# Patient Record
Sex: MALE | Race: WHITE | NOT HISPANIC OR LATINO | Employment: OTHER | URBAN - METROPOLITAN AREA
[De-identification: names, ages, dates, MRNs, and addresses within clinical notes are randomized per-mention and may not be internally consistent; named-entity substitution may affect disease eponyms.]

---

## 2019-05-28 ENCOUNTER — OFFICE VISIT (OUTPATIENT)
Dept: URGENT CARE | Facility: CLINIC | Age: 64
End: 2019-05-28
Payer: MEDICARE

## 2019-05-28 VITALS
OXYGEN SATURATION: 96 % | RESPIRATION RATE: 16 BRPM | HEART RATE: 89 BPM | SYSTOLIC BLOOD PRESSURE: 142 MMHG | TEMPERATURE: 96.8 F | DIASTOLIC BLOOD PRESSURE: 86 MMHG | HEIGHT: 72 IN | BODY MASS INDEX: 31.83 KG/M2 | WEIGHT: 235 LBS

## 2019-05-28 DIAGNOSIS — R10.9 FLANK PAIN: Primary | ICD-10-CM

## 2019-05-28 LAB
SL AMB  POCT GLUCOSE, UA: ABNORMAL
SL AMB LEUKOCYTE ESTERASE,UA: ABNORMAL
SL AMB POCT BILIRUBIN,UA: ABNORMAL
SL AMB POCT BLOOD,UA: ABNORMAL
SL AMB POCT CLARITY,UA: CLEAR
SL AMB POCT COLOR,UA: ABNORMAL
SL AMB POCT KETONES,UA: ABNORMAL
SL AMB POCT NITRITE,UA: ABNORMAL
SL AMB POCT PH,UA: 7.5
SL AMB POCT SPECIFIC GRAVITY,UA: 1.01
SL AMB POCT URINE PROTEIN: ABNORMAL
SL AMB POCT UROBILINOGEN: NORMAL

## 2019-05-28 PROCEDURE — G0463 HOSPITAL OUTPT CLINIC VISIT: HCPCS | Performed by: PHYSICIAN ASSISTANT

## 2019-05-28 PROCEDURE — 99213 OFFICE O/P EST LOW 20 MIN: CPT | Performed by: PHYSICIAN ASSISTANT

## 2019-05-28 PROCEDURE — 81002 URINALYSIS NONAUTO W/O SCOPE: CPT | Performed by: PHYSICIAN ASSISTANT

## 2019-05-28 RX ORDER — ESCITALOPRAM OXALATE 20 MG/1
20 TABLET ORAL DAILY
Refills: 1 | COMMUNITY
Start: 2019-04-05

## 2019-05-28 RX ORDER — AMLODIPINE BESYLATE 10 MG/1
10 TABLET ORAL DAILY
Refills: 0 | COMMUNITY
Start: 2019-05-20

## 2019-05-28 RX ORDER — HYDROCHLOROTHIAZIDE 25 MG/1
25 TABLET ORAL DAILY
Refills: 1 | COMMUNITY
Start: 2019-05-03

## 2019-05-28 RX ORDER — LEVOTHYROXINE SODIUM 0.07 MG/1
TABLET ORAL
COMMUNITY
Start: 2019-05-17

## 2019-05-28 RX ORDER — BUPROPION HYDROCHLORIDE 300 MG/1
TABLET ORAL
COMMUNITY
Start: 2019-05-25

## 2020-10-08 ENCOUNTER — OFFICE VISIT (OUTPATIENT)
Dept: URGENT CARE | Facility: CLINIC | Age: 65
End: 2020-10-08
Payer: MEDICARE

## 2020-10-08 VITALS
HEART RATE: 64 BPM | BODY MASS INDEX: 30.88 KG/M2 | OXYGEN SATURATION: 96 % | WEIGHT: 228 LBS | TEMPERATURE: 97.1 F | SYSTOLIC BLOOD PRESSURE: 143 MMHG | DIASTOLIC BLOOD PRESSURE: 67 MMHG | RESPIRATION RATE: 16 BRPM | HEIGHT: 72 IN
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
## 2020-10-08 DIAGNOSIS — S30.861A TICK BITE OF ABDOMEN, INITIAL ENCOUNTER: Primary | ICD-10-CM

## 2020-10-08 PROCEDURE — 99213 OFFICE O/P EST LOW 20 MIN: CPT | Performed by: PHYSICIAN ASSISTANT

## 2020-10-08 PROCEDURE — G0463 HOSPITAL OUTPT CLINIC VISIT: HCPCS | Performed by: PHYSICIAN ASSISTANT

## 2020-10-08 RX ORDER — DOXYCYCLINE 100 MG/1
100 TABLET ORAL 2 TIMES DAILY
Qty: 14 TABLET | Refills: 0 | Status: SHIPPED | OUTPATIENT
Start: 2020-10-08 | End: 2020-10-15

## 2022-09-23 ENCOUNTER — APPOINTMENT (EMERGENCY)
Dept: CT IMAGING | Facility: HOSPITAL | Age: 67
End: 2022-09-23
Payer: MEDICARE

## 2022-09-23 ENCOUNTER — HOSPITAL ENCOUNTER (EMERGENCY)
Facility: HOSPITAL | Age: 67
Discharge: HOME | End: 2022-09-23
Attending: EMERGENCY MEDICINE
Payer: MEDICARE

## 2022-09-23 VITALS
WEIGHT: 230 LBS | TEMPERATURE: 97.3 F | DIASTOLIC BLOOD PRESSURE: 83 MMHG | BODY MASS INDEX: 31.15 KG/M2 | OXYGEN SATURATION: 96 % | RESPIRATION RATE: 17 BRPM | HEIGHT: 72 IN | HEART RATE: 77 BPM | SYSTOLIC BLOOD PRESSURE: 143 MMHG

## 2022-09-23 DIAGNOSIS — T14.8XXA CONTUSION: ICD-10-CM

## 2022-09-23 DIAGNOSIS — S22.39XA RIB FRACTURE: Primary | ICD-10-CM

## 2022-09-23 PROCEDURE — 99283 EMERGENCY DEPT VISIT LOW MDM: CPT

## 2022-09-23 PROCEDURE — 99284 EMERGENCY DEPT VISIT MOD MDM: CPT | Performed by: PHYSICIAN ASSISTANT

## 2022-09-23 PROCEDURE — 71250 CT THORAX DX C-: CPT

## 2022-09-23 PROCEDURE — G1004 CDSM NDSC: HCPCS

## 2022-09-23 PROCEDURE — 96372 THER/PROPH/DIAG INJ SC/IM: CPT

## 2022-09-23 RX ORDER — KETOROLAC TROMETHAMINE 30 MG/ML
15 INJECTION, SOLUTION INTRAMUSCULAR; INTRAVENOUS ONCE
Status: COMPLETED | OUTPATIENT
Start: 2022-09-23 | End: 2022-09-23

## 2022-09-23 RX ORDER — BACITRACIN, NEOMYCIN, POLYMYXIN B 400; 3.5; 5 [USP'U]/G; MG/G; [USP'U]/G
1 OINTMENT TOPICAL ONCE
Status: COMPLETED | OUTPATIENT
Start: 2022-09-23 | End: 2022-09-23

## 2022-09-23 RX ORDER — LIDOCAINE 50 MG/G
1 PATCH TOPICAL ONCE
Status: DISCONTINUED | OUTPATIENT
Start: 2022-09-23 | End: 2022-09-23 | Stop reason: HOSPADM

## 2022-09-23 RX ORDER — LIDOCAINE 50 MG/G
1 PATCH TOPICAL DAILY
Qty: 6 PATCH | Refills: 0 | Status: SHIPPED | OUTPATIENT
Start: 2022-09-23

## 2022-09-23 RX ADMIN — BACITRACIN ZINC, NEOMYCIN, POLYMYXIN B 1 LARGE APPLICATION: 400; 3.5; 5 OINTMENT TOPICAL at 13:43

## 2022-09-23 RX ADMIN — KETOROLAC TROMETHAMINE 15 MG: 30 INJECTION, SOLUTION INTRAMUSCULAR at 13:35

## 2022-09-23 RX ADMIN — LIDOCAINE 5% 1 PATCH: 700 PATCH TOPICAL at 13:39

## 2022-09-23 NOTE — DISCHARGE INSTRUCTIONS
Please continue to take deep breaths to avoid developing pneumonia  Please alternate between Tylenol and ibuprofen as needed for pain control  Lidocaine patches of been sent to pharmacy  Please keep your wound clean and dry  Follow-up with your family doctor early next week  Please return with any new or worsening symptoms

## 2022-09-23 NOTE — ED PROVIDER NOTES
History  Chief Complaint   Patient presents with    Fall     Fall c/o right rib pain      74year old male presents to the emergency department for evaluation of right rib pain  Patient reports prior to arrival he was chasing after his puppy when he tripped and fell into the fireplace - which was off  States he hit the fireplace on his right side  States since he has had significant pain  Reports pain is worse with deep inspiration  He reports small contusion to the right side of his back  Denies any other pain  No head strike of LOC  History provided by:  Patient  Fall  Mechanism of injury: fall    Injury location:  Torso  Torso injury location:  Back  Incident location:  Home  Tetanus status:  Up to date  Associated symptoms: back pain    Associated symptoms: no abdominal pain, no blindness, no chest pain, no difficulty breathing, no headaches, no hearing loss, no loss of consciousness, no nausea, no neck pain, no seizures and no vomiting        Prior to Admission Medications   Prescriptions Last Dose Informant Patient Reported? Taking? amLODIPine (NORVASC) 10 mg tablet   Yes No   Sig: Take 10 mg by mouth daily   buPROPion (WELLBUTRIN XL) 300 mg 24 hr tablet   Yes No   escitalopram (LEXAPRO) 20 mg tablet   Yes No   Sig: Take 20 mg by mouth daily   hydrochlorothiazide (HYDRODIURIL) 25 mg tablet   Yes No   Sig: Take 25 mg by mouth daily   levothyroxine 75 mcg tablet   Yes No      Facility-Administered Medications: None       Past Medical History:   Diagnosis Date    Depression     Hypertension     Hypothyroidism        Past Surgical History:   Procedure Laterality Date    BACK SURGERY      TOTAL SHOULDER REPLACEMENT         Family History   Problem Relation Age of Onset    No Known Problems Mother     No Known Problems Father      I have reviewed and agree with the history as documented      E-Cigarette/Vaping     E-Cigarette/Vaping Substances     Social History     Tobacco Use    Smoking status: Light Tobacco Smoker    Smokeless tobacco: Never Used       Review of Systems   Constitutional: Negative  HENT: Negative for hearing loss  Eyes: Negative for blindness  Respiratory: Negative  Cardiovascular: Negative  Negative for chest pain  Gastrointestinal: Negative  Negative for abdominal pain, nausea and vomiting  Musculoskeletal: Positive for back pain  Negative for neck pain  Skin: Positive for wound  Neurological: Negative  Negative for seizures, loss of consciousness and headaches  All other systems reviewed and are negative  Physical Exam  Physical Exam  Vitals and nursing note reviewed  Constitutional:       General: He is not in acute distress  Appearance: Normal appearance  He is not ill-appearing, toxic-appearing or diaphoretic  HENT:      Head: Normocephalic and atraumatic  Nose: Nose normal       Mouth/Throat:      Mouth: Mucous membranes are moist    Eyes:      Conjunctiva/sclera: Conjunctivae normal    Cardiovascular:      Rate and Rhythm: Normal rate and regular rhythm  Pulmonary:      Effort: Pulmonary effort is normal  No respiratory distress  Breath sounds: Normal breath sounds  No stridor  No wheezing, rhonchi or rales  Abdominal:      General: Abdomen is flat  Bowel sounds are normal  There is no distension  Palpations: Abdomen is soft  Tenderness: There is no abdominal tenderness  There is no right CVA tenderness, left CVA tenderness or guarding  Musculoskeletal:         General: Tenderness present  No swelling or deformity  Normal range of motion  Cervical back: No tenderness or bony tenderness  No pain with movement  Normal range of motion  Thoracic back: Tenderness present  Back:       Comments: No midline back tenderness or step off deformities   Normal ROM in upper and lower extremities    Skin:     General: Skin is warm and dry  Capillary Refill: Capillary refill takes less than 2 seconds  Findings: Bruising (right side of back) present  Neurological:      General: No focal deficit present  Mental Status: He is alert and oriented to person, place, and time  Psychiatric:         Mood and Affect: Mood normal          Behavior: Behavior normal          Vital Signs  ED Triage Vitals   Temperature Pulse Respirations Blood Pressure SpO2   09/23/22 1314 09/23/22 1314 09/23/22 1314 09/23/22 1314 09/23/22 1314   (!) 97 3 °F (36 3 °C) 87 18 143/74 97 %      Temp Source Heart Rate Source Patient Position - Orthostatic VS BP Location FiO2 (%)   09/23/22 1314 09/23/22 1345 09/23/22 1345 09/23/22 1345 --   Temporal Monitor Sitting Left arm       Pain Score       09/23/22 1421       7           Vitals:    09/23/22 1314 09/23/22 1345   BP: 143/74 143/83   Pulse: 87 77   Patient Position - Orthostatic VS:  Sitting         Visual Acuity  Visual Acuity    Flowsheet Row Most Recent Value   L Pupil Size (mm) 3   R Pupil Size (mm) 3          ED Medications  Medications   lidocaine (LIDODERM) 5 % patch 1 patch (1 patch Topical Medication Applied 9/23/22 1339)   ketorolac (TORADOL) injection 15 mg (15 mg Intramuscular Given 9/23/22 1335)   neomycin-bacitracin-polymyxin b (NEOSPORIN) ointment 1 large application (1 large application Topical Given 9/23/22 1343)       Diagnostic Studies  Results Reviewed     None                 CT chest without contrast   Final Result by Michelle Jaramillo MD (09/23 1423)      1  Mildly displaced fracture of the right posterolateral 11th rib  No pneumothorax  2   Mild emphysema  3   Subcutaneous fat stranding in the right flank may be due to contusion  The study was marked in [facility] for immediate notification  Workstation performed: [id number]                    Procedures  Procedures         ED Course  ED Course as of 09/23/22 1604   Fri Sep 23, 2022   1425 CT chest: 1  Mildly displaced fracture of the right posterolateral 11th rib  No pneumothorax      2  Mild emphysema        3   Subcutaneous fat stranding in the right flank may be due to contusion  1440 I discussed results and findings with the patient  Incentive spirometer was ordered  We discussed pain control at home and strict return precautions  Patient agrees follow-up with PCP early next week  He was clinically and hemodynamically stable for discharge               [facility]  Number of Diagnoses or Management Options  Contusion: new and requires workup  Rib fracture: new and requires workup  Diagnosis management comments: 74year old male presents emergency department for evaluation of right-sided rib pain status post fall into an off fire place  Vitals and medical records reviewed  Lungs clear to auscultation  Tenderness to the right lateral ribs and right side of the back with some bruising  Abrasion over the right lower back  Abdomen is soft and nontender  Chest x-ray noted for 11th posterior lateral rib fracture, no pneumothorax as well as concerning for right flank contusion  I discussed results and findings with the patient, we discussed symptomatic treatment at home and strict return precautions  Patient was sent home with incentive spirometer  Patient was agreeable with treatment plan  He was clinically and hemodynamically stable for discharge       Amount and/or Complexity of Data Reviewed  Tests in the radiology section of CPT®: ordered and reviewed  Review and summarize past medical records: yes  Independent visualization of images, tracings, or specimens: yes        Disposition  Final diagnoses:   Rib fracture   Contusion     Time reflects when diagnosis was documented in both MDM as applicable and the Disposition within this note     Time User Action Codes Description Comment    9/23/2022  2:29 PM Jovanni Abarca [S22 39XA] Rib fracture     9/23/2022  2:29 PM Jovanni Coleman  8XXA] Contusion       ED Disposition     ED Disposition   Discharge    Condition   Stable Date/Time   Fri Sep 23, 2022  2:37 PM    Comment   Elisasaritaaugustin Bush discharge to home/self care  Follow-up Information     Follow up With Specialties Details Why Contact Info    Clarke Restrepo MD Family Medicine In 3 days for re-check [address]  [phone]            Discharge Medication List as of 9/23/2022  2:41 PM      START taking these medications    Details   lidocaine (Lidoderm) 5 % Apply 1 patch topically daily Remove & Discard patch within 12 hours or as directed by MD, Starting Fri 9/23/2022, Normal         CONTINUE these medications which have NOT CHANGED    Details   amLODIPine (NORVASC) 10 mg tablet Take 10 mg by mouth daily, Starting Mon 5/20/2019, Historical Med      buPROPion (WELLBUTRIN XL) 300 mg 24 hr tablet Starting Sat 5/25/2019, Historical Med      escitalopram (LEXAPRO) 20 mg tablet Take 20 mg by mouth daily, Starting Fri 4/5/2019, Historical Med      hydrochlorothiazide (HYDRODIURIL) 25 mg tablet Take 25 mg by mouth daily, Starting Fri 5/3/2019, Historical Med      levothyroxine 75 mcg tablet Starting Fri 5/17/2019, Historical Med             No discharge procedures on file      PDMP Review     None          ED Provider  Electronically Signed by           Gris Del Toro PA-C  09/23/22 6567

## 2022-10-03 ENCOUNTER — OFFICE VISIT (OUTPATIENT)
Dept: URGENT CARE | Facility: CLINIC | Age: 67
End: 2022-10-03
Payer: MEDICARE

## 2022-10-03 ENCOUNTER — APPOINTMENT (OUTPATIENT)
Dept: RADIOLOGY | Facility: CLINIC | Age: 67
End: 2022-10-03
Payer: MEDICARE

## 2022-10-03 VITALS
DIASTOLIC BLOOD PRESSURE: 61 MMHG | TEMPERATURE: 98.4 F | OXYGEN SATURATION: 98 % | WEIGHT: 230 LBS | RESPIRATION RATE: 16 BRPM | HEART RATE: 70 BPM | SYSTOLIC BLOOD PRESSURE: 102 MMHG | BODY MASS INDEX: 31.15 KG/M2 | HEIGHT: 72 IN

## 2022-10-03 DIAGNOSIS — S22.31XD CLOSED FRACTURE OF ONE RIB OF RIGHT SIDE WITH ROUTINE HEALING, SUBSEQUENT ENCOUNTER: ICD-10-CM

## 2022-10-03 DIAGNOSIS — S20.211D RIB CONTUSION, RIGHT, SUBSEQUENT ENCOUNTER: Primary | ICD-10-CM

## 2022-10-03 DIAGNOSIS — M94.0 COSTOCHONDRITIS: ICD-10-CM

## 2022-10-03 PROCEDURE — 71101 X-RAY EXAM UNILAT RIBS/CHEST: CPT

## 2022-10-03 PROCEDURE — G0463 HOSPITAL OUTPT CLINIC VISIT: HCPCS | Performed by: PHYSICIAN ASSISTANT

## 2022-10-03 PROCEDURE — 99213 OFFICE O/P EST LOW 20 MIN: CPT | Performed by: PHYSICIAN ASSISTANT

## 2022-10-03 RX ORDER — MELOXICAM 15 MG/1
15 TABLET ORAL DAILY
Qty: 30 TABLET | Refills: 0 | Status: SHIPPED | OUTPATIENT
Start: 2022-10-03

## 2022-10-03 RX ORDER — METHOCARBAMOL 500 MG/1
500 TABLET, FILM COATED ORAL 4 TIMES DAILY
Qty: 30 TABLET | Refills: 0 | Status: SHIPPED | OUTPATIENT
Start: 2022-10-03

## 2022-10-03 NOTE — PROGRESS NOTES
[email or web address]        NAME: Fortino Sanchez is a 74 y o  male  : 1955    MRN: [id number]  DATE: 2022  TIME: 8:11 AM    Assessment and Plan   Rib contusion, right, subsequent encounter [U11 823P]  4  Rib contusion, right, subsequent encounter  meloxicam (Mobic) 15 mg tablet   2  Closed fracture of one rib of right side with routine healing, subsequent encounter  XR ribs right w pa chest min 3 views    meloxicam (Mobic) 15 mg tablet   3  Costochondritis  methocarbamol (ROBAXIN) 500 mg tablet         Patient Instructions   Medications as prescribed  Splint with hand  Do not put anything around the chest   Patient to actively take deep breaths  Incentive spirometer 3 times every hour while awake  Tylenol  Ice  Follow up with PCP in 3-5 days  Proceed to  ER if symptoms worsen  Chief Complaint     Chief Complaint   Patient presents with    Rib Pain     Pt fell omn  and was seen in er- dx 2 fx right ribs  Was doing ok until  when he was coughing a lot and pain has increased tremendously since then         History of Present Illness       Patient is a 12-year-old male with significant past medical history of hypertension and hypothyroidism presents the office complaining of right-sided rib pain for 1 week  Patient had trip and fall and was diagnosed in the ER with rib fracture  States his pain was doing well but got significantly worse after he had a harsh cough a few days ago  Pain is rated 7/10 located to the right lateral ribs  States pain is now higher than previous rib fracture pain  Denies any trouble breathing  He used lidocaine patches but no relief  He was given an incentive spirometer but admits he has not been using as often as he should  States he has been able to walk the dog get air into his lungs  Review of Systems   Review of Systems   Constitutional: Negative for fever  Respiratory: Positive for cough  Negative for shortness of breath  Cardiovascular: Positive for chest pain (rib)  Current Medications       Current Outpatient Medications:     amLODIPine (NORVASC) 10 mg tablet, Take 10 mg by mouth daily, Disp: , Rfl: 0    buPROPion (WELLBUTRIN XL) 300 mg 24 hr tablet, , Disp: , Rfl:     escitalopram (LEXAPRO) 20 mg tablet, Take 20 mg by mouth daily, Disp: , Rfl: 1    hydrochlorothiazide (HYDRODIURIL) 25 mg tablet, Take 25 mg by mouth daily, Disp: , Rfl: 1    levothyroxine 75 mcg tablet, , Disp: , Rfl:     lidocaine (Lidoderm) 5 %, Apply 1 patch topically daily Remove & Discard patch within 12 hours or as directed by MD, Disp: 6 patch, Rfl: 0    meloxicam (Mobic) 15 mg tablet, Take 1 tablet (15 mg total) by mouth daily, Disp: 30 tablet, Rfl: 0    methocarbamol (ROBAXIN) 500 mg tablet, Take 1 tablet (500 mg total) by mouth 4 (four) times a day, Disp: 30 tablet, Rfl: 0    Current Allergies     Allergies as of 10/03/2022    (No Known Allergies)            The following portions of the patient's history were reviewed and updated as appropriate: allergies, current medications, past family history, past medical history, past social history, past surgical history and problem list      Past Medical History:   Diagnosis Date    Anxiety     Depression     Hypertension     Hypothyroidism        Past Surgical History:   Procedure Laterality Date    BACK SURGERY      TOTAL SHOULDER REPLACEMENT         Family History   Problem Relation Age of Onset    Dementia Mother     Blindness Mother     Dementia Father          Medications have been verified  Objective   /61   Pulse 70   Temp 98 4 °F (36 9 °C)   Resp 16   Ht 6' (1 829 m)   Wt 104 kg (230 lb)   SpO2 98%   BMI 31 19 kg/m²   No LMP for male patient  Physical Exam     Physical Exam  Vitals and nursing note reviewed  Constitutional:       Appearance: He is well-developed  HENT:      Head: Normocephalic and atraumatic        Right Ear: External ear normal  Left Ear: External ear normal       Nose: Nose normal    Eyes:      General: Lids are normal       Conjunctiva/sclera: Conjunctivae normal    Cardiovascular:      Rate and Rhythm: Normal rate and regular rhythm  Pulmonary:      Effort: Pulmonary effort is normal       Breath sounds: Examination of the right-lower field reveals rales  Examination of the left-lower field reveals rales  Rales present  Skin:     General: Skin is warm and dry  Capillary Refill: Capillary refill takes less than 2 seconds  Findings: No rash  Neurological:      Mental Status: He is alert  Right rib and chest XR: Healing 11 rib fx  No other acute osseous abnormalities  Radiology interpretation pending

## 2022-10-04 ENCOUNTER — TELEPHONE (OUTPATIENT)
Dept: URGENT CARE | Facility: CLINIC | Age: 67
End: 2022-10-04

## 2022-10-04 NOTE — TELEPHONE ENCOUNTER
Called patient to discuss radiology interpretation of recent right rib x-ray  Per Radiology,    1  Old fracture of the right 11th rib  2   2 fractures in the right 10th rib, the more anterior may be relatively acute      Advised patient to continue with treatment as discussed in the office to follow-up with his family doctor

## 2023-01-30 ENCOUNTER — APPOINTMENT (INPATIENT)
Dept: NON INVASIVE DIAGNOSTICS | Facility: HOSPITAL | Age: 68
End: 2023-01-30

## 2023-01-30 ENCOUNTER — HOSPITAL ENCOUNTER (INPATIENT)
Facility: HOSPITAL | Age: 68
LOS: 7 days | Discharge: HOME HEALTH | End: 2023-02-06
Attending: EMERGENCY MEDICINE | Admitting: FAMILY MEDICINE

## 2023-01-30 ENCOUNTER — APPOINTMENT (EMERGENCY)
Dept: RADIOLOGY | Facility: HOSPITAL | Age: 68
End: 2023-01-30

## 2023-01-30 ENCOUNTER — APPOINTMENT (INPATIENT)
Dept: CT IMAGING | Facility: HOSPITAL | Age: 68
End: 2023-01-30

## 2023-01-30 DIAGNOSIS — E83.42 HYPOMAGNESEMIA: ICD-10-CM

## 2023-01-30 DIAGNOSIS — I50.9 CONGESTIVE HEART FAILURE (CHF) (HCC): Primary | ICD-10-CM

## 2023-01-30 DIAGNOSIS — I49.8 BIGEMINY: ICD-10-CM

## 2023-01-30 DIAGNOSIS — G62.9 PERIPHERAL POLYNEUROPATHY: ICD-10-CM

## 2023-01-30 DIAGNOSIS — A40.8 SEPSIS DUE TO OTHER STREPTOCOCCUS SPECIES, UNSPECIFIED WHETHER ACUTE ORGAN DYSFUNCTION PRESENT (HCC): ICD-10-CM

## 2023-01-30 DIAGNOSIS — I49.3 FREQUENT PVCS: ICD-10-CM

## 2023-01-30 DIAGNOSIS — L97.521 ULCER OF LEFT FOOT, LIMITED TO BREAKDOWN OF SKIN (HCC): ICD-10-CM

## 2023-01-30 DIAGNOSIS — A41.9 SEPSIS, DUE TO UNSPECIFIED ORGANISM, UNSPECIFIED WHETHER ACUTE ORGAN DYSFUNCTION PRESENT (HCC): ICD-10-CM

## 2023-01-30 DIAGNOSIS — L97.509 FOOT ULCER (HCC): ICD-10-CM

## 2023-01-30 PROBLEM — E87.1 ACUTE HYPONATREMIA: Status: ACTIVE | Noted: 2023-01-30

## 2023-01-30 PROBLEM — F32.9 MDD (MAJOR DEPRESSIVE DISORDER): Status: ACTIVE | Noted: 2023-01-30

## 2023-01-30 PROBLEM — I10 HTN (HYPERTENSION): Status: ACTIVE | Noted: 2023-01-30

## 2023-01-30 PROBLEM — I35.0 MODERATE AORTIC STENOSIS: Status: ACTIVE | Noted: 2023-01-30

## 2023-01-30 PROBLEM — D72.829 LEUKOCYTOSIS: Status: ACTIVE | Noted: 2023-01-30

## 2023-01-30 PROBLEM — I50.33 ACUTE ON CHRONIC HEART FAILURE WITH PRESERVED EJECTION FRACTION (HCC): Status: ACTIVE | Noted: 2023-01-30

## 2023-01-30 LAB
2HR DELTA HS TROPONIN: -5 NG/L
4HR DELTA HS TROPONIN: 22 NG/L
ALBUMIN SERPL BCP-MCNC: 3.9 G/DL (ref 3.5–5)
ALP SERPL-CCNC: 59 U/L (ref 34–104)
ALT SERPL W P-5'-P-CCNC: 20 U/L (ref 7–52)
ANION GAP SERPL CALCULATED.3IONS-SCNC: 11 MMOL/L (ref 4–13)
ANION GAP SERPL CALCULATED.3IONS-SCNC: 8 MMOL/L (ref 4–13)
AORTIC ROOT: 3.8 CM
AORTIC VALVE MEAN VELOCITY: 25.9 M/S
APICAL FOUR CHAMBER EJECTION FRACTION: 57 %
APTT PPP: 30 SECONDS (ref 23–37)
ASCENDING AORTA: 3.3 CM
AST SERPL W P-5'-P-CCNC: 24 U/L (ref 13–39)
AV AREA BY CONTINUOUS VTI: 1.5 CM2
AV AREA PEAK VELOCITY: 1.3 CM2
AV LVOT MEAN GRADIENT: 2 MMHG
AV LVOT PEAK GRADIENT: 4 MMHG
AV MEAN GRADIENT: 30 MMHG
AV PEAK GRADIENT: 50 MMHG
AV REGURGITATION PRESSURE HALF TIME: 777 MS
AV VALVE AREA: 1.51 CM2
AV VELOCITY RATIO: 0.3
BACTERIA UR QL AUTO: NORMAL /HPF
BASOPHILS # BLD AUTO: 0.04 THOUSANDS/ÂΜL (ref 0–0.1)
BASOPHILS NFR BLD AUTO: 0 % (ref 0–1)
BILIRUB SERPL-MCNC: 0.62 MG/DL (ref 0.2–1)
BILIRUB UR QL STRIP: NEGATIVE
BNP SERPL-MCNC: 157 PG/ML (ref 0–100)
BUN SERPL-MCNC: 18 MG/DL (ref 5–25)
BUN SERPL-MCNC: 20 MG/DL (ref 5–25)
CALCIUM SERPL-MCNC: 8.5 MG/DL (ref 8.4–10.2)
CALCIUM SERPL-MCNC: 8.7 MG/DL (ref 8.4–10.2)
CARDIAC TROPONIN I PNL SERPL HS: 38 NG/L
CARDIAC TROPONIN I PNL SERPL HS: 43 NG/L
CARDIAC TROPONIN I PNL SERPL HS: 65 NG/L
CHLORIDE SERPL-SCNC: 100 MMOL/L (ref 96–108)
CHLORIDE SERPL-SCNC: 99 MMOL/L (ref 96–108)
CLARITY UR: CLEAR
CO2 SERPL-SCNC: 21 MMOL/L (ref 21–32)
CO2 SERPL-SCNC: 26 MMOL/L (ref 21–32)
COLOR UR: YELLOW
CREAT SERPL-MCNC: 1.27 MG/DL (ref 0.6–1.3)
CREAT SERPL-MCNC: 1.3 MG/DL (ref 0.6–1.3)
DOP CALC AO PEAK VEL: 3.52 M/S
DOP CALC AO VTI: 77.58 CM
DOP CALC LVOT AREA: 4.52 CM2
DOP CALC LVOT DIAMETER: 2.4 CM
DOP CALC LVOT PEAK VEL VTI: 25.86 CM
DOP CALC LVOT PEAK VEL: 1.05 M/S
DOP CALC LVOT STROKE INDEX: 51.1 ML/M2
DOP CALC LVOT STROKE VOLUME: 116.93
E WAVE DECELERATION TIME: 217 MS
EOSINOPHIL # BLD AUTO: 0 THOUSAND/ÂΜL (ref 0–0.61)
EOSINOPHIL NFR BLD AUTO: 0 % (ref 0–6)
ERYTHROCYTE [DISTWIDTH] IN BLOOD BY AUTOMATED COUNT: 13.2 % (ref 11.6–15.1)
FLUAV RNA RESP QL NAA+PROBE: NEGATIVE
FLUBV RNA RESP QL NAA+PROBE: NEGATIVE
FRACTIONAL SHORTENING: 36 (ref 28–44)
GFR SERPL CREATININE-BSD FRML MDRD: 55 ML/MIN/1.73SQ M
GFR SERPL CREATININE-BSD FRML MDRD: 57 ML/MIN/1.73SQ M
GLUCOSE SERPL-MCNC: 151 MG/DL (ref 65–140)
GLUCOSE SERPL-MCNC: 188 MG/DL (ref 65–140)
GLUCOSE UR STRIP-MCNC: NEGATIVE MG/DL
HCT VFR BLD AUTO: 48.3 % (ref 36.5–49.3)
HGB BLD-MCNC: 16.4 G/DL (ref 12–17)
HGB UR QL STRIP.AUTO: ABNORMAL
IMM GRANULOCYTES # BLD AUTO: 0.07 THOUSAND/UL (ref 0–0.2)
IMM GRANULOCYTES NFR BLD AUTO: 1 % (ref 0–2)
INR PPP: 1.03 (ref 0.84–1.19)
INTERVENTRICULAR SEPTUM IN DIASTOLE (PARASTERNAL SHORT AXIS VIEW): 1.6 CM
INTERVENTRICULAR SEPTUM: 1.6 CM (ref 0.6–1.1)
KETONES UR STRIP-MCNC: NEGATIVE MG/DL
LAAS-AP2: 28.1 CM2
LAAS-AP4: 22.3 CM2
LACTATE SERPL-SCNC: 1.2 MMOL/L (ref 0.5–2)
LEFT ATRIUM SIZE: 4.1 CM
LEFT INTERNAL DIMENSION IN SYSTOLE: 2.7 CM (ref 2.1–4)
LEFT VENTRICLE DIASTOLIC VOLUME (MOD BIPLANE): 156 ML
LEFT VENTRICLE SYSTOLIC VOLUME (MOD BIPLANE): 76 ML
LEFT VENTRICULAR INTERNAL DIMENSION IN DIASTOLE: 4.2 CM (ref 3.5–6)
LEFT VENTRICULAR POSTERIOR WALL IN END DIASTOLE: 1.7 CM
LEFT VENTRICULAR STROKE VOLUME: 51 ML
LEUKOCYTE ESTERASE UR QL STRIP: NEGATIVE
LV EF: 51 %
LVSV (TEICH): 51 ML
LYMPHOCYTES # BLD AUTO: 0.58 THOUSANDS/ÂΜL (ref 0.6–4.47)
LYMPHOCYTES NFR BLD AUTO: 5 % (ref 14–44)
MAGNESIUM SERPL-MCNC: 1.8 MG/DL (ref 1.9–2.7)
MAGNESIUM SERPL-MCNC: 2.1 MG/DL (ref 1.9–2.7)
MCH RBC QN AUTO: 32 PG (ref 26.8–34.3)
MCHC RBC AUTO-ENTMCNC: 34 G/DL (ref 31.4–37.4)
MCV RBC AUTO: 94 FL (ref 82–98)
MONOCYTES # BLD AUTO: 1.04 THOUSAND/ÂΜL (ref 0.17–1.22)
MONOCYTES NFR BLD AUTO: 9 % (ref 4–12)
MV E'TISSUE VEL-LAT: 6 CM/S
MV E'TISSUE VEL-SEP: 6 CM/S
MV PEAK A VEL: 0.86 M/S
MV PEAK E VEL: 89 CM/S
MV STENOSIS PRESSURE HALF TIME: 63 MS
MV VALVE AREA P 1/2 METHOD: 3.49
NEUTROPHILS # BLD AUTO: 9.48 THOUSANDS/ÂΜL (ref 1.85–7.62)
NEUTS SEG NFR BLD AUTO: 85 % (ref 43–75)
NITRITE UR QL STRIP: NEGATIVE
NON-SQ EPI CELLS URNS QL MICRO: NORMAL /HPF
NRBC BLD AUTO-RTO: 0 /100 WBCS
PH UR STRIP.AUTO: 6.5 [PH]
PLATELET # BLD AUTO: 190 THOUSANDS/UL (ref 149–390)
PMV BLD AUTO: 9.8 FL (ref 8.9–12.7)
POTASSIUM SERPL-SCNC: 3.5 MMOL/L (ref 3.5–5.3)
POTASSIUM SERPL-SCNC: 3.7 MMOL/L (ref 3.5–5.3)
PROT SERPL-MCNC: 7.2 G/DL (ref 6.4–8.4)
PROT UR STRIP-MCNC: NEGATIVE MG/DL
PROTHROMBIN TIME: 13.6 SECONDS (ref 11.6–14.5)
PV PEAK GRADIENT: 16 MMHG
RA PRESSURE ESTIMATED: 15 MMHG
RBC # BLD AUTO: 5.13 MILLION/UL (ref 3.88–5.62)
RBC #/AREA URNS AUTO: NORMAL /HPF
RIGHT ATRIUM AREA SYSTOLE A4C: 24 CM2
RIGHT VENTRICLE ID DIMENSION: 4.5 CM
RSV RNA RESP QL NAA+PROBE: NEGATIVE
RV PSP: 41 MMHG
SARS-COV-2 RNA RESP QL NAA+PROBE: NEGATIVE
SL CV AV DECELERATION TIME RETROGRADE: 2679 MS
SL CV AV PEAK GRADIENT RETROGRADE: 42 MMHG
SL CV LEFT ATRIUM LENGTH A2C: 6.1 CM
SL CV LV EF: 60
SL CV PED ECHO LEFT VENTRICLE DIASTOLIC VOLUME (MOD BIPLANE) 2D: 79 ML
SL CV PED ECHO LEFT VENTRICLE SYSTOLIC VOLUME (MOD BIPLANE) 2D: 28 ML
SODIUM SERPL-SCNC: 131 MMOL/L (ref 135–147)
SODIUM SERPL-SCNC: 134 MMOL/L (ref 135–147)
SP GR UR STRIP.AUTO: <=1.005 (ref 1–1.03)
TR MAX PG: 26 MMHG
TR PEAK VELOCITY: 2.6 M/S
TRICUSPID VALVE PEAK REGURGITATION VELOCITY: 2.56 M/S
UROBILINOGEN UR QL STRIP.AUTO: 0.2 E.U./DL
WBC # BLD AUTO: 11.21 THOUSAND/UL (ref 4.31–10.16)
WBC #/AREA URNS AUTO: NORMAL /HPF

## 2023-01-30 RX ORDER — DEXAMETHASONE SODIUM PHOSPHATE 4 MG/ML
10 INJECTION, SOLUTION INTRA-ARTICULAR; INTRALESIONAL; INTRAMUSCULAR; INTRAVENOUS; SOFT TISSUE ONCE
Status: COMPLETED | OUTPATIENT
Start: 2023-01-30 | End: 2023-01-30

## 2023-01-30 RX ORDER — CEFTRIAXONE 1 G/50ML
1000 INJECTION, SOLUTION INTRAVENOUS EVERY 24 HOURS
Status: DISCONTINUED | OUTPATIENT
Start: 2023-01-30 | End: 2023-02-01

## 2023-01-30 RX ORDER — POTASSIUM CHLORIDE 20 MEQ/1
20 TABLET, EXTENDED RELEASE ORAL ONCE
Status: COMPLETED | OUTPATIENT
Start: 2023-01-30 | End: 2023-01-30

## 2023-01-30 RX ORDER — FUROSEMIDE 10 MG/ML
40 INJECTION INTRAMUSCULAR; INTRAVENOUS 2 TIMES DAILY
Status: DISCONTINUED | OUTPATIENT
Start: 2023-01-30 | End: 2023-01-31

## 2023-01-30 RX ORDER — LEVOTHYROXINE SODIUM 0.07 MG/1
75 TABLET ORAL
Status: DISCONTINUED | OUTPATIENT
Start: 2023-01-30 | End: 2023-02-06 | Stop reason: HOSPADM

## 2023-01-30 RX ORDER — MAGNESIUM SULFATE 1 G/100ML
1 INJECTION INTRAVENOUS ONCE
Status: COMPLETED | OUTPATIENT
Start: 2023-01-30 | End: 2023-01-30

## 2023-01-30 RX ORDER — FUROSEMIDE 10 MG/ML
40 INJECTION INTRAMUSCULAR; INTRAVENOUS ONCE
Status: COMPLETED | OUTPATIENT
Start: 2023-01-30 | End: 2023-01-30

## 2023-01-30 RX ORDER — POTASSIUM CHLORIDE 20 MEQ/1
20 TABLET, EXTENDED RELEASE ORAL 2 TIMES DAILY
Status: DISCONTINUED | OUTPATIENT
Start: 2023-01-30 | End: 2023-02-06 | Stop reason: HOSPADM

## 2023-01-30 RX ORDER — AMLODIPINE BESYLATE 10 MG/1
10 TABLET ORAL DAILY
Status: DISCONTINUED | OUTPATIENT
Start: 2023-01-30 | End: 2023-02-06 | Stop reason: HOSPADM

## 2023-01-30 RX ORDER — ENOXAPARIN SODIUM 100 MG/ML
40 INJECTION SUBCUTANEOUS
Status: DISCONTINUED | OUTPATIENT
Start: 2023-01-30 | End: 2023-02-06 | Stop reason: HOSPADM

## 2023-01-30 RX ORDER — GABAPENTIN 100 MG/1
100 CAPSULE ORAL 2 TIMES DAILY
Status: DISCONTINUED | OUTPATIENT
Start: 2023-01-30 | End: 2023-02-06 | Stop reason: HOSPADM

## 2023-01-30 RX ORDER — ACETAMINOPHEN 325 MG/1
650 TABLET ORAL EVERY 4 HOURS PRN
Status: DISCONTINUED | OUTPATIENT
Start: 2023-01-30 | End: 2023-02-06 | Stop reason: HOSPADM

## 2023-01-30 RX ORDER — ACETAMINOPHEN 325 MG/1
650 TABLET ORAL ONCE
Status: DISCONTINUED | OUTPATIENT
Start: 2023-01-30 | End: 2023-01-30

## 2023-01-30 RX ORDER — BUPROPION HYDROCHLORIDE 150 MG/1
300 TABLET ORAL DAILY
Status: DISCONTINUED | OUTPATIENT
Start: 2023-01-30 | End: 2023-02-06 | Stop reason: HOSPADM

## 2023-01-30 RX ADMIN — ENOXAPARIN SODIUM 40 MG: 40 INJECTION SUBCUTANEOUS at 13:16

## 2023-01-30 RX ADMIN — FUROSEMIDE 40 MG: 10 INJECTION, SOLUTION INTRAMUSCULAR; INTRAVENOUS at 11:19

## 2023-01-30 RX ADMIN — BUPROPION HYDROCHLORIDE 300 MG: 150 TABLET, EXTENDED RELEASE ORAL at 13:16

## 2023-01-30 RX ADMIN — MAGNESIUM SULFATE HEPTAHYDRATE 1 G: 1 INJECTION, SOLUTION INTRAVENOUS at 10:31

## 2023-01-30 RX ADMIN — POTASSIUM CHLORIDE 20 MEQ: 20 TABLET, EXTENDED RELEASE ORAL at 13:17

## 2023-01-30 RX ADMIN — GABAPENTIN 100 MG: 100 CAPSULE ORAL at 13:17

## 2023-01-30 RX ADMIN — FUROSEMIDE 40 MG: 10 INJECTION, SOLUTION INTRAMUSCULAR; INTRAVENOUS at 17:26

## 2023-01-30 RX ADMIN — AMLODIPINE BESYLATE 10 MG: 10 TABLET ORAL at 13:17

## 2023-01-30 RX ADMIN — POTASSIUM CHLORIDE 20 MEQ: 20 TABLET, EXTENDED RELEASE ORAL at 17:25

## 2023-01-30 RX ADMIN — DEXAMETHASONE SODIUM PHOSPHATE 10 MG: 4 INJECTION, SOLUTION INTRAMUSCULAR; INTRAVENOUS at 08:25

## 2023-01-30 RX ADMIN — LEVOTHYROXINE SODIUM 75 MCG: 75 TABLET ORAL at 13:17

## 2023-01-30 RX ADMIN — POTASSIUM CHLORIDE 20 MEQ: 20 TABLET, EXTENDED RELEASE ORAL at 11:19

## 2023-01-30 RX ADMIN — CEFTRIAXONE 1000 MG: 1 INJECTION, SOLUTION INTRAVENOUS at 20:18

## 2023-01-30 RX ADMIN — SODIUM CHLORIDE 1000 ML: 0.9 INJECTION, SOLUTION INTRAVENOUS at 08:26

## 2023-01-30 RX ADMIN — GABAPENTIN 100 MG: 100 CAPSULE ORAL at 20:18

## 2023-01-30 NOTE — Clinical Note
Patient to [address] via litter with CRNA escort  Report given to [facility], bedside nurse

## 2023-01-30 NOTE — ED PROVIDER NOTES
History  Chief Complaint   Patient presents with   • Fever - 9 weeks to 74 years     Patient c/o fever, leg pain and weakness  Patient had recent shingle vaccine  Patient is a 68-year-old male history of hypertension hypothyroidism anxiety and depression presents the emergency department complaining of generalized weakness and fatigue and not feeling well along with a cough and URI symptoms and subjective fevers and neuropathy in the bilateral feet which is chronic but worsening  No chest pain no shortness of breath  Symptoms started about 4 days ago after getting shingles vaccine  History provided by:  Patient  Fever - 75 years or older  Temp source:  Subjective  Severity:  Mild  Onset quality:  Gradual  Duration:  4 days  Timing:  Constant  Progression:  Worsening  Chronicity:  New  Associated symptoms: congestion and cough    Associated symptoms: no chest pain, no chills, no diarrhea, no dysuria, no ear pain, no headaches, no myalgias, no nausea, no rash, no rhinorrhea, no sore throat and no vomiting        Prior to Admission Medications   Prescriptions Last Dose Informant Patient Reported? Taking?    amLODIPine (NORVASC) 10 mg tablet   Yes No   Sig: Take 10 mg by mouth daily   buPROPion (WELLBUTRIN XL) 300 mg 24 hr tablet   Yes No   escitalopram (LEXAPRO) 20 mg tablet   Yes No   Sig: Take 20 mg by mouth daily   hydrochlorothiazide (HYDRODIURIL) 25 mg tablet   Yes No   Sig: Take 25 mg by mouth daily   levothyroxine 75 mcg tablet   Yes No   lidocaine (Lidoderm) 5 %   No No   Sig: Apply 1 patch topically daily Remove & Discard patch within 12 hours or as directed by MD   meloxicam (Mobic) 15 mg tablet   No No   Sig: Take 1 tablet (15 mg total) by mouth daily   methocarbamol (ROBAXIN) 500 mg tablet   No No   Sig: Take 1 tablet (500 mg total) by mouth 4 (four) times a day      Facility-Administered Medications: None       Past Medical History:   Diagnosis Date   • Anxiety    • Depression    • Hypertension • Hypothyroidism        Past Surgical History:   Procedure Laterality Date   • BACK SURGERY     • TOTAL SHOULDER REPLACEMENT         Family History   Problem Relation Age of Onset   • Dementia Mother    • Blindness Mother    • Dementia Father      I have reviewed and agree with the history as documented  E-Cigarette/Vaping   • E-Cigarette Use Never User      E-Cigarette/Vaping Substances     Social History     Tobacco Use   • Smoking status: Light Smoker   • Smokeless tobacco: Never   Vaping Use   • Vaping Use: Never used   Substance Use Topics   • Alcohol use: Not Currently   • Drug use: Not Currently       Review of Systems   Constitutional: Positive for activity change, fatigue and fever  Negative for appetite change and chills  HENT: Positive for congestion  Negative for ear pain, rhinorrhea and sore throat  Eyes: Negative for discharge, redness and visual disturbance  Respiratory: Positive for cough and shortness of breath  Negative for chest tightness and wheezing  Cardiovascular: Negative for chest pain and palpitations  Gastrointestinal: Negative for abdominal pain, constipation, diarrhea, nausea and vomiting  Endocrine: Negative for polydipsia and polyuria  Genitourinary: Negative for difficulty urinating, dysuria, frequency, hematuria and urgency  Musculoskeletal: Negative for arthralgias and myalgias  Neuropathy bilateral feet   Skin: Negative for color change, pallor and rash  Neurological: Negative for dizziness, weakness, light-headedness, numbness and headaches  Hematological: Negative for adenopathy  Does not bruise/bleed easily  All other systems reviewed and are negative  Physical Exam  Physical Exam  Vitals and nursing note reviewed  Constitutional:       Appearance: He is well-developed  HENT:      Head: Normocephalic and atraumatic        Right Ear: External ear normal       Left Ear: External ear normal       Nose: Nose normal    Eyes: Conjunctiva/sclera: Conjunctivae normal       Pupils: Pupils are equal, round, and reactive to light  Cardiovascular:      Rate and Rhythm: Normal rate and regular rhythm  Heart sounds: Normal heart sounds  Pulmonary:      Effort: Pulmonary effort is normal  No respiratory distress  Breath sounds: Normal breath sounds  No wheezing or rales  Chest:      Chest wall: No tenderness  Abdominal:      General: Bowel sounds are normal  There is no distension  Palpations: Abdomen is soft  Tenderness: There is no abdominal tenderness  There is no guarding  Musculoskeletal:         General: Normal range of motion  Cervical back: Normal range of motion and neck supple  Skin:     General: Skin is warm and dry  Neurological:      Mental Status: He is alert and oriented to person, place, and time  Cranial Nerves: No cranial nerve deficit  Sensory: No sensory deficit           Vital Signs  ED Triage Vitals   Temperature Pulse Respirations Blood Pressure SpO2   01/30/23 0812 01/30/23 0812 01/30/23 0812 01/30/23 0812 01/30/23 0812   98 7 °F (37 1 °C) 88 18 136/70 95 %      Temp Source Heart Rate Source Patient Position - Orthostatic VS BP Location FiO2 (%)   01/30/23 0812 01/30/23 0812 01/30/23 0812 01/30/23 0812 --   Temporal Monitor Lying Right arm       Pain Score       01/30/23 0845       4           Vitals:    01/30/23 0812 01/30/23 0845 01/30/23 0900 01/30/23 1031   BP: 136/70 127/67 130/68 112/57   Pulse: 88 57 57 72   Patient Position - Orthostatic VS: Lying Lying  Sitting         Visual Acuity      ED Medications  Medications   acetaminophen (TYLENOL) tablet 650 mg (650 mg Oral Not Given 1/30/23 0193)   magnesium sulfate IVPB (premix) SOLN 1 g (1 g Intravenous New Bag 1/30/23 1031)   potassium chloride (K-DUR,KLOR-CON) CR tablet 20 mEq (has no administration in time range)   furosemide (LASIX) injection 40 mg (has no administration in time range)   sodium chloride 0 9 % bolus 1,000 mL (0 mL Intravenous Stopped 1/30/23 1033)   dexamethasone (DECADRON) injection 10 mg (10 mg Intravenous Given 1/30/23 0825)       Diagnostic Studies  Results Reviewed     Procedure Component Value Units Date/Time    HS Troponin I 2hr [[id number]]  (Normal) Collected: 01/30/23 1031    Lab Status: Final result Specimen: Blood from Arm, Right Updated: 01/30/23 1100     hs TnI 2hr 38 ng/L      Delta 2hr hsTnI -5 ng/L     HS Troponin I 4hr [[id number]]     Lab Status: No result Specimen: Blood     Urine Microscopic [[id number]]  (Normal) Collected: 01/30/23 0959    Lab Status: Final result Specimen: Urine, Clean Catch Updated: 01/30/23 1014     RBC, UA 1-2 /hpf      WBC, UA 0-1 /hpf      Epithelial Cells Occasional /hpf      Bacteria, UA None Seen /hpf     UA w Reflex to Microscopic w Reflex to Culture [[id number]]  (Abnormal) Collected: 01/30/23 0959    Lab Status: Final result Specimen: Urine, Clean Catch Updated: 01/30/23 1006     Color, UA Yellow     Clarity, UA Clear     Specific Gravity, UA <=1 005     pH, UA 6 5     Leukocytes, UA Negative     Nitrite, UA Negative     Protein, UA Negative mg/dl      Glucose, UA Negative mg/dl      Ketones, UA Negative mg/dl      Urobilinogen, UA 0 2 E U /dl      Bilirubin, UA Negative     Occult Blood, UA Small    B-Type Natriuretic Peptide(BNP) [[id number]]  (Abnormal) Collected: 01/30/23 0825    Lab Status: Final result Specimen: Blood from Arm, Right Updated: 01/30/23 0951      pg/mL     Lactic acid [[id number]]  (Normal) Collected: 01/30/23 0825    Lab Status: Final result Specimen: Blood from Arm, Right Updated: 01/30/23 0916     LACTIC ACID 1 2 mmol/L     Narrative:      Result may be elevated if tourniquet was used during collection      HS Troponin 0hr (reflex protocol) [[id number]]  (Normal) Collected: 01/30/23 0825    Lab Status: Final result Specimen: Blood from Arm, Right Updated: 01/30/23 0911     hs TnI 0hr 43 ng/L     FLU/RSV/COVID - if FLU/RSV clinically relevant [[id number]]  (Normal) Collected: 01/30/23 0825    Lab Status: Final result Specimen: Nares from Nose Updated: 01/30/23 0911     SARS-CoV-2 Negative     INFLUENZA A PCR Negative     INFLUENZA B PCR Negative     RSV PCR Negative    Narrative:      FOR PEDIATRIC PATIENTS - copy/paste COVID Guidelines URL to browser: [email or web address]    SARS-CoV-2 assay is a Nucleic Acid Amplification assay intended for the  qualitative detection of nucleic acid from SARS-CoV-2 in nasopharyngeal  swabs  Results are for the presumptive identification of SARS-CoV-2 RNA  Positive results are indicative of infection with SARS-CoV-2, the virus  causing COVID-19, but do not rule out bacterial infection or co-infection  with other viruses  Laboratories within the [place] and its  territories are required to report all positive results to the appropriate  public health authorities  Negative results do not preclude SARS-CoV-2  infection and should not be used as the sole basis for treatment or other  patient management decisions  Negative results must be combined with  clinical observations, patient history, and epidemiological information  This test has not been FDA cleared or approved  This test has been authorized by FDA under an Emergency Use Authorization  (EUA)  This test is only authorized for the duration of time the  declaration that circumstances exist justifying the authorization of the  emergency use of an in vitro diagnostic tests for detection of SARS-CoV-2  virus and/or diagnosis of COVID-19 infection under section 564(b)(1) of  the Act, 21 U  S C  396KBK-4(Z)(1), unless the authorization is terminated  or revoked sooner  The test has been validated but independent review by FDA  and CLIA is pending  Test performed using [employer] Gene[employer] Greenpert: This RT-PCR assay targets N2,  a region unique to SARS-CoV-2   A conserved region in the E-gene was chosen  for pan-Sarbecovirus detection which includes SARS-CoV-2  According to CMS-2020-01-R, this platform meets the definition of high-throughput technology      Comprehensive metabolic panel [[id number]]  (Abnormal) Collected: 01/30/23 0825    Lab Status: Final result Specimen: Blood from Arm, Right Updated: 01/30/23 0909     Sodium 131 mmol/L      Potassium 3 5 mmol/L      Chloride 99 mmol/L      CO2 21 mmol/L      ANION GAP 11 mmol/L      BUN 18 mg/dL      Creatinine 1 27 mg/dL      Glucose 151 mg/dL      Calcium 8 7 mg/dL      AST 24 U/L      ALT 20 U/L      Alkaline Phosphatase 59 U/L      Total Protein 7 2 g/dL      Albumin 3 9 g/dL      Total Bilirubin 0 62 mg/dL      eGFR 57 ml/min/1 73sq m     Narrative:      [place] guidelines for Chronic Kidney Disease (CKD):   •  Stage 1 with normal or high GFR (GFR > 90 mL/min/1 73 square meters)  •  Stage 2 Mild CKD (GFR = 60-89 mL/min/1 73 square meters)  •  Stage 3A Moderate CKD (GFR = 45-59 mL/min/1 73 square meters)  •  Stage 3B Moderate CKD (GFR = 30-44 mL/min/1 73 square meters)  •  Stage 4 Severe CKD (GFR = 15-29 mL/min/1 73 square meters)  •  Stage 5 End Stage CKD (GFR <15 mL/min/1 73 square meters)  Note: GFR calculation is accurate only with a steady state creatinine    Magnesium [[id number]]  (Abnormal) Collected: 01/30/23 0825    Lab Status: Final result Specimen: Blood from Arm, Right Updated: 01/30/23 0909     Magnesium 1 8 mg/dL     Protime-INR [[id number]]  (Normal) Collected: 01/30/23 0825    Lab Status: Final result Specimen: Blood from Arm, Right Updated: 01/30/23 0859     Protime 13 6 seconds      INR 1 03    APTT [[id number]]  (Normal) Collected: 01/30/23 0825    Lab Status: Final result Specimen: Blood from Arm, Right Updated: 01/30/23 0859     PTT 30 seconds     CBC and differential [[id number]]  (Abnormal) Collected: 01/30/23 0825    Lab Status: Final result Specimen: Blood from Arm, Right Updated: 01/30/23 0836     WBC 11 21 Thousand/uL      RBC 5 13 Million/uL      Hemoglobin 16 4 g/dL      Hematocrit 48 3 %      MCV 94 fL      MCH 32 0 pg      MCHC 34 0 g/dL      RDW 13 2 %      MPV 9 8 fL      Platelets 880 Thousands/uL      nRBC 0 /100 WBCs      Neutrophils Relative 85 %      Immat GRANS % 1 %      Lymphocytes Relative 5 %      Monocytes Relative 9 %      Eosinophils Relative 0 %      Basophils Relative 0 %      Neutrophils Absolute 9 48 Thousands/µL      Immature Grans Absolute 0 07 Thousand/uL      Lymphocytes Absolute 0 58 Thousands/µL      Monocytes Absolute 1 04 Thousand/µL      Eosinophils Absolute 0 00 Thousand/µL      Basophils Absolute 0 04 Thousands/µL                  XR chest 1 view portable   Final Result by Katarzyna Fernández MD (01/30 1014)      Development of mild vascular congestion                  Workstation performed: [id number]                    Procedures  ECG 12 Lead Documentation Only    Date/Time: 1/30/2023 8:44 AM  Performed by: Leno Goldstein DO  Authorized by: Leno Goldstein DO     ECG reviewed by me, the ED Provider: yes    Patient location:  ED  Previous ECG:     Comparison to cardiac monitor: Yes    Quality:     Tracing quality:  Limited by artifact  Rate:     ECG rate:  87    ECG rate assessment: normal    Rhythm:     Rhythm: sinus rhythm    Ectopy:     Ectopy: PVCs      PVCs:  Frequent  QRS:     QRS axis:  Normal    QRS intervals:  Normal  Conduction:     Conduction: normal    ST segments:     ST segments:  Non-specific  T waves:     T waves: non-specific               ED Course  ED Course as of 01/30/23 1110   Mon Jan 30, 2023   0922 hs TnI 0hr: 43   noted within normal limits patient has no acute chest pain or shortness of breath symptoms does have generalized fatigue and URI symptoms  Will obtain second troponin in ED to ensure no change in this patient being treated with IV fluids anti-inflammatories and Tylenol for now       1106  spoke with Dr Sukh Granda cardiology on-call reviewed case and findings in the emergency department recommends replete electrolytes and admit to hospital service for congestive heart failure and history of aortic stenosis  18  spoke with Dr Katlyn Richmond hospitalist on-call reviewed case and findings in the emergency department and management thus far and cardiology recommendations she accepts for admission  Medical Decision Making  Bigeminy: acute illness or injury  Congestive heart failure (CHF) ([place] [id number] ): acute illness or injury  Frequent PVCs: acute illness or injury  Hypomagnesemia: acute illness or injury  Amount and/or Complexity of Data Reviewed  External Data Reviewed: labs, radiology, ECG and notes  Labs: ordered  Decision-making details documented in ED Course  Radiology: ordered and independent interpretation performed  Decision-making details documented in ED Course  ECG/medicine tests: ordered and independent interpretation performed  Decision-making details documented in ED Course  Risk  OTC drugs  Prescription drug management  Decision regarding hospitalization            Disposition  Final diagnoses:   Bigeminy   Congestive heart failure (CHF) ([place] [id number] )   Frequent PVCs   Hypomagnesemia     Time reflects when diagnosis was documented in both MDM as applicable and the Disposition within this note     Time User Action Codes Description Comment    1/30/2023 11:04 AM Theotis Oh Add [I49 8] Bigeminy     1/30/2023 11:04 AM Remaley, Firman Remedies Add [I50 9] Congestive heart failure (CHF) ([place] [address] )     1/30/2023 11:04 AM Theotis Oh Add [I49 3] Frequent PVCs     1/30/2023 11:04 AM Theotis Oh Add [E83 42] Hypomagnesemia     1/30/2023 11:05 AM Theotis Oh Modify [I49 8] Bigeminy     1/30/2023 11:05 AM Fredrich Dad, Firman Remedies Modify [I50 9] Congestive heart failure (CHF) [facility])       ED Disposition     ED Disposition   Admit    Condition   Stable    Date/Time   Mon Jan 30, 2023 11:05 AM    Comment   Case was discussed with Dr Sebastian Jones and the patient's admission status was agreed to be Admission Status: inpatient status to the service of Dr Nunu Trujillo  Follow-up Information    None         Patient's Medications   Discharge Prescriptions    No medications on file       No discharge procedures on file      PDMP Review     None          ED Provider  Electronically Signed by           Karina Byrd DO  01/30/23 2339

## 2023-01-30 NOTE — ASSESSMENT & PLAN NOTE
· 2D echo telemetry monitor replete magnesium and potassium repeat labs tonight    Not on a beta-blocker we will have cardiology evaluate

## 2023-01-30 NOTE — ASSESSMENT & PLAN NOTE
· Light of being on hydrochlorothiazide discontinue as well to Lasix we will recheck a BMP tonight to ensure moving the right direction

## 2023-01-30 NOTE — ASSESSMENT & PLAN NOTE
Wt Readings from Last 3 Encounters:   01/30/23 104 kg (228 lb 13 4 oz)   10/03/22 104 kg (230 lb)   09/23/22 104 kg (230 lb)     · Patient is on hydrochlorothiazide last 2D echo had a moderate aortic stenosis and EF of 60 to 65%    · Lungs are with rails no leg edema proBNP is on the low side chest x-ray compliant with vascular congestion with worsening shortness of breath  · Received Lasix 40 mg IV in the ER we will give Lasix 40 mg IV twice daily to replete electrolytes repeat a 2D echo to ensure aortic stenosis not worsening  · Daily weights  ·   · I 's and O's  · Cardiology consultation  · For now tropes are negative

## 2023-01-30 NOTE — PLAN OF CARE
Problem: PAIN - ADULT  Goal: Verbalizes/displays adequate comfort level or baseline comfort level  Description: Interventions:  - Encourage patient to monitor pain and request assistance  - Assess pain using appropriate pain scale  - Administer analgesics based on type and severity of pain and evaluate response  - Implement non-pharmacological measures as appropriate and evaluate response  - Consider cultural and social influences on pain and pain management  - Notify physician/advanced practitioner if interventions unsuccessful or patient reports new pain  Outcome: Progressing     Problem: INFECTION - ADULT  Goal: Absence or prevention of progression during hospitalization  Description: INTERVENTIONS:  - Assess and monitor for signs and symptoms of infection  - Monitor lab/diagnostic results  - Monitor all insertion sites, i e  indwelling lines, tubes, and drains  - Monitor endotracheal if appropriate and nasal secretions for changes in amount and color  - [place] appropriate cooling/warming therapies per order  - Administer medications as ordered  - Instruct and encourage patient and family to use good hand hygiene technique  - Identify and instruct in appropriate isolation precautions for identified infection/condition  Outcome: Progressing  Goal: Absence of fever/infection during neutropenic period  Description: INTERVENTIONS:  - Monitor WBC    Outcome: Progressing     Problem: SAFETY ADULT  Goal: Patient will remain free of falls  Description: INTERVENTIONS:  - Educate patient/family on patient safety including physical limitations  - Instruct patient to call for assistance with activity   - Consult OT/PT to assist with strengthening/mobility   - Keep Call bell within reach  - Keep bed low and locked with side rails adjusted as appropriate  - Keep care items and personal belongings within reach  - Initiate and maintain comfort rounds  - Make Fall Risk Sign visible to staff  - Apply yellow socks and bracelet for high fall risk patients  - Consider moving patient to room near nurses station  Outcome: Progressing  Goal: Maintain or return to baseline ADL function  Description: INTERVENTIONS:  -  Assess patient's ability to carry out ADLs; assess patient's baseline for ADL function and identify physical deficits which impact ability to perform ADLs (bathing, care of mouth/teeth, toileting, grooming, dressing, etc )  - Assess/evaluate cause of self-care deficits   - Assess range of motion  - Assess patient's mobility; develop plan if impaired  - Assess patient's need for assistive devices and provide as appropriate  - Encourage maximum independence but intervene and supervise when necessary  - Involve family in performance of ADLs  - Assess for home care needs following discharge   - Consider OT consult to assist with ADL evaluation and planning for discharge  - Provide patient education as appropriate  Outcome: Progressing  Goal: Maintains/Returns to pre admission functional level  Description: INTERVENTIONS:  - Perform BMAT or MOVE assessment daily    - Set and communicate daily mobility goal to care team and patient/family/caregiver     - Collaborate with rehabilitation services on mobility goals if consulted  - Stand patient 3 times a day  - Ambulate patient 3 times a day  - Out of bed to chair 3 times a day   - Out of bed for meals 3 times a day  - Out of bed for toileting  - Record patient progress and toleration of activity level   Outcome: Progressing     Problem: DISCHARGE PLANNING  Goal: Discharge to home or other facility with appropriate resources  Description: INTERVENTIONS:  - Identify barriers to discharge w/patient and caregiver  - Arrange for needed discharge resources and transportation as appropriate  - Identify discharge learning needs (meds, wound care, etc )  - Arrange for interpretive services to assist at discharge as needed  - Refer to Case Management Department for coordinating discharge planning if the patient needs post-hospital services based on physician/advanced practitioner order or complex needs related to functional status, cognitive ability, or social support system  Outcome: Progressing     Problem: Knowledge Deficit  Goal: Patient/family/caregiver demonstrates understanding of disease process, treatment plan, medications, and discharge instructions  Description: Complete learning assessment and assess knowledge base    Interventions:  - Provide teaching at level of understanding  - Provide teaching via preferred learning methods  Outcome: Progressing

## 2023-01-30 NOTE — ASSESSMENT & PLAN NOTE
· The feet for some time has not been on any treatment we will start gabapentin 100 mg p o  twice daily and see how he tolerates before advancement his hemoglobin A1c recently done was 5 9 in the prediabetic stage

## 2023-01-30 NOTE — ASSESSMENT & PLAN NOTE
· No evidence of infection 11,000 no fever reported fever 103 we will do CT chest to rule out any underlying pneumonia  Monitor fever curve we will hold antibiotics repeat labs tomorrow    COVID influenza and RSV negative

## 2023-01-30 NOTE — H&P
[address]  H&P- Aidee Ronal 1955, 69 y o  male MRN: [id number]  Unit/Bed#: [facility] Encounter: [id number]  Primary Care Provider: Noah Schaumann, MD   Date and time admitted to hospital: 1/30/2023  8:09 AM    * Acute on chronic heart failure with preserved ejection fraction [facility])  Assessment & Plan  Wt Readings from Last 3 Encounters:   01/30/23 104 kg (228 lb 13 4 oz)   10/03/22 104 kg (230 lb)   09/23/22 104 kg (230 lb)     · Patient is on hydrochlorothiazide last 2D echo had a moderate aortic stenosis and EF of 60 to 65%  · Lungs are with rails no leg edema proBNP is on the low side chest x-ray compliant with vascular congestion with worsening shortness of breath  · Received Lasix 40 mg IV in the ER we will give Lasix 40 mg IV twice daily to replete electrolytes repeat a 2D echo to ensure aortic stenosis not worsening  · Daily weights  ·   · I 's and O's  · Cardiology consultation  · For now tropes are negative        Peripheral neuropathy  Assessment & Plan  · The feet for some time has not been on any treatment we will start gabapentin 100 mg p o  twice daily and see how he tolerates before advancement his hemoglobin A1c recently done was 5 9 in the prediabetic stage    MDD (major depressive disorder)  Assessment & Plan  · Start Wellbutrin    Moderate aortic stenosis  Assessment & Plan  · We will do an echocardiogram to evaluate any progression and ask cardiology to evaluate    HTN (hypertension)  Assessment & Plan  · We will continue Norvasc hold hydrochlorothiazide secondary to acute hyponatremia    Acute hyponatremia  Assessment & Plan  · Light of being on hydrochlorothiazide discontinue as well to Lasix we will recheck a BMP tonight to ensure moving the right direction    Leukocytosis  Assessment & Plan  · No evidence of infection 11,000 no fever reported fever 103 we will do CT chest to rule out any underlying pneumonia    Monitor fever curve we will hold antibiotics repeat labs tomorrow  COVID influenza and RSV negative    Bigeminy  Assessment & Plan  · 2D echo telemetry monitor replete magnesium and potassium repeat labs tonight  Not on a beta-blocker we will have cardiology evaluate    VTE Pharmacologic Prophylaxis: VTE Score: 3 Moderate Risk (Score 3-4) - Pharmacological DVT Prophylaxis Ordered: enoxaparin (Lovenox)  Code Status: Level 1 - Full Code   Discussion with family: Patient    Anticipated Length of Stay: Patient will be admitted on an inpatient basis with an anticipated length of stay of greater than 2 midnights secondary to Acute on chronic CHF  Total Time for Visit, including Counseling / Coordination of Care: 60 minutes Greater than 50% of this total time spent on direct patient counseling and coordination of care  Chief Complaint: Shortness of breath    History of Present Illness:  Santo Keller is a 69 y o  male with a PMH of moderate AS and depression who presents with worsening shortness of breath some cough stating a fever 103 that started all after the shingles vaccine on Thursday  No leg edema  He has been eating and drinking fine without any diarrhea  Associated with dizziness     Shortness of breath is also exertional denies any chest pain    Review of Systems:  Review of Systems   Constitutional: Positive for fever  Negative for chills  HENT: Negative for ear pain and sore throat  Eyes: Negative for pain and visual disturbance  Respiratory: Positive for cough and shortness of breath  Cardiovascular: Negative for chest pain and palpitations  Gastrointestinal: Negative for abdominal pain and vomiting  Genitourinary: Negative for dysuria and hematuria  Musculoskeletal: Negative for arthralgias and back pain  Neuropathy   Skin: Negative for color change and rash  Neurological: Negative for seizures and syncope  All other systems reviewed and are negative        Past Medical and Surgical History:   Past Medical History:   Diagnosis Date   • Anxiety    • Depression    • Hypertension    • Hypothyroidism        Past Surgical History:   Procedure Laterality Date   • BACK SURGERY     • TOTAL SHOULDER REPLACEMENT         Meds/Allergies:  Prior to Admission medications    Medication Sig Start Date End Date Taking? Authorizing Provider   amLODIPine (NORVASC) 10 mg tablet Take 10 mg by mouth daily 5/20/19   Historical Provider, MD   buPROPion (WELLBUTRIN XL) 300 mg 24 hr tablet  5/25/19   Historical Provider, MD   escitalopram (LEXAPRO) 20 mg tablet Take 20 mg by mouth daily 4/5/19   Historical Provider, MD   hydrochlorothiazide (HYDRODIURIL) 25 mg tablet Take 25 mg by mouth daily 5/3/19   Historical Provider, MD   levothyroxine 75 mcg tablet  5/17/19   Historical Provider, MD   lidocaine (Lidoderm) 5 % Apply 1 patch topically daily Remove & Discard patch within 12 hours or as directed by MD 9/23/22   Stas Duarte PA-C   meloxicam (Mobic) 15 mg tablet Take 1 tablet (15 mg total) by mouth daily 10/3/22   Mary Hansen PA-C   methocarbamol (ROBAXIN) 500 mg tablet Take 1 tablet (500 mg total) by mouth 4 (four) times a day 10/3/22   Mary Hansen PA-C     I have reviewed home medications with a medical source (PCP, Pharmacy, other)      Allergies: No Known Allergies    Social History:  Marital Status: /Civil Union   Substance Use History:   Social History     Substance and Sexual Activity   Alcohol Use Not Currently     Social History     Tobacco Use   Smoking Status Light Smoker   Smokeless Tobacco Never     Social History     Substance and Sexual Activity   Drug Use Not Currently       Family History:  Family History   Problem Relation Age of Onset   • Dementia Mother    • Blindness Mother    • Dementia Father        Physical Exam:     Vitals:   Blood Pressure: 110/60 (01/30/23 1238)  Pulse: (!) 49 (01/30/23 1238)  Temperature: 98 7 °F (37 1 °C) (01/30/23 0812)  Temp Source: Temporal (01/30/23 5553)  Respirations: 18 (01/30/23 1238)  Weight - Scale: 104 kg (228 lb 13 4 oz) (01/30/23 0812)  SpO2: 96 % (01/30/23 1238)    Physical Exam  Vitals and nursing note reviewed  Constitutional:       General: He is not in acute distress  Appearance: He is well-developed  HENT:      Head: Normocephalic and atraumatic  Eyes:      Conjunctiva/sclera: Conjunctivae normal    Cardiovascular:      Rate and Rhythm: Normal rate and regular rhythm  Heart sounds: No murmur heard  Pulmonary:      Effort: Pulmonary effort is normal  No respiratory distress  Breath sounds: Rales present  Abdominal:      Palpations: Abdomen is soft  Tenderness: There is no abdominal tenderness  Musculoskeletal:         General: No swelling  Cervical back: Neck supple  Skin:     General: Skin is warm and dry  Capillary Refill: Capillary refill takes less than 2 seconds  Neurological:      Mental Status: He is alert and oriented to person, place, and time  Psychiatric:         Mood and Affect: Mood normal          Additional Data:     Lab Results:  Results from last 7 days   Lab Units 01/30/23  0825   WBC Thousand/uL 11 21*   HEMOGLOBIN g/dL 16 4   HEMATOCRIT % 48 3   PLATELETS Thousands/uL 190   NEUTROS PCT % 85*   LYMPHS PCT % 5*   MONOS PCT % 9   EOS PCT % 0     Results from last 7 days   Lab Units 01/30/23  0825   SODIUM mmol/L 131*   POTASSIUM mmol/L 3 5   CHLORIDE mmol/L 99   CO2 mmol/L 21   BUN mg/dL 18   CREATININE mg/dL 1 27   ANION GAP mmol/L 11   CALCIUM mg/dL 8 7   ALBUMIN g/dL 3 9   TOTAL BILIRUBIN mg/dL 0 62   ALK PHOS U/L 59   ALT U/L 20   AST U/L 24   GLUCOSE RANDOM mg/dL 151*     Results from last 7 days   Lab Units 01/30/23  0825   INR  1 03             Results from last 7 days   Lab Units 01/30/23  0825   LACTIC ACID mmol/L 1 2       Lines/Drains:  Invasive Devices     Peripheral Intravenous Line  Duration           Peripheral IV 01/30/23 Ventral (anterior); Right Forearm <1 day Imaging: Reviewed radiology reports from this admission including: chest xray  XR chest 1 view portable   Final Result by Pito Nascimento MD (01/30 1014)      Development of mild vascular congestion                  Workstation performed: [id number]         CT chest wo contrast    (Results Pending)       EKG and Other Studies Reviewed on Admission:   · EKG: NSR  HR 87     ** Please Note: This note has been constructed using a voice recognition system   **

## 2023-01-31 PROBLEM — A41.9 SEPSIS (HCC): Status: ACTIVE | Noted: 2023-01-31

## 2023-01-31 LAB
ANION GAP SERPL CALCULATED.3IONS-SCNC: 8 MMOL/L (ref 4–13)
BASOPHILS # BLD AUTO: 0.01 THOUSANDS/ÂΜL (ref 0–0.1)
BASOPHILS NFR BLD AUTO: 0 % (ref 0–1)
BUN SERPL-MCNC: 24 MG/DL (ref 5–25)
CALCIUM SERPL-MCNC: 8.3 MG/DL (ref 8.4–10.2)
CHLORIDE SERPL-SCNC: 100 MMOL/L (ref 96–108)
CO2 SERPL-SCNC: 22 MMOL/L (ref 21–32)
CREAT SERPL-MCNC: 1.11 MG/DL (ref 0.6–1.3)
EOSINOPHIL # BLD AUTO: 0 THOUSAND/ÂΜL (ref 0–0.61)
EOSINOPHIL NFR BLD AUTO: 0 % (ref 0–6)
ERYTHROCYTE [DISTWIDTH] IN BLOOD BY AUTOMATED COUNT: 12.9 % (ref 11.6–15.1)
GFR SERPL CREATININE-BSD FRML MDRD: 67 ML/MIN/1.73SQ M
GLUCOSE SERPL-MCNC: 134 MG/DL (ref 65–140)
HCT VFR BLD AUTO: 43.8 % (ref 36.5–49.3)
HGB BLD-MCNC: 14.8 G/DL (ref 12–17)
IMM GRANULOCYTES # BLD AUTO: 0.06 THOUSAND/UL (ref 0–0.2)
IMM GRANULOCYTES NFR BLD AUTO: 1 % (ref 0–2)
LYMPHOCYTES # BLD AUTO: 0.69 THOUSANDS/ÂΜL (ref 0.6–4.47)
LYMPHOCYTES NFR BLD AUTO: 6 % (ref 14–44)
MAGNESIUM SERPL-MCNC: 1.8 MG/DL (ref 1.9–2.7)
MCH RBC QN AUTO: 31.3 PG (ref 26.8–34.3)
MCHC RBC AUTO-ENTMCNC: 33.8 G/DL (ref 31.4–37.4)
MCV RBC AUTO: 93 FL (ref 82–98)
MONOCYTES # BLD AUTO: 1.31 THOUSAND/ÂΜL (ref 0.17–1.22)
MONOCYTES NFR BLD AUTO: 11 % (ref 4–12)
NEUTROPHILS # BLD AUTO: 10.21 THOUSANDS/ÂΜL (ref 1.85–7.62)
NEUTS SEG NFR BLD AUTO: 82 % (ref 43–75)
NRBC BLD AUTO-RTO: 0 /100 WBCS
PLATELET # BLD AUTO: 156 THOUSANDS/UL (ref 149–390)
PMV BLD AUTO: 9.9 FL (ref 8.9–12.7)
POTASSIUM SERPL-SCNC: 3.7 MMOL/L (ref 3.5–5.3)
PROCALCITONIN SERPL-MCNC: 0.85 NG/ML
RBC # BLD AUTO: 4.73 MILLION/UL (ref 3.88–5.62)
SODIUM SERPL-SCNC: 130 MMOL/L (ref 135–147)
TSH SERPL DL<=0.05 MIU/L-ACNC: 1.81 UIU/ML (ref 0.45–4.5)
WBC # BLD AUTO: 12.28 THOUSAND/UL (ref 4.31–10.16)

## 2023-01-31 RX ORDER — MAGNESIUM SULFATE HEPTAHYDRATE 40 MG/ML
2 INJECTION, SOLUTION INTRAVENOUS ONCE
Status: COMPLETED | OUTPATIENT
Start: 2023-01-31 | End: 2023-01-31

## 2023-01-31 RX ORDER — LANOLIN ALCOHOL/MO/W.PET/CERES
400 CREAM (GRAM) TOPICAL 2 TIMES DAILY
Status: DISCONTINUED | OUTPATIENT
Start: 2023-02-01 | End: 2023-02-06 | Stop reason: HOSPADM

## 2023-01-31 RX ORDER — IPRATROPIUM BROMIDE AND ALBUTEROL SULFATE 2.5; .5 MG/3ML; MG/3ML
3 SOLUTION RESPIRATORY (INHALATION) EVERY 6 HOURS PRN
Status: DISCONTINUED | OUTPATIENT
Start: 2023-01-31 | End: 2023-02-01

## 2023-01-31 RX ORDER — HYDROCHLOROTHIAZIDE 25 MG/1
25 TABLET ORAL DAILY
Status: DISCONTINUED | OUTPATIENT
Start: 2023-02-01 | End: 2023-02-01

## 2023-01-31 RX ADMIN — GABAPENTIN 100 MG: 100 CAPSULE ORAL at 17:32

## 2023-01-31 RX ADMIN — FUROSEMIDE 40 MG: 10 INJECTION, SOLUTION INTRAMUSCULAR; INTRAVENOUS at 09:09

## 2023-01-31 RX ADMIN — LEVOTHYROXINE SODIUM 75 MCG: 75 TABLET ORAL at 05:28

## 2023-01-31 RX ADMIN — POTASSIUM CHLORIDE 20 MEQ: 20 TABLET, EXTENDED RELEASE ORAL at 17:32

## 2023-01-31 RX ADMIN — AMLODIPINE BESYLATE 10 MG: 10 TABLET ORAL at 09:09

## 2023-01-31 RX ADMIN — ENOXAPARIN SODIUM 40 MG: 40 INJECTION SUBCUTANEOUS at 09:09

## 2023-01-31 RX ADMIN — BUPROPION HYDROCHLORIDE 300 MG: 150 TABLET, EXTENDED RELEASE ORAL at 09:09

## 2023-01-31 RX ADMIN — GABAPENTIN 100 MG: 100 CAPSULE ORAL at 09:09

## 2023-01-31 RX ADMIN — ACETAMINOPHEN 325MG 650 MG: 325 TABLET ORAL at 05:28

## 2023-01-31 RX ADMIN — CEFTRIAXONE 1000 MG: 1 INJECTION, SOLUTION INTRAVENOUS at 20:17

## 2023-01-31 RX ADMIN — POTASSIUM CHLORIDE 20 MEQ: 20 TABLET, EXTENDED RELEASE ORAL at 09:09

## 2023-01-31 RX ADMIN — IPRATROPIUM BROMIDE AND ALBUTEROL SULFATE 3 ML: 2.5; .5 SOLUTION RESPIRATORY (INHALATION) at 15:53

## 2023-01-31 RX ADMIN — MAGNESIUM SULFATE HEPTAHYDRATE 2 G: 40 INJECTION, SOLUTION INTRAVENOUS at 10:09

## 2023-01-31 NOTE — ASSESSMENT & PLAN NOTE
Wt Readings from Last 3 Encounters:   01/31/23 101 kg (223 lb 9 6 oz)   10/03/22 104 kg (230 lb)   09/23/22 104 kg (230 lb)     Echocardiogram 1/30/2023 with EF 60%, grade 1 DD, elevated LA filling pressure with E/E' 15, mod AS   and vascular congestion reported on chest xray  Received IV furosemide x 2 doses, but further doses discontinued as he appeared dehydrated on exam   Continue off diuretics with no evidence of fluid retention on exam   Strict I's/O's, standing daily weights  Optimize electrolytes for K+ >4, Mag > 2

## 2023-01-31 NOTE — ASSESSMENT & PLAN NOTE
Patient has a know history of aortic stenosis reported to be moderate on echo 11/2021 through [facility]  Echocardiogram 1/30/2023 continues to show moderate aortic stenosis with PV 3 52, mean gradient 30 mmHg, DI 0 30  This should be monitored in the next 6-12 months

## 2023-01-31 NOTE — ASSESSMENT & PLAN NOTE
Patient spike high fever 102 4, elevated WBC with possible source of infection from pneumonia as evidenced on imaging  Procalcitonin is trending up  Follow blood culture  COVID/flu panel negative  Urine analysis normal  Continue IV antibiotic

## 2023-01-31 NOTE — CASE MANAGEMENT
Case Management Assessment & Discharge Planning Note    Patient name Julianne Alfaro  Location [address] [id number] MRN [id number]  : 1955 Date 2023       Current Admission Date: 2023  Current Admission Diagnosis:Acute on chronic heart failure with preserved ejection fraction [facility])   Patient Active Problem List    Diagnosis Date Noted   • Acute on chronic heart failure with preserved ejection fraction ([address] ) 2023   • Bigeminy 2023   • Leukocytosis 2023   • Acute hyponatremia 2023   • HTN (hypertension) 2023   • Moderate aortic stenosis 2023   • MDD (major depressive disorder) 2023   • Peripheral neuropathy 2023      LOS (days): 1  Geometric Mean LOS (GMLOS) (days): 3 90  Days to GMLOS:2 9     OBJECTIVE:    Risk of Unplanned Readmission Score: 8 94         Current admission status: Inpatient  Referral Reason:  (dc planning)    Preferred Pharmacy:   [id number], [address] [place] [address]  Phone: [phone] Fax: [phone]    Primary Care Provider: Julien Wilson MD    Primary Insurance: MEDICARE  Secondary Insurance: BLUE CROSS     CM met with patient at the bedside,baseline information  was obtained  CM discussed the role of CM in helping the patient develop a discharge plan and assist the patient in carry out their plan  Patient admit with HF, intermittent fever on IV lasix and IV rocephin  ASSESSMENT:  Active Health Care Proxies    There are no active Health Care Proxies on file         Advance Directives  Does patient have a Health Care POA?: Yes  Does patient have Advance Directives?: Yes  Advance Directives: Living will  Primary Contact: Aleena William, spouse see face sheet         Readmission Root Cause  30 Day Readmission: No    Patient Information  Admitted from[de-identified] Home  Mental Status: Alert  During Assessment patient was accompanied by: Not accompanied during assessment  Assessment information provided by[de-identified] Patient  Support Systems: Spouse/significant other, [address] of Residence: [address] do you live in?: [address] entry access options   Select all that apply : Stairs  Number of steps to enter home : 2  Type of Current Residence: 2 story home  Upon entering residence, is there a bedroom on the main floor (no further steps)?: No  A bedroom is located on the following floor levels of residence (select all that apply):: 2nd Floor  Upon entering residence, is there a bathroom on the main floor (no further steps)?: Yes  Number of steps to 2nd floor from main floor: One Flight  In the last 12 months, was there a time when you were not able to pay the mortgage or rent on time?: No  In the last 12 months, how many places have you lived?: 1  In the last 12 months, was there a time when you did not have a steady place to sleep or slept in a shelter (including now)?: No  Homeless/housing insecurity resource given?: No  Living Arrangements: Lives w/ Spouse/significant other  Is patient a ?: No    Activities of Daily Living Prior to Admission  Functional Status: Independent  Completes ADLs independently?: Yes  Ambulates independently?: Yes  Does patient use assisted devices?: No  Does patient currently own DME?: No  Does patient have a history of Outpatient Therapy (PT/OT)?: No  Does the patient have a history of Short-Term Rehab?: No  Does patient have a history of HHC?: No  Does patient currently have [address]?: No         Patient Information Continued  Income Source: Pension/senior care  Does patient have prescription coverage?: Yes  Within the past 12 months, you worried that your food would run out before you got the money to buy more : Never true  Within the past 12 months, the food you bought just didn't last and you didn't have money to get more : Never true  Food insecurity resource given?: No  Does patient receive dialysis treatments?: No  Does patient have a history of substance abuse?: No (admits to several beers per week)  Does patient have a history of Mental Health Diagnosis?: Yes  Is patient receiving treatment for mental health?: Yes (anxiety and depression, on medications)  Has patient received inpatient treatment related to mental health in the last 2 years?: No         Means of Transportation  Means of Transport to Appts[de-identified] Drives Self  In the past 12 months, has lack of transportation kept you from medical appointments or from getting medications?: No  In the past 12 months, has lack of transportation kept you from meetings, work, or from getting things needed for daily living?: No  Was application for public transport provided?: No        DISCHARGE DETAILS:    Discharge planning discussed with[de-identified] patient  Freedom of Choice: Yes     CM contacted family/caregiver?: No- see comments (declined)                  [facility]         Is the patient interested in [facility] at discharge?: No (not at this time)    DME Referral Provided  Referral made for DME?: No                 Discharge Destination Plan[de-identified] Home  Transport at Discharge : Family         CM will continue to follow and is available for any dc needs

## 2023-01-31 NOTE — PLAN OF CARE
Problem: PAIN - ADULT  Goal: Verbalizes/displays adequate comfort level or baseline comfort level  Description: Interventions:  - Encourage patient to monitor pain and request assistance  - Assess pain using appropriate pain scale  - Administer analgesics based on type and severity of pain and evaluate response  - Implement non-pharmacological measures as appropriate and evaluate response  - Consider cultural and social influences on pain and pain management  - Notify physician/advanced practitioner if interventions unsuccessful or patient reports new pain  Outcome: Progressing     Problem: SAFETY ADULT  Goal: Maintain or return to baseline ADL function  Description: INTERVENTIONS:  -  Assess patient's ability to carry out ADLs; assess patient's baseline for ADL function and identify physical deficits which impact ability to perform ADLs (bathing, care of mouth/teeth, toileting, grooming, dressing, etc )  - Assess/evaluate cause of self-care deficits   - Assess range of motion  - Assess patient's mobility; develop plan if impaired  - Assess patient's need for assistive devices and provide as appropriate  - Encourage maximum independence but intervene and supervise when necessary  - Involve family in performance of ADLs  - Assess for home care needs following discharge   - Consider OT consult to assist with ADL evaluation and planning for discharge  - Provide patient education as appropriate  Outcome: Progressing     Problem: DISCHARGE PLANNING  Goal: Discharge to home or other facility with appropriate resources  Description: INTERVENTIONS:  - Identify barriers to discharge w/patient and caregiver  - Arrange for needed discharge resources and transportation as appropriate  - Identify discharge learning needs (meds, wound care, etc )  - Arrange for interpretive services to assist at discharge as needed  - Refer to Case Management Department for coordinating discharge planning if the patient needs post-hospital services based on physician/advanced practitioner order or complex needs related to functional status, cognitive ability, or social support system  Outcome: Progressing

## 2023-01-31 NOTE — ASSESSMENT & PLAN NOTE
Patient presented [facility]  He reports a history of PVC's  EF 60% on echo 1/30/2023  PVC's improved on telemetry with addition of magnesium supplement  May benefit from updated cardiac monitoring in the outpatient setting

## 2023-01-31 NOTE — ASSESSMENT & PLAN NOTE
· 2D echo telemetry monitor replete magnesium and potassium repeat labs tonight    Not on a beta-blocker we will have cardiology evaluate  · After correcting electrolytes especially magnesium and potassium, improved, continue p o  magnesium  · TSH normal

## 2023-01-31 NOTE — PLAN OF CARE
Problem: PAIN - ADULT  Goal: Verbalizes/displays adequate comfort level or baseline comfort level  Description: Interventions:  - Encourage patient to monitor pain and request assistance  - Assess pain using appropriate pain scale  - Administer analgesics based on type and severity of pain and evaluate response  - Implement non-pharmacological measures as appropriate and evaluate response  - Consider cultural and social influences on pain and pain management  - Notify physician/advanced practitioner if interventions unsuccessful or patient reports new pain  Outcome: Progressing     Problem: INFECTION - ADULT  Goal: Absence or prevention of progression during hospitalization  Description: INTERVENTIONS:  - Assess and monitor for signs and symptoms of infection  - Monitor lab/diagnostic results  - Monitor all insertion sites, i e  indwelling lines, tubes, and drains  - Monitor endotracheal if appropriate and nasal secretions for changes in amount and color  - [place] appropriate cooling/warming therapies per order  - Administer medications as ordered  - Instruct and encourage patient and family to use good hand hygiene technique  - Identify and instruct in appropriate isolation precautions for identified infection/condition  Outcome: Progressing  Goal: Absence of fever/infection during neutropenic period  Description: INTERVENTIONS:  - Monitor WBC    Outcome: Progressing     Problem: SAFETY ADULT  Goal: Patient will remain free of falls  Description: INTERVENTIONS:  - Educate patient/family on patient safety including physical limitations  - Instruct patient to call for assistance with activity   - Consult OT/PT to assist with strengthening/mobility   - Keep Call bell within reach  - Keep bed low and locked with side rails adjusted as appropriate  - Keep care items and personal belongings within reach  - Initiate and maintain comfort rounds  - Make Fall Risk Sign visible to staff  - Offer Toileting every 2 Hours, in advance of need  - Apply yellow socks and bracelet for high fall risk patients  - Consider moving patient to room near nurses station  Outcome: Progressing  Goal: Maintain or return to baseline ADL function  Description: INTERVENTIONS:  -  Assess patient's ability to carry out ADLs; assess patient's baseline for ADL function and identify physical deficits which impact ability to perform ADLs (bathing, care of mouth/teeth, toileting, grooming, dressing, etc )  - Assess/evaluate cause of self-care deficits   - Assess range of motion  - Assess patient's mobility; develop plan if impaired  - Assess patient's need for assistive devices and provide as appropriate  - Encourage maximum independence but intervene and supervise when necessary  - Involve family in performance of ADLs  - Assess for home care needs following discharge   - Consider OT consult to assist with ADL evaluation and planning for discharge  - Provide patient education as appropriate  Outcome: Progressing  Goal: Maintains/Returns to pre admission functional level  Description: INTERVENTIONS:  - Perform BMAT or MOVE assessment daily    - Set and communicate daily mobility goal to care team and patient/family/caregiver  - Collaborate with rehabilitation services on mobility goals if consulted  - Perform Range of Motion 3 times a day    - Stand patient 3 times a day  - Ambulate patient 3 times a day  - Out of bed to chair 3 times a day   - Out of bed for meals 3 times a day  - Out of bed for toileting  - Record patient progress and toleration of activity level   Outcome: Progressing     Problem: DISCHARGE PLANNING  Goal: Discharge to home or other facility with appropriate resources  Description: INTERVENTIONS:  - Identify barriers to discharge w/patient and caregiver  - Arrange for needed discharge resources and transportation as appropriate  - Identify discharge learning needs (meds, wound care, etc )  - Arrange for interpretive services to assist at discharge as needed  - Refer to Case Management Department for coordinating discharge planning if the patient needs post-hospital services based on physician/advanced practitioner order or complex needs related to functional status, cognitive ability, or social support system  Outcome: Progressing     Problem: Knowledge Deficit  Goal: Patient/family/caregiver demonstrates understanding of disease process, treatment plan, medications, and discharge instructions  Description: Complete learning assessment and assess knowledge base    Interventions:  - Provide teaching at level of understanding  - Provide teaching via preferred learning methods  Outcome: Progressing

## 2023-01-31 NOTE — ASSESSMENT & PLAN NOTE
· We will do an echocardiogram to evaluate any progression and ask cardiology to evaluate  · Will need outpatient follow-up

## 2023-01-31 NOTE — ASSESSMENT & PLAN NOTE
Wt Readings from Last 3 Encounters:   01/31/23 101 kg (223 lb 9 6 oz)   10/03/22 104 kg (230 lb)   09/23/22 104 kg (230 lb)     · Patient is on hydrochlorothiazide last 2D echo had a moderate aortic stenosis and EF of 60 to 65%    · Lungs are with rails no leg edema proBNP is on the low side chest x-ray compliant with vascular congestion with worsening shortness of breath  · Continue IV Lasix 40 mg twice daily and monitor intake and output, renal function  · Repeat echocardiogram shows ejection fraction 60% with grade 1 diastolic dysfunction, elevated left atrial filling pressure, moderate aortic stenosis

## 2023-01-31 NOTE — CONSULTS
Consult Cardiology    Blade Freed 1955, 69 y o  male MRN: [id number]    Unit/Bed#: -01 Encounter: [id number]    Attending Provider: Kourtney Pepper MD   Primary Care Provider: Graeme Covarrubias MD   Date admitted to hospital: 1/30/2023       Consults    * Acute on chronic heart failure with preserved ejection fraction [facility])  Assessment & Plan  Wt Readings from Last 3 Encounters:   01/31/23 101 kg (223 lb 9 6 oz)   10/03/22 104 kg (230 lb)   09/23/22 104 kg (230 lb)     Echocardiogram 1/30/2023 with EF 60%, grade 1 DD, elevated LA filling pressure with E/E' 15, mod AS  Evidence of volume overload with  and vascular congestion  Currently on furosemide 40 mg IV BID  Will monitor on current regimen  Strict I's/O's, standing daily weights, sodium/fluid restriction  Optimize electrolytes for K+ >4, Mag > 2  Moderate aortic stenosis  Assessment & Plan  Patient has a know history of aortic stenosis reported to be moderate on echo 11/2021 through [facility]  Echocardiogram 1/30/2023 continues to show moderate aortic stenosis with PV 3 52, mean gradient 30 mmHg, DI 0 30  This should be monitored in the next 6-12 months  HTN (hypertension)  Assessment & Plan  Blood pressures acceptable  Continue amlodipine 10 mg daily  Will monitor with diuresis  Acute hyponatremia  Assessment & Plan  Sodium 131 at admission  HCTZ discontinued  Will monitor  Leukocytosis  Assessment & Plan  Mild leukocytosis on lab work  Temp of 102F  Medicine team aware and following  On IV antibiotics  Bigeminy  Assessment & Plan  Patient presented with ECG showing SR with PVC's in bigeminy  He reports a history of PVC's  EF 60% on echo 1/30/2023  Magnesium supplemented with 2g IV yesterday and today with improvement in PVC burden noted on telemetry  May benefit from updated cardiac monitoring in the outpatient setting              Physician Requesting Consult: Kourtney Pepper MD    Reason for Consult / Principal Problem: Acute diastolic heart failure and frequent PVC's  HPI: Shauna Mccullough is a 69y o  year old male who has a history of hypertension, hypothyroidism, and aortic stenosis  Patient presented to [address] [place] [address] ER 1/30/2023 with shortness of breath, weakness, and fever  He reports symptoms started Friday after receiving a Shingles vaccine on Thursday  Upon presentation ECG showed sinus rhythm with PVC's in a pattern of bigeminy  Labwork showed mild leukocytosis, magnesium 1 8, , and HS troponin 43->38->65  Chest xray was positive for vascular congestion  Chest CT showed mild emphysematous changes and possible atelectasis vs pneumonia  He was started on IV antibiotics and furosemide and magnesium was replaced  At the time of my assessment he is resting in bed  He feels is breathing is somewhat improved  He did spike a temp of 102F overnight  He has been getting up ambulating to the bathroom and feels his weakness is improved since admission  He reports that he has been experiencing belly bloating for the past year, which he thinks is fluid retention  He does feel "skipped beats" for years which he attributes to his PVC's  Reports undergoing a stress test and Holter monitor "years ago"  He has been on HCTZ at home  He denies leg swelling  He states he has been very active with no chest discomfort  He first noticed dyspnea on exertion on Friday of last week  Denies orthopnea but appears visibly dyspneic when lying down  Review of Systems   Constitutional: Negative for chills and fever  HENT: Negative for ear pain and sore throat  Eyes: Negative for pain and visual disturbance  Respiratory: Negative for cough and shortness of breath  Cardiovascular: Negative for chest pain, palpitations and leg swelling  Gastrointestinal: Positive for abdominal distention  Negative for abdominal pain and vomiting  Genitourinary: Negative for dysuria and hematuria     Musculoskeletal: Negative for arthralgias and back pain  Skin: Negative for color change and rash  Neurological: Negative for seizures and syncope  All other systems reviewed and are negative         Historical Information     Past Medical History:   Diagnosis Date   • Anxiety    • Aortic stenosis    • Depression    • Hypertension    • Hypothyroidism    • PVCs (premature ventricular contractions)      Past Surgical History:   Procedure Laterality Date   • APPENDECTOMY     • BACK SURGERY     • TOTAL SHOULDER REPLACEMENT       Social History     Substance and Sexual Activity   Alcohol Use Not Currently     Social History     Substance and Sexual Activity   Drug Use Not Currently    Comment: sporadic in young adulthood     Social History     Tobacco Use   Smoking Status Light Smoker   • Types: Cigarettes   Smokeless Tobacco Never       Family History:   Family History   Problem Relation Age of Onset   • Dementia Mother    • Blindness Mother    • Cancer Mother    • Dementia Father        Meds/Allergies     current meds:   Current Facility-Administered Medications   Medication Dose Route Frequency   • acetaminophen (TYLENOL) tablet 650 mg  650 mg Oral Q4H PRN   • amLODIPine (NORVASC) tablet 10 mg  10 mg Oral Daily   • buPROPion (WELLBUTRIN XL) 24 hr tablet 300 mg  300 mg Oral Daily   • cefTRIAXone (ROCEPHIN) IVPB (premix in dextrose) 1,000 mg 50 mL  1,000 mg Intravenous Q24H   • enoxaparin (LOVENOX) subcutaneous injection 40 mg  40 mg Subcutaneous Q24H ASHLEY   • furosemide (LASIX) injection 40 mg  40 mg Intravenous BID   • gabapentin (NEURONTIN) capsule 100 mg  100 mg Oral BID   • influenza vaccine, high-dose (FLUZONE HIGH-DOSE) IM injection BERYL 0 7 mL  0 7 mL Intramuscular Once   • levothyroxine tablet 75 mcg  75 mcg Oral Early Morning   • magnesium sulfate 2 g/50 mL IVPB (premix) 2 g  2 g Intravenous Once   • potassium chloride (K-DUR,KLOR-CON) CR tablet 20 mEq  20 mEq Oral BID          No Known Allergies    Objective     Vitals: Blood pressure 119/66, pulse 87, temperature 99 9 °F (37 7 °C), temperature source Oral, resp  rate 16, height 6' 0 01" (1 829 m), weight 101 kg (223 lb 9 6 oz), SpO2 99 %  , Body mass index is 30 32 kg/m²  Orthostatic Blood Pressures    Flowsheet Row Most Recent Value   Blood Pressure 119/66 filed at 01/31/2023 0900   Patient Position - Orthostatic VS Sitting filed at 01/31/2023 9063          Systolic (90CWS), SST:128 , Min:110 , MIS:451     Diastolic (15ZLZ), DRJ:29, Min:60, Max:67        Intake/Output Summary (Last 24 hours) at 1/31/2023 1052  Last data filed at 1/31/2023 1009  Gross per 24 hour   Intake 1440 ml   Output 1025 ml   Net 415 ml       Weight (last 2 days)     Date/Time Weight    01/31/23 0943 101 (223 6)    01/30/23 12:38:18 103 (228)    01/30/23 0812 104 (228 84)          Invasive Devices     Peripheral Intravenous Line  Duration           Peripheral IV 01/30/23 Ventral (anterior); Right Forearm 1 day                Physical Exam  Vitals and nursing note reviewed  Constitutional:       General: He is not in acute distress  Appearance: He is well-developed  HENT:      Head: Normocephalic and atraumatic  Eyes:      Conjunctiva/sclera: Conjunctivae normal    Cardiovascular:      Rate and Rhythm: Normal rate and regular rhythm  Occasional extrasystoles are present  Heart sounds: Murmur heard  Systolic murmur is present  Pulmonary:      Effort: Pulmonary effort is normal  No respiratory distress  Breath sounds: Examination of the right-lower field reveals rales  Examination of the left-lower field reveals rales  Rales present  Comments: Currently on room air  Abdominal:      Palpations: Abdomen is soft  Tenderness: There is no abdominal tenderness  Musculoskeletal:         General: No swelling  Cervical back: Neck supple  Right lower leg: No edema  Left lower leg: No edema  Skin:     General: Skin is warm and dry        Capillary Refill: Capillary refill takes less than 2 seconds  Neurological:      Mental Status: He is alert  Psychiatric:         Mood and Affect: Mood and affect normal          Speech: Speech normal          Behavior: Behavior normal  Behavior is cooperative  Cognition and Memory: Cognition and memory normal               Laboratory Results:          CBC with diff:   Results from last 7 days   Lab Units 01/31/23  0524 01/30/23  0825   WBC Thousand/uL 12 28* 11 21*   HEMOGLOBIN g/dL 14 8 16 4   HEMATOCRIT % 43 8 48 3   MCV fL 93 94   PLATELETS Thousands/uL 156 190   MCH pg 31 3 32 0   MCHC g/dL 33 8 34 0   RDW % 12 9 13 2   MPV fL 9 9 9 8   NRBC AUTO /100 WBCs 0 0         CMP:  Results from last 7 days   Lab Units 01/31/23  0524 01/30/23  1753 01/30/23  0825   POTASSIUM mmol/L 3 7 3 7 3 5   CHLORIDE mmol/L 100 100 99   CO2 mmol/L 22 26 21   BUN mg/dL 24 20 18   CREATININE mg/dL 1 11 1 30 1 27   CALCIUM mg/dL 8 3* 8 5 8 7   AST U/L  --   --  24   ALT U/L  --   --  20   ALK PHOS U/L  --   --  59   EGFR ml/min/1 73sq m 67 55 57       BMP:  Results from last 7 days   Lab Units 01/31/23  0524 01/30/23  1753 01/30/23  0825   POTASSIUM mmol/L 3 7 3 7 3 5   CHLORIDE mmol/L 100 100 99   CO2 mmol/L 22 26 21   BUN mg/dL 24 20 18   CREATININE mg/dL 1 11 1 30 1 27   CALCIUM mg/dL 8 3* 8 5 8 7       BNP:    Recent Labs     01/30/23  0825   *     HS troponin 43->38->65    Magnesium:   Results from last 7 days   Lab Units 01/31/23  0524 01/30/23  1753 01/30/23  0825   MAGNESIUM mg/dL 1 8* 2 1 1 8*       Coags:   Results from last 7 days   Lab Units 01/30/23  0825   PTT seconds 30   INR  1 03       TSH:   1 8    Cardiac testing:     Reviewed echocardiogram through [facility] dated 11/11/2021  Imaging: I have personally reviewed pertinent reports  XR chest 1 view portable    Result Date: 1/30/2023  Narrative: CHEST INDICATION:   cough   COMPARISON:  10/3/2022 EXAM PERFORMED/VIEWS:  XR CHEST PORTABLE Images:  1 FINDINGS: Development of mild vascular congestion Cardiomediastinal silhouette appears unremarkable  No pneumothorax or pleural effusion  Osseous structures appear within normal limits for patient age  Partially visualized left shoulder arthroplasty     Impression: Development of mild vascular congestion Workstation performed: [id number]     CT chest wo contrast    Result Date: 1/30/2023  Narrative: CT CHEST WITHOUT IV CONTRAST INDICATION:   r/o pneumonia  As per review of electronic medical record,  patient presenting with generalized weakness, fatigue, cough, upper respiratory infection symptoms, and subjective fever  COMPARISON:  CT of the chest from 9/23/2022 and chest radiograph from earlier the same day  TECHNIQUE: CT examination of the chest was performed without intravenous contrast  Axial, sagittal, and coronal 2D reformatted images were created from the source data and submitted for interpretation  Radiation dose length product (DLP) for this visit:  300 mGy-cm   This examination, like all CT scans performed in the [facility], was performed utilizing techniques to minimize radiation dose exposure, including the use of iterative reconstruction and automated exposure control  FINDINGS: BRONCHOPULMONARY:  Clear central airways  Minimal atelectatic changes are seen at the lung bases  There is a small, tubular opacity in the left upper lobe along the left major fissure, likely representing an area of atelectasis  There is a small groundglass opacity in the right upper lobe medially, abutting the mediastinum (series 3 image 46)  A small groundglass opacity seen at the right lung base  Minimal centrilobular emphysematous changes are seen  There are several, small air-filled cysts in the right lung  There is a calcified granuloma in the right upper lobe  PLEURA:  No pleural effusions  No pneumothorax  HEART/GREAT VESSELS: The heart is mildly enlarged  No pericardial effusion   Normal caliber thoracic aorta  There are calcifications of the aortic annulus and coronary arteries  Main pulmonary artery dilated up to 3 3 cm, suggestive of pulmonary arterial hypertension  MEDIASTINUM/LYMPH NODES:  No axillary or mediastinal lymphadenopathy  Evaluation for hilar lymphadenopathy is limited without IV contrast, however no grossly enlarged lymph nodes are seen  There are calcified mediastinal and right hilar lymph nodes, suggestive of sequela of prior granulomatous disease  CHEST WALL AND LOWER NECK:  Unremarkable  VISUALIZED STRUCTURES IN THE UPPER ABDOMEN:  Small calcifications are seen in the spleen, compatible with sequela of prior granulomatous disease  MUSCULOSKELETAL:  No focal aggressive osseous lesions  Degenerative changes of the spine  The patient is status post left shoulder arthroplasty  There are fractures of the right 10th and 11th ribs near the costovertebral junction with callus formation indicative of healing  There is a chronic, healed fracture of the right 10th rib posterior laterally and another chronic fracture with 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 laterally  A chronic healed fracture deformity of the right 1st rib is seen  Impression: Small groundglass opacity in the right upper lobe and another small groundglass opacity at the right lung base  These may represent areas of atelectasis versus minimal pneumonia  No confluent airspace opacities or consolidations  Minimal centrilobular emphysematous changes and other areas of atelectasis  Subacute, healing fracture of the right 10th and 11th ribs posteriorly  Additional findings as above  Workstation performed: [id number]     Echo complete w/ contrast if indicated    Result Date: 1/30/2023  Narrative: •  Left Ventricle: Left ventricular cavity size is normal  Wall thickness is increased  There is mild concentric hypertrophy  The left ventricular ejection fraction is 60%   Systolic function is normal  Wall motion is normal  Diastolic function is mildly abnormal, consistent with grade I (abnormal) relaxation  Left atrial filling pressure is elevated  E/E' 15  •  IVS: There is sigmoid appearance of the septum  •  Right Ventricle: Right ventricular cavity size is mildly dilated  Systolic function is normal  Normal tricuspid annular plane systolic excursion (TAPSE) > 1 7 cm  •  Left Atrium: The atrium is mildly dilated  •  Aortic Valve: The leaflets are moderately calcified  There is moderately reduced mobility  There is trace to mild regurgitation  There is moderate stenosis  The aortic valve peak velocity is 3 52 m/s  The aortic valve mean gradient is 30 mmHg  The dimensionless velocity index is 0 30  •  Mitral Valve: There is annular calcification  There is trace regurgitation  There is no evidence of stenosis  •  Tricuspid Valve: There is trace regurgitation  There is no evidence of stenosis  The right ventricular systolic pressure is mildly elevated  The estimated right ventricular systolic pressure is 71 82 mmHg  •  Aorta: The aortic root is mildly dilated  The ascending aorta is normal in size  The aortic root is 3 80 cm  The ascending aorta is 3 3 cm  •  IVC/SVC: The right atrial pressure is estimated at 15 0 mmHg  The inferior vena cava is dilated  Respirophasic changes in dimension were absent  •  Prior TTE study available for comparison  Prior study date: 11/11/2021  Changes noted when compared to prior study  Changes include: Moderate AS noted on prior study with peak transaortic velocity of 2 9 m/s with a mean gradient of 20 mmHg  DI 0 38  Peak velocity and mean gradient have increased since prior study  DI is now lower suggesting progression of the degree of aortic stenosis (still moderate aortic stenosis)  Aortic root previously reported as normal in size          EKG reviewed personally: EKG: Sinus rhythm with PVC's in bigeminy       Telemetry: Sinus rhythm with PVC's, rate , PVC triplet 1/30/23 at 1400    Counseling / Coordination of Care  Total floor / unit time spent today 45 minutes  Greater than 50% of total time was spent with the patient and / or family counseling and / or coordination of care    A description of the counseling / coordination of care: Discussed case with Dr Makayla Adler         Code Status: Level 1 - Full Code

## 2023-01-31 NOTE — RESPIRATORY THERAPY NOTE
RT Protocol Note  Luis Tony 69 y o  male MRN: [id number]  Unit/Bed#: -01 Encounter: [id number]    Assessment    Principal Problem:    Sepsis ([address] )  Active Problems:    Acute on chronic heart failure with preserved ejection fraction (HCC)    Bigeminy    Leukocytosis    Acute hyponatremia    HTN (hypertension)    Moderate aortic stenosis    MDD (major depressive disorder)    Peripheral neuropathy      Home Pulmonary Medications:         Past Medical History:   Diagnosis Date    Anxiety     Aortic stenosis     Depression     Hypertension     Hypothyroidism     PVCs (premature ventricular contractions)      Social History     Socioeconomic History    Marital status: /Civil Union     Spouse name: None    Number of children: None    Years of education: None    Highest education level: None   Occupational History    None   Tobacco Use    Smoking status: Light Smoker     Types: Cigarettes    Smokeless tobacco: Never   Vaping Use    Vaping Use: Never used   Substance and Sexual Activity    Alcohol use: Not Currently    Drug use: Not Currently     Comment: sporadic in young adulthood    Sexual activity: None     Comment: defer   Other Topics Concern    None   Social History Narrative    None     Social Determinants of Health     Financial Resource Strain: Not on file   Food Insecurity: No Food Insecurity    Worried About Running Out of Food in the Last Year: Never true    Ran Out of Food in the Last Year: Never true   Transportation Needs: No Transportation Needs    Lack of Transportation (Medical): No    Lack of Transportation (Non-Medical):  No   Physical Activity: Not on file   Stress: Not on file   Social Connections: Not on file   Intimate Partner Violence: Not on file   Housing Stability: Low Risk     Unable to Pay for Housing in the Last Year: No    Number of Places Lived in the Last Year: 1    Unstable Housing in the Last Year: No       Subjective         Objective    Physical Exam:   Assessment Type: Pre-treatment  General Appearance: Alert, Awake  Respiratory Pattern: Dyspnea with exertion  Bilateral Breath Sounds: Diminished, Clear (faint exp wheeze, DAX)  Cough: Non-productive  O2 Device: RA    Vitals:  Blood pressure 133/71, pulse 89, temperature 99 °F (37 2 °C), temperature source Oral, resp  rate 18, height 6' 0 01" (1 829 m), weight 101 kg (223 lb 9 6 oz), SpO2 93 %  Imaging and other studies: I have personally reviewed pertinent reports  O2 Device: RA     Plan    Respiratory Plan: No distress/Pulmonary history, Mild Distress pathway        Resp Comments: (P) Pt seen for increased SOB  pt states he is a 50+year smoker and has cut back to 1/2 pack a week  CT scan shows possible pneumonia and emphysema changes  pt given neb tx and states thats the best he has felt with his breathing   PMH includes aortic stenosis and CHF

## 2023-01-31 NOTE — PROGRESS NOTES
[address]  Progress Note - Daphne Bush 1955, 69 y o  male MRN: [id number]  Unit/Bed#: -[facility] Encounter: [id number]  Primary Care Provider: Severiano Sykes MD   Date and time admitted to hospital: 1/30/2023  8:09 AM    Acute on chronic heart failure with preserved ejection fraction [facility])  Assessment & Plan  Wt Readings from Last 3 Encounters:   01/31/23 101 kg (223 lb 9 6 oz)   10/03/22 104 kg (230 lb)   09/23/22 104 kg (230 lb)     · Patient is on hydrochlorothiazide last 2D echo had a moderate aortic stenosis and EF of 60 to 65%    · Lungs are with rails no leg edema proBNP is on the low side chest x-ray compliant with vascular congestion with worsening shortness of breath  · Continue IV Lasix 40 mg twice daily and monitor intake and output, renal function  · Repeat echocardiogram shows ejection fraction 60% with grade 1 diastolic dysfunction, elevated left atrial filling pressure, moderate aortic stenosis        * Sepsis ([facility] [address] )  Assessment & Plan  Patient spike high fever 102 4, elevated WBC with possible source of infection from pneumonia as evidenced on imaging  Procalcitonin is trending up  Follow blood culture  COVID/flu panel negative  Urine analysis normal  Continue IV antibiotic    Peripheral neuropathy  Assessment & Plan  · The feet for some time has not been on any treatment we will start gabapentin 100 mg p o  twice daily and see how he tolerates before advancement his hemoglobin A1c recently done was 5 9 in the prediabetic stage    MDD (major depressive disorder)  Assessment & Plan  · Start Wellbutrin    Moderate aortic stenosis  Assessment & Plan  · We will do an echocardiogram to evaluate any progression and ask cardiology to evaluate  · Will need outpatient follow-up    HTN (hypertension)  Assessment & Plan  · We will continue Norvasc hold hydrochlorothiazide secondary to acute hyponatremia    Acute hyponatremia  Assessment & Plan  · Light of being on hydrochlorothiazide discontinue as well to Lasix we will recheck a BMP tonight to ensure moving the right direction    Leukocytosis  Assessment & Plan  · No evidence of infection 11,000 no fever reported fever 103 we will do CT chest to rule out any underlying pneumonia  Monitor fever curve we will hold antibiotics repeat labs tomorrow  COVID influenza and RSV negative    Bigeminy  Assessment & Plan  · 2D echo telemetry monitor replete magnesium and potassium repeat labs tonight  Not on a beta-blocker we will have cardiology evaluate  · After correcting electrolytes especially magnesium and potassium, improved, continue p o  magnesium  · TSH normal        VTE Pharmacologic Prophylaxis: VTE Score: 3 Moderate Risk (Score 3-4) - Pharmacological DVT Prophylaxis Ordered: enoxaparin (Lovenox)  Patient Centered Rounds: I performed bedside rounds with nursing staff today  Discussions with Specialists or Other Care Team Provider: None    Education and Discussions with Family / Patient: Patient prefers to update his family member  Current Length of Stay: 1 day(s)  Current Patient Status: Inpatient   Certification Statement: The patient will continue to require additional inpatient hospital stay due to To monitor above condition  Discharge Plan: Anticipate discharge in 48-72 hrs to home  Code Status: Level 1 - Full Code    Subjective:   Seen and evaluated during the event  Resting comfortably  Reports he was having chills and fever this morning  Denies any nausea, vomiting  Having some cough productive in nature  Denies any recent history of antibiotic use  Objective:     Vitals:   Temp (24hrs), Av 4 °F (37 4 °C), Min:97 7 °F (36 5 °C), Max:102 4 °F (39 1 °C)    Temp:  [97 7 °F (36 5 °C)-102 4 °F (39 1 °C)] 99 °F (37 2 °C)  HR:  [66-94] 89  Resp:  [16-18] 18  BP: (111-133)/(64-71) 133/71  SpO2:  [95 %-99 %] 98 %  Body mass index is 30 32 kg/m²       Input and Output Summary (last 24 hours): Intake/Output Summary (Last 24 hours) at 1/31/2023 1340  Last data filed at 1/31/2023 1233  Gross per 24 hour   Intake 1490 ml   Output 925 ml   Net 565 ml       Physical Exam:   Physical Exam  Vitals reviewed  Constitutional:       Appearance: Normal appearance  He is diaphoretic  He is not ill-appearing  HENT:      Head: Normocephalic  Mouth/Throat:      Mouth: Mucous membranes are moist       Pharynx: Oropharynx is clear  No oropharyngeal exudate  Eyes:      General: No scleral icterus  Left eye: No discharge  Extraocular Movements: Extraocular movements intact  Conjunctiva/sclera: Conjunctivae normal       Pupils: Pupils are equal, round, and reactive to light  Neck:      Vascular: No carotid bruit  Cardiovascular:      Rate and Rhythm: Normal rate  Heart sounds: Murmur heard  No friction rub  No gallop  Pulmonary:      Effort: Pulmonary effort is normal  No respiratory distress  Breath sounds: No stridor  No wheezing or rhonchi  Abdominal:      General: Abdomen is flat  Bowel sounds are normal  There is no distension  Palpations: There is no mass  Tenderness: There is no abdominal tenderness  Hernia: No hernia is present  Musculoskeletal:         General: No swelling, tenderness, deformity or signs of injury  Normal range of motion  Cervical back: Normal range of motion  No rigidity or tenderness  Lymphadenopathy:      Cervical: No cervical adenopathy  Skin:     General: Skin is warm  Capillary Refill: Capillary refill takes less than 2 seconds  Coloration: Skin is not jaundiced or pale  Findings: No bruising or erythema  Neurological:      General: No focal deficit present  Mental Status: He is alert and oriented to person, place, and time  Mental status is at baseline  Cranial Nerves: No cranial nerve deficit  Sensory: No sensory deficit  Motor: No weakness        Coordination: Coordination normal          Additional Data:     Labs:  Results from last 7 days   Lab Units 01/31/23  0524   WBC Thousand/uL 12 28*   HEMOGLOBIN g/dL 14 8   HEMATOCRIT % 43 8   PLATELETS Thousands/uL 156   NEUTROS PCT % 82*   LYMPHS PCT % 6*   MONOS PCT % 11   EOS PCT % 0     Results from last 7 days   Lab Units 01/31/23  0524 01/30/23  1753 01/30/23  0825   SODIUM mmol/L 130*   < > 131*   POTASSIUM mmol/L 3 7   < > 3 5   CHLORIDE mmol/L 100   < > 99   CO2 mmol/L 22   < > 21   BUN mg/dL 24   < > 18   CREATININE mg/dL 1 11   < > 1 27   ANION GAP mmol/L 8   < > 11   CALCIUM mg/dL 8 3*   < > 8 7   ALBUMIN g/dL  --   --  3 9   TOTAL BILIRUBIN mg/dL  --   --  0 62   ALK PHOS U/L  --   --  59   ALT U/L  --   --  20   AST U/L  --   --  24   GLUCOSE RANDOM mg/dL 134   < > 151*    < > = values in this interval not displayed  Results from last 7 days   Lab Units 01/30/23  0825   INR  1 03             Results from last 7 days   Lab Units 01/31/23  1255 01/30/23  0825   LACTIC ACID mmol/L  --  1 2   PROCALCITONIN ng/ml 0 85*  --        Lines/Drains:  Invasive Devices     Peripheral Intravenous Line  Duration           Peripheral IV 01/30/23 Ventral (anterior); Right Forearm 1 day                  Telemetry:  Telemetry Orders (From admission, onward)             24 Hour Telemetry Monitoring  (ED Bridging Orders Panel)  Continuous x 24 Hours (Telem)        References:    Telemetry Guidelines   Question:  Reason for 24 Hour Telemetry  Answer:  Metabolic/Electrolyte Disturbance with High Probability of Dysrhythmia (K level <3 or >6, or KCL infusion >=10mEq/hr)                 Telemetry Reviewed: Normal Sinus Rhythm  Indication for Continued Telemetry Use: No indication for continued use  Will discontinue  Imaging: No pertinent imaging reviewed      Recent Cultures (last 7 days):         Last 24 Hours Medication List:   Current Facility-Administered Medications   Medication Dose Route Frequency Provider Last Rate   • acetaminophen  650 mg Oral Q4H PRN Malorie Dillon MD     • amLODIPine  10 mg Oral Daily Malorie Dillon MD     • buPROPion  300 mg Oral Daily Malorie Dillon MD     • cefTRIAXone  1,000 mg Intravenous Q24H Robin Kim MD 1,000 mg (01/30/23 2018)   • enoxaparin  40 mg Subcutaneous Q24H [address] Malorie Dillon MD     • furosemide  40 mg Intravenous BID Malorie Dillon MD     • gabapentin  100 mg Oral BID Malorie Dillon MD     • influenza vaccine  0 7 mL Intramuscular Once Malorie Dillon MD     • levothyroxine  75 mcg Oral Early Morning Malorie Dillon MD     • [START ON 2/1/2023] magnesium Oxide  400 mg Oral BID Brittany Goff MD     • potassium chloride  20 mEq Oral BID Malorie Dillon MD          Today, Patient Was Seen By: Brittany Goff MD    **Please Note: This note may have been constructed using a voice recognition system  **

## 2023-02-01 ENCOUNTER — APPOINTMENT (INPATIENT)
Dept: CT IMAGING | Facility: HOSPITAL | Age: 68
End: 2023-02-01

## 2023-02-01 PROBLEM — D72.829 LEUKOCYTOSIS: Status: RESOLVED | Noted: 2023-01-30 | Resolved: 2023-02-01

## 2023-02-01 PROBLEM — J43.8 OTHER EMPHYSEMA (HCC): Status: ACTIVE | Noted: 2023-02-01

## 2023-02-01 LAB
ALBUMIN SERPL BCP-MCNC: 3.4 G/DL (ref 3.5–5)
ALP SERPL-CCNC: 49 U/L (ref 34–104)
ALT SERPL W P-5'-P-CCNC: 25 U/L (ref 7–52)
ANION GAP SERPL CALCULATED.3IONS-SCNC: 7 MMOL/L (ref 4–13)
AST SERPL W P-5'-P-CCNC: 28 U/L (ref 13–39)
BASOPHILS # BLD AUTO: 0.03 THOUSANDS/ÂΜL (ref 0–0.1)
BASOPHILS NFR BLD AUTO: 0 % (ref 0–1)
BILIRUB SERPL-MCNC: 0.5 MG/DL (ref 0.2–1)
BUN SERPL-MCNC: 24 MG/DL (ref 5–25)
CALCIUM ALBUM COR SERPL-MCNC: 8.8 MG/DL (ref 8.3–10.1)
CALCIUM SERPL-MCNC: 8.3 MG/DL (ref 8.4–10.2)
CHLORIDE SERPL-SCNC: 101 MMOL/L (ref 96–108)
CO2 SERPL-SCNC: 23 MMOL/L (ref 21–32)
CREAT SERPL-MCNC: 1.07 MG/DL (ref 0.6–1.3)
EOSINOPHIL # BLD AUTO: 0.01 THOUSAND/ÂΜL (ref 0–0.61)
EOSINOPHIL NFR BLD AUTO: 0 % (ref 0–6)
ERYTHROCYTE [DISTWIDTH] IN BLOOD BY AUTOMATED COUNT: 13.1 % (ref 11.6–15.1)
GFR SERPL CREATININE-BSD FRML MDRD: 70 ML/MIN/1.73SQ M
GLUCOSE SERPL-MCNC: 120 MG/DL (ref 65–140)
HCT VFR BLD AUTO: 44.2 % (ref 36.5–49.3)
HGB BLD-MCNC: 14.8 G/DL (ref 12–17)
IMM GRANULOCYTES # BLD AUTO: 0.04 THOUSAND/UL (ref 0–0.2)
IMM GRANULOCYTES NFR BLD AUTO: 0 % (ref 0–2)
LYMPHOCYTES # BLD AUTO: 0.97 THOUSANDS/ÂΜL (ref 0.6–4.47)
LYMPHOCYTES NFR BLD AUTO: 11 % (ref 14–44)
MCH RBC QN AUTO: 31.2 PG (ref 26.8–34.3)
MCHC RBC AUTO-ENTMCNC: 33.5 G/DL (ref 31.4–37.4)
MCV RBC AUTO: 93 FL (ref 82–98)
MONOCYTES # BLD AUTO: 1.55 THOUSAND/ÂΜL (ref 0.17–1.22)
MONOCYTES NFR BLD AUTO: 17 % (ref 4–12)
NEUTROPHILS # BLD AUTO: 6.59 THOUSANDS/ÂΜL (ref 1.85–7.62)
NEUTS SEG NFR BLD AUTO: 72 % (ref 43–75)
NRBC BLD AUTO-RTO: 0 /100 WBCS
PLATELET # BLD AUTO: 150 THOUSANDS/UL (ref 149–390)
PMV BLD AUTO: 10.5 FL (ref 8.9–12.7)
POTASSIUM SERPL-SCNC: 3.8 MMOL/L (ref 3.5–5.3)
PROT SERPL-MCNC: 6.3 G/DL (ref 6.4–8.4)
RBC # BLD AUTO: 4.74 MILLION/UL (ref 3.88–5.62)
SODIUM SERPL-SCNC: 131 MMOL/L (ref 135–147)
WBC # BLD AUTO: 9.19 THOUSAND/UL (ref 4.31–10.16)

## 2023-02-01 RX ORDER — IPRATROPIUM BROMIDE AND ALBUTEROL SULFATE 2.5; .5 MG/3ML; MG/3ML
3 SOLUTION RESPIRATORY (INHALATION)
Status: DISCONTINUED | OUTPATIENT
Start: 2023-02-01 | End: 2023-02-01

## 2023-02-01 RX ORDER — IPRATROPIUM BROMIDE AND ALBUTEROL SULFATE 2.5; .5 MG/3ML; MG/3ML
3 SOLUTION RESPIRATORY (INHALATION)
Status: DISCONTINUED | OUTPATIENT
Start: 2023-02-01 | End: 2023-02-04

## 2023-02-01 RX ORDER — CEFAZOLIN SODIUM 2 G/50ML
2000 SOLUTION INTRAVENOUS EVERY 8 HOURS
Status: DISCONTINUED | OUTPATIENT
Start: 2023-02-01 | End: 2023-02-06 | Stop reason: HOSPADM

## 2023-02-01 RX ORDER — ALBUTEROL SULFATE 2.5 MG/3ML
2.5 SOLUTION RESPIRATORY (INHALATION) EVERY 4 HOURS PRN
Status: DISCONTINUED | OUTPATIENT
Start: 2023-02-01 | End: 2023-02-06 | Stop reason: HOSPADM

## 2023-02-01 RX ADMIN — LEVOTHYROXINE SODIUM 75 MCG: 75 TABLET ORAL at 05:19

## 2023-02-01 RX ADMIN — AMLODIPINE BESYLATE 10 MG: 10 TABLET ORAL at 09:11

## 2023-02-01 RX ADMIN — GABAPENTIN 100 MG: 100 CAPSULE ORAL at 18:30

## 2023-02-01 RX ADMIN — IPRATROPIUM BROMIDE AND ALBUTEROL SULFATE 3 ML: 2.5; .5 SOLUTION RESPIRATORY (INHALATION) at 07:52

## 2023-02-01 RX ADMIN — IPRATROPIUM BROMIDE AND ALBUTEROL SULFATE 3 ML: 2.5; .5 SOLUTION RESPIRATORY (INHALATION) at 13:33

## 2023-02-01 RX ADMIN — POTASSIUM CHLORIDE 20 MEQ: 20 TABLET, EXTENDED RELEASE ORAL at 09:11

## 2023-02-01 RX ADMIN — BUPROPION HYDROCHLORIDE 300 MG: 150 TABLET, EXTENDED RELEASE ORAL at 09:10

## 2023-02-01 RX ADMIN — CEFAZOLIN SODIUM 2000 MG: 2 SOLUTION INTRAVENOUS at 22:17

## 2023-02-01 RX ADMIN — MAGNESIUM OXIDE TAB 400 MG (241.3 MG ELEMENTAL MG) 400 MG: 400 (241.3 MG) TAB at 09:11

## 2023-02-01 RX ADMIN — CEFAZOLIN SODIUM 2000 MG: 2 SOLUTION INTRAVENOUS at 15:36

## 2023-02-01 RX ADMIN — POTASSIUM CHLORIDE 20 MEQ: 20 TABLET, EXTENDED RELEASE ORAL at 18:30

## 2023-02-01 RX ADMIN — GABAPENTIN 100 MG: 100 CAPSULE ORAL at 09:11

## 2023-02-01 RX ADMIN — ENOXAPARIN SODIUM 40 MG: 40 INJECTION SUBCUTANEOUS at 09:11

## 2023-02-01 RX ADMIN — ACETAMINOPHEN 325MG 650 MG: 325 TABLET ORAL at 15:44

## 2023-02-01 RX ADMIN — IPRATROPIUM BROMIDE AND ALBUTEROL SULFATE 3 ML: 2.5; .5 SOLUTION RESPIRATORY (INHALATION) at 19:44

## 2023-02-01 RX ADMIN — MAGNESIUM OXIDE TAB 400 MG (241.3 MG ELEMENTAL MG) 400 MG: 400 (241.3 MG) TAB at 18:30

## 2023-02-01 NOTE — ASSESSMENT & PLAN NOTE
Blood cultures + for staph aureus  MAKEDA today for evaluation of possible cardiac source  No contraindications for MAKEDA  Patient has been NPO since midnight  Myopia-Discussed diagnosis with patient. -Explained that people who are myopic are at a higher risk for developing RD/RT and reviewed associated S&S.-Pt to contact our office if symptoms develop. Astigmatism-Discussed diagnosis with patient. Presbyopia-Discussed diagnosis with patient. Updated spec Rx given. Recommend lens that will provide comfort as well as protect safety and health of eyes. CL wear-CLs fit and center well.-Stressed that patient should not sleep in CL. -Updated CL Rx given. -CL care and precautions given.

## 2023-02-01 NOTE — PROGRESS NOTES
[address]  Progress Note - Asmita Oquendo 1955, 69 y o  male MRN: [id number]  Unit/Bed#: -01 Encounter: [id number]  Primary Care Provider: Radha Mckeon MD   Date and time admitted to hospital: 1/30/2023  8:09 AM    Acute on chronic heart failure with preserved ejection fraction [facility])  Assessment & Plan  Wt Readings from Last 3 Encounters:   01/31/23 101 kg (223 lb 9 6 oz)   10/03/22 104 kg (230 lb)   09/23/22 104 kg (230 lb)     · Patient is on hydrochlorothiazide last 2D echo had a moderate aortic stenosis and EF of 60 to 65%    · Lungs are with rails no leg edema proBNP is on the low side chest x-ray compliant with vascular congestion with worsening shortness of breath  · Repeat echocardiogram shows ejection fraction 60% with grade 1 diastolic dysfunction, elevated left atrial filling pressure, moderate aortic stenosis  · Patient was initially receiving IV diuretics, which remain on hold since patient was found clinically dry-and patient also has bacteremia        * Sepsis [facility])  Assessment & Plan  Patient spike high fever 102 4, elevated WBC with possible source of infection from pneumonia as evidenced on imaging  Procalcitonin is trending up  Both sets of blood culture is positive, gram-positive cocci in clusters, most likely MSSA-antibiotic adjusted to cefazolin  COVID/flu panel negative  Follow ID recommendation  Patient denies any prosthesis in the body  Patient had echocardiogram done-requested cardiology to reevaluate the echocardiogram for any possible vegetation    Other emphysema [facility])  Assessment & Plan  Patient is chronic smoker  Suspect undiagnosed COPD  Continue respiratory protocol  Continue treatment for pneumonia    Peripheral neuropathy  Assessment & Plan  · The feet for some time has not been on any treatment we will start gabapentin 100 mg p o  twice daily and see how he tolerates before advancement his hemoglobin A1c recently done was 5 9 in the prediabetic stage    MDD (major depressive disorder)  Assessment & Plan  · Start Wellbutrin    Moderate aortic stenosis  Assessment & Plan  · We will do an echocardiogram to evaluate any progression and ask cardiology to evaluate  · Will need outpatient follow-up    HTN (hypertension)  Assessment & Plan  · We will continue Norvasc hold hydrochlorothiazide secondary to acute hyponatremia    Acute hyponatremia  Assessment & Plan  · Remained stable    Bigeminy  Assessment & Plan  · 2D echo telemetry monitor replete magnesium and potassium repeat labs tonight  Not on a beta-  · After correcting electrolytes especially magnesium and potassium, improved, continue p o  magnesium  · TSH normal    Leukocytosis-resolved as of 2023  Assessment & Plan  · Secondary to sepsis          VTE Pharmacologic Prophylaxis: VTE Score: 3 Moderate Risk (Score 3-4) - Pharmacological DVT Prophylaxis Ordered: enoxaparin (Lovenox)  Patient Centered Rounds: I performed bedside rounds with nursing staff today  Discussions with Specialists or Other Care Team Provider: Cardiology    Education and Discussions with Family / Patient: Updated  (wife) at bedside  Current Length of Stay: 2 day(s)  Current Patient Status: Inpatient   Certification Statement: The patient will continue to require additional inpatient hospital stay due to To monitor above condition  Discharge Plan: Anticipate discharge in 48-72 hrs to home  Code Status: Level 1 - Full Code    Subjective:   Seen and evaluated during the rounds  Resting comfortably  Patient reports he is feeling much better compared to yesterday but is still feeling some    Objective:     Vitals:   Temp (24hrs), Av 1 °F (36 7 °C), Min:97 7 °F (36 5 °C), Max:98 2 °F (36 8 °C)    Temp:  [97 7 °F (36 5 °C)-98 2 °F (36 8 °C)] 98 1 °F (36 7 °C)  HR:  [] 76  Resp:  [16-20] 20  BP: (107-129)/(67-80) 116/68  SpO2:  [94 %-100 %] 96 %  Body mass index is 30 32 kg/m²       Input and Output Summary (last 24 hours): Intake/Output Summary (Last 24 hours) at 2/1/2023 1648  Last data filed at 2/1/2023 0520  Gross per 24 hour   Intake 470 ml   Output 1200 ml   Net -730 ml       Physical Exam:   Physical Exam  Vitals and nursing note reviewed  Constitutional:       Appearance: Normal appearance  He is not ill-appearing or diaphoretic  HENT:      Head: Normocephalic  Mouth/Throat:      Mouth: Mucous membranes are moist       Pharynx: Oropharynx is clear  No oropharyngeal exudate  Eyes:      General: No scleral icterus  Left eye: No discharge  Extraocular Movements: Extraocular movements intact  Conjunctiva/sclera: Conjunctivae normal       Pupils: Pupils are equal, round, and reactive to light  Cardiovascular:      Rate and Rhythm: Normal rate  Heart sounds: No murmur heard  No friction rub  No gallop  Pulmonary:      Effort: Pulmonary effort is normal  No respiratory distress  Breath sounds: No stridor  Wheezing and rales present  No rhonchi  Abdominal:      General: Abdomen is flat  Bowel sounds are normal  There is no distension  Palpations: There is no mass  Tenderness: There is no abdominal tenderness  Hernia: No hernia is present  Musculoskeletal:      Right lower leg: No edema  Left lower leg: No edema  Lymphadenopathy:      Cervical: No cervical adenopathy  Skin:     General: Skin is warm  Capillary Refill: Capillary refill takes less than 2 seconds  Coloration: Skin is not jaundiced or pale  Findings: No bruising or erythema  Neurological:      General: No focal deficit present  Mental Status: He is alert and oriented to person, place, and time  Cranial Nerves: No cranial nerve deficit  Sensory: No sensory deficit  Motor: No weakness        Coordination: Coordination normal          Additional Data:     Labs:  Results from last 7 days   Lab Units 02/01/23  0456   WBC Thousand/uL 9  19   HEMOGLOBIN g/dL 14 8   HEMATOCRIT % 44 2   PLATELETS Thousands/uL 150   NEUTROS PCT % 72   LYMPHS PCT % 11*   MONOS PCT % 17*   EOS PCT % 0     Results from last 7 days   Lab Units 02/01/23  0456   SODIUM mmol/L 131*   POTASSIUM mmol/L 3 8   CHLORIDE mmol/L 101   CO2 mmol/L 23   BUN mg/dL 24   CREATININE mg/dL 1 07   ANION GAP mmol/L 7   CALCIUM mg/dL 8 3*   ALBUMIN g/dL 3 4*   TOTAL BILIRUBIN mg/dL 0 50   ALK PHOS U/L 49   ALT U/L 25   AST U/L 28   GLUCOSE RANDOM mg/dL 120     Results from last 7 days   Lab Units 01/30/23  0825   INR  1 03             Results from last 7 days   Lab Units 01/31/23  1255 01/30/23  0825   LACTIC ACID mmol/L  --  1 2   PROCALCITONIN ng/ml 0 85*  --        Lines/Drains:  Invasive Devices     Peripheral Intravenous Line  Duration           Peripheral IV 01/30/23 Ventral (anterior); Right Forearm 2 days                      Imaging: Reviewed radiology reports from this admission including: CT head    Recent Cultures (last 7 days):   Results from last 7 days   Lab Units 01/31/23  1352   GRAM STAIN RESULT  Gram positive cocci in clusters*  Gram positive cocci in clusters*       Last 24 Hours Medication List:   Current Facility-Administered Medications   Medication Dose Route Frequency Provider Last Rate   • acetaminophen  650 mg Oral Q4H PRN Fred Allen MD     • albuterol  2 5 mg Nebulization Q4H PRN June Alcaraz MD     • amLODIPine  10 mg Oral Daily Fred Allen MD     • buPROPion  300 mg Oral Daily Fred Allen MD     • cefazolin  2,000 mg Intravenous Q8H Deandre Martin MD 2,000 mg (02/01/23 1536)   • enoxaparin  40 mg Subcutaneous Q24H [address] Fred lAlen MD     • gabapentin  100 mg Oral BID Fred Allen MD     • ipratropium-albuterol  3 mL Nebulization TID June Alcaraz MD     • levothyroxine  75 mcg Oral Early Morning Fred Allen MD     • magnesium Oxide  400 mg Oral BID June Alcaraz MD     • potassium chloride  20 mEq Oral BID Markel Goldmann Josiane Benitez MD          Today, Patient Was Seen By: Katlyn Hopkins MD    **Please Note: This note may have been constructed using a voice recognition system  **

## 2023-02-01 NOTE — ASSESSMENT & PLAN NOTE
Patient is complaining of shortness of breath along with wheezing  Emphysema noted on CT along with possible pneumonia  See discussion under sepsis  Now receiving breathing treatments    Discontinued diuretics as likely not vascular congestion as noted on chest xray

## 2023-02-01 NOTE — ASSESSMENT & PLAN NOTE
Wt Readings from Last 3 Encounters:   01/31/23 101 kg (223 lb 9 6 oz)   10/03/22 104 kg (230 lb)   09/23/22 104 kg (230 lb)     · Patient is on hydrochlorothiazide last 2D echo had a moderate aortic stenosis and EF of 60 to 65%    · Lungs are with rails no leg edema proBNP is on the low side chest x-ray compliant with vascular congestion with worsening shortness of breath  · Repeat echocardiogram shows ejection fraction 60% with grade 1 diastolic dysfunction, elevated left atrial filling pressure, moderate aortic stenosis  · Patient was initially receiving IV diuretics, which remain on hold since patient was found clinically dry-and patient also has bacteremia

## 2023-02-01 NOTE — ASSESSMENT & PLAN NOTE
· 2D echo telemetry monitor replete magnesium and potassium repeat labs tonight    Not on a beta-  · After correcting electrolytes especially magnesium and potassium, improved, continue p o  magnesium  · TSH normal

## 2023-02-01 NOTE — PROGRESS NOTES
Progress Note:Cardiology  Kal Hearn 1955, 69 y o  male MRN: [id number]    Unit/Bed#: -01 Encounter: [id number]  Attending Physician: Alan Napoles MD   Primary Care Provider: Kev Bowles MD   Date admitted to hospital: 1/30/2023  Length of stay: 2         Acute on chronic heart failure with preserved ejection fraction [facility])  Assessment & Plan  Wt Readings from Last 3 Encounters:   01/31/23 101 kg (223 lb 9 6 oz)   10/03/22 104 kg (230 lb)   09/23/22 104 kg (230 lb)     Echocardiogram 1/30/2023 with EF 60%, grade 1 DD, elevated LA filling pressure with E/E' 15, mod AS   and vascular congestion reported on chest xray  Received IV furosemide x 2 doses, but further doses discontinued as he appears dehydrated on exam   Will monitor off diuretics  Strict I's/O's, standing daily weights  Optimize electrolytes for K+ >4, Mag > 2  Other emphysema [facility])  Assessment & Plan  Patient is complaining of shortness of breath along with wheezing  Emphysema noted on CT along with possible pneumonia  See discussion under sepsis  Now receiving breathing treatments  Discontinued diuretics as likely not vascular congestion as noted on chest xray    Moderate aortic stenosis  Assessment & Plan  Patient has a know history of aortic stenosis reported to be moderate on echo 11/2021 through [facility]  Echocardiogram 1/30/2023 continues to show moderate aortic stenosis with PV 3 52, mean gradient 30 mmHg, DI 0 30  This should be monitored in the next 6-12 months  HTN (hypertension)  Assessment & Plan  Blood pressures acceptable  Continue amlodipine 10 mg daily  Will monitor with diuresis  Acute hyponatremia  Assessment & Plan  Sodium 131 at admission  HCTZ discontinued  Will monitor  Leukocytosis  Assessment & Plan  See discussion under sepsis  Medicine team following  Bigeminy  Assessment & Plan  Patient presented with ECG showing SR with PVC's in bigeminy    He reports a history of PVC's   EF 60% on echo 1/30/2023  PVC's improved on telemetry with addition of magnesium supplement  May benefit from updated cardiac monitoring in the outpatient setting  * Sepsis ([address] )  Assessment & Plan  Blood cultures pending  No fevers overnight  Medicine team following  Subjective:   Patient seen and examined  He is sitting up at bedside  He received another breathing treatment this morning  Continues with fatigue and feeling "equalibrium is off" but no overt lightheadedness or dizziness  No chest pain  Breathing is improving with the breathing treatments  No fevers overnight  Review of Systems   Constitutional: Positive for malaise/fatigue  HENT: Negative  Cardiovascular: Positive for dyspnea on exertion  Negative for chest pain, irregular heartbeat, leg swelling, near-syncope, orthopnea and palpitations  Respiratory: Positive for shortness of breath and wheezing  Negative for cough and snoring  Endocrine: Negative  Skin: Negative  Musculoskeletal: Negative  Gastrointestinal: Negative  Genitourinary: Negative  Neurological: Negative  Psychiatric/Behavioral: Negative  Objective:     Vitals: Blood pressure 124/68, pulse 77, temperature 97 7 °F (36 5 °C), temperature source Temporal, resp  rate 16, height 6' 0 01" (1 829 m), weight 101 kg (223 lb 9 6 oz), SpO2 96 %  , Body mass index is 30 32 kg/m² ,     Orthostatic Blood Pressures    Flowsheet Row Most Recent Value   Blood Pressure 124/68 filed at 02/01/2023 7227   Patient Position - Orthostatic VS Lying filed at 02/01/2023 9113          Physical Exam  Vitals and nursing note reviewed  Constitutional:       General: He is not in acute distress  Appearance: He is well-developed  HENT:      Head: Normocephalic and atraumatic  Eyes:      Conjunctiva/sclera: Conjunctivae normal    Neck:      Vascular: No JVD  Cardiovascular:      Rate and Rhythm: Normal rate and regular rhythm        Heart sounds: Murmur heard     Systolic murmur is present  Pulmonary:      Effort: No respiratory distress  Breath sounds: Wheezing (throughout) present  Abdominal:      Palpations: Abdomen is soft  Tenderness: There is no abdominal tenderness  Musculoskeletal:         General: No swelling  Cervical back: Neck supple  Right lower leg: No edema  Left lower leg: No edema  Skin:     General: Skin is warm and dry  Capillary Refill: Capillary refill takes less than 2 seconds  Neurological:      Mental Status: He is alert  Psychiatric:         Mood and Affect: Mood normal          Speech: Speech normal          Behavior: Behavior normal  Behavior is cooperative           Cognition and Memory: Cognition normal             Intake/Output Summary (Last 24 hours) at 2/1/2023 1107  Last data filed at 2/1/2023 0520  Gross per 24 hour   Intake 1170 ml   Output 2000 ml   Net -830 ml       Weight (last 2 days)     Date/Time Weight    01/31/23 0943 101 (223 6)    01/30/23 12:38:18 103 (228)    01/30/23 0812 104 (228 84)             Medications:      Current Facility-Administered Medications:   •  acetaminophen (TYLENOL) tablet 650 mg, 650 mg, Oral, Q4H PRN, Isaiah Talbot MD, 650 mg at 01/31/23 2410  •  albuterol inhalation solution 2 5 mg, 2 5 mg, Nebulization, Q4H PRN, Delbert Martin MD  •  amLODIPine (NORVASC) tablet 10 mg, 10 mg, Oral, Daily, Isaiah Talbot MD, 10 mg at 02/01/23 0911  •  buPROPion (WELLBUTRIN XL) 24 hr tablet 300 mg, 300 mg, Oral, Daily, Isaiah Talbot MD, 300 mg at 02/01/23 0910  •  cefTRIAXone (ROCEPHIN) IVPB (premix in dextrose) 1,000 mg 50 mL, 1,000 mg, Intravenous, Q24H, Mary Abdalla MD, Last Rate: 100 mL/hr at 01/31/23 2017, 1,000 mg at 01/31/23 2017  •  enoxaparin (LOVENOX) subcutaneous injection 40 mg, 40 mg, Subcutaneous, Q24H [facility], Isaiah Talbot MD, 40 mg at 02/01/23 9424  •  gabapentin (NEURONTIN) capsule 100 mg, 100 mg, Oral, BID, Isaiah Talbot MD, 100 mg at 02/01/23 0911  •  ipratropium-albuterol (DUO-NEB) 0 5-2 5 mg/3 mL inhalation solution 3 mL, 3 mL, Nebulization, TID, Gunner Hollis MD  •  levothyroxine tablet 75 mcg, 75 mcg, Oral, Early Morning, Julisa Salgado MD, 75 mcg at 02/01/23 4089  •  magnesium Oxide (MAG-OX) tablet 400 mg, 400 mg, Oral, BID, Tomy Martin MD, 400 mg at 02/01/23 0911  •  potassium chloride (K-DUR,KLOR-CON) CR tablet 20 mEq, 20 mEq, Oral, BID, Julisa Salgado MD, 20 mEq at 02/01/23 0911     Labs & Results:        Results from last 7 days   Lab Units 02/01/23 0456 01/31/23  0524 01/30/23  0825   WBC Thousand/uL 9 19 12 28* 11 21*   HEMOGLOBIN g/dL 14 8 14 8 16 4   HEMATOCRIT % 44 2 43 8 48 3   PLATELETS Thousands/uL 150 156 190         Results from last 7 days   Lab Units 02/01/23  0456 01/31/23  0524 01/30/23  1753 01/30/23  0825   POTASSIUM mmol/L 3 8 3 7 3 7 3 5   CHLORIDE mmol/L 101 100 100 99   CO2 mmol/L 23 22 26 21   BUN mg/dL 24 24 20 18   CREATININE mg/dL 1 07 1 11 1 30 1 27   CALCIUM mg/dL 8 3* 8 3* 8 5 8 7   ALK PHOS U/L 49  --   --  59   ALT U/L 25  --   --  20   AST U/L 28  --   --  24     Results from last 7 days   Lab Units 01/30/23  0825   INR  1 03   PTT seconds 30     Results from last 7 days   Lab Units 01/31/23  0524 01/30/23  1753 01/30/23  0825   MAGNESIUM mg/dL 1 8* 2 1 1 8*     Results from last 7 days   Lab Units 01/30/23  0825   BNP pg/mL 157*      Telemetry: sinus rhythm with occasional PVC's  Rate 60-90's    Counseling / Coordination of Care  Total floor / unit time spent today 25 minutes  Greater than 50% of total time was spent with the patient and / or family counseling and / or coordination of care    A description of the counseling / coordination of care: Discussed with Dr Albino Powell

## 2023-02-01 NOTE — ASSESSMENT & PLAN NOTE
Patient spike high fever 102 4, elevated WBC with possible source of infection from pneumonia as evidenced on imaging  Procalcitonin is trending up  Both sets of blood culture is positive, gram-positive cocci in clusters, most likely MSSA-antibiotic adjusted to cefazolin  COVID/flu panel negative  Follow ID recommendation  Patient denies any prosthesis in the body  Patient had echocardiogram done-requested cardiology to reevaluate the echocardiogram for any possible vegetation

## 2023-02-01 NOTE — PLAN OF CARE
Problem: PAIN - ADULT  Goal: Verbalizes/displays adequate comfort level or baseline comfort level  Description: Interventions:  - Encourage patient to monitor pain and request assistance  - Assess pain using appropriate pain scale  - Administer analgesics based on type and severity of pain and evaluate response  - Implement non-pharmacological measures as appropriate and evaluate response  - Consider cultural and social influences on pain and pain management  - Notify physician/advanced practitioner if interventions unsuccessful or patient reports new pain  Outcome: Progressing     Problem: INFECTION - ADULT  Goal: Absence or prevention of progression during hospitalization  Description: INTERVENTIONS:  - Assess and monitor for signs and symptoms of infection  - Monitor lab/diagnostic results  - Monitor all insertion sites, i e  indwelling lines, tubes, and drains  - Monitor endotracheal if appropriate and nasal secretions for changes in amount and color  - [place] appropriate cooling/warming therapies per order  - Administer medications as ordered  - Instruct and encourage patient and family to use good hand hygiene technique  - Identify and instruct in appropriate isolation precautions for identified infection/condition  Outcome: Progressing  Goal: Absence of fever/infection during neutropenic period  Description: INTERVENTIONS:  - Monitor WBC    Outcome: Progressing     Problem: SAFETY ADULT  Goal: Patient will remain free of falls  Description: INTERVENTIONS:  - Educate patient/family on patient safety including physical limitations  - Instruct patient to call for assistance with activity   - Consult OT/PT to assist with strengthening/mobility   - Keep Call bell within reach  - Keep bed low and locked with side rails adjusted as appropriate  - Keep care items and personal belongings within reach  - Initiate and maintain comfort rounds  - Make Fall Risk Sign visible to staff  - Offer Toileting every 2 Hours, in advance of need  - Initiate/Maintain alarm  - Obtain necessary fall risk management equipment  - Apply yellow socks and bracelet for high fall risk patients  - Consider moving patient to room near nurses station  Outcome: Progressing  Goal: Maintain or return to baseline ADL function  Description: INTERVENTIONS:  -  Assess patient's ability to carry out ADLs; assess patient's baseline for ADL function and identify physical deficits which impact ability to perform ADLs (bathing, care of mouth/teeth, toileting, grooming, dressing, etc )  - Assess/evaluate cause of self-care deficits   - Assess range of motion  - Assess patient's mobility; develop plan if impaired  - Assess patient's need for assistive devices and provide as appropriate  - Encourage maximum independence but intervene and supervise when necessary  - Involve family in performance of ADLs  - Assess for home care needs following discharge   - Consider OT consult to assist with ADL evaluation and planning for discharge  - Provide patient education as appropriate  Outcome: Progressing  Goal: Maintains/Returns to pre admission functional level  Description: INTERVENTIONS:  - Perform BMAT or MOVE assessment daily    - Set and communicate daily mobility goal to care team and patient/family/caregiver  - Collaborate with rehabilitation services on mobility goals if consulted  - Perform Range of Motion - times a day  - Reposition patient every - hours    - Dangle patient - times a day  - Stand patient - times a day  - Ambulate patient - times a day  - Out of bed to chair - times a day   - Out of bed for meals - times a day  - Out of bed for toileting  - Record patient progress and toleration of activity level   Outcome: Progressing     Problem: DISCHARGE PLANNING  Goal: Discharge to home or other facility with appropriate resources  Description: INTERVENTIONS:  - Identify barriers to discharge w/patient and caregiver  - Arrange for needed discharge resources and transportation as appropriate  - Identify discharge learning needs (meds, wound care, etc )  - Arrange for interpretive services to assist at discharge as needed  - Refer to Case Management Department for coordinating discharge planning if the patient needs post-hospital services based on physician/advanced practitioner order or complex needs related to functional status, cognitive ability, or social support system  Outcome: Progressing     Problem: Knowledge Deficit  Goal: Patient/family/caregiver demonstrates understanding of disease process, treatment plan, medications, and discharge instructions  Description: Complete learning assessment and assess knowledge base    Interventions:  - Provide teaching at level of understanding  - Provide teaching via preferred learning methods  Outcome: Progressing

## 2023-02-01 NOTE — PLAN OF CARE
Problem: PAIN - ADULT  Goal: Verbalizes/displays adequate comfort level or baseline comfort level  Description: Interventions:  - Encourage patient to monitor pain and request assistance  - Assess pain using appropriate pain scale  - Administer analgesics based on type and severity of pain and evaluate response  - Implement non-pharmacological measures as appropriate and evaluate response  - Consider cultural and social influences on pain and pain management  - Notify physician/advanced practitioner if interventions unsuccessful or patient reports new pain  Outcome: Progressing     Problem: INFECTION - ADULT  Goal: Absence or prevention of progression during hospitalization  Description: INTERVENTIONS:  - Assess and monitor for signs and symptoms of infection  - Monitor lab/diagnostic results  - Monitor all insertion sites, i e  indwelling lines, tubes, and drains  - Monitor endotracheal if appropriate and nasal secretions for changes in amount and color  - [place] appropriate cooling/warming therapies per order  - Administer medications as ordered  - Instruct and encourage patient and family to use good hand hygiene technique  - Identify and instruct in appropriate isolation precautions for identified infection/condition  Outcome: Progressing     Problem: SAFETY ADULT  Goal: Patient will remain free of falls  Description: INTERVENTIONS:  - Educate patient/family on patient safety including physical limitations  - Instruct patient to call for assistance with activity   - Consult OT/PT to assist with strengthening/mobility   - Keep Call bell within reach  - Keep bed low and locked with side rails adjusted as appropriate  - Keep care items and personal belongings within reach  - Initiate and maintain comfort rounds  - Make Fall Risk Sign visible to staff    - Apply yellow socks and bracelet for high fall risk patients  - Consider moving patient to room near nurses station  Outcome: Progressing

## 2023-02-01 NOTE — ASSESSMENT & PLAN NOTE
Patient is chronic smoker  Suspect undiagnosed COPD  Continue respiratory protocol  Continue treatment for pneumonia

## 2023-02-02 LAB
ALBUMIN SERPL BCP-MCNC: 3.4 G/DL (ref 3.5–5)
ALP SERPL-CCNC: 49 U/L (ref 34–104)
ALT SERPL W P-5'-P-CCNC: 41 U/L (ref 7–52)
ANION GAP SERPL CALCULATED.3IONS-SCNC: 6 MMOL/L (ref 4–13)
AST SERPL W P-5'-P-CCNC: 37 U/L (ref 13–39)
BASOPHILS # BLD AUTO: 0.03 THOUSANDS/ÂΜL (ref 0–0.1)
BASOPHILS NFR BLD AUTO: 0 % (ref 0–1)
BILIRUB SERPL-MCNC: 0.58 MG/DL (ref 0.2–1)
BUN SERPL-MCNC: 19 MG/DL (ref 5–25)
CALCIUM ALBUM COR SERPL-MCNC: 9 MG/DL (ref 8.3–10.1)
CALCIUM SERPL-MCNC: 8.5 MG/DL (ref 8.4–10.2)
CHLORIDE SERPL-SCNC: 101 MMOL/L (ref 96–108)
CO2 SERPL-SCNC: 26 MMOL/L (ref 21–32)
CREAT SERPL-MCNC: 1.07 MG/DL (ref 0.6–1.3)
EOSINOPHIL # BLD AUTO: 0.13 THOUSAND/ÂΜL (ref 0–0.61)
EOSINOPHIL NFR BLD AUTO: 2 % (ref 0–6)
ERYTHROCYTE [DISTWIDTH] IN BLOOD BY AUTOMATED COUNT: 13.2 % (ref 11.6–15.1)
GFR SERPL CREATININE-BSD FRML MDRD: 70 ML/MIN/1.73SQ M
GLUCOSE SERPL-MCNC: 119 MG/DL (ref 65–140)
HCT VFR BLD AUTO: 43.4 % (ref 36.5–49.3)
HGB BLD-MCNC: 14.5 G/DL (ref 12–17)
IMM GRANULOCYTES # BLD AUTO: 0.04 THOUSAND/UL (ref 0–0.2)
IMM GRANULOCYTES NFR BLD AUTO: 1 % (ref 0–2)
LYMPHOCYTES # BLD AUTO: 1.38 THOUSANDS/ÂΜL (ref 0.6–4.47)
LYMPHOCYTES NFR BLD AUTO: 16 % (ref 14–44)
MCH RBC QN AUTO: 31.7 PG (ref 26.8–34.3)
MCHC RBC AUTO-ENTMCNC: 33.4 G/DL (ref 31.4–37.4)
MCV RBC AUTO: 95 FL (ref 82–98)
MONOCYTES # BLD AUTO: 1.7 THOUSAND/ÂΜL (ref 0.17–1.22)
MONOCYTES NFR BLD AUTO: 20 % (ref 4–12)
NEUTROPHILS # BLD AUTO: 5.28 THOUSANDS/ÂΜL (ref 1.85–7.62)
NEUTS SEG NFR BLD AUTO: 61 % (ref 43–75)
NRBC BLD AUTO-RTO: 0 /100 WBCS
PLATELET # BLD AUTO: 154 THOUSANDS/UL (ref 149–390)
PMV BLD AUTO: 10.1 FL (ref 8.9–12.7)
POTASSIUM SERPL-SCNC: 4.2 MMOL/L (ref 3.5–5.3)
PROT SERPL-MCNC: 6.5 G/DL (ref 6.4–8.4)
RBC # BLD AUTO: 4.57 MILLION/UL (ref 3.88–5.62)
SODIUM SERPL-SCNC: 133 MMOL/L (ref 135–147)
WBC # BLD AUTO: 8.56 THOUSAND/UL (ref 4.31–10.16)

## 2023-02-02 RX ADMIN — AMLODIPINE BESYLATE 10 MG: 10 TABLET ORAL at 09:03

## 2023-02-02 RX ADMIN — LEVOTHYROXINE SODIUM 75 MCG: 75 TABLET ORAL at 05:40

## 2023-02-02 RX ADMIN — LEVOTHYROXINE SODIUM 75 MCG: 75 TABLET ORAL at 05:43

## 2023-02-02 RX ADMIN — IPRATROPIUM BROMIDE AND ALBUTEROL SULFATE 3 ML: 2.5; .5 SOLUTION RESPIRATORY (INHALATION) at 07:39

## 2023-02-02 RX ADMIN — CEFAZOLIN SODIUM 2000 MG: 2 SOLUTION INTRAVENOUS at 05:40

## 2023-02-02 RX ADMIN — MAGNESIUM OXIDE TAB 400 MG (241.3 MG ELEMENTAL MG) 400 MG: 400 (241.3 MG) TAB at 09:03

## 2023-02-02 RX ADMIN — ENOXAPARIN SODIUM 40 MG: 40 INJECTION SUBCUTANEOUS at 09:03

## 2023-02-02 RX ADMIN — IPRATROPIUM BROMIDE AND ALBUTEROL SULFATE 3 ML: 2.5; .5 SOLUTION RESPIRATORY (INHALATION) at 19:39

## 2023-02-02 RX ADMIN — MAGNESIUM OXIDE TAB 400 MG (241.3 MG ELEMENTAL MG) 400 MG: 400 (241.3 MG) TAB at 18:14

## 2023-02-02 RX ADMIN — BUPROPION HYDROCHLORIDE 300 MG: 150 TABLET, EXTENDED RELEASE ORAL at 09:03

## 2023-02-02 RX ADMIN — GABAPENTIN 100 MG: 100 CAPSULE ORAL at 18:14

## 2023-02-02 RX ADMIN — CEFAZOLIN SODIUM 2000 MG: 2 SOLUTION INTRAVENOUS at 21:31

## 2023-02-02 RX ADMIN — GABAPENTIN 100 MG: 100 CAPSULE ORAL at 09:03

## 2023-02-02 RX ADMIN — IPRATROPIUM BROMIDE AND ALBUTEROL SULFATE 3 ML: 2.5; .5 SOLUTION RESPIRATORY (INHALATION) at 13:33

## 2023-02-02 RX ADMIN — POTASSIUM CHLORIDE 20 MEQ: 20 TABLET, EXTENDED RELEASE ORAL at 09:03

## 2023-02-02 RX ADMIN — ACETAMINOPHEN 325MG 650 MG: 325 TABLET ORAL at 05:43

## 2023-02-02 RX ADMIN — CEFAZOLIN SODIUM 2000 MG: 2 SOLUTION INTRAVENOUS at 14:26

## 2023-02-02 RX ADMIN — POTASSIUM CHLORIDE 20 MEQ: 20 TABLET, EXTENDED RELEASE ORAL at 18:14

## 2023-02-02 NOTE — PROGRESS NOTES
[address]  Progress Note - Maria Dolores Stevens 1955, 69 y o  male MRN: [id number]  Unit/Bed#: MS Amy-01 Encounter: [id number]  Primary Care Provider: Steff Benavides MD   Date and time admitted to hospital: 1/30/2023  8:09 AM    Acute on chronic heart failure with preserved ejection fraction [facility])  Assessment & Plan  Wt Readings from Last 3 Encounters:   01/31/23 101 kg (223 lb 9 6 oz)   10/03/22 104 kg (230 lb)   09/23/22 104 kg (230 lb)     · Patient is on hydrochlorothiazide last 2D echo had a moderate aortic stenosis and EF of 60 to 65%  · Lungs are with rails no leg edema proBNP is on the low side chest x-ray compliant with vascular congestion with worsening shortness of breath  · Repeat echocardiogram shows ejection fraction 60% with grade 1 diastolic dysfunction, elevated left atrial filling pressure, moderate aortic stenosis  · Patient was initially receiving IV diuretics, which remain on hold since patient was found clinically dry-and patient also has bacteremia        * Sepsis [facility])  Assessment & Plan  Patient spike high fever 102 4, elevated WBC with possible source of infection from pneumonia as evidenced on imaging  Procalcitonin is trending up  Both sets of blood culture is positive, gram-positive cocci in clusters, most likely MSSA-antibiotic adjusted to cefazolin  COVID/flu panel negative  ID recommendation appreciated-repeat blood culture ordered, discussed with cardiology, will plan for MAKEDA on 2/3/2023 at 2 PM  Per ID note, expect 4 weeks of IV antibiotic, PICC line can be place if repeat blood culture negative for 48 hours    Patient denies any prosthesis in the body  TTE shows no vegetation    Other emphysema [facility])  Assessment & Plan  Patient is chronic smoker  Suspect undiagnosed COPD  Continue respiratory protocol  Continue treatment for pneumonia    Peripheral neuropathy  Assessment & Plan  · The feet for some time has not been on any treatment we will start gabapentin 100 mg p o  twice daily and see how he tolerates before advancement his hemoglobin A1c recently done was 5 9 in the prediabetic stage    MDD (major depressive disorder)  Assessment & Plan  · Start Wellbutrin    Moderate aortic stenosis  Assessment & Plan  · We will do an echocardiogram to evaluate any progression and ask cardiology to evaluate  · Will need outpatient follow-up    HTN (hypertension)  Assessment & Plan  · We will continue Norvasc hold hydrochlorothiazide secondary to acute hyponatremia    Acute hyponatremia  Assessment & Plan  · Remained stable    Bigeminy  Assessment & Plan  · 2D echo telemetry monitor replete magnesium and potassium repeat labs tonight  Not on a beta-  · After correcting electrolytes especially magnesium and potassium, improved, continue p o  magnesium  · TSH normal          VTE Pharmacologic Prophylaxis: VTE Score: 3 Moderate Risk (Score 3-4) - Pharmacological DVT Prophylaxis Ordered: enoxaparin (Lovenox)  Patient Centered Rounds: I performed bedside rounds with nursing staff today  Discussions with Specialists or Other Care Team Provider: Infectious disease    Education and Discussions with Family / Patient: Updated  (wife) via phone  Current Length of Stay: 3 day(s)  Current Patient Status: Inpatient   Certification Statement: The patient will continue to require additional inpatient hospital stay due to To monitor above conditions  Discharge Plan: Anticipate discharge in 48-72 hrs to home  Code Status: Level 1 - Full Code    Subjective:   Seen and evaluated during the rounds  Resting comfortably  Patient reports this morning he was diaphoretic  But denies any fever, nausea, vomiting      Objective:     Vitals:   Temp (24hrs), Av 8 °F (36 6 °C), Min:97 3 °F (36 3 °C), Max:98 1 °F (36 7 °C)    Temp:  [97 3 °F (36 3 °C)-98 1 °F (36 7 °C)] 97 3 °F (36 3 °C)  HR:  [63-79] 63  Resp:  [16-20] 16  BP: (116-117)/(66-68) 116/66  SpO2:  [95 %-100 %] 97 %  Body mass index is 30 32 kg/m²  Input and Output Summary (last 24 hours): Intake/Output Summary (Last 24 hours) at 2/2/2023 1359  Last data filed at 2/2/2023 6606  Gross per 24 hour   Intake 120 ml   Output 1725 ml   Net -1605 ml       Physical Exam:   Physical Exam  Vitals and nursing note reviewed  Constitutional:       Appearance: Normal appearance  He is not ill-appearing or diaphoretic  HENT:      Head: Normocephalic  Nose: Nose normal  No congestion or rhinorrhea  Mouth/Throat:      Mouth: Mucous membranes are moist       Pharynx: Oropharynx is clear  No oropharyngeal exudate  Eyes:      General: No scleral icterus  Left eye: No discharge  Extraocular Movements: Extraocular movements intact  Conjunctiva/sclera: Conjunctivae normal       Pupils: Pupils are equal, round, and reactive to light  Cardiovascular:      Rate and Rhythm: Normal rate  Heart sounds: No murmur heard  No friction rub  No gallop  Pulmonary:      Effort: Pulmonary effort is normal  No respiratory distress  Breath sounds: No stridor  No wheezing or rhonchi  Abdominal:      General: Abdomen is flat  Bowel sounds are normal  There is no distension  Palpations: There is no mass  Tenderness: There is no abdominal tenderness  Hernia: No hernia is present  Musculoskeletal:      Cervical back: Normal range of motion  No rigidity  Right lower leg: No edema  Left lower leg: No edema  Lymphadenopathy:      Cervical: No cervical adenopathy  Skin:     General: Skin is warm  Capillary Refill: Capillary refill takes less than 2 seconds  Coloration: Skin is not jaundiced or pale  Findings: No bruising or erythema  Neurological:      General: No focal deficit present  Mental Status: He is oriented to person, place, and time  Cranial Nerves: No cranial nerve deficit  Sensory: No sensory deficit  Motor: No weakness  Coordination: Coordination normal          Additional Data:     Labs:  Results from last 7 days   Lab Units 02/02/23  0457   WBC Thousand/uL 8 56   HEMOGLOBIN g/dL 14 5   HEMATOCRIT % 43 4   PLATELETS Thousands/uL 154   NEUTROS PCT % 61   LYMPHS PCT % 16   MONOS PCT % 20*   EOS PCT % 2     Results from last 7 days   Lab Units 02/02/23  0457   SODIUM mmol/L 133*   POTASSIUM mmol/L 4 2   CHLORIDE mmol/L 101   CO2 mmol/L 26   BUN mg/dL 19   CREATININE mg/dL 1 07   ANION GAP mmol/L 6   CALCIUM mg/dL 8 5   ALBUMIN g/dL 3 4*   TOTAL BILIRUBIN mg/dL 0 58   ALK PHOS U/L 49   ALT U/L 41   AST U/L 37   GLUCOSE RANDOM mg/dL 119     Results from last 7 days   Lab Units 01/30/23  0825   INR  1 03             Results from last 7 days   Lab Units 01/31/23  1255 01/30/23  0825   LACTIC ACID mmol/L  --  1 2   PROCALCITONIN ng/ml 0 85*  --        Lines/Drains:  Invasive Devices     Peripheral Intravenous Line  Duration           Peripheral IV 01/30/23 Ventral (anterior); Right Forearm 3 days                      Imaging: No pertinent imaging reviewed      Recent Cultures (last 7 days):   Results from last 7 days   Lab Units 01/31/23  1352   BLOOD CULTURE  Staphylococcus aureus*  Staphylococcus aureus*   GRAM STAIN RESULT  Gram positive cocci in clusters*  Gram positive cocci in clusters*       Last 24 Hours Medication List:   Current Facility-Administered Medications   Medication Dose Route Frequency Provider Last Rate   • acetaminophen  650 mg Oral Q4H PRN Liz Patel MD     • albuterol  2 5 mg Nebulization Q4H PRN Marlena Bustos MD     • amLODIPine  10 mg Oral Daily Liz Patel MD     • buPROPion  300 mg Oral Daily Liz Patel MD     • cefazolin  2,000 mg Intravenous Q8H Marlena Bustos MD 2,000 mg (02/02/23 0540)   • enoxaparin  40 mg Subcutaneous Q24H [facility] Liz Patel MD     • gabapentin  100 mg Oral BID Liz Patel MD     • ipratropium-albuterol  3 mL Nebulization TID Marlena Bustos MD     • levothyroxine  75 mcg Oral Early Morning Omar Church MD     • magnesium Oxide  400 mg Oral BID Juline Runner, MD     • potassium chloride  20 mEq Oral BID Omar Church MD          Today, Patient Was Seen By: Juline Runner, MD    **Please Note: This note may have been constructed using a voice recognition system  **

## 2023-02-02 NOTE — CASE MANAGEMENT
Case Management Discharge Planning Note    Patient name Lizzeth Land  Location [address] [id number] MRN [id number]  : 1955 Date 2023       Current Admission Date: 2023  Current Admission Diagnosis:Sepsis [facility])   Patient Active Problem List    Diagnosis Date Noted   • Other emphysema ([facility] [address] ) 2023   • Sepsis ([facility] [address] ) 2023   • Acute on chronic heart failure with preserved ejection fraction ([facility] [address] ) 2023   • Bigeminy 2023   • Acute hyponatremia 2023   • HTN (hypertension) 2023   • Moderate aortic stenosis 2023   • MDD (major depressive disorder) 2023   • Peripheral neuropathy 2023      LOS (days): 3  Geometric Mean LOS (GMLOS) (days): 3 90  Days to GMLOS:0 7     OBJECTIVE:  Risk of Unplanned Readmission Score: 8 12         Current admission status: Inpatient   Preferred Pharmacy:   [facility] # [address] [address]  Phone: [phone] Fax: [phone]    Primary Care Provider: Judi Bland MD    Primary Insurance: MEDICARE  Secondary Insurance: BLUE CROSS    DISCHARGE DETAILS:    Chart reviewed and aware patient may need 4-6 weeks of IV antibiotics  Staph aureus bacteremia  HAL pending  CM spoke to both patient and wife via phone on the anticipated need for IV antibiotics  Reviewed the process of IV antibiotics and they can be done at home, pending cost   Both Mr Dom Malik and his wife are agreeable to learn IV antibiotics  Discharge planning discussed with[de-identified] patient  Freedom of Choice: Yes  Comments - Freedom of Choice: Discussed Needing HHC for Potential IV antibiotics, 1st choice is [address], however would be agreeable for a blanket referral  Discussed needing a [address], no preference  Reviewed the business relationship with [facility], pt in agreement to use them    CM contacted family/caregiver?: No- see comments  Were Treatment Team discharge recommendations reviewed with patient/caregiver?: Yes  Did patient/caregiver verbalize understanding of patient care needs?: Yes  Were patient/caregiver advised of the risks associated with not following Treatment Team discharge recommendations?: Yes    Contacts  Patient Contacts: Mrs Alberts Gave  Relationship to Patient[de-identified] Family  Contact Method: Phone  Reason/Outcome: Discharge Planning (discussed IV antibiotics  Mrs Espinoza is willing to learn how to do the antibiotics )    [address]         Is the patient interested in [facility] at discharge?: Yes  [address] requested[de-identified] [address][de-identified]  (Pending at this time)  [facility] Kamron Awad Provider[de-identified] PCP  Home Health Services Needed[de-identified] Administration of IV, IM or SC Medications  Homebound Criteria Met[de-identified] Requires Medical Transportation  Supporting Clincal Findings[de-identified] Limited Endurance, Fatigues Easliy in [employer]         Other Referral/Resources/Interventions Provided:  Interventions: [facility], [facility]  Referral Comments: Referral placed to [facility] and other health care agencies  Treatment Team Recommendation: Home with [address]  Discharge Destination Plan[de-identified] Home with [place] at Discharge : Family         At this time referral is pending for [facility],   CM will follow plan of care tomorrow for duration of the IV antibiotics

## 2023-02-02 NOTE — PLAN OF CARE
Problem: PAIN - ADULT  Goal: Verbalizes/displays adequate comfort level or baseline comfort level  Description: Interventions:  - Encourage patient to monitor pain and request assistance  - Assess pain using appropriate pain scale  - Administer analgesics based on type and severity of pain and evaluate response  - Implement non-pharmacological measures as appropriate and evaluate response  - Consider cultural and social influences on pain and pain management  - Notify physician/advanced practitioner if interventions unsuccessful or patient reports new pain  Outcome: Progressing     Problem: INFECTION - ADULT  Goal: Absence or prevention of progression during hospitalization  Description: INTERVENTIONS:  - Assess and monitor for signs and symptoms of infection  - Monitor lab/diagnostic results  - Monitor all insertion sites, i e  indwelling lines, tubes, and drains  - Monitor endotracheal if appropriate and nasal secretions for changes in amount and color  - [place] appropriate cooling/warming therapies per order  - Administer medications as ordered  - Instruct and encourage patient and family to use good hand hygiene technique  - Identify and instruct in appropriate isolation precautions for identified infection/condition  Outcome: Progressing

## 2023-02-02 NOTE — PLAN OF CARE
Problem: PAIN - ADULT  Goal: Verbalizes/displays adequate comfort level or baseline comfort level  Description: Interventions:  - Encourage patient to monitor pain and request assistance  - Assess pain using appropriate pain scale  - Administer analgesics based on type and severity of pain and evaluate response  - Implement non-pharmacological measures as appropriate and evaluate response  - Consider cultural and social influences on pain and pain management  - Notify physician/advanced practitioner if interventions unsuccessful or patient reports new pain  Outcome: Progressing     Problem: INFECTION - ADULT  Goal: Absence or prevention of progression during hospitalization  Description: INTERVENTIONS:  - Assess and monitor for signs and symptoms of infection  - Monitor lab/diagnostic results  - Monitor all insertion sites, i e  indwelling lines, tubes, and drains  - Monitor endotracheal if appropriate and nasal secretions for changes in amount and color  - [place] appropriate cooling/warming therapies per order  - Administer medications as ordered  - Instruct and encourage patient and family to use good hand hygiene technique  - Identify and instruct in appropriate isolation precautions for identified infection/condition  Outcome: Progressing  Goal: Absence of fever/infection during neutropenic period  Description: INTERVENTIONS:  - Monitor WBC    Outcome: Progressing     Problem: SAFETY ADULT  Goal: Patient will remain free of falls  Description: INTERVENTIONS:  - Educate patient/family on patient safety including physical limitations  - Instruct patient to call for assistance with activity   - Consult OT/PT to assist with strengthening/mobility   - Keep Call bell within reach  - Keep bed low and locked with side rails adjusted as appropriate  - Keep care items and personal belongings within reach  - Initiate and maintain comfort rounds  - Make Fall Risk Sign visible to staff  - Offer Toileting every 2 Hours, in advance of need  - Initiate/Maintain alarm  - Obtain necessary fall risk management equipment  - Apply yellow socks and bracelet for high fall risk patients  - Consider moving patient to room near nurses station  Outcome: Progressing  Goal: Maintain or return to baseline ADL function  Description: INTERVENTIONS:  -  Assess patient's ability to carry out ADLs; assess patient's baseline for ADL function and identify physical deficits which impact ability to perform ADLs (bathing, care of mouth/teeth, toileting, grooming, dressing, etc )  - Assess/evaluate cause of self-care deficits   - Assess range of motion  - Assess patient's mobility; develop plan if impaired  - Assess patient's need for assistive devices and provide as appropriate  - Encourage maximum independence but intervene and supervise when necessary  - Involve family in performance of ADLs  - Assess for home care needs following discharge   - Consider OT consult to assist with ADL evaluation and planning for discharge  - Provide patient education as appropriate  Outcome: Progressing  Goal: Maintains/Returns to pre admission functional level  Description: INTERVENTIONS:  - Perform BMAT or MOVE assessment daily    - Set and communicate daily mobility goal to care team and patient/family/caregiver  - Collaborate with rehabilitation services on mobility goals if consulted  - Perform Range of Motion - times a day  - Reposition patient every - hours    - Dangle patient - times a day  - Stand patient - times a day  - Ambulate patient - times a day  - Out of bed to chair - times a day   - Out of bed for meals - times a day  - Out of bed for toileting  - Record patient progress and toleration of activity level   Outcome: Progressing     Problem: DISCHARGE PLANNING  Goal: Discharge to home or other facility with appropriate resources  Description: INTERVENTIONS:  - Identify barriers to discharge w/patient and caregiver  - Arrange for needed discharge resources and transportation as appropriate  - Identify discharge learning needs (meds, wound care, etc )  - Arrange for interpretive services to assist at discharge as needed  - Refer to Case Management Department for coordinating discharge planning if the patient needs post-hospital services based on physician/advanced practitioner order or complex needs related to functional status, cognitive ability, or social support system  Outcome: Progressing     Problem: Knowledge Deficit  Goal: Patient/family/caregiver demonstrates understanding of disease process, treatment plan, medications, and discharge instructions  Description: Complete learning assessment and assess knowledge base    Interventions:  - Provide teaching at level of understanding  - Provide teaching via preferred learning methods  Outcome: Progressing

## 2023-02-02 NOTE — ASSESSMENT & PLAN NOTE
Patient spike high fever 102 4, elevated WBC with possible source of infection from pneumonia as evidenced on imaging  Procalcitonin is trending up  Both sets of blood culture is positive, gram-positive cocci in clusters, most likely MSSA-antibiotic adjusted to cefazolin  COVID/flu panel negative  ID recommendation appreciated-repeat blood culture ordered, discussed with cardiology, will plan for MAKEDA on 2/3/2023 at 2 PM  Per ID note, expect 4 weeks of IV antibiotic, PICC line can be place if repeat blood culture negative for 48 hours    Patient denies any prosthesis in the body  TTE shows no vegetation

## 2023-02-02 NOTE — CONSULTS
Consultation - Infectious Disease   Shannon Beck 69 y o  male MRN: [id number]  Unit/Bed#: -01 Encounter: [id number]      Inpatient consult to Infectious Diseases  Consult performed by: Dario Schwab, MD  Consult ordered by: Caroline Etienne MD            REQUIRED DOCUMENTATION:     1  This service was provided via Telemedicine  2  Provider located at [facility]  3  TeleMed provider: Dario Schwab, MD   4  Identify all parties in room with patient during tele consult:RN  5  After connecting through [employer], patient was identified by name and date of birth and assistant checked wristband  Patient was then informed that this was a Telemedicine visit and that the exam was being conducted confidentially over secure lines  My office door was closed  No one else was in the room  Patient acknowledged consent and understanding of privacy and security of the Telemedicine visit, and gave us permission to have the assistant stay in the room in order to assist with the history and to conduct the exam   I informed the patient that I have reviewed their record in Epic and presented the opportunity for them to ask any questions regarding the visit today  The patient agreed to participate  Assessment/Recommendations     1  Sepsis, evolved after admission  - Fever, leukocytosis  - Source of sepsis is bacteremia  - Clinically stable, afebrile without leukocytosis    · Management as below    2  Staph aureus bacteremia  - Source of bacteremia is uncertain  Has a superficial toe wound without local signs of infection, may have been a portal of entry  No radiographic evidence of pneumonia  Has left shoulder arthroplasty without any local signs of infection  No prosthetic vascular devices    Rule out endovascular infection  - TTE without vegetation  - Clinically improving, afebrile without leukocytosis    · Continue current antibiotic therapy with cefazolin 2 every 8 hours  · Check repeat blood cultures  · Given bacteremia without clear source favor MAKEDA  · Recommend podiatry evaluation  · Duration of therapy to be defined, anticipate at least 4 weeks of iv therapy   · PICC can be placed after fu blood cultures have cleared x 48 hours    3  Chronic toe wound  - Possible source of bacteremia although no local signs of infection    · Recommend XR foot and podiatry evaluation    4  Acute on chronic HFpEF, moderate aortic stenosis    · Management per cardiology, recommend MAKEDA as noted above    5  Possible COPD, chronic tobacco use  -Chest x-ray without obvious pneumonia    Management per primary team    Thank you for involving me in the care of your patient  Please call with questions, change in clinical status or if tests recommended above are abnormal      Discussed in detail with the primary service  History     Reason for Consult: Bacteremia  HPI: Radha Dash is a 69y o  year old male with hypertension, aortic valve stenosis, history of left leg cellulitis, history of left shoulder arthroplasty  He has a chronic left toe wound  He presented to the ER on 1/30 with generalized weakness, fatigue and malaise, cough and nasal congestion and subjective fever for 4 days  He reports he had bumped his shin over a week ago and had some broken skin that has since healed  [place] wound has been without erythema or drainage  In the ER vitals were stable, he had mild leukocytosis, chest x-ray was negative for infiltrate but noted mild vascular congestion  EKG showed nonspecific ST changes  He was admitted for diuresis for suspected acute on chronic diastolic heart failure  He went on to develop fever to 102 4 and he was started on ceftriaxone  Blood cultures from admission are growing staph aureus and he was switched to cefazolin yesterday  TTE is negative for vegetation  He was afebrile overnight without leukocytosis , he overall notes improvement but continues to feel weak and lightheaded     Denies nausea, vomiting, diarrhea or rash and is tolerating antibiotics well  Infectious disease is being consulted for diagnostic work up and antibiotic management  Review of Systems  Pertinent positives and negatives as noted in HPI  Rest complete 12 point system-based review of systems is otherwise negative  PAST MEDICAL HISTORY:  Past Medical History:   Diagnosis Date   • Anxiety    • Aortic stenosis    • Depression    • Hypertension    • Hypothyroidism    • PVCs (premature ventricular contractions)      Past Surgical History:   Procedure Laterality Date   • APPENDECTOMY     • BACK SURGERY     • TOTAL SHOULDER REPLACEMENT         FAMILY HISTORY:  Non-contributory    SOCIAL HISTORY:  Social History   /Civil Union  Social History     Substance and Sexual Activity   Alcohol Use Not Currently     Social History     Substance and Sexual Activity   Drug Use Not Currently    Comment: sporadic in young adulthood     Social History     Tobacco Use   Smoking Status Light Smoker   • Types: Cigarettes   Smokeless Tobacco Never       ALLERGIES:  No Known Allergies    MEDICATIONS:  All current active medications have been reviewed      Physical Exam     Temp:  [97 3 °F (36 3 °C)-98 1 °F (36 7 °C)] 97 3 °F (36 3 °C)  HR:  [63-79] 63  Resp:  [16-20] 16  BP: (116-117)/(66-68) 116/66  SpO2:  [94 %-100 %] 100 %  Temp (24hrs), Av 9 °F (36 6 °C), Min:97 3 °F (36 3 °C), Max:98 1 °F (36 7 °C)  Current: Temperature: (!) 97 3 °F (36 3 °C)    Intake/Output Summary (Last 24 hours) at 2023 7005  Last data filed at 2023 5477  Gross per 24 hour   Intake 120 ml   Output 1525 ml   Net -1405 ml         Physical exam findings reported by bedside and primary medical team staff    General Appearance:  Appearing chronically ill, nontoxic, and in no distress, appears stated age   Head:  Normocephalic, without obvious abnormality, atraumatic   Eyes:  PERRL, conjunctiva pink and sclera anicteric, both eyes   Nose: Nares normal, mucosa normal, no drainage   Throat: Oropharynx moist without lesions; lips, mucosa, and tongue normal; teeth and gums normal   Neck: Supple, symmetrical, trachea midline, no adenopathy, no tenderness/mass/nodules   Back:   Symmetric, no curvature, ROM normal, no CVA tenderness   Lungs:   Clear to auscultation bilaterally, no audible wheezes, rhonchi and rales, respirations unlabored   Chest Wall:  No tenderness or deformity   Heart:  Regular rate and rhythm, S1, S2 normal, no murmur, rub or gallop   Abdomen:   Soft, non-tender, non-distended, positive bowel sounds, no masses, no organomegaly    No CVA tenderness   Extremities: Extremities normal, atraumatic, no cyanosis, clubbing or edema   Skin: Skin color, texture, turgor normal, superficial wound over L great toe   Lymph nodes: Cervical, supraclavicular, and axillary nodes normal   Neurologic: Alert and oriented times 3       Invasive Devices:   Peripheral IV 01/30/23 Ventral (anterior); Right Forearm (Active)   Site Assessment [facility] 02/01/23 2115   Dressing Type Transparent 02/01/23 2115   Line Status Flushed 02/01/23 2115   Dressing Status Clean;Dry; Intact 02/01/23 2115   Dressing Change Due 02/03/23 02/01/23 2115   Reason Not Rotated Not due 01/31/23 0959       Labs, Imaging, & Other Studies     Lab Results:    I have personally reviewed pertinent labs      Results from last 7 days   Lab Units 02/02/23 0457 02/01/23 0456 01/31/23  0524   WBC Thousand/uL 8 56 9 19 12 28*   HEMOGLOBIN g/dL 14 5 14 8 14 8   PLATELETS Thousands/uL 154 150 156     Results from last 7 days   Lab Units 02/02/23  0457 02/01/23  0456 01/30/23  1753 01/30/23  0825   POTASSIUM mmol/L 4 2 3 8   < > 3 5   CHLORIDE mmol/L 101 101   < > 99   CO2 mmol/L 26 23   < > 21   BUN mg/dL 19 24   < > 18   CREATININE mg/dL 1 07 1 07   < > 1 27   EGFR ml/min/1 73sq m 70 70   < > 57   CALCIUM mg/dL 8 5 8 3*   < > 8 7   AST U/L 37 28  --  24   ALT U/L 41 25  --  20   ALK PHOS U/L 49 49  --  59    < > = values in this interval not displayed  Results from last 7 days   Lab Units 01/31/23  1352   GRAM STAIN RESULT  Gram positive cocci in clusters*  Gram positive cocci in clusters*       Imaging Studies:   I have personally reviewed pertinent imaging study reports and images in PACS  EKG, Pathology, and Other Studies:   I have personally reviewed pertinent reports and reviewed external records  Counseling/Coordination of care: Total 70 minutes communication with the patient via telehealth  Labs, medical tests and imaging studies were independently and extensively reviewed by me as noted above in HPI and old records were obtained and summarized as noted above in HPI  My recommendations were discussed with the patient in detail who verbalized understanding

## 2023-02-03 ENCOUNTER — ANESTHESIA (INPATIENT)
Dept: NON INVASIVE DIAGNOSTICS | Facility: HOSPITAL | Age: 68
End: 2023-02-03

## 2023-02-03 ENCOUNTER — APPOINTMENT (INPATIENT)
Dept: MRI IMAGING | Facility: HOSPITAL | Age: 68
End: 2023-02-03

## 2023-02-03 ENCOUNTER — APPOINTMENT (INPATIENT)
Dept: NON INVASIVE DIAGNOSTICS | Facility: HOSPITAL | Age: 68
End: 2023-02-03
Attending: FAMILY MEDICINE

## 2023-02-03 ENCOUNTER — ANESTHESIA EVENT (INPATIENT)
Dept: NON INVASIVE DIAGNOSTICS | Facility: HOSPITAL | Age: 68
End: 2023-02-03

## 2023-02-03 ENCOUNTER — APPOINTMENT (INPATIENT)
Dept: RADIOLOGY | Facility: HOSPITAL | Age: 68
End: 2023-02-03

## 2023-02-03 PROBLEM — L97.529 ULCER OF LEFT FOOT (HCC): Status: ACTIVE | Noted: 2023-02-03

## 2023-02-03 LAB
ALBUMIN SERPL BCP-MCNC: 3.3 G/DL (ref 3.5–5)
ALP SERPL-CCNC: 53 U/L (ref 34–104)
ALT SERPL W P-5'-P-CCNC: 32 U/L (ref 7–52)
ANION GAP SERPL CALCULATED.3IONS-SCNC: 3 MMOL/L (ref 4–13)
AST SERPL W P-5'-P-CCNC: 23 U/L (ref 13–39)
BACTERIA BLD CULT: ABNORMAL
BACTERIA BLD CULT: ABNORMAL
BASOPHILS # BLD MANUAL: 0 THOUSAND/UL (ref 0–0.1)
BASOPHILS NFR MAR MANUAL: 0 % (ref 0–1)
BILIRUB SERPL-MCNC: 0.67 MG/DL (ref 0.2–1)
BUN SERPL-MCNC: 18 MG/DL (ref 5–25)
CALCIUM ALBUM COR SERPL-MCNC: 9 MG/DL (ref 8.3–10.1)
CALCIUM SERPL-MCNC: 8.4 MG/DL (ref 8.4–10.2)
CHLORIDE SERPL-SCNC: 103 MMOL/L (ref 96–108)
CO2 SERPL-SCNC: 26 MMOL/L (ref 21–32)
CREAT SERPL-MCNC: 0.84 MG/DL (ref 0.6–1.3)
EOSINOPHIL # BLD MANUAL: 0.18 THOUSAND/UL (ref 0–0.4)
EOSINOPHIL NFR BLD MANUAL: 2 % (ref 0–6)
ERYTHROCYTE [DISTWIDTH] IN BLOOD BY AUTOMATED COUNT: 13.2 % (ref 11.6–15.1)
GFR SERPL CREATININE-BSD FRML MDRD: 89 ML/MIN/1.73SQ M
GLUCOSE SERPL-MCNC: 109 MG/DL (ref 65–140)
GRAM STN SPEC: ABNORMAL
GRAM STN SPEC: ABNORMAL
HCT VFR BLD AUTO: 41.8 % (ref 36.5–49.3)
HGB BLD-MCNC: 13.9 G/DL (ref 12–17)
LG PLATELETS BLD QL SMEAR: PRESENT
LYMPHOCYTES # BLD AUTO: 1.79 THOUSAND/UL (ref 0.6–4.47)
LYMPHOCYTES # BLD AUTO: 20 % (ref 14–44)
MCH RBC QN AUTO: 31.2 PG (ref 26.8–34.3)
MCHC RBC AUTO-ENTMCNC: 33.3 G/DL (ref 31.4–37.4)
MCV RBC AUTO: 94 FL (ref 82–98)
MONOCYTES # BLD AUTO: 1.25 THOUSAND/UL (ref 0–1.22)
MONOCYTES NFR BLD: 14 % (ref 4–12)
NEUTROPHILS # BLD MANUAL: 5.63 THOUSAND/UL (ref 1.85–7.62)
NEUTS BAND NFR BLD MANUAL: 3 % (ref 0–8)
NEUTS SEG NFR BLD AUTO: 60 % (ref 43–75)
PLATELET # BLD AUTO: 165 THOUSANDS/UL (ref 149–390)
PLATELET BLD QL SMEAR: ADEQUATE
PMV BLD AUTO: 11 FL (ref 8.9–12.7)
POTASSIUM SERPL-SCNC: 4.3 MMOL/L (ref 3.5–5.3)
PROT SERPL-MCNC: 6.3 G/DL (ref 6.4–8.4)
RBC # BLD AUTO: 4.46 MILLION/UL (ref 3.88–5.62)
RBC MORPH BLD: NORMAL
S AUREUS DNA BLD POS QL NAA+NON-PROBE: DETECTED
SL CV LV EF: 60
SODIUM SERPL-SCNC: 132 MMOL/L (ref 135–147)
VARIANT LYMPHS # BLD AUTO: 1 %
WBC # BLD AUTO: 8.93 THOUSAND/UL (ref 4.31–10.16)

## 2023-02-03 RX ORDER — ALBUTEROL SULFATE 2.5 MG/3ML
2.5 SOLUTION RESPIRATORY (INHALATION) ONCE AS NEEDED
Status: DISCONTINUED | OUTPATIENT
Start: 2023-02-03 | End: 2023-02-06 | Stop reason: HOSPADM

## 2023-02-03 RX ORDER — SODIUM CHLORIDE, SODIUM LACTATE, POTASSIUM CHLORIDE, CALCIUM CHLORIDE 600; 310; 30; 20 MG/100ML; MG/100ML; MG/100ML; MG/100ML
50 INJECTION, SOLUTION INTRAVENOUS CONTINUOUS
Status: DISCONTINUED | OUTPATIENT
Start: 2023-02-03 | End: 2023-02-05

## 2023-02-03 RX ORDER — SODIUM CHLORIDE, SODIUM LACTATE, POTASSIUM CHLORIDE, CALCIUM CHLORIDE 600; 310; 30; 20 MG/100ML; MG/100ML; MG/100ML; MG/100ML
INJECTION, SOLUTION INTRAVENOUS CONTINUOUS PRN
Status: DISCONTINUED | OUTPATIENT
Start: 2023-02-03 | End: 2023-02-03

## 2023-02-03 RX ORDER — PROPOFOL 10 MG/ML
INJECTION, EMULSION INTRAVENOUS AS NEEDED
Status: DISCONTINUED | OUTPATIENT
Start: 2023-02-03 | End: 2023-02-03

## 2023-02-03 RX ORDER — LIDOCAINE HYDROCHLORIDE 20 MG/ML
INJECTION, SOLUTION EPIDURAL; INFILTRATION; INTRACAUDAL; PERINEURAL AS NEEDED
Status: DISCONTINUED | OUTPATIENT
Start: 2023-02-03 | End: 2023-02-03

## 2023-02-03 RX ADMIN — ENOXAPARIN SODIUM 40 MG: 40 INJECTION SUBCUTANEOUS at 09:03

## 2023-02-03 RX ADMIN — POTASSIUM CHLORIDE 20 MEQ: 20 TABLET, EXTENDED RELEASE ORAL at 09:03

## 2023-02-03 RX ADMIN — PROPOFOL 50 MG: 10 INJECTION, EMULSION INTRAVENOUS at 14:21

## 2023-02-03 RX ADMIN — SODIUM CHLORIDE, SODIUM LACTATE, POTASSIUM CHLORIDE, AND CALCIUM CHLORIDE: .6; .31; .03; .02 INJECTION, SOLUTION INTRAVENOUS at 14:08

## 2023-02-03 RX ADMIN — PROPOFOL 20 MG: 10 INJECTION, EMULSION INTRAVENOUS at 14:16

## 2023-02-03 RX ADMIN — PROPOFOL 30 MG: 10 INJECTION, EMULSION INTRAVENOUS at 14:14

## 2023-02-03 RX ADMIN — CEFAZOLIN SODIUM 2000 MG: 2 SOLUTION INTRAVENOUS at 15:12

## 2023-02-03 RX ADMIN — PROPOFOL 140 MG: 10 INJECTION, EMULSION INTRAVENOUS at 14:12

## 2023-02-03 RX ADMIN — PROPOFOL 20 MG: 10 INJECTION, EMULSION INTRAVENOUS at 14:19

## 2023-02-03 RX ADMIN — PROPOFOL 20 MG: 10 INJECTION, EMULSION INTRAVENOUS at 14:22

## 2023-02-03 RX ADMIN — CEFAZOLIN SODIUM 2000 MG: 2 SOLUTION INTRAVENOUS at 22:15

## 2023-02-03 RX ADMIN — IPRATROPIUM BROMIDE AND ALBUTEROL SULFATE 3 ML: 2.5; .5 SOLUTION RESPIRATORY (INHALATION) at 07:31

## 2023-02-03 RX ADMIN — CEFAZOLIN SODIUM 2000 MG: 2 SOLUTION INTRAVENOUS at 05:39

## 2023-02-03 RX ADMIN — PROPOFOL 40 MG: 10 INJECTION, EMULSION INTRAVENOUS at 14:24

## 2023-02-03 RX ADMIN — MAGNESIUM OXIDE TAB 400 MG (241.3 MG ELEMENTAL MG) 400 MG: 400 (241.3 MG) TAB at 09:04

## 2023-02-03 RX ADMIN — MAGNESIUM OXIDE TAB 400 MG (241.3 MG ELEMENTAL MG) 400 MG: 400 (241.3 MG) TAB at 17:22

## 2023-02-03 RX ADMIN — PROPOFOL 50 MG: 10 INJECTION, EMULSION INTRAVENOUS at 14:18

## 2023-02-03 RX ADMIN — LIDOCAINE HYDROCHLORIDE 100 MG: 20 INJECTION, SOLUTION EPIDURAL; INFILTRATION; INTRACAUDAL; PERINEURAL at 14:12

## 2023-02-03 RX ADMIN — GABAPENTIN 100 MG: 100 CAPSULE ORAL at 09:04

## 2023-02-03 RX ADMIN — IPRATROPIUM BROMIDE AND ALBUTEROL SULFATE 3 ML: 2.5; .5 SOLUTION RESPIRATORY (INHALATION) at 20:22

## 2023-02-03 RX ADMIN — LEVOTHYROXINE SODIUM 75 MCG: 75 TABLET ORAL at 05:39

## 2023-02-03 RX ADMIN — POTASSIUM CHLORIDE 20 MEQ: 20 TABLET, EXTENDED RELEASE ORAL at 17:22

## 2023-02-03 RX ADMIN — PROPOFOL 50 MG: 10 INJECTION, EMULSION INTRAVENOUS at 14:15

## 2023-02-03 RX ADMIN — BUPROPION HYDROCHLORIDE 300 MG: 150 TABLET, EXTENDED RELEASE ORAL at 09:03

## 2023-02-03 RX ADMIN — PROPOFOL 20 MG: 10 INJECTION, EMULSION INTRAVENOUS at 14:26

## 2023-02-03 RX ADMIN — GABAPENTIN 100 MG: 100 CAPSULE ORAL at 17:22

## 2023-02-03 RX ADMIN — AMLODIPINE BESYLATE 10 MG: 10 TABLET ORAL at 09:04

## 2023-02-03 NOTE — PROGRESS NOTES
Progress Note:Cardiology  Yarelis Baig 1955, 69 y o  male MRN: [id number]    Unit/Bed#: -01 Encounter: [id number]  Attending Physician: Ray Martinez MD   Primary Care Provider: Rika Rondon MD   Date admitted to hospital: 1/30/2023  Length of stay: 4         * Sepsis [facility])  Assessment & Plan  Blood cultures + for staph aureus  MAKEDA today for evaluation of possible cardiac source  No contraindications for MAKEDA  Patient has been NPO since midnight  Other emphysema [facility])  Assessment & Plan  Patient is complaining of shortness of breath along with wheezing  Emphysema noted on CT along with possible pneumonia  See discussion under sepsis  Now receiving breathing treatments  Discontinued diuretics as likely not vascular congestion as noted on chest xray    Acute on chronic heart failure with preserved ejection fraction [facility])  Assessment & Plan  Wt Readings from Last 3 Encounters:   01/31/23 101 kg (223 lb 9 6 oz)   10/03/22 104 kg (230 lb)   09/23/22 104 kg (230 lb)     Echocardiogram 1/30/2023 with EF 60%, grade 1 DD, elevated LA filling pressure with E/E' 15, mod AS   and vascular congestion reported on chest xray  Received IV furosemide x 2 doses, but further doses discontinued as he appeared dehydrated on exam   Continue off diuretics with no evidence of fluid retention on exam   Strict I's/O's, standing daily weights  Optimize electrolytes for K+ >4, Mag > 2  Moderate aortic stenosis  Assessment & Plan  Patient has a know history of aortic stenosis reported to be moderate on echo 11/2021 through [facility]  Echocardiogram 1/30/2023 continues to show moderate aortic stenosis with PV 3 52, mean gradient 30 mmHg, DI 0 30  This should be monitored in the next 6-12 months  HTN (hypertension)  Assessment & Plan  Blood pressures acceptable  Continue amlodipine 10 mg daily  Acute hyponatremia  Assessment & Plan  Sodium 131 at admission  HCTZ discontinued  [facility]  Assessment & Plan  Patient presented with ECG showing SR with PVC's in [facility]  He reports a history of PVC's  EF 60% on echo 1/30/2023  PVC's improved on telemetry with addition of magnesium supplement  May benefit from updated cardiac monitoring in the outpatient setting  Leukocytosis-resolved as of 2/1/2023  Assessment & Plan  See discussion under sepsis  Medicine team following  Subjective:   Patient seen and examined  No significant events overnight  Currently NPO in preparation for MAKEDA today  He is up walking the hallway prior to my examination  He does report feeling much better since admission  Review of Systems   Constitutional: Negative  HENT: Negative  Cardiovascular: Negative for chest pain, dyspnea on exertion, irregular heartbeat, leg swelling, near-syncope, orthopnea and palpitations  Respiratory: Negative for cough, shortness of breath and snoring  Endocrine: Negative  Skin: Negative  Musculoskeletal: Negative  Gastrointestinal: Negative  Genitourinary: Negative  Neurological: Positive for dizziness (greatly improved)  Psychiatric/Behavioral: Negative  Objective:     Vitals: Blood pressure 113/64, pulse 71, temperature 98 1 °F (36 7 °C), temperature source Temporal, resp  rate 18, height 6' 0 01" (1 829 m), weight 101 kg (223 lb 9 6 oz), SpO2 96 %  , Body mass index is 30 32 kg/m² ,     Orthostatic Blood Pressures    Flowsheet Row Most Recent Value   Blood Pressure 113/64 filed at 02/03/2023 0800   Patient Position - Orthostatic VS Lying filed at 02/03/2023 0800          Physical Exam  Vitals and nursing note reviewed  Constitutional:       General: He is not in acute distress  Appearance: He is well-developed  HENT:      Head: Normocephalic and atraumatic  Eyes:      Conjunctiva/sclera: Conjunctivae normal    Cardiovascular:      Rate and Rhythm: Normal rate and regular rhythm  No extrasystoles are present       Heart sounds: Murmur heard  Systolic murmur is present  Pulmonary:      Effort: Pulmonary effort is normal  No respiratory distress  Breath sounds: Normal breath sounds  Comments: Breathing comfortably on room air  Abdominal:      Palpations: Abdomen is soft  Tenderness: There is no abdominal tenderness  Musculoskeletal:         General: No swelling  Cervical back: Neck supple  Right lower leg: No edema  Left lower leg: No edema  Skin:     General: Skin is warm and dry  Capillary Refill: Capillary refill takes less than 2 seconds  Neurological:      Mental Status: He is alert  Psychiatric:         Mood and Affect: Mood and affect normal          Speech: Speech normal          Behavior: Behavior normal  Behavior is cooperative           Cognition and Memory: Cognition normal             Intake/Output Summary (Last 24 hours) at 2/3/2023 1025  Last data filed at 2/3/2023 0447  Gross per 24 hour   Intake --   Output 1650 ml   Net -1650 ml       Weight (last 2 days)     None             Medications:      Current Facility-Administered Medications:   •  acetaminophen (TYLENOL) tablet 650 mg, 650 mg, Oral, Q4H PRN, Brittany Eldridge MD, 650 mg at 02/02/23 0543  •  albuterol inhalation solution 2 5 mg, 2 5 mg, Nebulization, Q4H PRN, Kev Martin MD  •  amLODIPine (NORVASC) tablet 10 mg, 10 mg, Oral, Daily, Brittany Eldridge MD, 10 mg at 02/03/23 3678  •  buPROPion (WELLBUTRIN XL) 24 hr tablet 300 mg, 300 mg, Oral, Daily, Brittany Eldridge MD, 300 mg at 02/03/23 0719  •  ceFAZolin (ANCEF) IVPB (premix in dextrose) 2,000 mg 50 mL, 2,000 mg, Intravenous, Q8H, Ant Enriquez MD, Last Rate: 100 mL/hr at 02/03/23 0539, 2,000 mg at 02/03/23 0539  •  enoxaparin (LOVENOX) subcutaneous injection 40 mg, 40 mg, Subcutaneous, Q24H [address], Brittany Eldridge MD, 40 mg at 02/03/23 4925  •  gabapentin (NEURONTIN) capsule 100 mg, 100 mg, Oral, BID, Brittany Eldridge MD, 100 mg at 02/03/23 8202  • ipratropium-albuterol (DUO-NEB) 0 5-2 5 mg/3 mL inhalation solution 3 mL, 3 mL, Nebulization, TID, Ozzy Solorzano MD, 3 mL at 02/03/23 2004  •  levothyroxine tablet 75 mcg, 75 mcg, Oral, Early Morning, Neto Whipple MD, 75 mcg at 02/03/23 6645  •  magnesium Oxide (MAG-OX) tablet 400 mg, 400 mg, Oral, BID, Gadiel Martin MD, 400 mg at 02/03/23 1848  •  potassium chloride (K-DUR,KLOR-CON) CR tablet 20 mEq, 20 mEq, Oral, BID, Neto Whipple MD, 20 mEq at 02/03/23 8523     Labs & Results:        Results from last 7 days   Lab Units 02/03/23 0445 02/02/23 0457 02/01/23  0456   WBC Thousand/uL 8 93 8 56 9 19   HEMOGLOBIN g/dL 13 9 14 5 14 8   HEMATOCRIT % 41 8 43 4 44 2   PLATELETS Thousands/uL 165 154 150         Results from last 7 days   Lab Units 02/03/23  0445 02/02/23  0457 02/01/23  0456   POTASSIUM mmol/L 4 3 4 2 3 8   CHLORIDE mmol/L 103 101 101   CO2 mmol/L 26 26 23   BUN mg/dL 18 19 24   CREATININE mg/dL 0 84 1 07 1 07   CALCIUM mg/dL 8 4 8 5 8 3*   ALK PHOS U/L 53 49 49   ALT U/L 32 41 25   AST U/L 23 37 28     Results from last 7 days   Lab Units 01/30/23  0825   INR  1 03   PTT seconds 30     Results from last 7 days   Lab Units 01/31/23  0524 01/30/23  1753 01/30/23  0825   MAGNESIUM mg/dL 1 8* 2 1 1 8*     Results from last 7 days   Lab Units 01/30/23  0825   BNP pg/mL 157*        Counseling / Coordination of Care  Total floor / unit time spent today 25 minutes  Greater than 50% of total time was spent with the patient and / or family counseling and / or coordination of care    A description of the counseling / coordination of care: Discussed case with Dr Lucia Tinajero

## 2023-02-03 NOTE — ANESTHESIA POSTPROCEDURE EVALUATION
Post-Op Assessment Note    CV Status:  Stable    Pain management: satisfactory to patient     Mental Status:  Alert and awake   Hydration Status:  Stable   PONV Controlled:  None   Airway Patency:  Patent      Post Op Vitals Reviewed: Yes      Staff: CRNA         No notable events documented      BP 99/54 (02/03/23 1439)    Temp 97 8 °F (36 6 °C) (02/03/23 1439)    Pulse 64 (02/03/23 1439)   Resp 18 (02/03/23 1439)    SpO2 98 % (02/03/23 1439)

## 2023-02-03 NOTE — ANESTHESIA PREPROCEDURE EVALUATION
Procedure:  MAKEDA    Relevant Problems   CARDIO   (+) Acute on chronic heart failure with preserved ejection fraction (HCC)   (+) Bigeminy   (+) HTN (hypertension)   (+) Moderate aortic stenosis      NEURO/PSYCH   (+) MDD (major depressive disorder)      PULMONARY   (+) Other emphysema ([address] )     •  Left Ventricle: Left ventricular cavity size is normal  Wall thickness is increased  There is mild concentric hypertrophy  The left ventricular ejection fraction is 60%  Systolic function is normal  Wall motion is normal  Diastolic function is mildly abnormal, consistent with grade I (abnormal) relaxation  Left atrial filling pressure is elevated  E/E' 15   •  IVS: There is sigmoid appearance of the septum  •  Right Ventricle: Right ventricular cavity size is mildly dilated  Systolic function is normal  Normal tricuspid annular plane systolic excursion (TAPSE) > 1 7 cm  •  Left Atrium: The atrium is mildly dilated  •  Aortic Valve: The leaflets are moderately calcified  There is moderately reduced mobility  There is trace to mild regurgitation  There is moderate stenosis  The aortic valve peak velocity is 3 52 m/s  The aortic valve mean gradient is 30 mmHg  The dimensionless velocity index is 0 30  •  Mitral Valve: There is annular calcification  There is trace regurgitation  There is no evidence of stenosis  •  Tricuspid Valve: There is trace regurgitation  There is no evidence of stenosis  The right ventricular systolic pressure is mildly elevated  The estimated right ventricular systolic pressure is 00 88 mmHg  •  Aorta: The aortic root is mildly dilated  The ascending aorta is normal in size  The aortic root is 3 80 cm  The ascending aorta is 3 3 cm  •  IVC/SVC: The right atrial pressure is estimated at 15 0 mmHg  The inferior vena cava is dilated  Respirophasic changes in dimension were absent  •  Prior TTE study available for comparison  Prior study date: 11/11/2021   Changes noted when compared to prior study  Changes include: Moderate AS noted on prior study with peak transaortic velocity of 2 9 m/s with a mean gradient of 20 mmHg  DI 0 38  Peak velocity and mean gradient have increased since prior study  DI is now lower suggesting progression of the degree of aortic stenosis (still moderate aortic stenosis)  Aortic root previously reported as normal in size               Anesthesia Plan  ASA Score- 3     Anesthesia Type- IV sedation with anesthesia with ASA Monitors  Additional Monitors:   Airway Plan:     Comment: Back up GA  Plan Factors-    Induction-     Postoperative Plan-     Informed Consent-   I personally reviewed this patient with the CRNA  Discussed and agreed on the Anesthesia Plan with the CRNA  Rocky Spear

## 2023-02-03 NOTE — CASE MANAGEMENT
Case Management Discharge Planning Note    Patient name John Lujan  Location [address] 338/-[id number] MRN [id number]  : 1955 Date 2/3/2023       Current Admission Date: 2023  Current Admission Diagnosis:Sepsis [facility])   Patient Active Problem List    Diagnosis Date Noted   • Other emphysema [address] [address] ) 2023   • Sepsis ([facility] [address] ) 2023   • Acute on chronic heart failure with preserved ejection fraction ([facility] [address] ) 2023   • Bigeminy 2023   • Acute hyponatremia 2023   • HTN (hypertension) 2023   • Moderate aortic stenosis 2023   • MDD (major depressive disorder) 2023   • Peripheral neuropathy 2023      LOS (days): 4  Geometric Mean LOS (GMLOS) (days): 3 90  Days to GMLOS:-0 1     OBJECTIVE:  Risk of Unplanned Readmission Score: 8 2         Current admission status: Inpatient   Preferred Pharmacy:   [facility] [id number] [address]  Phone: [phone] Fax: [phone]    Primary Care Provider: Hetal Weber MD    Primary Insurance: MEDICARE  Secondary Insurance: BLUE CROSS    DISCHARGE DETAILS:    Call received from [address] [place] [address] [address] at [facility], they can accept patient for IV antibiotics management  This was the patient's and spouse 1st choice: Accepted on AIDIN  CM made early referral to [facility] in anticipating needing the IV antibiotics  Currently on Ancef 2 Grams every 8 hours_ at  and 22  - requested [facility] to do an early price check  Await ID recommendations at this time  Anticipating a pick line  CM will continue to follow  l

## 2023-02-03 NOTE — PLAN OF CARE
Problem: PAIN - ADULT  Goal: Verbalizes/displays adequate comfort level or baseline comfort level  Description: Interventions:  - Encourage patient to monitor pain and request assistance  - Assess pain using appropriate pain scale  - Administer analgesics based on type and severity of pain and evaluate response  - Implement non-pharmacological measures as appropriate and evaluate response  - Consider cultural and social influences on pain and pain management  - Notify physician/advanced practitioner if interventions unsuccessful or patient reports new pain  Outcome: Progressing     Problem: INFECTION - ADULT  Goal: Absence or prevention of progression during hospitalization  Description: INTERVENTIONS:  - Assess and monitor for signs and symptoms of infection  - Monitor lab/diagnostic results  - Monitor all insertion sites, i e  indwelling lines, tubes, and drains  - Monitor endotracheal if appropriate and nasal secretions for changes in amount and color  - [place] appropriate cooling/warming therapies per order  - Administer medications as ordered  - Instruct and encourage patient and family to use good hand hygiene technique  - Identify and instruct in appropriate isolation precautions for identified infection/condition  Outcome: Progressing     Problem: SAFETY ADULT  Goal: Patient will remain free of falls  Description: INTERVENTIONS:  - Educate patient/family on patient safety including physical limitations  - Instruct patient to call for assistance with activity   - Consult OT/PT to assist with strengthening/mobility   - Keep Call bell within reach  - Keep bed low and locked with side rails adjusted as appropriate  - Keep care items and personal belongings within reach  - Initiate and maintain comfort rounds  - Make Fall Risk Sign visible to staff  - Offer Toileting every 2 Hours, in advance of need  Outcome: Progressing  Goal: Maintain or return to baseline ADL function  Description: INTERVENTIONS:  - Assess patient's ability to carry out ADLs; assess patient's baseline for ADL function and identify physical deficits which impact ability to perform ADLs (bathing, care of mouth/teeth, toileting, grooming, dressing, etc )  - Assess/evaluate cause of self-care deficits   - Assess range of motion  - Assess patient's mobility; develop plan if impaired  - Assess patient's need for assistive devices and provide as appropriate  - Encourage maximum independence but intervene and supervise when necessary  - Involve family in performance of ADLs  - Assess for home care needs following discharge   - Consider OT consult to assist with ADL evaluation and planning for discharge  - Provide patient education as appropriate  Outcome: Progressing  Goal: Maintains/Returns to pre admission functional level  Description: INTERVENTIONS:  - Perform BMAT or MOVE assessment daily    - Set and communicate daily mobility goal to care team and patient/family/caregiver     - Collaborate with rehabilitation services on mobility goals if consulted  - Ambulate patient 5 times a day  - Out of bed for toileting  - Record patient progress and toleration of activity level   Outcome: Progressing     Problem: DISCHARGE PLANNING  Goal: Discharge to home or other facility with appropriate resources  Description: INTERVENTIONS:  - Identify barriers to discharge w/patient and caregiver  - Arrange for needed discharge resources and transportation as appropriate  - Identify discharge learning needs (meds, wound care, etc )  - Arrange for interpretive services to assist at discharge as needed  - Refer to Case Management Department for coordinating discharge planning if the patient needs post-hospital services based on physician/advanced practitioner order or complex needs related to functional status, cognitive ability, or social support system  Outcome: Progressing     Problem: Knowledge Deficit  Goal: Patient/family/caregiver demonstrates understanding of disease process, treatment plan, medications, and discharge instructions  Description: Complete learning assessment and assess knowledge base    Interventions:  - Provide teaching at level of understanding  - Provide teaching via preferred learning methods  Outcome: Progressing     Problem: SKIN/TISSUE INTEGRITY - ADULT  Goal: Incision(s), wounds(s) or drain site(s) healing without S/S of infection  Description: INTERVENTIONS  - Assess and document dressing, wound bed, and surrounding tissue  - Provide patient and family education  - Perform skin care/dressing changes every day and prn  Outcome: Progressing

## 2023-02-03 NOTE — PLAN OF CARE
Problem: PAIN - ADULT  Goal: Verbalizes/displays adequate comfort level or baseline comfort level  Description: Interventions:  - Encourage patient to monitor pain and request assistance  - Assess pain using appropriate pain scale  - Administer analgesics based on type and severity of pain and evaluate response  - Implement non-pharmacological measures as appropriate and evaluate response  - Consider cultural and social influences on pain and pain management  - Notify physician/advanced practitioner if interventions unsuccessful or patient reports new pain  Outcome: Progressing     Problem: INFECTION - ADULT  Goal: Absence or prevention of progression during hospitalization  Description: INTERVENTIONS:  - Assess and monitor for signs and symptoms of infection  - Monitor lab/diagnostic results  - Monitor all insertion sites, i e  indwelling lines, tubes, and drains  - Monitor endotracheal if appropriate and nasal secretions for changes in amount and color  - [place] appropriate cooling/warming therapies per order  - Administer medications as ordered  - Instruct and encourage patient and family to use good hand hygiene technique  - Identify and instruct in appropriate isolation precautions for identified infection/condition  Outcome: Progressing  Goal: Absence of fever/infection during neutropenic period  Description: INTERVENTIONS:  - Monitor WBC    Outcome: Progressing     Problem: SAFETY ADULT  Goal: Patient will remain free of falls  Description: INTERVENTIONS:  - Educate patient/family on patient safety including physical limitations  - Instruct patient to call for assistance with activity   - Consult OT/PT to assist with strengthening/mobility   - Keep Call bell within reach  - Keep bed low and locked with side rails adjusted as appropriate  - Keep care items and personal belongings within reach  - Initiate and maintain comfort rounds  - Make Fall Risk Sign visible to staff  - Offer Toileting every 2 Hours, in advance of need  - Initiate/Maintain alarm  - Obtain necessary fall risk management equipment  - Apply yellow socks and bracelet for high fall risk patients  - Consider moving patient to room near nurses station  Outcome: Progressing  Goal: Maintain or return to baseline ADL function  Description: INTERVENTIONS:  -  Assess patient's ability to carry out ADLs; assess patient's baseline for ADL function and identify physical deficits which impact ability to perform ADLs (bathing, care of mouth/teeth, toileting, grooming, dressing, etc )  - Assess/evaluate cause of self-care deficits   - Assess range of motion  - Assess patient's mobility; develop plan if impaired  - Assess patient's need for assistive devices and provide as appropriate  - Encourage maximum independence but intervene and supervise when necessary  - Involve family in performance of ADLs  - Assess for home care needs following discharge   - Consider OT consult to assist with ADL evaluation and planning for discharge  - Provide patient education as appropriate  Outcome: Progressing  Goal: Maintains/Returns to pre admission functional level  Description: INTERVENTIONS:  - Perform BMAT or MOVE assessment daily    - Set and communicate daily mobility goal to care team and patient/family/caregiver  - Collaborate with rehabilitation services on mobility goals if consulted  - Perform Range of Motion - times a day  - Reposition patient every - hours    - Dangle patient - times a day  - Stand patient - times a day  - Ambulate patient - times a day  - Out of bed to chair - times a day   - Out of bed for meals - times a day  - Out of bed for toileting  - Record patient progress and toleration of activity level   Outcome: Progressing     Problem: DISCHARGE PLANNING  Goal: Discharge to home or other facility with appropriate resources  Description: INTERVENTIONS:  - Identify barriers to discharge w/patient and caregiver  - Arrange for needed discharge resources and transportation as appropriate  - Identify discharge learning needs (meds, wound care, etc )  - Arrange for interpretive services to assist at discharge as needed  - Refer to Case Management Department for coordinating discharge planning if the patient needs post-hospital services based on physician/advanced practitioner order or complex needs related to functional status, cognitive ability, or social support system  Outcome: Progressing     Problem: Knowledge Deficit  Goal: Patient/family/caregiver demonstrates understanding of disease process, treatment plan, medications, and discharge instructions  Description: Complete learning assessment and assess knowledge base    Interventions:  - Provide teaching at level of understanding  - Provide teaching via preferred learning methods  Outcome: Progressing

## 2023-02-03 NOTE — PROGRESS NOTES
-- Patient:  -- MRN: [id number]  -- Aidin Request ID: [id number]  -- Level of care reserved: [address] [facility]  -- Partner Reserved: MONY/ [address], [place], [address] [phone]  -- Clinical needs requested:  -- Geography searched: [id number] [facility]  -- Start of Service:  -- Request sent: 4:16pm EST on 2/2/2023 by Brandon Ferrer  -- Partner reserved: 11:49am EST on 2/3/2023 by Brandon Ferrer  -- Choice list shared: 11:45am EST on 2/3/2023 by Brandon Ferrer

## 2023-02-03 NOTE — CASE MANAGEMENT
Case Management Assessment & Discharge Planning Note    Patient name Lizandro Russell  Location [address] [id number] MRN [id number]  : 1955 Date 2/3/2023       Current Admission Date: 2023  Current Admission Diagnosis:Sepsis [facility])   Patient Active Problem List    Diagnosis Date Noted   • Other emphysema ([facility] [address] ) 2023   • Sepsis ([facility] [address] ) 2023   • Acute on chronic heart failure with preserved ejection fraction ([facility] [address] ) 2023   • Bigeminy 2023   • Acute hyponatremia 2023   • HTN (hypertension) 2023   • Moderate aortic stenosis 2023   • MDD (major depressive disorder) 2023   • Peripheral neuropathy 2023      LOS (days): 4  Geometric Mean LOS (GMLOS) (days): 3 90  Days to GMLOS:-0 3     OBJECTIVE:    Risk of Unplanned Readmission Score: 8 71         Current admission status: Inpatient  Referral Reason:  (dc planning)    Preferred Pharmacy:   [facility], operated [facility] [address] [facility], [address] [address]  Phone: [phone] Fax: [phone]    Primary Care Provider: Kerry Leon MD    Primary Insurance: MEDICARE  Secondary Insurance: BLUE CROSS    ASSESSMENT:  E[address] 26 Proxies    There are no active Health Care Proxies on file  Discussed with provider- Plan is PIC line on Monday as we are waiting for 48 hours from the final cultures  Updated [facility]  Spoke to patient and wife on cost of the antibiotics, $9904 agreeable and Mrs will call [facility] on Monday with credit card information         DISCHARGE DETAILS:    Discharge planning discussed with[de-identified] patient  Freedom of Choice: Yes  Comments - Freedom of Choice: Reviewed cost of IV antibiotics for 4 weeks of Ancef q8 hours will be 1312 00 - agreeable to use [facility] and this is affordable  CM contacted family/caregiver?: Yes  Were Treatment Team discharge recommendations reviewed with patient/caregiver?: Yes  Did patient/caregiver verbalize understanding of patient care needs?: Yes  Were patient/caregiver advised of the risks associated with not following Treatment Team discharge recommendations?: Yes    Contacts  Patient Contacts: Mrs Libertad Murrieta  Relationship to Patient[de-identified] Family  Contact Method: Phone  Phone Number: see face sheet  Reason/Outcome: Discharge Planning (Mrs Libertad Murrieta is in agreement to IV antibiotics at home, aware of Cost, and will call [facility] Monday with Credit Card information )    Requested [address]         Is the patient interested in [place] [address] at discharge?: Yes  [address] requested[de-identified] [address] [employer] Name[de-identified] Other ([facility])  [address] Provider[de-identified] PCP  Home Health Services Needed[de-identified] Administration of IV, IM or SC Medications (IV antibiotics)  Homebound Criteria Met[de-identified] Requires the Assistance of Another Person for Safe Ambulation or to Leave the Home  Supporting Clincal Findings[de-identified] Limited Endurance    DME Referral Provided  Referral made for DME?: No    Other Referral/Resources/Interventions Provided:  Interventions: Home Infusion  Referral Comments: Accepted [facility] and [facility] on [facility]         Treatment Team Recommendation: Home with [address] [employer]  Discharge Destination Plan[de-identified] Home with [place] at Discharge : Family

## 2023-02-03 NOTE — PROGRESS NOTES
Progress Note - Infectious Disease   Asiya Blanc 69 y o  male MRN: [id number]  Unit/Bed#: -01 Encounter: [id number]        REQUIRED DOCUMENTATION:     1  This service was provided via Telemedicine  2  Provider located at [place]  3  TeleMed provider: Abhi Webb MD   4  Identify all parties in room with patient during tele consult:RN  5  After connecting through [employer]Scape, patient was identified by name and date of birth and assistant checked wristband  Patient was then informed that this was a Telemedicine visit and that the exam was being conducted confidentially over secure lines  My office door was closed  No one else was in the room  Patient acknowledged consent and understanding of privacy and security of the Telemedicine visit, and gave us permission to have the assistant stay in the room in order to assist with the history and to conduct the exam   I informed the patient that I have reviewed their record in Epic and presented the opportunity for them to ask any questions regarding the visit today  The patient agreed to participate  Assessment/Recommendations     1  Sepsis, evolved after admission  - Fever, leukocytosis  - Source of sepsis is bacteremia  - Clinically stable, afebrile without leukocytosis     • Management as below     2   Staph aureus bacteremia  - Source of bacteremia is uncertain  Has a superficial toe and right foot wound without local signs of infection, may have been a portal of entry  No radiographic evidence of pneumonia  Has left shoulder arthroplasty without any local signs of infection  No prosthetic vascular devices    Rule out endovascular infection  - TTE without vegetation  - Clinically improving, afebrile without leukocytosis     • Continue current antibiotic therapy with cefazolin 2 every 8 hours  • FU repeat blood cultures  • Await MAKEDA results  • Await foot XR, podiatry evaluation  • Duration of therapy to be defined, anticipate at least 4 weeks of iv therapy   • PICC can be placed after fu blood cultures have cleared x 48 hours     3  Chronic toe and R submet wound  - Possible source of bacteremia although no local signs of infection     • Await XR foot and podiatry evaluation     4  Acute on chronic HFpEF, moderate aortic stenosis     • Management per cardiology, recommend MAKEDA as noted above     5  Possible COPD, chronic tobacco use  -Chest x-ray without obvious pneumonia     • Management per primary team    Discussed in detail with the primary service  History       Subjective: The patient has no complaints and reports feeling better  Denies fevers, chills, or sweats  Denies nausea, vomiting, or diarrhea      Antibiotics:  Cefazolin      Physical Exam     Temp:  [98 1 °F (36 7 °C)-98 2 °F (36 8 °C)] 98 1 °F (36 7 °C)  HR:  [64-71] 71  Resp:  [12-18] 18  BP: (113-116)/(64) 113/64  SpO2:  [94 %-97 %] 96 %  Temp (24hrs), Av 1 °F (36 7 °C), Min:98 1 °F (36 7 °C), Max:98 2 °F (36 8 °C)  Current: Temperature: 98 1 °F (36 7 °C)    Intake/Output Summary (Last 24 hours) at 2/3/2023 0932  Last data filed at 2/3/2023 0447  Gross per 24 hour   Intake --   Output 1650 ml   Net -1650 ml       Physical exam findings reported by bedside and primary medical team staff      General Appearance:  Appearing chronically ill, nontoxic, and in no distress, appears stated age   Throat: Oropharynx moist without lesions; lips, mucosa, and tongue normal; teeth and gums normal   Lungs:   Clear to auscultation bilaterally, no audible wheezes, rhonchi and rales, respirations unlabored   Heart:  Regular rate and rhythm, S1, S2 normal, no murmur, rub or gallop   Abdomen:   Soft, non-tender, non-distended, positive bowel sounds, no masses, no organomegaly    No CVA tenderness   Extremities: Extremities normal, atraumatic, no cyanosis, clubbing or edema   Skin: Skin color, texture, turgor normal, no rashes , superficial wound over L great toe and R 3rd submetatarsal Neurologic: Alert and oriented times 3, extremity strength 5/5 and symmetric         Invasive Devices:   Peripheral IV 02/03/23 Distal;Dorsal (posterior); Right Forearm (Active)   Site Assessment [facility] 02/03/23 0605   Dressing Type Transparent 02/03/23 0605   Line Status Blood return noted; Flushed; Infusing 02/03/23 0605   Dressing Status Clean;Dry; Intact 02/03/23 3707   Dressing Change Due 02/07/23 02/03/23 6752   Reason Not Rotated Not due 02/03/23 3171       Labs, Imaging, & Other Studies     Lab Results:    I have personally reviewed pertinent labs  Results from last 7 days   Lab Units 02/03/23  0445 02/02/23  0457 02/01/23  0456   WBC Thousand/uL 8 93 8 56 9 19   HEMOGLOBIN g/dL 13 9 14 5 14 8   PLATELETS Thousands/uL 165 154 150     Results from last 7 days   Lab Units 02/03/23  0445 02/02/23  0457 02/01/23  0456   POTASSIUM mmol/L 4 3 4 2 3 8   CHLORIDE mmol/L 103 101 101   CO2 mmol/L 26 26 23   BUN mg/dL 18 19 24   CREATININE mg/dL 0 84 1 07 1 07   EGFR ml/min/1 73sq m 89 70 70   CALCIUM mg/dL 8 4 8 5 8 3*   AST U/L 23 37 28   ALT U/L 32 41 25   ALK PHOS U/L 53 49 49     Results from last 7 days   Lab Units 02/02/23  1239 01/31/23  1352   BLOOD CULTURE  Received in Microbiology Lab  Culture in Progress  Received in Microbiology Lab  Culture in Progress  Staphylococcus aureus*  Staphylococcus aureus*   GRAM STAIN RESULT   --  Gram positive cocci in clusters*  Gram positive cocci in clusters*       Imaging Studies:   I have personally reviewed pertinent imaging study reports and images in PACS  EKG, Pathology, and Other Studies:   I have personally reviewed pertinent reports  Counseling/Coordination of care: Total 35 minutes communication with the patient via telehealth  Labs, medical tests and imaging studies were independently reviewed by me as noted above

## 2023-02-03 NOTE — PROGRESS NOTES
-- Patient:  -- MRN: [id number]  -- Aidin Request ID: [id number]  -- Level of care reserved:  Infusion  -- [phone]  -- Clinical needs requested:  -- Geography searched: [phone]  -- Start of Service:  -- Request sent: 12:00pm EST on 2/3/2023 by Olivia Colbert  -- Partner reserved: 4:23pm EST on 2/3/2023 by Olivia Colbert  -- Choice list shared:

## 2023-02-03 NOTE — CONSULTS
Consult - Podiatry   Betsy Enciso 69 y o  male MRN: [id number]  Unit/Bed#: -01 Encounter: [id number]    Assessment/Plan     Assessment:  1  Right submet 3 head HPK  2  Left plantar hallux IPJ ulceration   3  Bacteremia  4  Sepsis - POA  5  Peripheral neuropathy     Plan:  - Left hallux ulceration has bene present for one year in setting of PN  Clinically there are no acute SOI however this could absolutely be portal/source for bacteremia  Given chronicity of ulceration, I ordered an MRI to R/O OM  No open wounds to right foot  - XR right foot 3v 2/3/23: Negative for KULWINDER or osseous erosion  - XR left foot 3c 2/3/23: Neg for KULWINDER or osseous erosion attn hallux  Formal read pending    - A1c 5 9% 1/3/23 (pre-diabetic per SLIM)  - Thank you for consultation, please feel free to reach out for questions/concerns or if acute changes area noted to feet  History of Present Illness     HPI:  Betsy Enciso is a 69 y o  male admitted for SOB, cough and fever who presents as consultation for B/L foot wounds  Patient was found to be septic on admission w/ bacteremia  Podiatry was consulted to assess foot wounds as possible portal of entry for bacteremia  Foot wounds have been present for about one year  Has been going to outside podiatrist for wound care  Notes size has improved  Baseline PN, nondiabetic (pre-diabetic per SLIM)  Inpatient consult to Podiatry  Consult performed by: Karon Rodríguez DPM  Consult ordered by: Abril Weiss MD        Review of Systems   Constitutional: Negative  HENT: Negative  Eyes: Negative  Respiratory: Negative  Cardiovascular: Negative  Gastrointestinal: Negative  Musculoskeletal: Neg   Skin: B/L foot wounds   Neurological: PN   Psych: negative         Historical Information   Past Medical History:   Diagnosis Date   • Anxiety    • Aortic stenosis    • Depression    • Hypertension    • Hypothyroidism    • PVCs (premature ventricular contractions)      Past Surgical History:   Procedure Laterality Date   • APPENDECTOMY     • BACK SURGERY     • TOTAL SHOULDER REPLACEMENT       Social History   Social History     Substance and Sexual Activity   Alcohol Use Not Currently     Social History     Substance and Sexual Activity   Drug Use Not Currently    Comment: sporadic in young adulthood     Social History     Tobacco Use   Smoking Status Light Smoker   • Types: Cigarettes   Smokeless Tobacco Never     Family History:   Family History   Problem Relation Age of Onset   • Dementia Mother    • Blindness Mother    • Cancer Mother    • Dementia Father        Meds/Allergies   Medications Prior to Admission   Medication   • amLODIPine (NORVASC) 10 mg tablet   • buPROPion (WELLBUTRIN XL) 300 mg 24 hr tablet   • escitalopram (LEXAPRO) 20 mg tablet   • hydrochlorothiazide (HYDRODIURIL) 25 mg tablet   • levothyroxine 75 mcg tablet   • meloxicam (Mobic) 15 mg tablet   • methocarbamol (ROBAXIN) 500 mg tablet     No Known Allergies    Objective   First Vitals:   Blood Pressure: 136/70 (01/30/23 0812)  Pulse: 88 (01/30/23 0812)  Temperature: 98 7 °F (37 1 °C) (01/30/23 0812)  Temp Source: Temporal (01/30/23 0812)  Respirations: 18 (01/30/23 0812)  Height: 6' 0 01" (182 9 cm) (01/30/23 1238)  Weight - Scale: 104 kg (228 lb 13 4 oz) (01/30/23 0812)  SpO2: 95 % (01/30/23 0812)    Current Vitals:   Blood Pressure: 114/64 (02/02/23 2210)  Pulse: 71 (02/02/23 2210)  Temperature: 98 2 °F (36 8 °C) (02/02/23 2210)  Temp Source: Temporal (02/02/23 2210)  Respirations: 18 (02/02/23 2210)  Height: 6' 0 01" (182 9 cm) (01/30/23 1238)  Weight - Scale: 101 kg (223 lb 9 6 oz) (01/31/23 0943)  SpO2: 97 % (02/03/23 0715)        /64 (BP Location: Left arm)   Pulse 71   Temp 98 2 °F (36 8 °C) (Temporal)   Resp 18   Ht 6' 0 01" (1 829 m)   Wt 101 kg (223 lb 9 6 oz)   SpO2 97%   BMI 30 32 kg/m²      General Appearance:    Alert, cooperative, no distress   Head:    Normocephalic, without obvious abnormality, atraumatic   Eyes:    PERRL, conjunctiva/corneas clear, EOM's intact        Nose:   Moist mucous membranes   Neck:   Supple, symmetrical, trachea midline   Back:     Symmetric   Lungs:     Respirations unlabored   Heart:    Regular rate and rhythm, S1 and S2 normal, no murmur, rub   or gallop   Abdomen:     Soft, non-tender    B/L LE EXAM   Extremities:   Plantarflexion of left hallux at IPJ  Pain neg  Pulses:   B/L DP & PT pulses palpable  Legs to toes warm to warm  Pedal hair absent  Skin:   Right submet 3 head HPK without open wound  Left plantar hallux ulceration: measures approx 0 5x0 5x0 5cm with deep probe however with hard end point  There is no purulence, crepitus, fluctuance noted  B/L LE skin is thin dry and shiny  Neurologic:   Gross sensation is diminished to feet  Protective sensation is absent to B/L feet  + N/T/B to B/L feet  Lab Results:   No results displayed because visit has over 200 results  Results from last 7 days   Lab Units 01/31/23  1352   GRAM STAIN RESULT  Gram positive cocci in clusters*  Gram positive cocci in clusters*       Results from last 7 days   Lab Units 02/02/23  1239 01/31/23  1352   BLOOD CULTURE  Received in Microbiology Lab  Culture in Progress  Received in Microbiology Lab  Culture in Progress  Staphylococcus aureus*  Staphylococcus aureus*       Invalid input(s): LABAEARO            Imaging: I have personally reviewed pertinent films in PACS  EKG, Pathology, and Other Studies: I have personally reviewed pertinent reports        Code Status: Level 1 - Full Code  Advance Directive and Living Will:      Power of :    POLST:

## 2023-02-04 RX ADMIN — BUPROPION HYDROCHLORIDE 300 MG: 150 TABLET, EXTENDED RELEASE ORAL at 08:30

## 2023-02-04 RX ADMIN — CEFAZOLIN SODIUM 2000 MG: 2 SOLUTION INTRAVENOUS at 21:57

## 2023-02-04 RX ADMIN — MAGNESIUM OXIDE TAB 400 MG (241.3 MG ELEMENTAL MG) 400 MG: 400 (241.3 MG) TAB at 08:31

## 2023-02-04 RX ADMIN — IPRATROPIUM BROMIDE AND ALBUTEROL SULFATE 3 ML: 2.5; .5 SOLUTION RESPIRATORY (INHALATION) at 07:36

## 2023-02-04 RX ADMIN — ENOXAPARIN SODIUM 40 MG: 40 INJECTION SUBCUTANEOUS at 08:30

## 2023-02-04 RX ADMIN — POTASSIUM CHLORIDE 20 MEQ: 20 TABLET, EXTENDED RELEASE ORAL at 17:19

## 2023-02-04 RX ADMIN — LEVOTHYROXINE SODIUM 75 MCG: 75 TABLET ORAL at 06:18

## 2023-02-04 RX ADMIN — CEFAZOLIN SODIUM 2000 MG: 2 SOLUTION INTRAVENOUS at 13:56

## 2023-02-04 RX ADMIN — CEFAZOLIN SODIUM 2000 MG: 2 SOLUTION INTRAVENOUS at 06:19

## 2023-02-04 RX ADMIN — MAGNESIUM OXIDE TAB 400 MG (241.3 MG ELEMENTAL MG) 400 MG: 400 (241.3 MG) TAB at 17:20

## 2023-02-04 RX ADMIN — GABAPENTIN 100 MG: 100 CAPSULE ORAL at 17:19

## 2023-02-04 RX ADMIN — GABAPENTIN 100 MG: 100 CAPSULE ORAL at 08:31

## 2023-02-04 RX ADMIN — POTASSIUM CHLORIDE 20 MEQ: 20 TABLET, EXTENDED RELEASE ORAL at 08:31

## 2023-02-04 NOTE — ASSESSMENT & PLAN NOTE
· Patient is chronic smoker  · Suspect undiagnosed COPD  · Continue respiratory protocol  · Continue treatment for pneumonia

## 2023-02-04 NOTE — ASSESSMENT & PLAN NOTE
· We will do an echocardiogram to evaluate any progression and ask cardiology to evaluate  · Will need outpatient follow-up in 6-12 months

## 2023-02-04 NOTE — RESPIRATORY THERAPY NOTE
RT Protocol Note  Yarelis Baig 69 y o  male MRN: [id number]  Unit/Bed#: -01 Encounter: [id number]    Assessment    Principal Problem:    Sepsis ([address] )  Active Problems:    Acute on chronic heart failure with preserved ejection fraction (HCC)    Bigeminy    Acute hyponatremia    HTN (hypertension)    Moderate aortic stenosis    MDD (major depressive disorder)    Peripheral neuropathy    Other emphysema (HCC)    Ulcer of left foot (HCC)      Home Pulmonary Medications:         Past Medical History:   Diagnosis Date   • Anxiety    • Aortic stenosis    • Depression    • Hypertension    • Hypothyroidism    • PVCs (premature ventricular contractions)      Social History     Socioeconomic History   • Marital status: /Civil Union     Spouse name: None   • Number of children: None   • Years of education: None   • Highest education level: None   Occupational History   • None   Tobacco Use   • Smoking status: Light Smoker     Types: Cigarettes   • Smokeless tobacco: Never   Vaping Use   • Vaping Use: Never used   Substance and Sexual Activity   • Alcohol use: Not Currently   • Drug use: Not Currently     Comment: sporadic in young adulthood   • Sexual activity: None     Comment: defer   Other Topics Concern   • None   Social History Narrative   • None     Social Determinants of Health     Financial Resource Strain: Not on file   Food Insecurity: No Food Insecurity   • Worried About Running Out of Food in the Last Year: Never true   • Ran Out of Food in the Last Year: Never true   Transportation Needs: No Transportation Needs   • Lack of Transportation (Medical): No   • Lack of Transportation (Non-Medical):  No   Physical Activity: Not on file   Stress: Not on file   Social Connections: Not on file   Intimate Partner Violence: Not on file   Housing Stability: Low Risk    • Unable to Pay for Housing in the Last Year: No   • Number of Places Lived in the Last Year: 1   • Unstable Housing in the Last Year: No Subjective    Subjective Data: patient report shis breathing has improved since admission "like night and day, I wasn;t worth much when I came in here "    Objective    Physical Exam:        Vitals:  Blood pressure 108/63, pulse 63, temperature 98 1 °F (36 7 °C), temperature source Temporal, resp  rate 18, height 6' (1 829 m), weight 101 kg (223 lb), SpO2 98 %  Imaging and other studies: I have personally reviewed pertinent reports  O2 Device: room air     Plan    Respiratory Plan: No distress/Pulmonary history        Resp Comments: Pt c/o "I dont need these rxs" They helped me when I got here but now I feel good   Pt would like rxs only PRN

## 2023-02-04 NOTE — ASSESSMENT & PLAN NOTE
· Left plantar hallux IPJ ulceration  · Negative for osteomyelitis  · Podiatry: MRI to R/O OM    · MRI:  Soft tissue ulceration distal great toe  No soft tissue abscess  Small focus of bone marrow edema at the proximal aspect of the great toe distal phalanx without confluent decreased T1 marrow signal, nonspecific as described above  No confluent decreased T1 marrow signal to definitively suggest osteomyelitis  Arthropathy at the lateral tarsometatarsal joint with subcortical cystic changes

## 2023-02-04 NOTE — PLAN OF CARE
Problem: PAIN - ADULT  Goal: Verbalizes/displays adequate comfort level or baseline comfort level  Description: Interventions:  - Encourage patient to monitor pain and request assistance  - Assess pain using appropriate pain scale  - Administer analgesics based on type and severity of pain and evaluate response  - Implement non-pharmacological measures as appropriate and evaluate response  - Consider cultural and social influences on pain and pain management  - Notify physician/advanced practitioner if interventions unsuccessful or patient reports new pain  Outcome: Progressing     Problem: INFECTION - ADULT  Goal: Absence or prevention of progression during hospitalization  Description: INTERVENTIONS:  - Assess and monitor for signs and symptoms of infection  - Monitor lab/diagnostic results  - Monitor all insertion sites, i e  indwelling lines, tubes, and drains  - Monitor endotracheal if appropriate and nasal secretions for changes in amount and color  - Moody appropriate cooling/warming therapies per order  - Administer medications as ordered  - Instruct and encourage patient and family to use good hand hygiene technique  - Identify and instruct in appropriate isolation precautions for identified infection/condition  Outcome: Progressing     Problem: SAFETY ADULT  Goal: Patient will remain free of falls  Description: INTERVENTIONS:  - Educate patient/family on patient safety including physical limitations  - Instruct patient to call for assistance with activity   - Consult OT/PT to assist with strengthening/mobility   - Keep Call bell within reach  - Keep bed low and locked with side rails adjusted as appropriate  - Keep care items and personal belongings within reach  - Initiate and maintain comfort rounds  - Make Fall Risk Sign visible to staff  - Offer Toileting every 2 Hours, in advance of need  Outcome: Progressing  Goal: Maintain or return to baseline ADL function  Description: INTERVENTIONS:  - Assess patient's ability to carry out ADLs; assess patient's baseline for ADL function and identify physical deficits which impact ability to perform ADLs (bathing, care of mouth/teeth, toileting, grooming, dressing, etc )  - Assess/evaluate cause of self-care deficits   - Assess range of motion  - Assess patient's mobility; develop plan if impaired  - Assess patient's need for assistive devices and provide as appropriate  - Encourage maximum independence but intervene and supervise when necessary  - Involve family in performance of ADLs  - Assess for home care needs following discharge   - Consider OT consult to assist with ADL evaluation and planning for discharge  - Provide patient education as appropriate  Outcome: Progressing  Goal: Maintains/Returns to pre admission functional level  Description: INTERVENTIONS:  - Perform BMAT or MOVE assessment daily    - Set and communicate daily mobility goal to care team and patient/family/caregiver     - Collaborate with rehabilitation services on mobility goals if consulted  - Ambulate patient 5 times a day  - Out of bed for toileting  - Record patient progress and toleration of activity level   Outcome: Progressing     Problem: DISCHARGE PLANNING  Goal: Discharge to home or other facility with appropriate resources  Description: INTERVENTIONS:  - Identify barriers to discharge w/patient and caregiver  - Arrange for needed discharge resources and transportation as appropriate  - Identify discharge learning needs (meds, wound care, etc )  - Arrange for interpretive services to assist at discharge as needed  - Refer to Case Management Department for coordinating discharge planning if the patient needs post-hospital services based on physician/advanced practitioner order or complex needs related to functional status, cognitive ability, or social support system  Outcome: Progressing     Problem: Knowledge Deficit  Goal: Patient/family/caregiver demonstrates understanding of disease process, treatment plan, medications, and discharge instructions  Description: Complete learning assessment and assess knowledge base    Interventions:  - Provide teaching at level of understanding  - Provide teaching via preferred learning methods  Outcome: Progressing     Problem: SKIN/TISSUE INTEGRITY - ADULT  Goal: Incision(s), wounds(s) or drain site(s) healing without S/S of infection  Description: INTERVENTIONS  - Assess and document dressing, wound bed, and surrounding tissue  - Provide patient and family education  - Perform skin care/dressing changes every day and prn  Outcome: Progressing

## 2023-02-04 NOTE — PLAN OF CARE
Problem: PAIN - ADULT  Goal: Verbalizes/displays adequate comfort level or baseline comfort level  Description: Interventions:  - Encourage patient to monitor pain and request assistance  - Assess pain using appropriate pain scale  - Administer analgesics based on type and severity of pain and evaluate response  - Implement non-pharmacological measures as appropriate and evaluate response  - Consider cultural and social influences on pain and pain management  - Notify physician/advanced practitioner if interventions unsuccessful or patient reports new pain  Outcome: Progressing     Problem: INFECTION - ADULT  Goal: Absence or prevention of progression during hospitalization  Description: INTERVENTIONS:  - Assess and monitor for signs and symptoms of infection  - Monitor lab/diagnostic results  - Monitor all insertion sites, i e  indwelling lines, tubes, and drains  - Monitor endotracheal if appropriate and nasal secretions for changes in amount and color  - Rush appropriate cooling/warming therapies per order  - Administer medications as ordered  - Instruct and encourage patient and family to use good hand hygiene technique  - Identify and instruct in appropriate isolation precautions for identified infection/condition  Outcome: Progressing     Problem: SAFETY ADULT  Goal: Patient will remain free of falls  Description: INTERVENTIONS:  - Educate patient/family on patient safety including physical limitations  - Instruct patient to call for assistance with activity   - Consult OT/PT to assist with strengthening/mobility   - Keep Call bell within reach  - Keep bed low and locked with side rails adjusted as appropriate  - Keep care items and personal belongings within reach  - Initiate and maintain comfort rounds  - Make Fall Risk Sign visible to staff  - Offer Toileting every 2 Hours, in advance of need  Outcome: Progressing  Goal: Maintain or return to baseline ADL function  Description: INTERVENTIONS:  - Assess patient's ability to carry out ADLs; assess patient's baseline for ADL function and identify physical deficits which impact ability to perform ADLs (bathing, care of mouth/teeth, toileting, grooming, dressing, etc )  - Assess/evaluate cause of self-care deficits   - Assess range of motion  - Assess patient's mobility; develop plan if impaired  - Assess patient's need for assistive devices and provide as appropriate  - Encourage maximum independence but intervene and supervise when necessary  - Involve family in performance of ADLs  - Assess for home care needs following discharge   - Consider OT consult to assist with ADL evaluation and planning for discharge  - Provide patient education as appropriate  Outcome: Progressing  Goal: Maintains/Returns to pre admission functional level  Description: INTERVENTIONS:  - Perform BMAT or MOVE assessment daily    - Set and communicate daily mobility goal to care team and patient/family/caregiver     - Collaborate with rehabilitation services on mobility goals if consulted  - Ambulate patient 5 times a day  - Out of bed for toileting  - Record patient progress and toleration of activity level   Outcome: Progressing     Problem: DISCHARGE PLANNING  Goal: Discharge to home or other facility with appropriate resources  Description: INTERVENTIONS:  - Identify barriers to discharge w/patient and caregiver  - Arrange for needed discharge resources and transportation as appropriate  - Identify discharge learning needs (meds, wound care, etc )  - Arrange for interpretive services to assist at discharge as needed  - Refer to Case Management Department for coordinating discharge planning if the patient needs post-hospital services based on physician/advanced practitioner order or complex needs related to functional status, cognitive ability, or social support system  Outcome: Progressing     Problem: Knowledge Deficit  Goal: Patient/family/caregiver demonstrates understanding of disease process, treatment plan, medications, and discharge instructions  Description: Complete learning assessment and assess knowledge base    Interventions:  - Provide teaching at level of understanding  - Provide teaching via preferred learning methods  Outcome: Progressing     Problem: SKIN/TISSUE INTEGRITY - ADULT  Goal: Incision(s), wounds(s) or drain site(s) healing without S/S of infection  Description: INTERVENTIONS  - Assess and document dressing, wound bed, and surrounding tissue  - Provide patient and family education  - Perform skin care/dressing changes every day and prn  Outcome: Progressing

## 2023-02-04 NOTE — ASSESSMENT & PLAN NOTE
Patient spike high fever 102 4, elevated WBC with possible source of infection from pneumonia as evidenced on imaging  Procalcitonin is trending up  Both sets of blood culture is positive, gram-positive cocci in clusters, most likely MSSA-antibiotic adjusted to cefazolin  COVID/flu panel negative  TTE negative for vegetation  Follow repeat blood culture  Per ID note, expect 4 weeks of IV antibiotic, PICC line can be place if repeat blood culture negative for 48 hours    Patient denies any prosthesis in the body  TTE shows no vegetation

## 2023-02-04 NOTE — PROGRESS NOTES
[address]  Progress Note - Shannon Beck 1955, 69 y o  male MRN: [id number]  Unit/Bed#: -01 Encounter: [id number]  Primary Care Provider: Fernando Eller MD   Date and time admitted to hospital: 1/30/2023  8:09 AM    Acute on chronic heart failure with preserved ejection fraction [facility])  Assessment & Plan  Wt Readings from Last 3 Encounters:   02/03/23 101 kg (223 lb)   10/03/22 104 kg (230 lb)   09/23/22 104 kg (230 lb)     · Patient is on hydrochlorothiazide last 2D echo had a moderate aortic stenosis and EF of 60 to 65%  · Lungs are with rails no leg edema proBNP is on the low side chest x-ray compliant with vascular congestion with worsening shortness of breath  · Repeat echocardiogram shows ejection fraction 60% with grade 1 diastolic dysfunction, elevated left atrial filling pressure, moderate aortic stenosis  · Patient was initially receiving IV diuretics, which remain on hold since patient was found clinically dry-and patient also has bacteremia        * Sepsis [facility])  Assessment & Plan  Patient spike high fever 102 4, elevated WBC with possible source of infection from pneumonia as evidenced on imaging  Procalcitonin is trending up  Both sets of blood culture is positive, gram-positive cocci in clusters, most likely MSSA-antibiotic adjusted to cefazolin  COVID/flu panel negative  TTE negative for vegetation  Follow repeat blood culture  Per ID note, expect 4 weeks of IV antibiotic, PICC line can be place if repeat blood culture negative for 48 hours    Patient denies any prosthesis in the body  TTE shows no vegetation      Ulcer of left foot (HCC)  Assessment & Plan  Left plantar hallux IPJ ulceration  Negative for osteomyelitis  Due to recommendation appreciated    Other emphysema [facility])  Assessment & Plan  Patient is chronic smoker  Suspect undiagnosed COPD  Continue respiratory protocol  Continue treatment for pneumonia    Peripheral neuropathy  Assessment & Plan  · The feet for some time has not been on any treatment we will start gabapentin 100 mg p o  twice daily and see how he tolerates before advancement his hemoglobin A1c recently done was 5 9 in the prediabetic stage    MDD (major depressive disorder)  Assessment & Plan  · Start Wellbutrin    Moderate aortic stenosis  Assessment & Plan  · We will do an echocardiogram to evaluate any progression and ask cardiology to evaluate  · Will need outpatient follow-up    HTN (hypertension)  Assessment & Plan  · We will continue Norvasc hold hydrochlorothiazide secondary to acute hyponatremia    Acute hyponatremia  Assessment & Plan  · Remained stable    Bigeminy  Assessment & Plan  · 2D echo telemetry monitor replete magnesium and potassium repeat labs tonight  Not on a beta-  · After correcting electrolytes especially magnesium and potassium, improved, continue p o  magnesium  · TSH normal        VTE Pharmacologic Prophylaxis: VTE Score: 3 Moderate Risk (Score 3-4) - Pharmacological DVT Prophylaxis Ordered: enoxaparin (Lovenox)  Patient Centered Rounds: I performed bedside rounds with nursing staff today  Discussions with Specialists or Other Care Team Provider: Cardiology, ID    Education and Discussions with Family / Patient: With patient  Current Length of Stay: 4 day(s)  Current Patient Status: Inpatient   Certification Statement: The patient will continue to require additional inpatient hospital stay due to To monitor above condition  Discharge Plan: Anticipate discharge in 48-72 hrs to home  Code Status: Level 1 - Full Code    Subjective:   Seen and evaluated during the event  No overnight issues  Objective:     Vitals:   Temp (24hrs), Av 9 °F (36 6 °C), Min:97 5 °F (36 4 °C), Max:98 2 °F (36 8 °C)    Temp:  [97 5 °F (36 4 °C)-98 2 °F (36 8 °C)] 97 8 °F (36 6 °C)  HR:  [62-80] 64  Resp:  [12-24] 18  BP: ()/(54-67) 104/58  SpO2:  [95 %-100 %] 97 %  Body mass index is 30 24 kg/m²       Input and Output Summary (last 24 hours): Intake/Output Summary (Last 24 hours) at 2/3/2023 1924  Last data filed at 2/3/2023 1701  Gross per 24 hour   Intake 510 ml   Output 2475 ml   Net -1965 ml       Physical Exam:   Physical Exam  Vitals and nursing note reviewed  Constitutional:       Appearance: Normal appearance  He is not ill-appearing or diaphoretic  HENT:      Nose: Nose normal  No congestion  Mouth/Throat:      Mouth: Mucous membranes are moist       Pharynx: Oropharynx is clear  No oropharyngeal exudate  Eyes:      General: No scleral icterus  Left eye: No discharge  Extraocular Movements: Extraocular movements intact  Conjunctiva/sclera: Conjunctivae normal       Pupils: Pupils are equal, round, and reactive to light  Cardiovascular:      Rate and Rhythm: Normal rate  Heart sounds: Murmur heard  No friction rub  No gallop  Pulmonary:      Effort: Pulmonary effort is normal  No respiratory distress  Breath sounds: No stridor  No wheezing or rhonchi  Abdominal:      General: Abdomen is flat  Bowel sounds are normal  There is no distension  Palpations: There is no mass  Tenderness: There is no abdominal tenderness  Hernia: No hernia is present  Musculoskeletal:         General: No swelling, tenderness, deformity or signs of injury  Normal range of motion  Cervical back: Normal range of motion  No rigidity  Lymphadenopathy:      Cervical: No cervical adenopathy  Skin:     General: Skin is warm  Neurological:      General: No focal deficit present  Mental Status: He is alert and oriented to person, place, and time  Cranial Nerves: No cranial nerve deficit  Sensory: No sensory deficit  Motor: No weakness        Coordination: Coordination normal          Additional Data:     Labs:  Results from last 7 days   Lab Units 02/03/23  0445 02/02/23  0457   WBC Thousand/uL 8 93 8 56   HEMOGLOBIN g/dL 13 9 14 5   HEMATOCRIT % 41 8 43 4   PLATELETS Thousands/uL 165 154   BANDS PCT % 3  --    NEUTROS PCT %  --  61   LYMPHS PCT %  --  16   LYMPHO PCT % 20  --    MONOS PCT %  --  20*   MONO PCT % 14*  --    EOS PCT % 2 2     Results from last 7 days   Lab Units 02/03/23  0445   SODIUM mmol/L 132*   POTASSIUM mmol/L 4 3   CHLORIDE mmol/L 103   CO2 mmol/L 26   BUN mg/dL 18   CREATININE mg/dL 0 84   ANION GAP mmol/L 3*   CALCIUM mg/dL 8 4   ALBUMIN g/dL 3 3*   TOTAL BILIRUBIN mg/dL 0 67   ALK PHOS U/L 53   ALT U/L 32   AST U/L 23   GLUCOSE RANDOM mg/dL 109     Results from last 7 days   Lab Units 01/30/23  0825   INR  1 03             Results from last 7 days   Lab Units 01/31/23  1255 01/30/23  0825   LACTIC ACID mmol/L  --  1 2   PROCALCITONIN ng/ml 0 85*  --        Lines/Drains:  Invasive Devices     Peripheral Intravenous Line  Duration           Peripheral IV 02/03/23 Distal;Dorsal (posterior); Right Forearm <1 day                      Imaging: Reviewed radiology reports from this admission including: MRI foot    Recent Cultures (last 7 days):   Results from last 7 days   Lab Units 02/02/23  1239 01/31/23  1352   BLOOD CULTURE  Received in Microbiology Lab  Culture in Progress  Received in Microbiology Lab  Culture in Progress   Staphylococcus aureus*  Staphylococcus aureus*   GRAM STAIN RESULT   --  Gram positive cocci in clusters*  Gram positive cocci in clusters*       Last 24 Hours Medication List:   Current Facility-Administered Medications   Medication Dose Route Frequency Provider Last Rate   • acetaminophen  650 mg Oral Q4H PRN Brittany Eldridge MD     • albuterol  2 5 mg Nebulization Q4H PRN Kev Martin MD     • albuterol  2 5 mg Nebulization Once PRN Altagracia Dias MD     • amLODIPine  10 mg Oral Daily Brittany Eldridge MD     • buPROPion  300 mg Oral Daily Brittany Eldridge MD     • cefazolin  2,000 mg Intravenous Q8H Kev Martin MD 2,000 mg (02/03/23 1512)   • enoxaparin  40 mg Subcutaneous Q24H [address] [facility] MD Rosamaria     • gabapentin  100 mg Oral BID Grisel Shah MD     • ipratropium-albuterol  3 mL Nebulization TID Jose Palomino MD     • lactated ringers  50 mL/hr Intravenous Continuous Yeison OuTALI     • levothyroxine  75 mcg Oral Early Morning Grisel Shah MD     • magnesium Oxide  400 mg Oral BID Jose Palomino MD     • potassium chloride  20 mEq Oral BID Grisel Shah MD          Today, Patient Was Seen By: Jose Palomino MD    **Please Note: This note may have been constructed using a voice recognition system  **

## 2023-02-04 NOTE — ASSESSMENT & PLAN NOTE
Wt Readings from Last 3 Encounters:   02/03/23 101 kg (223 lb)   10/03/22 104 kg (230 lb)   09/23/22 104 kg (230 lb)     · Patient is on hydrochlorothiazide last 2D echo had a moderate aortic stenosis and EF of 60 to 65%    · Lungs are with rails no leg edema proBNP is on the low side chest x-ray compliant with vascular congestion with worsening shortness of breath  · Repeat echocardiogram shows ejection fraction 60% with grade 1 diastolic dysfunction, elevated left atrial filling pressure, moderate aortic stenosis  · Patient was initially receiving IV diuretics, which remain on hold since patient was found clinically dry-and patient also has bacteremia

## 2023-02-04 NOTE — ASSESSMENT & PLAN NOTE
· 2D echo telemetry monitor replete magnesium and potassium repeat labs tonight    Not on a beta-blocker  · After correcting electrolytes especially magnesium and potassium, improved, continue p o  magnesium  · TSH normal  · Cardiology: may benefit from outpatient cardiac monitoring

## 2023-02-04 NOTE — ASSESSMENT & PLAN NOTE
· Patient spike high fever 102 4, elevated WBC with possible source of infection from pneumonia as evidenced on imaging  · Procalcitonin is trending up  · Both sets of blood culture is positive, gram-positive cocci in clusters, most likely MSSA-antibiotic adjusted to cefazolin  · COVID/flu panel negative  · TTE negative for vegetation  · Repeat blood culture negative at 24 hours  · Per ID note, expect 4 weeks of IV antibiotic, PICC line can be place if repeat blood culture negative for 48 hours    · Patient denies any prosthesis in the body  · MAKEDA shows no vegetation

## 2023-02-04 NOTE — RESPIRATORY THERAPY NOTE
RT Protocol Note  Julianne Alfaro 69 y o  male MRN: [id number]  Unit/Bed#: -01 Encounter: [id number]    Assessment    Principal Problem:    Sepsis ([address] )  Active Problems:    Acute on chronic heart failure with preserved ejection fraction (HCC)    Bigeminy    Acute hyponatremia    HTN (hypertension)    Moderate aortic stenosis    MDD (major depressive disorder)    Peripheral neuropathy    Other emphysema (HCC)    Ulcer of left foot (HCC)      Home Pulmonary Medications:  none       Past Medical History:   Diagnosis Date   • Anxiety    • Aortic stenosis    • Depression    • Hypertension    • Hypothyroidism    • PVCs (premature ventricular contractions)      Social History     Socioeconomic History   • Marital status: /Civil Union     Spouse name: None   • Number of children: None   • Years of education: None   • Highest education level: None   Occupational History   • None   Tobacco Use   • Smoking status: Light Smoker     Types: Cigarettes   • Smokeless tobacco: Never   Vaping Use   • Vaping Use: Never used   Substance and Sexual Activity   • Alcohol use: Not Currently   • Drug use: Not Currently     Comment: sporadic in young adulthood   • Sexual activity: None     Comment: defer   Other Topics Concern   • None   Social History Narrative   • None     Social Determinants of Health     Financial Resource Strain: Not on file   Food Insecurity: No Food Insecurity   • Worried About Running Out of Food in the Last Year: Never true   • Ran Out of Food in the Last Year: Never true   Transportation Needs: No Transportation Needs   • Lack of Transportation (Medical): No   • Lack of Transportation (Non-Medical):  No   Physical Activity: Not on file   Stress: Not on file   Social Connections: Not on file   Intimate Partner Violence: Not on file   Housing Stability: Low Risk    • Unable to Pay for Housing in the Last Year: No   • Number of Places Lived in the Last Year: 1   • Unstable Housing in the Last Year: No Subjective    Subjective Data: patient report shis breathing has improved since admission "like night and day, I wasn;t worth much when I came in here "    Objective    Physical Exam:   Assessment Type: During-treatment  General Appearance: Alert, Awake  Respiratory Pattern: Normal  Chest Assessment: Chest expansion symmetrical  Bilateral Breath Sounds: Diminished  Cough: None  O2 Device: room air    Vitals:  Blood pressure 103/64, pulse 60, temperature 97 9 °F (36 6 °C), temperature source Temporal, resp  rate 18, height 6' (1 829 m), weight 101 kg (223 lb), SpO2 96 %            Imaging and other studies: performed greater than three days prior    O2 Device: room air     Plan    Respiratory Plan: No distress/Pulmonary history        Resp Comments: stable on room air

## 2023-02-04 NOTE — PROGRESS NOTES
[address]  Progress Note - Asmita Oquendo 1955, 69 y o  male MRN: [id number]  Unit/Bed#: -01 Encounter: [id number]  Primary Care Provider: Radha Mckeon MD   Date and time admitted to hospital: 1/30/2023  8:09 AM    * Sepsis [facility])  Assessment & Plan  · Patient spike high fever 102 4, elevated WBC with possible source of infection from pneumonia as evidenced on imaging  · Procalcitonin is trending up  · Both sets of blood culture is positive, gram-positive cocci in clusters, most likely MSSA-antibiotic adjusted to cefazolin  · COVID/flu panel negative  · TTE negative for vegetation  · Repeat blood culture negative at 24 hours  · Per ID note, expect 4 weeks of IV antibiotic, PICC line can be place if repeat blood culture negative for 48 hours  · Patient denies any prosthesis in the body  · MAKEDA shows no vegetation      Acute on chronic heart failure with preserved ejection fraction [facility])  Assessment & Plan  Wt Readings from Last 3 Encounters:   02/03/23 101 kg (223 lb)   10/03/22 104 kg (230 lb)   09/23/22 104 kg (230 lb)     · Patient is on hydrochlorothiazide last 2D echo had a moderate aortic stenosis and EF of 60 to 65%    · Lungs are with rails no leg edema proBNP is on the low side chest x-ray compliant with vascular congestion with worsening shortness of breath  · Repeat echocardiogram shows ejection fraction 60% with grade 1 diastolic dysfunction, elevated left atrial filling pressure, moderate aortic stenosis  · Patient was initially receiving IV diuretics, which remain on hold since patient was found clinically dry-and patient also has bacteremia    HTN (hypertension)  Assessment & Plan  · We will continue Norvasc hold hydrochlorothiazide secondary to acute hyponatremia    Acute hyponatremia  Assessment & Plan  · Remained stable    Ulcer of left foot (HCC)  Assessment & Plan  · Left plantar hallux IPJ ulceration  · Negative for osteomyelitis  · Podiatry: MRI to R/O OM    · MRI:  Soft tissue ulceration distal great toe  No soft tissue abscess  Small focus of bone marrow edema at the proximal aspect of the great toe distal phalanx without confluent decreased T1 marrow signal, nonspecific as described above  No confluent decreased T1 marrow signal to definitively suggest osteomyelitis  Arthropathy at the lateral tarsometatarsal joint with subcortical cystic changes  Other emphysema ([address] )  Assessment & Plan  · Patient is chronic smoker  · Suspect undiagnosed COPD  · Continue respiratory protocol  · Continue treatment for pneumonia    Peripheral neuropathy  Assessment & Plan  · The feet for some time has not been on any treatment we will start gabapentin 100 mg p o  twice daily and see how he tolerates before advancement his hemoglobin A1c recently done was 5 9 in the prediabetic stage    MDD (major depressive disorder)  Assessment & Plan  · Start Wellbutrin    Moderate aortic stenosis  Assessment & Plan  · We will do an echocardiogram to evaluate any progression and ask cardiology to evaluate  · Will need outpatient follow-up in 6-12 months    Bigeminy  Assessment & Plan  · 2D echo telemetry monitor replete magnesium and potassium repeat labs tonight  Not on a beta-blocker  · After correcting electrolytes especially magnesium and potassium, improved, continue p o  magnesium  · TSH normal  · Cardiology: may benefit from outpatient cardiac monitoring      VTE Pharmacologic Prophylaxis: VTE Score: 3 Moderate Risk (Score 3-4) - Pharmacological DVT Prophylaxis Ordered: enoxaparin (Lovenox)  Patient Centered Rounds: I performed bedside rounds with nursing staff today  Discussions with Specialists or Other Care Team Provider: Nursing and case management    Education and Discussions with Family / Patient: Updated  (wife) via phone  Time Spent for Care: 30 minutes   More than 50% of total time spent on counseling and coordination of care as described above     Current Length of Stay: 5 day(s)  Current Patient Status: Inpatient   Certification Statement: The patient will continue to require additional inpatient hospital stay due to sepsis  Discharge Plan: Anticipate discharge in 48-72 hrs to home  Code Status: Level 1 - Full Code    Subjective:   Patient seen and examined at bedside this morning  He offers no acute complaints  He states that he is feeling much better and is looking forward to going home  He denies any cough, congestion, chest pain, shortness of breath, fever, chills  Patient's wife states that he was complaining of sweats last week and needed the bed willing to be strep  This morning he offers no complaints of sweats  Objective:     Vitals:   Temp (24hrs), Av 8 °F (36 6 °C), Min:97 5 °F (36 4 °C), Max:98 1 °F (36 7 °C)    Temp:  [97 5 °F (36 4 °C)-98 1 °F (36 7 °C)] 98 1 °F (36 7 °C)  HR:  [59-80] 63  Resp:  [16-24] 18  BP: ()/(54-67) 108/63  SpO2:  [95 %-100 %] 98 %  Body mass index is 30 24 kg/m²  Input and Output Summary (last 24 hours): Intake/Output Summary (Last 24 hours) at 2023 1018  Last data filed at 2023 0067  Gross per 24 hour   Intake 1160 ml   Output 1775 ml   Net -615 ml       Physical Exam:   Physical Exam  Vitals reviewed  Constitutional:       General: He is not in acute distress  Appearance: He is obese  He is not ill-appearing or toxic-appearing  HENT:      Head: Normocephalic and atraumatic  Nose: Nose normal       Mouth/Throat:      Mouth: Mucous membranes are moist    Eyes:      Pupils: Pupils are equal, round, and reactive to light  Cardiovascular:      Rate and Rhythm: Normal rate and regular rhythm  Heart sounds: Murmur heard  Pulmonary:      Effort: Pulmonary effort is normal  No respiratory distress  Breath sounds: Normal breath sounds  No wheezing  Abdominal:      General: Bowel sounds are normal  There is no distension        Palpations: Abdomen is soft  There is no mass  Tenderness: There is no abdominal tenderness  Musculoskeletal:         General: Normal range of motion  Right lower leg: No edema  Left lower leg: No edema  Skin:     General: Skin is warm and dry  Neurological:      General: No focal deficit present  Mental Status: He is alert and oriented to person, place, and time  Psychiatric:         Mood and Affect: Mood normal          Behavior: Behavior normal           Additional Data:     Labs:  Results from last 7 days   Lab Units 02/03/23  0445 02/02/23  0457   WBC Thousand/uL 8 93 8 56   HEMOGLOBIN g/dL 13 9 14 5   HEMATOCRIT % 41 8 43 4   PLATELETS Thousands/uL 165 154   BANDS PCT % 3  --    NEUTROS PCT %  --  61   LYMPHS PCT %  --  16   LYMPHO PCT % 20  --    MONOS PCT %  --  20*   MONO PCT % 14*  --    EOS PCT % 2 2     Results from last 7 days   Lab Units 02/03/23  0445   SODIUM mmol/L 132*   POTASSIUM mmol/L 4 3   CHLORIDE mmol/L 103   CO2 mmol/L 26   BUN mg/dL 18   CREATININE mg/dL 0 84   ANION GAP mmol/L 3*   CALCIUM mg/dL 8 4   ALBUMIN g/dL 3 3*   TOTAL BILIRUBIN mg/dL 0 67   ALK PHOS U/L 53   ALT U/L 32   AST U/L 23   GLUCOSE RANDOM mg/dL 109     Results from last 7 days   Lab Units 01/30/23  0825   INR  1 03             Results from last 7 days   Lab Units 01/31/23  1255 01/30/23  0825   LACTIC ACID mmol/L  --  1 2   PROCALCITONIN ng/ml 0 85*  --        Lines/Drains:  Invasive Devices     Peripheral Intravenous Line  Duration           Peripheral IV 02/03/23 Distal;Dorsal (posterior); Right Forearm 1 day                      Imaging: Reviewed radiology reports from this admission including: MRI left foot, x-ray left foot, x-ray right foot, MAKEDA    Recent Cultures (last 7 days):   Results from last 7 days   Lab Units 02/02/23  1239 01/31/23  1352   BLOOD CULTURE  No Growth at 24 hrs  No Growth at 24 hrs   Staphylococcus aureus*  Staphylococcus aureus*   GRAM STAIN RESULT   --  Gram positive cocci in clusters* Gram positive cocci in clusters*       Last 24 Hours Medication List:   Current Facility-Administered Medications   Medication Dose Route Frequency Provider Last Rate   • acetaminophen  650 mg Oral Q4H PRN Kenzie Murillo MD     • albuterol  2 5 mg Nebulization Q4H PRN Isaura Martin MD     • albuterol  2 5 mg Nebulization Once PRN Yonis Rivera MD     • amLODIPine  10 mg Oral Daily Kenzie Murillo MD     • buPROPion  300 mg Oral Daily Kenzie Murillo MD     • cefazolin  2,000 mg Intravenous Q8H Pretty Crews MD Stopped (02/04/23 2013)   • enoxaparin  40 mg Subcutaneous Q24H [address] Kenzie Murillo MD     • gabapentin  100 mg Oral BID Kenzie Murillo MD     • lactated ringers  50 mL/hr Intravenous Continuous Dora Salas CRNA     • levothyroxine  75 mcg Oral Early Morning Kenzie Murillo MD     • magnesium Oxide  400 mg Oral BID Pretty Crews MD     • potassium chloride  20 mEq Oral BID Kenzie Murillo MD          Today, Patient Was Seen By: Mary German PA-C    **Please Note: This note may have been constructed using a voice recognition system  **

## 2023-02-05 ENCOUNTER — APPOINTMENT (INPATIENT)
Dept: RADIOLOGY | Facility: HOSPITAL | Age: 68
End: 2023-02-05

## 2023-02-05 LAB
ANION GAP SERPL CALCULATED.3IONS-SCNC: 4 MMOL/L (ref 4–13)
ATRIAL RATE: 87 BPM
BUN SERPL-MCNC: 17 MG/DL (ref 5–25)
CALCIUM SERPL-MCNC: 9 MG/DL (ref 8.4–10.2)
CHLORIDE SERPL-SCNC: 102 MMOL/L (ref 96–108)
CO2 SERPL-SCNC: 26 MMOL/L (ref 21–32)
CREAT SERPL-MCNC: 0.9 MG/DL (ref 0.6–1.3)
ERYTHROCYTE [DISTWIDTH] IN BLOOD BY AUTOMATED COUNT: 13.1 % (ref 11.6–15.1)
GFR SERPL CREATININE-BSD FRML MDRD: 86 ML/MIN/1.73SQ M
GLUCOSE SERPL-MCNC: 115 MG/DL (ref 65–140)
HCT VFR BLD AUTO: 44.8 % (ref 36.5–49.3)
HGB BLD-MCNC: 14.8 G/DL (ref 12–17)
MCH RBC QN AUTO: 31.3 PG (ref 26.8–34.3)
MCHC RBC AUTO-ENTMCNC: 33 G/DL (ref 31.4–37.4)
MCV RBC AUTO: 95 FL (ref 82–98)
P AXIS: 54 DEGREES
PLATELET # BLD AUTO: 286 THOUSANDS/UL (ref 149–390)
PMV BLD AUTO: 9.7 FL (ref 8.9–12.7)
POTASSIUM SERPL-SCNC: 4.8 MMOL/L (ref 3.5–5.3)
PR INTERVAL: 158 MS
QRS AXIS: 46 DEGREES
QRSD INTERVAL: 92 MS
QT INTERVAL: 386 MS
QTC INTERVAL: 464 MS
RBC # BLD AUTO: 4.73 MILLION/UL (ref 3.88–5.62)
SODIUM SERPL-SCNC: 132 MMOL/L (ref 135–147)
T WAVE AXIS: 38 DEGREES
VENTRICULAR RATE: 87 BPM
WBC # BLD AUTO: 10.31 THOUSAND/UL (ref 4.31–10.16)

## 2023-02-05 PROCEDURE — 02HV33Z INSERTION OF INFUSION DEVICE INTO SUPERIOR VENA CAVA, PERCUTANEOUS APPROACH: ICD-10-PCS | Performed by: FAMILY MEDICINE

## 2023-02-05 RX ADMIN — AMLODIPINE BESYLATE 10 MG: 10 TABLET ORAL at 08:52

## 2023-02-05 RX ADMIN — GABAPENTIN 100 MG: 100 CAPSULE ORAL at 17:53

## 2023-02-05 RX ADMIN — CEFAZOLIN SODIUM 2000 MG: 2 SOLUTION INTRAVENOUS at 06:18

## 2023-02-05 RX ADMIN — GABAPENTIN 100 MG: 100 CAPSULE ORAL at 08:52

## 2023-02-05 RX ADMIN — MAGNESIUM OXIDE TAB 400 MG (241.3 MG ELEMENTAL MG) 400 MG: 400 (241.3 MG) TAB at 08:53

## 2023-02-05 RX ADMIN — MAGNESIUM OXIDE TAB 400 MG (241.3 MG ELEMENTAL MG) 400 MG: 400 (241.3 MG) TAB at 17:53

## 2023-02-05 RX ADMIN — POTASSIUM CHLORIDE 20 MEQ: 20 TABLET, EXTENDED RELEASE ORAL at 08:52

## 2023-02-05 RX ADMIN — CEFAZOLIN SODIUM 2000 MG: 2 SOLUTION INTRAVENOUS at 21:31

## 2023-02-05 RX ADMIN — ENOXAPARIN SODIUM 40 MG: 40 INJECTION SUBCUTANEOUS at 08:53

## 2023-02-05 RX ADMIN — LEVOTHYROXINE SODIUM 75 MCG: 75 TABLET ORAL at 06:17

## 2023-02-05 RX ADMIN — CEFAZOLIN SODIUM 2000 MG: 2 SOLUTION INTRAVENOUS at 14:22

## 2023-02-05 RX ADMIN — BUPROPION HYDROCHLORIDE 300 MG: 150 TABLET, EXTENDED RELEASE ORAL at 08:52

## 2023-02-05 RX ADMIN — POTASSIUM CHLORIDE 20 MEQ: 20 TABLET, EXTENDED RELEASE ORAL at 17:52

## 2023-02-05 NOTE — PLAN OF CARE
Problem: PAIN - ADULT  Goal: Verbalizes/displays adequate comfort level or baseline comfort level  Description: Interventions:  - Encourage patient to monitor pain and request assistance  - Assess pain using appropriate pain scale  - Administer analgesics based on type and severity of pain and evaluate response  - Implement non-pharmacological measures as appropriate and evaluate response  - Consider cultural and social influences on pain and pain management  - Notify physician/advanced practitioner if interventions unsuccessful or patient reports new pain  Outcome: Progressing     Problem: INFECTION - ADULT  Goal: Absence or prevention of progression during hospitalization  Description: INTERVENTIONS:  - Assess and monitor for signs and symptoms of infection  - Monitor lab/diagnostic results  - Monitor all insertion sites, i e  indwelling lines, tubes, and drains  - Monitor endotracheal if appropriate and nasal secretions for changes in amount and color  - Fields appropriate cooling/warming therapies per order  - Administer medications as ordered  - Instruct and encourage patient and family to use good hand hygiene technique  - Identify and instruct in appropriate isolation precautions for identified infection/condition  Outcome: Progressing     Problem: SAFETY ADULT  Goal: Patient will remain free of falls  Description: INTERVENTIONS:  - Educate patient/family on patient safety including physical limitations  - Instruct patient to call for assistance with activity   - Consult OT/PT to assist with strengthening/mobility   - Keep Call bell within reach  - Keep bed low and locked with side rails adjusted as appropriate  - Keep care items and personal belongings within reach  - Initiate and maintain comfort rounds  - Make Fall Risk Sign visible to staff  - Offer Toileting every 2 Hours, in advance of need  Outcome: Progressing  Goal: Maintain or return to baseline ADL function  Description: INTERVENTIONS:  - Assess patient's ability to carry out ADLs; assess patient's baseline for ADL function and identify physical deficits which impact ability to perform ADLs (bathing, care of mouth/teeth, toileting, grooming, dressing, etc )  - Assess/evaluate cause of self-care deficits   - Assess range of motion  - Assess patient's mobility; develop plan if impaired  - Assess patient's need for assistive devices and provide as appropriate  - Encourage maximum independence but intervene and supervise when necessary  - Involve family in performance of ADLs  - Assess for home care needs following discharge   - Consider OT consult to assist with ADL evaluation and planning for discharge  - Provide patient education as appropriate  Outcome: Progressing  Goal: Maintains/Returns to pre admission functional level  Description: INTERVENTIONS:  - Perform BMAT or MOVE assessment daily    - Set and communicate daily mobility goal to care team and patient/family/caregiver     - Collaborate with rehabilitation services on mobility goals if consulted  - Ambulate patient 5 times a day  - Out of bed for toileting  - Record patient progress and toleration of activity level   Outcome: Progressing     Problem: DISCHARGE PLANNING  Goal: Discharge to home or other facility with appropriate resources  Description: INTERVENTIONS:  - Identify barriers to discharge w/patient and caregiver  - Arrange for needed discharge resources and transportation as appropriate  - Identify discharge learning needs (meds, wound care, etc )  - Arrange for interpretive services to assist at discharge as needed  - Refer to Case Management Department for coordinating discharge planning if the patient needs post-hospital services based on physician/advanced practitioner order or complex needs related to functional status, cognitive ability, or social support system  Outcome: Progressing     Problem: Knowledge Deficit  Goal: Patient/family/caregiver demonstrates understanding of disease process, treatment plan, medications, and discharge instructions  Description: Complete learning assessment and assess knowledge base    Interventions:  - Provide teaching at level of understanding  - Provide teaching via preferred learning methods  Outcome: Progressing     Problem: SKIN/TISSUE INTEGRITY - ADULT  Goal: Incision(s), wounds(s) or drain site(s) healing without S/S of infection  Description: INTERVENTIONS  - Assess and document dressing, wound bed, and surrounding tissue  - Provide patient and family education  - Perform skin care/dressing changes every day and prn  Outcome: Progressing

## 2023-02-05 NOTE — PROCEDURES
PICC Line Insertion    Date/Time: 2/5/2023 1:31 PM  Performed by: Simeon Cates by: JESUS Hays     Patient location:  Bedside  Other Assisting Provider: No    Consent:     Consent obtained:  Verbal and written    Consent given by:  Patient    Risks discussed:  Arterial puncture, bleeding, incorrect placement, infection, nerve damage and pneumothorax    Alternatives discussed:  No treatment  Universal protocol:     Procedure explained and questions answered to patient or proxy's satisfaction: yes      Site/side marked: yes      Immediately prior to procedure, a time out was called: yes      Patient identity confirmed:  Arm band and verbally with patient  Pre-procedure details:     Hand hygiene: Hand hygiene performed prior to insertion      Sterile barrier technique: All elements of maximal sterile technique followed      Skin preparation:  2% chlorhexidine    Skin preparation agent: Skin preparation agent completely dried prior to procedure    Indications:     PICC line indications: long term antibiotics    Anesthesia (see MAR for exact dosages):      Anesthesia method:  Local infiltration    Local anesthetic:  Lidocaine 1% w/o epi  Procedure details:     Location:  [place]    Vessel type: vein      Laterality:  Left    Site selection rationale:  Non dominant arm    Approach: percutaneous technique used      Patient position:  Flat    Procedural supplies:  Double lumen    Catheter size:  5 Fr    Landmarks identified: yes      Ultrasound guidance: yes      Ultrasound image availability:  Not saved    Sterile ultrasound techniques: Sterile gel and sterile probe covers were used      Number of attempts:  1    Successful placement: yes      Vessel of catheter tip end:  Chest Xray needed to confirm placement    Total catheter length (cm):  50    Catheter out on skin (cm):  0    Max flow rate:  999    Arm circumference:  29cm  Post-procedure details:     Post-procedure:  Dressing applied and securement device placed    Assessment:  Blood return through all ports, no pneumothorax on x-ray and free fluid flow    Post-procedure complications: none      Patient tolerance of procedure:   Tolerated well, no immediate complications

## 2023-02-05 NOTE — PROGRESS NOTES
[address]  Progress Note - Radha Dash 1955, 69 y o  male MRN: [id number]  Unit/Bed#: [facility] Encounter: [id number]  Primary Care Provider: Shazia Lisa MD   Date and time admitted to hospital: 1/30/2023  8:09 AM    * Sepsis [facility])  Assessment & Plan  · Patient spike high fever 102 4, elevated WBC with possible source of infection from pneumonia as evidenced on imaging  · Procalcitonin is trending up  · Both sets of blood culture is positive, gram-positive cocci in clusters, most likely MSSA-antibiotic adjusted to cefazolin  · COVID/flu panel negative  · TTE negative for vegetation  · Repeat blood culture negative at 48 hours  · Per ID note, expect 4 weeks of IV antibiotic, PICC line can be place if repeat blood culture negative for 48 hours  · Consulted critical care for PICC placement, duration and final antibiotics per ID recommendations  · Patient denies any prosthesis in the body  · MAKEDA shows no vegetation    Acute on chronic heart failure with preserved ejection fraction (HCC)  Assessment & Plan  Wt Readings from Last 3 Encounters:   02/03/23 101 kg (223 lb)   10/03/22 104 kg (230 lb)   09/23/22 104 kg (230 lb)     · Patient is on hydrochlorothiazide last 2D echo had a moderate aortic stenosis and EF of 60 to 65%    · Lungs are with rails no leg edema proBNP is on the low side chest x-ray compliant with vascular congestion with worsening shortness of breath  · Repeat echocardiogram shows ejection fraction 60% with grade 1 diastolic dysfunction, elevated left atrial filling pressure, moderate aortic stenosis  · Patient was initially receiving IV diuretics, which remain on hold since patient was found clinically dry-and patient also has bacteremia    HTN (hypertension)  Assessment & Plan  · We will continue Norvasc hold hydrochlorothiazide secondary to acute hyponatremia    Acute hyponatremia  Assessment & Plan  · Remained stable    Ulcer of left foot [facility])  Assessment & Plan  · Left plantar hallux IPJ ulceration  · Negative for osteomyelitis  · Podiatry: MRI to R/O OM    · MRI:  Soft tissue ulceration distal great toe  No soft tissue abscess  Small focus of bone marrow edema at the proximal aspect of the great toe distal phalanx without confluent decreased T1 marrow signal, nonspecific as described above  No confluent decreased T1 marrow signal to definitively suggest osteomyelitis  Arthropathy at the lateral tarsometatarsal joint with subcortical cystic changes  Other emphysema ([address] )  Assessment & Plan  · Patient is chronic smoker  · Suspect undiagnosed COPD  · Continue respiratory protocol  · Continue treatment for pneumonia    Peripheral neuropathy  Assessment & Plan  · The feet for some time has not been on any treatment we will start gabapentin 100 mg p o  twice daily and see how he tolerates before advancement his hemoglobin A1c recently done was 5 9 in the prediabetic stage    MDD (major depressive disorder)  Assessment & Plan  · Start Wellbutrin    Moderate aortic stenosis  Assessment & Plan  · We will do an echocardiogram to evaluate any progression and ask cardiology to evaluate  · Will need outpatient follow-up in 6-12 months    Bigeminy  Assessment & Plan  · 2D echo telemetry monitor replete magnesium and potassium repeat labs tonight  Not on a beta-blocker  · After correcting electrolytes especially magnesium and potassium, improved, continue p o  magnesium  · TSH normal  · Cardiology: may benefit from outpatient cardiac monitoring    Leukocytosis-resolved as of 2/1/2023  Assessment & Plan  · Secondary to sepsis      VTE Pharmacologic Prophylaxis: VTE Score: 3 Moderate Risk (Score 3-4) - Pharmacological DVT Prophylaxis Ordered: enoxaparin (Lovenox)  Patient Centered Rounds: I performed bedside rounds with nursing staff today    Discussions with Specialists or Other Care Team Provider: nursing, case management, critical care    Education and Discussions with Family / Patient: Updated  (wife) via phone  Time Spent for Care: 30 minutes  More than 50% of total time spent on counseling and coordination of care as described above  Current Length of Stay: 6 day(s)  Current Patient Status: Inpatient   Certification Statement: The patient will continue to require additional inpatient hospital stay due to sepsis, IV antibiotics, PICC placement  Discharge Plan: Anticipate discharge tomorrow to home  Code Status: Level 1 - Full Code    Subjective:   Patient seen and examined at bedside this morning  He offers no acute complaints  He admits to having an episode of sweating overnight around 1 am, but no fever  States this only happens at night  Denies nausea, vomiting, diarrhea, constipation, abdominal pain, chest pain, SOB, fever, chills  Objective:     Vitals:   Temp (24hrs), Av 6 °F (36 4 °C), Min:97 5 °F (36 4 °C), Max:97 9 °F (36 6 °C)    Temp:  [97 5 °F (36 4 °C)-97 9 °F (36 6 °C)] 97 5 °F (36 4 °C)  HR:  [55-60] 59  Resp:  [16-18] 16  BP: (112-117)/(65-66) 117/66  SpO2:  [94 %-99 %] 97 %  Body mass index is 30 24 kg/m²  Input and Output Summary (last 24 hours): Intake/Output Summary (Last 24 hours) at 2023 1031  Last data filed at 2023 0732  Gross per 24 hour   Intake 690 ml   Output 1700 ml   Net -1010 ml       Physical Exam:   Physical Exam  Vitals reviewed  Constitutional:       General: He is not in acute distress  Appearance: He is obese  He is not ill-appearing or toxic-appearing  HENT:      Head: Normocephalic and atraumatic  Nose: Nose normal       Mouth/Throat:      Mouth: Mucous membranes are moist    Eyes:      Pupils: Pupils are equal, round, and reactive to light  Cardiovascular:      Rate and Rhythm: Normal rate and regular rhythm  Heart sounds: Murmur heard  Pulmonary:      Effort: Pulmonary effort is normal  No respiratory distress  Breath sounds: Normal breath sounds  No wheezing  Abdominal:      General: Bowel sounds are normal  There is no distension  Palpations: Abdomen is soft  There is no mass  Tenderness: There is no abdominal tenderness  Musculoskeletal:         General: Normal range of motion  Right lower leg: No edema  Left lower leg: No edema  Skin:     General: Skin is warm and dry  Neurological:      General: No focal deficit present  Mental Status: He is alert and oriented to person, place, and time  Psychiatric:         Mood and Affect: Mood normal          Behavior: Behavior normal           Additional Data:     Labs:  Results from last 7 days   Lab Units 02/05/23  0553 02/03/23  0445 02/02/23  0457   WBC Thousand/uL 10 31* 8 93 8 56   HEMOGLOBIN g/dL 14 8 13 9 14 5   HEMATOCRIT % 44 8 41 8 43 4   PLATELETS Thousands/uL 286 165 154   BANDS PCT %  --  3  --    NEUTROS PCT %  --   --  61   LYMPHS PCT %  --   --  16   LYMPHO PCT %  --  20  --    MONOS PCT %  --   --  20*   MONO PCT %  --  14*  --    EOS PCT %  --  2 2     Results from last 7 days   Lab Units 02/05/23  0553 02/03/23  0445   SODIUM mmol/L 132* 132*   POTASSIUM mmol/L 4 8 4 3   CHLORIDE mmol/L 102 103   CO2 mmol/L 26 26   BUN mg/dL 17 18   CREATININE mg/dL 0 90 0 84   ANION GAP mmol/L 4 3*   CALCIUM mg/dL 9 0 8 4   ALBUMIN g/dL  --  3 3*   TOTAL BILIRUBIN mg/dL  --  0 67   ALK PHOS U/L  --  53   ALT U/L  --  32   AST U/L  --  23   GLUCOSE RANDOM mg/dL 115 109     Results from last 7 days   Lab Units 01/30/23  0825   INR  1 03             Results from last 7 days   Lab Units 01/31/23  1255 01/30/23  0825   LACTIC ACID mmol/L  --  1 2   PROCALCITONIN ng/ml 0 85*  --        Lines/Drains:  Invasive Devices     Peripheral Intravenous Line  Duration           Peripheral IV 02/03/23 Distal;Dorsal (posterior); Right Forearm 2 days                      Imaging: No pertinent imaging reviewed      Recent Cultures (last 7 days):   Results from last 7 days   Lab Units 02/02/23  1239 01/31/23  1352   BLOOD CULTURE  No Growth at 48 hrs  No Growth at 48 hrs  Staphylococcus aureus*  Staphylococcus aureus*   GRAM STAIN RESULT   --  Gram positive cocci in clusters*  Gram positive cocci in clusters*       Last 24 Hours Medication List:   Current Facility-Administered Medications   Medication Dose Route Frequency Provider Last Rate   • acetaminophen  650 mg Oral Q4H PRN Aristeo Harding MD     • albuterol  2 5 mg Nebulization Q4H PRN Yadira Martin MD     • albuterol  2 5 mg Nebulization Once PRN Cisco Street MD     • amLODIPine  10 mg Oral Daily Aristeo Harding MD     • buPROPion  300 mg Oral Daily Aristeo Harding MD     • cefazolin  2,000 mg Intravenous Q8H Darryl Sands MD 2,000 mg (02/05/23 0618)   • enoxaparin  40 mg Subcutaneous Q24H [address] Aristeo Harding MD     • gabapentin  100 mg Oral BID Aristeo Harding MD     • lactated ringers  50 mL/hr Intravenous Continuous Meera Smith CRNA     • levothyroxine  75 mcg Oral Early Morning Aristeo Harding MD     • magnesium Oxide  400 mg Oral BID Darryl Sands MD     • potassium chloride  20 mEq Oral BID Aristeo Harding MD          Today, Patient Was Seen By: Will Avalos PA-C    **Please Note: This note may have been constructed using a voice recognition system  **

## 2023-02-05 NOTE — ASSESSMENT & PLAN NOTE
· Patient spike high fever 102 4, elevated WBC with possible source of infection from pneumonia as evidenced on imaging  · Procalcitonin is trending up  · Both sets of blood culture is positive, gram-positive cocci in clusters, most likely MSSA-antibiotic adjusted to cefazolin  · COVID/flu panel negative  · TTE negative for vegetation  · Repeat blood culture negative at 48 hours  · Per ID note, expect 4 weeks of IV antibiotic, PICC line can be place if repeat blood culture negative for 48 hours    · Consulted critical care for PICC placement, duration and final antibiotics per ID recommendations  · Patient denies any prosthesis in the body  · MAKEDA shows no vegetation

## 2023-02-05 NOTE — PLAN OF CARE
Problem: PAIN - ADULT  Goal: Verbalizes/displays adequate comfort level or baseline comfort level  Description: Interventions:  - Encourage patient to monitor pain and request assistance  - Assess pain using appropriate pain scale  - Administer analgesics based on type and severity of pain and evaluate response  - Implement non-pharmacological measures as appropriate and evaluate response  - Consider cultural and social influences on pain and pain management  - Notify physician/advanced practitioner if interventions unsuccessful or patient reports new pain  Outcome: Progressing     Problem: INFECTION - ADULT  Goal: Absence or prevention of progression during hospitalization  Description: INTERVENTIONS:  - Assess and monitor for signs and symptoms of infection  - Monitor lab/diagnostic results  - Monitor all insertion sites, i e  indwelling lines, tubes, and drains  - Monitor endotracheal if appropriate and nasal secretions for changes in amount and color  - [place] appropriate cooling/warming therapies per order  - Administer medications as ordered  - Instruct and encourage patient and family to use good hand hygiene technique  - Identify and instruct in appropriate isolation precautions for identified infection/condition  Outcome: Progressing     Problem: SAFETY ADULT  Goal: Patient will remain free of falls  Description: INTERVENTIONS:  - Educate patient/family on patient safety including physical limitations  - Instruct patient to call for assistance with activity   - Consult OT/PT to assist with strengthening/mobility   - Keep Call bell within reach  - Keep bed low and locked with side rails adjusted as appropriate  - Keep care items and personal belongings within reach  - Initiate and maintain comfort rounds  - Make Fall Risk Sign visible to staff  - Offer Toileting every 2 Hours, in advance of need  Outcome: Progressing  Goal: Maintain or return to baseline ADL function  Description: INTERVENTIONS:  - Assess patient's ability to carry out ADLs; assess patient's baseline for ADL function and identify physical deficits which impact ability to perform ADLs (bathing, care of mouth/teeth, toileting, grooming, dressing, etc )  - Assess/evaluate cause of self-care deficits   - Assess range of motion  - Assess patient's mobility; develop plan if impaired  - Assess patient's need for assistive devices and provide as appropriate  - Encourage maximum independence but intervene and supervise when necessary  - Involve family in performance of ADLs  - Assess for home care needs following discharge   - Consider OT consult to assist with ADL evaluation and planning for discharge  - Provide patient education as appropriate  Outcome: Progressing  Goal: Maintains/Returns to pre admission functional level  Description: INTERVENTIONS:  - Perform BMAT or MOVE assessment daily    - Set and communicate daily mobility goal to care team and patient/family/caregiver     - Collaborate with rehabilitation services on mobility goals if consulted  - Ambulate patient 5 times a day  - Out of bed for toileting  - Record patient progress and toleration of activity level   Outcome: Progressing     Problem: DISCHARGE PLANNING  Goal: Discharge to home or other facility with appropriate resources  Description: INTERVENTIONS:  - Identify barriers to discharge w/patient and caregiver  - Arrange for needed discharge resources and transportation as appropriate  - Identify discharge learning needs (meds, wound care, etc )  - Arrange for interpretive services to assist at discharge as needed  - Refer to Case Management Department for coordinating discharge planning if the patient needs post-hospital services based on physician/advanced practitioner order or complex needs related to functional status, cognitive ability, or social support system  Outcome: Progressing     Problem: Knowledge Deficit  Goal: Patient/family/caregiver demonstrates understanding of disease process, treatment plan, medications, and discharge instructions  Description: Complete learning assessment and assess knowledge base    Interventions:  - Provide teaching at level of understanding  - Provide teaching via preferred learning methods  Outcome: Progressing     Problem: SKIN/TISSUE INTEGRITY - ADULT  Goal: Incision(s), wounds(s) or drain site(s) healing without S/S of infection  Description: INTERVENTIONS  - Assess and document dressing, wound bed, and surrounding tissue  - Provide patient and family education  - Perform skin care/dressing changes every day and prn  Outcome: Progressing

## 2023-02-06 VITALS
OXYGEN SATURATION: 97 % | TEMPERATURE: 97.9 F | HEART RATE: 58 BPM | DIASTOLIC BLOOD PRESSURE: 63 MMHG | SYSTOLIC BLOOD PRESSURE: 111 MMHG | BODY MASS INDEX: 30.2 KG/M2 | RESPIRATION RATE: 18 BRPM | WEIGHT: 223 LBS | HEIGHT: 72 IN

## 2023-02-06 PROBLEM — I49.8 BIGEMINY: Status: RESOLVED | Noted: 2023-01-30 | Resolved: 2023-02-06

## 2023-02-06 PROBLEM — A41.9 SEPSIS (HCC): Status: RESOLVED | Noted: 2023-01-31 | Resolved: 2023-02-06

## 2023-02-06 PROBLEM — I50.33 ACUTE ON CHRONIC HEART FAILURE WITH PRESERVED EJECTION FRACTION (HCC): Status: RESOLVED | Noted: 2023-01-30 | Resolved: 2023-02-06

## 2023-02-06 LAB
ANION GAP SERPL CALCULATED.3IONS-SCNC: 4 MMOL/L (ref 4–13)
BUN SERPL-MCNC: 20 MG/DL (ref 5–25)
CALCIUM SERPL-MCNC: 9 MG/DL (ref 8.4–10.2)
CHLORIDE SERPL-SCNC: 103 MMOL/L (ref 96–108)
CO2 SERPL-SCNC: 27 MMOL/L (ref 21–32)
CREAT SERPL-MCNC: 0.94 MG/DL (ref 0.6–1.3)
ERYTHROCYTE [DISTWIDTH] IN BLOOD BY AUTOMATED COUNT: 13 % (ref 11.6–15.1)
GFR SERPL CREATININE-BSD FRML MDRD: 82 ML/MIN/1.73SQ M
GLUCOSE SERPL-MCNC: 110 MG/DL (ref 65–140)
HCT VFR BLD AUTO: 43.1 % (ref 36.5–49.3)
HGB BLD-MCNC: 13.9 G/DL (ref 12–17)
MCH RBC QN AUTO: 30.8 PG (ref 26.8–34.3)
MCHC RBC AUTO-ENTMCNC: 32.3 G/DL (ref 31.4–37.4)
MCV RBC AUTO: 95 FL (ref 82–98)
PLATELET # BLD AUTO: 333 THOUSANDS/UL (ref 149–390)
PMV BLD AUTO: 9.9 FL (ref 8.9–12.7)
POTASSIUM SERPL-SCNC: 4.8 MMOL/L (ref 3.5–5.3)
RBC # BLD AUTO: 4.52 MILLION/UL (ref 3.88–5.62)
SODIUM SERPL-SCNC: 134 MMOL/L (ref 135–147)
WBC # BLD AUTO: 10.4 THOUSAND/UL (ref 4.31–10.16)

## 2023-02-06 RX ORDER — GABAPENTIN 100 MG/1
100 CAPSULE ORAL 2 TIMES DAILY
Qty: 60 CAPSULE | Refills: 0 | Status: SHIPPED | OUTPATIENT
Start: 2023-02-06 | End: 2023-02-16

## 2023-02-06 RX ORDER — CEFAZOLIN SODIUM 2 G/50ML
2000 SOLUTION INTRAVENOUS EVERY 8 HOURS
Qty: 5700 ML | Refills: 0 | Status: ON HOLD | OUTPATIENT
Start: 2023-02-06 | End: 2023-02-14 | Stop reason: SDUPTHER

## 2023-02-06 RX ORDER — LANOLIN ALCOHOL/MO/W.PET/CERES
400 CREAM (GRAM) TOPICAL 2 TIMES DAILY
Qty: 60 TABLET | Refills: 0 | Status: SHIPPED | OUTPATIENT
Start: 2023-02-06 | End: 2023-02-16

## 2023-02-06 RX ORDER — POTASSIUM CHLORIDE 20 MEQ/1
20 TABLET, EXTENDED RELEASE ORAL 2 TIMES DAILY
Qty: 60 TABLET | Refills: 0 | Status: SHIPPED | OUTPATIENT
Start: 2023-02-06 | End: 2023-02-16

## 2023-02-06 RX ADMIN — LEVOTHYROXINE SODIUM 75 MCG: 75 TABLET ORAL at 05:07

## 2023-02-06 RX ADMIN — BUPROPION HYDROCHLORIDE 300 MG: 150 TABLET, EXTENDED RELEASE ORAL at 08:14

## 2023-02-06 RX ADMIN — AMLODIPINE BESYLATE 10 MG: 10 TABLET ORAL at 08:14

## 2023-02-06 RX ADMIN — MAGNESIUM OXIDE TAB 400 MG (241.3 MG ELEMENTAL MG) 400 MG: 400 (241.3 MG) TAB at 08:14

## 2023-02-06 RX ADMIN — POTASSIUM CHLORIDE 20 MEQ: 20 TABLET, EXTENDED RELEASE ORAL at 08:14

## 2023-02-06 RX ADMIN — GABAPENTIN 100 MG: 100 CAPSULE ORAL at 08:14

## 2023-02-06 RX ADMIN — CEFAZOLIN SODIUM 2000 MG: 2 SOLUTION INTRAVENOUS at 05:05

## 2023-02-06 RX ADMIN — CEFAZOLIN SODIUM 2000 MG: 2 SOLUTION INTRAVENOUS at 13:11

## 2023-02-06 RX ADMIN — ENOXAPARIN SODIUM 40 MG: 40 INJECTION SUBCUTANEOUS at 08:14

## 2023-02-06 NOTE — CASE MANAGEMENT
Case Management Discharge Planning Note    Patient name Asmita Oquendo  Location [address] 338/-32 MRN [id number]  : 1955 Date 2023       Current Admission Date: 2023  Current Admission Diagnosis:Sepsis [facility])   Patient Active Problem List    Diagnosis Date Noted   • Ulcer of left foot [address] [address] ) 2023   • Other emphysema ([facility] [address] ) 2023   • Sepsis ([facility] [address] ) 2023   • Acute on chronic heart failure with preserved ejection fraction ([facility] [address] ) 2023   • Bigeminy 2023   • Acute hyponatremia 2023   • HTN (hypertension) 2023   • Moderate aortic stenosis 2023   • MDD (major depressive disorder) 2023   • Peripheral neuropathy 2023      LOS (days): 7  Geometric Mean LOS (GMLOS) (days): 3 90  Days to GMLOS:-3 1     OBJECTIVE:  Risk of Unplanned Readmission Score: 8 89         Current admission status: Inpatient   Preferred Pharmacy:   [facility] [id number] [facility], [address] [place] [address]  Phone: [phone] Fax: [phone]    Primary Care Provider: Radha Mckeon MD    Primary Insurance: MEDICARE  Secondary Insurance: BLUE CROSS    DISCHARGE DETAILS:      CM was unaware of discharge today  CM spoke to patient at the bedside, reviewed DC IMM with patient and informed that patient can stay an additional 4 hours for reconsidering appealing the discharge as the medicare rights were review on the day of discharge  Pt verbalized understanding and feels ready to go  [facility] stated that they would be at the pt's home tonight for IV med administration and teaching                                                                                     IMM Given (Date):: 23  IMM Given to[de-identified] Patient

## 2023-02-06 NOTE — PLAN OF CARE
Problem: PAIN - ADULT  Goal: Verbalizes/displays adequate comfort level or baseline comfort level  Description: Interventions:  - Encourage patient to monitor pain and request assistance  - Assess pain using appropriate pain scale  - Administer analgesics based on type and severity of pain and evaluate response  - Implement non-pharmacological measures as appropriate and evaluate response  - Consider cultural and social influences on pain and pain management  - Notify physician/advanced practitioner if interventions unsuccessful or patient reports new pain  Outcome: Progressing     Problem: INFECTION - ADULT  Goal: Absence or prevention of progression during hospitalization  Description: INTERVENTIONS:  - Assess and monitor for signs and symptoms of infection  - Monitor lab/diagnostic results  - Monitor all insertion sites, i e  indwelling lines, tubes, and drains  - Monitor endotracheal if appropriate and nasal secretions for changes in amount and color  - [place] appropriate cooling/warming therapies per order  - Administer medications as ordered  - Instruct and encourage patient and family to use good hand hygiene technique  - Identify and instruct in appropriate isolation precautions for identified infection/condition  Outcome: Progressing     Problem: SAFETY ADULT  Goal: Patient will remain free of falls  Description: INTERVENTIONS:  - Educate patient/family on patient safety including physical limitations  - Instruct patient to call for assistance with activity   - Consult OT/PT to assist with strengthening/mobility   - Keep Call bell within reach  - Keep bed low and locked with side rails adjusted as appropriate  - Keep care items and personal belongings within reach  - Initiate and maintain comfort rounds  - Make Fall Risk Sign visible to staff  - Offer Toileting every 2 Hours, in advance of need  Outcome: Progressing  Goal: Maintain or return to baseline ADL function  Description: INTERVENTIONS:  - Assess patient's ability to carry out ADLs; assess patient's baseline for ADL function and identify physical deficits which impact ability to perform ADLs (bathing, care of mouth/teeth, toileting, grooming, dressing, etc )  - Assess/evaluate cause of self-care deficits   - Assess range of motion  - Assess patient's mobility; develop plan if impaired  - Assess patient's need for assistive devices and provide as appropriate  - Encourage maximum independence but intervene and supervise when necessary  - Involve family in performance of ADLs  - Assess for home care needs following discharge   - Consider OT consult to assist with ADL evaluation and planning for discharge  - Provide patient education as appropriate  Outcome: Progressing  Goal: Maintains/Returns to pre admission functional level  Description: INTERVENTIONS:  - Perform BMAT or MOVE assessment daily    - Set and communicate daily mobility goal to care team and patient/family/caregiver     - Collaborate with rehabilitation services on mobility goals if consulted  - Ambulate patient 5 times a day  - Out of bed for toileting  - Record patient progress and toleration of activity level   Outcome: Progressing     Problem: DISCHARGE PLANNING  Goal: Discharge to home or other facility with appropriate resources  Description: INTERVENTIONS:  - Identify barriers to discharge w/patient and caregiver  - Arrange for needed discharge resources and transportation as appropriate  - Identify discharge learning needs (meds, wound care, etc )  - Arrange for interpretive services to assist at discharge as needed  - Refer to Case Management Department for coordinating discharge planning if the patient needs post-hospital services based on physician/advanced practitioner order or complex needs related to functional status, cognitive ability, or social support system  Outcome: Progressing     Problem: Knowledge Deficit  Goal: Patient/family/caregiver demonstrates understanding of disease process, treatment plan, medications, and discharge instructions  Description: Complete learning assessment and assess knowledge base    Interventions:  - Provide teaching at level of understanding  - Provide teaching via preferred learning methods  Outcome: Progressing     Problem: SKIN/TISSUE INTEGRITY - ADULT  Goal: Incision(s), wounds(s) or drain site(s) healing without S/S of infection  Description: INTERVENTIONS  - Assess and document dressing, wound bed, and surrounding tissue  - Provide patient and family education  - Perform skin care/dressing changes every day and prn  Outcome: Progressing

## 2023-02-06 NOTE — CASE MANAGEMENT
Case Management Discharge Planning Note    [facility]  Location [address] 338/-96 MRN [id number]  : 1955 Date 2023       Current Admission Date: 2023  Current Admission Diagnosis:Acute hyponatremia   Patient Active Problem List    Diagnosis Date Noted   • Ulcer of left foot ([facility] [address] ) 2023   • Other emphysema ([facility] [address] ) 2023   • Acute hyponatremia 2023   • HTN (hypertension) 2023   • Moderate aortic stenosis 2023   • MDD (major depressive disorder) 2023   • Peripheral neuropathy 2023      LOS (days): 7  Geometric Mean LOS (GMLOS) (days): 3 90  Days to GMLOS:-3 3     OBJECTIVE:  Risk of Unplanned Readmission Score: 9 06         Current admission status: Inpatient   Preferred Pharmacy:   [facility] [id number] [place] [address]  Phone: [phone] Fax: [phone]    Primary Care Provider: Jaqueline Flores MD    Primary Insurance: MEDICARE  Secondary Insurance: BLUE CROSS    DISCHARGE DETAILS:     CM met with pt to follow up on change of IV antibiotic duration  Pt stated that the Physician spoke with him about needing to take IV antibiotics for 6 weeks  [facility] is coordinating with pt to deliver meds to pt   Faxed AVS to [facility]

## 2023-02-06 NOTE — ASSESSMENT & PLAN NOTE
Wt Readings from Last 3 Encounters:   02/03/23 101 kg (223 lb)   10/03/22 104 kg (230 lb)   09/23/22 104 kg (230 lb)     · Patient is on hydrochlorothiazide last 2D echo had a moderate aortic stenosis and EF of 60 to 65%    · Lungs are with rails no leg edema proBNP is on the low side chest x-ray compliant with vascular congestion with worsening shortness of breath  · Repeat echocardiogram shows ejection fraction 60% with grade 1 diastolic dysfunction, elevated left atrial filling pressure, moderate aortic stenosis  · Patient was initially receiving IV diuretics, which remain on hold since patient was found clinically dry-and patient also has bacteremia  · Will restart HCTZ on discharge, follow up with cardiology

## 2023-02-06 NOTE — DISCHARGE INSTR - AVS FIRST PAGE
Complete antibiotics for 6 weeks every 8 hours  Will complete on 3/16  [facility] to the house for around 8 pm   You will need weekly labs - CBC and CMP while on iv antibitics  PICC can be removed after completing IV antibiotics  Follow up with infectious disease within 2 weeks of discharge   Follow up closely with wound care and podiatry outpatient   Follow up with cardiology  Follow up with PCP in 1 week  Continue taking Wellbutrin and Lexapro and follow up with family doctor regarding these medications, it is important to not stop taking these abruptly

## 2023-02-06 NOTE — DISCHARGE SUMMARY
[address]  Discharge- Loretta Bloch 1955, 69 y o  male MRN: [id number]  Unit/Bed#: -01 Encounter: [id number]  Primary Care Provider: Judi Anand MD   Date and time admitted to hospital: 1/30/2023  8:09 AM    * Sepsis [facility])  Assessment & Plan  · Patient spike high fever 102 4, elevated WBC with possible source of infection from pneumonia as evidenced on imaging  · Procalcitonin is trending up  · Both sets of blood culture is positive, gram-positive cocci in clusters, most likely MSSA-antibiotic adjusted to cefazolin  · COVID/flu panel negative  · TTE negative for vegetation  · Repeat blood culture negative at 72 hours  · Per ID note, expect 4 weeks of IV antibiotic, PICC line can be place if repeat blood culture negative for 48 hours  · Consulted critical care for PICC placement, duration and final antibiotics per ID recommendations  · Patient denies any prosthesis in the body  · MAEKDA shows no vegetation  · ID: continue with IV cefazolin 2 g q8h for 6 weeks to complete through 3/16, weekly CBC with diff and CMP, follow up within 2 weeks with ID  · Follow up closely with podiatry    Acute on chronic heart failure with preserved ejection fraction [facility])  Assessment & Plan  Wt Readings from Last 3 Encounters:   02/03/23 101 kg (223 lb)   10/03/22 104 kg (230 lb)   09/23/22 104 kg (230 lb)     · Patient is on hydrochlorothiazide last 2D echo had a moderate aortic stenosis and EF of 60 to 65%    · Lungs are with rails no leg edema proBNP is on the low side chest x-ray compliant with vascular congestion with worsening shortness of breath  · Repeat echocardiogram shows ejection fraction 60% with grade 1 diastolic dysfunction, elevated left atrial filling pressure, moderate aortic stenosis  · Patient was initially receiving IV diuretics, which remain on hold since patient was found clinically dry-and patient also has bacteremia  · Will restart HCTZ on discharge, follow up with cardiology    HTN (hypertension)  Assessment & Plan  · We will continue Norvasc hold hydrochlorothiazide secondary to acute hyponatremia  · Restarting HCTZ on discharge, follow up with cardiology    Acute hyponatremia  Assessment & Plan  · Remained stable    Ulcer of left foot (HCC)  Assessment & Plan  · Left plantar hallux IPJ ulceration  · Negative for osteomyelitis  · Podiatry: MRI to R/O OM    · MRI:  Soft tissue ulceration distal great toe  No soft tissue abscess  Small focus of bone marrow edema at the proximal aspect of the great toe distal phalanx without confluent decreased T1 marrow signal, nonspecific as described above  No confluent decreased T1 marrow signal to definitively suggest osteomyelitis  Arthropathy at the lateral tarsometatarsal joint with subcortical cystic changes  Other emphysema ([address] )  Assessment & Plan  · Patient is chronic smoker  · Suspect undiagnosed COPD  · Continue respiratory protocol  · Continue treatment for pneumonia    Peripheral neuropathy  Assessment & Plan  · The feet for some time has not been on any treatment we will start gabapentin 100 mg p o  twice daily and see how he tolerates before advancement his hemoglobin A1c recently done was 5 9 in the prediabetic stage    MDD (major depressive disorder)  Assessment & Plan  · Start Wellbutrin    Moderate aortic stenosis  Assessment & Plan  · We will do an echocardiogram to evaluate any progression and ask cardiology to evaluate  · Will need outpatient follow-up in 6-12 months    Bigeminy  Assessment & Plan  · 2D echo telemetry monitor replete magnesium and potassium repeat labs tonight    Not on a beta-blocker  · After correcting electrolytes especially magnesium and potassium, improved, continue p o  magnesium  · TSH normal  · Cardiology: may benefit from outpatient cardiac monitoring  · Follow up with cardiology outpatient      Medical Problems     Resolved Problems  Date Reviewed: 2/6/2023          Resolved    Acute on chronic heart failure with preserved ejection fraction ([facility] [address] ) 2/6/2023     Resolved by  Rossy Og PA-C    Bigeminy 2/6/2023     Resolved by  Rossy Og PA-C    Leukocytosis 2/1/2023     Resolved by  Tressa Chua MD    * (Principal) Sepsis ([facility] [address] ) 2/6/2023     Resolved by  Rossy Og PA-C        Discharging Physician / Practitioner: Rossy Og PA-C  PCP: Steff Benavides MD  Admission Date:   Admission Orders (From admission, onward)     Ordered        01/30/23 1109  INPATIENT ADMISSION  Once                      Discharge Date: 02/06/23    Consultations During Hospital Stay:  · Podiatry, infectious disease, cardiology    Procedures Performed:   · PICC line insertion on 2/5/2023    Significant Findings / Test Results:   · XR chest: Left PICC tip overlying the lower SVC  No acute cardiopulmonary disease  · MRI left foot/forefoot toes: Soft tissue ulceration distal great toe  No soft tissue abscess  Small focus of bone marrow edema at the proximal aspect of the great toe distal phalanx without confluent decreased T1 marrow signal, nonspecific as described above  No confluent decreased T1 marrow signal to definitively suggest osteomyelitis  Arthropathy at the lateral tarsometatarsal joint with subcortical cystic changes  · XR foot left: No signs of osteomyelitis  No acute osseous abnormality  · XR foot right: No signs of osteomyelitis  No acute osseous abnormality  · CT head: No acute intracranial abnormality  Chronic microangiopathic changes  · CT chest: Small groundglass opacity in the right upper lobe and another small groundglass opacity at the right lung base  These may represent areas of atelectasis versus minimal pneumonia  No confluent airspace opacities or consolidations  Minimal centrilobular emphysematous changes and other areas of atelectasis  Subacute, healing fracture of the right 10th and 11th ribs posteriorly    · XR chest: Development of mild vascular congestion  · WBC: 12 28  · Na: 130  · Procalcitonin: 0 85  · Blood cultures 2/2: staphylococcus aureus  · TTE:  Left Ventricle: Left ventricular cavity size is normal  Wall thickness is increased  There is mild concentric hypertrophy  The left ventricular ejection fraction is 60%  Systolic function is normal  Wall motion is normal  Diastolic function is mildly abnormal, consistent with grade I (abnormal) relaxation  Left atrial filling pressure is elevated  E/E' 15  IVS: There is sigmoid appearance of the septum  Right Ventricle: Right ventricular cavity size is mildly dilated  Systolic function is normal  Normal tricuspid annular plane systolic excursion (TAPSE) > 1 7 cm  Left Atrium: The atrium is mildly dilated  Aortic Valve: The leaflets are moderately calcified  There is moderately reduced mobility  There is trace to mild regurgitation  There is moderate stenosis  The aortic valve peak velocity is 3 52 m/s  The aortic valve mean gradient is 30 mmHg  The dimensionless velocity index is 0 30  Mitral Valve: There is annular calcification  There is trace regurgitation  There is no evidence of stenosis  Tricuspid Valve: There is trace regurgitation  There is no evidence of stenosis  The right ventricular systolic pressure is mildly elevated  The estimated right ventricular systolic pressure is 78 69 mmHg  Aorta: The aortic root is mildly dilated  The ascending aorta is normal in size  The aortic root is 3 80 cm  The ascending aorta is 3 3 cm  IVC/SVC: The right atrial pressure is estimated at 15 0 mmHg  The inferior vena cava is dilated  Respirophasic changes in dimension were absent  · MAKEDA: Left Ventricle: Left ventricular cavity size is normal  The left ventricular ejection fraction is 60%  Systolic function is normal  Wall motion is normal  Left Atrium: The atrium is mildly dilated  Atrial Septum: No patent foramen ovale detected using color flow Doppler at rest  Aortic Valve:  The leaflets are severely thickened  The leaflets are severely calcified  There is moderately reduced mobility  There is no evidence of regurgitation  There is moderate stenosis  Vegetation could not be excluded on the basis of this study as the valve cusps are thickened and heavily calcified  No definitive vegetation is seen  Mitral Valve: There is trace regurgitation  There is no evidence of stenosis  No vegetation present on the mitral valve  Tricuspid Valve: There is trace regurgitation  There is no evidence of stenosis  No vegetation present on the tricuspid valve  Incidental Findings:   · See above   · I reviewed the above mentioned incidental findings with the patient and/or family and they expressed understanding  Test Results Pending at Discharge (will require follow up): · Blood cultures     Outpatient Tests Requested:  · weekly labs - CBC and CMP while on iv antibitics  · Follow up with infectious disease within 2 weeks of discharge   · Follow up closely with wound care and podiatry outpatient   · Follow up with cardiology for repeat echo in 6 months  · Follow up with PCP in 1 week     Complications:  None    Reason for Admission: acute on chronic CHF    Hospital Course:   Kal Hearn is a 69 y o  male patient with PMH of moderate AS and depression who originally presented to the hospital on 1/30/2023 due to worsening shortness of breath, cough, fever 103 after getting a shingles vaccine  Work-up in ED was significant for findings as above  Cardiology was consulted regarding acute on chronic CHF  Was noted to have some leukocytosis with no evidence of an infection and reported fever of 103  COVID influenza RSV negative  Also noted to have bigeminy  Was placed on telemetry  Patient had echo with findings as above  Cardiology saw patient and due to appearing clinically dry, IV furosemide was discontinued and HCTZ was restarted which was stopped due to hyponatremia   Cardiology recommended that due to bigeminy patient should be considered for outpatient Holter monitor and to follow-up with cardiology on discharge  Blood cultures were obtained and patient was placed on antibiotics for potential pneumonia  Patient was noted to become septic during stay and met criteria with high fever 102 4 F, leukocytosis and possible source of infection with pneumonia  Blood cultures were noted to be positive and growing Staph aureus  Patient was started on IV cefazolin  Infectious disease was consulted and recommended to continue antibiotic therapy of cefazolin 2 g every 8 hours  Recommended a MAKEDA which had resulted as above  Podiatry was consulted due to having a chronic toe wound  An x-ray of the foot was obtained which showed results as above  Left toe ulceration with no acute signs of infection, but this could be a source of bacteremia and due to this an MRI was done to rule out osteomyelitis per podiatry  MRI foot with results as above  Patient had repeat blood cultures done which needed to be negative for 48 hours before placement of PICC  Anticipated 4 weeks of IV antibiotics  After blood cultures were negative for 48 hours, critical care was consulted for placement of PICC line  Final recommendations of antibiotics by infectious disease were cefazolin 2 g every 8 hours for 6 weeks of IV antibiotic therapy through 3/16  Patient needs weekly labs with CBC and differential and CMP while on the biotics  PICC can be removed after completing IV antibiotics  Patient needs to follow-up with infectious disease within 2 weeks of discharge  Discussed with podiatry prior to discharge, no evidence of acute osteomyelitis based on MRI of left foot  Safe for discharge home with close podiatry and wound care follow-up  Recommend patient follow-up with PCP in 1 week  Follow-up with cardiology to discuss possible Holter monitor and echocardiogram in 6-12 months    Due to hyponatremia almost fully correcting, patient was restarted on hydrochlorothiazide with instructions to follow-up with PCP and cardiology for further management of this  Please see above list of diagnoses and related plan for additional information  Condition at Discharge: stable    Discharge Day Visit / Exam:   Subjective: Patient seen and examined at bedside this morning  He offers no acute complaints  States that he has been feeling well and only has occasional sweating at night which is not bothersome to him  He is excited to be going home  Denies fever, chills, cough, congestion, chest pain, shortness of breath, nausea, vomiting, diarrhea, abdominal pain, constipation  Vitals: Blood Pressure: 111/63 (02/06/23 0739)  Pulse: 58 (02/06/23 0739)  Temperature: 97 9 °F (36 6 °C) (02/06/23 0739)  Temp Source: Temporal (02/05/23 1503)  Respirations: 18 (02/06/23 0739)  Height: 6' (182 9 cm) (02/03/23 1359)  Weight - Scale: 101 kg (223 lb) (02/03/23 1359)  SpO2: 97 % (02/06/23 0739)  Exam:   Physical Exam  Vitals reviewed  Constitutional:       General: He is not in acute distress  Appearance: He is obese  He is not ill-appearing or toxic-appearing  HENT:      Head: Normocephalic and atraumatic  Nose: Nose normal       Mouth/Throat:      Mouth: Mucous membranes are moist    Eyes:      Pupils: Pupils are equal, round, and reactive to light  Cardiovascular:      Rate and Rhythm: Normal rate and regular rhythm  Heart sounds: Murmur heard  Pulmonary:      Effort: Pulmonary effort is normal  No respiratory distress  Breath sounds: Normal breath sounds  No wheezing  Abdominal:      General: Bowel sounds are normal  There is no distension  Palpations: Abdomen is soft  There is no mass  Tenderness: There is no abdominal tenderness  Musculoskeletal:         General: Normal range of motion  Right lower leg: No edema  Left lower leg: No edema  Skin:     General: Skin is warm and dry        Comments: PICC line visible in left arm with mild ecchymosis present  Neurological:      General: No focal deficit present  Mental Status: He is alert and oriented to person, place, and time  Psychiatric:         Mood and Affect: Mood normal          Behavior: Behavior normal           Discussion with Family: Updated  (wife) via phone  Discharge instructions/Information to patient and family:   See after visit summary for information provided to patient and family  Provisions for Follow-Up Care:  See after visit summary for information related to follow-up care and any pertinent home health orders  Disposition:   Home with [facility]NA Services (Reminder: Complete face to face encounter)    Planned Readmission: No     Discharge Statement:  I spent 60 minutes discharging the patient  This time was spent on the day of discharge  I had direct contact with the patient on the day of discharge  Greater than 50% of the total time was spent examining patient, answering all patient questions, arranging and discussing plan of care with patient as well as directly providing post-discharge instructions  Additional time then spent on discharge activities  Discharge Medications:  See after visit summary for reconciled discharge medications provided to patient and/or family        **Please Note: This note may have been constructed using a voice recognition system**

## 2023-02-06 NOTE — PROGRESS NOTES
Progress Note - Infectious Disease   Huy Capps 69 y o  male MRN: [id number]  Unit/Bed#: -01 Encounter: [id number]        REQUIRED DOCUMENTATION:     1  This service was provided via Telemedicine  2  Provider located at [facility]  3  TeleMed provider: Gregoria Barr MD   4  Identify all parties in room with patient during tele consult:RN  5  After connecting through [employer], patient was identified by name and date of birth and assistant checked wristband  Patient was then informed that this was a Telemedicine visit and that the exam was being conducted confidentially over secure lines  My office door was closed  No one else was in the room  Patient acknowledged consent and understanding of privacy and security of the Telemedicine visit, and gave us permission to have the assistant stay in the room in order to assist with the history and to conduct the exam   I informed the patient that I have reviewed their record in Epic and presented the opportunity for them to ask any questions regarding the visit today  The patient agreed to participate  Assessment/Recommendations     1  Sepsis, evolved after admission  - Fever, leukocytosis  - Source of sepsis is bacteremia  - Clinically stable, afebrile without leukocytosis     • Management as below     2   Staph aureus bacteremia  - Source of bacteremia is likely chronic left toe wound and possible early osteo, no local signs of infection  Has left shoulder arthroplasty without any local signs of infection  No prosthetic vascular devices   No evidence of endovascular infection with negative MAKEDA and rapid clearance of bacteremia  - Afebrile without leukocytosis     • Continue current antibiotic therapy with cefazolin 2 every 8 hours  Recommend 6 weeks of iv antibiotics via PICC through 3/16  Patient will need weekly labs - CBC/diff, CMP while on iv antibitics  PICC can be removed after completing iv antibiotics  ID fu recommended within 2 weeks of discharge      3  Chronic L toe wound, possible early osteo  -Left toe wound has been present for a year and is likely source of bacteremia, no local signs of infection  - MRI without definite evidence of osteo, non specific focal area of bone marrow edema in distal phalanx of great toe may reflect focal stress reaction or early osteo     • Antibiotics as above  • Recommend wound care and close follow-up with podiatry, if wound fails to heal on fu would favor definitive surgical treatment     4  Acute on chronic HFpEF, moderate aortic stenosis   -MAKEDA negative for vegetation    • Management per cardiology     5  Possible COPD, chronic tobacco use  -Chest x-ray without obvious pneumonia     • Management per primary team    Discussed in detail with the primary service  History       Subjective: The patient has no complaints  Denies fevers, chills, or sweats  Denies nausea, vomiting, or diarrhea      Antibiotics:  Cefazolin      Physical Exam     Temp:  [97 5 °F (36 4 °C)-97 9 °F (36 6 °C)] 97 9 °F (36 6 °C)  HR:  [58] 58  Resp:  [16-18] 18  BP: (111-117)/(62-63) 111/63  SpO2:  [94 %-100 %] 97 %  Temp (24hrs), Av 7 °F (36 5 °C), Min:97 5 °F (36 4 °C), Max:97 9 °F (36 6 °C)  Current: Temperature: 97 9 °F (36 6 °C)    Intake/Output Summary (Last 24 hours) at 2023 4240  Last data filed at 2023 4184  Gross per 24 hour   Intake 460 ml   Output 900 ml   Net -440 ml       Physical exam findings reported by bedside and primary medical team staff      General Appearance:  Appearing chronically ill, nontoxic, and in no distress, appears stated age   Throat: Oropharynx moist without lesions; lips, mucosa, and tongue normal; teeth and gums normal   Lungs:   Clear to auscultation bilaterally, no audible wheezes, rhonchi and rales, respirations unlabored   Heart:  Regular rate and rhythm, S1, S2 normal, no murmur, rub or gallop   Abdomen:   Soft, non-tender, non-distended, positive bowel sounds, no masses, no organomegaly    No CVA tenderness   Extremities: Extremities normal, atraumatic, no cyanosis, clubbing or edema   Skin: Skin color, texture, turgor normal, no rashes , superficial wound over L great toe and R 3rd submetatarsal, stable without erythema   Neurologic: Alert and oriented times 3, extremity strength 5/5 and symmetric         Invasive Devices:   Peripheral IV 02/03/23 Distal;Dorsal (posterior); Right Forearm (Active)   Site Assessment [facility] 02/03/23 0605   Dressing Type Transparent 02/03/23 0605   Line Status Blood return noted; Flushed; Infusing 02/03/23 0605   Dressing Status Clean;Dry; Intact 02/03/23 9231   Dressing Change Due 02/07/23 02/03/23 7447   Reason Not Rotated Not due 02/03/23 2063       Labs, Imaging, & Other Studies     Lab Results:    I have personally reviewed pertinent labs  Results from last 7 days   Lab Units 02/06/23  0500 02/05/23  0553 02/03/23  0445   WBC Thousand/uL 10 40* 10 31* 8 93   HEMOGLOBIN g/dL 13 9 14 8 13 9   PLATELETS Thousands/uL 333 286 165     Results from last 7 days   Lab Units 02/06/23  0500 02/05/23  0553 02/03/23  0445 02/02/23  0457 02/01/23  0456   POTASSIUM mmol/L 4 8   < > 4 3 4 2 3 8   CHLORIDE mmol/L 103   < > 103 101 101   CO2 mmol/L 27   < > 26 26 23   BUN mg/dL 20   < > 18 19 24   CREATININE mg/dL 0 94   < > 0 84 1 07 1 07   EGFR ml/min/1 73sq m 82   < > 89 70 70   CALCIUM mg/dL 9 0   < > 8 4 8 5 8 3*   AST U/L  --   --  23 37 28   ALT U/L  --   --  32 41 25   ALK PHOS U/L  --   --  53 49 49    < > = values in this interval not displayed  Results from last 7 days   Lab Units 02/02/23  1239 01/31/23  1352   BLOOD CULTURE  No Growth at 72 hrs  No Growth at 72 hrs  Staphylococcus aureus*  Staphylococcus aureus*   GRAM STAIN RESULT   --  Gram positive cocci in clusters*  Gram positive cocci in clusters*       Imaging Studies:   I have personally reviewed pertinent imaging study reports and images in PACS        EKG, Pathology, and Other Studies:   I have personally reviewed pertinent reports  Counseling/Coordination of care: Total 35 minutes communication with the patient via telehealth  Labs, medical tests and imaging studies were independently reviewed by me as noted above

## 2023-02-06 NOTE — ASSESSMENT & PLAN NOTE
· We will continue Norvasc hold hydrochlorothiazide secondary to acute hyponatremia  · Restarting HCTZ on discharge, follow up with cardiology

## 2023-02-06 NOTE — ASSESSMENT & PLAN NOTE
· Patient spike high fever 102 4, elevated WBC with possible source of infection from pneumonia as evidenced on imaging  · Procalcitonin is trending up  · Both sets of blood culture is positive, gram-positive cocci in clusters, most likely MSSA-antibiotic adjusted to cefazolin  · COVID/flu panel negative  · TTE negative for vegetation  · Repeat blood culture negative at 72 hours  · Per ID note, expect 4 weeks of IV antibiotic, PICC line can be place if repeat blood culture negative for 48 hours    · Consulted critical care for PICC placement, duration and final antibiotics per ID recommendations  · Patient denies any prosthesis in the body  · MAKEDA shows no vegetation  · ID: continue with IV cefazolin 2 g q8h for 6 weeks to complete through 3/16, weekly CBC with diff and CMP, follow up within 2 weeks with ID  · Follow up closely with podiatry

## 2023-02-06 NOTE — ASSESSMENT & PLAN NOTE
· 2D echo telemetry monitor replete magnesium and potassium repeat labs tonight    Not on a beta-blocker  · After correcting electrolytes especially magnesium and potassium, improved, continue p o  magnesium  · TSH normal  · Cardiology: may benefit from outpatient cardiac monitoring  · Follow up with cardiology outpatient

## 2023-02-07 ENCOUNTER — TELEPHONE (OUTPATIENT)
Dept: INFECTIOUS DISEASES | Facility: CLINIC | Age: 68
End: 2023-02-07

## 2023-02-07 LAB
BACTERIA BLD CULT: NORMAL
BACTERIA BLD CULT: NORMAL

## 2023-02-07 NOTE — TELEPHONE ENCOUNTER
Received a call from [address] at [facility] regarding patients labs  She will be going out Monday 2/13 to draw them, as labs were drawn before he [address]  Patient was arranged for virtual via wife's cell  They will f/u with questions/concerns

## 2023-02-10 ENCOUNTER — APPOINTMENT (INPATIENT)
Dept: ULTRASOUND IMAGING | Facility: HOSPITAL | Age: 68
End: 2023-02-10

## 2023-02-10 ENCOUNTER — HOSPITAL ENCOUNTER (INPATIENT)
Facility: HOSPITAL | Age: 68
LOS: 6 days | Discharge: HOME HEALTH | End: 2023-02-16
Attending: EMERGENCY MEDICINE | Admitting: FAMILY MEDICINE

## 2023-02-10 ENCOUNTER — APPOINTMENT (EMERGENCY)
Dept: RADIOLOGY | Facility: HOSPITAL | Age: 68
End: 2023-02-10

## 2023-02-10 DIAGNOSIS — A41.9 SEPSIS, DUE TO UNSPECIFIED ORGANISM, UNSPECIFIED WHETHER ACUTE ORGAN DYSFUNCTION PRESENT (HCC): ICD-10-CM

## 2023-02-10 DIAGNOSIS — R78.81 BACTEREMIA: ICD-10-CM

## 2023-02-10 DIAGNOSIS — N19 RENAL FAILURE: ICD-10-CM

## 2023-02-10 DIAGNOSIS — E87.5 HYPERKALEMIA: ICD-10-CM

## 2023-02-10 DIAGNOSIS — L97.521 ULCER OF LEFT FOOT, LIMITED TO BREAKDOWN OF SKIN (HCC): ICD-10-CM

## 2023-02-10 DIAGNOSIS — R06.00 DYSPNEA: Primary | ICD-10-CM

## 2023-02-10 DIAGNOSIS — R06.02 SHORTNESS OF BREATH: ICD-10-CM

## 2023-02-10 DIAGNOSIS — N17.9 AKI (ACUTE KIDNEY INJURY) (HCC): ICD-10-CM

## 2023-02-10 PROBLEM — B95.61 MSSA BACTEREMIA: Status: ACTIVE | Noted: 2023-02-10

## 2023-02-10 LAB
2HR DELTA HS TROPONIN: -1 NG/L
4HR DELTA HS TROPONIN: 1 NG/L
ALBUMIN SERPL BCP-MCNC: 3.1 G/DL (ref 3.5–5)
ALP SERPL-CCNC: 47 U/L (ref 34–104)
ALT SERPL W P-5'-P-CCNC: <3 U/L (ref 7–52)
ANION GAP SERPL CALCULATED.3IONS-SCNC: 3 MMOL/L (ref 4–13)
ANION GAP SERPL CALCULATED.3IONS-SCNC: 5 MMOL/L (ref 4–13)
AST SERPL W P-5'-P-CCNC: 11 U/L (ref 13–39)
BASOPHILS # BLD AUTO: 0.06 THOUSANDS/ÂΜL (ref 0–0.1)
BASOPHILS NFR BLD AUTO: 1 % (ref 0–1)
BILIRUB SERPL-MCNC: 0.27 MG/DL (ref 0.2–1)
BNP SERPL-MCNC: 346 PG/ML (ref 0–100)
BUN SERPL-MCNC: 72 MG/DL (ref 5–25)
BUN SERPL-MCNC: 72 MG/DL (ref 5–25)
CALCIUM ALBUM COR SERPL-MCNC: 9.8 MG/DL (ref 8.3–10.1)
CALCIUM SERPL-MCNC: 9.1 MG/DL (ref 8.4–10.2)
CALCIUM SERPL-MCNC: 9.4 MG/DL (ref 8.4–10.2)
CARDIAC TROPONIN I PNL SERPL HS: 14 NG/L
CARDIAC TROPONIN I PNL SERPL HS: 15 NG/L
CARDIAC TROPONIN I PNL SERPL HS: 16 NG/L
CHLORIDE SERPL-SCNC: 108 MMOL/L (ref 96–108)
CHLORIDE SERPL-SCNC: 108 MMOL/L (ref 96–108)
CO2 SERPL-SCNC: 20 MMOL/L (ref 21–32)
CO2 SERPL-SCNC: 24 MMOL/L (ref 21–32)
CREAT SERPL-MCNC: 2.12 MG/DL (ref 0.6–1.3)
CREAT SERPL-MCNC: 2.28 MG/DL (ref 0.6–1.3)
D DIMER PPP FEU-MCNC: 1.08 UG/ML FEU
EOSINOPHIL # BLD AUTO: 0.39 THOUSAND/ÂΜL (ref 0–0.61)
EOSINOPHIL NFR BLD AUTO: 4 % (ref 0–6)
ERYTHROCYTE [DISTWIDTH] IN BLOOD BY AUTOMATED COUNT: 13.1 % (ref 11.6–15.1)
GFR SERPL CREATININE-BSD FRML MDRD: 28 ML/MIN/1.73SQ M
GFR SERPL CREATININE-BSD FRML MDRD: 30 ML/MIN/1.73SQ M
GLUCOSE SERPL-MCNC: 101 MG/DL (ref 65–140)
GLUCOSE SERPL-MCNC: 130 MG/DL (ref 65–140)
GLUCOSE SERPL-MCNC: 138 MG/DL (ref 65–140)
GLUCOSE SERPL-MCNC: 149 MG/DL (ref 65–140)
GLUCOSE SERPL-MCNC: 48 MG/DL (ref 65–140)
GLUCOSE SERPL-MCNC: 91 MG/DL (ref 65–140)
HCT VFR BLD AUTO: 37.9 % (ref 36.5–49.3)
HGB BLD-MCNC: 12.2 G/DL (ref 12–17)
IMM GRANULOCYTES # BLD AUTO: 0.07 THOUSAND/UL (ref 0–0.2)
IMM GRANULOCYTES NFR BLD AUTO: 1 % (ref 0–2)
LYMPHOCYTES # BLD AUTO: 1.53 THOUSANDS/ÂΜL (ref 0.6–4.47)
LYMPHOCYTES NFR BLD AUTO: 15 % (ref 14–44)
MCH RBC QN AUTO: 31.4 PG (ref 26.8–34.3)
MCHC RBC AUTO-ENTMCNC: 32.2 G/DL (ref 31.4–37.4)
MCV RBC AUTO: 98 FL (ref 82–98)
MONOCYTES # BLD AUTO: 1.27 THOUSAND/ÂΜL (ref 0.17–1.22)
MONOCYTES NFR BLD AUTO: 12 % (ref 4–12)
NEUTROPHILS # BLD AUTO: 7.02 THOUSANDS/ÂΜL (ref 1.85–7.62)
NEUTS SEG NFR BLD AUTO: 67 % (ref 43–75)
NRBC BLD AUTO-RTO: 0 /100 WBCS
PLATELET # BLD AUTO: 366 THOUSANDS/UL (ref 149–390)
PMV BLD AUTO: 8.8 FL (ref 8.9–12.7)
POTASSIUM SERPL-SCNC: 6.1 MMOL/L (ref 3.5–5.3)
POTASSIUM SERPL-SCNC: 6.3 MMOL/L (ref 3.5–5.3)
PROT SERPL-MCNC: 6.5 G/DL (ref 6.4–8.4)
RBC # BLD AUTO: 3.88 MILLION/UL (ref 3.88–5.62)
SODIUM SERPL-SCNC: 133 MMOL/L (ref 135–147)
SODIUM SERPL-SCNC: 135 MMOL/L (ref 135–147)
WBC # BLD AUTO: 10.34 THOUSAND/UL (ref 4.31–10.16)

## 2023-02-10 RX ORDER — SODIUM POLYSTYRENE SULFONATE 4.1 MEQ/G
15 POWDER, FOR SUSPENSION ORAL; RECTAL ONCE
Status: COMPLETED | OUTPATIENT
Start: 2023-02-10 | End: 2023-02-10

## 2023-02-10 RX ORDER — SODIUM CHLORIDE 9 MG/ML
125 INJECTION, SOLUTION INTRAVENOUS CONTINUOUS
Status: DISCONTINUED | OUTPATIENT
Start: 2023-02-10 | End: 2023-02-11

## 2023-02-10 RX ORDER — DEXTROSE MONOHYDRATE 25 G/50ML
25 INJECTION, SOLUTION INTRAVENOUS ONCE
Status: COMPLETED | OUTPATIENT
Start: 2023-02-10 | End: 2023-02-10

## 2023-02-10 RX ORDER — LEVOTHYROXINE SODIUM 0.07 MG/1
75 TABLET ORAL
Status: DISCONTINUED | OUTPATIENT
Start: 2023-02-11 | End: 2023-02-16 | Stop reason: HOSPADM

## 2023-02-10 RX ORDER — DEXTROSE MONOHYDRATE 25 G/50ML
50 INJECTION, SOLUTION INTRAVENOUS ONCE
Status: COMPLETED | OUTPATIENT
Start: 2023-02-10 | End: 2023-02-10

## 2023-02-10 RX ORDER — DEXTROSE MONOHYDRATE 25 G/50ML
INJECTION, SOLUTION INTRAVENOUS
Status: COMPLETED
Start: 2023-02-10 | End: 2023-02-10

## 2023-02-10 RX ORDER — BUPROPION HYDROCHLORIDE 150 MG/1
300 TABLET ORAL DAILY
Status: DISCONTINUED | OUTPATIENT
Start: 2023-02-11 | End: 2023-02-16 | Stop reason: HOSPADM

## 2023-02-10 RX ORDER — ACETAMINOPHEN 325 MG/1
650 TABLET ORAL EVERY 6 HOURS PRN
Status: DISCONTINUED | OUTPATIENT
Start: 2023-02-10 | End: 2023-02-16 | Stop reason: HOSPADM

## 2023-02-10 RX ORDER — AMLODIPINE BESYLATE 10 MG/1
10 TABLET ORAL DAILY
Status: DISCONTINUED | OUTPATIENT
Start: 2023-02-10 | End: 2023-02-16 | Stop reason: HOSPADM

## 2023-02-10 RX ORDER — CALCIUM GLUCONATE 20 MG/ML
1 INJECTION, SOLUTION INTRAVENOUS ONCE
Status: COMPLETED | OUTPATIENT
Start: 2023-02-10 | End: 2023-02-11

## 2023-02-10 RX ORDER — ESCITALOPRAM OXALATE 10 MG/1
20 TABLET ORAL DAILY
Status: DISCONTINUED | OUTPATIENT
Start: 2023-02-11 | End: 2023-02-16 | Stop reason: HOSPADM

## 2023-02-10 RX ORDER — CALCIUM CHLORIDE 100 MG/ML
1 INJECTION INTRAVENOUS; INTRAVENTRICULAR ONCE
Status: COMPLETED | OUTPATIENT
Start: 2023-02-10 | End: 2023-02-10

## 2023-02-10 RX ORDER — HEPARIN SODIUM 5000 [USP'U]/ML
5000 INJECTION, SOLUTION INTRAVENOUS; SUBCUTANEOUS EVERY 8 HOURS SCHEDULED
Status: DISCONTINUED | OUTPATIENT
Start: 2023-02-10 | End: 2023-02-12

## 2023-02-10 RX ORDER — ONDANSETRON 2 MG/ML
4 INJECTION INTRAMUSCULAR; INTRAVENOUS EVERY 6 HOURS PRN
Status: DISCONTINUED | OUTPATIENT
Start: 2023-02-10 | End: 2023-02-16 | Stop reason: HOSPADM

## 2023-02-10 RX ADMIN — HEPARIN SODIUM 5000 UNITS: 5000 INJECTION INTRAVENOUS; SUBCUTANEOUS at 20:49

## 2023-02-10 RX ADMIN — AMLODIPINE BESYLATE 10 MG: 10 TABLET ORAL at 15:59

## 2023-02-10 RX ADMIN — INSULIN HUMAN 10 UNITS: 100 INJECTION, SOLUTION PARENTERAL at 20:11

## 2023-02-10 RX ADMIN — DEXTROSE MONOHYDRATE 25 ML: 25 INJECTION, SOLUTION INTRAVENOUS at 14:13

## 2023-02-10 RX ADMIN — CALCIUM GLUCONATE 1 G: 20 INJECTION, SOLUTION INTRAVENOUS at 20:04

## 2023-02-10 RX ADMIN — INSULIN HUMAN 10 UNITS: 100 INJECTION, SOLUTION PARENTERAL at 13:12

## 2023-02-10 RX ADMIN — CALCIUM CHLORIDE 1 G: 100 INJECTION INTRAVENOUS; INTRAVENTRICULAR at 12:53

## 2023-02-10 RX ADMIN — SODIUM CHLORIDE 125 ML/HR: 0.9 INJECTION, SOLUTION INTRAVENOUS at 15:59

## 2023-02-10 RX ADMIN — SODIUM BICARBONATE 50 MEQ: 84 INJECTION, SOLUTION INTRAVENOUS at 13:10

## 2023-02-10 RX ADMIN — SODIUM POLYSTYRENE SULFONATE 15 G: 1 POWDER ORAL; RECTAL at 20:02

## 2023-02-10 RX ADMIN — SODIUM POLYSTYRENE SULFONATE 15 G: 1 POWDER ORAL; RECTAL at 15:59

## 2023-02-10 RX ADMIN — DEXTROSE MONOHYDRATE 50 ML: 25 INJECTION, SOLUTION INTRAVENOUS at 20:07

## 2023-02-10 RX ADMIN — HEPARIN SODIUM 5000 UNITS: 5000 INJECTION INTRAVENOUS; SUBCUTANEOUS at 15:59

## 2023-02-10 RX ADMIN — SODIUM CHLORIDE 125 ML/HR: 0.9 INJECTION, SOLUTION INTRAVENOUS at 23:54

## 2023-02-10 RX ADMIN — DEXTROSE MONOHYDRATE 25 ML: 25 INJECTION, SOLUTION INTRAVENOUS at 13:11

## 2023-02-10 RX ADMIN — ALBUTEROL SULFATE 5 MG: 2.5 SOLUTION RESPIRATORY (INHALATION) at 13:12

## 2023-02-10 NOTE — ASSESSMENT & PLAN NOTE
he has baseline creatinine 0 8-0 9  Currently elevated to 2 28 with hyperkalemia  On IV fluid hydration  Stop HCTZ and Ancef  Urinary retention protocol  May also be allergic reaction to Ancef

## 2023-02-10 NOTE — ED PROVIDER NOTES
History  Chief Complaint   Patient presents with   • Shortness of Breath     Patient c/o sob, wt gain and abdominal distention  Patient was recently discharged with CHF and staph infection in blood stream       18-year-old male with a recent admission secondary to bacteremia and is on IV antibiotics via PICC line who also developed pulmonary edema while in the hospital presents emergency room today reporting that he is developing shortness of breath and abdominal distention  Reports that he is becoming increasingly short of breath over the last day  No chest pressure      History provided by:  Patient  Shortness of Breath  Severity:  Severe  Onset quality:  Gradual  Timing:  Constant  Progression:  Worsening  Relieved by:  Rest  Worsened by: Activity  Ineffective treatments:  None tried  Associated symptoms: no abdominal pain, no chest pain, no cough, no ear pain, no fever, no headaches, no rash, no sore throat, no vomiting and no wheezing        Prior to Admission Medications   Prescriptions Last Dose Informant Patient Reported? Taking?    amLODIPine (NORVASC) 10 mg tablet   Yes No   Sig: Take 10 mg by mouth daily   buPROPion (WELLBUTRIN XL) 300 mg 24 hr tablet   Yes No   ceFAZolin (ANCEF) 2000 mg IVPB   No No   Sig: Inject 2,000 mg into a catheter in a vein over 30 minutes at 100 mL/hr every 8 (eight) hours   escitalopram (LEXAPRO) 20 mg tablet   Yes No   Sig: Take 20 mg by mouth daily   gabapentin (NEURONTIN) 100 mg capsule   No No   Sig: Take 1 capsule (100 mg total) by mouth 2 (two) times a day   hydrochlorothiazide (HYDRODIURIL) 25 mg tablet   Yes No   Sig: Take 25 mg by mouth daily   levothyroxine 75 mcg tablet   Yes No   magnesium Oxide (MAG-OX) 400 mg TABS   No No   Sig: Take 1 tablet (400 mg total) by mouth 2 (two) times a day   meloxicam (Mobic) 15 mg tablet   No No   Sig: Take 1 tablet (15 mg total) by mouth daily   methocarbamol (ROBAXIN) 500 mg tablet   No No   Sig: Take 1 tablet (500 mg total) by mouth 4 (four) times a day   potassium chloride (K-DUR,KLOR-CON) 20 mEq tablet   No No   Sig: Take 1 tablet (20 mEq total) by mouth 2 (two) times a day      Facility-Administered Medications: None       Past Medical History:   Diagnosis Date   • Anxiety    • Aortic stenosis    • Depression    • Hypertension    • Hypothyroidism    • PVCs (premature ventricular contractions)        Past Surgical History:   Procedure Laterality Date   • APPENDECTOMY     • BACK SURGERY     • TOTAL SHOULDER REPLACEMENT         Family History   Problem Relation Age of Onset   • Dementia Mother    • Blindness Mother    • Cancer Mother    • Dementia Father      I have reviewed and agree with the history as documented  E-Cigarette/Vaping   • E-Cigarette Use Never User      E-Cigarette/Vaping Substances     Social History     Tobacco Use   • Smoking status: Light Smoker     Types: Cigarettes   • Smokeless tobacco: Never   Vaping Use   • Vaping Use: Never used   Substance Use Topics   • Alcohol use: Not Currently   • Drug use: Not Currently     Comment: sporadic in young adulthood       Review of Systems   Constitutional: Negative  Negative for activity change, appetite change, fatigue and fever  HENT: Negative  Negative for congestion, ear pain, rhinorrhea, sinus pressure and sore throat  Eyes: Negative  Respiratory: Positive for shortness of breath  Negative for cough, chest tightness and wheezing  Cardiovascular: Negative  Negative for chest pain, palpitations and leg swelling  Gastrointestinal: Positive for abdominal distention  Negative for abdominal pain, diarrhea, nausea and vomiting  Endocrine: Negative  Genitourinary: Negative  Negative for dysuria, flank pain and frequency  Musculoskeletal: Negative  Negative for arthralgias, back pain and myalgias  Skin: Negative  Negative for rash  Allergic/Immunologic: Negative  Neurological: Positive for weakness   Negative for dizziness, facial asymmetry, light-headedness and headaches  Hematological: Negative  Psychiatric/Behavioral: Negative  All other systems reviewed and are negative  Physical Exam  Physical Exam  Vitals and nursing note reviewed  Constitutional:       General: He is in acute distress  Appearance: Normal appearance  He is well-developed  He is not ill-appearing or toxic-appearing  HENT:      Head: Normocephalic and atraumatic  Hair is normal       Jaw: No pain on movement  Right Ear: External ear normal       Left Ear: External ear normal       Nose: Nose normal  No congestion  Mouth/Throat:      Mouth: Mucous membranes are moist    Eyes:      General: Lids are normal  No scleral icterus  Extraocular Movements: Extraocular movements intact  Conjunctiva/sclera: Conjunctivae normal       Pupils: Pupils are equal, round, and reactive to light  Cardiovascular:      Rate and Rhythm: Normal rate and regular rhythm  Heart sounds: Murmur heard  Pulmonary:      Effort: Pulmonary effort is normal  Tachypnea present  No respiratory distress  Breath sounds: Normal breath sounds  No decreased breath sounds, wheezing, rhonchi or rales  Abdominal:      General: Abdomen is flat  There is no distension  Palpations: Abdomen is soft  Abdomen is not rigid  Tenderness: There is no abdominal tenderness  There is no guarding or rebound  Musculoskeletal:         General: No swelling, tenderness, deformity or signs of injury  Normal range of motion  Cervical back: Normal range of motion and neck supple  Right lower leg: No tenderness  No edema  Left lower leg: No tenderness  No edema  Skin:     General: Skin is warm and dry  Coloration: Skin is not pale  Findings: No rash  Neurological:      General: No focal deficit present  Mental Status: He is alert and oriented to person, place, and time  Mental status is at baseline     Psychiatric:         Attention and Perception: Attention normal          Mood and Affect: Mood normal          Speech: Speech normal          Behavior: Behavior normal          Vital Signs  ED Triage Vitals [02/10/23 1127]   Temperature Pulse Respirations Blood Pressure SpO2   (!) 97 °F (36 1 °C) 67 22 164/76 95 %      Temp Source Heart Rate Source Patient Position - Orthostatic VS BP Location FiO2 (%)   Temporal Monitor Lying Right arm --      Pain Score       --           Vitals:    02/10/23 1127 02/10/23 1230   BP: 164/76    Pulse: 67 58   Patient Position - Orthostatic VS: Lying          Visual Acuity      ED Medications  Medications   insulin regular (HumuLIN R,NovoLIN R) injection 10 Units (has no administration in time range)   dextrose 50 % IV solution 25 mL (has no administration in time range)   albuterol inhalation solution 5 mg (has no administration in time range)   sodium bicarbonate 8 4 % injection 50 mEq (has no administration in time range)   calcium chloride 10 % injection 1 g (1 g Intravenous Given 2/10/23 1253)       Diagnostic Studies  Results Reviewed     Procedure Component Value Units Date/Time    Comprehensive metabolic panel [[id number]]  (Abnormal) Collected: 02/10/23 1143    Lab Status: Final result Specimen: Blood from Arm, Left Updated: 02/10/23 1233     Sodium 133 mmol/L      Potassium 6 1 mmol/L      Chloride 108 mmol/L      CO2 20 mmol/L      ANION GAP 5 mmol/L      BUN 72 mg/dL      Creatinine 2 28 mg/dL      Glucose 91 mg/dL      Calcium 9 1 mg/dL      Corrected Calcium 9 8 mg/dL      AST 11 U/L      ALT <3 U/L      Alkaline Phosphatase 47 U/L      Total Protein 6 5 g/dL      Albumin 3 1 g/dL      Total Bilirubin 0 27 mg/dL      eGFR 28 ml/min/1 73sq m     Narrative:      [facility] guidelines for Chronic Kid[facility] Disease (CKD):   •  Stage 1 with normal or high GFR (GFR > 90 mL/min/1 73 square meters)  •  Stage 2 Mild CKD (GFR = 60-89 mL/min/1 73 square meters)  •  Stage 3A Moderate CKD (GFR = 45-59 mL/min/1 73 square meters)  •  Stage 3B Moderate CKD (GFR = 30-44 mL/min/1 73 square meters)  •  Stage 4 Severe CKD (GFR = 15-29 mL/min/1 73 square meters)  •  Stage 5 End Stage CKD (GFR <15 mL/min/1 73 square meters)  Note: GFR calculation is accurate only with a steady state creatinine    B-Type Natriuretic Peptide(BNP) [[id number]]  (Abnormal) Collected: 02/10/23 1143    Lab Status: Final result Specimen: Blood from Arm, Left Updated: 02/10/23 1229      pg/mL     HS Troponin I 2hr [[id number]]     Lab Status: No result Specimen: Blood     HS Troponin I 4hr [[id number]]     Lab Status: No result Specimen: Blood     HS Troponin 0hr (reflex protocol) [[id number]]  (Normal) Collected: 02/10/23 1143    Lab Status: Final result Specimen: Blood from Arm, Left Updated: 02/10/23 1225     hs TnI 0hr 15 ng/L     D-Dimer [[id number]]  (Abnormal) Collected: 02/10/23 1143    Lab Status: Final result Specimen: Blood from Arm, Left Updated: 02/10/23 1219     D-Dimer, Quant 1 08 ug/ml FEU     Narrative: In the evaluation for possible pulmonary embolism, in the appropriate (Well's Score of 4 or less) patient, the age adjusted d-dimer cutoff for this patient can be calculated as:    Age x 0 01 (in ug/mL) for Age-adjusted D-dimer exclusion threshold for a patient over 50 years      CBC and differential [[id number]]  (Abnormal) Collected: 02/10/23 1143    Lab Status: Final result Specimen: Blood from Arm, Left Updated: 02/10/23 1153     WBC 10 34 Thousand/uL      RBC 3 88 Million/uL      Hemoglobin 12 2 g/dL      Hematocrit 37 9 %      MCV 98 fL      MCH 31 4 pg      MCHC 32 2 g/dL      RDW 13 1 %      MPV 8 8 fL      Platelets 142 Thousands/uL      nRBC 0 /100 WBCs      Neutrophils Relative 67 %      Immat GRANS % 1 %      Lymphocytes Relative 15 %      Monocytes Relative 12 %      Eosinophils Relative 4 %      Basophils Relative 1 %      Neutrophils Absolute 7 02 Thousands/µL      Immature Grans Absolute 0 07 Thousand/uL Lymphocytes Absolute 1 53 Thousands/µL      Monocytes Absolute 1 27 Thousand/µL      Eosinophils Absolute 0 39 Thousand/µL      Basophils Absolute 0 06 Thousands/µL                  XR chest 1 view portable    (Results Pending)   CTA ED chest PE Study    (Results Pending)              Procedures  ECG 12 Lead Documentation Only    Date/Time: 2/10/2023 11:46 AM  Performed by: Leidy Buckley DO  Authorized by: Leidy Buckley DO     Indications / Diagnosis:  Chest pain  ECG reviewed by me, the ED Provider: yes    Patient location:  ED  Previous ECG:     Previous ECG:  Compared to current    Comparison ECG info:  Previous EKG has too many PVCs to make an adequate comparison  Interpretation:     Interpretation: non-specific    Quality:     Tracing quality:  Limited by artifact  Rate:     ECG rate assessment: normal    Rhythm:     Rhythm: sinus rhythm    Ectopy:     Ectopy: none    QRS:     QRS axis:  Normal  Conduction:     Conduction: normal    ST segments:     ST segments:  Normal  T waves:     T waves: normal               ED Course  ED Course as of 02/10/23 1304   Fri Feb 10, 2023   1131 Echocardiogram from recent admission showed 60% ejection fraction   1223 D-Dimer, Quant(!): 1 08    Given recent history,  Patient being prone to a pulmonary embolism  Will proceed with CTA imaging   1301 BUN(!): 72   1301 Creatinine(!): 2 28  Patient in acute renal failure  1301 Potassium(!): 6 1  Patient will be given medications to address his hyperkalemia    1301 Case was discussed with medicine and patient was admitted to that service  SBIRT 20yo+    Flowsheet Row Most Recent Value   SBIRT (25 yo +)    In order to provide better care to our patients, we are screening all of our patients for alcohol and drug use  Would it be okay to ask you these screening questions? Yes Filed at: 02/10/2023 1130   Initial Alcohol Screen: US AUDIT-C     1   How often do you have a drink containing alcohol? 0 Filed at: 02/10/2023 1130   2  How many drinks containing alcohol do you have on a typical day you are drinking? 0 Filed at: 02/10/2023 1130   3a  Male UNDER 65: How often do you have five or more drinks on one occasion? 0 Filed at: 02/10/2023 1130   3b  FEMALE Any Age, or MALE 65+: How often do you have 4 or more drinks on one occassion? 0 Filed at: 02/10/2023 1130   Audit-C Score 0 Filed at: 02/10/2023 1130   NIC: How many times in the past year have you    Used an illegal drug or used a prescription medication for non-medical reasons? Never Filed at: 02/10/2023 1130                    Medical Decision Making  Patient presented to the emergency department and a MSE was performed  The patient was evaluated for complaint related to acute shortness of breath  Differential diagnoses included, but are not limited to, acute coronary syndrome, arrhythmia, myocardial infarction, pulmonary embolism, pneumothorax, infectious process of the lungs such as pneumonia, reactive airway disease, asthma, COPD, cardiomyopathy, congestive heart failure or viral syndrome  Several of these diagnoses have been evaluated and ruled out by history and physical   As needed, patient will be further evaluated with laboratory and imaging studies  Higher level diagnostics, such as CT imaging or ultrasound, may also be required  Please see work-up portion of the note for further evaluation of patient's risk  Socioeconomic factors were also considered as part of the decision-making process  Unless otherwise stated in the chart or patient is admitted as elsewhere documented, any deviously prescribed medications will be maintained        Dyspnea: complicated acute illness or injury with systemic symptoms  Hyperkalemia: complicated acute illness or injury with systemic symptoms that poses a threat to life or bodily functions  Renal failure: complicated acute illness or injury with systemic symptoms that poses a threat to life or bodily functions  Amount and/or Complexity of Data Reviewed  Labs: ordered  Decision-making details documented in ED Course  Radiology: ordered  Discussion of management or test interpretation with external provider(s): Discussed case with medicine service  Risk  OTC drugs  Prescription drug management  Decision regarding hospitalization  Risk Details: Patient presented to the emergency department and a MSE was performed  The patient was evaluated and diagnosed with acute renal failure and hyperkalemia  This is a new issue that will require additional planned work-up and treatment in a hospitalized setting  As may have been required as part of this evaluation, clinical laboratory test, radiology imaging and medical testing (I e  EKG) were ordered as necessitated by the patient's presentation  I independently reviewed these studies, imaging and testing  This patient's case is considered to be a considerable risk secondary to the above listed disease process and poses a threat to the patient's well-being and baseline function  Further in-patient diagnostic testing and management, which may include the administration of parenteral medications, is required  Disposition  Final diagnoses:   Dyspnea   Renal failure   Hyperkalemia     Time reflects when diagnosis was documented in both MDM as applicable and the Disposition within this note     Time User Action Codes Description Comment    2/10/2023  1:02 PM Barbara Figures Add [R06 00] Dyspnea     2/10/2023  1:02 PM Barbara Figures Add [N19] Renal failure     2/10/2023  1:02 PM Barbara Figures Add [E87 5] Hyperkalemia       ED Disposition     ED Disposition   Admit    Condition   Stable    Date/Time   Fri Feb 10, 2023  1:02 PM    Comment              Follow-up Information    None         Patient's Medications   Discharge Prescriptions    No medications on file       No discharge procedures on file      PDMP Review     None          ED Provider  Electronically Signed by           Liza Montana, DO  02/10/23 8747 Tom Razo, DO  02/10/23 9528

## 2023-02-10 NOTE — H&P
[address]  H&P- Julianne Alfaro 1955, 69 y o  male MRN: [id number]  Unit/Bed#: ED 05 Encounter: [id number]  Primary Care Provider: Julien Wilson MD   Date and time admitted to hospital: 2/10/2023 11:23 AM    * ANNALEE (acute kidney injury) [facility])  Assessment & Plan  he has baseline creatinine 0 8-0 9  Currently elevated to 2 28 with hyperkalemia  On IV fluid hydration  Stop HCTZ and Ancef  Urinary retention protocol  May also be allergic reaction to Ancef  Hyperkalemia  Assessment & Plan  Potassium 6 1  EKG no changes noted so far  Received calcium gluconate, insulin dextrose and Kayexalate  Repeat BMP this afternoon and again in the morning  Hold oral potassium supplementation  MSSA bacteremia  Assessment & Plan  History of MSSA bacteremia  He was recently discharged from the hospital with MSSA bacteremia and placed on a 6-week course of IV Ancef and it was concern for left toe ulcer with early osteomyelitis as the cause of the infection  Patient now presents with acute kidney injury and hyperkalemia  unClear whether it is secondary to oral potassium supplementation versus reaction to Ancef  Hold Ancef for now  Placed on gentle hydration  Consult placed to infectious disease and podiatry to determine if further antibiotics are required for the foot and duration  Ulcer of left foot [facility])  Assessment & Plan  Continue conservative management  Consult placed to podiatry    MDD (major depressive disorder)  Assessment & Plan  stable for now    Continue home meds    Benign essential HTN  Assessment & Plan  Continue Norvasc but hold HCTZ due to mild hyponatremia and ANNALEE      VTE Prophylaxis: Heparin  / sequential compression device   Code Status: full code  POLST: There is no POLST form on file for this patient (pre-hospital)  Discussion with family: Discussed with wife at bedside    Anticipated Length of Stay:  Patient will be admitted on an Inpatient basis with an anticipated length of stay of more than 2 midnights  Justification for Hospital Stay: Acute kidney injury    Total Time for Visit, including Counseling / Coordination of Care: 60 minutes  Greater than 50% of this total time spent on direct patient counseling and coordination of care  Chief Complaint:   shortness of breath and decreased urine output    History of Present Illness:    Jose R Pardo is a 69 y o  male who presents with feeling well today complaining of some shortness of breath decreased urine output and he feels like he has gained some weight  He was recently discharged from the hospital and sent home with IV antibiotics and did well initially the first day or 2 but started having more fatigue sleeping more and came to the ED to be evaluated  denies any fever but complains of chills    Review of Systems:    Review of Systems   Constitutional: Positive for appetite change and fatigue  Negative for chills and fever  HENT: Negative for hearing loss, sore throat and trouble swallowing  Eyes: Negative for photophobia, discharge and visual disturbance  Respiratory: Positive for shortness of breath  Negative for chest tightness  Cardiovascular: Negative for chest pain and palpitations  Gastrointestinal: Negative for abdominal pain, blood in stool and vomiting  Endocrine: Negative for polydipsia and polyuria  Genitourinary: Positive for decreased urine volume  Negative for difficulty urinating, dysuria, flank pain and hematuria  Musculoskeletal: Negative for back pain and gait problem  Skin: Negative for rash  Allergic/Immunologic: Negative for environmental allergies and food allergies  Neurological: Positive for weakness  Negative for dizziness, seizures, syncope and headaches  Hematological: Does not bruise/bleed easily  Psychiatric/Behavioral: Negative for behavioral problems  All other systems reviewed and are negative        Past Medical and Surgical History: Past Medical History:   Diagnosis Date   • Anxiety    • Aortic stenosis    • Depression    • Hypertension    • Hypothyroidism    • PVCs (premature ventricular contractions)        Past Surgical History:   Procedure Laterality Date   • APPENDECTOMY     • BACK SURGERY     • TOTAL SHOULDER REPLACEMENT         Meds/Allergies:    Prior to Admission medications    Medication Sig Start Date End Date Taking? Authorizing Provider   amLODIPine (NORVASC) 10 mg tablet Take 10 mg by mouth daily 5/20/19   Historical Provider, MD   buPROPion (WELLBUTRIN XL) 300 mg 24 hr tablet  5/25/19   Historical Provider, MD   ceFAZolin (ANCEF) 2000 mg IVPB Inject 2,000 mg into a catheter in a vein over 30 minutes at 100 mL/hr every 8 (eight) hours 2/6/23 3/16/23  Estefani Thakur PA-C   escitalopram (LEXAPRO) 20 mg tablet Take 20 mg by mouth daily 4/5/19   Historical Provider, MD   gabapentin (NEURONTIN) 100 mg capsule Take 1 capsule (100 mg total) by mouth 2 (two) times a day 2/6/23   Cassius Chang PA-C   hydrochlorothiazide (HYDRODIURIL) 25 mg tablet Take 25 mg by mouth daily 5/3/19   Historical Provider, MD   levothyroxine 75 mcg tablet  5/17/19   Historical Provider, MD   magnesium Oxide (MAG-OX) 400 mg TABS Take 1 tablet (400 mg total) by mouth 2 (two) times a day 2/6/23   Estefani Thakur PA-C   meloxicam (Mobic) 15 mg tablet Take 1 tablet (15 mg total) by mouth daily 10/3/22   Jackeline Bullock PA-C   methocarbamol (ROBAXIN) 500 mg tablet Take 1 tablet (500 mg total) by mouth 4 (four) times a day 10/3/22   Jackeline Bullock PA-C   potassium chloride (K-DUR,KLOR-CON) 20 mEq tablet Take 1 tablet (20 mEq total) by mouth 2 (two) times a day 2/6/23   Estefani Thakur PA-C     I have reviewed home medications with patient personally      Allergies: No Known Allergies    Social History:     Marital Status: /Civil Union     Social History     Substance and Sexual Activity   Alcohol Use Not Currently     Social History     Tobacco Use   Smoking Status Light Smoker   • Types: Cigarettes   Smokeless Tobacco Never     Social History     Substance and Sexual Activity   Drug Use Not Currently    Comment: sporadic in young adulthood       Family History:    Family History   Problem Relation Age of Onset   • Dementia Mother    • Blindness Mother    • Cancer Mother    • Dementia Father        Physical Exam:     Vitals:   Blood Pressure: (!) 172/79 (02/10/23 1400)  Pulse: 81 (02/10/23 1400)  Temperature: (!) 97 °F (36 1 °C) (02/10/23 1127)  Temp Source: Temporal (02/10/23 1127)  Respirations: 22 (02/10/23 1400)  Weight - Scale: 104 kg (229 lb 11 5 oz) (02/10/23 1127)  SpO2: 95 % (02/10/23 1400)    Physical Exam  Vitals and nursing note reviewed  Constitutional:       Appearance: He is ill-appearing  HENT:      Head: Normocephalic and atraumatic  Right Ear: External ear normal       Left Ear: External ear normal       Nose: Nose normal       Mouth/Throat:      Pharynx: Oropharynx is clear  Eyes:      Pupils: Pupils are equal, round, and reactive to light  Cardiovascular:      Rate and Rhythm: Normal rate and regular rhythm  Heart sounds: Normal heart sounds  Pulmonary:      Effort: Pulmonary effort is normal       Breath sounds: Normal breath sounds  Abdominal:      General: Bowel sounds are normal       Palpations: Abdomen is soft  Tenderness: There is no abdominal tenderness  Musculoskeletal:         General: Normal range of motion  Cervical back: Normal range of motion and neck supple  Skin:     General: Skin is warm and dry  Capillary Refill: Capillary refill takes less than 2 seconds  Neurological:      General: No focal deficit present  Mental Status: He is alert and oriented to person, place, and time  Psychiatric:         Mood and Affect: Mood normal              Additional Data:     Lab Results: I have personally reviewed pertinent reports        Results from last 7 days Lab Units 02/10/23  1143   WBC Thousand/uL 10 34*   HEMOGLOBIN g/dL 12 2   HEMATOCRIT % 37 9   PLATELETS Thousands/uL 366   NEUTROS PCT % 67   LYMPHS PCT % 15   MONOS PCT % 12   EOS PCT % 4     Results from last 7 days   Lab Units 02/10/23  1143   SODIUM mmol/L 133*   POTASSIUM mmol/L 6 1*   CHLORIDE mmol/L 108   CO2 mmol/L 20*   BUN mg/dL 72*   CREATININE mg/dL 2 28*   ANION GAP mmol/L 5   CALCIUM mg/dL 9 1   ALBUMIN g/dL 3 1*   TOTAL BILIRUBIN mg/dL 0 27   ALK PHOS U/L 47   ALT U/L <3*   AST U/L 11*   GLUCOSE RANDOM mg/dL 91         Results from last 7 days   Lab Units 02/10/23  1440 02/10/23  1426 02/10/23  1402   POC GLUCOSE mg/dl 149* 138 48*               Imaging: I have personally reviewed pertinent reports  XR chest 1 view portable   Final Result by Austen Schumacher MD (02/10 1324)   Mild congestive changes  Workstation performed: [id number]             EKG, Pathology, and Other Studies Reviewed on Admission:   · EKG: Sinus rhythm    Allscripts / Epic Records Reviewed: Yes     ** Please Note: This note has been constructed using a voice recognition system   **

## 2023-02-10 NOTE — ASSESSMENT & PLAN NOTE
Potassium 6 1  EKG no changes noted so far  Received calcium gluconate, insulin dextrose and Kayexalate  Repeat BMP this afternoon and again in the morning  Hold oral potassium supplementation  Simponi Counseling:  I discussed with the patient the risks of golimumab including but not limited to myelosuppression, immunosuppression, autoimmune hepatitis, demyelinating diseases, lymphoma, and serious infections.  The patient understands that monitoring is required including a PPD at baseline and must alert us or the primary physician if symptoms of infection or other concerning signs are noted.

## 2023-02-10 NOTE — ASSESSMENT & PLAN NOTE
History of MSSA bacteremia  He was recently discharged from the hospital with MSSA bacteremia and placed on a 6-week course of IV Ancef and it was concern for left toe ulcer with early osteomyelitis as the cause of the infection  Patient now presents with acute kidney injury and hyperkalemia  unClear whether it is secondary to oral potassium supplementation versus reaction to Ancef  Hold Ancef for now  Placed on gentle hydration  Consult placed to infectious disease and podiatry to determine if further antibiotics are required for the foot and duration

## 2023-02-11 LAB
ANION GAP SERPL CALCULATED.3IONS-SCNC: 1 MMOL/L (ref 4–13)
ANION GAP SERPL CALCULATED.3IONS-SCNC: 3 MMOL/L (ref 4–13)
BUN SERPL-MCNC: 59 MG/DL (ref 5–25)
BUN SERPL-MCNC: 63 MG/DL (ref 5–25)
CALCIUM SERPL-MCNC: 7.8 MG/DL (ref 8.4–10.2)
CALCIUM SERPL-MCNC: 9 MG/DL (ref 8.4–10.2)
CHLORIDE SERPL-SCNC: 110 MMOL/L (ref 96–108)
CHLORIDE SERPL-SCNC: 114 MMOL/L (ref 96–108)
CO2 SERPL-SCNC: 20 MMOL/L (ref 21–32)
CO2 SERPL-SCNC: 24 MMOL/L (ref 21–32)
CREAT SERPL-MCNC: 1.5 MG/DL (ref 0.6–1.3)
CREAT SERPL-MCNC: 1.66 MG/DL (ref 0.6–1.3)
ERYTHROCYTE [DISTWIDTH] IN BLOOD BY AUTOMATED COUNT: 12.9 % (ref 11.6–15.1)
GFR SERPL CREATININE-BSD FRML MDRD: 41 ML/MIN/1.73SQ M
GFR SERPL CREATININE-BSD FRML MDRD: 46 ML/MIN/1.73SQ M
GLUCOSE SERPL-MCNC: 220 MG/DL (ref 65–140)
GLUCOSE SERPL-MCNC: 86 MG/DL (ref 65–140)
GLUCOSE SERPL-MCNC: 93 MG/DL (ref 65–140)
HCT VFR BLD AUTO: 40.5 % (ref 36.5–49.3)
HGB BLD-MCNC: 13.4 G/DL (ref 12–17)
MCH RBC QN AUTO: 31.6 PG (ref 26.8–34.3)
MCHC RBC AUTO-ENTMCNC: 33.1 G/DL (ref 31.4–37.4)
MCV RBC AUTO: 96 FL (ref 82–98)
PLATELET # BLD AUTO: 327 THOUSANDS/UL (ref 149–390)
PMV BLD AUTO: 8.9 FL (ref 8.9–12.7)
POTASSIUM SERPL-SCNC: 5 MMOL/L (ref 3.5–5.3)
POTASSIUM SERPL-SCNC: 5.2 MMOL/L (ref 3.5–5.3)
POTASSIUM SERPL-SCNC: 5.8 MMOL/L (ref 3.5–5.3)
RBC # BLD AUTO: 4.24 MILLION/UL (ref 3.88–5.62)
SODIUM SERPL-SCNC: 135 MMOL/L (ref 135–147)
SODIUM SERPL-SCNC: 137 MMOL/L (ref 135–147)
WBC # BLD AUTO: 9.01 THOUSAND/UL (ref 4.31–10.16)

## 2023-02-11 RX ORDER — DEXTROSE MONOHYDRATE 25 G/50ML
50 INJECTION, SOLUTION INTRAVENOUS ONCE
Status: COMPLETED | OUTPATIENT
Start: 2023-02-11 | End: 2023-02-11

## 2023-02-11 RX ORDER — CALCIUM GLUCONATE 20 MG/ML
1 INJECTION, SOLUTION INTRAVENOUS ONCE
Status: COMPLETED | OUTPATIENT
Start: 2023-02-11 | End: 2023-02-11

## 2023-02-11 RX ORDER — ALBUTEROL SULFATE 2.5 MG/3ML
2.5 SOLUTION RESPIRATORY (INHALATION) EVERY 4 HOURS PRN
Status: DISCONTINUED | OUTPATIENT
Start: 2023-02-11 | End: 2023-02-16 | Stop reason: HOSPADM

## 2023-02-11 RX ORDER — FUROSEMIDE 10 MG/ML
20 INJECTION INTRAMUSCULAR; INTRAVENOUS ONCE
Status: COMPLETED | OUTPATIENT
Start: 2023-02-11 | End: 2023-02-11

## 2023-02-11 RX ORDER — SODIUM CHLORIDE 9 MG/ML
75 INJECTION, SOLUTION INTRAVENOUS CONTINUOUS
Status: DISCONTINUED | OUTPATIENT
Start: 2023-02-11 | End: 2023-02-12

## 2023-02-11 RX ORDER — SODIUM POLYSTYRENE SULFONATE 4.1 MEQ/G
15 POWDER, FOR SUSPENSION ORAL; RECTAL ONCE
Status: COMPLETED | OUTPATIENT
Start: 2023-02-11 | End: 2023-02-11

## 2023-02-11 RX ADMIN — AMLODIPINE BESYLATE 10 MG: 10 TABLET ORAL at 08:35

## 2023-02-11 RX ADMIN — HEPARIN SODIUM 5000 UNITS: 5000 INJECTION INTRAVENOUS; SUBCUTANEOUS at 14:43

## 2023-02-11 RX ADMIN — INSULIN HUMAN 10 UNITS: 100 INJECTION, SOLUTION PARENTERAL at 10:17

## 2023-02-11 RX ADMIN — SODIUM POLYSTYRENE SULFONATE 15 G: 1 POWDER ORAL; RECTAL at 10:17

## 2023-02-11 RX ADMIN — ACETAMINOPHEN 325MG 650 MG: 325 TABLET ORAL at 08:45

## 2023-02-11 RX ADMIN — FUROSEMIDE 20 MG: 10 INJECTION, SOLUTION INTRAVENOUS at 10:16

## 2023-02-11 RX ADMIN — BUPROPION HYDROCHLORIDE 300 MG: 150 TABLET, EXTENDED RELEASE ORAL at 08:35

## 2023-02-11 RX ADMIN — ALBUTEROL SULFATE 2.5 MG: 2.5 SOLUTION RESPIRATORY (INHALATION) at 15:09

## 2023-02-11 RX ADMIN — HEPARIN SODIUM 5000 UNITS: 5000 INJECTION INTRAVENOUS; SUBCUTANEOUS at 22:24

## 2023-02-11 RX ADMIN — SODIUM CHLORIDE 75 ML/HR: 0.9 INJECTION, SOLUTION INTRAVENOUS at 23:22

## 2023-02-11 RX ADMIN — DEXTROSE MONOHYDRATE 50 ML: 25 INJECTION, SOLUTION INTRAVENOUS at 10:17

## 2023-02-11 RX ADMIN — HEPARIN SODIUM 5000 UNITS: 5000 INJECTION INTRAVENOUS; SUBCUTANEOUS at 05:15

## 2023-02-11 RX ADMIN — LEVOTHYROXINE SODIUM 75 MCG: 75 TABLET ORAL at 05:15

## 2023-02-11 RX ADMIN — SODIUM CHLORIDE 75 ML/HR: 0.9 INJECTION, SOLUTION INTRAVENOUS at 10:16

## 2023-02-11 RX ADMIN — ACETAMINOPHEN 325MG 650 MG: 325 TABLET ORAL at 22:26

## 2023-02-11 RX ADMIN — ACETAMINOPHEN 325MG 650 MG: 325 TABLET ORAL at 14:43

## 2023-02-11 RX ADMIN — ESCITALOPRAM OXALATE 20 MG: 10 TABLET ORAL at 08:35

## 2023-02-11 RX ADMIN — CALCIUM GLUCONATE 1 G: 20 INJECTION, SOLUTION INTRAVENOUS at 10:17

## 2023-02-11 NOTE — PLAN OF CARE
Problem: Potential for Falls  Goal: Patient will remain free of falls  Description: INTERVENTIONS:  - Educate patient/family on patient safety including physical limitations  - Instruct patient to call for assistance with activity   - Consult OT/PT to assist with strengthening/mobility   - Keep Call bell within reach  - Keep bed low and locked with side rails adjusted as appropriate  - Keep care items and personal belongings within reach  - Initiate and maintain comfort rounds  - Offer Toileting every 2 Hours, in advance of need  - Consider moving patient to room near nurses station  Outcome: Progressing     Problem: PAIN - ADULT  Goal: Verbalizes/displays adequate comfort level or baseline comfort level  Description: Interventions:  - Encourage patient to monitor pain and request assistance  - Assess pain using appropriate pain scale  - Administer analgesics based on type and severity of pain and evaluate response  - Implement non-pharmacological measures as appropriate and evaluate response  - Consider cultural and social influences on pain and pain management  - Notify physician/advanced practitioner if interventions unsuccessful or patient reports new pain  Outcome: Progressing     Problem: INFECTION - ADULT  Goal: Absence or prevention of progression during hospitalization  Description: INTERVENTIONS:  - Assess and monitor for signs and symptoms of infection  - Monitor lab/diagnostic results  - Monitor all insertion sites, i e  indwelling lines, tubes, and drains  - Monitor endotracheal if appropriate and nasal secretions for changes in amount and color  - Rigby appropriate cooling/warming therapies per order  - Administer medications as ordered  - Instruct and encourage patient and family to use good hand hygiene technique  - Identify and instruct in appropriate isolation precautions for identified infection/condition  Outcome: Progressing  Goal: Absence of fever/infection during neutropenic period  Description: INTERVENTIONS:  - Monitor WBC    Outcome: Progressing     Problem: SAFETY ADULT  Goal: Patient will remain free of falls  Description: INTERVENTIONS:  - Educate patient/family on patient safety including physical limitations  - Instruct patient to call for assistance with activity   - Consult OT/PT to assist with strengthening/mobility   - Keep Call bell within reach  - Keep bed low and locked with side rails adjusted as appropriate  - Keep care items and personal belongings within reach  - Initiate and maintain comfort rounds  - Offer Toileting every 2 Hours, in advance of need  - Consider moving patient to room near nurses station  Outcome: Progressing  Goal: Maintain or return to baseline ADL function  Description: INTERVENTIONS:  -  Assess patient's ability to carry out ADLs; assess patient's baseline for ADL function and identify physical deficits which impact ability to perform ADLs (bathing, care of mouth/teeth, toileting, grooming, dressing, etc )  - Assess/evaluate cause of self-care deficits   - Assess range of motion  - Assess patient's mobility; develop plan if impaired  - Assess patient's need for assistive devices and provide as appropriate  - Encourage maximum independence but intervene and supervise when necessary  - Involve family in performance of ADLs  - Assess for home care needs following discharge   - Consider OT consult to assist with ADL evaluation and planning for discharge  - Provide patient education as appropriate  Outcome: Progressing  Goal: Maintains/Returns to pre admission functional level  Description: INTERVENTIONS:  - Perform BMAT or MOVE assessment daily    - Set and communicate daily mobility goal to care team and patient/family/caregiver  - Collaborate with rehabilitation services on mobility goals if consulted  - Reposition patient every 2 hours    - Record patient progress and toleration of activity level   Outcome: Progressing     Problem: DISCHARGE PLANNING  Goal: Discharge to home or other facility with appropriate resources  Description: INTERVENTIONS:  - Identify barriers to discharge w/patient and caregiver  - Arrange for needed discharge resources and transportation as appropriate  - Identify discharge learning needs (meds, wound care, etc )  - Arrange for interpretive services to assist at discharge as needed  - Refer to Case Management Department for coordinating discharge planning if the patient needs post-hospital services based on physician/advanced practitioner order or complex needs related to functional status, cognitive ability, or social support system  Outcome: Progressing     Problem: Knowledge Deficit  Goal: Patient/family/caregiver demonstrates understanding of disease process, treatment plan, medications, and discharge instructions  Description: Complete learning assessment and assess knowledge base    Interventions:  - Provide teaching at level of understanding  - Provide teaching via preferred learning methods  Outcome: Progressing     Problem: CARDIOVASCULAR - ADULT  Goal: Maintains optimal cardiac output and hemodynamic stability  Description: INTERVENTIONS:  - Monitor I/O, vital signs and rhythm  - Monitor for S/S and trends of decreased cardiac output  - Administer and titrate ordered vasoactive medications to optimize hemodynamic stability  - Assess quality of pulses, skin color and temperature  - Assess for signs of decreased coronary artery perfusion  - Instruct patient to report change in severity of symptoms  Outcome: Progressing  Goal: Absence of cardiac dysrhythmias or at baseline rhythm  Description: INTERVENTIONS:  - Continuous cardiac monitoring, vital signs, obtain 12 lead EKG if ordered  - Administer antiarrhythmic and heart rate control medications as ordered  - Monitor electrolytes and administer replacement therapy as ordered  Outcome: Progressing     Problem: GASTROINTESTINAL - ADULT  Goal: Minimal or absence of nausea and/or vomiting  Description: INTERVENTIONS:  - Administer IV fluids if ordered to ensure adequate hydration  - Maintain NPO status until nausea and vomiting are resolved  - Nasogastric tube if ordered  - Administer ordered antiemetic medications as needed  - Provide nonpharmacologic comfort measures as appropriate  - Advance diet as tolerated, if ordered  - Consider nutrition services referral to assist patient with adequate nutrition and appropriate food choices  Outcome: Progressing  Goal: Maintains or returns to baseline bowel function  Description: INTERVENTIONS:  - Assess bowel function  - Encourage oral fluids to ensure adequate hydration  - Administer IV fluids if ordered to ensure adequate hydration  - Administer ordered medications as needed  - Encourage mobilization and activity  - Consider nutritional services referral to assist patient with adequate nutrition and appropriate food choices  Outcome: Progressing  Goal: Maintains adequate nutritional intake  Description: INTERVENTIONS:  - Monitor percentage of each meal consumed  - Identify factors contributing to decreased intake, treat as appropriate  - Assist with meals as needed  - Monitor I&O, weight, and lab values if indicated  - Obtain nutrition services referral as needed  Outcome: Progressing  Goal: Establish and maintain optimal ostomy function  Description: INTERVENTIONS:  - Assess bowel function  - Encourage oral fluids to ensure adequate hydration  - Administer IV fluids if ordered to ensure adequate hydration   - Administer ordered medications as needed  - Encourage mobilization and activity  - Nutrition services referral to assist patient with appropriate food choices  - Assess stoma site  - Consider wound care consult   Outcome: Progressing  Goal: Oral mucous membranes remain intact  Description: INTERVENTIONS  - Assess oral mucosa and hygiene practices  - Implement preventative oral hygiene regimen  - Implement oral medicated treatments as ordered  - Initiate Nutrition services referral as needed  Outcome: Progressing     Problem: METABOLIC, FLUID AND ELECTROLYTES - ADULT  Goal: Electrolytes maintained within normal limits  Description: INTERVENTIONS:  - Monitor labs and assess patient for signs and symptoms of electrolyte imbalances  - Administer electrolyte replacement as ordered  - Monitor response to electrolyte replacements, including repeat lab results as appropriate  - Instruct patient on fluid and nutrition as appropriate  Outcome: Progressing  Goal: Fluid balance maintained  Description: INTERVENTIONS:  - Monitor labs   - Monitor I/O and WT  - Instruct patient on fluid and nutrition as appropriate  - Assess for signs & symptoms of volume excess or deficit  Outcome: Progressing  Goal: Glucose maintained within target range  Description: INTERVENTIONS:  - Monitor Blood Glucose as ordered  - Assess for signs and symptoms of hyperglycemia and hypoglycemia  - Administer ordered medications to maintain glucose within target range  - Assess nutritional intake and initiate nutrition service referral as needed  Outcome: Progressing     Problem: Nutrition/Hydration-ADULT  Goal: Nutrient/Hydration intake appropriate for improving, restoring or maintaining nutritional needs  Description: Monitor and assess patient's nutrition/hydration status for malnutrition  Collaborate with interdisciplinary team and initiate plan and interventions as ordered  Monitor patient's weight and dietary intake as ordered or per policy  Utilize nutrition screening tool and intervene as necessary  Determine patient's food preferences and provide high-protein, high-caloric foods as appropriate       INTERVENTIONS:  - Monitor oral intake, urinary output, labs, and treatment plans  - Assess nutrition and hydration status and recommend course of action  - Evaluate amount of meals eaten  - Assist patient with eating if necessary   - Allow adequate time for meals  - Recommend/ encourage appropriate diets, oral nutritional supplements, and vitamin/mineral supplements  - Order, calculate, and assess calorie counts as needed  - Recommend, monitor, and adjust tube feedings and TPN/PPN based on assessed needs  - Assess need for intravenous fluids  - Provide specific nutrition/hydration education as appropriate  - Include patient/family/caregiver in decisions related to nutrition  Outcome: Progressing

## 2023-02-11 NOTE — ASSESSMENT & PLAN NOTE
he has baseline creatinine 0 8-0 9  Currently elevated to 2 28 with hyperkalemia  On IV fluid hydration  Stop HCTZ and Ancef  Urinary retention protocol  May also be allergic reaction to Ancef  Improved to 1 6    Continue gentle hydration and also give a dose of Lasix 20 mg IV x1 as patient states he is feeling very puffy and wants to diurese  Kidney Ultrasound normal

## 2023-02-11 NOTE — CASE MANAGEMENT
Case Management Assessment & Discharge Planning Note    Patient name Lizandro Russell  Location [address] [id number] MRN [id number]  : 1955 Date 2023       Current Admission Date: 2/10/2023  Current Admission Diagnosis:ANNALEE (acute kidney injury) [facility])   Patient Active Problem List    Diagnosis Date Noted   • ANNALEE (acute kidney injury) ([facility] [address] ) 02/10/2023   • Hyperkalemia 02/10/2023   • MSSA bacteremia 02/10/2023   • Ulcer of left foot ([facility] [address] ) 2023   • Other emphysema ([facility] [address] ) 2023   • Acute hyponatremia 2023   • Benign essential HTN 2023   • Moderate aortic stenosis 2023   • MDD (major depressive disorder) 2023   • Peripheral neuropathy 2023      LOS (days): 1  Geometric Mean LOS (GMLOS) (days):   Days to GMLOS:     OBJECTIVE:  PATIENT READMITTED TO HOSPITAL  Risk of Unplanned Readmission Score: 12 02         Current admission status: Inpatient       Preferred Pharmacy:   [facility] [address] [address]  Phone: [phone] Fax: [phone]    Primary Care Provider: Kerry Leon MD    Primary Insurance: MEDICARE  Secondary Insurance: BLUE CROSS    ASSESSMENT:  Active Health Care Proxies    There are no active Health Care Proxies on file         Advance Directives  Does patient have a Health Care POA?: Yes  Does patient have Advance Directives?: Yes  Advance Directives: Living will  Primary Contact: Angela Woodruff, spouse         Readmission Root Cause  30 Day Readmission: Yes  Who directed you to return to the hospital?: Family  Did you understand whom to contact if you had questions or problems?: Yes  Did you get your prescriptions before you left the hospital?: Yes  Were you able to get your prescriptions filled when you left the hospital?: Yes  Did you take your medications as prescribed?: Yes  Were you able to get to your follow-up appointments?: Yes  During previous admission, was a post-acute recommendation made?: Yes  What post-acute resources were offered?: [facility]  Patient was readmitted due to: SOB  Action Plan: return home with [facility]    Patient Information  Admitted from[de-identified] Home  Mental Status: Alert  During Assessment patient was accompanied by: Not accompanied during assessment  Assessment information provided by[de-identified] Patient  Primary Caregiver: Self  Support Systems: Spouse/significant other, Mario [address] Dr of Residence: [address] do you live in?: [address] entry access options   Select all that apply : Stairs  Number of steps to enter home : 2  Do the steps have railings?: No  Type of Current Residence: 2 story home  Upon entering residence, is there a bedroom on the main floor (no further steps)?: No  A bedroom is located on the following floor levels of residence (select all that apply):: 2nd Floor  Upon entering residence, is there a bathroom on the main floor (no further steps)?: Yes (full bath)  Number of steps to 2nd floor from main floor: One Flight  In the last 12 months, was there a time when you were not able to pay the mortgage or rent on time?: No  In the last 12 months, how many places have you lived?: 1  In the last 12 months, was there a time when you did not have a steady place to sleep or slept in a shelter (including now)?: No  Homeless/housing insecurity resource given?: Refused  Living Arrangements: Lives w/ Spouse/significant other  Is patient a ?: No    Activities of Daily Living Prior to Admission  Functional Status: Independent  Completes ADLs independently?: Yes  Ambulates independently?: Yes  Does patient use assisted devices?: No  Does patient currently own DME?: No  Does patient have a history of Outpatient Therapy (PT/OT)?: No  Does the patient have a history of Short-Term Rehab?: No  Does patient have a history of HHC?: Yes ([facility])  Does patient currently have [facility]?: Yes ([facility])    Current Home Health Care  Type of Current Home Care Services: Nurse visit  Current Home Health Agency[de-identified] Other (please enter name in comment) ([facility])  [address] [facility] Provider[de-identified] PCP    Patient Information Continued  Income Source: Pension/snf  Does patient have prescription coverage?: Yes  Within the past 12 months, you worried that your food would run out before you got the money to buy more : Never true  Within the past 12 months, the food you bought just didn't last and you didn't have money to get more : Never true  Food insecurity resource given?: N/A  Does patient receive dialysis treatments?: No  Does patient have a history of substance abuse?: No  Does patient have a history of Mental Health Diagnosis?: No         Means of Transportation  Means of Transport to Appts[de-identified] Drives Self  In the past 12 months, has lack of transportation kept you from medical appointments or from getting medications?: No  In the past 12 months, has lack of transportation kept you from meetings, work, or from getting things needed for daily living?: No  Was application for public transport provided?: N/A        DISCHARGE DETAILS:    Discharge planning discussed with[de-identified] patient  Freedom of Choice: Yes  Comments - Freedom of Choice: resumption [facility] for IV antibiotics  CM contacted family/caregiver?: No- see comments (patient declined)                  [address]         Is the patient interested in [facility] at discharge?: Yes  [address] requested[de-identified] [address] Name[de-identified] Other ([facility] [address])  [address] Provider[de-identified] PCP  [address] Needed[de-identified] Administration of IV, IM or SC Medications         Other Referral/Resources/Interventions Provided:  Interventions: HHC  Referral Comments: [facility] and [facility] for IV antibiotics             CM met with patient at the bedside, baseline information was obtained   CM discussed the role of CM in helping the patient develop a discharge plan and assist the patient in carry out their plan  Patient lives at home in 2 story house with spouse, independent at baseline  Patient has [facility] for IV antibiotic (Ancef) due to bacteremia and antibiotics through [address]  CM to follow for discharge referral needs  CM submitted AIDIN referrals to [facility] and [address] for notification of admission and resumption of care upon discharge

## 2023-02-11 NOTE — PLAN OF CARE
Problem: Potential for Falls  Goal: Patient will remain free of falls  Description: INTERVENTIONS:  - Educate patient/family on patient safety including physical limitations  - Instruct patient to call for assistance with activity   - Consult OT/PT to assist with strengthening/mobility   - Keep Call bell within reach  - Keep bed low and locked with side rails adjusted as appropriate  - Keep care items and personal belongings within reach  - Initiate and maintain comfort rounds  - Make Fall Risk Sign visible to staff  - Offer Toileting every  Hours, in advance of need  - Initiate/Maintain alarm  - Obtain necessary fall risk management equipment:   - Apply yellow socks and bracelet for high fall risk patients  - Consider moving patient to room near nurses station  Outcome: Progressing     Problem: PAIN - ADULT  Goal: Verbalizes/displays adequate comfort level or baseline comfort level  Description: Interventions:  - Encourage patient to monitor pain and request assistance  - Assess pain using appropriate pain scale  - Administer analgesics based on type and severity of pain and evaluate response  - Implement non-pharmacological measures as appropriate and evaluate response  - Consider cultural and social influences on pain and pain management  - Notify physician/advanced practitioner if interventions unsuccessful or patient reports new pain  Outcome: Progressing     Problem: INFECTION - ADULT  Goal: Absence or prevention of progression during hospitalization  Description: INTERVENTIONS:  - Assess and monitor for signs and symptoms of infection  - Monitor lab/diagnostic results  - Monitor all insertion sites, i e  indwelling lines, tubes, and drains  - Monitor endotracheal if appropriate and nasal secretions for changes in amount and color  - Etta appropriate cooling/warming therapies per order  - Administer medications as ordered  - Instruct and encourage patient and family to use good hand hygiene technique  - Identify and instruct in appropriate isolation precautions for identified infection/condition  Outcome: Progressing  Goal: Absence of fever/infection during neutropenic period  Description: INTERVENTIONS:  - Monitor WBC    Outcome: Progressing     Problem: SAFETY ADULT  Goal: Patient will remain free of falls  Description: INTERVENTIONS:  - Educate patient/family on patient safety including physical limitations  - Instruct patient to call for assistance with activity   - Consult OT/PT to assist with strengthening/mobility   - Keep Call bell within reach  - Keep bed low and locked with side rails adjusted as appropriate  - Keep care items and personal belongings within reach  - Initiate and maintain comfort rounds  - Make Fall Risk Sign visible to staff  - Offer Toileting every  Hours, in advance of need  - Initiate/Maintain alarm  - Obtain necessary fall risk management equipment:   - Apply yellow socks and bracelet for high fall risk patients  - Consider moving patient to room near nurses station  Outcome: Progressing  Goal: Maintain or return to baseline ADL function  Description: INTERVENTIONS:  -  Assess patient's ability to carry out ADLs; assess patient's baseline for ADL function and identify physical deficits which impact ability to perform ADLs (bathing, care of mouth/teeth, toileting, grooming, dressing, etc )  - Assess/evaluate cause of self-care deficits   - Assess range of motion  - Assess patient's mobility; develop plan if impaired  - Assess patient's need for assistive devices and provide as appropriate  - Encourage maximum independence but intervene and supervise when necessary  - Involve family in performance of ADLs  - Assess for home care needs following discharge   - Consider OT consult to assist with ADL evaluation and planning for discharge  - Provide patient education as appropriate  Outcome: Progressing  Goal: Maintains/Returns to pre admission functional level  Description: INTERVENTIONS:  - Perform BMAT or MOVE assessment daily    - Set and communicate daily mobility goal to care team and patient/family/caregiver  - Collaborate with rehabilitation services on mobility goals if consulted  - Perform Range of Motion  times a day  - Reposition patient every  hours  - Dangle patient  times a day  - Stand patient times a day  - Ambulate patient times a day  - Out of bed to chair  times a day   - Out of bed for meals  times a day  - Out of bed for toileting  - Record patient progress and toleration of activity level   Outcome: Progressing     Problem: DISCHARGE PLANNING  Goal: Discharge to home or other facility with appropriate resources  Description: INTERVENTIONS:  - Identify barriers to discharge w/patient and caregiver  - Arrange for needed discharge resources and transportation as appropriate  - Identify discharge learning needs (meds, wound care, etc )  - Arrange for interpretive services to assist at discharge as needed  - Refer to Case Management Department for coordinating discharge planning if the patient needs post-hospital services based on physician/advanced practitioner order or complex needs related to functional status, cognitive ability, or social support system  Outcome: Progressing     Problem: Knowledge Deficit  Goal: Patient/family/caregiver demonstrates understanding of disease process, treatment plan, medications, and discharge instructions  Description: Complete learning assessment and assess knowledge base    Interventions:  - Provide teaching at level of understanding  - Provide teaching via preferred learning methods  Outcome: Progressing     Problem: CARDIOVASCULAR - ADULT  Goal: Maintains optimal cardiac output and hemodynamic stability  Description: INTERVENTIONS:  - Monitor I/O, vital signs and rhythm  - Monitor for S/S and trends of decreased cardiac output  - Administer and titrate ordered vasoactive medications to optimize hemodynamic stability  - Assess quality of pulses, skin color and temperature  - Assess for signs of decreased coronary artery perfusion  - Instruct patient to report change in severity of symptoms  Outcome: Progressing  Goal: Absence of cardiac dysrhythmias or at baseline rhythm  Description: INTERVENTIONS:  - Continuous cardiac monitoring, vital signs, obtain 12 lead EKG if ordered  - Administer antiarrhythmic and heart rate control medications as ordered  - Monitor electrolytes and administer replacement therapy as ordered  Outcome: Progressing     Problem: GASTROINTESTINAL - ADULT  Goal: Minimal or absence of nausea and/or vomiting  Description: INTERVENTIONS:  - Administer IV fluids if ordered to ensure adequate hydration  - Maintain NPO status until nausea and vomiting are resolved  - Nasogastric tube if ordered  - Administer ordered antiemetic medications as needed  - Provide nonpharmacologic comfort measures as appropriate  - Advance diet as tolerated, if ordered  - Consider nutrition services referral to assist patient with adequate nutrition and appropriate food choices  Outcome: Progressing  Goal: Maintains or returns to baseline bowel function  Description: INTERVENTIONS:  - Assess bowel function  - Encourage oral fluids to ensure adequate hydration  - Administer IV fluids if ordered to ensure adequate hydration  - Administer ordered medications as needed  - Encourage mobilization and activity  - Consider nutritional services referral to assist patient with adequate nutrition and appropriate food choices  Outcome: Progressing  Goal: Maintains adequate nutritional intake  Description: INTERVENTIONS:  - Monitor percentage of each meal consumed  - Identify factors contributing to decreased intake, treat as appropriate  - Assist with meals as needed  - Monitor I&O, weight, and lab values if indicated  - Obtain nutrition services referral as needed  Outcome: Progressing  Goal: Establish and maintain optimal ostomy function  Description: INTERVENTIONS:  - Assess bowel function  - Encourage oral fluids to ensure adequate hydration  - Administer IV fluids if ordered to ensure adequate hydration   - Administer ordered medications as needed  - Encourage mobilization and activity  - Nutrition services referral to assist patient with appropriate food choices  - Assess stoma site  - Consider wound care consult   Outcome: Progressing  Goal: Oral mucous membranes remain intact  Description: INTERVENTIONS  - Assess oral mucosa and hygiene practices  - Implement preventative oral hygiene regimen  - Implement oral medicated treatments as ordered  - Initiate Nutrition services referral as needed  Outcome: Progressing     Problem: METABOLIC, FLUID AND ELECTROLYTES - ADULT  Goal: Electrolytes maintained within normal limits  Description: INTERVENTIONS:  - Monitor labs and assess patient for signs and symptoms of electrolyte imbalances  - Administer electrolyte replacement as ordered  - Monitor response to electrolyte replacements, including repeat lab results as appropriate  - Instruct patient on fluid and nutrition as appropriate  Outcome: Progressing  Goal: Fluid balance maintained  Description: INTERVENTIONS:  - Monitor labs   - Monitor I/O and WT  - Instruct patient on fluid and nutrition as appropriate  - Assess for signs & symptoms of volume excess or deficit  Outcome: Progressing  Goal: Glucose maintained within target range  Description: INTERVENTIONS:  - Monitor Blood Glucose as ordered  - Assess for signs and symptoms of hyperglycemia and hypoglycemia  - Administer ordered medications to maintain glucose within target range  - Assess nutritional intake and initiate nutrition service referral as needed  Outcome: Progressing     Problem: Nutrition/Hydration-ADULT  Goal: Nutrient/Hydration intake appropriate for improving, restoring or maintaining nutritional needs  Description: Monitor and assess patient's nutrition/hydration status for malnutrition  Collaborate with interdisciplinary team and initiate plan and interventions as ordered  Monitor patient's weight and dietary intake as ordered or per policy  Utilize nutrition screening tool and intervene as necessary  Determine patient's food preferences and provide high-protein, high-caloric foods as appropriate       INTERVENTIONS:  - Monitor oral intake, urinary output, labs, and treatment plans  - Assess nutrition and hydration status and recommend course of action  - Evaluate amount of meals eaten  - Assist patient with eating if necessary   - Allow adequate time for meals  - Recommend/ encourage appropriate diets, oral nutritional supplements, and vitamin/mineral supplements  - Order, calculate, and assess calorie counts as needed  - Recommend, monitor, and adjust tube feedings and TPN/PPN based on assessed needs  - Assess need for intravenous fluids  - Provide specific nutrition/hydration education as appropriate  - Include patient/family/caregiver in decisions related to nutrition  Outcome: Progressing

## 2023-02-11 NOTE — PROGRESS NOTES
[address]  Progress Note - Chance Cordova 1955, 69 y o  male MRN: [id number]  Unit/Bed#: -01 Encounter: [id number]  Primary Care Provider: Glenda Reynoso MD   Date and time admitted to hospital: 2/10/2023 11:23 AM    * ANNALEE (acute kidney injury) [facility])  Assessment & Plan  he has baseline creatinine 0 8-0 9  Currently elevated to 2 28 with hyperkalemia  On IV fluid hydration  Stop HCTZ and Ancef  Urinary retention protocol  May also be allergic reaction to Ancef  Improved to 1 6  Continue gentle hydration and also give a dose of Lasix 20 mg IV x1 as patient states he is feeling very puffy and wants to diurese  Kidney Ultrasound normal    Hyperkalemia  Assessment & Plan  Potassium 6 1  EKG no changes noted so far  Received calcium gluconate, insulin dextrose and Kayexalate  Repeat BMP this afternoon and again in the morning  Hold oral potassium supplementation  2/11: Calcium again elevated to 5 8 this morning  We will repeat calcium gluconate insulin, dextrose, Kayexalate and also give a dose of IV Lasix and continue IV fluid hydration  Further as per nephrology  Repeat potassium at 4 PM and continue telemetry monitoring  So far no EKG changes noted due to hyperkalemia      MSSA bacteremia  Assessment & Plan  History of MSSA bacteremia  He was recently discharged from the hospital with MSSA bacteremia and placed on a 6-week course of IV Ancef and it was concern for left toe ulcer with early osteomyelitis as the cause of the infection  Patient now presents with acute kidney injury and hyperkalemia  unClear whether it is secondary to oral potassium supplementation versus reaction to Ancef  Hold Ancef for now  Placed on gentle hydration  Consult placed to infectious disease and podiatry to determine if further antibiotics are required for the foot and duration  Ulcer of left foot [facility])  Assessment & Plan  Continue conservative management    Consult placed to podiatry    MDD (major depressive disorder)  Assessment & Plan  stable for now  Continue home meds    Benign essential HTN  Assessment & Plan  Continue Norvasc but hold HCTZ due to mild hyponatremia and ANNALEE    VTE Pharmacologic Prophylaxis:   Pharmacologic: Heparin  Mechanical VTE Prophylaxis in Place: Yes    Patient Centered Rounds: I have performed bedside rounds with nursing staff today  Discussions with Specialists or Other Care Team Provider: We will discuss with nephrology    Education and Discussions with Family / Patient: Discussed with patient at bedside    Time Spent for Care: 45 minutes  More than 50% of total time spent on counseling and coordination of care as described above  Current Length of Stay: 1 day(s)    Current Patient Status: Inpatient   Certification Statement: The patient will continue to require additional inpatient hospital stay due to Acute hyperkalemia    Discharge Plan: We will be in the hospital at least 48 hours    Code Status: Level 1 - Full Code      Subjective:   Patient denies any chest pain but complaining of swelling in his face and his belly he feels swollen all over and not urinating as much as he wants to    Objective:     Vitals:   Temp (24hrs), Av 8 °F (36 6 °C), Min:97 °F (36 1 °C), Max:98 1 °F (36 7 °C)    Temp:  [97 °F (36 1 °C)-98 1 °F (36 7 °C)] 98 1 °F (36 7 °C)  HR:  [58-81] 68  Resp:  [13-22] 18  BP: (128-172)/(66-79) 152/74  SpO2:  [95 %] 95 %  Body mass index is 30 87 kg/m²  Input and Output Summary (last 24 hours): Intake/Output Summary (Last 24 hours) at 2023 1008  Last data filed at 2023 0900  Gross per 24 hour   Intake 1569 58 ml   Output 450 ml   Net 1119 58 ml       Physical Exam:     Physical Exam  Vitals and nursing note reviewed  Constitutional:       Appearance: Normal appearance  HENT:      Head: Normocephalic and atraumatic        Right Ear: External ear normal       Left Ear: External ear normal       Nose: Nose normal       Mouth/Throat:      Pharynx: Oropharynx is clear  Eyes:      Pupils: Pupils are equal, round, and reactive to light  Cardiovascular:      Rate and Rhythm: Normal rate and regular rhythm  Heart sounds: Normal heart sounds  Pulmonary:      Effort: Pulmonary effort is normal       Comments: Mild decreased breath sounds b/l bases  Abdominal:      General: Bowel sounds are normal       Palpations: Abdomen is soft  Tenderness: There is no abdominal tenderness  Musculoskeletal:         General: Normal range of motion  Cervical back: Normal range of motion and neck supple  Comments: Trace edema b/l le   Skin:     General: Skin is warm and dry  Capillary Refill: Capillary refill takes less than 2 seconds  Neurological:      General: No focal deficit present  Mental Status: He is alert and oriented to person, place, and time  Psychiatric:         Mood and Affect: Mood normal            Additional Data:     Labs:    Results from last 7 days   Lab Units 02/11/23  0530 02/10/23  1143   WBC Thousand/uL 9 01 10 34*   HEMOGLOBIN g/dL 13 4 12 2   HEMATOCRIT % 40 5 37 9   PLATELETS Thousands/uL 327 366   NEUTROS PCT %  --  67   LYMPHS PCT %  --  15   MONOS PCT %  --  12   EOS PCT %  --  4     Results from last 7 days   Lab Units 02/11/23  0530 02/10/23  1822 02/10/23  1143   SODIUM mmol/L 135   < > 133*   POTASSIUM mmol/L 5 8*   < > 6 1*   CHLORIDE mmol/L 110*   < > 108   CO2 mmol/L 24   < > 20*   BUN mg/dL 63*   < > 72*   CREATININE mg/dL 1 66*   < > 2 28*   ANION GAP mmol/L 1*   < > 5   CALCIUM mg/dL 9 0   < > 9 1   ALBUMIN g/dL  --   --  3 1*   TOTAL BILIRUBIN mg/dL  --   --  0 27   ALK PHOS U/L  --   --  47   ALT U/L  --   --  <3*   AST U/L  --   --  11*   GLUCOSE RANDOM mg/dL 93   < > 91    < > = values in this interval not displayed           Results from last 7 days   Lab Units 02/10/23  2002 02/10/23  1440 02/10/23  1426 02/10/23  1402   POC GLUCOSE mg/dl 101 149* 138 48* * I Have Reviewed All Lab Data Listed Above  * Additional Pertinent Lab Tests Reviewed: Allegra 66 Admission Reviewed    Imaging:    Imaging Reports Reviewed Today Include: Kidney ultrasound  Imaging Personally Reviewed by Myself Includes:  none    Recent Cultures (last 7 days):           Last 24 Hours Medication List:   Current Facility-Administered Medications   Medication Dose Route Frequency Provider Last Rate   • acetaminophen  650 mg Oral Q6H PRN Blaine Benitez MD     • albuterol  2 5 mg Nebulization Q4H PRN Karyndavion Martinez, CRNP     • amLODIPine  10 mg Oral Daily Blaine Benitez MD     • buPROPion  300 mg Oral Daily Blaine Benitez MD     • calcium gluconate  1 g Intravenous Once Blaine Benitez MD     • dextrose  50 mL Intravenous Once Blaine Benitez MD     • escitalopram  20 mg Oral Daily Blaine Benitez MD     • furosemide  20 mg Intravenous Once Blaine Benitez MD     • heparin (porcine)  5,000 Units Subcutaneous Q8H [facility] Blaine Benitez MD     • insulin regular  10 Units Intravenous Once Blaine Benitez MD     • levothyroxine  75 mcg Oral Early Morning Blaine Benitez MD     • nicotine  1 patch Transdermal Daily Blaine Benitez MD     • ondansetron  4 mg Intravenous Q6H PRN Blaine Benitez MD     • sodium chloride  75 mL/hr Intravenous Continuous Blaine Benitez MD     • sodium polystyrene  15 g Oral Once Blaine Benitez MD          Today, Patient Was Seen By: Blaine Benitez MD    ** Please Note: Dictation voice to text software may have been used in the creation of this document   **

## 2023-02-11 NOTE — CONSULTS
REQUIRED DOCUMENTATION:     1  This service was provided via Telemedicine  2  Provider located at [facility]  3  TeleMed provider: Josefine Kawasaki, DPM   4  Identify all parties in room with patient during tele consult: None  5  Patient was then informed that this was a Telemedicine visit and that the exam was being conducted confidentially over secure lines  My office door was closed  No one else was in the room  Patient acknowledged consent and understanding of privacy and security of the Telemedicine visit, and gave us permission to have the assistant stay in the room in order to assist with the history and to conduct the exam   I informed the patient that I have reviewed their record in Epic and presented the opportunity for them to ask any questions regarding the visit today  The patient agreed to participate  Consult - Podiatry   Fermin Alberts 69 y o  male MRN: [id number]  Unit/Bed#: -01 Encounter: [id number]    Assessment/Plan     Assessment:  1  Left plantar hallux IPJ ulceration  - MRI left foot 2/3/23: "Small focus of bone marrow edema at the proximal aspect of the great toe distal phalanx without confluent decreased T1 marrow signal, nonspecific as described above  No confluent decreased T1 marrow signal to definitively suggest osteomyelitis"  2  H/o MSSA bacteremia (#1 likely portal of entry)  3  PN    Plan:  - MRI reviewed as above, stress rxn vs possible early OM however not definitive  Per literature, aggressive offloading and LWC recommended  C/w [address] at this time (dermagran dsd)  - I briefly discussed bowen arthroplasty vs amputation if ulceration were not to heal  - IV abx per ID  - Thank you for consultation, please feel free to reach out for questions/concerns or if acute changes area noted to feet  History of Present Illness     HPI:  Fermin Alberts is a 69 y o  male admitted for ANNALEE presenting as consultation for left 1st toe ulceration   Patient was recently admitted due to sepsis/bacteremia with portal likely toe ulcer  MRI left foot without definitive OM  He was placed on 6skx IV abx for bacteremia by ID which is possibly causing ANNALEE  Notes he went to his outpt podiatrist this past Thursday and everything looked good  Inpatient consult to Podiatry  Consult performed by: Arthor Sever, DPM  Consult ordered by: Angel Wei MD        Review of Systems   Constitutional: Negative  HENT: Negative  Eyes: Negative  Respiratory: Negative  Cardiovascular: Negative  Gastrointestinal: Negative  Musculoskeletal: Neg   Skin:L 1st toe wound   Neurological: PN    Psych: negative         Historical Information   Past Medical History:   Diagnosis Date   • Anxiety    • Aortic stenosis    • Depression    • Hypertension    • Hypothyroidism    • PVCs (premature ventricular contractions)      Past Surgical History:   Procedure Laterality Date   • APPENDECTOMY     • BACK SURGERY     • TOTAL SHOULDER REPLACEMENT       Social History   Social History     Substance and Sexual Activity   Alcohol Use Not Currently     Social History     Substance and Sexual Activity   Drug Use Not Currently    Comment: sporadic in young adulthood     Social History     Tobacco Use   Smoking Status Light Smoker   • Types: Cigarettes   Smokeless Tobacco Never     Family History:   Family History   Problem Relation Age of Onset   • Dementia Mother    • Blindness Mother    • Cancer Mother    • Dementia Father        Meds/Allergies   Medications Prior to Admission   Medication   • ceFAZolin (ANCEF) 2000 mg IVPB   • amLODIPine (NORVASC) 10 mg tablet   • buPROPion (WELLBUTRIN XL) 300 mg 24 hr tablet   • escitalopram (LEXAPRO) 20 mg tablet   • gabapentin (NEURONTIN) 100 mg capsule   • hydrochlorothiazide (HYDRODIURIL) 25 mg tablet   • levothyroxine 75 mcg tablet   • magnesium Oxide (MAG-OX) 400 mg TABS   • meloxicam (Mobic) 15 mg tablet   • methocarbamol (ROBAXIN) 500 mg tablet   • potassium chloride (K-DUR,KLOR-CON) 20 mEq tablet     No Known Allergies    Objective   First Vitals:   Blood Pressure: 164/76 (02/10/23 1127)  Pulse: 67 (02/10/23 1127)  Temperature: (!) 97 °F (36 1 °C) (02/10/23 1127)  Temp Source: Temporal (02/10/23 1127)  Respirations: 22 (02/10/23 1127)  Height: 6' (182 9 cm) (02/10/23 1508)  Weight - Scale: 104 kg (229 lb 11 5 oz) (02/10/23 1127)  SpO2: 95 % (02/10/23 1127)    Current Vitals:   Blood Pressure: 152/74 (02/11/23 0835)  Pulse: 68 (02/11/23 0835)  Temperature: 98 1 °F (36 7 °C) (02/11/23 0727)  Temp Source: Temporal (02/10/23 1127)  Respirations: 18 (02/11/23 0727)  Height: 6' (182 9 cm) (02/10/23 1508)  Weight - Scale: 103 kg (227 lb 9 6 oz) (02/11/23 0600)  SpO2: 95 % (02/11/23 0727)        /74   Pulse 68   Temp 98 1 °F (36 7 °C)   Resp 18   Ht 6' (1 829 m)   Wt 103 kg (227 lb 9 6 oz)   SpO2 95%   BMI 30 87 kg/m²      General Appearance:    Alert, cooperative, no distress   Head:    Normocephalic, without obvious abnormality, atraumatic   Eyes:    PERRL, conjunctiva/corneas clear, EOM's intact        Nose:   Moist mucous membranes   Neck:   Supple, symmetrical, trachea midline   Back:     Symmetric   Lungs:     Respirations unlabored   Heart:    Regular rate and rhythm, S1 and S2 normal, no murmur, rub   or gallop   Abdomen:     Soft, non-tender    B/L LE EXAM   Extremities:   Plantarflexion of left hallux at IPJ with dec MTPJ ROM  Pain neg  Pulses:   B/L DP & PT pulses palpable  Legs to toes warm to warm, pedal hair absent  Skin:   Right submet 3 HPK  Left plantar hallux IPJ ulceration appears stable without acute SOI per image  Exam limited due to remote  Neurologic:   Gross sensation is diminished to feet  Protective sensation is absent to B/L feet  + N/T/B to B/L feet                 Lab Results:   Admission on 02/10/2023   Component Date Value   • WBC 02/10/2023 10 34 (H)    • RBC 02/10/2023 3 88    • Hemoglobin 02/10/2023 12 2    • Hematocrit 02/10/2023 37 9    • MCV 02/10/2023 98    • MCH 02/10/2023 31 4    • MCHC 02/10/2023 32 2    • RDW 02/10/2023 13 1    • MPV 02/10/2023 8 8 (L)    • Platelets 90/30/1052 366    • nRBC 02/10/2023 0    • Neutrophils Relative 02/10/2023 67    • Immat GRANS % 02/10/2023 1    • Lymphocytes Relative 02/10/2023 15    • Monocytes Relative 02/10/2023 12    • Eosinophils Relative 02/10/2023 4    • Basophils Relative 02/10/2023 1    • Neutrophils Absolute 02/10/2023 7 02    • Immature Grans Absolute 02/10/2023 0 07    • Lymphocytes Absolute 02/10/2023 1 53    • Monocytes Absolute 02/10/2023 1 27 (H)    • Eosinophils Absolute 02/10/2023 0 39    • Basophils Absolute 02/10/2023 0 06    • Sodium 02/10/2023 133 (L)    • Potassium 02/10/2023 6 1 (H)    • Chloride 02/10/2023 108    • CO2 02/10/2023 20 (L)    • ANION GAP 02/10/2023 5    • BUN 02/10/2023 72 (H)    • Creatinine 02/10/2023 2 28 (H)    • Glucose 02/10/2023 91    • Calcium 02/10/2023 9 1    • Corrected Calcium 02/10/2023 9 8    • AST 02/10/2023 11 (L)    • ALT 02/10/2023 <3 (L)    • Alkaline Phosphatase 02/10/2023 47    • Total Protein 02/10/2023 6 5    • Albumin 02/10/2023 3 1 (L)    • Total Bilirubin 02/10/2023 0 27    • eGFR 02/10/2023 28    • D-Dimer, Quant 02/10/2023 1 08 (H)    • hs TnI 0hr 02/10/2023 15    • BNP 02/10/2023 346 (H)    • hs TnI 2hr 02/10/2023 14    • Delta 2hr hsTnI 02/10/2023 -1    • hs TnI 4hr 02/10/2023 16    • Delta 4hr hsTnI 02/10/2023 1    • POC Glucose 02/10/2023 48 (L)    • POC Glucose 02/10/2023 138    • POC Glucose 02/10/2023 149 (H)    • Sodium 02/10/2023 135    • Potassium 02/10/2023 6 3 (HH)    • Chloride 02/10/2023 108    • CO2 02/10/2023 24    • ANION GAP 02/10/2023 3 (L)    • BUN 02/10/2023 72 (H)    • Creatinine 02/10/2023 2 12 (H)    • Glucose 02/10/2023 130    • Calcium 02/10/2023 9 4    • eGFR 02/10/2023 30    • POC Glucose 02/10/2023 101    • Sodium 02/11/2023 137    • Potassium 02/11/2023 5 0    • Chloride 02/11/2023 114 (H)    • CO2 02/11/2023 20 (L)    • ANION GAP 02/11/2023 3 (L)    • BUN 02/11/2023 59 (H)    • Creatinine 02/11/2023 1 50 (H)    • Glucose 02/11/2023 86    • Calcium 02/11/2023 7 8 (L)    • eGFR 02/11/2023 46    • WBC 02/11/2023 9 01    • RBC 02/11/2023 4 24    • Hemoglobin 02/11/2023 13 4    • Hematocrit 02/11/2023 40 5    • MCV 02/11/2023 96    • MCH 02/11/2023 31 6    • MCHC 02/11/2023 33 1    • RDW 02/11/2023 12 9    • Platelets 63/47/3159 327    • MPV 02/11/2023 8 9    • Sodium 02/11/2023 135    • Potassium 02/11/2023 5 8 (H)    • Chloride 02/11/2023 110 (H)    • CO2 02/11/2023 24    • ANION GAP 02/11/2023 1 (L)    • BUN 02/11/2023 63 (H)    • Creatinine 02/11/2023 1 66 (H)    • Glucose 02/11/2023 93    • Calcium 02/11/2023 9 0    • eGFR 02/11/2023 41                    Invalid input(s): LABAEARO            Imaging: I have personally reviewed pertinent films in PACS  EKG, Pathology, and Other Studies: I have personally reviewed pertinent reports        Code Status: Level 1 - Full Code  Advance Directive and Living Will:      Power of :    POLST:

## 2023-02-11 NOTE — ASSESSMENT & PLAN NOTE
Potassium 6 1  EKG no changes noted so far  Received calcium gluconate, insulin dextrose and Kayexalate  Repeat BMP this afternoon and again in the morning  Hold oral potassium supplementation  2/11: Calcium again elevated to 5 8 this morning  We will repeat calcium gluconate insulin, dextrose, Kayexalate and also give a dose of IV Lasix and continue IV fluid hydration  Further as per nephrology  Repeat potassium at 4 PM and continue telemetry monitoring    So far no EKG changes noted due to hyperkalemia

## 2023-02-12 PROBLEM — R06.02 SHORTNESS OF BREATH: Status: ACTIVE | Noted: 2023-02-12

## 2023-02-12 LAB
ANION GAP SERPL CALCULATED.3IONS-SCNC: 2 MMOL/L (ref 4–13)
APTT PPP: 29 SECONDS (ref 23–37)
APTT PPP: 58 SECONDS (ref 23–37)
ATRIAL RATE: 61 BPM
BUN SERPL-MCNC: 58 MG/DL (ref 5–25)
CALCIUM SERPL-MCNC: 9.1 MG/DL (ref 8.4–10.2)
CHLORIDE SERPL-SCNC: 111 MMOL/L (ref 96–108)
CO2 SERPL-SCNC: 23 MMOL/L (ref 21–32)
CREAT SERPL-MCNC: 1.48 MG/DL (ref 0.6–1.3)
GFR SERPL CREATININE-BSD FRML MDRD: 47 ML/MIN/1.73SQ M
GLUCOSE SERPL-MCNC: 99 MG/DL (ref 65–140)
INR PPP: 0.96 (ref 0.84–1.19)
P AXIS: 57 DEGREES
POTASSIUM SERPL-SCNC: 5.3 MMOL/L (ref 3.5–5.3)
PR INTERVAL: 178 MS
PROTHROMBIN TIME: 12.9 SECONDS (ref 11.6–14.5)
QRS AXIS: 65 DEGREES
QRSD INTERVAL: 88 MS
QT INTERVAL: 408 MS
QTC INTERVAL: 410 MS
SODIUM SERPL-SCNC: 136 MMOL/L (ref 135–147)
T WAVE AXIS: 56 DEGREES
VENTRICULAR RATE: 61 BPM

## 2023-02-12 RX ORDER — HEPARIN SODIUM 1000 [USP'U]/ML
4200 INJECTION, SOLUTION INTRAVENOUS; SUBCUTANEOUS EVERY 6 HOURS PRN
Status: DISCONTINUED | OUTPATIENT
Start: 2023-02-12 | End: 2023-02-13

## 2023-02-12 RX ORDER — HEPARIN SODIUM 1000 [USP'U]/ML
8400 INJECTION, SOLUTION INTRAVENOUS; SUBCUTANEOUS EVERY 6 HOURS PRN
Status: DISCONTINUED | OUTPATIENT
Start: 2023-02-12 | End: 2023-02-13

## 2023-02-12 RX ORDER — HEPARIN SODIUM 10000 [USP'U]/100ML
3-30 INJECTION, SOLUTION INTRAVENOUS
Status: DISCONTINUED | OUTPATIENT
Start: 2023-02-12 | End: 2023-02-13

## 2023-02-12 RX ORDER — FUROSEMIDE 10 MG/ML
40 INJECTION INTRAMUSCULAR; INTRAVENOUS ONCE
Status: COMPLETED | OUTPATIENT
Start: 2023-02-12 | End: 2023-02-12

## 2023-02-12 RX ORDER — SIMETHICONE 80 MG
80 TABLET,CHEWABLE ORAL
Status: DISPENSED | OUTPATIENT
Start: 2023-02-12 | End: 2023-02-14

## 2023-02-12 RX ORDER — LANOLIN ALCOHOL/MO/W.PET/CERES
6 CREAM (GRAM) TOPICAL
Status: DISCONTINUED | OUTPATIENT
Start: 2023-02-12 | End: 2023-02-16 | Stop reason: HOSPADM

## 2023-02-12 RX ADMIN — LEVOTHYROXINE SODIUM 75 MCG: 75 TABLET ORAL at 04:48

## 2023-02-12 RX ADMIN — HEPARIN SODIUM 20 UNITS/KG/HR: 10000 INJECTION, SOLUTION INTRAVENOUS at 20:04

## 2023-02-12 RX ADMIN — ACETAMINOPHEN 325MG 650 MG: 325 TABLET ORAL at 14:22

## 2023-02-12 RX ADMIN — HEPARIN SODIUM 18 UNITS/KG/HR: 10000 INJECTION, SOLUTION INTRAVENOUS at 12:49

## 2023-02-12 RX ADMIN — ACETAMINOPHEN 325MG 650 MG: 325 TABLET ORAL at 05:00

## 2023-02-12 RX ADMIN — ESCITALOPRAM OXALATE 20 MG: 10 TABLET ORAL at 08:20

## 2023-02-12 RX ADMIN — SIMETHICONE 80 MG: 80 TABLET, CHEWABLE ORAL at 11:45

## 2023-02-12 RX ADMIN — AMLODIPINE BESYLATE 10 MG: 10 TABLET ORAL at 08:20

## 2023-02-12 RX ADMIN — HEPARIN SODIUM 5000 UNITS: 5000 INJECTION INTRAVENOUS; SUBCUTANEOUS at 04:48

## 2023-02-12 RX ADMIN — SIMETHICONE 80 MG: 80 TABLET, CHEWABLE ORAL at 16:58

## 2023-02-12 RX ADMIN — MELATONIN 6 MG: 3 TAB ORAL at 21:53

## 2023-02-12 RX ADMIN — FUROSEMIDE 40 MG: 10 INJECTION, SOLUTION INTRAMUSCULAR; INTRAVENOUS at 09:36

## 2023-02-12 RX ADMIN — BUPROPION HYDROCHLORIDE 300 MG: 150 TABLET, EXTENDED RELEASE ORAL at 08:20

## 2023-02-12 RX ADMIN — HEPARIN SODIUM 4200 UNITS: 1000 INJECTION INTRAVENOUS; SUBCUTANEOUS at 19:59

## 2023-02-12 NOTE — ASSESSMENT & PLAN NOTE
Suspect secondary to mild fluid overload but also cannot rule out pulmonary embolism at this time  Will do CTA chest due to renal dysfunction    We will get VQ scan and venous Doppler bilateral lower extremity tomorrow we will tentatively place him on a heparin drip for now

## 2023-02-12 NOTE — PROGRESS NOTES
Progress Note - Nephrology   Kayce Payan 69 y o  male MRN: [id number]  Unit/Bed#: -01 Encounter: [id number]       A/P:  1  Acute kidney injury on top of chronic kidney disease present on admission              Continues to improve, patient's creatinine is down to 1 40 mg/dL, continue to avoid other potential nephrotoxins and continue supportive care  Patient no longer on IV fluids at this time  2   Chronic disease stage II with a baseline creatinine between 0 9-1 mg/dL  3  Hypertension in the setting of chronic kidney disease stage II              Blood pressure okay, continue with amlodipine, reassess blood pressure throughout the day  4   Hyperkalemia              Potassium at 5 3 mmol/L, continue to monitor, avoid high potassium foods, avoid medications known to increase potassium levels such as ACE inhibitor/patient's receptor blocker  5  Shortness of breath   Continue care as indicated according to hospitalist   Reviewed with the team, patient may be placed on a heparin drip for the potential of a pulmonary embolus  We considered the use of a CT scan with contrast, which may be a viable option depending on how he progresses clinically, however patient will most likely have a VQ scan performed tomorrow, February 13, and in the meantime placed on heparin drip for potential pulmonary embolus  6  MSSA bacteremia   Reinitiation of cefazolin as indicated according to hospitalist recommendations  7   Bloating   Patient is having regular bowel movements, including 1 earlier this morning, but Gas-X for his personal satisfaction      Follow up reason for today's visit: Acute kidney injury/chronic kidney disease/hypertension/hyperkalemia    ANNALEE (acute kidney injury) [facility])    Patient Active Problem List   Diagnosis   • Acute hyponatremia   • Benign essential HTN   • Moderate aortic stenosis   • MDD (major depressive disorder)   • Peripheral neuropathy   • Other emphysema ([facility] [address] )   • Ulcer of left foot ([facility] [address] ) • ANNALEE (acute kidney injury) ([facility] [address] )   • Hyperkalemia   • MSSA bacteremia         Subjective:   Patient continues to feel that he has a swollen face and a swollen abdomen, no nausea or vomiting, continues to complain of significant shortness of breath especially with mild exertion  Objective:     Vitals: Blood pressure 147/78, pulse 79, temperature 98 2 °F (36 8 °C), resp  rate 20, height 6' (1 829 m), weight 105 kg (231 lb), SpO2 91 %  ,Body mass index is 31 33 kg/m²  Weight (last 2 days)     Date/Time Weight    02/12/23 0600 105 (231)    02/11/23 0600 103 (227 6)    02/10/23 15:08:29 104 (229 28)    02/10/23 1438 104 (229 28)    02/10/23 1127 104 (229 72)            Intake/Output Summary (Last 24 hours) at 2/12/2023 0923  Last data filed at 2/12/2023 0814  Gross per 24 hour   Intake 2002 5 ml   Output 1600 ml   Net 402 5 ml     I/O last 3 completed shifts: In: 3212 1 [P O :1240;  I V :1972 1]  Out: 1650 [Urine:1650]         Physical Exam: /78   Pulse 79   Temp 98 2 °F (36 8 °C)   Resp 20   Ht 6' (1 829 m)   Wt 105 kg (231 lb)   SpO2 91%   BMI 31 33 kg/m²     General Appearance:    Alert, cooperative, no distress, appears stated age   Head:    Normocephalic, without obvious abnormality, atraumatic   Eyes:    Conjunctiva/corneas clear   Ears:    Normal external ears   Nose:   Nares normal, septum midline, mucosa normal, no drainage    or sinus tenderness   Throat:   Lips, mucosa, and tongue normal; teeth and gums normal   Neck:   Supple, symmetrical, trachea midline, no adenopathy;        thyroid:  No enlargement/tenderness/nodules; no carotid    bruit or JVD   Back:     Symmetric, no curvature, ROM normal, no CVA tenderness   Lungs:     Clear to auscultation bilaterally, respirations unlabored   Chest wall:    No tenderness or deformity   Heart:    Regular rate and rhythm, S1 and S2 normal, no murmur, rub   or gallop   Abdomen:     Soft, non-tender, bowel sounds active   Extremities:   Extremities normal, atraumatic, no cyanosis or edema   Skin:   Skin color, texture, turgor normal, no rashes or lesions   Lymph nodes:   Cervical normal   Neurologic:   CNII-XII intact            Lab, Imaging and other studies: I have personally reviewed pertinent labs  CBC: No results found for: WBC, HGB, HCT, MCV, PLT, ADJUSTEDWBC, MCH, MCHC, RDW, MPV, NRBC  CMP:   Lab Results   Component Value Date    K 5 3 02/12/2023     (H) 02/12/2023    CO2 23 02/12/2023    BUN 58 (H) 02/12/2023    CREATININE 1 48 (H) 02/12/2023    CALCIUM 9 1 02/12/2023    EGFR 47 02/12/2023         Results from last 7 days   Lab Units 02/12/23  0448 02/11/23  1629 02/11/23  0530 02/11/23  0310 02/10/23  1822 02/10/23  1143   POTASSIUM mmol/L 5 3 5 2 5 8* 5 0   < > 6 1*   CHLORIDE mmol/L 111*  --  110* 114*   < > 108   CO2 mmol/L 23  --  24 20*   < > 20*   BUN mg/dL 58*  --  63* 59*   < > 72*   CREATININE mg/dL 1 48*  --  1 66* 1 50*   < > 2 28*   CALCIUM mg/dL 9 1  --  9 0 7 8*   < > 9 1   ALK PHOS U/L  --   --   --   --   --  47   ALT U/L  --   --   --   --   --  <3*   AST U/L  --   --   --   --   --  11*    < > = values in this interval not displayed  Phosphorus: No results found for: PHOS  Magnesium: No results found for: MG  Urinalysis: No results found for: [place] , SPECGRAV, PHUR, LEUKOCYTESUR, NITRITE, PROTEINUA, GLUCOSEU, KETONESU, BILIRUBINUR, BLOODU  Ionized Calcium: No results found for: CAION  Coagulation: No results found for: PT, INR, APTT  Troponin: No results found for: TROPONINI  ABG: No results found for: PHART, EBD8NUF, PO2ART, YUL8NCH, E5EHRYEY, BEART, SOURCE  Radiology review:     IMAGING  Procedure: XR chest 1 view portable    Result Date: 2/10/2023  Narrative: CHEST INDICATION:   Shortness of breath  COMPARISON:  Compared to 02/05/23 EXAM PERFORMED/VIEWS:  XR CHEST PORTABLE FINDINGS:  Left-sided PICC line in SVC  Cardiomediastinal silhouette appears unremarkable  Poor inspiration  Mild prominent markings  Right paratracheal opacity may suggest tortuous vessels  No pneumothorax or pleural effusion  Osseous structures appear within normal limits for patient age  Left humeral head prosthesis  Impression: Mild congestive changes  Workstation performed: [id number]     Procedure: US kidney and bladder    Result Date: 2/11/2023  Narrative: RENAL ULTRASOUND INDICATION:   mark  COMPARISON:  None TECHNIQUE:   Ultrasound of the retroperitoneum was performed with a curvilinear transducer utilizing volumetric sweeps and still imaging techniques  FINDINGS: KIDNEYS: Symmetric and normal size  Right kidney:  12 1 x 5 8 x 5 8 cm  Volume 213 4 mL Left kidney:  12 7 x 6 9 x 6 5 cm  Volume 295 1 mL Right kidney Normal echogenicity and contour  No mass is identified  No hydronephrosis  No shadowing calculi  No perinephric fluid collections  Left kidney Normal echogenicity and contour  No mass is identified  No hydronephrosis  No shadowing calculi  No perinephric fluid collections  URETERS: Nonvisualized  BLADDER: Normally distended  No focal thickening or mass lesions  Bilateral ureteral jets detected  Impression: No hydronephrosis   Workstation performed: [id number]       Current Facility-Administered Medications   Medication Dose Route Frequency   • acetaminophen (TYLENOL) tablet 650 mg  650 mg Oral Q6H PRN   • albuterol inhalation solution 2 5 mg  2 5 mg Nebulization Q4H PRN   • amLODIPine (NORVASC) tablet 10 mg  10 mg Oral Daily   • buPROPion (WELLBUTRIN XL) 24 hr tablet 300 mg  300 mg Oral Daily   • escitalopram (LEXAPRO) tablet 20 mg  20 mg Oral Daily   • furosemide (LASIX) injection 40 mg  40 mg Intravenous Once   • heparin (porcine) subcutaneous injection 5,000 Units  5,000 Units Subcutaneous Q8H [facility]   • levothyroxine tablet 75 mcg  75 mcg Oral Early Morning   • nicotine (NICODERM CQ) 7 mg/24hr TD 24 hr patch 1 patch  1 patch Transdermal Daily   • ondansetron (ZOFRAN) injection 4 mg  4 mg Intravenous Q6H PRN Medications Discontinued During This Encounter   Medication Reason   • sodium chloride 0 9 % infusion    • sodium chloride 0 9 % infusion        Aurora Barrera, DO      This progress note was produced in part using a dictation device which may document imprecise wording from author's original intent

## 2023-02-12 NOTE — ASSESSMENT & PLAN NOTE
he has baseline creatinine 0 8-0 9  Currently elevated to 2 28 with hyperkalemia most likely has atn    Stop HCTZ and Ancef  Less likely allergic reaction to Ancef  Creatinine improved to 1 4 with hydration  Stop IV fluids  Avoid any further nephrotoxic agents or hypotension    Placed on Lasix 20 mg IV x1 on 2/11 and will give a dose of 40 mg IV x1 on 2/12  Kidney Ultrasound normal

## 2023-02-12 NOTE — PLAN OF CARE
Problem: Potential for Falls  Goal: Patient will remain free of falls  Description: INTERVENTIONS:  - Educate patient/family on patient safety including physical limitations  - Instruct patient to call for assistance with activity   - Consult OT/PT to assist with strengthening/mobility   - Keep Call bell within reach  - Keep bed low and locked with side rails adjusted as appropriate  - Keep care items and personal belongings within reach  - Initiate and maintain comfort rounds  - Make Fall Risk Sign visible to staff  - Offer Toileting every  Hours, in advance of need  - Initiate/Maintain alarm  - Obtain necessary fall risk management equipment:   - Apply yellow socks and bracelet for high fall risk patients  - Consider moving patient to room near nurses station  Outcome: Progressing     Problem: PAIN - ADULT  Goal: Verbalizes/displays adequate comfort level or baseline comfort level  Description: Interventions:  - Encourage patient to monitor pain and request assistance  - Assess pain using appropriate pain scale  - Administer analgesics based on type and severity of pain and evaluate response  - Implement non-pharmacological measures as appropriate and evaluate response  - Consider cultural and social influences on pain and pain management  - Notify physician/advanced practitioner if interventions unsuccessful or patient reports new pain  Outcome: Progressing     Problem: INFECTION - ADULT  Goal: Absence or prevention of progression during hospitalization  Description: INTERVENTIONS:  - Assess and monitor for signs and symptoms of infection  - Monitor lab/diagnostic results  - Monitor all insertion sites, i e  indwelling lines, tubes, and drains  - Monitor endotracheal if appropriate and nasal secretions for changes in amount and color  - [place] appropriate cooling/warming therapies per order  - Administer medications as ordered  - Instruct and encourage patient and family to use good hand hygiene technique  - Identify and instruct in appropriate isolation precautions for identified infection/condition  Outcome: Progressing  Goal: Absence of fever/infection during neutropenic period  Description: INTERVENTIONS:  - Monitor WBC    Outcome: Progressing     Problem: SAFETY ADULT  Goal: Patient will remain free of falls  Description: INTERVENTIONS:  - Educate patient/family on patient safety including physical limitations  - Instruct patient to call for assistance with activity   - Consult OT/PT to assist with strengthening/mobility   - Keep Call bell within reach  - Keep bed low and locked with side rails adjusted as appropriate  - Keep care items and personal belongings within reach  - Initiate and maintain comfort rounds  - Make Fall Risk Sign visible to staff  - Offer Toileting every  Hours, in advance of need  - Initiate/Maintain alarm  - Obtain necessary fall risk management equipment:   - Apply yellow socks and bracelet for high fall risk patients  - Consider moving patient to room near nurses station  Outcome: Progressing  Goal: Maintain or return to baseline ADL function  Description: INTERVENTIONS:  -  Assess patient's ability to carry out ADLs; assess patient's baseline for ADL function and identify physical deficits which impact ability to perform ADLs (bathing, care of mouth/teeth, toileting, grooming, dressing, etc )  - Assess/evaluate cause of self-care deficits   - Assess range of motion  - Assess patient's mobility; develop plan if impaired  - Assess patient's need for assistive devices and provide as appropriate  - Encourage maximum independence but intervene and supervise when necessary  - Involve family in performance of ADLs  - Assess for home care needs following discharge   - Consider OT consult to assist with ADL evaluation and planning for discharge  - Provide patient education as appropriate  Outcome: Progressing  Goal: Maintains/Returns to pre admission functional level  Description: INTERVENTIONS:  - Perform BMAT or MOVE assessment daily    - Set and communicate daily mobility goal to care team and patient/family/caregiver  - Collaborate with rehabilitation services on mobility goals if consulted  - Perform Range of Motion  times a day  - Reposition patient every  hours  - Dangle patient  times a day  - Stand patient  times a day  - Ambulate patient  times a day  - Out of bed to chair  times a day   - Out of bed for meals times a day  - Out of bed for toileting  - Record patient progress and toleration of activity level   Outcome: Progressing     Problem: DISCHARGE PLANNING  Goal: Discharge to home or other facility with appropriate resources  Description: INTERVENTIONS:  - Identify barriers to discharge w/patient and caregiver  - Arrange for needed discharge resources and transportation as appropriate  - Identify discharge learning needs (meds, wound care, etc )  - Arrange for interpretive services to assist at discharge as needed  - Refer to Case Management Department for coordinating discharge planning if the patient needs post-hospital services based on physician/advanced practitioner order or complex needs related to functional status, cognitive ability, or social support system  Outcome: Progressing     Problem: Knowledge Deficit  Goal: Patient/family/caregiver demonstrates understanding of disease process, treatment plan, medications, and discharge instructions  Description: Complete learning assessment and assess knowledge base    Interventions:  - Provide teaching at level of understanding  - Provide teaching via preferred learning methods  Outcome: Progressing     Problem: CARDIOVASCULAR - ADULT  Goal: Maintains optimal cardiac output and hemodynamic stability  Description: INTERVENTIONS:  - Monitor I/O, vital signs and rhythm  - Monitor for S/S and trends of decreased cardiac output  - Administer and titrate ordered vasoactive medications to optimize hemodynamic stability  - Assess quality of pulses, skin color and temperature  - Assess for signs of decreased coronary artery perfusion  - Instruct patient to report change in severity of symptoms  Outcome: Progressing  Goal: Absence of cardiac dysrhythmias or at baseline rhythm  Description: INTERVENTIONS:  - Continuous cardiac monitoring, vital signs, obtain 12 lead EKG if ordered  - Administer antiarrhythmic and heart rate control medications as ordered  - Monitor electrolytes and administer replacement therapy as ordered  Outcome: Progressing     Problem: GASTROINTESTINAL - ADULT  Goal: Minimal or absence of nausea and/or vomiting  Description: INTERVENTIONS:  - Administer IV fluids if ordered to ensure adequate hydration  - Maintain NPO status until nausea and vomiting are resolved  - Nasogastric tube if ordered  - Administer ordered antiemetic medications as needed  - Provide nonpharmacologic comfort measures as appropriate  - Advance diet as tolerated, if ordered  - Consider nutrition services referral to assist patient with adequate nutrition and appropriate food choices  Outcome: Progressing  Goal: Maintains or returns to baseline bowel function  Description: INTERVENTIONS:  - Assess bowel function  - Encourage oral fluids to ensure adequate hydration  - Administer IV fluids if ordered to ensure adequate hydration  - Administer ordered medications as needed  - Encourage mobilization and activity  - Consider nutritional services referral to assist patient with adequate nutrition and appropriate food choices  Outcome: Progressing  Goal: Maintains adequate nutritional intake  Description: INTERVENTIONS:  - Monitor percentage of each meal consumed  - Identify factors contributing to decreased intake, treat as appropriate  - Assist with meals as needed  - Monitor I&O, weight, and lab values if indicated  - Obtain nutrition services referral as needed  Outcome: Progressing  Goal: Establish and maintain optimal ostomy function  Description: INTERVENTIONS:  - Assess bowel function  - Encourage oral fluids to ensure adequate hydration  - Administer IV fluids if ordered to ensure adequate hydration   - Administer ordered medications as needed  - Encourage mobilization and activity  - Nutrition services referral to assist patient with appropriate food choices  - Assess stoma site  - Consider wound care consult   Outcome: Progressing  Goal: Oral mucous membranes remain intact  Description: INTERVENTIONS  - Assess oral mucosa and hygiene practices  - Implement preventative oral hygiene regimen  - Implement oral medicated treatments as ordered  - Initiate Nutrition services referral as needed  Outcome: Progressing     Problem: METABOLIC, FLUID AND ELECTROLYTES - ADULT  Goal: Electrolytes maintained within normal limits  Description: INTERVENTIONS:  - Monitor labs and assess patient for signs and symptoms of electrolyte imbalances  - Administer electrolyte replacement as ordered  - Monitor response to electrolyte replacements, including repeat lab results as appropriate  - Instruct patient on fluid and nutrition as appropriate  Outcome: Progressing  Goal: Fluid balance maintained  Description: INTERVENTIONS:  - Monitor labs   - Monitor I/O and WT  - Instruct patient on fluid and nutrition as appropriate  - Assess for signs & symptoms of volume excess or deficit  Outcome: Progressing  Goal: Glucose maintained within target range  Description: INTERVENTIONS:  - Monitor Blood Glucose as ordered  - Assess for signs and symptoms of hyperglycemia and hypoglycemia  - Administer ordered medications to maintain glucose within target range  - Assess nutritional intake and initiate nutrition service referral as needed  Outcome: Progressing     Problem: Nutrition/Hydration-ADULT  Goal: Nutrient/Hydration intake appropriate for improving, restoring or maintaining nutritional needs  Description: Monitor and assess patient's nutrition/hydration status for malnutrition  Collaborate with interdisciplinary team and initiate plan and interventions as ordered  Monitor patient's weight and dietary intake as ordered or per policy  Utilize nutrition screening tool and intervene as necessary  Determine patient's food preferences and provide high-protein, high-caloric foods as appropriate       INTERVENTIONS:  - Monitor oral intake, urinary output, labs, and treatment plans  - Assess nutrition and hydration status and recommend course of action  - Evaluate amount of meals eaten  - Assist patient with eating if necessary   - Allow adequate time for meals  - Recommend/ encourage appropriate diets, oral nutritional supplements, and vitamin/mineral supplements  - Order, calculate, and assess calorie counts as needed  - Recommend, monitor, and adjust tube feedings and TPN/PPN based on assessed needs  - Assess need for intravenous fluids  - Provide specific nutrition/hydration education as appropriate  - Include patient/family/caregiver in decisions related to nutrition  Outcome: Progressing

## 2023-02-12 NOTE — PROGRESS NOTES
[address]  Progress Note - Radha Jarvis 1955, 69 y o  male MRN: [id number]  Unit/Bed#: -01 Encounter: [id number]  Primary Care Provider: Elena Storm MD   Date and time admitted to hospital: 2/10/2023 11:23 AM    * ANNALEE (acute kidney injury) [facility])  Assessment & Plan  he has baseline creatinine 0 8-0 9  Currently elevated to 2 28 with hyperkalemia most likely has atn    Stop HCTZ and Ancef  Less likely allergic reaction to Ancef  Creatinine improved to 1 4 with hydration  Stop IV fluids  Avoid any further nephrotoxic agents or hypotension  Placed on Lasix 20 mg IV x1 on 2/11 and will give a dose of 40 mg IV x1 on 2/12  Kidney Ultrasound normal    Hyperkalemia  Assessment & Plan  Potassium 6 1 on admission  EKG no changes noted so far  Received (calcium gluconate, insulin dextrose and Kayexalate) x3 since admission  Hold oral potassium supplementation  2/12: Potassium down to 5 2 today  We will give Lasix 40 mg IV x1 stop IV fluids and repeat BMP tomorrow      Shortness of breath  Assessment & Plan  Suspect secondary to mild fluid overload but also cannot rule out pulmonary embolism at this time  Will do CTA chest due to renal dysfunction  We will get VQ scan and venous Doppler bilateral lower extremity tomorrow we will tentatively place him on a heparin drip for now    MSSA bacteremia  Assessment & Plan  History of MSSA bacteremia  He was recently discharged from the hospital with MSSA bacteremia and placed on a 6-week course of IV Ancef and it was concern for left toe ulcer with early osteomyelitis as the cause of the infection  Patient now presents with acute kidney injury and hyperkalemia  unClear whether it is secondary to oral potassium supplementation versus reaction to Ancef  Hold Ancef for now  Placed on gentle hydration    Consult placed to infectious disease and podiatry to determine if further antibiotics are required for the foot and duration  Repeat blood cultures ordered    Ulcer of left foot [facility])  Assessment & Plan  Continue conservative management and local wound care  further as per podiatry    MDD (major depressive disorder)  Assessment & Plan  stable for now  Continue home meds    Benign essential HTN  Assessment & Plan  Continue Norvasc but hold HCTZ due to mild hyponatremia and ANNALEE    VTE Pharmacologic Prophylaxis:   Pharmacologic: Heparin  Mechanical VTE Prophylaxis in Place: Yes    Patient Centered Rounds: I have performed bedside rounds with nursing staff today  Discussions with Specialists or Other Care Team Provider: Discussed with nephrology    Education and Discussions with Family / Patient: Discussed with patient at bedside    Time Spent for Care: 30 minutes  More than 50% of total time spent on counseling and coordination of care as described above  Current Length of Stay: 2 day(s)    Current Patient Status: Inpatient   Certification Statement: The patient will continue to require additional inpatient hospital stay due to acute kidney injury    Discharge Plan:  discharge after 48 hours    Code Status: Level 1 - Full Code      Subjective:   Patient complains of some shortness of breath with exertion  Denies any chest pain  He still feels swollen and tight in his chest    Objective:     Vitals:   Temp (24hrs), Av 3 °F (36 8 °C), Min:98 1 °F (36 7 °C), Max:98 8 °F (37 1 °C)    Temp:  [98 1 °F (36 7 °C)-98 8 °F (37 1 °C)] 98 2 °F (36 8 °C)  HR:  [66-82] 79  Resp:  [18-22] 20  BP: (141-155)/(72-80) 147/78  SpO2:  [91 %-98 %] 91 %  Body mass index is 31 33 kg/m²  Input and Output Summary (last 24 hours): Intake/Output Summary (Last 24 hours) at 2023 1125  Last data filed at 2023 1052  Gross per 24 hour   Intake 1762 5 ml   Output 1850 ml   Net -87 5 ml       Physical Exam:     Physical Exam  Vitals and nursing note reviewed  Constitutional:       Appearance: He is ill-appearing     HENT:      Head: Normocephalic and atraumatic  Right Ear: External ear normal       Left Ear: External ear normal       Nose: Nose normal       Mouth/Throat:      Pharynx: Oropharynx is clear  Eyes:      Pupils: Pupils are equal, round, and reactive to light  Cardiovascular:      Rate and Rhythm: Normal rate and regular rhythm  Heart sounds: Normal heart sounds  Pulmonary:      Effort: Pulmonary effort is normal       Breath sounds: Normal breath sounds  Abdominal:      General: Bowel sounds are normal       Palpations: Abdomen is soft  Tenderness: There is no abdominal tenderness  Musculoskeletal:         General: Normal range of motion  Cervical back: Normal range of motion and neck supple  Comments: Trace pedal edema bilateral lower extremity   Skin:     General: Skin is warm and dry  Capillary Refill: Capillary refill takes less than 2 seconds  Neurological:      General: No focal deficit present  Mental Status: He is alert and oriented to person, place, and time  Psychiatric:         Mood and Affect: Mood normal            Additional Data:     Labs:    Results from last 7 days   Lab Units 02/11/23  0530 02/10/23  1143   WBC Thousand/uL 9 01 10 34*   HEMOGLOBIN g/dL 13 4 12 2   HEMATOCRIT % 40 5 37 9   PLATELETS Thousands/uL 327 366   NEUTROS PCT %  --  67   LYMPHS PCT %  --  15   MONOS PCT %  --  12   EOS PCT %  --  4     Results from last 7 days   Lab Units 02/12/23  0448 02/10/23  1822 02/10/23  1143   SODIUM mmol/L 136   < > 133*   POTASSIUM mmol/L 5 3   < > 6 1*   CHLORIDE mmol/L 111*   < > 108   CO2 mmol/L 23   < > 20*   BUN mg/dL 58*   < > 72*   CREATININE mg/dL 1 48*   < > 2 28*   ANION GAP mmol/L 2*   < > 5   CALCIUM mg/dL 9 1   < > 9 1   ALBUMIN g/dL  --   --  3 1*   TOTAL BILIRUBIN mg/dL  --   --  0 27   ALK PHOS U/L  --   --  47   ALT U/L  --   --  <3*   AST U/L  --   --  11*   GLUCOSE RANDOM mg/dL 99   < > 91    < > = values in this interval not displayed  Results from last 7 days   Lab Units 02/11/23  1605 02/10/23  2002 02/10/23  1440 02/10/23  1426 02/10/23  1402   POC GLUCOSE mg/dl 220* 101 149* 138 48*                   * I Have Reviewed All Lab Data Listed Above  * Additional Pertinent Lab Tests Reviewed: [address] Admission Reviewed    Imaging:    Imaging Reports Reviewed Today Include: kidney ultrasound  Imaging Personally Reviewed by Myself Includes:  cxray    Recent Cultures (last 7 days):           Last 24 Hours Medication List:   Current Facility-Administered Medications   Medication Dose Route Frequency Provider Last Rate   • acetaminophen  650 mg Oral Q6H PRN Angel Wei MD     • albuterol  2 5 mg Nebulization Q4H PRN JESUS Herrmann     • amLODIPine  10 mg Oral Daily Angel Wei MD     • buPROPion  300 mg Oral Daily Angel Wei MD     • escitalopram  20 mg Oral Daily Angel Wei MD     • heparin   Intravenous Once Angel Wei MD     • levothyroxine  75 mcg Oral Early Morning Angel Wei MD     • nicotine  1 patch Transdermal Daily Angel Wei MD     • ondansetron  4 mg Intravenous Q6H PRN Angel Wei MD     • simethicone  80 mg Oral 4x Daily (with meals and at bedtime) Jennifer Gastelum DO          Today, Patient Was Seen By: Angel Wei MD    ** Please Note: Dictation voice to text software may have been used in the creation of this document   **

## 2023-02-12 NOTE — CONSULTS
Consultation - Nephrology   Radha Dash 69 y o  male MRN: [id number]  Unit/Bed#: -01 Encounter: [id number]      A/P:  1  Acute kidney injury on top of chronic kidney disease present on admission   Trending slowly improving with volume expansion, continue to provide volume if clinically indicated  It appears the patient is eating and drinking appropriately at this time  Continue avoid all potential nephrotoxins including NSAIDs and Villalta 2 inhibitors  Continue other supportive treatment at this time  2   Chronic disease stage II with a baseline creatinine between 0 9-1 mg/dL  3  Hypertension in the setting of chronic kidney disease stage II   Blood pressures have been controlled at this time, currently stable on amlodipine 10 mg once daily  Continue to encourage low-sodium diet  4   Hyperkalemia   Significant proved, most likely resolved at this time  Continue to avoid potassium supplementation, and continue with low potassium diet for now  Patient is a status post one-time dose of furosemide 20 mg IV as well as other medication and calcium gluconate and insulin with D50 for intracellular potassium shifts in the acute setting  5  MSSA bacteremia   Continue cefazolin hospitalist recommendations  Thank you for allowing us to participate in the care of your patient  Please feel free to contact us regarding the care of this patient, or any other questions/concerns that may be applicable  Patient Active Problem List   Diagnosis   • Acute hyponatremia   • Benign essential HTN   • Moderate aortic stenosis   • MDD (major depressive disorder)   • Peripheral neuropathy   • Other emphysema (HCC)   • Ulcer of left foot (HCC)   • ANNALEE (acute kidney injury) ([facility] [address] )   • Hyperkalemia   • MSSA bacteremia       History of Present Illness   Physician Requesting Consult: Yenny Gaytan MD  Reason for Consult / Principal Problem: Acute kidney injury  Hx and PE limited by:   HPI: Radha Dash is a 69 y o  year old male who presented to the emergency department on February 10 due to increased shortness of breath associated with reduced urine output  Patient has a significant history for recent admission to the hospital for sepsis which turned out to be due to methicillin sensitive Staphylococcus aureus bacteremia and was discharged on February 6 for a full 6-week treatment of cefazolin 2 g every 8 hours, with last dose to be given on March 16  Source of infection at the same time, the patient had an episode of acute congestive heart failure  Most likely due to foot ulceration  At this time, the patient is feeling improved, and in general appears anxious about his recent healthcare developments  From the renal standpoint, the patient appears to have a baseline creatinine between 0 9-1 mg/dL, presenting creatinine 2 12 mg/dL in addition to hyperkalemia of 6 3 mmol/L  Patient is on hydrochlorothiazide, however is also on potassium chloride 20 mill equivalents twice daily in addition to meloxicam daily  Patient denies other NSAIDs at this time  Status post 1 expansion, the patient's serum creatinine has improved down to 1 66 mg/dL, and with discontinuation of potassium supplementation and meloxicam, the patient's potassium level has improved down to 5 8 mmol/L  Imaging including ultrasound kidney and bladder appear appropriate  History obtained from chart review and the patient    Constitutional ROS- Denies fever, chills, night sweats, weight changes  Positive for fatigue  HEENT ROS- Denies history of eye surgeries, glaucoma, headaches or history of trauma, blurred vision     Endocrine ROS- No history diabetes mellitus or thyroid disease  Cardiovascular ROS- Denies chest pain, palpitation, dyspnea exertion, orthopnea, claudication  Pulmonary ROS- Denies history of COPD, asthma  Denies cough, hemoptysis, shortness of breath    GI ROS- Denies abdominal pain, diarrhea, nausea, swallowing problems, vomiting, constipation, blood in stools, fecal incontinence  Hematological ROS- Denies history of easy bruising, blood clots, bleeding or blood transfusions  Genitourinary ROS- Denies recent hematuria, pyuria, flank pain, change in urinary stream, decreased urinary output, increased urinary frequency, nocturia, foamy urine, or urinary incontinence  Lymphatic ROS- Denies lymphadenopathy  Musculoskeletal ROS- Denies history of muscle weakness, joint pain  Dermatological ROS- Denies rash, wounds, ulcers, itching, jaundice  Psychiatric ROS- Denies anxiety, depression, hallucinations, disorientation  Neurological ROS- No stroke or TIA symptoms        Historical Information   Past Medical History:   Diagnosis Date   • Anxiety    • Aortic stenosis    • Depression    • Hypertension    • Hypothyroidism    • PVCs (premature ventricular contractions)      Past Surgical History:   Procedure Laterality Date   • APPENDECTOMY     • BACK SURGERY     • TOTAL SHOULDER REPLACEMENT       Social History   Social History     Substance and Sexual Activity   Alcohol Use Not Currently     Social History     Substance and Sexual Activity   Drug Use Not Currently    Comment: sporadic in young adulthood     Social History     Tobacco Use   Smoking Status Light Smoker   • Types: Cigarettes   Smokeless Tobacco Never     Family History   Problem Relation Age of Onset   • Dementia Mother    • Blindness Mother    • Cancer Mother    • Dementia Father        Meds/Allergies   all current active meds have been reviewed, current meds:   Current Facility-Administered Medications   Medication Dose Route Frequency   • acetaminophen (TYLENOL) tablet 650 mg  650 mg Oral Q6H PRN   • albuterol inhalation solution 2 5 mg  2 5 mg Nebulization Q4H PRN   • amLODIPine (NORVASC) tablet 10 mg  10 mg Oral Daily   • buPROPion (WELLBUTRIN XL) 24 hr tablet 300 mg  300 mg Oral Daily   • escitalopram (LEXAPRO) tablet 20 mg  20 mg Oral Daily   • heparin (porcine) subcutaneous injection 5,000 Units  5,000 Units Subcutaneous Q8H [address]   • levothyroxine tablet 75 mcg  75 mcg Oral Early Morning   • nicotine (NICODERM CQ) 7 mg/24hr TD 24 hr patch 1 patch  1 patch Transdermal Daily   • ondansetron (ZOFRAN) injection 4 mg  4 mg Intravenous Q6H PRN   • sodium chloride 0 9 % infusion  75 mL/hr Intravenous Continuous    and PTA meds:    Medications Prior to Admission   Medication   • ceFAZolin (ANCEF) 2000 mg IVPB   • amLODIPine (NORVASC) 10 mg tablet   • buPROPion (WELLBUTRIN XL) 300 mg 24 hr tablet   • escitalopram (LEXAPRO) 20 mg tablet   • gabapentin (NEURONTIN) 100 mg capsule   • hydrochlorothiazide (HYDRODIURIL) 25 mg tablet   • levothyroxine 75 mcg tablet   • magnesium Oxide (MAG-OX) 400 mg TABS   • meloxicam (Mobic) 15 mg tablet   • methocarbamol (ROBAXIN) 500 mg tablet   • potassium chloride (K-DUR,KLOR-CON) 20 mEq tablet         No Known Allergies    Objective     Intake/Output Summary (Last 24 hours) at 2/11/2023 1924  Last data filed at 2/11/2023 1609  Gross per 24 hour   Intake 2229 58 ml   Output 1650 ml   Net 579 58 ml       Invasive Devices:        Physical Exam      I/O last 3 completed shifts: In: 2229 6 [P O :1240; I V :989 6]  Out: 1900 [Urine:1900]    Vitals:    02/11/23 1604   BP: 152/73   Pulse: 75   Resp:    Temp: 98 1 °F (36 7 °C)   SpO2: 95%       General Appearance:    No acute distress  Cooperative  Appears stated age  Head:    Normocephalic  Atraumatic  Normal jaw occlusion  Eyes:    Lids, conjunctiva normal  No scleral icterus  Ears:    Normal external ears  Nose:   Nares normal  No drainage  Mouth:   Lips, tongue normal  Mucosa normal  Phonation normal    Neck:   Supple  Symmetrical    Back:     Symmetric  No CVA tenderness  Lungs:     Normal respiratory effort  Clear to auscultation bilaterally  Chest wall:    No tenderness or deformity  Heart:    Regular rate and rhythm  Normal S1 and S2  No murmur  No JVD  No edema  Abdomen:     Soft  Non-tender  Bowel sounds active  Genitourinary:   No Germain catheter present  Extremities:   Extremities normal  Atraumatic  No cyanosis  Skin:   Warm and dry  No pallor, jaundice, rash, ecchymoses  Neurologic:   Alert and oriented to person, place, time  No focal deficit  Current Weight: Weight - Scale: 103 kg (227 lb 9 6 oz)  First Weight: Weight - Scale: 104 kg (229 lb 11 5 oz)    Lab Results:  I have personally reviewed pertinent labs      CBC:   Lab Results   Component Value Date    WBC 9 01 02/11/2023    HGB 13 4 02/11/2023    HCT 40 5 02/11/2023    MCV 96 02/11/2023     02/11/2023    MCH 31 6 02/11/2023    MCHC 33 1 02/11/2023    RDW 12 9 02/11/2023    MPV 8 9 02/11/2023     CMP:   Lab Results   Component Value Date    K 5 2 02/11/2023     (H) 02/11/2023    CO2 24 02/11/2023    BUN 63 (H) 02/11/2023    CREATININE 1 66 (H) 02/11/2023    CALCIUM 9 0 02/11/2023    EGFR 41 02/11/2023     Phosphorus: No results found for: PHOS  Magnesium: No results found for: MG  Urinalysis: No results found for: Bernardo Retort, SPECGRAV, PHUR, LEUKOCYTESUR, NITRITE, PROTEINUA, GLUCOSEU, KETONESU, BILIRUBINUR, BLOODU  Ionized Calcium: No results found for: CAION  Coagulation: No results found for: PT, INR, APTT  Troponin: No results found for: TROPONINI  ABG: No results found for: PHART, ROD0KDZ, PO2ART, DWL4ZWM, G4TGQRGB, BEART, SOURCE    Results from last 7 days   Lab Units 02/11/23  1629 02/11/23  0530 02/11/23  0310 02/10/23  1822 02/10/23  1143   POTASSIUM mmol/L 5 2 5 8* 5 0 6 3* 6 1*   CHLORIDE mmol/L  --  110* 114* 108 108   CO2 mmol/L  --  24 20* 24 20*   BUN mg/dL  --  63* 59* 72* 72*   CREATININE mg/dL  --  1 66* 1 50* 2 12* 2 28*   CALCIUM mg/dL  --  9 0 7 8* 9 4 9 1   ALK PHOS U/L  --   --   --   --  47   ALT U/L  --   --   --   --  <3*   AST U/L  --   --   --   --  11*       Radiology review:  Procedure: XR chest 1 view portable    Result Date: 2/10/2023  Narrative: CHEST INDICATION:   Shortness of breath  COMPARISON:  Compared to 02/05/23 EXAM PERFORMED/VIEWS:  XR CHEST PORTABLE FINDINGS:  Left-sided PICC line in SVC  Cardiomediastinal silhouette appears unremarkable  Poor inspiration  Mild prominent markings  Right paratracheal opacity may suggest tortuous vessels  No pneumothorax or pleural effusion  Osseous structures appear within normal limits for patient age  Left humeral head prosthesis  Impression: Mild congestive changes  Workstation performed: [id number]     Procedure: US kidney and bladder    Result Date: 2/11/2023  Narrative: RENAL ULTRASOUND INDICATION:   mark  COMPARISON:  None TECHNIQUE:   Ultrasound of the retroperitoneum was performed with a curvilinear transducer utilizing volumetric sweeps and still imaging techniques  FINDINGS: KIDNEYS: Symmetric and normal size  Right kidney:  12 1 x 5 8 x 5 8 cm  Volume 213 4 mL Left kidney:  12 7 x 6 9 x 6 5 cm  Volume 295 1 mL Right kidney Normal echogenicity and contour  No mass is identified  No hydronephrosis  No shadowing calculi  No perinephric fluid collections  Left kidney Normal echogenicity and contour  No mass is identified  No hydronephrosis  No shadowing calculi  No perinephric fluid collections  URETERS: Nonvisualized  BLADDER: Normally distended  No focal thickening or mass lesions  Bilateral ureteral jets detected  Impression: No hydronephrosis  Workstation performed: [id number]       Jaclyn Lawson DO      This consultation note was produced in part using a dictation device which may document imprecise wording from author's original intent

## 2023-02-12 NOTE — ASSESSMENT & PLAN NOTE
Potassium 6 1 on admission  EKG no changes noted so far  Received (calcium gluconate, insulin dextrose and Kayexalate) x3 since admission  Hold oral potassium supplementation  2/12: Potassium down to 5 2 today    We will give Lasix 40 mg IV x1 stop IV fluids and repeat BMP tomorrow

## 2023-02-12 NOTE — PLAN OF CARE
Problem: Potential for Falls  Goal: Patient will remain free of falls  Description: INTERVENTIONS:  - Educate patient/family on patient safety including physical limitations  - Instruct patient to call for assistance with activity   - Consult OT/PT to assist with strengthening/mobility   - Keep Call bell within reach  - Keep bed low and locked with side rails adjusted as appropriate  - Keep care items and personal belongings within reach  - Initiate and maintain comfort rounds  - Offer Toileting every 2 Hours, in advance of need  - Consider moving patient to room near nurses station  Outcome: Progressing     Problem: PAIN - ADULT  Goal: Verbalizes/displays adequate comfort level or baseline comfort level  Description: Interventions:  - Encourage patient to monitor pain and request assistance  - Assess pain using appropriate pain scale  - Administer analgesics based on type and severity of pain and evaluate response  - Implement non-pharmacological measures as appropriate and evaluate response  - Consider cultural and social influences on pain and pain management  - Notify physician/advanced practitioner if interventions unsuccessful or patient reports new pain  Outcome: Progressing     Problem: INFECTION - ADULT  Goal: Absence or prevention of progression during hospitalization  Description: INTERVENTIONS:  - Assess and monitor for signs and symptoms of infection  - Monitor lab/diagnostic results  - Monitor all insertion sites, i e  indwelling lines, tubes, and drains  - Monitor endotracheal if appropriate and nasal secretions for changes in amount and color  - [place] appropriate cooling/warming therapies per order  - Administer medications as ordered  - Instruct and encourage patient and family to use good hand hygiene technique  - Identify and instruct in appropriate isolation precautions for identified infection/condition  Outcome: Progressing  Goal: Absence of fever/infection during neutropenic period  Description: INTERVENTIONS:  - Monitor WBC    Outcome: Progressing     Problem: SAFETY ADULT  Goal: Patient will remain free of falls  Description: INTERVENTIONS:  - Educate patient/family on patient safety including physical limitations  - Instruct patient to call for assistance with activity   - Consult OT/PT to assist with strengthening/mobility   - Keep Call bell within reach  - Keep bed low and locked with side rails adjusted as appropriate  - Keep care items and personal belongings within reach  - Initiate and maintain comfort rounds  - Offer Toileting every 2 Hours, in advance of need  - Consider moving patient to room near nurses station  Outcome: Progressing  Goal: Maintain or return to baseline ADL function  Description: INTERVENTIONS:  -  Assess patient's ability to carry out ADLs; assess patient's baseline for ADL function and identify physical deficits which impact ability to perform ADLs (bathing, care of mouth/teeth, toileting, grooming, dressing, etc )  - Assess/evaluate cause of self-care deficits   - Assess range of motion  - Assess patient's mobility; develop plan if impaired  - Assess patient's need for assistive devices and provide as appropriate  - Encourage maximum independence but intervene and supervise when necessary  - Involve family in performance of ADLs  - Assess for home care needs following discharge   - Consider OT consult to assist with ADL evaluation and planning for discharge  - Provide patient education as appropriate  Outcome: Progressing  Goal: Maintains/Returns to pre admission functional level  Description: INTERVENTIONS:  - Perform BMAT or MOVE assessment daily    - Set and communicate daily mobility goal to care team and patient/family/caregiver     - Collaborate with rehabilitation services on mobility goals if consulted  - Record patient progress and toleration of activity level   Outcome: Progressing     Problem: DISCHARGE PLANNING  Goal: Discharge to home or other facility with appropriate resources  Description: INTERVENTIONS:  - Identify barriers to discharge w/patient and caregiver  - Arrange for needed discharge resources and transportation as appropriate  - Identify discharge learning needs (meds, wound care, etc )  - Arrange for interpretive services to assist at discharge as needed  - Refer to Case Management Department for coordinating discharge planning if the patient needs post-hospital services based on physician/advanced practitioner order or complex needs related to functional status, cognitive ability, or social support system  Outcome: Progressing     Problem: Knowledge Deficit  Goal: Patient/family/caregiver demonstrates understanding of disease process, treatment plan, medications, and discharge instructions  Description: Complete learning assessment and assess knowledge base    Interventions:  - Provide teaching at level of understanding  - Provide teaching via preferred learning methods  Outcome: Progressing     Problem: CARDIOVASCULAR - ADULT  Goal: Maintains optimal cardiac output and hemodynamic stability  Description: INTERVENTIONS:  - Monitor I/O, vital signs and rhythm  - Monitor for S/S and trends of decreased cardiac output  - Administer and titrate ordered vasoactive medications to optimize hemodynamic stability  - Assess quality of pulses, skin color and temperature  - Assess for signs of decreased coronary artery perfusion  - Instruct patient to report change in severity of symptoms  Outcome: Progressing  Goal: Absence of cardiac dysrhythmias or at baseline rhythm  Description: INTERVENTIONS:  - Continuous cardiac monitoring, vital signs, obtain 12 lead EKG if ordered  - Administer antiarrhythmic and heart rate control medications as ordered  - Monitor electrolytes and administer replacement therapy as ordered  Outcome: Progressing     Problem: GASTROINTESTINAL - ADULT  Goal: Minimal or absence of nausea and/or vomiting  Description: INTERVENTIONS:  - Administer IV fluids if ordered to ensure adequate hydration  - Maintain NPO status until nausea and vomiting are resolved  - Nasogastric tube if ordered  - Administer ordered antiemetic medications as needed  - Provide nonpharmacologic comfort measures as appropriate  - Advance diet as tolerated, if ordered  - Consider nutrition services referral to assist patient with adequate nutrition and appropriate food choices  Outcome: Progressing  Goal: Maintains or returns to baseline bowel function  Description: INTERVENTIONS:  - Assess bowel function  - Encourage oral fluids to ensure adequate hydration  - Administer IV fluids if ordered to ensure adequate hydration  - Administer ordered medications as needed  - Encourage mobilization and activity  - Consider nutritional services referral to assist patient with adequate nutrition and appropriate food choices  Outcome: Progressing  Goal: Maintains adequate nutritional intake  Description: INTERVENTIONS:  - Monitor percentage of each meal consumed  - Identify factors contributing to decreased intake, treat as appropriate  - Assist with meals as needed  - Monitor I&O, weight, and lab values if indicated  - Obtain nutrition services referral as needed  Outcome: Progressing  Goal: Establish and maintain optimal ostomy function  Description: INTERVENTIONS:  - Assess bowel function  - Encourage oral fluids to ensure adequate hydration  - Administer IV fluids if ordered to ensure adequate hydration   - Administer ordered medications as needed  - Encourage mobilization and activity  - Nutrition services referral to assist patient with appropriate food choices  - Assess stoma site  - Consider wound care consult   Outcome: Progressing  Goal: Oral mucous membranes remain intact  Description: INTERVENTIONS  - Assess oral mucosa and hygiene practices  - Implement preventative oral hygiene regimen  - Implement oral medicated treatments as ordered  - Initiate Nutrition services referral as needed  Outcome: Progressing     Problem: METABOLIC, FLUID AND ELECTROLYTES - ADULT  Goal: Electrolytes maintained within normal limits  Description: INTERVENTIONS:  - Monitor labs and assess patient for signs and symptoms of electrolyte imbalances  - Administer electrolyte replacement as ordered  - Monitor response to electrolyte replacements, including repeat lab results as appropriate  - Instruct patient on fluid and nutrition as appropriate  Outcome: Progressing  Goal: Fluid balance maintained  Description: INTERVENTIONS:  - Monitor labs   - Monitor I/O and WT  - Instruct patient on fluid and nutrition as appropriate  - Assess for signs & symptoms of volume excess or deficit  Outcome: Progressing  Goal: Glucose maintained within target range  Description: INTERVENTIONS:  - Monitor Blood Glucose as ordered  - Assess for signs and symptoms of hyperglycemia and hypoglycemia  - Administer ordered medications to maintain glucose within target range  - Assess nutritional intake and initiate nutrition service referral as needed  Outcome: Progressing     Problem: Nutrition/Hydration-ADULT  Goal: Nutrient/Hydration intake appropriate for improving, restoring or maintaining nutritional needs  Description: Monitor and assess patient's nutrition/hydration status for malnutrition  Collaborate with interdisciplinary team and initiate plan and interventions as ordered  Monitor patient's weight and dietary intake as ordered or per policy  Utilize nutrition screening tool and intervene as necessary  Determine patient's food preferences and provide high-protein, high-caloric foods as appropriate       INTERVENTIONS:  - Monitor oral intake, urinary output, labs, and treatment plans  - Assess nutrition and hydration status and recommend course of action  - Evaluate amount of meals eaten  - Assist patient with eating if necessary   - Allow adequate time for meals  - Recommend/ encourage appropriate diets, oral nutritional supplements, and vitamin/mineral supplements  - Order, calculate, and assess calorie counts as needed  - Recommend, monitor, and adjust tube feedings and TPN/PPN based on assessed needs  - Assess need for intravenous fluids  - Provide specific nutrition/hydration education as appropriate  - Include patient/family/caregiver in decisions related to nutrition  Outcome: Progressing

## 2023-02-12 NOTE — ASSESSMENT & PLAN NOTE
History of MSSA bacteremia  He was recently discharged from the hospital with MSSA bacteremia and placed on a 6-week course of IV Ancef and it was concern for left toe ulcer with early osteomyelitis as the cause of the infection  Patient now presents with acute kidney injury and hyperkalemia  unClear whether it is secondary to oral potassium supplementation versus reaction to Ancef  Hold Ancef for now  Placed on gentle hydration  Consult placed to infectious disease and podiatry to determine if further antibiotics are required for the foot and duration    Repeat blood cultures ordered

## 2023-02-13 ENCOUNTER — APPOINTMENT (OUTPATIENT)
Dept: NUCLEAR MEDICINE | Facility: HOSPITAL | Age: 68
End: 2023-02-13

## 2023-02-13 ENCOUNTER — APPOINTMENT (INPATIENT)
Dept: NON INVASIVE DIAGNOSTICS | Facility: HOSPITAL | Age: 68
End: 2023-02-13

## 2023-02-13 ENCOUNTER — APPOINTMENT (INPATIENT)
Dept: RADIOLOGY | Facility: HOSPITAL | Age: 68
End: 2023-02-13

## 2023-02-13 LAB
ANION GAP SERPL CALCULATED.3IONS-SCNC: 2 MMOL/L (ref 4–13)
APTT PPP: 100 SECONDS (ref 23–37)
APTT PPP: 75 SECONDS (ref 23–37)
BUN SERPL-MCNC: 58 MG/DL (ref 5–25)
CALCIUM SERPL-MCNC: 9.1 MG/DL (ref 8.4–10.2)
CHLORIDE SERPL-SCNC: 111 MMOL/L (ref 96–108)
CO2 SERPL-SCNC: 23 MMOL/L (ref 21–32)
CREAT SERPL-MCNC: 1.51 MG/DL (ref 0.6–1.3)
GFR SERPL CREATININE-BSD FRML MDRD: 46 ML/MIN/1.73SQ M
GLUCOSE SERPL-MCNC: 110 MG/DL (ref 65–140)
POTASSIUM SERPL-SCNC: 5.2 MMOL/L (ref 3.5–5.3)
SODIUM SERPL-SCNC: 136 MMOL/L (ref 135–147)

## 2023-02-13 RX ORDER — FUROSEMIDE 10 MG/ML
40 INJECTION INTRAMUSCULAR; INTRAVENOUS
Status: COMPLETED | OUTPATIENT
Start: 2023-02-13 | End: 2023-02-14

## 2023-02-13 RX ORDER — CEFAZOLIN SODIUM 1 G/50ML
1000 SOLUTION INTRAVENOUS EVERY 8 HOURS
Status: DISCONTINUED | OUTPATIENT
Start: 2023-02-13 | End: 2023-02-14

## 2023-02-13 RX ORDER — FUROSEMIDE 10 MG/ML
40 INJECTION INTRAMUSCULAR; INTRAVENOUS
Status: DISCONTINUED | OUTPATIENT
Start: 2023-02-13 | End: 2023-02-13

## 2023-02-13 RX ORDER — HEPARIN SODIUM 5000 [USP'U]/ML
5000 INJECTION, SOLUTION INTRAVENOUS; SUBCUTANEOUS EVERY 8 HOURS SCHEDULED
Status: DISCONTINUED | OUTPATIENT
Start: 2023-02-14 | End: 2023-02-16 | Stop reason: HOSPADM

## 2023-02-13 RX ADMIN — LEVOTHYROXINE SODIUM 75 MCG: 75 TABLET ORAL at 04:47

## 2023-02-13 RX ADMIN — SIMETHICONE 80 MG: 80 TABLET, CHEWABLE ORAL at 21:07

## 2023-02-13 RX ADMIN — FUROSEMIDE 40 MG: 10 INJECTION, SOLUTION INTRAVENOUS at 15:28

## 2023-02-13 RX ADMIN — HEPARIN SODIUM 18 UNITS/KG/HR: 10000 INJECTION, SOLUTION INTRAVENOUS at 03:30

## 2023-02-13 RX ADMIN — CEFAZOLIN SODIUM 1000 MG: 1 SOLUTION INTRAVENOUS at 11:37

## 2023-02-13 RX ADMIN — ESCITALOPRAM OXALATE 20 MG: 10 TABLET ORAL at 08:08

## 2023-02-13 RX ADMIN — CEFAZOLIN SODIUM 1000 MG: 1 SOLUTION INTRAVENOUS at 20:21

## 2023-02-13 RX ADMIN — HEPARIN SODIUM 20 UNITS/KG/HR: 10000 INJECTION, SOLUTION INTRAVENOUS at 02:01

## 2023-02-13 RX ADMIN — BUPROPION HYDROCHLORIDE 300 MG: 150 TABLET, EXTENDED RELEASE ORAL at 08:07

## 2023-02-13 RX ADMIN — AMLODIPINE BESYLATE 10 MG: 10 TABLET ORAL at 08:08

## 2023-02-13 RX ADMIN — HEPARIN SODIUM 18 UNITS/KG/HR: 10000 INJECTION, SOLUTION INTRAVENOUS at 13:24

## 2023-02-13 NOTE — ASSESSMENT & PLAN NOTE
VQ scan and lower extremity Doppler ultrasound negative for DVT  Suspect secondary to mild fluid overload  Received multiple doses of IV Lasix and shortness of breath is improved

## 2023-02-13 NOTE — ASSESSMENT & PLAN NOTE
Suspect secondary to mild fluid overload but also cannot rule out pulmonary embolism at this time  Unable to do CTA chest due to renal dysfunction    We will get VQ scan and venous Doppler bilateral lower extremity tomorrow we will tentatively place him on a heparin drip for now until pulmonary embolism is ruled out

## 2023-02-13 NOTE — CONSULTS
Consultation - Infectious Disease   Lonita Prader 69 y o  male MRN: [id number]  Unit/Bed#: -01 Encounter: [id number]      Inpatient consult to Infectious Diseases  Consult performed by: Leigh Fontaine MD  Consult ordered by: Lawrence Savage MD            REQUIRED DOCUMENTATION:     1  This service was provided via Telemedicine  2  Provider located at [facility]  3  TeleMed provider: Leigh Fontaine MD   4  Identify all parties in room with patient during tele consult:RN  5  After connecting through [employer], patient was identified by name and date of birth and assistant checked wristband  Patient was then informed that this was a Telemedicine visit and that the exam was being conducted confidentially over secure lines  My office door was closed  No one else was in the room  Patient acknowledged consent and understanding of privacy and security of the Telemedicine visit, and gave us permission to have the assistant stay in the room in order to assist with the history and to conduct the exam   I informed the patient that I have reviewed their record in Epic and presented the opportunity for them to ask any questions regarding the visit today  The patient agreed to participate  Assessment/Recommendations     1  Acute kidney injury on CKD  - Likely multifactorial more likely related to underlying CHF and HCTZ use, less likely drug reaction to cefazolin- no rash or peripheral eosinophilia noted to suspect AIN  -Renal ultrasound normal  - creatinine improving    · Antibiotics as below  · Avoid nephrotoxic agents, additional management per nephrology and primary team     2   Recent history of staph aureus bacteremia  - Source of bacteremia is likely chronic left toe wound, no local signs of infection but early osteo could not be ruled out  Has left shoulder arthroplasty without any local signs of infection   No prosthetic vascular devices   No evidence of endovascular infection with negative MAKEDA and rapid clearance of bacteremia  - Afebrile without leukocytosis     · Recommend resuming cefazolin 2 q8h and monitor clinical course and renal function  · Recommend at least 4 weeks of iv therapy for complicated bacteremia, can complete 6 weeks of therapy with po abx for possible early toe osteo  • Monitor clinical course     3   Chronic L toe wound, possible early osteo  -Left toe wound has been present for a year and is likely source of bacteremia, no local signs of infection  - MRI without definite evidence of osteo, non specific focal area of bone marrow edema in distal phalanx of great toe may reflect focal stress reaction or early osteo     • Antibiotics as above  • Recommend wound care and close follow-up with podiatry, if wound fails to heal on fu would favor definitive surgical treatment     4  Dyspnea  - Multifactorial in the setting of ANNALEE, chronic HFpEF, moderate aortic stenosis and possible copd   -MAKEDA negative for vegetation     • Management per primary team, await VQ scan to rule out PE     Will follow  Thank you for involving me in the care of your patient  Please call with questions, change in clinical status or if tests recommended above are abnormal      Discussed in detail with the primary service  History     Reason for Consult: Acute kidney injury, bacteremia  HPI: Fermin Alberts is a 69y o  year old male with hypertension, aortic valve stenosis, history of left leg cellulitis, history of left shoulder arthroplasty and a chronic left toe wound  He was recently admitted on 1/30 with sepsis, Staph aureus bacteremia  MAKEDA was negative and bacteremia rapidly cleared MRI of the foot did not show definite osteo of the left toe, possible early osteo  He was discharged with a plan to complete 6 weeks of IV cefazolin through 3/16  He returned to the ER on 2/10 with worsening shortness of breath and abdominal distention   Labs were notable for new acute kidney injury with a creatinine of 2 28, baseline normal, and hyperkalemia with a potassium of 6 1  EKG showed no acute ST changes  Chest x-ray showed pulmonary vascular congestion, renal ultrasound was unremarkable  Antibiotics were held and he was initially given gentle hydration followed by diuresis  His creatinine has improved  He remains afebrile  A VQ scan is planned today and he remains on a heparin drip for possible PE  Denies nausea, vomiting, diarrhea or rash and is tolerating antibiotics well  Infectious disease is being consulted for diagnostic work up and antibiotic management  Review of Systems  Pertinent positives and negatives as noted in HPI  Rest complete 12 point system-based review of systems is otherwise negative  PAST MEDICAL HISTORY:  Past Medical History:   Diagnosis Date   • Anxiety    • Aortic stenosis    • Depression    • Hypertension    • Hypothyroidism    • PVCs (premature ventricular contractions)      Past Surgical History:   Procedure Laterality Date   • APPENDECTOMY     • BACK SURGERY     • TOTAL SHOULDER REPLACEMENT         FAMILY HISTORY:  Non-contributory    SOCIAL HISTORY:  Social History   /Civil Union  Social History     Substance and Sexual Activity   Alcohol Use Not Currently     Social History     Substance and Sexual Activity   Drug Use Not Currently    Comment: sporadic in young adulthood     Social History     Tobacco Use   Smoking Status Light Smoker   • Types: Cigarettes   Smokeless Tobacco Never       ALLERGIES:  No Known Allergies    MEDICATIONS:  All current active medications have been reviewed      Physical Exam     Temp:  [98 1 °F (36 7 °C)-98 8 °F (37 1 °C)] 98 1 °F (36 7 °C)  HR:  [78-83] 78  Resp:  [20-21] 21  BP: (149-158)/(79-81) 158/81  SpO2:  [93 %-96 %] 96 %  Temp (24hrs), Av 4 °F (36 9 °C), Min:98 1 °F (36 7 °C), Max:98 8 °F (37 1 °C)  Current: Temperature: 98 1 °F (36 7 °C)    Intake/Output Summary (Last 24 hours) at 2023 8205  Last data filed at 2023 0540  Gross per 24 hour   Intake 1440 ml   Output 1750 ml   Net -310 ml         Physical exam findings reported by bedside and primary medical team staff    General Appearance:  Appearing well, nontoxic, and in no distress, appears stated age   Head:  Normocephalic, without obvious abnormality, atraumatic   Eyes:  PERRL, conjunctiva pink and sclera anicteric, both eyes   Nose: Nares normal, mucosa normal, no drainage   Throat: Oropharynx moist without lesions; lips, mucosa, and tongue normal; teeth and gums normal   Neck: Supple, symmetrical, trachea midline, no adenopathy, no tenderness/mass/nodules   Back:   Symmetric, no curvature, ROM normal, no CVA tenderness   Lungs:   Clear to auscultation bilaterally, no audible wheezes, rhonchi and rales, respirations unlabored   Chest Wall:  No tenderness or deformity   Heart:  Regular rate and rhythm, S1, S2 normal, no murmur, rub or gallop   Abdomen:   Soft, non-tender, non-distended, positive bowel sounds, no masses, no organomegaly    No CVA tenderness   Extremities: Extremities normal, atraumatic, no cyanosis, clubbing or edema   Skin: Skin color, texture, turgor normal, no rashes or lesions  No draining wounds noted  Lymph nodes: Cervical, supraclavicular, and axillary nodes normal   Neurologic: Alert and oriented times 3, extremity strength 5/5 and symmetric       Invasive Devices:   PICC Line 02/05/23 (Active)   Reasons to continue PICC Long term antibiotics 02/13/23 0806   Goal for Removal N/A- Discharging with PICC for IV ABX/medications 02/13/23 8411   Line Necessity Reviewed Yes, reviewed with provider 02/13/23 0806   Site Assessment WDL; Clean;Dry; Intact 02/13/23 0806   #1 Lumen Color/Status Red lumen;Blood return noted; Flushed;Normal saline locked 02/13/23 0806   #2 Lumen Color/Status Purple lumen; Infusing 02/13/23 0806   Catheter Out on Skin (cm) 0 cm 02/10/23 1939   Dressing Type Transparent 02/13/23 0806   Dressing Status Clean;Dry; Intact 02/13/23 0806 Dressing Intervention 7 day central line dressing changed 02/13/23 0806   Dressing Change Due 02/19/23 02/13/23 0806       Labs, Imaging, & Other Studies     Lab Results:    I have personally reviewed pertinent labs  Results from last 7 days   Lab Units 02/11/23  0530 02/10/23  1143   WBC Thousand/uL 9 01 10 34*   HEMOGLOBIN g/dL 13 4 12 2   PLATELETS Thousands/uL 327 366     Results from last 7 days   Lab Units 02/13/23  0447 02/10/23  1822 02/10/23  1143   POTASSIUM mmol/L 5 2   < > 6 1*   CHLORIDE mmol/L 111*   < > 108   CO2 mmol/L 23   < > 20*   BUN mg/dL 58*   < > 72*   CREATININE mg/dL 1 51*   < > 2 28*   EGFR ml/min/1 73sq m 46   < > 28   CALCIUM mg/dL 9 1   < > 9 1   AST U/L  --   --  11*   ALT U/L  --   --  <3*   ALK PHOS U/L  --   --  47    < > = values in this interval not displayed  Results from last 7 days   Lab Units 02/12/23  1159 02/12/23  1152   BLOOD CULTURE  Received in Microbiology Lab  Culture in Progress  Received in Microbiology Lab  Culture in Progress  Imaging Studies:   I have personally reviewed pertinent imaging study reports and images in PACS  EKG, Pathology, and Other Studies:   I have personally reviewed pertinent reports and reviewed external records  Counseling/Coordination of care: Total 70 minutes communication with the patient via telehealth  Labs, medical tests and imaging studies were independently and extensively reviewed by me as noted above in HPI and old records were obtained and summarized as noted above in HPI  My recommendations were discussed with the patient in detail who verbalized understanding

## 2023-02-13 NOTE — PROGRESS NOTES
[address]  Progress Note - Radha Galvez 1955, 69 y o  male MRN: [id number]  Unit/Bed#: -01 Encounter: [id number]  Primary Care Provider: Andressa Chand MD   Date and time admitted to hospital: 2/10/2023 11:23 AM    * ANNALEE (acute kidney injury) [facility])  Assessment & Plan  he has baseline creatinine 0 8-0 9  Currently elevated to 2 28 with hyperkalemia most likely has atn    Stop HCTZ and Ancef  Less likely allergic reaction to Ancef  Creatinine improved to 1 4-1 5 with hydration  Stop IV fluids  Avoid any further nephrotoxic agents or hypotension  Placed on Lasix 20 mg IV x1 on 2/11 and 40 mg IV x1 on 2/12  Increase to Lasix 40 mg IV twice daily  Repeat chest x-ray shows congestive changes  Monitor Renal function closely during diuresis  Kidney Ultrasound normal    Hyperkalemia  Assessment & Plan  Potassium 6 1 on admission  EKG no changes noted so far  Received (calcium gluconate, insulin dextrose and Kayexalate)x3 since admission  Hold oral potassium supplementation  Potassium down to 5 2 now  Patient on IV diuretics monitor renal function and potassium levels with that  Low Potassium diet      Shortness of breath  Assessment & Plan  Suspect secondary to mild fluid overload but also cannot rule out pulmonary embolism at this time  Unable to do CTA chest due to renal dysfunction  We will get VQ scan and venous Doppler bilateral lower extremity tomorrow we will tentatively place him on a heparin drip for now until pulmonary embolism is ruled out    MSSA bacteremia  Assessment & Plan  History of MSSA bacteremia  He was recently discharged from the hospital with MSSA bacteremia and placed on a 6-week course of IV Ancef and it was concern for left toe ulcer with early osteomyelitis as the cause of the infection  Patient now presents with acute kidney injury and hyperkalemia  unClear whether annalee is secondary to nsaids versus reaction to Ancef   less Likely reaction to Ancef as no eosinophils or rashes noted  Hold Ancef for now  Consult placed to infectious disease and podiatry to determine if further antibiotics are required for the foot  Repeat blood cultures ordered   Please note that Ancef was held 48 hours and then blood cultures were drawn    Ulcer of left foot [facility])  Assessment & Plan  Continue conservative management and local wound care  further as per podiatry    MDD (major depressive disorder)  Assessment & Plan  stable for now  Continue home meds    Benign essential HTN  Assessment & Plan  Continue Norvasc but hold HCTZ due to mild hyponatremia and ANNALEE  Blood Pressure control is acceptable      VTE Pharmacologic Prophylaxis:   Pharmacologic: Heparin Drip  Mechanical VTE Prophylaxis in Place: Yes    Patient Centered Rounds: I have performed bedside rounds with nursing staff today  Discussions with Specialists or Other Care Team Provider: dw nephro and demetra arango id    Education and Discussions with Family / Patient: discussed with patient at bedside and will update family    Time Spent for Care: 45 minutes  More than 50% of total time spent on counseling and coordination of care as described above  Current Length of Stay: 3 day(s)    Current Patient Status: Inpatient   Certification Statement: The patient will continue to require additional inpatient hospital stay due to Acute kidney injury    Discharge Plan: pending progress  Anticipate another 48 hours in the hospital    Code Status: Level 1 - Full Code      Subjective:   Patient denies any chest pain but does complain of shortness of breath at rest and with exertion  Denies any fevers or chills    Objective:     Vitals:   Temp (24hrs), Av 4 °F (36 9 °C), Min:98 1 °F (36 7 °C), Max:98 8 °F (37 1 °C)    Temp:  [98 1 °F (36 7 °C)-98 8 °F (37 1 °C)] 98 1 °F (36 7 °C)  HR:  [78-83] 78  Resp:  [20-21] 21  BP: (149-158)/(79-81) 158/81  SpO2:  [93 %-96 %] 96 %  Body mass index is 31 17 kg/m²  Input and Output Summary (last 24 hours): Intake/Output Summary (Last 24 hours) at 2/13/2023 1036  Last data filed at 2/13/2023 0540  Gross per 24 hour   Intake 1440 ml   Output 1750 ml   Net -310 ml       Physical Exam:     Physical Exam  Vitals and nursing note reviewed  Constitutional:       Appearance: He is ill-appearing  HENT:      Head: Normocephalic and atraumatic  Right Ear: External ear normal       Left Ear: External ear normal       Nose: Nose normal       Mouth/Throat:      Pharynx: Oropharynx is clear  Eyes:      Pupils: Pupils are equal, round, and reactive to light  Cardiovascular:      Rate and Rhythm: Normal rate and regular rhythm  Heart sounds: Normal heart sounds  Pulmonary:      Effort: Respiratory distress present  Breath sounds: Rales present  Abdominal:      General: Bowel sounds are normal       Palpations: Abdomen is soft  Tenderness: There is no abdominal tenderness  Musculoskeletal:         General: Normal range of motion  Cervical back: Normal range of motion and neck supple  Comments: ulcer noted on the left foot first toe   Skin:     General: Skin is warm and dry  Capillary Refill: Capillary refill takes less than 2 seconds  Neurological:      General: No focal deficit present  Mental Status: He is alert and oriented to person, place, and time     Psychiatric:         Mood and Affect: Mood normal            Additional Data:     Labs:    Results from last 7 days   Lab Units 02/11/23  0530 02/10/23  1143   WBC Thousand/uL 9 01 10 34*   HEMOGLOBIN g/dL 13 4 12 2   HEMATOCRIT % 40 5 37 9   PLATELETS Thousands/uL 327 366   NEUTROS PCT %  --  67   LYMPHS PCT %  --  15   MONOS PCT %  --  12   EOS PCT %  --  4     Results from last 7 days   Lab Units 02/13/23  0447 02/10/23  1822 02/10/23  1143   SODIUM mmol/L 136   < > 133*   POTASSIUM mmol/L 5 2   < > 6 1*   CHLORIDE mmol/L 111*   < > 108   CO2 mmol/L 23   < > 20*   BUN mg/dL 58*   < > 72*   CREATININE mg/dL 1 51*   < > 2 28*   ANION GAP mmol/L 2*   < > 5   CALCIUM mg/dL 9 1   < > 9 1   ALBUMIN g/dL  --   --  3 1*   TOTAL BILIRUBIN mg/dL  --   --  0 27   ALK PHOS U/L  --   --  47   ALT U/L  --   --  <3*   AST U/L  --   --  11*   GLUCOSE RANDOM mg/dL 110   < > 91    < > = values in this interval not displayed  Results from last 7 days   Lab Units 02/12/23  1153   INR  0 96     Results from last 7 days   Lab Units 02/11/23  1605 02/10/23  2002 02/10/23  1440 02/10/23  1426 02/10/23  1402   POC GLUCOSE mg/dl 220* 101 149* 138 48*                   * I Have Reviewed All Lab Data Listed Above  * Additional Pertinent Lab Tests Reviewed: [address] Admission Reviewed    Imaging:    Imaging Reports Reviewed Today Include: cxray  Imaging Personally Reviewed by Myself Includes:  cxray    Recent Cultures (last 7 days):     Results from last 7 days   Lab Units 02/12/23  1159 02/12/23  1152   BLOOD CULTURE  Received in Microbiology Lab  Culture in Progress  Received in Microbiology Lab  Culture in Progress         Last 24 Hours Medication List:   Current Facility-Administered Medications   Medication Dose Route Frequency Provider Last Rate   • acetaminophen  650 mg Oral Q6H PRN Angel Wei MD     • albuterol  2 5 mg Nebulization Q4H PRN Karyn S Juan, CRNP     • amLODIPine  10 mg Oral Daily Angel Wei MD     • buPROPion  300 mg Oral Daily Angel Wei MD     • escitalopram  20 mg Oral Daily Angel Wei MD     • furosemide  40 mg Intravenous BID (diuretic) Angel Wei MD     • heparin (porcine)  3-30 Units/kg/hr (Order-Specific) Intravenous Titrated Angel Wei MD 18 Units/kg/hr (02/13/23 0330)   • heparin (porcine)  4,200 Units Intravenous Q6H PRN Angel Wei MD     • heparin (porcine)  8,400 Units Intravenous Q6H PRN Angel Wei MD     • levothyroxine  75 mcg Oral Early Morning Angel Wei MD     • melatonin  6 mg Oral HS PRN Karyn S Juan, CRNP     • nicotine  1 patch Transdermal Daily Magnus Galicia MD     • ondansetron  4 mg Intravenous Q6H PRN Magnus Galicia MD     • simethicone  80 mg Oral 4x Daily (with meals and at bedtime) Sharol Hatchet, DO          Today, Patient Was Seen By: Magnus Galicia MD    ** Please Note: Dictation voice to text software may have been used in the creation of this document   **

## 2023-02-13 NOTE — PROGRESS NOTES
Progress Note - Nephrology   Karolina Kyle 69 y o  male MRN: [id number]  Unit/Bed#: -01 Encounter: [id number]    A/P:  1   Acute kidney injury on top of chronic kidney disease present on admission              Creatinine stable around 1 5 mg/dL  Patient was reinitiated on diuretics due to potential issues with congestive heart failure as the cause of his shortness of breath  Continue Dialamine's care as best as possible, avoid nephrotoxins as much as possible  2   Chronic disease stage II with a baseline creatinine between 0 9-1 mg/dL  3   Hypertension in the setting of chronic kidney disease stage II             Blood pressure slightly elevated at this time, would simply reassess  4   Hyperkalemia              Potassium level is appropriate at 5 2 mmol/L, continue to monitor for now  5   Shortness of breath              VQ scan result that is low probability, patient is currently on heparin drip  Lower extremity duplex remains pending  Patient believes that his shortness of breath has slightly improved  Patient reinitiated on furosemide 40 mg IV twice daily  6   MSSA bacteremia            Continue cefazolin, monitor kidney function closely  7   Bloating   Continue simethicone 4 times daily, unclear if the patient has received benefit from this medication at this time  Scheduled to have it be given 4 times daily through tomorrow noon time    Follow up reason for today's visit: Acute kidney injury/chronic kidney disease/hypertension/hyperkalemia    ANNALEE (acute kidney injury) [facility])    Patient Active Problem List   Diagnosis   • Acute hyponatremia   • Benign essential HTN   • Moderate aortic stenosis   • MDD (major depressive disorder)   • Peripheral neuropathy   • Other emphysema (HCC)   • Ulcer of left foot (HCC)   • ANNALEE (acute kidney injury) ([facility] [address] )   • Hyperkalemia   • MSSA bacteremia   • Shortness of breath         Subjective:   Patient continues to complain of dyspnea with exertion      Objective: Vitals: Blood pressure 158/81, pulse 78, temperature 98 1 °F (36 7 °C), resp  rate 21, height 6' (1 829 m), weight 104 kg (229 lb 12 8 oz), SpO2 96 %  ,Body mass index is 31 17 kg/m²  Weight (last 2 days)     Date/Time Weight    02/13/23 0540 104 (229 8)    02/12/23 0600 105 (231)    02/11/23 0600 103 (227 6)            Intake/Output Summary (Last 24 hours) at 2/13/2023 1313  Last data filed at 2/13/2023 0900  Gross per 24 hour   Intake 900 ml   Output 800 ml   Net 100 ml     I/O last 3 completed shifts: In: 2782 5 [P O :1800; I V :982 5]  Out: 2500 [Urine:2500]         Physical Exam: /81   Pulse 78   Temp 98 1 °F (36 7 °C)   Resp 21   Ht 6' (1 829 m)   Wt 104 kg (229 lb 12 8 oz)   SpO2 96%   BMI 31 17 kg/m²     General Appearance:    Alert, cooperative, no distress, appears stated age   Head:    Normocephalic, without obvious abnormality, atraumatic   Eyes:    Conjunctiva/corneas clear   Ears:    Normal external ears   Nose:   Nares normal, septum midline, mucosa normal, no drainage    or sinus tenderness   Throat:   Lips, mucosa, and tongue normal; teeth and gums normal   Neck:   Supple   Back:     Symmetric, no curvature, ROM normal, no CVA tenderness   Lungs:     Clear to auscultation bilaterally, respirations unlabored   Chest wall:    No tenderness or deformity   Heart:    Regular rate and rhythm, S1 and S2 normal, no murmur, rub   or gallop   Abdomen:     Soft, non-tender, bowel sounds active   Extremities:   Extremities normal, atraumatic, no cyanosis or edema   Skin:   Skin color, texture, turgor normal, no rashes or lesions   Lymph nodes:   Cervical normal   Neurologic:   CNII-XII intact            Lab, Imaging and other studies: I have personally reviewed pertinent labs    CBC: No results found for: WBC, HGB, HCT, MCV, PLT, ADJUSTEDWBC, MCH, MCHC, RDW, MPV, NRBC  CMP:   Lab Results   Component Value Date    K 5 2 02/13/2023     (H) 02/13/2023    CO2 23 02/13/2023    BUN 58 (H) 02/13/2023    CREATININE 1 51 (H) 02/13/2023    CALCIUM 9 1 02/13/2023    EGFR 46 02/13/2023         Results from last 7 days   Lab Units 02/13/23  0447 02/12/23  0448 02/11/23  1629 02/11/23  0530 02/10/23  1822 02/10/23  1143   POTASSIUM mmol/L 5 2 5 3 5 2 5 8*   < > 6 1*   CHLORIDE mmol/L 111* 111*  --  110*   < > 108   CO2 mmol/L 23 23  --  24   < > 20*   BUN mg/dL 58* 58*  --  63*   < > 72*   CREATININE mg/dL 1 51* 1 48*  --  1 66*   < > 2 28*   CALCIUM mg/dL 9 1 9 1  --  9 0   < > 9 1   ALK PHOS U/L  --   --   --   --   --  47   ALT U/L  --   --   --   --   --  <3*   AST U/L  --   --   --   --   --  11*    < > = values in this interval not displayed  Phosphorus: No results found for: PHOS  Magnesium: No results found for: MG  Urinalysis: No results found for: Yola Alpers, SPECGRAV, PHUR, LEUKOCYTESUR, NITRITE, PROTEINUA, GLUCOSEU, KETONESU, BILIRUBINUR, BLOODU  Ionized Calcium: No results found for: CAION  Coagulation: No results found for: PT, INR, APTT  Troponin: No results found for: TROPONINI  ABG: No results found for: PHART, UVC6ITU, PO2ART, TIR1AWR, K0BQCYRM, BEART, SOURCE  Radiology review:     IMAGING  Procedure: XR chest portable    Result Date: 2/13/2023  Narrative: CHEST INDICATION:   Sob  COMPARISON:  2/10/2023 EXAM PERFORMED/VIEWS:  XR CHEST PORTABLE FINDINGS:  Left PICC line tip projecting over the proximal SVC without pneumothorax  Cardiomegaly noted  Mild central congestion with trace left basilar pleural effusion  No consolidation or pneumothorax  Osseous structures appear within normal limits for patient age  Left humeral prosthesis noted  Impression: Mild pulmonary edema pattern   Workstation performed: [id number]     Procedure: NM lung ventilation / perfusion    Result Date: 2/13/2023  Narrative: VENTILATION AND PERFUSION SCAN INDICATION: Dyspnea on exertion COMPARISON:  Chest x-ray dated 2/13/2023 TECHNIQUE:  Posterior ventilation imaging was performed after the inhalation of 6 4 mCi Xe-133 gas  Multiplanar perfusion imaging was next performed following the intravenous administration of 4 1 mCi Tc-99m labeled MAA  FINDINGS:  Ventilation imaging demonstrates normal distribution of radiopharmaceutical throughout both lungs  Perfusion imaging demonstrates nonuniform perfusion without moderate or large segmental perfusion defect  On bilateral lateral spot images, there is fairly symmetric nonsegmental patchy decreased perfusion involving the posterior aspect of the bilateral  upper to midlung zones with tracer activity extending to the margins of the lungs and therefore most likely artifactual in nature or possibly related to patient habitus/patient positioning  Impression: The probability for pulmonary embolus is low  Workstation performed: [id number]     Procedure: US kidney and bladder    Result Date: 2/11/2023  Narrative: RENAL ULTRASOUND INDICATION:   mark  COMPARISON:  None TECHNIQUE:   Ultrasound of the retroperitoneum was performed with a curvilinear transducer utilizing volumetric sweeps and still imaging techniques  FINDINGS: KIDNEYS: Symmetric and normal size  Right kidney:  12 1 x 5 8 x 5 8 cm  Volume 213 4 mL Left kidney:  12 7 x 6 9 x 6 5 cm  Volume 295 1 mL Right kidney Normal echogenicity and contour  No mass is identified  No hydronephrosis  No shadowing calculi  No perinephric fluid collections  Left kidney Normal echogenicity and contour  No mass is identified  No hydronephrosis  No shadowing calculi  No perinephric fluid collections  URETERS: Nonvisualized  BLADDER: Normally distended  No focal thickening or mass lesions  Bilateral ureteral jets detected  Impression: No hydronephrosis   Workstation performed: [id number]       Current Facility-Administered Medications   Medication Dose Route Frequency   • acetaminophen (TYLENOL) tablet 650 mg  650 mg Oral Q6H PRN   • albuterol inhalation solution 2 5 mg  2 5 mg Nebulization Q4H PRN   • amLODIPine (NORVASC) tablet 10 mg  10 mg Oral Daily   • buPROPion (WELLBUTRIN XL) 24 hr tablet 300 mg  300 mg Oral Daily   • ceFAZolin (ANCEF) IVPB (premix in dextrose) 1,000 mg 50 mL  1,000 mg Intravenous Q8H   • escitalopram (LEXAPRO) tablet 20 mg  20 mg Oral Daily   • furosemide (LASIX) injection 40 mg  40 mg Intravenous BID (diuretic)   • heparin (porcine) 25,000 units in 0 45% NaCl 250 mL infusion (premix)  3-30 Units/kg/hr (Order-Specific) Intravenous Titrated   • heparin (porcine) injection 4,200 Units  4,200 Units Intravenous Q6H PRN   • heparin (porcine) injection 8,400 Units  8,400 Units Intravenous Q6H PRN   • levothyroxine tablet 75 mcg  75 mcg Oral Early Morning   • melatonin tablet 6 mg  6 mg Oral HS PRN   • nicotine (NICODERM CQ) 7 mg/24hr TD 24 hr patch 1 patch  1 patch Transdermal Daily   • ondansetron (ZOFRAN) injection 4 mg  4 mg Intravenous Q6H PRN   • simethicone (MYLICON) chewable tablet 80 mg  80 mg Oral 4x Daily (with meals and at bedtime)     Medications Discontinued During This Encounter   Medication Reason   • sodium chloride 0 9 % infusion    • sodium chloride 0 9 % infusion    • heparin (porcine) subcutaneous injection 5,000 Units    • heparin (VTE/PE) high Meño Weir, DO      This progress note was produced in part using a dictation device which may document imprecise wording from author's original intent

## 2023-02-13 NOTE — PHYSICAL THERAPY NOTE
PHYSICAL THERAPY EVALUATION  NAME:  Ravinder Cuadra  DATE: 02/13/23    AGE:   69 y o   Mrn:   [id number]  ADMIT DX:  Bacteremia [R78 81]  Hyperkalemia [E87 5]  Renal failure [N19]  Dyspnea [R06 00]  SOB (shortness of breath) [R06 02]  ANNALEE (acute kidney injury) ([facility] [address] ) [N17 9]  Ulcer of left foot, limited to breakdown of skin ([facility] [address] ) [place] Prim    Past Medical History:   Diagnosis Date    Anxiety     Aortic stenosis     Depression     Hypertension     Hypothyroidism     PVCs (premature ventricular contractions)      Length Of Stay: 3  Performed at least 2 patient identifiers during session: Name and Birthday  PHYSICAL THERAPY EVALUATION :        02/13/23 1037   Note Type   Note type Evaluation   Pain Assessment   Pain Assessment Tool 0-10   Pain Score No Pain   Restrictions/Precautions   Weight Bearing Precautions Per Order Yes   LLE Weight Bearing Per Order WBAT   Braces or Orthoses   (declined toe off loading shoe, pt reports has one at home but doesn't use it  Educated pt on minimizing weight bearing through forefoot and putting weight through heel for gait)   Other Precautions Chair Alarm; Bed Alarm;WBS;Fall Risk   Home Living   Type of Home House  ([address])   Home Layout Two level;Bed/bath upstairs;Stairs to enter without rails  (full bath, first floor)   Bathroom Shower/Tub Walk-in shower   Bathroom Toilet Standard   Bathroom Equipment   (pt denies DMe)   [address]  (doesn't use PTA)   Additional Comments Pt reports living with spouse in 2 , no AD use PTA   Prior Function   Level of [place] Independent with ADLs; Independent with functional mobility; Independent with IADLS   Lives With Spouse   Receives Help From Family   IADLs Independent with driving; Independent with meal prep; Independent with medication management   Falls in the last 6 months 0   Vocational Retired   Comments Pt reports completing ADLs, IADLs, mobility, and drives at I   Cognition   Overall Cognitive Status [facility]   Orientation Level Oriented X4   Following Commands Follows all commands and directions without difficulty   RLE Assessment   RLE Assessment WFL  (5/5 strength)   LLE Assessment   LLE Assessment X  (5/5 strength, deferred ankle strength due to wound)   Light Touch   RLE Light Touch Impaired   RLE Light Touch Comments   (bottom of feet)   LLE Light Touch Impaired   LLE Light Touch Comments bottom of feet   Bed Mobility   Supine to Sit 6  Modified independent   Additional items Increased time required;HOB elevated   Sit to Supine 6  Modified independent   Additional items Increased time required;HOB elevated   Additional Comments HOB slightly elevated, bedrails and increased time   Transfers   Sit to Stand 5  Supervision   Additional items Increased time required;Verbal cues   Stand to Sit 5  Supervision   Additional items Increased time required;Verbal cues   Stand pivot 5  Supervision   Additional items Increased time required;Verbal cues   Additional Comments supervision for transfers for safety as pt unable to maintain NWB to L forefoot during gait  Increased time required, mild-mod MONTANEZ noted but O2 92-96% throughout session on RA, educated pt on pursed lip breathing however pt reports is unable because of his nose   Ambulation/Elevation   Gait pattern Improper Weight shift;Decreased foot clearance; Excessively slow   Gait Assistance 5  Supervision   Additional items Verbal cues   Assistive Device None   Distance 70' no AD, despite cuing pt unable to maintain NWB to L forefoot, slow gait speed, steady gait   Balance   Static Sitting Normal   Dynamic Sitting Good   Static Standing Fair +   Dynamic Standing Fair +   Ambulatory Fair   Endurance Deficit   Endurance Deficit Yes   Endurance Deficit Description fatigue, MONTANEZ   Activity Tolerance   Activity Tolerance Patient limited by fatigue   Medical Staff Made Aware OT, [facility]   Nurse Made Aware RN, Tiff   Assessment   Prognosis Good   Problem List Decreased strength;Decreased endurance; Impaired balance;Decreased mobility; Impaired judgement;Decreased safety awareness;Decreased skin integrity;Obesity; Impaired sensation;Orthopedic restrictions   Barriers to Discharge None   Barriers to Discharge Comments Pt reports spouse is always home and can assist PRN   Goals   Patient Goals Go home   STG Expiration Date 02/27/23   Plan   Treatment/Interventions Functional transfer training;LE strengthening/ROM; Elevations; Therapeutic exercise; Endurance training;Patient/family training;Equipment eval/education; Bed mobility;Gait training; Compensatory technique education;Spoke to nursing;OT   PT Frequency 1-2x/wk   Recommendation   PT Discharge Recommendation No rehabilitation needs   Equipment Recommended   (none anticipated)   AM-PAC Basic Mobility Inpatient   Turning in Flat Bed Without Bedrails 4   Lying on Back to Sitting on Edge of Flat Bed Without Bedrails 4   Moving Bed to Chair 3   Standing Up From Chair Using Arms 3   Walk in Room 3   Climb 3-5 Stairs With Railing 3   Basic Mobility Inpatient Raw Score 20   Basic Mobility Standardized Score 43 99   Highest Level Of Mobility   JH-HLM Goal 6: Walk 10 steps or more   JH-HLM Achieved 7: Walk 25 feet or more   End of Consult   Patient Position at End of Consult Supine; All needs within reach  (RN, Tiff present)     The patient's AM-PAC Basic Mobility Inpatient Short Form Raw Score is 20    A Raw score of greater than 16 suggests the patient may benefit from discharge to home  Please also refer to the recommendation of the Physical Therapist for safe discharge planning  (Please find full objective findings from PT assessment regarding body systems outlined above)  Assessment: Pt is a 69 y o  male seen for PT evaluation s/p admission to [address] [place] [address] on 2/10/2023 with ANNALEE (acute kidney injury) ([facility] [address] )  Order placed for PT services    Upon evaluation: Pt is presenting with impaired functional mobility due to decreased strength, decreased ROM, decreased endurance, impaired balance, gait deviations, decreased safety awareness, impaired judgment, orthopedic restrictions, fall risk, and impaired skin integrity requiring  mod I assistance for bed mobility and supervision assistance for transfers and ambulation with no AD   Pt's clinical presentation is currently unstable/unpredictable given the functional mobility deficits above coupled with fall risks as indicated by AM-PAC 6-Clicks: 62/24 as well as impulsivity, impaired balance, polypharmacy, impaired judgement, decreased safety awareness, and limited sensation/neuropathy and combined with medical complications of hypertension , abnormal renal lab values, multiple readmissions, and need for input for mobility technique/safety  Pt's PMHx and comorbidities that may affect physical performance and progress include: CHF, HTN, obesity, and neuropathy  Personal factors affecting pt at time of IE include: anxiety, depression, step(s) to enter environment, multi-level environment, past experience, behavioral pattern, inability to navigate community distances, and questionable non-compliance  Pt will benefit from continued skilled PT services to address deficits as defined above and to maximize level of functional mobility to facilitate return toward PLOF and improved QOL  From PT/mobility standpoint, recommendation at time of d/c would be home independently w/ no skilled acute PT needs pending progress in order to reduce fall risk and maximize pt's functional independence and consistency with mobility in order to facilitate return to PLOF  Recommend trial with cane next 1-2 sessions and ther ex next 1-2 sessions  Goals: Pt will: Perform transfers with modified I to increase Indep in home alone environment and prepare for ambulation  Pt will ambulate with LRAD for >/= 150' with  modified I  to improve gait quality and promote proper use of assistive device and to access home environment   Pt will complete >/= 12 steps with with unilateral handrail with modified I to return to home with KULWINDER and return to multilevel home  Increase bilateral LE strength 1/2 grade to facilitate independent mobility and Increase all balance 1/2 grade to decrease risk for falls  Pt will maintain NWB to L forefoot during mobility to facility wound healing without need for VC         [place] Leo, YANETH,DPT

## 2023-02-13 NOTE — OCCUPATIONAL THERAPY NOTE
Occupational Therapy Evaluation     Patient Name: Lonita Prader  PMDHN'G Date: 2/13/2023  Problem List  Principal Problem:    ANNALEE (acute kidney injury) ([address] )  Active Problems:    Benign essential HTN    MDD (major depressive disorder)    Ulcer of left foot (HCC)    Hyperkalemia    MSSA bacteremia    Shortness of breath    Past Medical History  Past Medical History:   Diagnosis Date    Anxiety     Aortic stenosis     Depression     Hypertension     Hypothyroidism     PVCs (premature ventricular contractions)      Past Surgical History  Past Surgical History:   Procedure Laterality Date    APPENDECTOMY      BACK SURGERY      TOTAL SHOULDER REPLACEMENT           02/13/23 1038   Note Type   Note type Evaluation   Pain Assessment   Pain Assessment Tool 0-10   Pain Score No Pain   Restrictions/Precautions   Weight Bearing Precautions Per Order Yes   LLE Weight Bearing Per Order WBAT   Braces or Orthoses   (declined shoe off loading shoe)   Other Precautions Chair Alarm; Bed Alarm; Fall Risk;WBS   Home Living   Type of Home House  ([address])   Home Layout Two level;Performs ADLs on one level; Able to live on main level with bedroom/bathroom  (full bath on 1st)   Bathroom Shower/Tub Walk-in shower   [facility]   Additional Comments Pt reports living in a [facility] with spouse  No AD PTA  Prior Function   Level of [place] Independent with ADLs; Independent with functional mobility; Independent with IADLS   Lives With Spouse   IADLs Independent with driving; Independent with meal prep; Independent with medication management   Falls in the last 6 months 0   ADL   UB Dressing Assistance 5  Supervision/Setup   UB Dressing Deficit Setup   LB Dressing Assistance 5  Supervision/Setup   LB Dressing Deficit Setup   Additional Comments UB/LB ADLs @ S with increased time d/t SOB   Bed Mobility   Supine to Sit 6  Modified independent   Additional items Increased time required;HOB elevated   Sit to Supine 6  Modified independent   Additional items Increased time required;HOB elevated   Additional Comments Pt supine in bed at beginning of session  Supine <> sit @ Mod I  Transfers   Sit to Stand 5  Supervision   Additional items Increased time required;Verbal cues   Stand to Sit 5  Supervision   Additional items Increased time required;Verbal cues   Stand pivot 5  Supervision   Additional items Increased time required;Verbal cues   Additional Comments STS from EOB with no AD @ S  Pt able to complete functional mobility around room with no AD @ S    Balance   Static Sitting Normal   Dynamic Sitting Good   Static Standing Fair +   Dynamic Standing Fair +   Activity Tolerance   Activity Tolerance Patient tolerated treatment well   Medical Staff Made Aware Spoke with PT Emili   Nurse Made Aware Spoke with RN Dahiana   RUE Assessment   RUE Assessment WFL   LUE Assessment   LUE Assessment WFL   Hand Function   Gross Motor Coordination Functional   Fine Motor Coordination Functional   Cognition   Overall Cognitive Status WFL   Arousal/Participation Alert; Responsive; Cooperative   Attention Within functional limits   Orientation Level Oriented X4   Memory Within functional limits   Following Commands Follows all commands and directions without difficulty   Assessment   Limitation Decreased endurance   Prognosis Good   Assessment Pt is a 69 y o  male, admitted to [address] [place] [address] 2/10/2023 d/t experiencing SOB, decreased urine output and weight gain  Dx: ANNALEE  Pt with PMHx impacting their performance during ADL tasks, including: anxiety, aortic stenosis, depression, hypertension, hypothyroidism, PVCs  Prior to admission to the hospital Pt was performing ADLs without physical assistance  IADLs without physical assistance  Functional transfers/ambulation without physical assistance  Cognitive status was PTA was Intact   OT order placed to assess Pt's ADLs, cognitive status, and performance during functional tasks in order to maximize safety and independence while making most appropriate d/c recommendations  PT/OT co-evaluation completed at this time d/t safety concerns  Pt's clinical presentation is currently evolving given new onset deficits that effect Pt's occupational performance and ability to safely return to OF including decrease endurance and decrease activity tolerance combined with medical complications of abnormal renal lab values and abnormal CBC  Personal factors affecting Pt at time of initial evaluation include: step(s) to enter environment, multi-level environment and questionable non-compliance  Pt reporting they are near their baseline function other than MONTANEZ but has no concerns regarding ADLs, IADLs or functional mobility upon return to home, therefore do not require acute OT services at this time  D/C OT effective 2/13/2023  If new concerns arise, please re-consult  Plan   OT Frequency Eval only   Recommendation   OT Discharge Recommendation No rehabilitation needs   AM-PAC Daily Activity Inpatient   Lower Body Dressing 4   Bathing 4   Toileting 4   Upper Body Dressing 4   Grooming 4   Eating 4   Daily Activity Raw Score 24   Daily Activity Standardized Score (Calc for Raw Score >=11) 57 54   AM-PAC Applied Cognition Inpatient   Following a Speech/Presentation 4   Understanding Ordinary Conversation 4   Taking Medications 4   Remembering Where Things Are Placed or Put Away 4   Remembering List of 4-5 Errands 4   Taking Care of Complicated Tasks 4   Applied Cognition Raw Score 24   Applied Cognition Standardized Score 62 21     The patient's raw score on the AM-PAC Daily Activity Inpatient Short Form is 24  A raw score of greater than or equal to 19 suggests the patient may benefit from discharge to home  Pt reporting they are near their baseline function other than MONTANEZ but has no concerns regarding ADLs, IADLs or functional mobility upon return to home, therefore do not require acute OT services at this time   D/C OT effective 2/13/2023  If new concerns arise, please re-consult      PRISCILLA Magaña/RYAN

## 2023-02-13 NOTE — ASSESSMENT & PLAN NOTE
History of MSSA bacteremia  He was recently discharged from the hospital with MSSA bacteremia and placed on a 6-week course of IV Ancef and it was concern for left toe ulcer with early osteomyelitis as the cause of the infection  Patient now presents with acute kidney injury and hyperkalemia  unClear whether mark is secondary to nsaids versus reaction to Ancef  less Likely reaction to Ancef as no eosinophils or rashes noted  Hold Ancef for now  Consult placed to infectious disease and podiatry to determine if further antibiotics are required for the foot  Repeat blood cultures ordered 2/12    Please note that Ancef was held 48 hours and then blood cultures were drawn

## 2023-02-13 NOTE — PLAN OF CARE
Problem: Potential for Falls  Goal: Patient will remain free of falls  Description: INTERVENTIONS:  - Educate patient/family on patient safety including physical limitations  - Instruct patient to call for assistance with activity   - Consult OT/PT to assist with strengthening/mobility   - Keep Call bell within reach  - Keep bed low and locked with side rails adjusted as appropriate  - Keep care items and personal belongings within reach  - Initiate and maintain comfort rounds  - Make Fall Risk Sign visible to staff  - Offer Toileting every  Hours, in advance of need  - Initiate/Maintain alarm  - Obtain necessary fall risk management equipment:   - Apply yellow socks and bracelet for high fall risk patients  - Consider moving patient to room near nurses station  Outcome: Progressing     Problem: PAIN - ADULT  Goal: Verbalizes/displays adequate comfort level or baseline comfort level  Description: Interventions:  - Encourage patient to monitor pain and request assistance  - Assess pain using appropriate pain scale  - Administer analgesics based on type and severity of pain and evaluate response  - Implement non-pharmacological measures as appropriate and evaluate response  - Consider cultural and social influences on pain and pain management  - Notify physician/advanced practitioner if interventions unsuccessful or patient reports new pain  Outcome: Progressing     Problem: INFECTION - ADULT  Goal: Absence or prevention of progression during hospitalization  Description: INTERVENTIONS:  - Assess and monitor for signs and symptoms of infection  - Monitor lab/diagnostic results  - Monitor all insertion sites, i e  indwelling lines, tubes, and drains  - Monitor endotracheal if appropriate and nasal secretions for changes in amount and color  - Daisy appropriate cooling/warming therapies per order  - Administer medications as ordered  - Instruct and encourage patient and family to use good hand hygiene technique  - Identify and instruct in appropriate isolation precautions for identified infection/condition  Outcome: Progressing  Goal: Absence of fever/infection during neutropenic period  Description: INTERVENTIONS:  - Monitor WBC    Outcome: Progressing     Problem: SAFETY ADULT  Goal: Patient will remain free of falls  Description: INTERVENTIONS:  - Educate patient/family on patient safety including physical limitations  - Instruct patient to call for assistance with activity   - Consult OT/PT to assist with strengthening/mobility   - Keep Call bell within reach  - Keep bed low and locked with side rails adjusted as appropriate  - Keep care items and personal belongings within reach  - Initiate and maintain comfort rounds  - Make Fall Risk Sign visible to staff  - Offer Toileting every  Hours, in advance of need  - Initiate/Maintain alarm  - Obtain necessary fall risk management equipment:   - Apply yellow socks and bracelet for high fall risk patients  - Consider moving patient to room near nurses station  Outcome: Progressing  Goal: Maintain or return to baseline ADL function  Description: INTERVENTIONS:  -  Assess patient's ability to carry out ADLs; assess patient's baseline for ADL function and identify physical deficits which impact ability to perform ADLs (bathing, care of mouth/teeth, toileting, grooming, dressing, etc )  - Assess/evaluate cause of self-care deficits   - Assess range of motion  - Assess patient's mobility; develop plan if impaired  - Assess patient's need for assistive devices and provide as appropriate  - Encourage maximum independence but intervene and supervise when necessary  - Involve family in performance of ADLs  - Assess for home care needs following discharge   - Consider OT consult to assist with ADL evaluation and planning for discharge  - Provide patient education as appropriate  Outcome: Progressing  Goal: Maintains/Returns to pre admission functional level  Description: INTERVENTIONS:  - Perform BMAT or MOVE assessment daily    - Set and communicate daily mobility goal to care team and patient/family/caregiver  - Collaborate with rehabilitation services on mobility goals if consulted  - Perform Range of Motion  times a day  - Reposition patient every  hours  - Dangle patient  times a day  - Stand patient  times a day  - Ambulate patient  times a day  - Out of bed to chair  times a day   - Out of bed for meal times a day  - Out of bed for toileting  - Record patient progress and toleration of activity level   Outcome: Progressing     Problem: DISCHARGE PLANNING  Goal: Discharge to home or other facility with appropriate resources  Description: INTERVENTIONS:  - Identify barriers to discharge w/patient and caregiver  - Arrange for needed discharge resources and transportation as appropriate  - Identify discharge learning needs (meds, wound care, etc )  - Arrange for interpretive services to assist at discharge as needed  - Refer to Case Management Department for coordinating discharge planning if the patient needs post-hospital services based on physician/advanced practitioner order or complex needs related to functional status, cognitive ability, or social support system  Outcome: Progressing     Problem: Knowledge Deficit  Goal: Patient/family/caregiver demonstrates understanding of disease process, treatment plan, medications, and discharge instructions  Description: Complete learning assessment and assess knowledge base    Interventions:  - Provide teaching at level of understanding  - Provide teaching via preferred learning methods  Outcome: Progressing     Problem: CARDIOVASCULAR - ADULT  Goal: Maintains optimal cardiac output and hemodynamic stability  Description: INTERVENTIONS:  - Monitor I/O, vital signs and rhythm  - Monitor for S/S and trends of decreased cardiac output  - Administer and titrate ordered vasoactive medications to optimize hemodynamic stability  - Assess quality of pulses, skin color and temperature  - Assess for signs of decreased coronary artery perfusion  - Instruct patient to report change in severity of symptoms  Outcome: Progressing  Goal: Absence of cardiac dysrhythmias or at baseline rhythm  Description: INTERVENTIONS:  - Continuous cardiac monitoring, vital signs, obtain 12 lead EKG if ordered  - Administer antiarrhythmic and heart rate control medications as ordered  - Monitor electrolytes and administer replacement therapy as ordered  Outcome: Progressing     Problem: GASTROINTESTINAL - ADULT  Goal: Minimal or absence of nausea and/or vomiting  Description: INTERVENTIONS:  - Administer IV fluids if ordered to ensure adequate hydration  - Maintain NPO status until nausea and vomiting are resolved  - Nasogastric tube if ordered  - Administer ordered antiemetic medications as needed  - Provide nonpharmacologic comfort measures as appropriate  - Advance diet as tolerated, if ordered  - Consider nutrition services referral to assist patient with adequate nutrition and appropriate food choices  Outcome: Progressing  Goal: Maintains or returns to baseline bowel function  Description: INTERVENTIONS:  - Assess bowel function  - Encourage oral fluids to ensure adequate hydration  - Administer IV fluids if ordered to ensure adequate hydration  - Administer ordered medications as needed  - Encourage mobilization and activity  - Consider nutritional services referral to assist patient with adequate nutrition and appropriate food choices  Outcome: Progressing  Goal: Maintains adequate nutritional intake  Description: INTERVENTIONS:  - Monitor percentage of each meal consumed  - Identify factors contributing to decreased intake, treat as appropriate  - Assist with meals as needed  - Monitor I&O, weight, and lab values if indicated  - Obtain nutrition services referral as needed  Outcome: Progressing  Goal: Establish and maintain optimal ostomy function  Description: INTERVENTIONS:  - Assess bowel function  - Encourage oral fluids to ensure adequate hydration  - Administer IV fluids if ordered to ensure adequate hydration   - Administer ordered medications as needed  - Encourage mobilization and activity  - Nutrition services referral to assist patient with appropriate food choices  - Assess stoma site  - Consider wound care consult   Outcome: Progressing  Goal: Oral mucous membranes remain intact  Description: INTERVENTIONS  - Assess oral mucosa and hygiene practices  - Implement preventative oral hygiene regimen  - Implement oral medicated treatments as ordered  - Initiate Nutrition services referral as needed  Outcome: Progressing     Problem: METABOLIC, FLUID AND ELECTROLYTES - ADULT  Goal: Electrolytes maintained within normal limits  Description: INTERVENTIONS:  - Monitor labs and assess patient for signs and symptoms of electrolyte imbalances  - Administer electrolyte replacement as ordered  - Monitor response to electrolyte replacements, including repeat lab results as appropriate  - Instruct patient on fluid and nutrition as appropriate  Outcome: Progressing  Goal: Fluid balance maintained  Description: INTERVENTIONS:  - Monitor labs   - Monitor I/O and WT  - Instruct patient on fluid and nutrition as appropriate  - Assess for signs & symptoms of volume excess or deficit  Outcome: Progressing  Goal: Glucose maintained within target range  Description: INTERVENTIONS:  - Monitor Blood Glucose as ordered  - Assess for signs and symptoms of hyperglycemia and hypoglycemia  - Administer ordered medications to maintain glucose within target range  - Assess nutritional intake and initiate nutrition service referral as needed  Outcome: Progressing     Problem: Nutrition/Hydration-ADULT  Goal: Nutrient/Hydration intake appropriate for improving, restoring or maintaining nutritional needs  Description: Monitor and assess patient's nutrition/hydration status for malnutrition  Collaborate with interdisciplinary team and initiate plan and interventions as ordered  Monitor patient's weight and dietary intake as ordered or per policy  Utilize nutrition screening tool and intervene as necessary  Determine patient's food preferences and provide high-protein, high-caloric foods as appropriate       INTERVENTIONS:  - Monitor oral intake, urinary output, labs, and treatment plans  - Assess nutrition and hydration status and recommend course of action  - Evaluate amount of meals eaten  - Assist patient with eating if necessary   - Allow adequate time for meals  - Recommend/ encourage appropriate diets, oral nutritional supplements, and vitamin/mineral supplements  - Order, calculate, and assess calorie counts as needed  - Recommend, monitor, and adjust tube feedings and TPN/PPN based on assessed needs  - Assess need for intravenous fluids  - Provide specific nutrition/hydration education as appropriate  - Include patient/family/caregiver in decisions related to nutrition  Outcome: Progressing

## 2023-02-13 NOTE — PROGRESS NOTES
Progress Note - Podiatry  Nam Manzanares 69 y o  male MRN: [id number]  Unit/Bed#: -01 Encounter: [id number]    Assessment:  1  Left plantar hallux IPJ ulceration  - MRI left foot 2/3/23: "Small focus of bone marrow edema at the proximal aspect of the great toe distal phalanx without confluent decreased T1 marrow signal, nonspecific as described above   No confluent decreased T1 marrow signal to definitively suggest osteomyelitis"  2  H/o MSSA bacteremia on 4-6wks IV ancef (#1 likely portal of entry)  3  PN    Plan:  - Left plantar wound superficial without SOI  MRI as above w/o definitive OM  C/w LWC and offloading  I again briefly discussed bowen arthroplasty vs amputation if ulceration were not to heal   - WBAT toe unloading surgical shoe  - Abx per ID  - Rest of care per primary team and consulting teams    Subjective/Objective   Chief Complaint:   Chief Complaint   Patient presents with   • Shortness of Breath     Patient c/o sob, wt gain and abdominal distention  Patient was recently discharged with CHF and staph infection in blood stream         Subjective: 69 y o  y/o male was seen and evaluated at bedside  Blood pressure 158/81, pulse 78, temperature 98 1 °F (36 7 °C), resp  rate 21, height 6' (1 829 m), weight 104 kg (229 lb 12 8 oz), SpO2 96 %  ,Body mass index is 31 17 kg/m²  Invasive Devices     Peripherally Inserted Central Catheter Line  Duration           PICC Line 02/05/23 7 days                Physical Exam:   General: Alert, cooperative and no distress  Lungs: Non labored breathing  Heart: Positive S1, S2  Abdomen: Soft, non-tender  Extremity: gross msk and nvs baseline  Left plantar hallux ulceration appears dry and stable without deep probe  No acute SOI noted                 Lab, Imaging and other studies:   CBC: No results found for: WBC, HGB, HCT, MCV, PLT, ADJUSTEDWBC, MCH, MCHC, RDW, MPV, NRBC    Imaging: I have personally reviewed pertinent films in PACS  EKG, Pathology, and Other Studies: I have personally reviewed pertinent reports

## 2023-02-13 NOTE — ASSESSMENT & PLAN NOTE
he has baseline creatinine 0 8-0 9  Admission creatinine was elevated to 2 28 with hyperkalemia most likely has atn  Hydrochlorothiazide was initially placed on hold  Creatinine improved to 1 4-1 5 with hydration  IV fluids discontinued and HCTZ resumed     Avoid any further nephrotoxic agents or hypotension  Placed on Lasix 20 mg IV x1 on 2/11 and 40 mg IV x1 on 2/12  Increase to Lasix 40 mg IV twice daily for 3 doses    Repeat chest x-ray shows congestive changes  Monitor Renal function closely during diuresis  Kidney Ultrasound normal

## 2023-02-13 NOTE — ASSESSMENT & PLAN NOTE
Potassium 6 1 on admission  EKG no changes noted so far  Received (calcium gluconate, insulin dextrose and Kayexalate)x3 since admission  Hold oral potassium supplementation  Potassium down to 5 2 now  Patient on IV diuretics monitor renal function and potassium levels with that    Low Potassium diet

## 2023-02-13 NOTE — ASSESSMENT & PLAN NOTE
Continue Norvasc but hold HCTZ due to mild hyponatremia and ANNALEE  Blood Pressure control is acceptable

## 2023-02-13 NOTE — ASSESSMENT & PLAN NOTE
History of MSSA bacteremia  He was recently discharged from the hospital with MSSA bacteremia and placed on a 6-week course of IV Ancef and it was concern for left toe ulcer with early osteomyelitis as the cause of the infection  Patient now presents with acute kidney injury and hyperkalemia  unClear whether mark is secondary to nsaids versus reaction to Ancef  less Likely reaction to Ancef as no eosinophils or rashes noted  restart Ancef for now to complete total 4 week course through 3/ 5     Podiatry and ID input appreciated  Repeat blood cultures ordered 2/12  Please note that Ancef was held 48 hours and then blood cultures were drawn  Repeat blood cultures are negative to date

## 2023-02-13 NOTE — PLAN OF CARE
Problem: PHYSICAL THERAPY ADULT  Goal: Performs mobility at highest level of function for planned discharge setting  See evaluation for individualized goals  Description: Treatment/Interventions: Functional transfer training, LE strengthening/ROM, Elevations, Therapeutic exercise, Endurance training, Patient/family training, Equipment eval/education, Bed mobility, Gait training, Compensatory technique education, Spoke to nursing, OT  Equipment Recommended:  (none anticipated)       See flowsheet documentation for full assessment, interventions and recommendations  Note: Prognosis: Good  Problem List: Decreased strength, Decreased endurance, Impaired balance, Decreased mobility, Impaired judgement, Decreased safety awareness, Decreased skin integrity, Obesity, Impaired sensation, Orthopedic restrictions  Assessment: Pt is a 69 y o  male seen for PT evaluation s/p admission to [address] [place] [address] on 2/10/2023 with ANNALEE (acute kidney injury) ([facility] [address] )  Order placed for PT services  Upon evaluation: Pt is presenting with impaired functional mobility due to decreased strength, decreased ROM, decreased endurance, impaired balance, gait deviations, decreased safety awareness, impaired judgment, orthopedic restrictions, fall risk, and impaired skin integrity requiring  mod I assistance for bed mobility and supervision assistance for transfers and ambulation with no AD   Pt's clinical presentation is currently unstable/unpredictable given the functional mobility deficits above coupled with fall risks as indicated by AM-PAC 6-Clicks: 21/76 as well as impulsivity, impaired balance, polypharmacy, impaired judgement, decreased safety awareness, and limited sensation/neuropathy and combined with medical complications of hypertension , abnormal renal lab values, multiple readmissions, and need for input for mobility technique/safety    Pt's PMHx and comorbidities that may affect physical performance and progress include: CHF, HTN, obesity, and neuropathy  Personal factors affecting pt at time of IE include: anxiety, depression, step(s) to enter environment, multi-level environment, past experience, behavioral pattern, inability to navigate community distances, and questionable non-compliance  Pt will benefit from continued skilled PT services to address deficits as defined above and to maximize level of functional mobility to facilitate return toward PLOF and improved QOL  From PT/mobility standpoint, recommendation at time of d/c would be home independently w/ no skilled acute PT needs pending progress in order to reduce fall risk and maximize pt's functional independence and consistency with mobility in order to facilitate return to PLOF  Recommend trial with cane next 1-2 sessions and ther ex next 1-2 sessions  Barriers to Discharge: None  Barriers to Discharge Comments: Pt reports spouse is always home and can assist PRN  PT Discharge Recommendation: No rehabilitation needs    See flowsheet documentation for full assessment

## 2023-02-13 NOTE — ASSESSMENT & PLAN NOTE
he has baseline creatinine 0 8-0 9  Currently elevated to 2 28 with hyperkalemia most likely has atn    Stop HCTZ and Ancef  Less likely allergic reaction to Ancef  Creatinine improved to 1 4-1 5 with hydration  Stop IV fluids  Avoid any further nephrotoxic agents or hypotension  Placed on Lasix 20 mg IV x1 on 2/11 and 40 mg IV x1 on 2/12  Increase to Lasix 40 mg IV twice daily    Repeat chest x-ray shows congestive changes  Monitor Renal function closely during diuresis  Kidney Ultrasound normal

## 2023-02-13 NOTE — PLAN OF CARE
Problem: Potential for Falls  Goal: Patient will remain free of falls  Description: INTERVENTIONS:  - Educate patient/family on patient safety including physical limitations  - Instruct patient to call for assistance with activity   - Consult OT/PT to assist with strengthening/mobility   - Keep Call bell within reach  - Keep bed low and locked with side rails adjusted as appropriate  - Keep care items and personal belongings within reach  - Initiate and maintain comfort rounds  - Make Fall Risk Sign   to staff  - Offer Toileting every   Hours, in advance of need  - Initiate/Maintain   alarm  - Obtain necessary fall risk management equipment:      - Apply yellow socks and bracelet for high fall risk patients  - Consider moving patient to room near nurses station  Outcome: Progressing     Problem: PAIN - ADULT  Goal: Verbalizes/displays adequate comfort level or baseline comfort level  Description: Interventions:  - Encourage patient to monitor pain and request assistance  - Assess pain using appropriate pain scale  - Administer analgesics based on type and severity of pain and evaluate response  - Implement non-pharmacological measures as appropriate and evaluate response  - Consider cultural and social influences on pain and pain management  - Notify physician/advanced practitioner if interventions unsuccessful or patient reports new pain  Outcome: Progressing     Problem: INFECTION - ADULT  Goal: Absence or prevention of progression during hospitalization  Description: INTERVENTIONS:  - Assess and monitor for signs and symptoms of infection  - Monitor lab/diagnostic results  - Monitor all insertion sites, i e  indwelling lines, tubes, and drains  - Monitor endotracheal if appropriate and nasal secretions for changes in amount and color  - Elroy appropriate cooling/warming therapies per order  - Administer medications as ordered  - Instruct and encourage patient and family to use good hand hygiene technique  - Identify and instruct in appropriate isolation precautions for identified infection/condition  Outcome: Progressing  Goal: Absence of fever/infection during neutropenic period  Description: INTERVENTIONS:  - Monitor WBC    Outcome: Progressing     Problem: SAFETY ADULT  Goal: Patient will remain free of falls  Description: INTERVENTIONS:  - Educate patient/family on patient safety including physical limitations  - Instruct patient to call for assistance with activity   - Consult OT/PT to assist with strengthening/mobility   - Keep Call bell within reach  - Keep bed low and locked with side rails adjusted as appropriate  - Keep care items and personal belongings within reach  - Initiate and maintain comfort rounds  - Make Fall Risk Sign   to staff  - Offer Toileting every   Hours, in advance of need  - Initiate/Maintain   alarm  - Obtain necessary fall risk management equipment:      - Apply yellow socks and bracelet for high fall risk patients  - Consider moving patient to room near nurses station  Outcome: Progressing  Goal: Maintain or return to baseline ADL function  Description: INTERVENTIONS:  -  Assess patient's ability to carry out ADLs; assess patient's baseline for ADL function and identify physical deficits which impact ability to perform ADLs (bathing, care of mouth/teeth, toileting, grooming, dressing, etc )  - Assess/evaluate cause of self-care deficits   - Assess range of motion  - Assess patient's mobility; develop plan if impaired  - Assess patient's need for assistive devices and provide as appropriate  - Encourage maximum independence but intervene and supervise when necessary  - Involve family in performance of ADLs  - Assess for home care needs following discharge   - Consider OT consult to assist with ADL evaluation and planning for discharge  - Provide patient education as appropriate  Outcome: Progressing  Goal: Maintains/Returns to pre admission functional level  Description: INTERVENTIONS:  - Perform BMAT or MOVE assessment daily    - Set and communicate daily mobility goal to care team and patient/family/caregiver  - Collaborate with rehabilitation services on mobility goals if consulted  - Perform Range of Motion   times a day  - Reposition patient every   hours  - Dangle patient   times a day  - Stand patient   times a day  - Ambulate patient   times a day  - Out of bed to chair   times a day   - Out of bed for meals     times a day  - Out of bed for toileting  - Record patient progress and toleration of activity level   Outcome: Progressing     Problem: DISCHARGE PLANNING  Goal: Discharge to home or other facility with appropriate resources  Description: INTERVENTIONS:  - Identify barriers to discharge w/patient and caregiver  - Arrange for needed discharge resources and transportation as appropriate  - Identify discharge learning needs (meds, wound care, etc )  - Arrange for interpretive services to assist at discharge as needed  - Refer to Case Management Department for coordinating discharge planning if the patient needs post-hospital services based on physician/advanced practitioner order or complex needs related to functional status, cognitive ability, or social support system  Outcome: Progressing     Problem: Knowledge Deficit  Goal: Patient/family/caregiver demonstrates understanding of disease process, treatment plan, medications, and discharge instructions  Description: Complete learning assessment and assess knowledge base    Interventions:  - Provide teaching at level of understanding  - Provide teaching via preferred learning methods  Outcome: Progressing     Problem: CARDIOVASCULAR - ADULT  Goal: Maintains optimal cardiac output and hemodynamic stability  Description: INTERVENTIONS:  - Monitor I/O, vital signs and rhythm  - Monitor for S/S and trends of decreased cardiac output  - Administer and titrate ordered vasoactive medications to optimize hemodynamic stability  - Assess quality of pulses, skin color and temperature  - Assess for signs of decreased coronary artery perfusion  - Instruct patient to report change in severity of symptoms  Outcome: Progressing  Goal: Absence of cardiac dysrhythmias or at baseline rhythm  Description: INTERVENTIONS:  - Continuous cardiac monitoring, vital signs, obtain 12 lead EKG if ordered  - Administer antiarrhythmic and heart rate control medications as ordered  - Monitor electrolytes and administer replacement therapy as ordered  Outcome: Progressing     Problem: GASTROINTESTINAL - ADULT  Goal: Minimal or absence of nausea and/or vomiting  Description: INTERVENTIONS:  - Administer IV fluids if ordered to ensure adequate hydration  - Maintain NPO status until nausea and vomiting are resolved  - Nasogastric tube if ordered  - Administer ordered antiemetic medications as needed  - Provide nonpharmacologic comfort measures as appropriate  - Advance diet as tolerated, if ordered  - Consider nutrition services referral to assist patient with adequate nutrition and appropriate food choices  Outcome: Progressing  Goal: Maintains or returns to baseline bowel function  Description: INTERVENTIONS:  - Assess bowel function  - Encourage oral fluids to ensure adequate hydration  - Administer IV fluids if ordered to ensure adequate hydration  - Administer ordered medications as needed  - Encourage mobilization and activity  - Consider nutritional services referral to assist patient with adequate nutrition and appropriate food choices  Outcome: Progressing  Goal: Maintains adequate nutritional intake  Description: INTERVENTIONS:  - Monitor percentage of each meal consumed  - Identify factors contributing to decreased intake, treat as appropriate  - Assist with meals as needed  - Monitor I&O, weight, and lab values if indicated  - Obtain nutrition services referral as needed  Outcome: Progressing  Goal: Establish and maintain optimal ostomy function  Description: INTERVENTIONS:  - Assess bowel function  - Encourage oral fluids to ensure adequate hydration  - Administer IV fluids if ordered to ensure adequate hydration   - Administer ordered medications as needed  - Encourage mobilization and activity  - Nutrition services referral to assist patient with appropriate food choices  - Assess stoma site  - Consider wound care consult   Outcome: Progressing  Goal: Oral mucous membranes remain intact  Description: INTERVENTIONS  - Assess oral mucosa and hygiene practices  - Implement preventative oral hygiene regimen  - Implement oral medicated treatments as ordered  - Initiate Nutrition services referral as needed  Outcome: Progressing     Problem: METABOLIC, FLUID AND ELECTROLYTES - ADULT  Goal: Electrolytes maintained within normal limits  Description: INTERVENTIONS:  - Monitor labs and assess patient for signs and symptoms of electrolyte imbalances  - Administer electrolyte replacement as ordered  - Monitor response to electrolyte replacements, including repeat lab results as appropriate  - Instruct patient on fluid and nutrition as appropriate  Outcome: Progressing  Goal: Fluid balance maintained  Description: INTERVENTIONS:  - Monitor labs   - Monitor I/O and WT  - Instruct patient on fluid and nutrition as appropriate  - Assess for signs & symptoms of volume excess or deficit  Outcome: Progressing  Goal: Glucose maintained within target range  Description: INTERVENTIONS:  - Monitor Blood Glucose as ordered  - Assess for signs and symptoms of hyperglycemia and hypoglycemia  - Administer ordered medications to maintain glucose within target range  - Assess nutritional intake and initiate nutrition service referral as needed  Outcome: Progressing     Problem: Nutrition/Hydration-ADULT  Goal: Nutrient/Hydration intake appropriate for improving, restoring or maintaining nutritional needs  Description: Monitor and assess patient's nutrition/hydration status for malnutrition  Collaborate with interdisciplinary team and initiate plan and interventions as ordered  Monitor patient's weight and dietary intake as ordered or per policy  Utilize nutrition screening tool and intervene as necessary  Determine patient's food preferences and provide high-protein, high-caloric foods as appropriate       INTERVENTIONS:  - Monitor oral intake, urinary output, labs, and treatment plans  - Assess nutrition and hydration status and recommend course of action  - Evaluate amount of meals eaten  - Assist patient with eating if necessary   - Allow adequate time for meals  - Recommend/ encourage appropriate diets, oral nutritional supplements, and vitamin/mineral supplements  - Order, calculate, and assess calorie counts as needed  - Recommend, monitor, and adjust tube feedings and TPN/PPN based on assessed needs  - Assess need for intravenous fluids  - Provide specific nutrition/hydration education as appropriate  - Include patient/family/caregiver in decisions related to nutrition  Outcome: Progressing

## 2023-02-14 LAB
ANION GAP SERPL CALCULATED.3IONS-SCNC: 3 MMOL/L (ref 4–13)
BUN SERPL-MCNC: 53 MG/DL (ref 5–25)
CALCIUM SERPL-MCNC: 9.1 MG/DL (ref 8.4–10.2)
CHLORIDE SERPL-SCNC: 111 MMOL/L (ref 96–108)
CO2 SERPL-SCNC: 24 MMOL/L (ref 21–32)
CREAT SERPL-MCNC: 1.36 MG/DL (ref 0.6–1.3)
ERYTHROCYTE [DISTWIDTH] IN BLOOD BY AUTOMATED COUNT: 13.2 % (ref 11.6–15.1)
GFR SERPL CREATININE-BSD FRML MDRD: 52 ML/MIN/1.73SQ M
GLUCOSE SERPL-MCNC: 101 MG/DL (ref 65–140)
HCT VFR BLD AUTO: 34.6 % (ref 36.5–49.3)
HGB BLD-MCNC: 11.2 G/DL (ref 12–17)
MAGNESIUM SERPL-MCNC: 2.2 MG/DL (ref 1.9–2.7)
MCH RBC QN AUTO: 31.2 PG (ref 26.8–34.3)
MCHC RBC AUTO-ENTMCNC: 32.4 G/DL (ref 31.4–37.4)
MCV RBC AUTO: 96 FL (ref 82–98)
PLATELET # BLD AUTO: 317 THOUSANDS/UL (ref 149–390)
PMV BLD AUTO: 8.6 FL (ref 8.9–12.7)
POTASSIUM SERPL-SCNC: 5 MMOL/L (ref 3.5–5.3)
RBC # BLD AUTO: 3.59 MILLION/UL (ref 3.88–5.62)
SODIUM SERPL-SCNC: 138 MMOL/L (ref 135–147)
WBC # BLD AUTO: 9.53 THOUSAND/UL (ref 4.31–10.16)

## 2023-02-14 RX ORDER — HYDROCHLOROTHIAZIDE 25 MG/1
25 TABLET ORAL DAILY
Status: DISCONTINUED | OUTPATIENT
Start: 2023-02-14 | End: 2023-02-16 | Stop reason: HOSPADM

## 2023-02-14 RX ORDER — CEFAZOLIN SODIUM 2 G/50ML
2000 SOLUTION INTRAVENOUS EVERY 8 HOURS
Status: DISCONTINUED | OUTPATIENT
Start: 2023-02-14 | End: 2023-02-16 | Stop reason: HOSPADM

## 2023-02-14 RX ORDER — FUROSEMIDE 10 MG/ML
40 INJECTION INTRAMUSCULAR; INTRAVENOUS
Status: COMPLETED | OUTPATIENT
Start: 2023-02-14 | End: 2023-02-15

## 2023-02-14 RX ORDER — CEFAZOLIN SODIUM 2 G/50ML
2000 SOLUTION INTRAVENOUS EVERY 8 HOURS
Qty: 5700 ML | Refills: 0 | Status: SHIPPED | OUTPATIENT
Start: 2023-02-14 | End: 2023-03-05

## 2023-02-14 RX ADMIN — HYDROCHLOROTHIAZIDE 25 MG: 25 TABLET ORAL at 13:10

## 2023-02-14 RX ADMIN — BUPROPION HYDROCHLORIDE 300 MG: 150 TABLET, EXTENDED RELEASE ORAL at 08:25

## 2023-02-14 RX ADMIN — AMLODIPINE BESYLATE 10 MG: 10 TABLET ORAL at 08:25

## 2023-02-14 RX ADMIN — SIMETHICONE 80 MG: 80 TABLET, CHEWABLE ORAL at 08:25

## 2023-02-14 RX ADMIN — FUROSEMIDE 40 MG: 10 INJECTION, SOLUTION INTRAVENOUS at 15:59

## 2023-02-14 RX ADMIN — MELATONIN 6 MG: 3 TAB ORAL at 20:43

## 2023-02-14 RX ADMIN — CEFAZOLIN SODIUM 1000 MG: 1 SOLUTION INTRAVENOUS at 03:34

## 2023-02-14 RX ADMIN — FUROSEMIDE 40 MG: 10 INJECTION, SOLUTION INTRAVENOUS at 08:25

## 2023-02-14 RX ADMIN — CEFAZOLIN SODIUM 1000 MG: 1 SOLUTION INTRAVENOUS at 11:35

## 2023-02-14 RX ADMIN — HEPARIN SODIUM 5000 UNITS: 5000 INJECTION INTRAVENOUS; SUBCUTANEOUS at 20:35

## 2023-02-14 RX ADMIN — LEVOTHYROXINE SODIUM 75 MCG: 75 TABLET ORAL at 06:00

## 2023-02-14 RX ADMIN — CEFAZOLIN SODIUM 2000 MG: 2 SOLUTION INTRAVENOUS at 20:30

## 2023-02-14 RX ADMIN — ESCITALOPRAM OXALATE 20 MG: 10 TABLET ORAL at 08:25

## 2023-02-14 NOTE — PROGRESS NOTES
[address]  Progress Note - Charlotte Holm 1955, 69 y o  male MRN: [id number]  Unit/Bed#: -[facility] Encounter: [id number]  Primary Care Provider: Arleen Moreira MD   Date and time admitted to hospital: 2/10/2023 11:23 AM    * MARK (acute kidney injury) [facility])  Assessment & Plan  he has baseline creatinine 0 8-0 9  Admission creatinine was elevated to 2 28 with hyperkalemia most likely has atn  Hydrochlorothiazide was initially placed on hold  Creatinine improved to 1 4-1 5 with hydration  IV fluids discontinued and HCTZ resumed     Avoid any further nephrotoxic agents or hypotension  Placed on Lasix 20 mg IV x1 on 2/11 and 40 mg IV x1 on 2/12  Increase to Lasix 40 mg IV twice daily for 3 doses  Repeat chest x-ray shows congestive changes  Monitor Renal function closely during diuresis  Kidney Ultrasound normal    Shortness of breath  Assessment & Plan  VQ scan and lower extremity Doppler ultrasound negative for DVT  Suspect secondary to mild fluid overload  Received multiple doses of IV Lasix and shortness of breath is improved  MSSA bacteremia  Assessment & Plan  History of MSSA bacteremia  He was recently discharged from the hospital with MSSA bacteremia and placed on a 6-week course of IV Ancef and it was concern for left toe ulcer with early osteomyelitis as the cause of the infection  Patient now presents with acute kidney injury and hyperkalemia  unClear whether mark is secondary to nsaids versus reaction to Ancef  less Likely reaction to Ancef as no eosinophils or rashes noted  restart Ancef for now to complete total 4 week course through 3/ 5     Podiatry and ID input appreciated  Repeat blood cultures ordered 2/12  Please note that Ancef was held 48 hours and then blood cultures were drawn  Repeat blood cultures are negative to date  Hyperkalemia  Assessment & Plan  Potassium 6 1 on admission  EKG no changes noted so far    Received (calcium gluconate, insulin dextrose and Kayexalate)x3 since admission  Hold oral potassium supplementation  Potassium down to 5 2 now  Patient on IV diuretics monitor renal function and potassium levels with that  Low Potassium diet      Ulcer of left foot [facility])  Assessment & Plan  Continue conservative management and local wound care  further as per podiatry    MDD (major depressive disorder)  Assessment & Plan  stable for now  Continue home meds    Benign essential HTN  Assessment & Plan  Continue Norvasc  HCTZ initially on hold due to mild hyponatremia and ANNALEE  Resumed by nephrology today  VTE Pharmacologic Prophylaxis:   High Risk (Score >/= 5) - Pharmacological DVT Prophylaxis Ordered: heparin  Sequential Compression Devices Ordered  Patient Centered Rounds: I performed bedside rounds with nursing staff today  Discussions with Specialists or Other Care Team Provider: Discussed with infectious disease    Education and Discussions with Family / Patient: Patient declined call to   Total Time Spent on Date of Encounter in care of patient: 45 minutes This time was spent on one or more of the following: performing physical exam; counseling and coordination of care; obtaining or reviewing history; documenting in the medical record; reviewing/ordering tests, medications or procedures; communicating with other healthcare professionals and discussing with patient's family/caregivers  Current Length of Stay: 4 day(s)  Current Patient Status: Inpatient   Certification Statement: The patient will continue to require additional inpatient hospital stay due to Ongoing monitoring of renal function  Discharge Plan: Anticipate discharge tomorrow to home with home services  Code Status: Level 1 - Full Code    Subjective:   Patient seen and examined  Denies any lower extremity pain  Still complains of intermittent shortness of breath  Denied any cough      Objective:     Vitals:   Temp (24hrs), Av 4 °F (36 9 °C), Min:98 2 °F (36 8 °C), Max:98 6 °F (37 °C)    Temp:  [98 2 °F (36 8 °C)-98 6 °F (37 °C)] 98 2 °F (36 8 °C)  HR:  [74] 74  Resp:  [16-19] 19  BP: (162-177)/(83-95) 177/89  SpO2:  [90 %-94 %] 90 %  Body mass index is 31 06 kg/m²  Input and Output Summary (last 24 hours): Intake/Output Summary (Last 24 hours) at 2/14/2023 1508  Last data filed at 2/14/2023 1301  Gross per 24 hour   Intake 658 ml   Output 2675 ml   Net -2017 ml       Physical Exam:   Physical Exam  HENT:      Head: Normocephalic  Nose: Nose normal       Mouth/Throat:      Mouth: Mucous membranes are moist    Eyes:      Extraocular Movements: Extraocular movements intact  Pupils: Pupils are equal, round, and reactive to light  Cardiovascular:      Rate and Rhythm: Regular rhythm  Pulmonary:      Comments: Scattered expiratory wheezing  No rales appreciated  Abdominal:      General: Abdomen is flat  Bowel sounds are normal       Palpations: Abdomen is soft  Musculoskeletal:      Cervical back: Neck supple  Right lower leg: No edema  Left lower leg: No edema  Skin:     General: Skin is warm  Neurological:      General: No focal deficit present  Mental Status: He is alert and oriented to person, place, and time  Mental status is at baseline  Psychiatric:         Mood and Affect: Mood normal          Additional Data:     Labs:  Results from last 7 days   Lab Units 02/14/23  0602 02/11/23  0530 02/10/23  1143   WBC Thousand/uL 9 53   < > 10 34*   HEMOGLOBIN g/dL 11 2*   < > 12 2   HEMATOCRIT % 34 6*   < > 37 9   PLATELETS Thousands/uL 317   < > 366   NEUTROS PCT %  --   --  67   LYMPHS PCT %  --   --  15   MONOS PCT %  --   --  12   EOS PCT %  --   --  4    < > = values in this interval not displayed       Results from last 7 days   Lab Units 02/14/23  0602 02/10/23  1822 02/10/23  1143   SODIUM mmol/L 138   < > 133*   POTASSIUM mmol/L 5 0   < > 6 1*   CHLORIDE mmol/L 111*   < > 108   CO2 mmol/L 24   < > 20*   BUN mg/dL 53*   < > 72*   CREATININE mg/dL 1 36*   < > 2 28*   ANION GAP mmol/L 3*   < > 5   CALCIUM mg/dL 9 1   < > 9 1   ALBUMIN g/dL  --   --  3 1*   TOTAL BILIRUBIN mg/dL  --   --  0 27   ALK PHOS U/L  --   --  47   ALT U/L  --   --  <3*   AST U/L  --   --  11*   GLUCOSE RANDOM mg/dL 101   < > 91    < > = values in this interval not displayed  Results from last 7 days   Lab Units 02/12/23  1153   INR  0 96     Results from last 7 days   Lab Units 02/11/23  1605 02/10/23  2002 02/10/23  1440 02/10/23  1426 02/10/23  1402   POC GLUCOSE mg/dl 220* 101 149* 138 48*               Lines/Drains:  Invasive Devices     Peripherally Inserted Central Catheter Line  Duration           PICC Line 02/05/23 9 days                Central Line:  Goal for removal: Will discontinue when meds requiring line are completed  Imaging: No pertinent imaging reviewed  Recent Cultures (last 7 days):   Results from last 7 days   Lab Units 02/12/23  1159 02/12/23  1152   BLOOD CULTURE  No Growth at 24 hrs  No Growth at 24 hrs         Last 24 Hours Medication List:   Current Facility-Administered Medications   Medication Dose Route Frequency Provider Last Rate   • acetaminophen  650 mg Oral Q6H PRN Se Barnett MD     • albuterol  2 5 mg Nebulization Q4H PRN Karyn S Juan, CRNP     • amLODIPine  10 mg Oral Daily Se Barnett MD     • buPROPion  300 mg Oral Daily Se Barnett MD     • cefazolin  2,000 mg Intravenous Anastasiya Kilgore MD     • escitalopram  20 mg Oral Daily Se Barnett MD     • furosemide  40 mg Intravenous BID (diuretic) Easton Jarrett MD     • heparin (porcine)  5,000 Units Subcutaneous Q8H [address] Se Barnett MD     • hydrochlorothiazide  25 mg Oral Daily Easton Jarrett MD     • levothyroxine  75 mcg Oral Early Morning Se Barnett MD     • melatonin  6 mg Oral HS PRN Karyn S Juan, CRNP     • nicotine  1 patch Transdermal Daily Se Barnett MD     • ondansetron  4 mg Intravenous Q6H PRN Shanda Saucer Merline Kelch, MD          Today, Patient Was Seen By: Katie Moreno MD    **Please Note: This note may have been constructed using a voice recognition system  **

## 2023-02-14 NOTE — ASSESSMENT & PLAN NOTE
Continue Norvasc  HCTZ initially on hold due to mild hyponatremia and ANNALEE  Resumed by nephrology today

## 2023-02-14 NOTE — PLAN OF CARE
Problem: Potential for Falls  Goal: Patient will remain free of falls  Description: INTERVENTIONS:  - Educate patient/family on patient safety including physical limitations  - Instruct patient to call for assistance with activity   - Consult OT/PT to assist with strengthening/mobility   - Keep Call bell within reach  - Keep bed low and locked with side rails adjusted as appropriate  - Keep care items and personal belongings within reach  - Initiate and maintain comfort rounds  - Make Fall Risk Sign visible to staff  - Offer Toileting every 2 Hours in advance of need  - Apply yellow socks and bracelet for high fall risk patients  Outcome: Progressing     Problem: PAIN - ADULT  Goal: Verbalizes/displays adequate comfort level or baseline comfort level  Description: Interventions:  - Encourage patient to monitor pain and request assistance  - Assess pain using appropriate pain scale  - Administer analgesics based on type and severity of pain and evaluate response  - Implement non-pharmacological measures as appropriate and evaluate response  - Consider cultural and social influences on pain and pain management  - Notify physician/advanced practitioner if interventions unsuccessful or patient reports new pain  Outcome: Progressing     Problem: INFECTION - ADULT  Goal: Absence or prevention of progression during hospitalization  Description: INTERVENTIONS:  - Assess and monitor for signs and symptoms of infection  - Monitor lab/diagnostic results  - Monitor all insertion sites, i e  indwelling lines, tubes, and drains  - Monitor endotracheal if appropriate and nasal secretions for changes in amount and color  - Crane appropriate cooling/warming therapies per order  - Administer medications as ordered  - Instruct and encourage patient and family to use good hand hygiene technique  - Identify and instruct in appropriate isolation precautions for identified infection/condition  Outcome: Progressing     Problem: SAFETY ADULT  Goal: Patient will remain free of falls  Description: INTERVENTIONS:  - Educate patient/family on patient safety including physical limitations  - Instruct patient to call for assistance with activity   - Consult OT/PT to assist with strengthening/mobility   - Keep Call bell within reach  - Keep bed low and locked with side rails adjusted as appropriate  - Keep care items and personal belongings within reach  - Initiate and maintain comfort rounds  - Make Fall Risk Sign visible to staff  - Offer Toileting every 2 Hours in advance of need  - Apply yellow socks and bracelet for high fall risk patients  Outcome: Progressing  Goal: Maintain or return to baseline ADL function  Description: INTERVENTIONS:  - Educate patient/family on patient safety including physical limitations  - Instruct patient to call for assistance with activity   - Consult OT/PT to assist with strengthening/mobility   - Keep Call bell within reach  - Keep bed low and locked with side rails adjusted as appropriate  - Keep care items and personal belongings within reach  - Initiate and maintain comfort rounds  - Make Fall Risk Sign visible to staff  - Offer Toileting every 2 Hours in advance of need  - Apply yellow socks and bracelet for high fall risk patients  Outcome: Progressing  Goal: Maintains/Returns to pre admission functional level  Description: INTERVENTIONS:  - Perform BMAT or MOVE assessment daily    - Set and communicate daily mobility goal to care team and patient/family/caregiver  - Collaborate with rehabilitation services on mobility goals if consulted  - Perform Range of Motion   times a day  - Reposition patient every   hours  - Dangle patient   times a day  - Stand patient   times a day  - Ambulate patient   times a day  - Out of bed to chair   times a day   - Out of bed for meals     times a day  - Out of bed for toileting  - Record patient progress and toleration of activity level   Outcome: Progressing Problem: DISCHARGE PLANNING  Goal: Discharge to home or other facility with appropriate resources  Description: INTERVENTIONS:  - Identify barriers to discharge w/patient and caregiver  - Arrange for needed discharge resources and transportation as appropriate  - Identify discharge learning needs (meds, wound care, etc )  - Arrange for interpretive services to assist at discharge as needed  - Refer to Case Management Department for coordinating discharge planning if the patient needs post-hospital services based on physician/advanced practitioner order or complex needs related to functional status, cognitive ability, or social support system  Outcome: Progressing     Problem: Knowledge Deficit  Goal: Patient/family/caregiver demonstrates understanding of disease process, treatment plan, medications, and discharge instructions  Description: Complete learning assessment and assess knowledge base    Interventions:  - Provide teaching at level of understanding  - Provide teaching via preferred learning methods  Outcome: Progressing     Problem: CARDIOVASCULAR - ADULT  Goal: Maintains optimal cardiac output and hemodynamic stability  Description: INTERVENTIONS:  - Monitor I/O, vital signs and rhythm  - Monitor for S/S and trends of decreased cardiac output  - Administer and titrate ordered vasoactive medications to optimize hemodynamic stability  - Assess quality of pulses, skin color and temperature  - Assess for signs of decreased coronary artery perfusion  - Instruct patient to report change in severity of symptoms  Outcome: Progressing  Goal: Absence of cardiac dysrhythmias or at baseline rhythm  Description: INTERVENTIONS:  - Continuous cardiac monitoring, vital signs, obtain 12 lead EKG if ordered  - Administer antiarrhythmic and heart rate control medications as ordered  - Monitor electrolytes and administer replacement therapy as ordered  Outcome: Progressing     Problem: METABOLIC, FLUID AND ELECTROLYTES - ADULT  Goal: Electrolytes maintained within normal limits  Description: INTERVENTIONS:  - Monitor labs and assess patient for signs and symptoms of electrolyte imbalances  - Administer electrolyte replacement as ordered  - Monitor response to electrolyte replacements, including repeat lab results as appropriate  - Instruct patient on fluid and nutrition as appropriate  Outcome: Progressing  Goal: Fluid balance maintained  Description: INTERVENTIONS:  - Monitor labs   - Monitor I/O and WT  - Instruct patient on fluid and nutrition as appropriate  - Assess for signs & symptoms of volume excess or deficit  Outcome: Progressing  Goal: Glucose maintained within target range  Description: INTERVENTIONS:  - Monitor Blood Glucose as ordered  - Assess for signs and symptoms of hyperglycemia and hypoglycemia  - Administer ordered medications to maintain glucose within target range  - Assess nutritional intake and initiate nutrition service referral as needed  Outcome: Progressing     Problem: SKIN/TISSUE INTEGRITY - ADULT  Goal: Incision(s), wounds(s) or drain site(s) healing without S/S of infection  Description: INTERVENTIONS  - Assess and document dressing, wound bed and surrounding tissue  - Provide patient and family education  - Perform skin care/dressing changes every day and prn  Outcome: Progressing     Problem: Nutrition/Hydration-ADULT  Goal: Nutrient/Hydration intake appropriate for improving, restoring or maintaining nutritional needs  Description: Monitor and assess patient's nutrition/hydration status for malnutrition  Collaborate with interdisciplinary team and initiate plan and interventions as ordered  Monitor patient's weight and dietary intake as ordered or per policy  Utilize nutrition screening tool and intervene as necessary  Determine patient's food preferences and provide high-protein, high-caloric foods as appropriate       INTERVENTIONS:  - Monitor oral intake, urinary output, labs, and treatment plans  - Assess nutrition and hydration status and recommend course of action  - Evaluate amount of meals eaten  - Assist patient with eating if necessary   - Allow adequate time for meals  - Recommend/ encourage appropriate diets, oral nutritional supplements, and vitamin/mineral supplements  - Order, calculate, and assess calorie counts as needed  - Recommend, monitor, and adjust tube feedings and TPN/PPN based on assessed needs  - Assess need for intravenous fluids  - Provide specific nutrition/hydration education as appropriate  - Include patient/family/caregiver in decisions related to nutrition  Outcome: Progressing

## 2023-02-14 NOTE — PROGRESS NOTES
Progress Note - Nephrology   Nam Manzanares 69 y o  male MRN: [id number]  Unit/Bed#: -01 Encounter: [id number]    A/P:  1   Acute kidney injury on top of chronic kidney disease present on admission              Plan continue to slowly improve, now down to 1 36 mg/dL  Continue to maximize overall  Avoid potential nephrotoxins  2   Chronic disease stage II with a baseline creatinine between 0 9-1 mg/dL  3   Hypertension in the setting of chronic kidney disease stage II             Blood pressure continues slightly increased, continue with amlodipine, will reinitiate hydrochlorothiazide 25 mg once daily for now  Continue to closely monitor electrolytes and kidney function  4   Hyperkalemia              Potassium level remains high normal continue to monitor for now  5   Shortness of breath              As mentioned yesterday, VQ scan came back as low probability, ultimately, the patient may be experiencing increased dyspnea due to significant deconditioning  This was reviewed with him and he understands that this is certainly a component of his dyspnea  At the same time, the patient has mild volume overloaded state, he is status post furosemide 40 mg IV yesterday evening as well as this morning  He is feeling a little better status post diuretic use  6   MSSA bacteremia            Patient is back on cefazolin, appears to be tolerating well  7   Bloating   Patient has been on Gas-X for the past 2 days, feels about the same, last dose scheduled for around noontime today      Follow up reason for today's visit: Acute kidney injury/chronic kidney disease/hypertension    ANNALEE (acute kidney injury) [facility])    Patient Active Problem List   Diagnosis   • Acute hyponatremia   • Benign essential HTN   • Moderate aortic stenosis   • MDD (major depressive disorder)   • Peripheral neuropathy   • Other emphysema (HCC)   • Ulcer of left foot (HCC)   • [facility]   • Hyperkalemia   • MSSA bacteremia • Shortness of breath         Subjective:   Patient continues to complain of significant dyspnea with exertion, feels that he may still have some swelling in his face and in his abdomen, offers no other complaints  No chest pain, and he is eating and drinking with no nausea or vomiting  Objective:     Vitals: Blood pressure (!) 177/95, pulse 74, temperature 98 2 °F (36 8 °C), resp  rate 19, height 6' (1 829 m), weight 104 kg (229 lb), SpO2 93 %  ,Body mass index is 31 06 kg/m²  Weight (last 2 days)     Date/Time Weight    02/14/23 0600 104 (229)    02/13/23 0540 104 (229 8)    02/12/23 0600 105 (231)            Intake/Output Summary (Last 24 hours) at 2/14/2023 1157  Last data filed at 2/14/2023 1135  Gross per 24 hour   Intake 720 ml   Output 2230 ml   Net -1510 ml     I/O last 3 completed shifts:   In: 600 [P O :600]  Out: 2730 [Urine:2730]         Physical Exam: BP (!) 177/95   Pulse 74   Temp 98 2 °F (36 8 °C)   Resp 19   Ht 6' (1 829 m)   Wt 104 kg (229 lb)   SpO2 93%   BMI 31 06 kg/m²     General Appearance:    Alert, cooperative, no distress, appears stated age   Head:    Normocephalic, without obvious abnormality, atraumatic   Eyes:    Conjunctiva/corneas clear   Ears:    Normal external ears   Nose:   Nares normal, septum midline, mucosa normal, no drainage    or sinus tenderness   Throat:   Lips, mucosa, and tongue normal; teeth and gums normal   Neck:   Supple   Back:     Symmetric, no curvature, ROM normal, no CVA tenderness   Lungs:     Clear to auscultation bilaterally, respirations unlabored   Chest wall:    No tenderness or deformity   Heart:    Regular rate and rhythm, S1 and S2 normal, no murmur, rub   or gallop   Abdomen:     Soft, non-tender, bowel sounds active   Extremities:   Extremities normal, atraumatic, no cyanosis or edema   Skin:   Skin color, texture, turgor normal, no rashes or lesions   Lymph nodes:   Cervical normal   Neurologic:   CNII-XII intact            Lab, Imaging and other studies: I have personally reviewed pertinent labs  CBC:   Lab Results   Component Value Date    WBC 9 53 02/14/2023    HGB 11 2 (L) 02/14/2023    HCT 34 6 (L) 02/14/2023    MCV 96 02/14/2023     02/14/2023    MCH 31 2 02/14/2023    MCHC 32 4 02/14/2023    RDW 13 2 02/14/2023    MPV 8 6 (L) 02/14/2023     CMP:   Lab Results   Component Value Date    K 5 0 02/14/2023     (H) 02/14/2023    CO2 24 02/14/2023    BUN 53 (H) 02/14/2023    CREATININE 1 36 (H) 02/14/2023    CALCIUM 9 1 02/14/2023    EGFR 52 02/14/2023         Results from last 7 days   Lab Units 02/14/23  0602 02/13/23  0447 02/12/23  0448 02/10/23  1822 02/10/23  1143   POTASSIUM mmol/L 5 0 5 2 5 3   < > 6 1*   CHLORIDE mmol/L 111* 111* 111*   < > 108   CO2 mmol/L 24 23 23   < > 20*   BUN mg/dL 53* 58* 58*   < > 72*   CREATININE mg/dL 1 36* 1 51* 1 48*   < > 2 28*   CALCIUM mg/dL 9 1 9 1 9 1   < > 9 1   ALK PHOS U/L  --   --   --   --  47   ALT U/L  --   --   --   --  <3*   AST U/L  --   --   --   --  11*    < > = values in this interval not displayed  Phosphorus: No results found for: PHOS  Magnesium:   Lab Results   Component Value Date    MG 2 2 02/14/2023     Urinalysis: No results found for: Rocío Michael, SPECGRAV, PHUR, LEUKOCYTESUR, NITRITE, PROTEINUA, GLUCOSEU, KETONESU, BILIRUBINUR, BLOODU  Ionized Calcium: No results found for: CAION  Coagulation: No results found for: PT, INR, APTT  Troponin: No results found for: TROPONINI  ABG: No results found for: PHART, TXS2KVI, PO2ART, SPR2EVU, C9TDSOXP, BEART, SOURCE  Radiology review:     IMAGING  Procedure: XR chest portable    Result Date: 2/13/2023  Narrative: CHEST INDICATION:   Sob  COMPARISON:  2/10/2023 EXAM PERFORMED/VIEWS:  XR CHEST PORTABLE FINDINGS:  Left PICC line tip projecting over the proximal SVC without pneumothorax  Cardiomegaly noted  Mild central congestion with trace left basilar pleural effusion  No consolidation or pneumothorax   Osseous structures appear within normal limits for patient age  Left humeral prosthesis noted  Impression: Mild pulmonary edema pattern  Workstation performed: [id number]     Procedure: NM lung ventilation / perfusion    Result Date: 2/13/2023  Narrative: VENTILATION AND PERFUSION SCAN INDICATION: Dyspnea on exertion COMPARISON:  Chest x-ray dated 2/13/2023 TECHNIQUE:  Posterior ventilation imaging was performed after the inhalation of 6 4 mCi Xe-133 gas  Multiplanar perfusion imaging was next performed following the intravenous administration of 4 1 mCi Tc-99m labeled MAA  FINDINGS:  Ventilation imaging demonstrates normal distribution of radiopharmaceutical throughout both lungs  Perfusion imaging demonstrates nonuniform perfusion without moderate or large segmental perfusion defect  On bilateral lateral spot images, there is fairly symmetric nonsegmental patchy decreased perfusion involving the posterior aspect of the bilateral  upper to midlung zones with tracer activity extending to the margins of the lungs and therefore most likely artifactual in nature or possibly related to patient habitus/patient positioning  Impression: The probability for pulmonary embolus is low  Workstation performed: [id number]     Procedure: VAS lower limb venous duplex study, complete bilateral    Result Date: 2/13/2023  Narrative:  [facility] REPORT CLINICAL: Indications: Patient presents with bilateral lower extremity swelling and shortness of breath  Operative History: No Cardiovascular Surgeries Risk Factors The patient has history of HTN  CONCLUSION: Impression: RIGHT LOWER LIMB: No evidence of acute or chronic deep vein thrombosis  No evidence of superficial thrombophlebitis noted  Doppler evaluation shows a normal response to augmentation maneuvers  Popliteal, posterior tibial and anterior tibial arterial Doppler waveforms are triphasic  LEFT LOWER LIMB: No evidence of acute or chronic deep vein thrombosis  No evidence of superficial thrombophlebitis noted  Doppler evaluation shows a normal response to augmentation maneuvers  Popliteal, posterior tibial and anterior tibial arterial Doppler waveforms are triphasic  Technical findings posted in [facility]: Electronically Signed by: Nayla Diaz MD, RPVI on 2023-02-13 11:34:11 PM      Current Facility-Administered Medications   Medication Dose Route Frequency   • acetaminophen (TYLENOL) tablet 650 mg  650 mg Oral Q6H PRN   • albuterol inhalation solution 2 5 mg  2 5 mg Nebulization Q4H PRN   • amLODIPine (NORVASC) tablet 10 mg  10 mg Oral Daily   • buPROPion (WELLBUTRIN XL) 24 hr tablet 300 mg  300 mg Oral Daily   • ceFAZolin (ANCEF) IVPB (premix in dextrose) 1,000 mg 50 mL  1,000 mg Intravenous Q8H   • escitalopram (LEXAPRO) tablet 20 mg  20 mg Oral Daily   • furosemide (LASIX) injection 40 mg  40 mg Intravenous BID (diuretic)   • heparin (porcine) subcutaneous injection 5,000 Units  5,000 Units Subcutaneous Q8H [facility]   • levothyroxine tablet 75 mcg  75 mcg Oral Early Morning   • melatonin tablet 6 mg  6 mg Oral HS PRN   • nicotine (NICODERM CQ) 7 mg/24hr TD 24 hr patch 1 patch  1 patch Transdermal Daily   • ondansetron (ZOFRAN) injection 4 mg  4 mg Intravenous Q6H PRN   • simethicone (MYLICON) chewable tablet 80 mg  80 mg Oral 4x Daily (with meals and at bedtime)     Medications Discontinued During This Encounter   Medication Reason   • sodium chloride 0 9 % infusion    • sodium chloride 0 9 % infusion    • heparin (porcine) subcutaneous injection 5,000 Units    • heparin (VTE/PE) high    • furosemide (LASIX) injection 40 mg    • heparin (porcine) 25,000 units in 0 45% NaCl 250 mL infusion (premix)    • heparin (porcine) injection 8,400 Units    • heparin (porcine) injection 4,200 Units        Orion Burrows DO      This progress note was produced in part using a dictation device which may document imprecise wording from author's original intent

## 2023-02-14 NOTE — PROGRESS NOTES
Progress Note - Infectious Disease   Alexis Rolle 69 y o  male MRN: [id number]  Unit/Bed#: -01 Encounter: [id number]        REQUIRED DOCUMENTATION:     1  This service was provided via Telemedicine  2  Provider located at [facility]  3  TeleMed provider: Brianna Grayson MD   4  Identify all parties in room with patient during tele consult:RN  5  After connecting through [employer], patient was identified by name and date of birth and assistant checked wristband  Patient was then informed that this was a Telemedicine visit and that the exam was being conducted confidentially over secure lines  My office door was closed  No one else was in the room  Patient acknowledged consent and understanding of privacy and security of the Telemedicine visit, and gave us permission to have the assistant stay in the room in order to assist with the history and to conduct the exam   I informed the patient that I have reviewed their record in Epic and presented the opportunity for them to ask any questions regarding the visit today  The patient agreed to participate  Assessment/Recommendations     1   Acute kidney injury on CKD  - Likely multifactorial more likely related to underlying CHF and HCTZ use, less likely drug reaction to cefazolin- no rash or peripheral eosinophilia noted to suspect AIN  -Renal ultrasound normal  - creatinine continues to improve     • Antibiotics as below  • Avoid nephrotoxic agents, additional management per nephrology and primary team     2   Recent history of complicated staph aureus bacteremia  - Source of bacteremia is likely chronic left toe wound, no local signs of infection but early osteo could not be ruled out  Has left shoulder arthroplasty without any local signs of infection   No prosthetic vascular devices  No evidence of endovascular infection with negative MAKEDA and rapid clearance of bacteremia  - Afebrile without leukocytosis     • Continue cefazolin 2 q8h and monitor clinical course and renal function  • Recommend at least 4 weeks of iv therapy for complicated bacteremia through 3/5  Patient will need weekly labs - CBC/diff, CMP while on iv antibitics  PICC can be removed after completing iv antibiotics  ID fu recommended within 2 weeks of discharge   Recommend wound care, close podiatry follow up  • If toe wound has failed to heal would consider completing 6 weeks of total therapy through 3/19 with transition to po doxy 100 bid after completing iv cefazolin      3   Chronic L toe wound, possible early osteo  -Left toe wound has been present for a year and is likely source of bacteremia, no local signs of infection  - MRI without definite evidence of osteo, non specific focal area of bone marrow edema in distal phalanx of great toe may reflect focal stress reaction or early osteo  - clinically stable     • Antibiotics as above  • Recommend wound care and close follow-up with podiatry, if wound fails to heal on fu would favor definitive surgical treatment and/or consider completing 6 weeks of abx therapy     4  Dyspnea  - Multifactorial in the setting of ANNALEE, chronic HFpEF, moderate aortic stenosis and possible copd   -MAKEDA negative for vegetation, VQ scan with low probability of PE     • Management per primary team       Discussed in detail with the primary service  History       Subjective: The patient has no complaints  Continues to note dyspnea but no leg edema  Denies fevers, chills, or sweats  Denies nausea, vomiting, or diarrhea      Antibiotics:  cefazolin      Physical Exam     Temp:  [97 9 °F (36 6 °C)-98 6 °F (37 °C)] 98 2 °F (36 8 °C)  HR:  [72-74] 74  Resp:  [16-19] 19  BP: (161-177)/(82-95) 177/95  SpO2:  [92 %-94 %] 93 %  Temp (24hrs), Av 2 °F (36 8 °C), Min:97 9 °F (36 6 °C), Max:98 6 °F (37 °C)  Current: Temperature: 98 2 °F (36 8 °C)    Intake/Output Summary (Last 24 hours) at 2023 1042  Last data filed at 2023 1000  Gross per 24 hour   Intake 660 ml   Output 2230 ml   Net -1570 ml       Physical exam findings reported by bedside and primary medical team staff      General Appearance:  Appearing well, nontoxic, and in no distress, appears stated age   Throat: Oropharynx moist without lesions; lips, mucosa, and tongue normal; teeth and gums normal   Lungs:   Clear to auscultation bilaterally, no audible wheezes, rhonchi and rales, respirations unlabored   Heart:  Regular rate and rhythm, S1, S2 normal, no murmur, rub or gallop   Abdomen:   Soft, non-tender, non-distended, positive bowel sounds, no masses, no organomegaly    No CVA tenderness   Extremities: Extremities normal, atraumatic, no cyanosis, clubbing or edema   Skin: Skin color, texture, turgor normal, no rashes, chronic left superficial toe wound without erythema or drainage   Neurologic: Alert and oriented times 3, extremity strength 5/5 and symmetric         Invasive Devices:   PICC Line 02/05/23 (Active)   Reasons to continue PICC Long term antibiotics 02/14/23 1000   Goal for Removal N/A- Discharging with PICC for IV ABX/medications 02/14/23 1000   Line Necessity Reviewed Yes, reviewed with provider 02/14/23 1000   Site Assessment WDL 02/14/23 1000   #1 Lumen Color/Status Red lumen;Blood return noted; Flushed;Normal saline locked 02/14/23 1000   #2 Lumen Color/Status Purple lumen;Blood return noted; Flushed;Normal saline locked 02/14/23 1000   Catheter Out on Skin (cm) 0 cm 02/10/23 1939   Dressing Type Chlorhexidine dressing 02/14/23 1000   Dressing Status Clean;Dry; Intact 02/14/23 1000   Dressing Intervention 7 day central line dressing changed 02/13/23 0806   Dressing Change Due 02/19/23 02/13/23 0806       Labs, Imaging, & Other Studies     Lab Results:    I have personally reviewed pertinent labs      Results from last 7 days   Lab Units 02/14/23  0602 02/11/23  0530 02/10/23  1143   WBC Thousand/uL 9 53 9 01 10 34*   HEMOGLOBIN g/dL 11 2* 13 4 12 2   PLATELETS Thousands/uL 317 327 366 Results from last 7 days   Lab Units 02/14/23  0602 02/10/23  1822 02/10/23  1143   POTASSIUM mmol/L 5 0   < > 6 1*   CHLORIDE mmol/L 111*   < > 108   CO2 mmol/L 24   < > 20*   BUN mg/dL 53*   < > 72*   CREATININE mg/dL 1 36*   < > 2 28*   EGFR ml/min/1 73sq m 52   < > 28   CALCIUM mg/dL 9 1   < > 9 1   AST U/L  --   --  11*   ALT U/L  --   --  <3*   ALK PHOS U/L  --   --  47    < > = values in this interval not displayed  Results from last 7 days   Lab Units 02/12/23  1159 02/12/23  1152   BLOOD CULTURE  No Growth at 24 hrs  No Growth at 24 hrs  Imaging Studies:   I have personally reviewed pertinent imaging study reports and images in PACS  EKG, Pathology, and Other Studies:   I have personally reviewed pertinent reports  Counseling/Coordination of care: Total 35 minutes communication with the patient via telehealth  Labs, medical tests and imaging studies were independently reviewed by me as noted above

## 2023-02-14 NOTE — PLAN OF CARE
Problem: Potential for Falls  Goal: Patient will remain free of falls  Description: INTERVENTIONS:  - Educate patient/family on patient safety including physical limitations  - Instruct patient to call for assistance with activity   - Consult OT/PT to assist with strengthening/mobility   - Keep Call bell within reach  - Keep bed low and locked with side rails adjusted as appropriate  - Keep care items and personal belongings within reach  - Initiate and maintain comfort rounds  - Make Fall Risk Sign   to staff  - Offer Toileting every   Hours, in advance of need  - Initiate/Maintain   alarm  - Obtain necessary fall risk management equipment:      - Apply yellow socks and bracelet for high fall risk patients  - Consider moving patient to room near nurses station  Outcome: Progressing     Problem: PAIN - ADULT  Goal: Verbalizes/displays adequate comfort level or baseline comfort level  Description: Interventions:  - Encourage patient to monitor pain and request assistance  - Assess pain using appropriate pain scale  - Administer analgesics based on type and severity of pain and evaluate response  - Implement non-pharmacological measures as appropriate and evaluate response  - Consider cultural and social influences on pain and pain management  - Notify physician/advanced practitioner if interventions unsuccessful or patient reports new pain  Outcome: Progressing     Problem: INFECTION - ADULT  Goal: Absence or prevention of progression during hospitalization  Description: INTERVENTIONS:  - Assess and monitor for signs and symptoms of infection  - Monitor lab/diagnostic results  - Monitor all insertion sites, i e  indwelling lines, tubes, and drains  - Monitor endotracheal if appropriate and nasal secretions for changes in amount and color  - [place] appropriate cooling/warming therapies per order  - Administer medications as ordered  - Instruct and encourage patient and family to use good hand hygiene technique  - Identify and instruct in appropriate isolation precautions for identified infection/condition  Outcome: Progressing     Problem: SAFETY ADULT  Goal: Patient will remain free of falls  Description: INTERVENTIONS:  - Educate patient/family on patient safety including physical limitations  - Instruct patient to call for assistance with activity   - Consult OT/PT to assist with strengthening/mobility   - Keep Call bell within reach  - Keep bed low and locked with side rails adjusted as appropriate  - Keep care items and personal belongings within reach  - Initiate and maintain comfort rounds  - Make Fall Risk Sign   to staff  - Offer Toileting every   Hours, in advance of need  - Initiate/Maintain   alarm  - Obtain necessary fall risk management equipment:      - Apply yellow socks and bracelet for high fall risk patients  - Consider moving patient to room near nurses station  Outcome: Progressing  Goal: Maintain or return to baseline ADL function  Description: INTERVENTIONS:  -  Assess patient's ability to carry out ADLs; assess patient's baseline for ADL function and identify physical deficits which impact ability to perform ADLs (bathing, care of mouth/teeth, toileting, grooming, dressing, etc )  - Assess/evaluate cause of self-care deficits   - Assess range of motion  - Assess patient's mobility; develop plan if impaired  - Assess patient's need for assistive devices and provide as appropriate  - Encourage maximum independence but intervene and supervise when necessary  - Involve family in performance of ADLs  - Assess for home care needs following discharge   - Consider OT consult to assist with ADL evaluation and planning for discharge  - Provide patient education as appropriate  Outcome: Progressing  Goal: Maintains/Returns to pre admission functional level  Description: INTERVENTIONS:  - Perform BMAT or MOVE assessment daily    - Set and communicate daily mobility goal to care team and patient/family/caregiver  - Collaborate with rehabilitation services on mobility goals if consulted  - Perform Range of Motion   times a day  - Reposition patient every   hours  - Dangle patient   times a day  - Stand patient   times a day  - Ambulate patient   times a day  - Out of bed to chair   times a day   - Out of bed for meals     times a day  - Out of bed for toileting  - Record patient progress and toleration of activity level   Outcome: Progressing     Problem: DISCHARGE PLANNING  Goal: Discharge to home or other facility with appropriate resources  Description: INTERVENTIONS:  - Identify barriers to discharge w/patient and caregiver  - Arrange for needed discharge resources and transportation as appropriate  - Identify discharge learning needs (meds, wound care, etc )  - Arrange for interpretive services to assist at discharge as needed  - Refer to Case Management Department for coordinating discharge planning if the patient needs post-hospital services based on physician/advanced practitioner order or complex needs related to functional status, cognitive ability, or social support system  Outcome: Progressing     Problem: Knowledge Deficit  Goal: Patient/family/caregiver demonstrates understanding of disease process, treatment plan, medications, and discharge instructions  Description: Complete learning assessment and assess knowledge base    Interventions:  - Provide teaching at level of understanding  - Provide teaching via preferred learning methods  Outcome: Progressing     Problem: CARDIOVASCULAR - ADULT  Goal: Maintains optimal cardiac output and hemodynamic stability  Description: INTERVENTIONS:  - Monitor I/O, vital signs and rhythm  - Monitor for S/S and trends of decreased cardiac output  - Administer and titrate ordered vasoactive medications to optimize hemodynamic stability  - Assess quality of pulses, skin color and temperature  - Assess for signs of decreased coronary artery perfusion  - Instruct patient to report change in severity of symptoms  Outcome: Progressing  Goal: Absence of cardiac dysrhythmias or at baseline rhythm  Description: INTERVENTIONS:  - Continuous cardiac monitoring, vital signs, obtain 12 lead EKG if ordered  - Administer antiarrhythmic and heart rate control medications as ordered  - Monitor electrolytes and administer replacement therapy as ordered  Outcome: Progressing     Problem: GASTROINTESTINAL - ADULT  Goal: Minimal or absence of nausea and/or vomiting  Description: INTERVENTIONS:  - Administer IV fluids if ordered to ensure adequate hydration  - Maintain NPO status until nausea and vomiting are resolved  - Nasogastric tube if ordered  - Administer ordered antiemetic medications as needed  - Provide nonpharmacologic comfort measures as appropriate  - Advance diet as tolerated, if ordered  - Consider nutrition services referral to assist patient with adequate nutrition and appropriate food choices  Outcome: Progressing  Goal: Maintains or returns to baseline bowel function  Description: INTERVENTIONS:  - Assess bowel function  - Encourage oral fluids to ensure adequate hydration  - Administer IV fluids if ordered to ensure adequate hydration  - Administer ordered medications as needed  - Encourage mobilization and activity  - Consider nutritional services referral to assist patient with adequate nutrition and appropriate food choices  Outcome: Progressing  Goal: Maintains adequate nutritional intake  Description: INTERVENTIONS:  - Monitor percentage of each meal consumed  - Identify factors contributing to decreased intake, treat as appropriate  - Assist with meals as needed  - Monitor I&O, weight, and lab values if indicated  - Obtain nutrition services referral as needed  Outcome: Progressing  Goal: Establish and maintain optimal ostomy function  Description: INTERVENTIONS:  - Assess bowel function  - Encourage oral fluids to ensure adequate hydration  - Administer IV fluids if ordered to ensure adequate hydration   - Administer ordered medications as needed  - Encourage mobilization and activity  - Nutrition services referral to assist patient with appropriate food choices  - Assess stoma site  - Consider wound care consult   Outcome: Progressing  Goal: Oral mucous membranes remain intact  Description: INTERVENTIONS  - Assess oral mucosa and hygiene practices  - Implement preventative oral hygiene regimen  - Implement oral medicated treatments as ordered  - Initiate Nutrition services referral as needed  Outcome: Progressing     Problem: METABOLIC, FLUID AND ELECTROLYTES - ADULT  Goal: Electrolytes maintained within normal limits  Description: INTERVENTIONS:  - Monitor labs and assess patient for signs and symptoms of electrolyte imbalances  - Administer electrolyte replacement as ordered  - Monitor response to electrolyte replacements, including repeat lab results as appropriate  - Instruct patient on fluid and nutrition as appropriate  Outcome: Progressing  Goal: Fluid balance maintained  Description: INTERVENTIONS:  - Monitor labs   - Monitor I/O and WT  - Instruct patient on fluid and nutrition as appropriate  - Assess for signs & symptoms of volume excess or deficit  Outcome: Progressing  Goal: Glucose maintained within target range  Description: INTERVENTIONS:  - Monitor Blood Glucose as ordered  - Assess for signs and symptoms of hyperglycemia and hypoglycemia  - Administer ordered medications to maintain glucose within target range  - Assess nutritional intake and initiate nutrition service referral as needed  Outcome: Progressing     Problem: Nutrition/Hydration-ADULT  Goal: Nutrient/Hydration intake appropriate for improving, restoring or maintaining nutritional needs  Description: Monitor and assess patient's nutrition/hydration status for malnutrition  Collaborate with interdisciplinary team and initiate plan and interventions as ordered    Monitor patient's weight and dietary intake as ordered or per policy  Utilize nutrition screening tool and intervene as necessary  Determine patient's food preferences and provide high-protein, high-caloric foods as appropriate       INTERVENTIONS:  - Monitor oral intake, urinary output, labs, and treatment plans  - Assess nutrition and hydration status and recommend course of action  - Evaluate amount of meals eaten  - Assist patient with eating if necessary   - Allow adequate time for meals  - Recommend/ encourage appropriate diets, oral nutritional supplements, and vitamin/mineral supplements  - Order, calculate, and assess calorie counts as needed  - Recommend, monitor, and adjust tube feedings and TPN/PPN based on assessed needs  - Assess need for intravenous fluids  - Provide specific nutrition/hydration education as appropriate  - Include patient/family/caregiver in decisions related to nutrition  Outcome: Progressing

## 2023-02-15 LAB
ANION GAP SERPL CALCULATED.3IONS-SCNC: 5 MMOL/L (ref 4–13)
BUN SERPL-MCNC: 50 MG/DL (ref 5–25)
CALCIUM SERPL-MCNC: 9.2 MG/DL (ref 8.4–10.2)
CHLORIDE SERPL-SCNC: 109 MMOL/L (ref 96–108)
CO2 SERPL-SCNC: 25 MMOL/L (ref 21–32)
CREAT SERPL-MCNC: 1.26 MG/DL (ref 0.6–1.3)
GFR SERPL CREATININE-BSD FRML MDRD: 57 ML/MIN/1.73SQ M
GLUCOSE SERPL-MCNC: 101 MG/DL (ref 65–140)
POTASSIUM SERPL-SCNC: 4.5 MMOL/L (ref 3.5–5.3)
SODIUM SERPL-SCNC: 139 MMOL/L (ref 135–147)

## 2023-02-15 RX ORDER — FUROSEMIDE 10 MG/ML
40 INJECTION INTRAMUSCULAR; INTRAVENOUS
Status: COMPLETED | OUTPATIENT
Start: 2023-02-15 | End: 2023-02-16

## 2023-02-15 RX ADMIN — ESCITALOPRAM OXALATE 20 MG: 10 TABLET ORAL at 08:07

## 2023-02-15 RX ADMIN — HEPARIN SODIUM 5000 UNITS: 5000 INJECTION INTRAVENOUS; SUBCUTANEOUS at 21:30

## 2023-02-15 RX ADMIN — AMLODIPINE BESYLATE 10 MG: 10 TABLET ORAL at 08:07

## 2023-02-15 RX ADMIN — LEVOTHYROXINE SODIUM 75 MCG: 75 TABLET ORAL at 05:27

## 2023-02-15 RX ADMIN — CEFAZOLIN SODIUM 2000 MG: 2 SOLUTION INTRAVENOUS at 19:21

## 2023-02-15 RX ADMIN — FUROSEMIDE 40 MG: 10 INJECTION, SOLUTION INTRAVENOUS at 08:07

## 2023-02-15 RX ADMIN — CEFAZOLIN SODIUM 2000 MG: 2 SOLUTION INTRAVENOUS at 04:21

## 2023-02-15 RX ADMIN — HYDROCHLOROTHIAZIDE 25 MG: 25 TABLET ORAL at 08:07

## 2023-02-15 RX ADMIN — FUROSEMIDE 40 MG: 10 INJECTION, SOLUTION INTRAVENOUS at 16:08

## 2023-02-15 RX ADMIN — CEFAZOLIN SODIUM 2000 MG: 2 SOLUTION INTRAVENOUS at 11:26

## 2023-02-15 RX ADMIN — BUPROPION HYDROCHLORIDE 300 MG: 150 TABLET, EXTENDED RELEASE ORAL at 08:07

## 2023-02-15 RX ADMIN — HEPARIN SODIUM 5000 UNITS: 5000 INJECTION INTRAVENOUS; SUBCUTANEOUS at 14:06

## 2023-02-15 RX ADMIN — HEPARIN SODIUM 5000 UNITS: 5000 INJECTION INTRAVENOUS; SUBCUTANEOUS at 05:27

## 2023-02-15 NOTE — PROGRESS NOTES
[address]  Progress Note - Huy Capps 1955, 69 y o  male MRN: [id number]  Unit/Bed#: -01 Encounter: [id number]  Primary Care Provider: Jaqueline Flores MD   Date and time admitted to hospital: 2/10/2023 11:23 AM    * ANNALEE (acute kidney injury) [facility])  Assessment & Plan  he has baseline creatinine 0 8-0 9  Admission creatinine was elevated to 2 28 with hyperkalemia most likely has atn  Hydrochlorothiazide was initially placed on hold  Creatinine improved to 1 4-1 5 with hydration  IV fluids discontinued and HCTZ resumed     Avoid any further nephrotoxic agents or hypotension  Placed on Lasix 20 mg IV x1 on 2/11 and 40 mg IV x1 on 2/12  Increase to Lasix 40 mg IV twice daily for 3 doses  Repeat chest x-ray shows congestive changes  Monitor Renal function closely during diuresis  Kidney Ultrasound normal    Shortness of breath  Assessment & Plan  VQ scan and lower extremity Doppler ultrasound negative for DVT  Suspect secondary to mild fluid overload  Received multiple doses of IV Lasix and shortness of breath is improved  Continue with IV Lasix for today will likely transition to oral diuretics in the next 24 hours  Continues to have subjective shortness of breath however has no lower extremity edema and rales on exam   Echocardiogram with normal ejection fraction and grade 1 diastolic dysfunction  MSSA bacteremia  Assessment & Plan  History of MSSA bacteremia  He was recently discharged from the hospital with MSSA bacteremia and placed on a 6-week course of IV Ancef and it was concern for left toe ulcer with early osteomyelitis as the cause of the infection  Patient now presents with acute kidney injury and hyperkalemia  unClear whether annalee is secondary to nsaids versus reaction to Ancef  less Likely reaction to Ancef as no eosinophils or rashes noted  restart Ancef for now to complete total 4 week course through 3/ 5     Podiatry and ID input appreciated  Repeat blood cultures ordered 2/12  Please note that Ancef was held 48 hours and then blood cultures were drawn  Repeat blood cultures are negative to date  Hyperkalemia  Assessment & Plan  Potassium 6 1 on admission  EKG no changes noted so far  Received (calcium gluconate, insulin dextrose and Kayexalate)x3 since admission  Hold oral potassium supplementation  Potassium down to 5 2 now  Patient on IV diuretics monitor renal function and potassium levels with that  Low Potassium diet      Ulcer of left foot [facility])  Assessment & Plan  Continue conservative management and local wound care  further as per podiatry    MDD (major depressive disorder)  Assessment & Plan  stable for now  Continue home meds    Benign essential HTN  Assessment & Plan  Continue Norvasc  HCTZ initially on hold due to mild hyponatremia and ANNALEE  Resumed by nephrology 2/14          VTE Pharmacologic Prophylaxis:   High Risk (Score >/= 5) - Pharmacological DVT Prophylaxis Ordered: heparin  Sequential Compression Devices Ordered  Patient Centered Rounds: I performed bedside rounds with nursing staff today  Discussions with Specialists or Other Care Team Provider: Discussed with nursing and case management  Education and Discussions with Family / Patient: Updated  (wife) via phone  Total Time Spent on Date of Encounter in care of patient: 45 minutes This time was spent on one or more of the following: performing physical exam; counseling and coordination of care; obtaining or reviewing history; documenting in the medical record; reviewing/ordering tests, medications or procedures; communicating with other healthcare professionals and discussing with patient's family/caregivers      Current Length of Stay: 5 day(s)  Current Patient Status: Inpatient   Certification Statement: The patient will continue to require additional inpatient hospital stay due to iv diuresis  Discharge Plan: Anticipate discharge tomorrow to home with home services  Code Status: Level 1 - Full Code    Subjective:   Seen and examined  Still continues to complain of shortness of breath but denies any cough  Does not have any increased oxygen requirement, cough, fever or chills  Objective:     Vitals:   Temp (24hrs), Av 5 °F (36 9 °C), Min:98 4 °F (36 9 °C), Max:98 8 °F (37 1 °C)    Temp:  [98 4 °F (36 9 °C)-98 8 °F (37 1 °C)] 98 4 °F (36 9 °C)  HR:  [69-72] 72  Resp:  [16-19] 19  BP: (160-171)/() 160/86  SpO2:  [91 %-95 %] 95 %  Body mass index is 30 98 kg/m²  Input and Output Summary (last 24 hours): Intake/Output Summary (Last 24 hours) at 2/15/2023 1454  Last data filed at 2/15/2023 1240  Gross per 24 hour   Intake 170 ml   Output 3595 ml   Net -3425 ml       Physical Exam:   Physical Exam  HENT:      Head: Normocephalic  Nose: Nose normal       Mouth/Throat:      Mouth: Mucous membranes are moist    Eyes:      Extraocular Movements: Extraocular movements intact  Pupils: Pupils are equal, round, and reactive to light  Cardiovascular:      Rate and Rhythm: Normal rate and regular rhythm  Pulmonary:      Effort: Pulmonary effort is normal  No respiratory distress  Breath sounds: No wheezing  Comments: Poor respiratory effort with diminished breath sounds at bases  No rales appreciated  Abdominal:      General: Abdomen is flat  Bowel sounds are normal    Musculoskeletal:      Cervical back: Neck supple  Right lower leg: No edema  Left lower leg: No edema  Neurological:      General: No focal deficit present  Mental Status: He is alert and oriented to person, place, and time  Mental status is at baseline     Psychiatric:         Mood and Affect: Mood normal          Additional Data:     Labs:  Results from last 7 days   Lab Units 23  0602 23  0530 02/10/23  1143   WBC Thousand/uL 9 53   < > 10 34*   HEMOGLOBIN g/dL 11 2*   < > 12 2   HEMATOCRIT % 34 6*   < > 37 9   PLATELETS Thousands/uL 317   < > 366   NEUTROS PCT %  --   --  67   LYMPHS PCT %  --   --  15   MONOS PCT %  --   --  12   EOS PCT %  --   --  4    < > = values in this interval not displayed  Results from last 7 days   Lab Units 02/15/23  0536 02/10/23  1822 02/10/23  1143   SODIUM mmol/L 139   < > 133*   POTASSIUM mmol/L 4 5   < > 6 1*   CHLORIDE mmol/L 109*   < > 108   CO2 mmol/L 25   < > 20*   BUN mg/dL 50*   < > 72*   CREATININE mg/dL 1 26   < > 2 28*   ANION GAP mmol/L 5   < > 5   CALCIUM mg/dL 9 2   < > 9 1   ALBUMIN g/dL  --   --  3 1*   TOTAL BILIRUBIN mg/dL  --   --  0 27   ALK PHOS U/L  --   --  47   ALT U/L  --   --  <3*   AST U/L  --   --  11*   GLUCOSE RANDOM mg/dL 101   < > 91    < > = values in this interval not displayed  Results from last 7 days   Lab Units 02/12/23  1153   INR  0 96     Results from last 7 days   Lab Units 02/11/23  1605 02/10/23  2002 02/10/23  1440 02/10/23  1426 02/10/23  1402   POC GLUCOSE mg/dl 220* 101 149* 138 48*               Lines/Drains:  Invasive Devices     Peripherally Inserted Central Catheter Line  Duration           PICC Line 02/05/23 10 days                Central Line:  Goal for removal: Will discontinue when meds requiring line are completed  Imaging: No pertinent imaging reviewed  Recent Cultures (last 7 days):   Results from last 7 days   Lab Units 02/12/23  1159 02/12/23  1152   BLOOD CULTURE  No Growth at 48 hrs  No Growth at 48 hrs         Last 24 Hours Medication List:   Current Facility-Administered Medications   Medication Dose Route Frequency Provider Last Rate   • acetaminophen  650 mg Oral Q6H PRN Medhat Sherman MD     • albuterol  2 5 mg Nebulization Q4H PRN JESUS Herrmann     • amLODIPine  10 mg Oral Daily Medhat Sherman MD     • buPROPion  300 mg Oral Daily Medhat Sherman MD     • cefazolin  2,000 mg Intravenous Rosemary Fang MD 2,000 mg (02/15/23 1126)   • escitalopram  20 mg Oral Daily Medhat Sherman MD     • furosemide  40 mg Intravenous BID (diuretic) Belle Meza MD     • heparin (porcine)  5,000 Units Subcutaneous [facility] Brie Benites MD     • hydrochlorothiazide  25 mg Oral Daily Belle Meza MD     • levothyroxine  75 mcg Oral Early Morning Brie Benites MD     • melatonin  6 mg Oral HS PRN JESUS Herrmann     • nicotine  1 patch Transdermal Daily Brie Benites MD     • ondansetron  4 mg Intravenous Q6H PRN Brie Benites MD          Today, Patient Was Seen By: Belle Meza MD    **Please Note: This note may have been constructed using a voice recognition system  **

## 2023-02-15 NOTE — PROGRESS NOTES
Progress Note - Nephrology   Catina Lopez 69 y o  male MRN: [id number]  Unit/Bed#: -01 Encounter: [id number]    Assessment and Plan    1  Acute kidney injury, superimposed on chronic kidney disease, present on admission  Renal function improved to creatinine 1 26 mg/dL  Trend renal function, optimize hemodynamics, hold ACE/ARB, avoid nephrotoxins, renally dose medications, monitor volume status, monitor for urinary retention  2  Shortness of breath  Patient reports this is persistent  We will continue furosemide 40 mg IV twice today  Look to transition to oral diuretics versus discontinuation of loop diuretics in 24 to 48 hours  3  Chronic kidney disease, stage II with baseline creatinine between 0 9 and 1 0 mg/dL  4  Hypertension the setting of chronic kidney disease, stage II  Remains elevated  We will follow with hydrochlorothiazide 25 mg daily initiated 2/14/2023  Also on amlodipine 10 mg daily and furosemide 40 mg IV  5  MSSA bacteremia  On cefazolin  Management per hospitalist colleagues  Follow up reason for today's visit:     ANNALEE (acute kidney injury) [facility])    Patient Active Problem List   Diagnosis   • Acute hyponatremia   • Benign essential HTN   • Moderate aortic stenosis   • MDD (major depressive disorder)   • Peripheral neuropathy   • Other emphysema (HCC)   • Ulcer of left foot (HCC)   • ANNALEE (acute kidney injury) ([facility] [address] )   • Hyperkalemia   • MSSA bacteremia   • Shortness of breath         Subjective:   Ports continued shortness of breath, continued swelling in face and abdomen, denies lower extremity swelling  Feels he needs more Lasix  A complete 10 point review of systems was performed and is otherwise negative  Objective:     Vitals: Blood pressure 160/86, pulse 72, temperature 98 4 °F (36 9 °C), resp  rate 19, height 6' (1 829 m), weight 104 kg (228 lb 6 4 oz), SpO2 95 %  ,Body mass index is 30 98 kg/m²      Weight (last 2 days)     Date/Time Weight    02/15/23 0597 104 (228 4)    02/14/23 0600 104 (229)    02/13/23 0540 104 (229 8)            Intake/Output Summary (Last 24 hours) at 2/15/2023 1326  Last data filed at 2/15/2023 1240  Gross per 24 hour   Intake 170 ml   Output 3595 ml   Net -3425 ml     I/O last 3 completed shifts: In: 65 [P O :358; I V :10; IV Piggyback:100]  Out: 4795 [Urine:4795]         Physical Exam: /86 (BP Location: Right arm)   Pulse 72   Temp 98 4 °F (36 9 °C)   Resp 19   Ht 6' (1 829 m)   Wt 104 kg (228 lb 6 4 oz)   SpO2 95%   BMI 30 98 kg/m²     General Appearance:    No acute distress  Cooperative  Appears stated age  Head:    Normocephalic  Atraumatic  Normal jaw occlusion  Eyes:    Lids, conjunctiva normal  No scleral icterus  Ears:    Normal external ears  Nose:   Nares normal  No drainage  Mouth:   Lips, tongue normal  Mucosa normal  Phonation normal    Neck:   Supple  Symmetrical    Back:     Symmetric  No CVA tenderness  Lungs:     Normal respiratory effort  Clear to auscultation bilaterally  Chest wall:    No tenderness or deformity  Heart:    Regular rate and rhythm  Normal S1 and S2  No murmur  No JVD  1+ edema  Abdomen:     Soft  Non-tender  Bowel sounds active  Genitourinary:   No Germain catheter present  Extremities:   Extremities normal  Atraumatic  No cyanosis  Skin:   Warm and dry  No pallor, jaundice, rash, ecchymoses  Neurologic:   Alert and oriented to person, place, time  No focal deficit  Lab, Imaging and other studies: I have personally reviewed pertinent labs  CBC: No results found for: WBC, HGB, HCT, MCV, PLT, ADJUSTEDWBC, MCH, MCHC, RDW, MPV, NRBC  CMP:   Lab Results   Component Value Date    K 4 5 02/15/2023     (H) 02/15/2023    CO2 25 02/15/2023    BUN 50 (H) 02/15/2023    CREATININE 1 26 02/15/2023    CALCIUM 9 2 02/15/2023    EGFR 57 02/15/2023           Results from last 7 days   Lab Units 02/15/23  0536 02/14/23  0602 02/13/23  0447 02/10/23  1822 02/10/23  1143 POTASSIUM mmol/L 4 5 5 0 5 2   < > 6 1*   CHLORIDE mmol/L 109* 111* 111*   < > 108   CO2 mmol/L 25 24 23   < > 20*   BUN mg/dL 50* 53* 58*   < > 72*   CREATININE mg/dL 1 26 1 36* 1 51*   < > 2 28*   CALCIUM mg/dL 9 2 9 1 9 1   < > 9 1   ALK PHOS U/L  --   --   --   --  47   ALT U/L  --   --   --   --  <3*   AST U/L  --   --   --   --  11*    < > = values in this interval not displayed  Phosphorus: No results found for: PHOS  Magnesium: No results found for: MG  Urinalysis: No results found for: Alicia William, SPECGRAV, PHUR, LEUKOCYTESUR, NITRITE, PROTEINUA, GLUCOSEU, KETONESU, BILIRUBINUR, BLOODU  Ionized Calcium: No results found for: CAION  Coagulation: No results found for: PT, INR, APTT  Troponin: No results found for: TROPONINI  ABG: No results found for: PHART, STL0TTB, PO2ART, VSY8HFG, Y8MIPIDD, BEART, SOURCE  Radiology review:     IMAGING  Procedure: XR chest portable    Result Date: 2/13/2023  Narrative: CHEST INDICATION:   Sob  COMPARISON:  2/10/2023 EXAM PERFORMED/VIEWS:  XR CHEST PORTABLE FINDINGS:  Left PICC line tip projecting over the proximal SVC without pneumothorax  Cardiomegaly noted  Mild central congestion with trace left basilar pleural effusion  No consolidation or pneumothorax  Osseous structures appear within normal limits for patient age  Left humeral prosthesis noted  Impression: Mild pulmonary edema pattern  Workstation performed: [id number]     Procedure: NM lung ventilation / perfusion    Result Date: 2/13/2023  Narrative: VENTILATION AND PERFUSION SCAN INDICATION: Dyspnea on exertion COMPARISON:  Chest x-ray dated 2/13/2023 TECHNIQUE:  Posterior ventilation imaging was performed after the inhalation of 6 4 mCi Xe-133 gas  Multiplanar perfusion imaging was next performed following the intravenous administration of 4 1 mCi Tc-99m labeled MAA  FINDINGS:  Ventilation imaging demonstrates normal distribution of radiopharmaceutical throughout both lungs   Perfusion imaging demonstrates nonuniform perfusion without moderate or large segmental perfusion defect  On bilateral lateral spot images, there is fairly symmetric nonsegmental patchy decreased perfusion involving the posterior aspect of the bilateral  upper to midlung zones with tracer activity extending to the margins of the lungs and therefore most likely artifactual in nature or possibly related to patient habitus/patient positioning  Impression: The probability for pulmonary embolus is low  Workstation performed: [id number]     Procedure: VAS lower limb venous duplex study, complete bilateral    Result Date: 2/13/2023  Narrative:  [facility] REPORT CLINICAL: Indications: Patient presents with bilateral lower extremity swelling and shortness of breath  Operative History: No Cardiovascular Surgeries Risk Factors The patient has history of HTN  CONCLUSION: Impression: RIGHT LOWER LIMB: No evidence of acute or chronic deep vein thrombosis  No evidence of superficial thrombophlebitis noted  Doppler evaluation shows a normal response to augmentation maneuvers  Popliteal, posterior tibial and anterior tibial arterial Doppler waveforms are triphasic  LEFT LOWER LIMB: No evidence of acute or chronic deep vein thrombosis  No evidence of superficial thrombophlebitis noted  Doppler evaluation shows a normal response to augmentation maneuvers  Popliteal, posterior tibial and anterior tibial arterial Doppler waveforms are triphasic    Technical findings posted in [employer]: Electronically Signed by: Essence Torres MD, RPVI on 2023-02-13 11:34:11 PM      Current Facility-Administered Medications   Medication Dose Route Frequency   • acetaminophen (TYLENOL) tablet 650 mg  650 mg Oral Q6H PRN   • albuterol inhalation solution 2 5 mg  2 5 mg Nebulization Q4H PRN   • amLODIPine (NORVASC) tablet 10 mg  10 mg Oral Daily   • buPROPion (WELLBUTRIN XL) 24 hr tablet 300 mg  300 mg Oral Daily   • ceFAZolin (ANCEF) IVPB (premix in dextrose) 2,000 mg 50 mL  2,000 mg Intravenous Q8H   • escitalopram (LEXAPRO) tablet 20 mg  20 mg Oral Daily   • heparin (porcine) subcutaneous injection 5,000 Units  5,000 Units Subcutaneous Q8H [address]   • hydrochlorothiazide (HYDRODIURIL) tablet 25 mg  25 mg Oral Daily   • levothyroxine tablet 75 mcg  75 mcg Oral Early Morning   • melatonin tablet 6 mg  6 mg Oral HS PRN   • nicotine (NICODERM CQ) 7 mg/24hr TD 24 hr patch 1 patch  1 patch Transdermal Daily   • ondansetron (ZOFRAN) injection 4 mg  4 mg Intravenous Q6H PRN     Medications Discontinued During This Encounter   Medication Reason   • sodium chloride 0 9 % infusion    • sodium chloride 0 9 % infusion    • heparin (porcine) subcutaneous injection 5,000 Units    • heparin (VTE/PE) high    • furosemide (LASIX) injection 40 mg    • heparin (porcine) 25,000 units in 0 45% NaCl 250 mL infusion (premix)    • heparin (porcine) injection 8,400 Units    • heparin (porcine) injection 4,200 Units    • ceFAZolin (ANCEF) IVPB (premix in dextrose) 1,000 mg 50 mL    • ceFAZolin (ANCEF) 2000 mg IVPB Reorder       Kayce Rivera PA-C    Portions of the record may have been created with voice recognition software  Occasional wrong word or "sound a like" substitutions may have occurred due to the inherent limitations of voice recognition software  Read the chart carefully and recognize, using context, where substitutions have occurred

## 2023-02-15 NOTE — PLAN OF CARE
Problem: Potential for Falls  Goal: Patient will remain free of falls  Description: INTERVENTIONS:  - Educate patient/family on patient safety including physical limitations  - Instruct patient to call for assistance with activity   - Consult OT/PT to assist with strengthening/mobility   - Keep Call bell within reach  - Keep bed low and locked with side rails adjusted as appropriate  - Keep care items and personal belongings within reach  - Initiate and maintain comfort rounds  - Make Fall Risk Sign visible to staff  - Offer Toileting every 2 Hours in advance of need  - Apply yellow socks and bracelet for high fall risk patients  Outcome: Progressing     Problem: PAIN - ADULT  Goal: Verbalizes/displays adequate comfort level or baseline comfort level  Description: Interventions:  - Encourage patient to monitor pain and request assistance  - Assess pain using appropriate pain scale  - Administer analgesics based on type and severity of pain and evaluate response  - Implement non-pharmacological measures as appropriate and evaluate response  - Consider cultural and social influences on pain and pain management  - Notify physician/advanced practitioner if interventions unsuccessful or patient reports new pain  Outcome: Progressing     Problem: INFECTION - ADULT  Goal: Absence or prevention of progression during hospitalization  Description: INTERVENTIONS:  - Assess and monitor for signs and symptoms of infection  - Monitor lab/diagnostic results  - Monitor all insertion sites, i e  indwelling lines, tubes, and drains  - Monitor endotracheal if appropriate and nasal secretions for changes in amount and color  - [place] appropriate cooling/warming therapies per order  - Administer medications as ordered  - Instruct and encourage patient and family to use good hand hygiene technique  - Identify and instruct in appropriate isolation precautions for identified infection/condition  Outcome: Progressing     Problem: SAFETY ADULT  Goal: Patient will remain free of falls  Description: INTERVENTIONS:  - Educate patient/family on patient safety including physical limitations  - Instruct patient to call for assistance with activity   - Consult OT/PT to assist with strengthening/mobility   - Keep Call bell within reach  - Keep bed low and locked with side rails adjusted as appropriate  - Keep care items and personal belongings within reach  - Initiate and maintain comfort rounds  - Make Fall Risk Sign visible to staff  - Offer Toileting every 2 Hours in advance of need  - Apply yellow socks and bracelet for high fall risk patients  Outcome: Progressing  Goal: Maintain or return to baseline ADL function  Description: INTERVENTIONS:  - Educate patient/family on patient safety including physical limitations  - Instruct patient to call for assistance with activity   - Consult OT/PT to assist with strengthening/mobility   - Keep Call bell within reach  - Keep bed low and locked with side rails adjusted as appropriate  - Keep care items and personal belongings within reach  - Initiate and maintain comfort rounds  - Make Fall Risk Sign visible to staff  - Offer Toileting every 2 Hours in advance of need  - Apply yellow socks and bracelet for high fall risk patients  Outcome: Progressing  Goal: Maintains/Returns to pre admission functional level  Description: INTERVENTIONS:  - Perform BMAT or MOVE assessment daily    - Set and communicate daily mobility goal to care team and patient/family/caregiver     - Ambulate patient 6 times a day  - Out of bed for toileting  - Record patient progress and toleration of activity level   Outcome: Progressing    Problem: DISCHARGE PLANNING  Goal: Discharge to home or other facility with appropriate resources  Description: INTERVENTIONS:  - Identify barriers to discharge w/patient and caregiver  - Arrange for needed discharge resources and transportation as appropriate  - Identify discharge learning needs (meds, wound care, etc )  - Arrange for interpretive services to assist at discharge as needed  - Refer to Case Management Department for coordinating discharge planning if the patient needs post-hospital services based on physician/advanced practitioner order or complex needs related to functional status, cognitive ability, or social support system  Outcome: Progressing     Problem: Knowledge Deficit  Goal: Patient/family/caregiver demonstrates understanding of disease process, treatment plan, medications, and discharge instructions  Description: Complete learning assessment and assess knowledge base    Interventions:  - Provide teaching at level of understanding  - Provide teaching via preferred learning methods  Outcome: Progressing     Problem: METABOLIC, FLUID AND ELECTROLYTES - ADULT  Goal: Electrolytes maintained within normal limits  Description: INTERVENTIONS:  - Monitor labs and assess patient for signs and symptoms of electrolyte imbalances  - Administer electrolyte replacement as ordered  - Monitor response to electrolyte replacements, including repeat lab results as appropriate  - Instruct patient on fluid and nutrition as appropriate  Outcome: Progressing  Goal: Fluid balance maintained  Description: INTERVENTIONS:  - Monitor labs   - Monitor I/O and WT  - Instruct patient on fluid and nutrition as appropriate  - Assess for signs & symptoms of volume excess or deficit  Outcome: Progressing  Goal: Glucose maintained within target range  Description: INTERVENTIONS:  - Monitor Blood Glucose as ordered  - Assess for signs and symptoms of hyperglycemia and hypoglycemia  - Administer ordered medications to maintain glucose within target range  - Assess nutritional intake and initiate nutrition service referral as needed  Outcome: Progressing     Problem: SKIN/TISSUE INTEGRITY - ADULT  Goal: Incision(s), wounds(s) or drain site(s) healing without S/S of infection  Description: INTERVENTIONS  - Assess and document dressing, wound bed and surrounding tissue  - Provide patient and family education  - Perform skin care/dressing changes every day and prn  Outcome: Progressing     Problem: RESPIRATORY - ADULT  Goal: Achieves optimal ventilation and oxygenation  Description: INTERVENTIONS:  - Assess for changes in respiratory status  - Assess for changes in mentation and behavior  - Position to facilitate oxygenation and minimize respiratory effort encourage rest periods in between activities to allow for adequate gas exchange  - Oxygen administered by appropriate delivery if ordered  - I  - Encourage broncho-pulmonary hygiene including cough, deep breathe, Incentive Spirometry  - Assess the need for suctioning and aspirate as needed  - Assess and instruct to report SOB or any respiratory difficulty  - Respiratory Therapy support as indicated  Outcome: Progressing

## 2023-02-15 NOTE — CASE MANAGEMENT
Cm sub   Case Management Discharge Planning Note    Patient name Karolina Kyle  Location [address] 313/-95 MRN [id number]  : 1955 Date 2023       Current Admission Date: 2/10/2023  Current Admission Diagnosis:ANNALEE (acute kidney injury) [facility])   Patient Active Problem List    Diagnosis Date Noted   • Shortness of breath 2023   • ANNALEE (acute kidney injury) ([facility] [address] ) 02/10/2023   • Hyperkalemia 02/10/2023   • MSSA bacteremia 02/10/2023   • Ulcer of left foot ([facility] [address] ) 2023   • Other emphysema ([facility] [address] ) 2023   • Acute hyponatremia 2023   • Benign essential HTN 2023   • Moderate aortic stenosis 2023   • MDD (major depressive disorder) 2023   • Peripheral neuropathy 2023      LOS (days): 4  Geometric Mean LOS (GMLOS) (days): 3 40  Days to GMLOS:-0 9     OBJECTIVE:  Risk of Unplanned Readmission Score: 14 32         Current admission status: Inpatient   Preferred Pharmacy:   [facility] # [address] [address]  Phone: [phone] Fax: [phone]    Primary Care Provider: Rakesh Anton MD    Primary Insurance: MEDICARE  Secondary Insurance: BLUE CROSS    DISCHARGE DETAILS:        CM forwarded ID Progress Note and Order for Cefazolin until 3/5/23 to [address] [place] [address]'S [facility], in [place], in anticipation of patients discharge  CM updated [facility], in [place],  with patients potential discharge date for tomorrow

## 2023-02-15 NOTE — ASSESSMENT & PLAN NOTE
Continue Norvasc    HCTZ initially on hold due to mild hyponatremia and ANNALEE  Resumed by nephrology 2/14

## 2023-02-15 NOTE — CASE MANAGEMENT
Case Management Discharge Planning Note    Patient name Radha Galvez  Location [address] 313/-03 MRN [id number]  : 1955 Date 2/15/2023       Current Admission Date: 2/10/2023  Current Admission Diagnosis:ANNALEE (acute kidney injury) [facility])   Patient Active Problem List    Diagnosis Date Noted   • Shortness of breath 2023   • ANNALEE (acute kidney injury) ([facility] [address] ) 02/10/2023   • Hyperkalemia 02/10/2023   • MSSA bacteremia 02/10/2023   • Ulcer of left foot ([facility] [address] ) 2023   • Other emphysema ([facility] [address] ) 2023   • Acute hyponatremia 2023   • Benign essential HTN 2023   • Moderate aortic stenosis 2023   • MDD (major depressive disorder) 2023   • Peripheral neuropathy 2023      LOS (days): 5  Geometric Mean LOS (GMLOS) (days): 3 40  Days to GMLOS:-1 5     OBJECTIVE:  Risk of Unplanned Readmission Score: 13 53         Current admission status: Inpatient   Preferred Pharmacy:   [facility] [address] [facility], [address] [address]  Phone: [phone] Fax: [phone]    Primary Care Provider: Andressa Chand MD    Primary Insurance: MEDICARE  Secondary Insurance: BLUE CROSS    DISCHARGE DETAILS:    Discharge planning discussed with[de-identified] patient  Freedom of Choice: Yes  Comments - Freedom of Choice: [facility] resumption  CM contacted family/caregiver?: No- see comments (patient declined)  Were Treatment Team discharge recommendations reviewed with patient/caregiver?: Yes  Did patient/caregiver verbalize understanding of patient care needs?: Yes  Were patient/caregiver advised of the risks associated with not following Treatment Team discharge recommendations?: Yes         Requested  [address]         Is the patient interested in [facility] at discharge?: Yes  [address] requested[de-identified] Physical Therapy, [address] Name[de-identified] Other ([facility])  [address] [place] [address] Provider[de-identified] PCP  [address] Needed[de-identified] Evaluate Functional Status and Safety, Strengthening/Theraputic Exercises to Improve Function, Administration of IV, IM or SC Medications  Homebound Criteria Met[de-identified] Immunosuppressed  Supporting Clincal Findings[de-identified] Limited Endurance    DME Referral Provided  Referral made for DME?: No    Other Referral/Resources/Interventions Provided:  Interventions: HHC, Home Infusion  Referral Comments: [facility] and [facility] for IV antibiotics    Would you like to participate in our [address] service program?  : Yes    Treatment Team Recommendation: Home with [facility]  Discharge Destination Plan[de-identified] Home with Rina at Discharge : Family               IMM Given (Date):: 02/15/23  IMM Given to[de-identified] Patient  Family notified[de-identified] appeal rights providecd to patient           CM met with patient to discuss discharge today with [facility] for IV antibiotic delivery, patient indicated he also have antibiotics at home from prior delivery  CM made patient aware [facility] will resume in home also  CM discussed with patient he has been receiving his IV antibiotics at [address] [place] [address] 3:30 AM, 11:30 AM and 7:30 PM   Patient indicated he receives doses at home 7:00 AM, 3:00 PM,  and 11:00 PM   CM encouraged patient to stay at [address] [place] [address] until after 11:30 AM dose and take PM dose late tonight so he can start switching to home times progressively  CM spoke to patient at the bedside, reviewed DC IMM with patient and informed that patient can stay an additional 4 hours for reconsidering appealing the discharge as the medicare rights were review on the day of discharge  Patient indicated he has been short of breath when ambulating and has gained fluids/weight while at [address] [place] [address]  Patient indicated he does not feel comfortable discharging today  Patient indicated he will call [address] number for appeal following CM providing him with IMM

## 2023-02-15 NOTE — PLAN OF CARE
Problem: RESPIRATORY - ADULT  Goal: Achieves optimal ventilation and oxygenation  Description: INTERVENTIONS:  - Assess for changes in respiratory status  - Assess for changes in mentation and behavior  - Position to facilitate oxygenation and minimize respiratory effort encourage rest periods in between activities to allow for adequate gas exchange  - Oxygen administered by appropriate delivery if ordered  - I  - Encourage broncho-pulmonary hygiene including cough, deep breathe, Incentive Spirometry  - Assess the need for suctioning and aspirate as needed  - Assess and instruct to report SOB or any respiratory difficulty  - Respiratory Therapy support as indicated  Outcome: Progressing

## 2023-02-15 NOTE — ASSESSMENT & PLAN NOTE
VQ scan and lower extremity Doppler ultrasound negative for DVT  Suspect secondary to mild fluid overload  Received multiple doses of IV Lasix and shortness of breath is improved  Continue with IV Lasix for today will likely transition to oral diuretics in the next 24 hours  Continues to have subjective shortness of breath however has no lower extremity edema and rales on exam   Echocardiogram with normal ejection fraction and grade 1 diastolic dysfunction

## 2023-02-16 VITALS
HEART RATE: 59 BPM | SYSTOLIC BLOOD PRESSURE: 164 MMHG | TEMPERATURE: 98.1 F | RESPIRATION RATE: 17 BRPM | BODY MASS INDEX: 30.1 KG/M2 | WEIGHT: 222.2 LBS | DIASTOLIC BLOOD PRESSURE: 89 MMHG | OXYGEN SATURATION: 96 % | HEIGHT: 72 IN

## 2023-02-16 LAB
ANION GAP SERPL CALCULATED.3IONS-SCNC: 1 MMOL/L (ref 4–13)
BUN SERPL-MCNC: 47 MG/DL (ref 5–25)
CALCIUM SERPL-MCNC: 9 MG/DL (ref 8.4–10.2)
CHLORIDE SERPL-SCNC: 110 MMOL/L (ref 96–108)
CO2 SERPL-SCNC: 28 MMOL/L (ref 21–32)
CREAT SERPL-MCNC: 1.23 MG/DL (ref 0.6–1.3)
GFR SERPL CREATININE-BSD FRML MDRD: 59 ML/MIN/1.73SQ M
GLUCOSE SERPL-MCNC: 90 MG/DL (ref 65–140)
POTASSIUM SERPL-SCNC: 4.2 MMOL/L (ref 3.5–5.3)
SODIUM SERPL-SCNC: 139 MMOL/L (ref 135–147)

## 2023-02-16 RX ORDER — FUROSEMIDE 10 MG/ML
40 INJECTION INTRAMUSCULAR; INTRAVENOUS ONCE
Status: COMPLETED | OUTPATIENT
Start: 2023-02-16 | End: 2023-02-16

## 2023-02-16 RX ORDER — POTASSIUM CHLORIDE 750 MG/1
10 CAPSULE, EXTENDED RELEASE ORAL DAILY
Qty: 30 CAPSULE | Refills: 0 | Status: SHIPPED | OUTPATIENT
Start: 2023-02-16

## 2023-02-16 RX ORDER — FUROSEMIDE 40 MG/1
40 TABLET ORAL DAILY
Qty: 30 TABLET | Refills: 0 | Status: SHIPPED | OUTPATIENT
Start: 2023-02-16

## 2023-02-16 RX ADMIN — CEFAZOLIN SODIUM 2000 MG: 2 SOLUTION INTRAVENOUS at 11:06

## 2023-02-16 RX ADMIN — FUROSEMIDE 40 MG: 10 INJECTION, SOLUTION INTRAVENOUS at 09:54

## 2023-02-16 RX ADMIN — CEFAZOLIN SODIUM 2000 MG: 2 SOLUTION INTRAVENOUS at 02:39

## 2023-02-16 RX ADMIN — HEPARIN SODIUM 5000 UNITS: 5000 INJECTION INTRAVENOUS; SUBCUTANEOUS at 05:05

## 2023-02-16 RX ADMIN — AMLODIPINE BESYLATE 10 MG: 10 TABLET ORAL at 09:54

## 2023-02-16 RX ADMIN — HYDROCHLOROTHIAZIDE 25 MG: 25 TABLET ORAL at 09:54

## 2023-02-16 RX ADMIN — LEVOTHYROXINE SODIUM 75 MCG: 75 TABLET ORAL at 05:05

## 2023-02-16 RX ADMIN — ESCITALOPRAM OXALATE 20 MG: 10 TABLET ORAL at 09:54

## 2023-02-16 RX ADMIN — BUPROPION HYDROCHLORIDE 300 MG: 150 TABLET, EXTENDED RELEASE ORAL at 09:54

## 2023-02-16 NOTE — ASSESSMENT & PLAN NOTE
History of MSSA bacteremia  He was recently discharged from the hospital with MSSA bacteremia and placed on a 6-week course of IV Ancef and it was concern for left toe ulcer with early osteomyelitis as the cause of the infection  Patient now presents with acute kidney injury and hyperkalemia  Ancef dose initially decreased because of acute kidney injury  Subsequently upon resolution of renal failure placed on 2 g every 8 hourly  Patient to complete total 4 week course through 3/ 5     Podiatry and ID input appreciated  Repeat blood cultures ordered 2/12  Please note that Ancef was held 48 hours and then blood cultures were drawn  Repeat blood cultures are negative to date

## 2023-02-16 NOTE — ASSESSMENT & PLAN NOTE
Patient has a baseline creatinine 0 8-0 9  Admission creatinine was elevated to 2 28 with hyperkalemia most likely has atn  Hydrochlorothiazide was initially placed on hold  Creatinine improved to 1 4-1 5 with hydration  IV fluids discontinued    Avoid any further nephrotoxic agents or hypotension  Placed on Lasix 20 mg IV x1 on 2/11 and 40 mg IV x1 on 2/12  Increase to Lasix 40 mg IV twice daily for 3 doses  Repeat chest x-ray shows congestive changes  Renal functions have remained stable with diuresis  Will transition to Lasix twice daily upon discharge  Fluids outpatient follow-up of renal functions on current diuretic regimen

## 2023-02-16 NOTE — ASSESSMENT & PLAN NOTE
VQ scan and lower extremity Doppler ultrasound negative for DVT  Suspect secondary to mild fluid overload  Received multiple doses of IV Lasix and shortness of breath is improved  Continue with IV Lasix for today will likely transition to oral diuretics on discharge  Continues to have subjective shortness of breath however has no lower extremity edema and rales on exam   Echocardiogram with normal ejection fraction and grade 1 diastolic dysfunction  Recommended to monitor weight daily and maintain on low-salt diet  We will continue with oral diuretics on discharge

## 2023-02-16 NOTE — CASE MANAGEMENT
Case Management Discharge Planning Note    Patient name Kal Eisenbergable  Location [address] 313/-23 MRN [id number]  : 1955 Date 2023       Current Admission Date: 2/10/2023  Current Admission Diagnosis:ANNALEE (acute kidney injury) [facility])   Patient Active Problem List    Diagnosis Date Noted   • Heart failure with preserved ejection fraction 2023   • ANNALEE (acute kidney injury) ([facility] [address] ) 02/10/2023   • Hyperkalemia 02/10/2023   • MSSA bacteremia 02/10/2023   • Ulcer of left foot ([facility] [address] ) 2023   • Other emphysema ([facility] [address] ) 2023   • Acute hyponatremia 2023   • Benign essential HTN 2023   • Moderate aortic stenosis 2023   • MDD (major depressive disorder) 2023   • Peripheral neuropathy 2023      LOS (days): 6  Geometric Mean LOS (GMLOS) (days): 3 40  Days to GMLOS:-2 7     OBJECTIVE:  Risk of Unplanned Readmission Score: 13 62         Current admission status: Inpatient   Preferred Pharmacy:   [facility] # [address] [address]  Phone: [phone] Fax: [phone]    Primary Care Provider: Kev Bowles MD    Primary Insurance: MEDICARE  Secondary Insurance: BLUE CROSS    DISCHARGE DETAILS:           AVS to [facility]

## 2023-02-16 NOTE — PROGRESS NOTES
Progress Note - Infectious Disease   Loretta Bloch 69 y o  male MRN: [id number]  Unit/Bed#: -01 Encounter: [id number]        REQUIRED DOCUMENTATION:     1  This service was provided via Telemedicine  2  Provider located at [facility]  3  TeleMed provider: Corby Arrington MD   4  Identify all parties in room with patient during tele consult:RN  5  After connecting through [facility], patient was identified by name and date of birth and assistant checked wristband  Patient was then informed that this was a Telemedicine visit and that the exam was being conducted confidentially over secure lines  My office door was closed  No one else was in the room  Patient acknowledged consent and understanding of privacy and security of the Telemedicine visit, and gave us permission to have the assistant stay in the room in order to assist with the history and to conduct the exam   I informed the patient that I have reviewed their record in Epic and presented the opportunity for them to ask any questions regarding the visit today  The patient agreed to participate  Assessment/Recommendations     1   Acute kidney injury on CKD  - Likely multifactorial more likely related to underlying CHF and HCTZ use, unlikely drug reaction to cefazolin, tolerating abx at present  -Renal ultrasound normal  - creatinine continues to improve     • Antibiotics as below  • Avoid nephrotoxic agents, additional management per nephrology and primary team     2   Recent history of complicated staph aureus bacteremia  - Source of bacteremia is likely chronic left toe wound, no local signs of infection but early osteo could not be ruled out  Has left shoulder arthroplasty without any local signs of infection   No prosthetic vascular devices  No evidence of endovascular infection with negative MAKEDA and rapid clearance of bacteremia  - Afebrile without leukocytosis     • Continue cefazolin 2 q8h and monitor clinical course and renal function  • Recommend at least 4 weeks of iv therapy for complicated bacteremia through 3/5  Patient will need weekly labs - CBC/diff, CMP while on iv antibitics  PICC can be removed after completing iv antibiotics  ID fu recommended within 2 weeks of discharge   Recommend wound care, close podiatry follow up  • If toe wound has failed to heal would consider completing 6 weeks of total therapy through 3/19 with transition to po doxy 100 bid after completing iv cefazolin      3   Chronic L toe wound, possible early osteo  -Left toe wound has been present for a year and is likely source of bacteremia, no local signs of infection  - MRI without definite evidence of osteo, non specific focal area of bone marrow edema in distal phalanx of great toe may reflect focal stress reaction or early osteo  - clinically stable     • Antibiotics as above  • Recommend wound care and close follow-up with podiatry, if wound fails to heal on fu would favor definitive surgical treatment and/or consider completing 6 weeks of abx therapy     4  Dyspnea  - Multifactorial in the setting of ANNALEE, chronic HFpEF, moderate aortic stenosis and possible copd   -MAKEDA negative for vegetation, VQ scan with low probability of PE     • Recommend close outpt fu with pcp and cardiology and outpatient fu with pulm , recommend tobacco cessation counseling     Reviewed case with primary team  Will sign off  Please call with concerns or any changes in clinical status or new test results  History       Subjective: The patient has no complaints  Intermittent dyspnea in the morning but improves with lasix  Denies fevers, chills, or sweats  Denies nausea, vomiting, or diarrhea      Antibiotics:  cefazolin      Physical Exam     Temp:  [98 1 °F (36 7 °C)-98 2 °F (36 8 °C)] 98 1 °F (36 7 °C)  HR:  [68-77] 77  Resp:  [17-18] 17  BP: (159-160)/(82-86) 159/86  SpO2:  [92 %-94 %] 92 %  Temp (24hrs), Av 1 °F (36 7 °C), Min:98 1 °F (36 7 °C), Max:98 2 °F (36 8 °C)  Current: Temperature: 98 1 °F (36 7 °C)    Intake/Output Summary (Last 24 hours) at 2/16/2023 0934  Last data filed at 2/16/2023 6836  Gross per 24 hour   Intake 440 ml   Output 2735 ml   Net -2295 ml       Physical exam findings reported by bedside and primary medical team staff      General Appearance:  Appearing well, nontoxic, and in no distress, appears stated age   Throat: Oropharynx moist without lesions; lips, mucosa, and tongue normal; teeth and gums normal   Lungs:   Clear to auscultation bilaterally, no audible wheezes, rhonchi and rales, respirations unlabored   Heart:  Regular rate and rhythm, S1, S2 normal, no murmur, rub or gallop   Abdomen:   Soft, non-tender, non-distended, positive bowel sounds, no masses, no organomegaly    No CVA tenderness   Extremities: Extremities normal, atraumatic, no cyanosis, clubbing or edema   Skin: Skin color, texture, turgor normal, no rashes, chronic left superficial toe wound without erythema or drainage   Neurologic: Alert and oriented times 3, extremity strength 5/5 and symmetric         Invasive Devices:   PICC Line 02/05/23 (Active)   Reasons to continue PICC Long term antibiotics 02/14/23 1000   Goal for Removal N/A- Discharging with PICC for IV ABX/medications 02/14/23 1000   Line Necessity Reviewed Yes, reviewed with provider 02/14/23 1000   Site Assessment WDL 02/14/23 1000   #1 Lumen Color/Status Red lumen;Blood return noted; Flushed;Normal saline locked 02/14/23 1000   #2 Lumen Color/Status Purple lumen;Blood return noted; Flushed;Normal saline locked 02/14/23 1000   Catheter Out on Skin (cm) 0 cm 02/10/23 1939   Dressing Type Chlorhexidine dressing 02/14/23 1000   Dressing Status Clean;Dry; Intact 02/14/23 1000   Dressing Intervention 7 day central line dressing changed 02/13/23 0806   Dressing Change Due 02/19/23 02/13/23 0806       Labs, Imaging, & Other Studies     Lab Results:    I have personally reviewed pertinent labs      Results from last 7 days   Lab Units 02/14/23  0602 02/11/23  0530 02/10/23  1143   WBC Thousand/uL 9 53 9 01 10 34*   HEMOGLOBIN g/dL 11 2* 13 4 12 2   PLATELETS Thousands/uL 317 327 366     Results from last 7 days   Lab Units 02/16/23  0509 02/10/23  1822 02/10/23  1143   POTASSIUM mmol/L 4 2   < > 6 1*   CHLORIDE mmol/L 110*   < > 108   CO2 mmol/L 28   < > 20*   BUN mg/dL 47*   < > 72*   CREATININE mg/dL 1 23   < > 2 28*   EGFR ml/min/1 73sq m 59   < > 28   CALCIUM mg/dL 9 0   < > 9 1   AST U/L  --   --  11*   ALT U/L  --   --  <3*   ALK PHOS U/L  --   --  47    < > = values in this interval not displayed  Results from last 7 days   Lab Units 02/12/23  1159 02/12/23  1152   BLOOD CULTURE  No Growth at 72 hrs  No Growth at 72 hrs  Imaging Studies:   I have personally reviewed pertinent imaging study reports and images in PACS  EKG, Pathology, and Other Studies:   I have personally reviewed pertinent reports  Counseling/Coordination of care: Total 35 minutes communication with the patient via telehealth  Labs, medical tests and imaging studies were independently reviewed by me as noted above

## 2023-02-16 NOTE — PLAN OF CARE
Problem: Potential for Falls  Goal: Patient will remain free of falls  Description: INTERVENTIONS:  - Educate patient/family on patient safety including physical limitations  - Instruct patient to call for assistance with activity   - Consult OT/PT to assist with strengthening/mobility   - Keep Call bell within reach  - Keep bed low and locked with side rails adjusted as appropriate  - Keep care items and personal belongings within reach  - Initiate and maintain comfort rounds  - Make Fall Risk Sign visible to staff  - Offer Toileting every 2 Hours in advance of need  - Apply yellow socks and bracelet for high fall risk patients  Outcome: Progressing     Problem: PAIN - ADULT  Goal: Verbalizes/displays adequate comfort level or baseline comfort level  Description: Interventions:  - Encourage patient to monitor pain and request assistance  - Assess pain using appropriate pain scale  - Administer analgesics based on type and severity of pain and evaluate response  - Implement non-pharmacological measures as appropriate and evaluate response  - Consider cultural and social influences on pain and pain management  - Notify physician/advanced practitioner if interventions unsuccessful or patient reports new pain  Outcome: Progressing

## 2023-02-16 NOTE — ASSESSMENT & PLAN NOTE
Potassium 6 1 on admission  EKG no changes noted so far  Received (calcium gluconate, insulin dextrose and Kayexalate)x3 since admission  Hold oral potassium supplementation  Potassium down to 4 2 on the day of discharge

## 2023-02-16 NOTE — CASE MANAGEMENT
Case Management Progress Note    Patient name Fermin Alberts  Location [address] 313/-71 MRN [id number]  : 1955 Date 2023       LOS (days): 6  Geometric Mean LOS (GMLOS) (days): 3 40  Days to GMLOS:-2 7        OBJECTIVE:        Current admission status: Inpatient  Preferred Pharmacy:   [facility] # [address] [address]  Phone: [phone] Fax: [phone]    Primary Care Provider: Minoo Witt MD    Primary Insurance: MEDICARE  Secondary Insurance: BLUE CROSS      PROGRESS NOTE:    Received a call from [facility], [facility] they were requesting clinical on Mikael  An appeal was initiated 2/15 by the patient  [place] [id number] EMR KEY SYQVIH    Patient was already DC when message was retrieved  CM called [facility] and explained to representative that a notification was not received, and patient was already dc  Explained patient was Not discharged yesterday and he was already dc  [facility] requested a letter fax to them ([phone]) which included, that he was not discharged yesterday, as he was not clinically ready for dc as he needed diuresing  And that patient was dc today  This was completed

## 2023-02-16 NOTE — DISCHARGE SUMMARY
[address]  Discharge- Lizzeth Land 1955, 69 y o  male MRN: [id number]  Unit/Bed#: -01 Encounter: [id number]  Primary Care Provider: Judi Bland MD   Date and time admitted to hospital: 2/10/2023 11:23 AM    * ANNALEE (acute kidney injury) [facility])  Assessment & Plan  Patient has a baseline creatinine 0 8-0 9  Admission creatinine was elevated to 2 28 with hyperkalemia most likely has atn  Hydrochlorothiazide was initially placed on hold  Creatinine improved to 1 4-1 5 with hydration  IV fluids discontinued    Avoid any further nephrotoxic agents or hypotension  Placed on Lasix 20 mg IV x1 on 2/11 and 40 mg IV x1 on 2/12  Increase to Lasix 40 mg IV twice daily for 3 doses  Repeat chest x-ray shows congestive changes  Renal functions have remained stable with diuresis  Will transition to Lasix twice daily upon discharge  Fluids outpatient follow-up of renal functions on current diuretic regimen  Heart failure with preserved ejection fraction  Assessment & Plan  VQ scan and lower extremity Doppler ultrasound negative for DVT  Suspect secondary to mild fluid overload  Received multiple doses of IV Lasix and shortness of breath is improved  Continue with IV Lasix for today will likely transition to oral diuretics on discharge  Continues to have subjective shortness of breath however has no lower extremity edema and rales on exam   Echocardiogram with normal ejection fraction and grade 1 diastolic dysfunction  Recommended to monitor weight daily and maintain on low-salt diet  We will continue with oral diuretics on discharge  MSSA bacteremia  Assessment & Plan  History of MSSA bacteremia  He was recently discharged from the hospital with MSSA bacteremia and placed on a 6-week course of IV Ancef and it was concern for left toe ulcer with early osteomyelitis as the cause of the infection    Patient now presents with acute kidney injury and hyperkalemia  Ancef dose initially decreased because of acute kidney injury  Subsequently upon resolution of renal failure placed on 2 g every 8 hourly  Patient to complete total 4 week course through 3/ 5     Podiatry and ID input appreciated  Repeat blood cultures ordered 2/12  Please note that Ancef was held 48 hours and then blood cultures were drawn  Repeat blood cultures are negative to date  Hyperkalemia  Assessment & Plan  Potassium 6 1 on admission  EKG no changes noted so far  Received (calcium gluconate, insulin dextrose and Kayexalate)x3 since admission  Hold oral potassium supplementation  Potassium down to 4 2 on the day of discharge  Ulcer of left foot [facility])  Assessment & Plan  Continue conservative management and local wound care  further as per podiatry  Continue with intravenous antibiotics to complete course of treatment through 3/5  MDD (major depressive disorder)  Assessment & Plan  stable for now  Continue home meds    Benign essential HTN  Assessment & Plan  Continue Norvasc  HCTZ initially on hold due to mild hyponatremia and ANNALEE  Resumed by nephrology 2/14  Will be transitioned to furosemide on discharge        Medical Problems     Resolved Problems  Date Reviewed: 2/13/2023   None       Discharging Physician / Practitioner: Dulce Huertas MD  PCP: Severiano Sykes MD  Admission Date:   Admission Orders (From admission, onward)     Ordered        02/10/23 [address]  Once                      Discharge Date: 02/16/23    Consultations During Hospital Stay:  · Infectious disease  · Podiatry  · Nephrology    Procedures Performed:   · Echocardiogram with normal ejection fraction and grade 1 diastolic dysfunction    · VQ scan low probability of PE  · Venous Doppler ultrasound negative for DVT  · Renal ultrasound no hydronephrosis    Significant Findings / Test Results:   NA  Incidental Findings:   · NA    Test Results Pending at Discharge (will require follow up):   NA  Outpatient Tests Requested:  · Cbc/BMP in 1 week  Complications:  None   Reason for Admission: Abnormal renal function  Hospital Course:   Catina Lopez is a 69 y o  male patient who originally presented to the hospital on 2/10/2023 due to acute kidney injury  Patient was on home IV antibiotics for MSSA bacteremia  His diuretics were initially placed on hold and he received gentle IV hydration  Subsequent renal functions improved  He did have volume overload with normal ejection fraction on echocardiogram and grade 1 diastolic dysfunction  Started on IV diuresis with reported improvement in symptoms  Sequently transition to oral diuretics on discharge  Maintain on low salt and fluid restricted diet  His Ancef dose was increased to 2 g every 8 hourly upon resolution of his renal failure  Stable for discharge to complete 4-week course of antibiotic 335  He will get weekly CBC and BMP while on antibiotics  He will continue to follow-up with infectious disease and podiatry on discharge  Please see above list of diagnoses and related plan for additional information  Condition at Discharge: stable    Discharge Day Visit / Exam:   Subjective: Patient reports improvement in shortness of breath on diuretics  Denies any cough  No acute events overnight  Does not require any supplemental oxygen  Vitals: Blood Pressure: 164/89 (02/16/23 1042)  Pulse: 59 (02/16/23 1042)  Temperature: 98 1 °F (36 7 °C) (02/16/23 0746)  Temp Source: Temporal (02/11/23 1130)  Respirations: 17 (02/16/23 0746)  Height: 6' (182 9 cm) (02/10/23 1508)  Weight - Scale: 101 kg (222 lb 3 2 oz) (02/16/23 0551)  SpO2: 96 % (02/16/23 1042)  Exam:   Physical Exam  Constitutional:       General: He is not in acute distress  HENT:      Head: Normocephalic  Mouth/Throat:      Mouth: Mucous membranes are moist    Eyes:      Extraocular Movements: Extraocular movements intact        Pupils: Pupils are equal, round, and reactive to light  Cardiovascular:      Rate and Rhythm: Normal rate  Pulses: Normal pulses  Pulmonary:      Effort: Pulmonary effort is normal       Breath sounds: Normal breath sounds  Abdominal:      General: Abdomen is flat  Musculoskeletal:      Comments: Trace bilateral lower extremity edema   left upper extremity PICC line in place   Neurological:      General: No focal deficit present  Mental Status: He is alert and oriented to person, place, and time  Mental status is at baseline  Psychiatric:         Mood and Affect: Mood normal          Discussion with Family: Patient declined call to   Discharge instructions/Information to patient and family:   See after visit summary for information provided to patient and family  Provisions for Follow-Up Care:  See after visit summary for information related to follow-up care and any pertinent home health orders  Disposition:   Home with VNA Services (Reminder: Complete face to face encounter)    Planned Readmission: No     Discharge Statement:  I spent 45 minutes discharging the patient  This time was spent on the day of discharge  I had direct contact with the patient on the day of discharge  Greater than 50% of the total time was spent examining patient, answering all patient questions, arranging and discussing plan of care with patient as well as directly providing post-discharge instructions  Additional time then spent on discharge activities  Discharge Medications:  See after visit summary for reconciled discharge medications provided to patient and/or family        **Please Note: This note may have been constructed using a voice recognition system**

## 2023-02-16 NOTE — NURSING NOTE
Reviewed discharge instructions, med rec, , follow up appointments, handout given on fluid restriction, and picc care verbally understood advised to keep antibiotics refrigerated that were delivered to room

## 2023-02-16 NOTE — CASE MANAGEMENT
Case Management Discharge Planning Note    Patient name Jany Knightu  Location [address] 313/-75 MRN [id number]  : 1955 Date 2023       Current Admission Date: 2/10/2023  Current Admission Diagnosis:ANNALEE (acute kidney injury) [facility])   Patient Active Problem List    Diagnosis Date Noted   • Shortness of breath 2023   • ANNAELE (acute kidney injury) ([facility] [address] ) 02/10/2023   • Hyperkalemia 02/10/2023   • MSSA bacteremia 02/10/2023   • Ulcer of left foot ([facility] [address] ) 2023   • Other emphysema ([facility] [address] ) 2023   • Acute hyponatremia 2023   • Benign essential HTN 2023   • Moderate aortic stenosis 2023   • MDD (major depressive disorder) 2023   • Peripheral neuropathy 2023      LOS (days): 6  Geometric Mean LOS (GMLOS) (days): 3 40  Days to GMLOS:-2 6     OBJECTIVE:  Risk of Unplanned Readmission Score: 13 62         Current admission status: Inpatient   Preferred Pharmacy:   [facility] # [address] [place] [address]  Phone: [phone] Fax: [phone]    Primary Care Provider: Yohana Pickens MD    Primary Insurance: MEDICARE  Secondary Insurance: BLUE CROSS    DISCHARGE DETAILS:     CM met with patient to review discharge today with [facility] and [facility] for IV antibiotics  [facility] plans to deliver more IV antibiotics to [facility] today for discharge purposes

## 2023-02-16 NOTE — ASSESSMENT & PLAN NOTE
Continue conservative management and local wound care  further as per podiatry  Continue with intravenous antibiotics to complete course of treatment through 3/5

## 2023-02-16 NOTE — ASSESSMENT & PLAN NOTE
Continue Norvasc  HCTZ initially on hold due to mild hyponatremia and ANNALEE  Resumed by nephrology 2/14    Will be transitioned to furosemide on discharge

## 2023-02-17 DIAGNOSIS — B95.61 STAPHYLOCOCCUS AUREUS BACTEREMIA: Primary | ICD-10-CM

## 2023-02-17 DIAGNOSIS — R78.81 STAPHYLOCOCCUS AUREUS BACTEREMIA: Primary | ICD-10-CM

## 2023-02-17 LAB
BACTERIA BLD CULT: NORMAL
BACTERIA BLD CULT: NORMAL

## 2023-02-17 NOTE — CASE MANAGEMENT
Case Management Discharge Planning Note    Patient name Lizzeth Land  Location [address] 313/-25 MRN [id number]  : 1955 Date 2023       Current Admission Date: 2/10/2023  Current Admission Diagnosis:ANNALEE (acute kidney injury) [facility])   Patient Active Problem List    Diagnosis Date Noted   • Heart failure with preserved ejection fraction 2023   • ANNALEE (acute kidney injury) ([facility] [address] ) 02/10/2023   • Hyperkalemia 02/10/2023   • MSSA bacteremia 02/10/2023   • Ulcer of left foot ([facility] [address] ) 2023   • Other emphysema ([facility] [address] ) 2023   • Acute hyponatremia 2023   • Benign essential HTN 2023   • Moderate aortic stenosis 2023   • MDD (major depressive disorder) 2023   • Peripheral neuropathy 2023      LOS (days): 6  Geometric Mean LOS (GMLOS) (days): 3 40  Days to GMLOS:-2 7     OBJECTIVE:  Risk of Unplanned Readmission Score: 14 11         Current admission status: Inpatient   Preferred Pharmacy:   [facility] # [address] [address]  Phone: [phone] Fax: [phone]    Primary Care Provider: Judi Bland MD    Primary Insurance: MEDICARE  Secondary Insurance: BLUE CROSS    DISCHARGE DETAILS:    Call receive from [place] at [facility], she received my letter to explain patient was not dc on the  and was dc on the   She requested all clinical to be sent over per review  CM explained the person yesterday told me the letter was sufficient yesterday and no clinical was needed  CM will submit clinical  EMR SYQVIH  His ID -- is [place] [id number]  CM task CM support

## 2023-02-17 NOTE — CASE MANAGEMENT
[address] 48 received a [facility] appeal    Care Manager notified of the appeal: Irma Lauren  Case Control ID: [id number]  EMR Key:[id number]  Clinicals attached via [employer] online portal  Appeal is pending       Submission Tracking#[id number]

## 2023-02-17 NOTE — PROGRESS NOTES
OPAT NOTE    Supervising/Discharge physician: Umm    Diagnosis: Staph Bacteremia    Drug: Cefazolin    Dose/Frequency: 2g IV Q8    Infusion/VNA contact: [facility]    Next appointment: 3/1

## 2023-02-21 NOTE — CASE MANAGEMENT
Case Management Progress Note    Patient name Hank Garcia  Location [address] 313/-98 MRN [id number]  : 1955 Date 2023       LOS (days): 6  Geometric Mean LOS (GMLOS) (days): 3 40  Days to GMLOS:-2 7        OBJECTIVE:        Current admission status: Inpatient  Preferred Pharmacy:   [facility] # [address] [address]  Phone: [phone] Fax: [phone]    Primary Care Provider: Gladys Burger MD    Primary Insurance: MEDICARE  Secondary Insurance: BLUE CROSS    PROGRESS NOTE:    Chaz Streeter website      2/15/2023 10:50:54 AM  Medical Records Received:  2023 11:52:08 AM  Outcome:  [place] Agrees with termination of Hospital services  Liability:  Beneficiary Liability Starts on 2023

## 2023-02-24 ENCOUNTER — OFFICE VISIT (OUTPATIENT)
Dept: PODIATRY | Age: 68
End: 2023-02-24

## 2023-02-24 ENCOUNTER — HOSPITAL ENCOUNTER (OUTPATIENT)
Dept: RADIOLOGY | Facility: CLINIC | Age: 68
End: 2023-02-24

## 2023-02-24 VITALS — WEIGHT: 222 LBS | BODY MASS INDEX: 30.07 KG/M2 | HEIGHT: 72 IN

## 2023-02-24 DIAGNOSIS — I73.9 PERIPHERAL VASCULAR DISEASE (HCC): ICD-10-CM

## 2023-02-24 DIAGNOSIS — G62.9 PERIPHERAL POLYNEUROPATHY: ICD-10-CM

## 2023-02-24 DIAGNOSIS — L97.521 ULCER OF LEFT FOOT, LIMITED TO BREAKDOWN OF SKIN (HCC): ICD-10-CM

## 2023-02-24 DIAGNOSIS — A40.8 SEPSIS DUE TO OTHER STREPTOCOCCUS SPECIES, UNSPECIFIED WHETHER ACUTE ORGAN DYSFUNCTION PRESENT (HCC): ICD-10-CM

## 2023-02-24 DIAGNOSIS — A41.9 SEPSIS, DUE TO UNSPECIFIED ORGANISM, UNSPECIFIED WHETHER ACUTE ORGAN DYSFUNCTION PRESENT (HCC): ICD-10-CM

## 2023-02-24 RX ORDER — LISINOPRIL 10 MG/1
TABLET ORAL
COMMUNITY
Start: 2023-02-22

## 2023-02-24 NOTE — PROGRESS NOTES
Assessment/Plan:     Diagnoses and all orders for this visit:    Sepsis, due to unspecified organism, unspecified whether acute organ dysfunction present [facility])  -     Ambulatory Referral to Podiatry    Sepsis due to other Streptococcus species, unspecified whether acute organ dysfunction present [facility])  -     Ambulatory Referral to Podiatry    Ulcer of left foot, limited to breakdown of skin [facility])  -     Ambulatory Referral to Podiatry  -     X-ray foot left 3+ views; Future    Other orders  -     lisinopril (ZESTRIL) 10 mg tablet           IMPRESSION:  1  Left plantar hallux IPJ ulceration - healed w/ fragile epithelium on 2/24/23 exam  - MRI left foot 2/3/23: "Small focus of bone marrow edema at the proximal aspect of the great toe distal phalanx without confluent decreased T1 marrow signal, nonspecific as described above   No confluent decreased T1 marrow signal to definitively suggest osteomyelitis"  2  H/o MSSA bacteremia on 4-6wks IV ancef (#1 likely portal of entry)  3  PN  4  PVD (Q8 findings)           PLAN:  · I reviewed clinical exam with patient in detail today  I have discussed with the patient the pathophysiology of this diagnosis and reviewed how the examination correlates with this diagnosis  · Left plantar hallux ulcer has healed after I pared callus to somewhat fragile epithelium with a #10 blade  MRI as above w/o definitive OM  · C/w Abx per ID   · I reviewed and independently interpreted XR left foot WB 3v 2/24/23: there is elevation of 1st ray with PF of hallux IPJ  · I briefly discussed bowen arthroplasty vs amputation if ulceration were to reopen  · Rec aquaphor to keep skin soft   · Rec cushion to prevent friction  · Rec dannanberg arch supports (1st ray cutout) to reduce pressure to plantar hallux  · F/u 2 weeks for recheck       Subjective:      Patient ID: Mary Harrell is a 69 y o  male  Peace Win presents to clinic today concerning left 1st toe ulceration   This has been present for months and seeing another outpt podiatrist  Recently admitted February due to bacteremia with portal likely ulcer on toe  Has PN  On 4 wks IV abx for MSSA bacteremia by ID  The following portions of the patient's history were reviewed and updated as appropriate: allergies, current medications, past family history, past medical history, past social history, past surgical history and problem list     Review of Systems   Constitutional: Negative for activity change, chills and fever  HENT: Negative  Respiratory: Negative for cough, chest tightness and shortness of breath  Cardiovascular: Negative for chest pain and leg swelling  Endocrine: Negative  Genitourinary: Negative  Skin:        Left plantar hallux lesion   Neurological:        PN   Psychiatric/Behavioral: Negative  Negative for agitation and behavioral problems  Objective:      Ht 6' (1 829 m)   Wt 101 kg (222 lb)   BMI 30 11 kg/m²          Physical Exam  Constitutional:       General: He is not in acute distress  Appearance: Normal appearance  He is not ill-appearing  Cardiovascular:      Comments: Bilateral DP and PT pulses are palpable but slightly diminished 1/4  Pedal hair is diminished  Legs to toes warm to cool  Varicosities with mild LE edema noted  Pulmonary:      Effort: No respiratory distress  Musculoskeletal:         General: No tenderness or deformity  Normal range of motion  Skin:     Capillary Refill: Capillary refill takes less than 2 seconds  Comments: B/L LE skin is atrophic - thin, dry and shiny in appearance  Left plantar hallux hyperkeratotic tissue without current open ulcer - previous ulcer healed however epithelium is fragile  Toenails x10 are elongated, dystrophic, discolored with nail thickening and subungual debris  Neurological:      General: No focal deficit present  Mental Status: He is alert and oriented to person, place, and time        Comments: Gross sensation to feet intact  Patient endorses or denies numbness, tingling and burning to B/L feet      Psychiatric:         Mood and Affect: Mood normal          Behavior: Behavior normal

## 2023-02-24 NOTE — PATIENT INSTRUCTIONS
[address] [facility] [address]  com : [employer] Insoles - Size: Large, Mens Shoe Size (9 1/2 - 11), Womens Shoe Size (10 1/2 - 12) :  [employer] : Health & Household     Aquaphor to callus on toe    Allevyn border foam  Silicone toe sleeve

## 2023-02-24 NOTE — PROGRESS NOTES
Virtual Outpatient Progress Note - [facility] Infectious Disease   Alexis Rolle 69 y o  male MRN: [id number]  Encounter: [id number]      Virtual Regular Visit  Verification of patient location: [address] [place] [address]  Patient is located in the following state in which I hold an active license [place]    Problem List Items Addressed This Visit        Genitourinary    ANNALEE (acute kidney injury) ([facility] )       Other    Ulcer of left foot ([facility] )    Relevant Orders    Discontinue PICC line    MSSA bacteremia - Primary    Relevant Orders    Discontinue PICC line     Encounter provider Laurent Esquivel PA-C    Provider located at [address] [place] [address] [facility] [address] [place] [id number] [phone]    The patient was identified by name and date of birth  Alexis Rolle was informed that this is a telemedicine visit and that the visit is being conducted through the [employer]  He agrees to proceed     My office door was closed  No one else was in the room  He acknowledged consent and understanding of privacy and security of the video platform  The patient has agreed to participate and understands they can discontinue the visit at any time  Patient is aware this is a billable service  Assessment/Plan:  1  Complicated MSSA bacteremia  Source of bacteremia likely chronic left toe wound with possible early osteomyelitis  Patient has a left shoulder arthroplasty but does not have any local signs of infection  No other known prosthetic or intravascular devices  There is no evidence for endovascular infection with negative MAKEDA and rapid clearance of bacteremia  Plan was to complete a 4-6-week course of abx for complicated bacteremia with possible early OM pending improvement in toe wound  He will complete 4 weeks of IV ancef on 3/5/23  Wound during recent podiatry visit appeared to have healed   Discussed with podiatry, given healed wound, equivocal MRI and clinical improvement we will stop IV abx after 4 week course  -Continue 4 week course of IV cefazolin through 3/5/23   -D/c PICC after last dose of IV abx   -Continue close outpatient podiatry follow up      2  Left plantar hallux ulcer  Wound being followed by outpatient podiatry  Appears to have healed  MRI equivocal for possible early osteomyelitis  Open wound was likely the source for bacteremia as above  -Abx as above  -Continue close outpatient podiatry follow-up     3  Acute kidney injury  This has resolved  Creatinine on 2/20 was 1 0   -Monitor Cr while on abx      4  PICC Line  Will remove after last dose of IV abx  Above assessment and plan discussed in detail with patient during examination  Antibiotics:  IV cefazolin     Subjective:  Maria Dolores Stevens is a 69 y o  male patient who presents to outpatient ID office for regimen of MSSA bacteremia  He was recently admitted 1/30 to 2/6 with MSSA bacteremia and placed on a 6-week course of IV Ancef  Patient presented back to the hospital and readmitted 2/10-2/16 with shortness of breath and acute kidney injury  He was also found to be hyperkalemic  Due to acute renal failure Ancef dose was adjusted  Infectious disease was following for management of known MSSA bacteremia  Source is likely patient's chronic left great toe wound with possible early osteomyelitis  He was seen by podiatry recently and this appeared improved  He is tolerating the IV abx without side effects  No missed doses  No fevers or chills  No n/v/d  Just got delivery today and will finish abx on Sunday  No issues with picc  VNA coming out to remove picc on Monday  He is happy foot wound has healed since he has been dealing with it for over a year  Agrees with plan to stop abx at 4 weeks        Past Medical History:   Diagnosis Date   • Anxiety    • Aortic stenosis    • Depression    • Hypertension    • Hypothyroidism    • PVCs (premature ventricular contractions) Past Surgical History:   Procedure Laterality Date   • APPENDECTOMY     • BACK SURGERY     • TOTAL SHOULDER REPLACEMENT         Current Outpatient Medications   Medication Sig Dispense Refill   • amLODIPine (NORVASC) 10 mg tablet Take 10 mg by mouth daily  0   • buPROPion (WELLBUTRIN XL) 300 mg 24 hr tablet      • ceFAZolin (ANCEF) 2000 mg IVPB Inject 2,000 mg into a catheter in a vein over 30 minutes at 100 mL/hr every 8 (eight) hours for 19 days 5700 mL 0   • escitalopram (LEXAPRO) 20 mg tablet Take 20 mg by mouth daily  1   • furosemide (LASIX) 40 mg tablet Take 1 tablet (40 mg total) by mouth daily 30 tablet 0   • levothyroxine 75 mcg tablet      • lisinopril (ZESTRIL) 10 mg tablet      • nicotine (NICODERM CQ) 7 mg/24hr TD 24 hr patch Place 1 patch on the skin over 24 hours daily 28 patch 0   • potassium chloride (MICRO-K) 10 MEQ CR capsule Take 1 capsule (10 mEq total) by mouth daily 30 capsule 0     No current facility-administered medications for this visit  No Known Allergies    Video Exam  Exam limited due to virtual visit     There were no vitals filed for this visit  Physical Exam  Constitutional:       Appearance: Normal appearance  HENT:      Head: Normocephalic and atraumatic  Neurological:      General: No focal deficit present  Mental Status: He is alert  Psychiatric:         Mood and Affect: Mood normal          Behavior: Behavior normal          Labs, Imaging, & Other studies:   All pertinent labs and imaging studies were personally reviewed by me from 2/20/23        I spent 30 minutes with patient today in which greater than 50% of the time was spent in counseling/coordination of care regarding ongoing management of mssa bacteremia and possible osteo with IV abx

## 2023-03-01 ENCOUNTER — TELEMEDICINE (OUTPATIENT)
Age: 68
End: 2023-03-01

## 2023-03-01 DIAGNOSIS — B95.61 MSSA BACTEREMIA: Primary | ICD-10-CM

## 2023-03-01 DIAGNOSIS — L97.521 ULCER OF LEFT FOOT, LIMITED TO BREAKDOWN OF SKIN (HCC): ICD-10-CM

## 2023-03-01 DIAGNOSIS — N17.9 AKI (ACUTE KIDNEY INJURY) (HCC): ICD-10-CM

## 2023-03-01 DIAGNOSIS — R78.81 MSSA BACTEREMIA: Primary | ICD-10-CM

## 2023-03-07 RX ORDER — CLONAZEPAM 1 MG/1
1 TABLET ORAL
COMMUNITY
End: 2023-03-08 | Stop reason: ALTCHOICE

## 2023-03-07 RX ORDER — CRANBERRY FRUIT EXTRACT 200 MG
600 CAPSULE ORAL DAILY
COMMUNITY
End: 2023-03-08 | Stop reason: ALTCHOICE

## 2023-03-07 RX ORDER — LIOTHYRONINE SODIUM 50 UG/1
TABLET ORAL
COMMUNITY
End: 2023-03-08 | Stop reason: ALTCHOICE

## 2023-03-07 RX ORDER — CHLORAL HYDRATE 500 MG
2 CAPSULE ORAL DAILY
COMMUNITY

## 2023-03-07 RX ORDER — LISINOPRIL 10 MG/1
10 TABLET ORAL DAILY
COMMUNITY
Start: 2023-02-22 | End: 2023-03-08 | Stop reason: SDUPTHER

## 2023-03-07 RX ORDER — MELOXICAM 15 MG/1
15 TABLET ORAL DAILY PRN
COMMUNITY

## 2023-03-08 ENCOUNTER — OFFICE VISIT (OUTPATIENT)
Dept: CARDIOLOGY CLINIC | Facility: CLINIC | Age: 68
End: 2023-03-08

## 2023-03-08 VITALS
HEIGHT: 72 IN | TEMPERATURE: 97.8 F | DIASTOLIC BLOOD PRESSURE: 78 MMHG | HEART RATE: 74 BPM | BODY MASS INDEX: 29.07 KG/M2 | OXYGEN SATURATION: 97 % | SYSTOLIC BLOOD PRESSURE: 120 MMHG | WEIGHT: 214.6 LBS

## 2023-03-08 DIAGNOSIS — I49.3 PVCS (PREMATURE VENTRICULAR CONTRACTIONS): ICD-10-CM

## 2023-03-08 DIAGNOSIS — I10 BENIGN ESSENTIAL HTN: ICD-10-CM

## 2023-03-08 DIAGNOSIS — I35.0 MODERATE AORTIC STENOSIS: Primary | ICD-10-CM

## 2023-03-08 DIAGNOSIS — R06.02 SHORTNESS OF BREATH: ICD-10-CM

## 2023-03-08 RX ORDER — LISINOPRIL 10 MG/1
10 TABLET ORAL DAILY
Qty: 90 TABLET | Refills: 3 | Status: SHIPPED | OUTPATIENT
Start: 2023-03-08

## 2023-03-08 RX ORDER — FUROSEMIDE 20 MG/1
20 TABLET ORAL DAILY
Qty: 90 TABLET | Refills: 3 | Status: SHIPPED | OUTPATIENT
Start: 2023-03-08 | End: 2023-03-15 | Stop reason: SDUPTHER

## 2023-03-08 RX ORDER — POTASSIUM CHLORIDE 750 MG/1
10 CAPSULE, EXTENDED RELEASE ORAL DAILY
Qty: 90 CAPSULE | Refills: 3 | Status: SHIPPED | OUTPATIENT
Start: 2023-03-08

## 2023-03-08 NOTE — PATIENT INSTRUCTIONS
Reduce furosemide to 20 mg daily  OK to cut your 40 mg pills in half to use up, but the new script is 20 mg tablets  Continue to monitor your daily weights and report a gain of 3 pounds in 1 day or 5 pounds in 1 week  BMP lab test in 2 weeks, no fasting needed  Continue daily magnesium - I recommend 250 -500 mg daily  24 hour Holter monitor to assess PVC burden  Based on results we will let you know if adjustments to your medication are needed  Echocardiogram in 6 months to monitor your aortic valve stenosis  Continue to monitor blood pressure once daily and report readings > 140/90   Contact us with any concerns    I do recommend a neurology eval

## 2023-03-08 NOTE — PROGRESS NOTES
Cardiology Follow Up    Loretta Bloch  1955  [id number]  [facility] [place] [address] [facility] [address]  [phone]    Flaco Gomez presents for follow up of AS, PVC's, CHF  1  Moderate aortic stenosis  Assessment & Plan:  Patient has a know history of aortic stenosis reported to be moderate on echo 11/2021 through [facility]  Echocardiogram 1/30/2023 continues to show moderate aortic stenosis with PV 3 52, mean gradient 30 mmHg, DI 0 30  Will obtain updated echo in 6-12 months  Orders:  -     Echo follow up/limited w/ contrast if indicated; Future; Expected date: 09/08/2023    2  PVCs (premature ventricular contractions)  Assessment & Plan:  ECG and telemetry monitoring during hospitalization revealed PVC's in the pattern of bigeminy  History of PVC's per patient report  Echocardiogram 1/30/2023 with EF 60%  Magnesium supplementation added with improvement in PVC's  Will obtain 24 hour Holter monitoring to assess PVC burden  Continue magnesium  BMP to monitor K+ with goal > 4  Orders:  -     Holter monitor; Future; Expected date: 03/08/2023    3  Heart failure with preserved ejection fraction  Assessment & Plan:  Echocardiogram 1/30/2023 with EF 57% and mild diastolic dysfunction  Currently appears euvolemic on furosemide 20 mg daily  Reviewed 2 g sodium diet, daily weights, and s/s to report  Will monitor  Orders:  -     Ambulatory Referral to Cardiology  -     Echo follow up/limited w/ contrast if indicated; Future; Expected date: 09/08/2023  -     Basic metabolic panel; Future; Expected date: 03/22/2023  -     furosemide (LASIX) 20 mg tablet; Take 1 tablet (20 mg total) by mouth daily  -     potassium chloride (MICRO-K) 10 MEQ CR capsule; Take 1 capsule (10 mEq total) by mouth daily    4   Benign essential HTN  Assessment & Plan:  Blood pressure remains at goal   Continue amlodipine 10 mg daily, lisinopril 10 mg daily  Also on furosemide 20 mg daily  Updated BMP ordered  Reviewed 2 g sodium diet, regular exercise, weight control  Orders:  -     lisinopril (ZESTRIL) 10 mg tablet; Take 1 tablet (10 mg total) by mouth daily     LEXI Haywood has a past medical history of aortic stenosis, chronic diastolic heart failure, HTN, hypothyroidism, and PVC's  He was admitted to [address] [place] [address] 1/30-2/6/2023 for sepsis  Cardiology was consulted for evaluation of CHF an MAKEDA for work up of bacteremia  HCTZ was discontinued due to hyponatremia  Furosemide was held due to dehydration  Echo 1/30/2023 showed EF 60% with moderate AS  MAKEDA showed no source of bacteremia  Ultimately source of bacteremia was from a toe wound  He was re-admitted to [address] [place] [address] 2/10-2/16/2023 for hyperkalemia and ANNALEE  His potassium had been continued despite discontinuation of HCTZ and furosemide  He was also on IV antibiotics via a PICC line  He underwent adjustment of antibiotic dosing  He was initially treated with IV fluids, but subsequently developed congestive changes on chest imaging and requiring IV furosemide  Nephrology was managing treatment during admission  3/8/2023Gzeejen Hammer presents for close follow up accompanied by his wife  He reports significant improvement since hospital discharge  Brings along a list of home BP readings  His BP was elevated in 160-170's and his PCP started lisinopril 10 mg daily on 2/22/2023  He has been taking this faithfully and tolerating well  BP since starting are 627-151 systolic primarily  He denies chest pain, shortness of breath, lightheadedness, palpitations, or edema/swelling  He is monitoring his weights daily  He completed lab work through [place] [address] yesterday showing stable renal function and electrolytes  His PCP stopped his furosemide this week  He states he did not take today but otherwise has been taking 40 mg daily  He does continue with intermittent balance issues, feeling like his equilibrium is off   His wife is concerned for Parkinson's disease as he has a tremor and muscle weakness/neuropathy  He drops items frequently  No falls  He does not follow with a neurologist      Medical Problems     Problem List     Other emphysema ([facility] )    Acute hyponatremia    Benign essential HTN    Moderate aortic stenosis    MDD (major depressive disorder)    Peripheral neuropathy    Ulcer of left foot (HCC)    ANNALEE (acute kidney injury) ([facility] )    Hyperkalemia    MSSA bacteremia    Heart failure with preserved ejection fraction        Past Medical History:   Diagnosis Date   • Anxiety    • Aortic stenosis    • Depression    • Hypertension    • Hypothyroidism    • PVCs (premature ventricular contractions)      Social History     Socioeconomic History   • Marital status: /Civil Union     Spouse name: Not on file   • Number of children: Not on file   • Years of education: Not on file   • Highest education level: Not on file   Occupational History   • Not on file   Tobacco Use   • Smoking status: Former     Packs/day: 1 00     Years: 50 00     Pack years: 50 00     Types: Cigarettes     Quit date:      Years since quittin 1   • Smokeless tobacco: Never   Vaping Use   • Vaping Use: Never used   Substance and Sexual Activity   • Alcohol use: Not Currently   • Drug use: Not Currently     Comment: sporadic in young adulthood   • Sexual activity: Not on file     Comment: defer   Other Topics Concern   • Not on file   Social History Narrative   • Not on file     Social Determinants of Health     Financial Resource Strain: Not on file   Food Insecurity: No Food Insecurity   • Worried About Running Out of Food in the Last Year: Never true   • Ran Out of Food in the Last Year: Never true   Transportation Needs: No Transportation Needs   • Lack of Transportation (Medical): No   • Lack of Transportation (Non-Medical):  No   Physical Activity: Not on file   Stress: Not on file   Social Connections: Not on file   Intimate Partner Violence: Not on file Housing Stability: Low Risk    • Unable to Pay for Housing in the Last Year: No   • Number of Places Lived in the Last Year: 1   • Unstable Housing in the Last Year: No      Family History   Problem Relation Age of Onset   • Dementia Mother    • Blindness Mother    • Cancer Mother    • Dementia Father      Past Surgical History:   Procedure Laterality Date   • APPENDECTOMY     • BACK SURGERY     • TOTAL SHOULDER REPLACEMENT         Current Outpatient Medications:   •  amLODIPine (NORVASC) 10 mg tablet, Take 10 mg by mouth daily, Disp: , Rfl: 0  •  buPROPion (WELLBUTRIN XL) 300 mg 24 hr tablet, , Disp: , Rfl:   •  escitalopram (LEXAPRO) 20 mg tablet, Take 20 mg by mouth daily, Disp: , Rfl: 1  •  furosemide (LASIX) 20 mg tablet, Take 1 tablet (20 mg total) by mouth daily, Disp: 90 tablet, Rfl: 3  •  levothyroxine 75 mcg tablet, , Disp: , Rfl:   •  lisinopril (ZESTRIL) 10 mg tablet, Take 1 tablet (10 mg total) by mouth daily, Disp: 90 tablet, Rfl: 3  •  Multiple Vitamins-Minerals (OCUVITE ADULT FORMULA PO), Take 1 tablet by mouth, Disp: , Rfl:   •  Omega-3 1000 MG CAPS, Take 2 capsules by mouth daily, Disp: , Rfl:   •  potassium chloride (MICRO-K) 10 MEQ CR capsule, Take 1 capsule (10 mEq total) by mouth daily, Disp: 90 capsule, Rfl: 3  •  ammonium lactate (LAC-HYDRIN) 12 % cream, Apply topically as needed for dry skin, Disp: 385 g, Rfl: 0  •  meloxicam (MOBIC) 15 mg tablet, Take 15 mg by mouth Once daily as needed (Patient not taking: Reported on 3/8/2023), Disp: , Rfl:   Allergies   Allergen Reactions   • Statins Myalgia     Other reaction(s): muscle aches         Labs:     Chemistry        Component Value Date/Time    K 4 2 02/16/2023 0509     (H) 02/16/2023 0509    CO2 28 02/16/2023 0509    BUN 47 (H) 02/16/2023 0509    CREATININE 1 23 02/16/2023 0509        Component Value Date/Time    CALCIUM 9 0 02/16/2023 0509    ALKPHOS 47 02/10/2023 1143    AST 11 (L) 02/10/2023 1143    ALT <3 (L) 02/10/2023 1146 Cardiac testing:  ECG 2/10/2023: Normal sinus rhythm  MAKEDA 2/3/2023:  EF 60%  LA mildly dilated  Mod AS  Trace MR  Trace TR  Echocardiogram 1/30/2023:  EF 60%, mild LVH  Grade 1 DD  Mod AS, PV 3 52 m/s, MG 30 mmHg, DI 0 30  Mild AI  MAC with trace MR  Trace TR  PASP 41 mmHg  Aortic root 3 8 cm  Ascending aorta 3 3  cm  ECG 1/30/2023: Sinus rhythm with frequent PVC's  Nonspecific ST abnormality  Review of Systems   Constitutional: Negative  HENT: Negative  Cardiovascular: Negative for chest pain, dyspnea on exertion, irregular heartbeat, leg swelling, near-syncope, orthopnea and palpitations  Respiratory: Negative for cough, shortness of breath, snoring and wheezing  Endocrine: Negative  Skin: Negative  Musculoskeletal: Negative  Gastrointestinal: Negative  Genitourinary: Negative  Neurological: Positive for loss of balance and numbness  Psychiatric/Behavioral: Negative  Vitals:    03/08/23 1103   BP: 120/78   Pulse: 74   Temp: 97 8 °F (36 6 °C)   SpO2: 97%     Vitals:    03/08/23 1103   Weight: 97 3 kg (214 lb 9 6 oz)     Height: 6' (182 9 cm)   Body mass index is 29 1 kg/m²  Physical Exam  Vitals and nursing note reviewed  Constitutional:       General: He is not in acute distress  Appearance: He is well-developed  He is not diaphoretic  HENT:      Head: Normocephalic and atraumatic  Neck:      Vascular: No carotid bruit or JVD  Cardiovascular:      Rate and Rhythm: Normal rate and regular rhythm  No extrasystoles are present  Pulses: Intact distal pulses  Heart sounds: S1 normal and S2 normal  Murmur heard  Harsh midsystolic murmur is present at the upper right sternal border radiating to the neck  No friction rub  No gallop  Comments: No edema  Pulmonary:      Effort: Pulmonary effort is normal  No respiratory distress  Breath sounds: Normal breath sounds  Abdominal:      General: There is no distension  Palpations: Abdomen is soft  Tenderness: There is no abdominal tenderness  Skin:     General: Skin is warm and dry  Findings: No rash  Neurological:      Mental Status: He is alert and oriented to person, place, and time  Psychiatric:         Mood and Affect: Mood and affect normal          Speech: Speech normal          Behavior: Behavior normal  Behavior is cooperative           Cognition and Memory: Cognition and memory normal

## 2023-03-09 ENCOUNTER — TELEPHONE (OUTPATIENT)
Dept: CARDIOLOGY CLINIC | Facility: CLINIC | Age: 68
End: 2023-03-09

## 2023-03-09 NOTE — TELEPHONE ENCOUNTER
Heidi Newby from [facility] called asking for OV note from yesterday   Fax is [phone]

## 2023-03-10 ENCOUNTER — OFFICE VISIT (OUTPATIENT)
Dept: PODIATRY | Age: 68
End: 2023-03-10

## 2023-03-10 VITALS — BODY MASS INDEX: 28.99 KG/M2 | WEIGHT: 214 LBS | HEIGHT: 72 IN

## 2023-03-10 DIAGNOSIS — L84 CALLUS: ICD-10-CM

## 2023-03-10 DIAGNOSIS — I73.9 PERIPHERAL VASCULAR DISEASE (HCC): Primary | ICD-10-CM

## 2023-03-10 DIAGNOSIS — G62.9 PERIPHERAL POLYNEUROPATHY: ICD-10-CM

## 2023-03-10 DIAGNOSIS — L97.521 ULCER OF LEFT FOOT, LIMITED TO BREAKDOWN OF SKIN (HCC): ICD-10-CM

## 2023-03-10 RX ORDER — AMMONIUM LACTATE 12 G/100G
CREAM TOPICAL AS NEEDED
Qty: 385 G | Refills: 0 | Status: SHIPPED | OUTPATIENT
Start: 2023-03-10

## 2023-03-10 NOTE — PROGRESS NOTES
Assessment/Plan:     Diagnoses and all orders for this visit:    Peripheral vascular disease ([facility] [address] )    Peripheral polyneuropathy    Callus  -     ammonium lactate (LAC-HYDRIN) 12 % cream; Apply topically as needed for dry skin    Ulcer of left foot, limited to breakdown of skin ([facility] [address] )           IMPRESSION:  1  Left plantar hallux IPJ ulceration - healed w/ fragile epithelium on 2/24/23 and 3/10/23 exam  - MRI left foot 2/3/23: "Small focus of bone marrow edema at the proximal aspect of the great toe distal phalanx without confluent decreased T1 marrow signal, nonspecific as described above   No confluent decreased T1 marrow signal to definitively suggest osteomyelitis"  2  H/o MSSA bacteremia s/p 4-6wks IV ancef (#1 likely portal of entry) (finished 3/5/23)  3  PN  4  PVD (Q8 findings) w/ right foot HPK         PLAN:  · Left plantar hallux ulcer continues to be healed after I pared callus to somewhat fragile epithelium with a #15 blade  MRI as above w/o definitive OM  Finished Abx per ID   · I reviewed and independently interpreted XR left foot WB 3v 2/24/23: there is elevation of 1st ray with PF of hallux IPJ  · I again briefly discussed bowen arthroplasty vs amputation if ulceration were to reopen  · C/w aquaphor to keep skin soft, cushion to prevent friction, dannanberg arch supports (1st ray cutout) to reduce pressure to plantar hallux  · I rx'd amlactin   · I pared right foot callus to normal epithelium with a #15 blade  · F/u 3 weeks for recheck       Subjective:      Patient ID: Asiya Blanc is a 69 y o  male  Guzman Anita presents to clinic today concerning left 1st toe ulceration - thinks it is still healed  Had final ID appt yesterday and was dc'd will labs normal  PICC was pullsed 3/5/23 and he still continues to feel well   Notes right foot callus he would like looked at as well      The following portions of the patient's history were reviewed and updated as appropriate: allergies, current medications, past family history, past medical history, past social history, past surgical history and problem list     Review of Systems   Constitutional: Negative for activity change, chills and fever  HENT: Negative  Respiratory: Negative for cough, chest tightness and shortness of breath  Cardiovascular: Negative for chest pain and leg swelling  Endocrine: Negative  Genitourinary: Negative  Skin:        Left plantar hallux lesion   Neurological:        PN   Psychiatric/Behavioral: Negative  Negative for agitation and behavioral problems  Objective:      Ht 6' (1 829 m)   Wt 97 1 kg (214 lb)   BMI 29 02 kg/m²          Physical Exam  Constitutional:       General: He is not in acute distress  Appearance: Normal appearance  He is not ill-appearing  Cardiovascular:      Comments: Bilateral DP and PT pulses are palpable but slightly diminished 1/4  Pedal hair is diminished  Legs to toes warm to cool  Varicosities with mild LE edema noted  Pulmonary:      Effort: No respiratory distress  Musculoskeletal:         General: No tenderness or deformity  Normal range of motion  Skin:     Capillary Refill: Capillary refill takes less than 2 seconds  Comments: B/L LE skin is atrophic - thin, dry and shiny in appearance  Left plantar hallux hyperkeratotic tissue without current open ulcer - previous ulcer healed however epithelium is fragile  Toenails x10 are elongated, dystrophic, discolored with nail thickening and subungual debris  Right submet 3 head HPK  Neurological:      General: No focal deficit present  Mental Status: He is alert and oriented to person, place, and time  Comments: Gross sensation to feet intact  Patient endorses or denies numbness, tingling and burning to B/L feet      Psychiatric:         Mood and Affect: Mood normal          Behavior: Behavior normal

## 2023-03-12 PROBLEM — I49.3 PVCS (PREMATURE VENTRICULAR CONTRACTIONS): Status: ACTIVE | Noted: 2023-03-12

## 2023-03-12 NOTE — ASSESSMENT & PLAN NOTE
ECG and telemetry monitoring during hospitalization revealed PVC's in the pattern of bigeminy  History of PVC's per patient report  Echocardiogram 1/30/2023 with EF 60%  Magnesium supplementation added with improvement in PVC's  Will obtain 24 hour Holter monitoring to assess PVC burden  Continue magnesium  BMP to monitor K+ with goal > 4

## 2023-03-12 NOTE — ASSESSMENT & PLAN NOTE
Blood pressure remains at goal   Continue amlodipine 10 mg daily, lisinopril 10 mg daily  Also on furosemide 20 mg daily  Updated BMP ordered  Reviewed 2 g sodium diet, regular exercise, weight control

## 2023-03-12 NOTE — ASSESSMENT & PLAN NOTE
Echocardiogram 1/30/2023 with EF 70% and mild diastolic dysfunction  Currently appears euvolemic on furosemide 20 mg daily  Reviewed 2 g sodium diet, daily weights, and s/s to report  Will monitor

## 2023-03-12 NOTE — ASSESSMENT & PLAN NOTE
Patient has a know history of aortic stenosis reported to be moderate on echo 11/2021 through [facility]  Echocardiogram 1/30/2023 continues to show moderate aortic stenosis with PV 3 52, mean gradient 30 mmHg, DI 0 30  Will obtain updated echo in 6-12 months

## 2023-03-15 ENCOUNTER — HOSPITAL ENCOUNTER (OUTPATIENT)
Dept: NON INVASIVE DIAGNOSTICS | Facility: HOSPITAL | Age: 68
Discharge: HOME | End: 2023-03-15

## 2023-03-15 ENCOUNTER — TELEPHONE (OUTPATIENT)
Dept: CARDIOLOGY CLINIC | Facility: CLINIC | Age: 68
End: 2023-03-15

## 2023-03-15 DIAGNOSIS — I49.3 PVCS (PREMATURE VENTRICULAR CONTRACTIONS): ICD-10-CM

## 2023-03-15 DIAGNOSIS — R06.02 SHORTNESS OF BREATH: ICD-10-CM

## 2023-03-15 RX ORDER — FUROSEMIDE 40 MG/1
40 TABLET ORAL DAILY
Qty: 30 TABLET | Refills: 11 | Status: SHIPPED | OUTPATIENT
Start: 2023-03-15

## 2023-03-15 NOTE — TELEPHONE ENCOUNTER
Pt called stating that he gained 3 lb since yesterday and 8 lbs since last seen here  TT Lola and she said that he is to go back up to 40 mg lasix and call us at the end of the week       He is aware

## 2023-03-21 ENCOUNTER — TELEPHONE (OUTPATIENT)
Dept: CARDIOLOGY CLINIC | Facility: CLINIC | Age: 68
End: 2023-03-21

## 2023-03-21 NOTE — TELEPHONE ENCOUNTER
----- Message from Roman Scherer sent at 3/21/2023 12:44 PM EDT -----  Please notify patient that his Holter showed 3 7% PVC burden  This is not high enough for us to adjust medication at this time  Please continue with magnesium supplement and we will discuss further at next visit  Thank you!

## 2023-03-25 ENCOUNTER — NURSE TRIAGE (OUTPATIENT)
Dept: OTHER | Facility: OTHER | Age: 68
End: 2023-03-25

## 2023-03-25 NOTE — TELEPHONE ENCOUNTER
Reason for Disposition  • [1] Caller has URGENT question AND [2] triager unable to answer question    Answer Assessment - Initial Assessment Questions  1  MAIN CONCERN OR SYMPTOM:  "What is your main concern right now?" "What questions do you have?" "What's the main symptom you're worried about?" (e g , breathing difficulty, ankle swelling, weight gain )      Weight gain of 5lbs in past week    2  ONSET: "When did the this start?"      Over past week    3  BREATHING DIFFICULTY: "Are you having any difficulty breathing?" If Yes, ask: "How bad is it?"  (e g , none, mild, moderate, severe)    - MILD: No SOB at rest, mild SOB with walking, speaks normally in sentences, able to lie down, no retractions, pulse < 100     - MODERATE: SOB at rest, SOB with minimal exertion and prefers to sit, cannot lie down flat, speaks in phrases, mild retractions, audible wheezing, pulse 100-120     - SEVERE: Very SOB at rest, speaks in single words, struggling to breathe, sitting hunched forward, retractions, pulse > 120       Denies     4  BETTER-SAME-WORSE: "Are you getting better, staying the same, or getting worse compared to the day you were discharged?"      Worse     5  HOSPITALIZATION: "How long were you hospitalized?" (e g , days)        6  DISCHARGE DATE: "What date were you discharged from the hospital?"      Patient was admitted from 1/30 - 2/6, and then again from 2/10 - 2/16 for CHF    7  DISCHARGE DOCTOR: "Who is the main doctor taking care of you now?"      [facility]  cardiology    8  DISCHARGE APPOINTMENT: "Have you scheduled a follow-up discharge appointment with your doctor?"      LOV 3/8/2023    9  DISCHARGE MEDICATIONS: "Did the physician who discharged you order any new medications for you to use?" If Yes, ask: "Have you filled the prescription and started taking the medication?"       Patient is taking Lasix 40 mg daily    10   WEIGHT - DISCHARGE:  "Do you know your weight when you were discharged from the hospital?"        206     11  WEIGHT - TARGET:  "Do you have a target weight?"         Unsure     12  WEIGHT - CURRENT:  "What is your current weight?"         214 8    13  OTHER SYMPTOMS: "Do you have any other symptoms?" (e g , depression, weakness)        Fatigue    Patient denies shortness of breath, lower extremity edema, CP  Discussed protocol disposition with wife who reports that patient will not go back to the emergency room and would like to know if he can increase his Lasix  Contacted on-call provider who recommends patient increase his Lasix from 40 mg daily to 40 mg twice daily over the weekend and to call the office on Monday  Contacted patient's wife and informed her of above information  ER/callback precautions reviewed  She verbalized understanding and was appreciative      Protocols used: HEART FAILURE POST-HOSPITALIZATION FOLLOW-UP CALL-ADULT-

## 2023-03-25 NOTE — TELEPHONE ENCOUNTER
Regarding: weight gain - Lasix  ----- Message from Tim Acosta sent at 3/25/2023  3:10 PM EDT -----  " My  has gained a total of over 5Lbs in a week   Does he need to up his lasix?"

## 2023-03-27 ENCOUNTER — TELEPHONE (OUTPATIENT)
Dept: CARDIOLOGY CLINIC | Facility: CLINIC | Age: 68
End: 2023-03-27

## 2023-03-27 NOTE — TELEPHONE ENCOUNTER
Pt states that he has been taking it twice daily  States that since yesterday he still went up a lb  limitations in strength

## 2023-03-27 NOTE — TELEPHONE ENCOUNTER
"I called and talked with Meagan Hubbard  He states he has been eating ring bologna  He reports weight gain and swelling  No shortness of breath  He also states his BP is \"all over the place\" on his home cuff  I would like to bring him in for BP check to confirm accuracy of his home cuff as well as a weight check on our scale  He will stay on furosemide 40 mg BID for now  Can you call him to schedule a nurse visit? Thank you!   "

## 2023-03-27 NOTE — TELEPHONE ENCOUNTER
Patient was instructed to increase furosemide to twice daily over the weekend  Can you call him and his wife for an update this morning to check on his weight and symptoms? Thank you!

## 2023-03-29 ENCOUNTER — TELEPHONE (OUTPATIENT)
Dept: CARDIOLOGY CLINIC | Facility: CLINIC | Age: 68
End: 2023-03-29

## 2023-03-29 ENCOUNTER — OFFICE VISIT (OUTPATIENT)
Dept: CARDIOLOGY CLINIC | Facility: CLINIC | Age: 68
End: 2023-03-29

## 2023-03-29 ENCOUNTER — APPOINTMENT (OUTPATIENT)
Dept: LAB | Facility: HOSPITAL | Age: 68
End: 2023-03-29

## 2023-03-29 VITALS
TEMPERATURE: 98.2 F | BODY MASS INDEX: 29.53 KG/M2 | SYSTOLIC BLOOD PRESSURE: 124 MMHG | HEART RATE: 91 BPM | DIASTOLIC BLOOD PRESSURE: 76 MMHG | HEIGHT: 72 IN | WEIGHT: 218 LBS | OXYGEN SATURATION: 96 %

## 2023-03-29 DIAGNOSIS — R06.02 SHORTNESS OF BREATH: Primary | ICD-10-CM

## 2023-03-29 DIAGNOSIS — R06.02 SHORTNESS OF BREATH: ICD-10-CM

## 2023-03-29 LAB
ANION GAP SERPL CALCULATED.3IONS-SCNC: 8 MMOL/L (ref 4–13)
BUN SERPL-MCNC: 24 MG/DL (ref 5–25)
CALCIUM SERPL-MCNC: 10.1 MG/DL (ref 8.4–10.2)
CHLORIDE SERPL-SCNC: 104 MMOL/L (ref 96–108)
CO2 SERPL-SCNC: 26 MMOL/L (ref 21–32)
CREAT SERPL-MCNC: 1.14 MG/DL (ref 0.6–1.3)
GFR SERPL CREATININE-BSD FRML MDRD: 65 ML/MIN/1.73SQ M
GLUCOSE SERPL-MCNC: 85 MG/DL (ref 65–140)
NT-PROBNP SERPL-MCNC: 324 PG/ML
POTASSIUM SERPL-SCNC: 4.6 MMOL/L (ref 3.5–5.3)
SODIUM SERPL-SCNC: 138 MMOL/L (ref 135–147)

## 2023-03-29 RX ORDER — FUROSEMIDE 40 MG/1
60 TABLET ORAL DAILY
Qty: 45 TABLET | Refills: 11 | Status: SHIPPED | OUTPATIENT
Start: 2023-03-29

## 2023-03-29 NOTE — TELEPHONE ENCOUNTER
I left a message for Dana Betancourt to call back  My message:  His kidney function and electrolytes are stable  His weight was 214 pounds on 3/8 and 218 pounds today  I would like him to take the furosemide 40 mg twice daily for 2 more days for a total of 7 days, then reduce to 60 mg daily  I sent this dose to his pharmacy  Recheck BMP to monitor kidney function in 2 weeks  He should continue to limit the sodium in his diet and call with gain of 3 pounds in 1day or 5 pounds in 1 week  Thank you!

## 2023-03-29 NOTE — PROGRESS NOTES
Patient present today for BP check  Patient did bring his BP cuff from home and took BP in same are reading was 140/76 pulse 87  Patient only just got machine on 2/10/23

## 2023-03-30 NOTE — TELEPHONE ENCOUNTER
Please have him call Monday with an update on weights   As long as he is not fluid overloaded, I am ok with proceeding

## 2023-03-30 NOTE — TELEPHONE ENCOUNTER
Next week Friday  They are concerned that all of the prep and being cleared out will be an issue and cause him to be dehydrated

## 2023-03-31 ENCOUNTER — OFFICE VISIT (OUTPATIENT)
Dept: PODIATRY | Age: 68
End: 2023-03-31

## 2023-03-31 ENCOUNTER — HOSPITAL ENCOUNTER (OUTPATIENT)
Dept: RADIOLOGY | Facility: CLINIC | Age: 68
End: 2023-03-31

## 2023-03-31 VITALS
HEIGHT: 72 IN | SYSTOLIC BLOOD PRESSURE: 118 MMHG | WEIGHT: 218 LBS | DIASTOLIC BLOOD PRESSURE: 60 MMHG | BODY MASS INDEX: 29.53 KG/M2

## 2023-03-31 DIAGNOSIS — L97.521 ULCER OF LEFT FOOT, LIMITED TO BREAKDOWN OF SKIN (HCC): ICD-10-CM

## 2023-03-31 DIAGNOSIS — I73.9 PERIPHERAL VASCULAR DISEASE (HCC): Primary | ICD-10-CM

## 2023-03-31 DIAGNOSIS — I73.9 PERIPHERAL VASCULAR DISEASE (HCC): ICD-10-CM

## 2023-03-31 DIAGNOSIS — G62.9 PERIPHERAL POLYNEUROPATHY: ICD-10-CM

## 2023-03-31 NOTE — PROGRESS NOTES
"Assessment/Plan:     Diagnoses and all orders for this visit:    Peripheral vascular disease ([address] )  -     XR toe left great min 2 views; Future    Peripheral polyneuropathy    Ulcer of left foot, limited to breakdown of skin (HCC)           IMPRESSION:  1  Left plantar hallux IPJ ulceration - healed w/ fragile epithelium on 2/24/23 and 3/10/23 exam however reopened 3/31/23 exam  - MRI left foot 2/3/23: \"Small focus of bone marrow edema at the proximal aspect of the great toe distal phalanx without confluent decreased T1 marrow signal, nonspecific as described above   No confluent decreased T1 marrow signal to definitively suggest osteomyelitis\"  2  H/o MSSA bacteremia s/p 4-6wks IV ancef (#1 likely portal of entry) (finished 3/5/23)  3  PN  4  PVD (Q8 findings)          PLAN:  · Left plantar hallux ulcer has unfortunately reopened  MRI as above w/o definitive OM  Finished Abx per ID  No acute SOI today fortunately  · I reviewed and independently interpreted XR left 1st toe 3/31/23: I do not see KUWLINDER nor gross osseous erosion  · I again discussed bowen arthroplasty vs amputation  Patient will consider bowen arthroplasty as I fear the ulcer with keep reopening without this  · Applied dermagran dsd, chg Q2d  Do not shower  · I fear he is at risk for recurrent infection from toe ulcer  · F/u 1 week for recheck     Debridement   Wound 01/30/23 Toe (Comment  which one) Left; Outer;Posterior    Universal Protocol:  Consent: Verbal consent obtained    Risks and benefits: risks, benefits and alternatives were discussed  Consent given by: patient  Patient understanding: patient states understanding of the procedure being performed  Patient consent: the patient's understanding of the procedure matches consent given  Patient identity confirmed: verbally with patient      Performed by: physician  Debridement type: selective    Post-debridement measurements  Length (cm): 0 2  Width (cm): 0 2  Depth (cm): 0 1  Percent debrided: " 100%  Surface Area (cm^2): 0 04  Area debrided (cm^2): 0 04  Volume (cm^3): 0  Devitalized tissue debrided: biofilm and callus  Instrument(s) utilized: blade  Bleeding: none  Hemostasis obtained with: not applicable  Procedural pain (0-10): insensate  Post-procedural pain: insensate   Response to treatment: procedure was tolerated well          Subjective:      Patient ID: Khushboo Carreon is a 69 y o  male  Jamir Dance presents to clinic today concerning left 1st toe ulceration which was previously healed  Notes not drainage however continued callus  Has not thought of surgery yet  The following portions of the patient's history were reviewed and updated as appropriate: allergies, current medications, past family history, past medical history, past social history, past surgical history and problem list     Review of Systems   Constitutional: Negative for activity change, chills and fever  HENT: Negative  Respiratory: Negative for cough, chest tightness and shortness of breath  Cardiovascular: Negative for chest pain and leg swelling  Endocrine: Negative  Genitourinary: Negative  Skin:        Left plantar hallux lesion   Neurological:        PN   Psychiatric/Behavioral: Negative  Negative for agitation and behavioral problems  Objective:      /60   Ht 6' (1 829 m)   Wt 98 9 kg (218 lb)   BMI 29 57 kg/m²          Physical Exam  Constitutional:       General: He is not in acute distress  Appearance: Normal appearance  He is not ill-appearing  Cardiovascular:      Comments: Bilateral DP and PT pulses are palpable but slightly diminished 1/4  Pedal hair is diminished  Legs to toes warm to cool  Varicosities with mild LE edema noted  Pulmonary:      Effort: No respiratory distress  Musculoskeletal:         General: No tenderness or deformity  Normal range of motion  Skin:     Capillary Refill: Capillary refill takes less than 2 seconds        Comments: B/L LE skin is atrophic - thin, dry and shiny in appearance  Left plantar hallux hyperkeratotic tissue with recurrent ulceration after callus was pared  There is no deep probe to bone however skin in this area is fragile and thin  No erythema, edema, purulence  Toenails x10 are elongated, dystrophic, discolored with nail thickening and subungual debris  Right submet 3 head HPK  Neurological:      General: No focal deficit present  Mental Status: He is alert and oriented to person, place, and time  Comments: Gross sensation to feet intact  Patient endorses or denies numbness, tingling and burning to B/L feet      Psychiatric:         Mood and Affect: Mood normal          Behavior: Behavior normal

## 2023-04-03 ENCOUNTER — TELEPHONE (OUTPATIENT)
Dept: CARDIOLOGY CLINIC | Facility: CLINIC | Age: 68
End: 2023-04-03

## 2023-04-03 NOTE — TELEPHONE ENCOUNTER
Ok to send  Also, please find out from patient how his weights and swelling are with the increased furosemide  Thank you!

## 2023-04-03 NOTE — TELEPHONE ENCOUNTER
All information sent  Also, pt states that he is doing well  States that yesterday he did have palm Sunday food and that included ham so his weight was up this AM but he has been going down generally  States that the swelling in his legs is gone, but there is some in his stomach  Please schedule pt for f/u post JENNIFER with Radha in 2-3 weeks

## 2023-04-03 NOTE — TELEPHONE ENCOUNTER
Lb [place] from [facility]  stating that pt is getting a colonscopy on Friday and told the that he has CHF  Office is requesting last office note and echo results    P- [phone]  F  [phone]

## 2023-04-12 NOTE — PRE-PROCEDURE INSTRUCTIONS
Pre-Surgery Instructions:   Medication Instructions   • amLODIPine (NORVASC) 10 mg tablet Take day of surgery  • ammonium lactate (LAC-HYDRIN) 12 % cream Uses PRN- DO NOT take day of surgery   • buPROPion (WELLBUTRIN XL) 300 mg 24 hr tablet Take day of surgery  • escitalopram (LEXAPRO) 20 mg tablet Take day of surgery  • furosemide (LASIX) 40 mg tablet Hold day of surgery  • levothyroxine 75 mcg tablet Take day of surgery  • lisinopril (ZESTRIL) 10 mg tablet Hold day of surgery  • Magnesium 400 MG CAPS Stop taking 7 days prior to surgery  • Multiple Vitamins-Minerals (OCUVITE ADULT FORMULA PO) Stop taking 7 days prior to surgery  • potassium chloride (MICRO-K) 10 MEQ CR capsule Hold day of surgery  Medication instructions for day surgery reviewed  Please use only a sip of water to take your instructed medications  Avoid all over the counter vitamins, supplements and NSAIDS for one week prior to surgery per anesthesia guidelines  Tylenol is ok to take as needed  You will receive a call one business day prior to surgery with an arrival time and hospital directions  If your surgery is scheduled on a Monday, the hospital will be calling you on the Friday prior to your surgery  If you have not heard from anyone by 8pm, please call the hospital supervisor through the hospital  at [phone]  Joe Dheeraj [phone])  Do not eat or drink anything after midnight the night before your surgery, including candy, mints, lifesavers, or chewing gum  Do not drink alcohol 24hrs before your surgery  Try not to smoke at least 24hrs before your surgery  Follow the pre surgery showering instructions as listed in the [facility] Surgical Experience Booklet” or otherwise provided by your surgeon's office  Do not shave the surgical area 24 hours before surgery  Do not apply any lotions, creams, including makeup, cologne, deodorant, or perfumes after showering on the day of your surgery       No contact lenses, eye make-up, or artificial eyelashes  Remove nail polish, including gel polish, and any artificial, gel, or acrylic nails if possible  Remove all jewelry including rings and body piercing jewelry  Wear causal clothing that is easy to take on and off  Consider your type of surgery  Keep any valuables, jewelry, piercings at home  Please bring any specially ordered equipment (sling, braces) if indicated  Arrange for a responsible person to drive you to and from the hospital on the day of your surgery  Visitor Guidelines discussed  Call the surgeon's office with any new illnesses, exposures, or additional questions prior to surgery  Please reference your [facility] Surgical Experience Booklet” for additional information to prepare for your upcoming surgery

## 2023-04-13 ENCOUNTER — APPOINTMENT (OUTPATIENT)
Dept: LAB | Facility: HOSPITAL | Age: 68
End: 2023-04-13

## 2023-04-13 DIAGNOSIS — N17.9 AKI (ACUTE KIDNEY INJURY) (HCC): Primary | ICD-10-CM

## 2023-04-13 DIAGNOSIS — R06.02 SHORTNESS OF BREATH: ICD-10-CM

## 2023-04-13 DIAGNOSIS — M20.5X2 HALLUX LIMITUS OF LEFT FOOT: ICD-10-CM

## 2023-04-13 DIAGNOSIS — Z01.818 PREOP EXAMINATION: ICD-10-CM

## 2023-04-13 LAB
ALBUMIN SERPL BCP-MCNC: 4.3 G/DL (ref 3.5–5)
ALP SERPL-CCNC: 82 U/L (ref 34–104)
ALT SERPL W P-5'-P-CCNC: 8 U/L (ref 7–52)
ANION GAP SERPL CALCULATED.3IONS-SCNC: 7 MMOL/L (ref 4–13)
AST SERPL W P-5'-P-CCNC: 14 U/L (ref 13–39)
BILIRUB SERPL-MCNC: 0.42 MG/DL (ref 0.2–1)
BUN SERPL-MCNC: 30 MG/DL (ref 5–25)
CALCIUM SERPL-MCNC: 9.6 MG/DL (ref 8.4–10.2)
CHLORIDE SERPL-SCNC: 103 MMOL/L (ref 96–108)
CO2 SERPL-SCNC: 29 MMOL/L (ref 21–32)
CREAT SERPL-MCNC: 1.72 MG/DL (ref 0.6–1.3)
CRP SERPL QL: 4.5 MG/L
ERYTHROCYTE [DISTWIDTH] IN BLOOD BY AUTOMATED COUNT: 15.2 % (ref 11.6–15.1)
ERYTHROCYTE [SEDIMENTATION RATE] IN BLOOD: 23 MM/HOUR (ref 0–19)
GFR SERPL CREATININE-BSD FRML MDRD: 39 ML/MIN/1.73SQ M
GLUCOSE SERPL-MCNC: 108 MG/DL (ref 65–140)
HCT VFR BLD AUTO: 48.1 % (ref 36.5–49.3)
HGB BLD-MCNC: 15.6 G/DL (ref 12–17)
MCH RBC QN AUTO: 31.2 PG (ref 26.8–34.3)
MCHC RBC AUTO-ENTMCNC: 32.4 G/DL (ref 31.4–37.4)
MCV RBC AUTO: 96 FL (ref 82–98)
PLATELET # BLD AUTO: 294 THOUSANDS/UL (ref 149–390)
PMV BLD AUTO: 9.2 FL (ref 8.9–12.7)
POTASSIUM SERPL-SCNC: 4.4 MMOL/L (ref 3.5–5.3)
PROT SERPL-MCNC: 7.7 G/DL (ref 6.4–8.4)
RBC # BLD AUTO: 5 MILLION/UL (ref 3.88–5.62)
SODIUM SERPL-SCNC: 139 MMOL/L (ref 135–147)
WBC # BLD AUTO: 8.27 THOUSAND/UL (ref 4.31–10.16)

## 2023-04-18 ENCOUNTER — TELEPHONE (OUTPATIENT)
Dept: CARDIOLOGY CLINIC | Facility: CLINIC | Age: 68
End: 2023-04-18

## 2023-04-18 NOTE — TELEPHONE ENCOUNTER
Angela from patients PCP office called stated that she spoke to Cardiology office on 4/14/23 asking if PCP office can use last Cardiology OV notes from 3/08/23 with Teri Wood to clear patient for surgery that is 4/19/23 surgery is to be done at [address] with Louisa Habermann  PCP office did not hear back from Cardiology  PCP office can be reached at [phone] option 2 for nursing

## 2023-04-18 NOTE — TELEPHONE ENCOUNTER
He is acceptable risk based on my last office visit  I printed a letter that you can give  They need to monitor him for fluid overload and avoid hypotension given his moderate aortic stenosis    Thank you

## 2023-04-18 NOTE — LETTER
April 18, 2023    [address]  [address] [place] [id number]      [facility] [birth year] has moderate aortic stenosis and diastolic heart failure  He is mildly elevated but acceptable cardiac risk for surgery  Please monitor volume status closely and avoid hypotension  If you have any questions or concerns, please don't hesitate to call      Sincerely,             Jose Alberto Webb

## 2023-04-19 ENCOUNTER — HOSPITAL ENCOUNTER (OUTPATIENT)
Facility: HOSPITAL | Age: 68
Setting detail: OUTPATIENT SURGERY
Discharge: HOME | End: 2023-04-19
Attending: STUDENT IN AN ORGANIZED HEALTH CARE EDUCATION/TRAINING PROGRAM | Admitting: STUDENT IN AN ORGANIZED HEALTH CARE EDUCATION/TRAINING PROGRAM

## 2023-04-19 ENCOUNTER — APPOINTMENT (OUTPATIENT)
Dept: RADIOLOGY | Facility: HOSPITAL | Age: 68
End: 2023-04-19

## 2023-04-19 VITALS
RESPIRATION RATE: 20 BRPM | BODY MASS INDEX: 29.12 KG/M2 | HEIGHT: 72 IN | WEIGHT: 215 LBS | SYSTOLIC BLOOD PRESSURE: 131 MMHG | TEMPERATURE: 97.6 F | DIASTOLIC BLOOD PRESSURE: 65 MMHG | OXYGEN SATURATION: 95 % | HEART RATE: 54 BPM

## 2023-04-19 DIAGNOSIS — Z98.890 POSTOPERATIVE STATE: Primary | ICD-10-CM

## 2023-04-19 LAB
ANION GAP SERPL CALCULATED.3IONS-SCNC: 8 MMOL/L (ref 4–13)
BUN SERPL-MCNC: 24 MG/DL (ref 5–25)
CALCIUM SERPL-MCNC: 10 MG/DL (ref 8.4–10.2)
CHLORIDE SERPL-SCNC: 105 MMOL/L (ref 96–108)
CO2 SERPL-SCNC: 23 MMOL/L (ref 21–32)
CREAT SERPL-MCNC: 1.01 MG/DL (ref 0.6–1.3)
GFR SERPL CREATININE-BSD FRML MDRD: 75 ML/MIN/1.73SQ M
GLUCOSE P FAST SERPL-MCNC: 97 MG/DL (ref 65–99)
GLUCOSE SERPL-MCNC: 97 MG/DL (ref 65–140)
POTASSIUM SERPL-SCNC: 4.2 MMOL/L (ref 3.5–5.3)
SODIUM SERPL-SCNC: 136 MMOL/L (ref 135–147)

## 2023-04-19 RX ORDER — DIPHENHYDRAMINE HYDROCHLORIDE 50 MG/ML
12.5 INJECTION INTRAMUSCULAR; INTRAVENOUS ONCE AS NEEDED
Status: DISCONTINUED | OUTPATIENT
Start: 2023-04-19 | End: 2023-04-19 | Stop reason: HOSPADM

## 2023-04-19 RX ORDER — OXYCODONE HYDROCHLORIDE AND ACETAMINOPHEN 5; 325 MG/1; MG/1
1 TABLET ORAL EVERY 4 HOURS PRN
Qty: 5 TABLET | Refills: 0 | Status: SHIPPED | OUTPATIENT
Start: 2023-04-19 | End: 2023-04-29

## 2023-04-19 RX ORDER — CEFAZOLIN SODIUM 2 G/50ML
2000 SOLUTION INTRAVENOUS ONCE
Status: COMPLETED | OUTPATIENT
Start: 2023-04-19 | End: 2023-04-19

## 2023-04-19 RX ORDER — FENTANYL CITRATE/PF 50 MCG/ML
25 SYRINGE (ML) INJECTION
Status: DISCONTINUED | OUTPATIENT
Start: 2023-04-19 | End: 2023-04-19 | Stop reason: HOSPADM

## 2023-04-19 RX ORDER — CHLORHEXIDINE GLUCONATE 4 G/100ML
SOLUTION TOPICAL DAILY PRN
Status: DISCONTINUED | OUTPATIENT
Start: 2023-04-19 | End: 2023-04-23 | Stop reason: HOSPADM

## 2023-04-19 RX ORDER — LIDOCAINE HYDROCHLORIDE 10 MG/ML
INJECTION, SOLUTION EPIDURAL; INFILTRATION; INTRACAUDAL; PERINEURAL AS NEEDED
Status: DISCONTINUED | OUTPATIENT
Start: 2023-04-19 | End: 2023-04-19 | Stop reason: HOSPADM

## 2023-04-19 RX ORDER — CHLORHEXIDINE GLUCONATE 0.12 MG/ML
15 RINSE ORAL ONCE
Status: COMPLETED | OUTPATIENT
Start: 2023-04-19 | End: 2023-04-19

## 2023-04-19 RX ORDER — ONDANSETRON 2 MG/ML
4 INJECTION INTRAMUSCULAR; INTRAVENOUS ONCE AS NEEDED
Status: DISCONTINUED | OUTPATIENT
Start: 2023-04-19 | End: 2023-04-19 | Stop reason: HOSPADM

## 2023-04-19 RX ORDER — BUPIVACAINE HYDROCHLORIDE 2.5 MG/ML
INJECTION, SOLUTION EPIDURAL; INFILTRATION; INTRACAUDAL AS NEEDED
Status: DISCONTINUED | OUTPATIENT
Start: 2023-04-19 | End: 2023-04-19 | Stop reason: HOSPADM

## 2023-04-19 RX ORDER — MAGNESIUM HYDROXIDE 1200 MG/15ML
LIQUID ORAL AS NEEDED
Status: DISCONTINUED | OUTPATIENT
Start: 2023-04-19 | End: 2023-04-19 | Stop reason: HOSPADM

## 2023-04-19 RX ADMIN — CHLORHEXIDINE GLUCONATE 0.12% ORAL RINSE 15 ML: 1.2 LIQUID ORAL at 12:36

## 2023-04-19 NOTE — OP NOTE
OPERATIVE REPORT  PATIENT NAME: Jami Valdivia    :  1955  MRN: [id number]  Pt Location: [facility] OR ROOM 02    SURGERY DATE: 2023    Surgeon(s) and Role:     Kellee Price DPM - Primary    Preop Diagnosis:  Ulcer of left foot, limited to breakdown of skin ([address] ) [L97 521]  Hallux limitus of left foot [M20 5X2]    Post-Op Diagnosis Codes:     * Ulcer of left foot, limited to breakdown of skin ([address] ) [L97 521]     * Hallux limitus of left foot [M20 5X2]    Procedure(s):  Left - BUNIONECTOMY PERDOMO and wound debridement    Specimen(s):  * No specimens in log *    Estimated Blood Loss:   Minimal    Drains:  * No LDAs found *    Anesthesia Type:   Choice    Operative Indications:  Ulcer of left foot, limited to breakdown of skin (HCC) [L97 521]  Hallux limitus of left foot [M20 5X2]    Operative Findings:  Consistent with diagnosis  Hallux limitus s/p perdomo arthroplasty which allowed for more dorsiflexory ROM of great toe thus placing less weight to plantar IPJ at site of ulcer  Complications:   None    Procedure and Technique:    Under mild sedation, the patient was brought into the operating room and remained on stertcher in a supine position  A pneumatic ankle tourniquet was then placed around the patient's left ankle with ample webril padding  A time out was performed to confirm the correct patient, procedure and site with all parties in agreement  Following IV sedation, an ankle block was performed consisting of 10 ml of 1% Lidocaine and 0 25% Bupivacaine in a 1:1 mixture  The foot was then scrubbed, prepped and draped in the usual aseptic manner  An esmarch bandage was utilized to exsangunate the patients foot and the pneumatic ankle tourniquet was then inflated  The esmarch bandage was removed and the foot was placed on the operating room table      Attention was now directed to the dorso medial aspect of the Irer2cx MTPJ where an approximately 5cm incision was made through skin with a #15 blade just medial to and parallel to the EHL tendon  It was now deepened by means of sharp and blunt dissection  All vital neurovascular structures were identified and retracted  Bleeders were cauterized as necessary  The incision was deepened to the capsule of the 1st MTPJoint and incised with a straight linear incision  The capsule was dissected off the 1st met head and base of the proximal phalanx  A through and through osteotomy through the base of the proximal phalnax was performed just distal to the anatomical flare  The base of the proximal phalnax was freed of its remaining soft tissue attachment and excised in toto from the surgical wound  The wound was then irrigated with copious amounts of sterile saline  The FHL tendon was inspected and found to be intact  Capsular with purse-stringing and subcutaneous closure was completed with 3-0 Vicryl suture  Skin closure was obtained with 3-0 Nylon  There was noted to be significantly improved ROM of the 1st MTPJ after surgery (from around 5 degrees to more than 50 degrees)  Selective debride of the ulceration to the distal hallux IPJ was then performed as follows  This was debrided with #15 blade nonviable, devitalized, hyperkeratotic tissue  Pre and Post debridement wound measurements 0 5x0 5x0 1cm, with appearance of wound fresh bleeding tissue, viable with viable 100% vs nonviable 0%, <20sq cm debrided  Adaptic applied to dorsal incision site and dermagran applied to ulceration  This was covered with 4x4's, christine and ACE  The tourniquet was released at this time  Total tourniquet time being around 30 minutes  A prompt hyperemic response was noted to all digits of the Left foot  The patient was returned to PACU in stable condition with N/V status at perioperative baseline  I was present for the entire procedure      Patient Disposition:  PACU         SIGNATURE: Omar Galdamez DPM  DATE: April 19, 2023  TIME: 2:13 PM

## 2023-04-19 NOTE — DISCHARGE SUMMARY
Discharge Summary Outpatient Procedure Podiatry -   Liana Ly 69 y o  male MRN: [id number]  Unit/Bed#: OR POOL Encounter: [id number]    Admission Date: 4/19/2023     Admitting Diagnosis: Ulcer of left foot, limited to breakdown of skin ([facility] [address] ) [L97 521]  Hallux limitus of left foot [M20 5X2]    Discharge Diagnosis: same    Procedures Performed: Melvia Carlos and wound debridement: 65591 (CPT®) LEFT FOOT    Complications: none    Condition at Discharge: stable    Discharge instructions/Information to patient and family:   See after visit summary for information provided to patient and family  Provisions for Follow-Up Care/Important appointments:  See after visit summary for information related to follow-up care and any pertinent home health orders  Discharge Medications:  See after visit summary for reconciled discharge medications provided to patient and family

## 2023-04-19 NOTE — DISCHARGE INSTR - AVS FIRST PAGE
Dr Cristy Taveras    1  Take your prescribed medication as directed  2  Upon arrival at home, lie down and elevate your surgical foot on 2 pillows  3  Remain quiet, off your feet as much as possible, for the first 24-48 hours  This is when your feet first swell and may become painful  After 48 hours you may begin limited walking following these restrictions:   Weightbear as tolerated to surgical foot in surgical shoe  4  Drink large quantities of water  Consume no alcohol  Continue a well-balanced diet  5  Report any unusual discomfort or fever to this office  6  A limited amount of discomfort and swelling is to be expected  In some cases the skin may take on a bruised appearance  The surgical solution that was applied to your foot prior to the operation is dark in color and the operation site may appear to be oozing when it actually is not  7  A slight amount of blood is to be expected, and is no cause for alarm  Do not remove the dressings  If there is active bleeding and if the bleeding persists, add additional gauze to the bandage, apply direct pressure, elevate your feet and call this office  8  Do not get the dressings wet  As regular bathing may be inconvenient, sponge baths are recommended  9  When anesthesia wears off and if any discomfort should be present, apply an ice pack directly over the operated area for 15 minute intervals for several hours or until the pain leaves  (USE IN EXCESS OF 15 MINUTES COULD CAUSE FROSTBITE)  Do not use hot water bags or electric pads  A convenient icepack can be made by placing ice cubes in a plastic bag and covering this with a towel  10  If necessary, take a mild laxative before retiring  11  Wear your special open shoes anytime you put weight on your foot, even if it is just to walk to the bathroom and back  It will probably be 2 or 3 weeks before you will be permitted to try regular shoes    12  Having performed the operation, we are interested in a prompt recovery  Please cooperate by following the above instructions  13  Please call to confirm your post-op appointment or call with any other questions

## 2023-04-19 NOTE — QUICK NOTE
Patient seen and examined  Medical history reviewed  No acute changes in medical history  See anesthesia preprocedure note for full details

## 2023-04-24 ENCOUNTER — OFFICE VISIT (OUTPATIENT)
Dept: PODIATRY | Age: 68
End: 2023-04-24

## 2023-04-24 VITALS
DIASTOLIC BLOOD PRESSURE: 78 MMHG | BODY MASS INDEX: 29.16 KG/M2 | HEIGHT: 73 IN | WEIGHT: 220 LBS | SYSTOLIC BLOOD PRESSURE: 140 MMHG | TEMPERATURE: 98 F | HEART RATE: 78 BPM

## 2023-04-24 DIAGNOSIS — L97.521 ULCER OF LEFT FOOT, LIMITED TO BREAKDOWN OF SKIN (HCC): Primary | ICD-10-CM

## 2023-04-24 DIAGNOSIS — M20.5X2 HALLUX LIMITUS OF LEFT FOOT: ICD-10-CM

## 2023-04-24 DIAGNOSIS — Z98.890 STATUS POST FOOT SURGERY: ICD-10-CM

## 2023-04-24 RX ORDER — CEPHALEXIN 500 MG/1
500 CAPSULE ORAL EVERY 6 HOURS SCHEDULED
Qty: 20 CAPSULE | Refills: 0 | Status: SHIPPED | OUTPATIENT
Start: 2023-04-24 | End: 2023-04-29

## 2023-04-24 NOTE — PROGRESS NOTES
Kal Bell presents to clinic POD#5 s/p left bowen arthroplasty (DOS 4/19/23) due to distal-plantar IPJ ulceration in attempt for limb salvage  Condition: Dressing C/D/I  WBAT surgical shoe  Doing well, pain controlled  He has been walking on it and nor resting (against instructions)  W[place] [employer] [place] over the [place]  Dorsal left 1st MPTJ incision appears well coapted without dehiscence, all sutures intact  No purulence, crepitus, fluctuance  There is edema and slight erythema present (however locally to toe and not proximal)  Toe warm and well perfused  Plantar hallux IPJ ulceration appears with healing, now partial thickness  Instructions given to patient (betadine, adaptic, dsd QOD)  I again instructed patient to rest the foot as much as possible and elevate/ice - I stressed the importance of this  I rx'd cephalexin 5d  Images reviewed today: XR left foot 2v 4/19/23 with 1st toe proximal phalanx base excision     RTC: 7 days for suture removal and recheck  Report to ED if further SOI arise

## 2023-04-28 ENCOUNTER — TELEPHONE (OUTPATIENT)
Dept: PODIATRY | Facility: CLINIC | Age: 68
End: 2023-04-28

## 2023-04-28 NOTE — TELEPHONE ENCOUNTER
Caller: Raghav Sotelo (spouse)    Doctor/Office: Dr Merna Palacios    #: [phone]    Escalation: Care Calling on behalf of patient Shawn Lover [phone]  Would like a call back to discuss bandage care  Soaking through and needs to be done daily eventhough paperwork says every 2 days  Please call back and advise  Would like a call before EOD  Thank you

## 2023-04-28 NOTE — TELEPHONE ENCOUNTER
Called pt today per Dr Marie Miranda and told him that it is ok to change his dressing every day and that the clear blood tinged drainage is normal  Also the white skin is normal

## 2023-05-01 ENCOUNTER — HOSPITAL ENCOUNTER (OUTPATIENT)
Dept: RADIOLOGY | Facility: CLINIC | Age: 68
Discharge: HOME | End: 2023-05-01

## 2023-05-01 ENCOUNTER — OFFICE VISIT (OUTPATIENT)
Dept: PODIATRY | Age: 68
End: 2023-05-01

## 2023-05-01 VITALS
WEIGHT: 220 LBS | HEIGHT: 73 IN | SYSTOLIC BLOOD PRESSURE: 116 MMHG | BODY MASS INDEX: 29.16 KG/M2 | DIASTOLIC BLOOD PRESSURE: 72 MMHG

## 2023-05-01 DIAGNOSIS — G62.9 PERIPHERAL POLYNEUROPATHY: ICD-10-CM

## 2023-05-01 DIAGNOSIS — T81.31XA DEHISCENCE OF OPERATIVE WOUND, INITIAL ENCOUNTER: ICD-10-CM

## 2023-05-01 DIAGNOSIS — Z98.890 STATUS POST FOOT SURGERY: Primary | ICD-10-CM

## 2023-05-01 DIAGNOSIS — Z98.890 STATUS POST FOOT SURGERY: ICD-10-CM

## 2023-05-01 NOTE — PROGRESS NOTES
Yue Ramirez presents to clinic POD#12 s/p left bowen arthroplasty (DOS 4/19/23) due to distal-plantar IPJ ulceration in attempt for limb salvage  Patient notes more drainage from area  Condition: Dressing C/D/I  WBAT surgical shoe  No pain  He has still not laxmi resting as instructed and notes drainage on dressing more than usual     Dorsal left 1st MPTJ incision appears with new proximal dehiscence (about 2cm), full thickness with subcutaneous layer exposed, no deep probe  No purulence, crepitus, fluctuance  There is edema but resolved erythema  Toe warm and well perfused  Plantar hallux IPJ ulceration appears with healing, now healed with thin layer of hyperkeratotis  There is improved DF of hallux s/p surgery  Wound debridement note/procedure: After verbal consent was obtained, sutures removed, wound located at left dorsal 1st MTPJ was excisionally debrided with 15 blade of nonviable, devitalized, slough/fibrin/fibrous tissue w/ excision of  subcutaneous tissue to depth of subcutaneous tissue  Post debridement wound measurements 2x0 5x0 2cm, with appearance of wound fresh bleeding tissue with viable 100% vs nonviable 0%, <20sq cm debrided  Instructions given to patient (maxorb Ag, dsd, ACE QD to QOD)  Ice, elevation, ACE for compression  Remain off of foot and keep it elevated at all times  Images reviewed today: XR left foot 3v 5/1/23 with 1st toe proximal phalanx base excision  Stable post-op changes  RTC: 5d for recheck  Report to ED if further SOI arise

## 2023-05-01 NOTE — PATIENT INSTRUCTIONS
Change dressing every 1-2 days with maxorb Ag, 4x4, christine, ACE  Keep foot elevated above heart  Limit ambulation

## 2023-05-05 ENCOUNTER — OFFICE VISIT (OUTPATIENT)
Dept: PODIATRY | Age: 68
End: 2023-05-05

## 2023-05-05 ENCOUNTER — TELEPHONE (OUTPATIENT)
Dept: CARDIOLOGY CLINIC | Facility: CLINIC | Age: 68
End: 2023-05-05

## 2023-05-05 VITALS
DIASTOLIC BLOOD PRESSURE: 76 MMHG | BODY MASS INDEX: 29.16 KG/M2 | HEIGHT: 73 IN | WEIGHT: 220 LBS | SYSTOLIC BLOOD PRESSURE: 120 MMHG

## 2023-05-05 DIAGNOSIS — Z98.890 STATUS POST FOOT SURGERY: Primary | ICD-10-CM

## 2023-05-05 DIAGNOSIS — T81.31XD DEHISCENCE OF OPERATIVE WOUND, SUBSEQUENT ENCOUNTER: ICD-10-CM

## 2023-05-05 DIAGNOSIS — I49.3 PVCS (PREMATURE VENTRICULAR CONTRACTIONS): Primary | ICD-10-CM

## 2023-05-05 NOTE — PROGRESS NOTES
Verner Rower presents to clinic s/p left bowen arthroplasty (DOS 4/19/23) due to distal-plantar IPJ ulceration in attempt for limb salvage  Doing well, doing wound care as instructed  Denies N/V/C/F/CP/SOB  Condition: Dressing C/D/I  WBAT surgical shoe  No pain  He has been resting more  Dorsal left 1st MPTJ incision appears with proximal dehiscence (about 1 5x0 5x0 2cm), full thickness with subcutaneous layer exposed, no deep probe  No purulence, crepitus, fluctuance  There is edema but resolved erythema  Toe warm and well perfused  Plantar hallux IPJ ulceration appears healed with thin layer of hyperkeratotis  There is improved DF of hallux s/p surgery  Wound debridement note/procedure: After verbal consent was obtained, wound located at left dorsal 1st MTPJ was excisionally debrided with 15 blade of nonviable, devitalized, slough/fibrin/fibrous tissue w/ excision of  subcutaneous tissue to depth of subcutaneous tissue  Post debridement wound measurements 1 5x0 5x0 25cm, with appearance of wound fresh bleeding tissue with viable 100% vs nonviable 0%, <20sq cm debrided  [place] wound matrix applied, acticoat 7, steristrip  DSD, ACE  Toe unloading shoe dispensed     Instructions given to patient (Do not change what is under steri-strips  Change only dsd, ACE QD to QOD)  Remain off of foot and keep it elevated at all times  RTC: 5d for recheck and l;ikely repeat Phoenix application  Report to ED if further SOI arise

## 2023-05-05 NOTE — TELEPHONE ENCOUNTER
Patient stopped in office to see if he should get another refill on his or stop taking Magnesium 400 MG CAPS     Pharmacy: [facility] in [facility] [address] provider: JESUS Hill  Last OV: 3/29/23  Next OV: 6/13/23  Supply: 60 days

## 2023-05-10 ENCOUNTER — OFFICE VISIT (OUTPATIENT)
Dept: PODIATRY | Age: 68
End: 2023-05-10

## 2023-05-10 VITALS
DIASTOLIC BLOOD PRESSURE: 78 MMHG | WEIGHT: 220 LBS | BODY MASS INDEX: 29.16 KG/M2 | HEIGHT: 73 IN | SYSTOLIC BLOOD PRESSURE: 120 MMHG

## 2023-05-10 DIAGNOSIS — T81.31XD DEHISCENCE OF OPERATIVE WOUND, SUBSEQUENT ENCOUNTER: Primary | ICD-10-CM

## 2023-05-10 DIAGNOSIS — M20.5X2 HALLUX LIMITUS OF LEFT FOOT: ICD-10-CM

## 2023-05-10 DIAGNOSIS — Z98.890 STATUS POST FOOT SURGERY: ICD-10-CM

## 2023-05-10 NOTE — PROGRESS NOTES
Clifton Gaston presents to clinic s/p left bowen arthroplasty (DOS 4/19/23) due to distal-plantar IPJ ulceration in attempt for limb salvage  Doing well, doing wound care as instructed at site of dorsal dehiscence  Denies N/V/C/F/CP/SOB  Condition: Dressing C/D/I  WBAT surgical shoe  No pain  He has been resting more  Dorsal left 1st MPTJ incision appears with proximal dehiscence (about 1 5x0 5x0 2cm), full thickness with subcutaneous layer exposed, no deep probe  No purulence, crepitus, fluctuance  There is edema but resolved erythema  Toe warm and well perfused  Plantar hallux IPJ ulceration appears healed with thin layer of hyperkeratotis  There is improved DF of hallux s/p surgery  Wound debridement note/procedure: After verbal consent was obtained, wound located at left dorsal 1st MTPJ was excisionally debrided with 15 blade of nonviable, devitalized, slough/fibrin/fibrous tissue w/ excision of  subcutaneous tissue to depth of subcutaneous tissue  Post debridement wound measurements 1 5x0 5x0 25cm, with appearance of wound fresh bleeding tissue with viable 100% vs nonviable 0%, <20sq cm debrided  [place] wound matrix applied, acticoat 7, steristrip  DSD, ACE  Toe unloading shoe     Instructions given to patient (Do not change what is under steri-strips  Change only dsd, ACE QD to QOD)  Remain off of foot and keep it elevated at all times  RTC: 7d for recheck, new XR and likely repeat [place] application  Report to ED if further SOI arise

## 2023-05-17 ENCOUNTER — OFFICE VISIT (OUTPATIENT)
Dept: PODIATRY | Age: 68
End: 2023-05-17

## 2023-05-17 ENCOUNTER — HOSPITAL ENCOUNTER (OUTPATIENT)
Dept: RADIOLOGY | Facility: CLINIC | Age: 68
Discharge: HOME | End: 2023-05-17

## 2023-05-17 VITALS — HEIGHT: 73 IN | BODY MASS INDEX: 29.16 KG/M2 | WEIGHT: 220 LBS

## 2023-05-17 DIAGNOSIS — T81.31XD DEHISCENCE OF OPERATIVE WOUND, SUBSEQUENT ENCOUNTER: ICD-10-CM

## 2023-05-17 DIAGNOSIS — M79.672 LEFT FOOT PAIN: Primary | ICD-10-CM

## 2023-05-17 DIAGNOSIS — Z98.890 STATUS POST FOOT SURGERY: ICD-10-CM

## 2023-05-17 DIAGNOSIS — M79.672 LEFT FOOT PAIN: ICD-10-CM

## 2023-05-17 RX ORDER — CEPHALEXIN 500 MG/1
500 CAPSULE ORAL EVERY 6 HOURS SCHEDULED
Qty: 28 CAPSULE | Refills: 0 | Status: SHIPPED | OUTPATIENT
Start: 2023-05-17 | End: 2023-05-24

## 2023-05-17 NOTE — PROGRESS NOTES
Britta presents to clinic s/p left bowen arthroplasty (DOS 4/19/23) due to distal-plantar IPJ ulceration in attempt for limb salvage  Doing well, doing wound care as instructed at site of dorsal dehiscence  Denies N/V/C/F/CP/SOB  Going on his trip to [facility]) this week  Condition: Dressing C/D/I  WBAT surgical shoe  No pain  He has been resting more  Dorsal left 1st MPTJ incision appears with proximal dehiscence (about 1 0x0 5x0 1cm), full thickness with subcutaneous layer exposed, no deep probe  No purulence, crepitus, fluctuance  There is edema but resolved erythema  Toe warm and well perfused  Plantar hallux IPJ ulceration appears healed with thin layer of hyperkeratotis  There is improved DF of hallux s/p surgery  Wound debridement note/procedure: After verbal consent was obtained, wound located at left dorsal 1st MTPJ was excisionally debrided with 15 blade of nonviable, devitalized, slough/fibrin/fibrous tissue w/ excision of  subcutaneous tissue to depth of subcutaneous tissue  Post debridement wound measurements 1 0x0 5x0 15cm, with appearance of wound fresh bleeding tissue with viable 100% vs nonviable 0%, <20sq cm debrided  Silver alginate, DSD, ACE  Chg Q2-3d and PRN if soiled  Toe unloading shoe     XR left foot 3v reviewed from 5/17/23: stable resection of 1st toe proximal phalanx base    I rx'd cephalexin for PRN use if he notices SOI while in trip to [facility])  I recommended that he be vigilant about not getting foot wet and resting  RTC: 1-2wks at wound care (patient going on fishing trip to [facility]))   Report to ED if SOI arise

## 2023-06-06 ENCOUNTER — APPOINTMENT (OUTPATIENT)
Dept: RADIOLOGY | Facility: MEDICAL CENTER | Age: 68
End: 2023-06-06
Payer: MEDICARE

## 2023-06-06 ENCOUNTER — HOSPITAL ENCOUNTER (OUTPATIENT)
Dept: MRI IMAGING | Facility: HOSPITAL | Age: 68
Discharge: HOME | End: 2023-06-06
Attending: STUDENT IN AN ORGANIZED HEALTH CARE EDUCATION/TRAINING PROGRAM
Payer: MEDICARE

## 2023-06-06 ENCOUNTER — HOSPITAL ENCOUNTER (INPATIENT)
Facility: HOSPITAL | Age: 68
LOS: 6 days | Discharge: HOME | DRG: 474 | End: 2023-06-12
Attending: EMERGENCY MEDICINE | Admitting: FAMILY MEDICINE
Payer: MEDICARE

## 2023-06-06 ENCOUNTER — OFFICE VISIT (OUTPATIENT)
Dept: PODIATRY | Facility: CLINIC | Age: 68
End: 2023-06-06
Payer: MEDICARE

## 2023-06-06 VITALS — WEIGHT: 220 LBS | HEIGHT: 73 IN | BODY MASS INDEX: 29.16 KG/M2

## 2023-06-06 DIAGNOSIS — L03.116 CELLULITIS OF LEFT FOOT: ICD-10-CM

## 2023-06-06 DIAGNOSIS — Z98.890 STATUS POST FOOT SURGERY: ICD-10-CM

## 2023-06-06 DIAGNOSIS — Z01.818 PRE-OPERATIVE CLEARANCE: ICD-10-CM

## 2023-06-06 DIAGNOSIS — G62.9 PERIPHERAL POLYNEUROPATHY: ICD-10-CM

## 2023-06-06 DIAGNOSIS — M86.172 OTHER ACUTE OSTEOMYELITIS OF LEFT FOOT (HCC): ICD-10-CM

## 2023-06-06 DIAGNOSIS — T81.31XD DEHISCENCE OF OPERATIVE WOUND, SUBSEQUENT ENCOUNTER: ICD-10-CM

## 2023-06-06 DIAGNOSIS — I35.0 MODERATE AORTIC STENOSIS: ICD-10-CM

## 2023-06-06 DIAGNOSIS — M79.672 LEFT FOOT PAIN: ICD-10-CM

## 2023-06-06 DIAGNOSIS — M86.072 ACUTE HEMATOGENOUS OSTEOMYELITIS OF LEFT FOOT (HCC): Primary | ICD-10-CM

## 2023-06-06 DIAGNOSIS — R06.02 SHORTNESS OF BREATH: ICD-10-CM

## 2023-06-06 DIAGNOSIS — N17.9 AKI (ACUTE KIDNEY INJURY) (HCC): ICD-10-CM

## 2023-06-06 DIAGNOSIS — M79.672 LEFT FOOT PAIN: Primary | ICD-10-CM

## 2023-06-06 PROBLEM — E87.1 ACUTE HYPONATREMIA: Status: RESOLVED | Noted: 2023-01-30 | Resolved: 2023-06-06

## 2023-06-06 LAB
ALBUMIN SERPL BCP-MCNC: 3.9 G/DL (ref 3.5–5)
ALP SERPL-CCNC: 71 U/L (ref 34–104)
ALT SERPL W P-5'-P-CCNC: 11 U/L (ref 7–52)
ANION GAP SERPL CALCULATED.3IONS-SCNC: 9 MMOL/L (ref 4–13)
APTT PPP: 29 SECONDS (ref 23–37)
AST SERPL W P-5'-P-CCNC: 14 U/L (ref 13–39)
BASOPHILS # BLD AUTO: 0.04 THOUSANDS/ÂΜL (ref 0–0.1)
BASOPHILS NFR BLD AUTO: 0 % (ref 0–1)
BILIRUB SERPL-MCNC: 0.23 MG/DL (ref 0.2–1)
BUN SERPL-MCNC: 26 MG/DL (ref 5–25)
CALCIUM SERPL-MCNC: 9.5 MG/DL (ref 8.4–10.2)
CHLORIDE SERPL-SCNC: 103 MMOL/L (ref 96–108)
CO2 SERPL-SCNC: 25 MMOL/L (ref 21–32)
CREAT SERPL-MCNC: 1.36 MG/DL (ref 0.6–1.3)
CRP SERPL QL: 21.3 MG/L
EOSINOPHIL # BLD AUTO: 0.52 THOUSAND/ÂΜL (ref 0–0.61)
EOSINOPHIL NFR BLD AUTO: 5 % (ref 0–6)
ERYTHROCYTE [DISTWIDTH] IN BLOOD BY AUTOMATED COUNT: 13.9 % (ref 11.6–15.1)
ERYTHROCYTE [SEDIMENTATION RATE] IN BLOOD: 43 MM/HOUR (ref 0–19)
GFR SERPL CREATININE-BSD FRML MDRD: 52 ML/MIN/1.73SQ M
GLUCOSE SERPL-MCNC: 97 MG/DL (ref 65–140)
HCT VFR BLD AUTO: 45.3 % (ref 36.5–49.3)
HGB BLD-MCNC: 15.1 G/DL (ref 12–17)
IMM GRANULOCYTES # BLD AUTO: 0.04 THOUSAND/UL (ref 0–0.2)
IMM GRANULOCYTES NFR BLD AUTO: 0 % (ref 0–2)
INR PPP: 0.89 (ref 0.84–1.19)
LACTATE SERPL-SCNC: 1.3 MMOL/L (ref 0.5–2)
LIPASE SERPL-CCNC: 51 U/L (ref 11–82)
LYMPHOCYTES # BLD AUTO: 2.18 THOUSANDS/ÂΜL (ref 0.6–4.47)
LYMPHOCYTES NFR BLD AUTO: 21 % (ref 14–44)
MCH RBC QN AUTO: 31.2 PG (ref 26.8–34.3)
MCHC RBC AUTO-ENTMCNC: 33.3 G/DL (ref 31.4–37.4)
MCV RBC AUTO: 94 FL (ref 82–98)
MONOCYTES # BLD AUTO: 1.25 THOUSAND/ÂΜL (ref 0.17–1.22)
MONOCYTES NFR BLD AUTO: 12 % (ref 4–12)
NEUTROPHILS # BLD AUTO: 6.38 THOUSANDS/ÂΜL (ref 1.85–7.62)
NEUTS SEG NFR BLD AUTO: 62 % (ref 43–75)
NRBC BLD AUTO-RTO: 0 /100 WBCS
PLATELET # BLD AUTO: 264 THOUSANDS/UL (ref 149–390)
PMV BLD AUTO: 9.6 FL (ref 8.9–12.7)
POTASSIUM SERPL-SCNC: 3.8 MMOL/L (ref 3.5–5.3)
PROT SERPL-MCNC: 7.1 G/DL (ref 6.4–8.4)
PROTHROMBIN TIME: 12.1 SECONDS (ref 11.6–14.5)
RBC # BLD AUTO: 4.84 MILLION/UL (ref 3.88–5.62)
SODIUM SERPL-SCNC: 137 MMOL/L (ref 135–147)
TSH SERPL DL<=0.05 MIU/L-ACNC: 3.15 UIU/ML (ref 0.45–4.5)
WBC # BLD AUTO: 10.41 THOUSAND/UL (ref 4.31–10.16)

## 2023-06-06 PROCEDURE — 85610 PROTHROMBIN TIME: CPT | Performed by: EMERGENCY MEDICINE

## 2023-06-06 PROCEDURE — 99214 OFFICE O/P EST MOD 30 MIN: CPT | Performed by: STUDENT IN AN ORGANIZED HEALTH CARE EDUCATION/TRAINING PROGRAM

## 2023-06-06 PROCEDURE — 99223 1ST HOSP IP/OBS HIGH 75: CPT | Performed by: FAMILY MEDICINE

## 2023-06-06 PROCEDURE — 83735 ASSAY OF MAGNESIUM: CPT | Performed by: FAMILY MEDICINE

## 2023-06-06 PROCEDURE — 73718 MRI LOWER EXTREMITY W/O DYE: CPT

## 2023-06-06 PROCEDURE — 87040 BLOOD CULTURE FOR BACTERIA: CPT | Performed by: EMERGENCY MEDICINE

## 2023-06-06 PROCEDURE — 85652 RBC SED RATE AUTOMATED: CPT | Performed by: EMERGENCY MEDICINE

## 2023-06-06 PROCEDURE — 86140 C-REACTIVE PROTEIN: CPT | Performed by: EMERGENCY MEDICINE

## 2023-06-06 PROCEDURE — G1004 CDSM NDSC: HCPCS

## 2023-06-06 PROCEDURE — 85730 THROMBOPLASTIN TIME PARTIAL: CPT | Performed by: EMERGENCY MEDICINE

## 2023-06-06 PROCEDURE — 87070 CULTURE OTHR SPECIMN AEROBIC: CPT | Performed by: FAMILY MEDICINE

## 2023-06-06 PROCEDURE — 99284 EMERGENCY DEPT VISIT MOD MDM: CPT

## 2023-06-06 PROCEDURE — 85025 COMPLETE CBC W/AUTO DIFF WBC: CPT | Performed by: EMERGENCY MEDICINE

## 2023-06-06 PROCEDURE — 80053 COMPREHEN METABOLIC PANEL: CPT | Performed by: EMERGENCY MEDICINE

## 2023-06-06 PROCEDURE — 73630 X-RAY EXAM OF FOOT: CPT

## 2023-06-06 PROCEDURE — 83605 ASSAY OF LACTIC ACID: CPT | Performed by: EMERGENCY MEDICINE

## 2023-06-06 PROCEDURE — 84443 ASSAY THYROID STIM HORMONE: CPT | Performed by: FAMILY MEDICINE

## 2023-06-06 PROCEDURE — 83690 ASSAY OF LIPASE: CPT | Performed by: EMERGENCY MEDICINE

## 2023-06-06 PROCEDURE — 87205 SMEAR GRAM STAIN: CPT | Performed by: FAMILY MEDICINE

## 2023-06-06 PROCEDURE — 36415 COLL VENOUS BLD VENIPUNCTURE: CPT | Performed by: EMERGENCY MEDICINE

## 2023-06-06 PROCEDURE — 87186 SC STD MICRODIL/AGAR DIL: CPT | Performed by: FAMILY MEDICINE

## 2023-06-06 RX ORDER — FUROSEMIDE 10 MG/ML
40 INJECTION INTRAMUSCULAR; INTRAVENOUS
Status: DISCONTINUED | OUTPATIENT
Start: 2023-06-07 | End: 2023-06-07

## 2023-06-06 RX ORDER — LEVOTHYROXINE SODIUM 0.07 MG/1
75 TABLET ORAL
Status: DISCONTINUED | OUTPATIENT
Start: 2023-06-07 | End: 2023-06-12 | Stop reason: HOSPADM

## 2023-06-06 RX ORDER — POLYETHYLENE GLYCOL 3350 17 G/17G
17 POWDER, FOR SOLUTION ORAL DAILY
Status: DISCONTINUED | OUTPATIENT
Start: 2023-06-07 | End: 2023-06-12 | Stop reason: HOSPADM

## 2023-06-06 RX ORDER — DOCUSATE SODIUM 100 MG/1
100 CAPSULE, LIQUID FILLED ORAL 2 TIMES DAILY
Status: DISCONTINUED | OUTPATIENT
Start: 2023-06-06 | End: 2023-06-12 | Stop reason: HOSPADM

## 2023-06-06 RX ORDER — HEPARIN SODIUM 5000 [USP'U]/ML
5000 INJECTION, SOLUTION INTRAVENOUS; SUBCUTANEOUS EVERY 8 HOURS SCHEDULED
Status: DISCONTINUED | OUTPATIENT
Start: 2023-06-06 | End: 2023-06-12 | Stop reason: HOSPADM

## 2023-06-06 RX ORDER — LANOLIN ALCOHOL/MO/W.PET/CERES
400 CREAM (GRAM) TOPICAL DAILY
Status: DISCONTINUED | OUTPATIENT
Start: 2023-06-07 | End: 2023-06-12 | Stop reason: HOSPADM

## 2023-06-06 RX ORDER — GABAPENTIN 100 MG/1
100 CAPSULE ORAL 2 TIMES DAILY
COMMUNITY

## 2023-06-06 RX ORDER — CEFTRIAXONE 1 G/50ML
1000 INJECTION, SOLUTION INTRAVENOUS EVERY 24 HOURS
Status: DISCONTINUED | OUTPATIENT
Start: 2023-06-06 | End: 2023-06-10

## 2023-06-06 RX ORDER — ESCITALOPRAM OXALATE 10 MG/1
20 TABLET ORAL DAILY
Status: DISCONTINUED | OUTPATIENT
Start: 2023-06-07 | End: 2023-06-12 | Stop reason: HOSPADM

## 2023-06-06 RX ORDER — SULFAMETHOXAZOLE AND TRIMETHOPRIM 800; 160 MG/1; MG/1
1 TABLET ORAL EVERY 12 HOURS SCHEDULED
Qty: 14 TABLET | Refills: 0 | Status: SHIPPED | OUTPATIENT
Start: 2023-06-06 | End: 2023-06-12

## 2023-06-06 RX ORDER — BUPROPION HYDROCHLORIDE 150 MG/1
300 TABLET ORAL DAILY
Status: DISCONTINUED | OUTPATIENT
Start: 2023-06-07 | End: 2023-06-12 | Stop reason: HOSPADM

## 2023-06-06 RX ORDER — ONDANSETRON 2 MG/ML
4 INJECTION INTRAMUSCULAR; INTRAVENOUS EVERY 6 HOURS PRN
Status: DISCONTINUED | OUTPATIENT
Start: 2023-06-06 | End: 2023-06-12 | Stop reason: HOSPADM

## 2023-06-06 RX ORDER — ACETAMINOPHEN 325 MG/1
650 TABLET ORAL EVERY 6 HOURS PRN
Status: DISCONTINUED | OUTPATIENT
Start: 2023-06-06 | End: 2023-06-12 | Stop reason: HOSPADM

## 2023-06-06 RX ORDER — OXYCODONE HYDROCHLORIDE AND ACETAMINOPHEN 5; 325 MG/1; MG/1
1 TABLET ORAL EVERY 4 HOURS PRN
Status: DISCONTINUED | OUTPATIENT
Start: 2023-06-06 | End: 2023-06-07

## 2023-06-06 RX ORDER — AMLODIPINE BESYLATE 10 MG/1
10 TABLET ORAL DAILY
Status: DISCONTINUED | OUTPATIENT
Start: 2023-06-07 | End: 2023-06-12 | Stop reason: HOSPADM

## 2023-06-06 RX ADMIN — SODIUM CHLORIDE 3 G: 9 INJECTION, SOLUTION INTRAVENOUS at 18:39

## 2023-06-06 RX ADMIN — DOCUSATE SODIUM 100 MG: 100 CAPSULE, LIQUID FILLED ORAL at 21:15

## 2023-06-06 RX ADMIN — VANCOMYCIN HYDROCHLORIDE 1500 MG: 5 INJECTION, POWDER, LYOPHILIZED, FOR SOLUTION INTRAVENOUS at 16:20

## 2023-06-06 RX ADMIN — HEPARIN SODIUM 5000 UNITS: 5000 INJECTION INTRAVENOUS; SUBCUTANEOUS at 21:24

## 2023-06-06 RX ADMIN — CEFTRIAXONE 1000 MG: 1 INJECTION, SOLUTION INTRAVENOUS at 21:21

## 2023-06-06 NOTE — PROGRESS NOTES
Camila Mustafa presents to clinic s/p left bowen arthroplasty (DOS 4/19/23) due to distal-plantar IPJ ulceration in attempt for limb salvage  Notes increased redness and swelling over the last week  Denies N/V/C/F/CP/SOB  Returned from his trip to [facility])  Condition: Dressing C/D/I  WBAT in regular shoe  Dorsal left 1st MPTJ incision appears with proximal dehiscence (about 0 5x0 5x0 1cm), full thickness with subcutaneous layer exposed, no deep probe  No purulence, crepitus, fluctuance  There is edema and erythema  Toe warm and well perfused  Plantar hallux IPJ ulceration appears healed with thin layer of hyperkeratotis  There is improved DF of hallux s/p surgery  Silver alginate, DSD, ACE  Chg Q2-3d and PRN if soiled  Toe unloading shoe     XR left foot 3v reviewed from 6/6/23: resection of 1st toe proximal phalanx base with new erosion to osteotomy site concerning for OM  There is increased soft tissue density julián-1st MTPJ concerning for joint effusion  I rx'd 7d bactrim DS BID  I ordered MRI left foot STAT to assess for OM  I instructed patient to report to ED if this is + for OM/abscess as he will likely need amputation/washout    RTC: 1wk if MRI negative however if + then patient should report to ED for admission

## 2023-06-06 NOTE — H&P
[address]  H&P  Name: Garth Mishra 69 y o  male I MRN: [id number]  Unit/Bed#: ED 6 I Date of Admission: 6/6/2023   Date of Service: 6/6/2023 I Hospital Day: 0      Assessment/Plan   ANNALEE (acute kidney injury) ([facility] )  Assessment & Plan  Creatinine on 4/19/2023 was 1 01  Today creatinine is 1 36  Patient also reports he gained 4 pounds over last 1 week-could be related to CHF  Hold lisinopril  We will use IV Lasix  Monitor intake and output    * Acute hematogenous osteomyelitis of left foot ([facility] )  Assessment & Plan  MRI Foot:Findings consistent with definite osteomyelitis of the great toe proximal phalanx  Nonspecific marrow edema involving the distal 3 1 cm of the first metatarsal, possibly reactive, although early osteomyelitis cannot be excluded  Large first MTP joint effusion, nonspecific; septic arthritis cannot be excluded on the basis of imaging  No evidence of a soft tissue abscess  Continue IV ceftriaxone vancomycin with pharmacy consult  Follow wound culture, blood culture  Check arterial duplex to rule out PAD  Follow podiatry consult  Follow cardiology consult for preop clearance since patient has cardiac history    Heart failure with preserved ejection fraction  Assessment & Plan  Diastolic in nature  Last echocardiogram shows ejection fraction of 60% with grade 1 diastolic dysfunction  And also reports he gained 4 pounds over last 1 week even though he was taking Lasix  Aspiration IV Lasix 40 mg twice daily and monitor  Cardiology recommendation    Moderate aortic stenosis  Assessment & Plan  Continue cardiac medication  Cardiology recommendation         VTE Pharmacologic Prophylaxis: VTE Score: 5 High Risk (Score >/= 5) - Pharmacological DVT Prophylaxis Ordered: heparin  Sequential Compression Devices Ordered  Code Status: Level 1 - Full Code as per patient  Discussion with family: Updated  (wife) via phone      Anticipated Length of Stay: Patient will be admitted on an inpatient basis with an anticipated length of stay of greater than 2 midnights secondary to To monitor above condition  Total Time Spent on Date of Encounter in care of patient: 15 minutes This time was spent on one or more of the following: performing physical exam; counseling and coordination of care; obtaining or reviewing history; documenting in the medical record; reviewing/ordering tests, medications or procedures; communicating with other healthcare professionals and discussing with patient's family/caregivers  Chief Complaint: Left foot swelling redness and discharge    History of Present Illness:  Titus Cabral is a 69 y o  male with a PMH of CHF, aortic stenosis, peripheral neuropathy who presents with left foot swelling and redness  As per patient, he recently had surgery-bunionectomy on the left foot and it was not healing properly, and following up with podiatry  Patient's podiatry ordered MRI which shows osteomyelitis and recommending to admission  Patient denies any nausea, vomiting, diarrhea  Does have peripheral neuropathy, complaining of redness is getting worse, and there is some yellow discharge noted       Review of Systems:  Review of Systems   Constitutional: Positive for activity change and unexpected weight change  Negative for appetite change, chills, diaphoresis, fatigue and fever  Respiratory: Negative for cough, shortness of breath and stridor  Cardiovascular: Negative for chest pain, palpitations and leg swelling  Gastrointestinal: Negative for abdominal distention, abdominal pain, anal bleeding, blood in stool, constipation, rectal pain and vomiting  Musculoskeletal: Positive for arthralgias and gait problem  Skin: Positive for color change, rash and wound  Neurological: Negative for dizziness, seizures, facial asymmetry, speech difficulty and headaches  Hematological: Negative for adenopathy     Psychiatric/Behavioral: Negative for agitation, "behavioral problems, confusion and decreased concentration  All other systems reviewed and are negative  Past Medical and Surgical History:   Past Medical History:   Diagnosis Date   • Anxiety    • Aortic stenosis    • Borderline high cholesterol    • CHF (congestive heart failure) ([facility]   • Colon polyp    • Depression    • Heart murmur    • Hypertension    • Hypothyroidism    • Prediabetes    • PVCs (premature ventricular contractions)     \"skip\"   • Wears glasses     readers       Past Surgical History:   Procedure Laterality Date   • APPENDECTOMY     • BACK SURGERY     • CATARACT EXTRACTION Bilateral    • COLONOSCOPY     • AL CORRJ HALLUX VALGUS W/SESMDC W/RESCJ PROX PHAL Left 4/19/2023    Procedure: [place] and wound debridement;  Surgeon: Isaias Macario DPM;  Location:  MAIN OR;  Service: Podiatry   • TONSILLECTOMY     • TOTAL SHOULDER REPLACEMENT Left    • WISDOM TOOTH EXTRACTION         Meds/Allergies:  Prior to Admission medications    Medication Sig Start Date End Date Taking?  Authorizing Provider   amLODIPine (NORVASC) 10 mg tablet Take 10 mg by mouth daily 5/20/19   Historical Provider, MD   ammonium lactate (LAC-HYDRIN) 12 % cream Apply topically as needed for dry skin 3/10/23   Isaias Macario DPM   buPROPion (WELLBUTRIN XL) 300 mg 24 hr tablet  5/25/19   Historical Provider, MD   escitalopram (LEXAPRO) 20 mg tablet Take 20 mg by mouth daily 4/5/19   Historical Provider, MD   furosemide (LASIX) 40 mg tablet Take 1 5 tablets (60 mg total) by mouth daily 3/29/23   JESUS Rivera   levothyroxine 75 mcg tablet  5/17/19   Historical Provider, MD   lisinopril (ZESTRIL) 10 mg tablet Take 1 tablet (10 mg total) by mouth daily 3/8/23   JESUS Rivera   Magnesium 400 MG CAPS Take 1 capsule (400 mg total) by mouth in the morning 5/5/23   JESUS Rivera   Multiple Vitamins-Minerals (OCUVITE ADULT FORMULA PO) Take 1 tablet by mouth Eye care vitamin " "daily  Patient not taking: Reported on 2023    Historical Provider, MD   potassium chloride (MICRO-K) 10 MEQ CR capsule Take 1 capsule (10 mEq total) by mouth daily 3/8/23   JESUS Mcclure   sulfamethoxazole-trimethoprim (BACTRIM DS) 800-160 mg per tablet Take 1 tablet by mouth every 12 (twelve) hours for 7 days 23  Nat Brown DPM     I have reviewed home medications with patient personally  Allergies: Allergies   Allergen Reactions   • Statins Myalgia     Other reaction(s): muscle aches and joint aches         Social History:  Marital Status: /Civil Union   Occupation: Unknown  Patient Pre-hospital Living Situation: Home  Patient Pre-hospital Level of Mobility: walks  Patient Pre-hospital Diet Restrictions: Cardiac diet  Substance Use History:   Social History     Substance and Sexual Activity   Alcohol Use Yes    Comment: socially-2 drinks per week     Social History     Tobacco Use   Smoking Status Former   • Packs/day: 1 00   • Years: 50 00   • Total pack years: 50 00   • Types: Cigarettes   • Quit date: 2023   • Years since quittin 4   Smokeless Tobacco Never     Social History     Substance and Sexual Activity   Drug Use Not Currently    Comment: sporadic in young adulthood       Family History:  Family History   Problem Relation Age of Onset   • Dementia Mother    • Blindness Mother    • Cancer Mother    • Dementia Father        Physical Exam:     Vitals:   Blood Pressure: 145/77 (23 1700)  Pulse: 68 (23 1700)  Temperature: 98 °F (36 7 °C) (23 1526)  Temp Source: Temporal (23 1526)  Respirations: 22 (23 1700)  Height: 6' 1\" (185 4 cm) (23 1526)  Weight - Scale: 99 8 kg (220 lb) (23 1526)  SpO2: 95 % (23 1700)    Physical Exam  Vitals and nursing note reviewed  Constitutional:       Appearance: He is not ill-appearing or diaphoretic     HENT:      Mouth/Throat:      Mouth: Mucous membranes are moist       Pharynx: No " oropharyngeal exudate or posterior oropharyngeal erythema  Eyes:      Extraocular Movements: Extraocular movements intact  Conjunctiva/sclera: Conjunctivae normal       Pupils: Pupils are equal, round, and reactive to light  Cardiovascular:      Rate and Rhythm: Normal rate  Heart sounds: Murmur heard  No friction rub  No gallop  Pulmonary:      Effort: Pulmonary effort is normal  No respiratory distress  Breath sounds: No stridor  Rales present  No wheezing or rhonchi  Abdominal:      General: Abdomen is flat  Bowel sounds are normal  There is no distension  Palpations: There is no mass  Tenderness: There is no abdominal tenderness  Hernia: No hernia is present  Musculoskeletal:         General: Swelling (left foot) and tenderness (left foot) present  Skin:     Findings: Erythema (left foot) present  Neurological:      Mental Status: He is alert and oriented to person, place, and time  Cranial Nerves: No cranial nerve deficit  Sensory: No sensory deficit  Motor: No weakness        Coordination: Coordination normal    Psychiatric:         Mood and Affect: Mood normal          Additional Data:     Lab Results:  Results from last 7 days   Lab Units 06/06/23  1610   EOS PCT % 5   HEMATOCRIT % 45 3   HEMOGLOBIN g/dL 15 1   LYMPHS PCT % 21   MONOS PCT % 12   NEUTROS PCT % 62   PLATELETS Thousands/uL 264   WBC Thousand/uL 10 41*     Results from last 7 days   Lab Units 06/06/23  1610   ANION GAP mmol/L 9   ALBUMIN g/dL 3 9   ALK PHOS U/L 71   ALT U/L 11   AST U/L 14   BUN mg/dL 26*   CALCIUM mg/dL 9 5   CHLORIDE mmol/L 103   CO2 mmol/L 25   CREATININE mg/dL 1 36*   GLUCOSE RANDOM mg/dL 97   POTASSIUM mmol/L 3 8   SODIUM mmol/L 137   TOTAL BILIRUBIN mg/dL 0 23     Results from last 7 days   Lab Units 06/06/23  1610   INR  0 89             Results from last 7 days   Lab Units 06/06/23  1610   LACTIC ACID mmol/L 1 3       Lines/Drains:  Invasive Devices Peripheral Intravenous Line  Duration           Peripheral IV 06/06/23 Left;Ventral (anterior) Forearm <1 day    Peripheral IV 06/06/23 Right;Ventral (anterior) Forearm <1 day                    Imaging: Reviewed radiology reports from this admission including: MRI foot  VAS lower limb arterial duplex, complete bilateral    (Results Pending)   US kidney and bladder    (Results Pending)       EKG and Other Studies Reviewed on Admission:   · EKG: No EKG obtained  ** Please Note: This note has been constructed using a voice recognition system   **

## 2023-06-06 NOTE — PROGRESS NOTES
Cristal Frye is a 69 y o  male who is currently ordered Vancomycin IV with management by the Pharmacy Consult service  Relevant clinical data and objective / subjective history reviewed  Vancomycin Assessment:  Indication and Goal AUC/Trough: Bone/joint infection (goal -600, trough >10)  Clinical Status:  new start  Micro:     Renal Function:  SCr: 1 36 mg/dL  CrCl: 63 7 mL/min  Renal replacement: Not on dialysis  Days of Therapy: 1  Current Dose: 1500 mg IV once (loading dose)  Vancomycin Plan:  New Dosin mg IV q 12 hours  Estimated AUC: 489 mcg*hr/mL  Estimated Trough: 17 1 mcg/mL  Next Level: 23 at 1330  Renal Function Monitoring: Daily BMP and [place] will continue to follow closely for s/sx of nephrotoxicity, infusion reactions and appropriateness of therapy  BMP and CBC will be ordered per protocol  We will continue to follow the patient’s culture results and clinical progress daily      Gustavo Chapman, Pharmacist

## 2023-06-06 NOTE — ED PROVIDER NOTES
History  Chief Complaint   Patient presents with   • Wound Check     Pt just had MRI and was told to come to ER for bone infection in left great toe, ongoing toe injury/infection over a month ago      Patient complains of increasing redness and swelling to the left great toe  Had surgery on the toe a few months ago  Seen by podiatry today who sent him for an emergent MRI which was positive for osteomyelitis  Sent here for admission and possible surgery by podiatry  Patient denies any fevers or chills  No nausea vomiting or diarrhea  History provided by:  Patient   used: No    Medical Problem  Location:  Left foot swelling  Quality:  Achy  Severity:  Mild  Onset quality:  Gradual  Duration:  1 week  Timing:  Constant  Progression:  Worsening  Context:  Complains of redness and swelling to the left great toe at the incision site from April  Relieved by:  Nothing  Worsened by:  Nothing  Ineffective treatments:  Was on Bactrim for 7 days  Associated symptoms: no abdominal pain, no chest pain, no cough, no diarrhea, no ear pain, no fever, no headaches, no loss of consciousness, no nausea, no rash, no shortness of breath, no sore throat, no vomiting and no wheezing        Prior to Admission Medications   Prescriptions Last Dose Informant Patient Reported? Taking?    Magnesium 400 MG CAPS   No No   Sig: Take 1 capsule (400 mg total) by mouth in the morning   Multiple Vitamins-Minerals (OCUVITE ADULT FORMULA PO)   Yes No   Sig: Take 1 tablet by mouth Eye care vitamin daily   Patient not taking: Reported on 4/24/2023   amLODIPine (NORVASC) 10 mg tablet   Yes No   Sig: Take 10 mg by mouth daily   ammonium lactate (LAC-HYDRIN) 12 % cream   No No   Sig: Apply topically as needed for dry skin   buPROPion (WELLBUTRIN XL) 300 mg 24 hr tablet   Yes No   escitalopram (LEXAPRO) 20 mg tablet   Yes No   Sig: Take 20 mg by mouth daily   furosemide (LASIX) 40 mg tablet   No No   Sig: Take 1 5 tablets (60 mg "total) by mouth daily   levothyroxine 75 mcg tablet   Yes No   lisinopril (ZESTRIL) 10 mg tablet   No No   Sig: Take 1 tablet (10 mg total) by mouth daily   potassium chloride (MICRO-K) 10 MEQ CR capsule   No No   Sig: Take 1 capsule (10 mEq total) by mouth daily   sulfamethoxazole-trimethoprim (BACTRIM DS) 800-160 mg per tablet   No No   Sig: Take 1 tablet by mouth every 12 (twelve) hours for 7 days      Facility-Administered Medications: None       Past Medical History:   Diagnosis Date   • Anxiety    • Aortic stenosis    • Borderline high cholesterol    • CHF (congestive heart failure) ([facility])     Asaf Jackson-cardio-   • Colon polyp    • Depression    • Heart murmur    • Hypertension    • Hypothyroidism    • Prediabetes    • PVCs (premature ventricular contractions)     \"skip\"   • Wears glasses     readers       Past Surgical History:   Procedure Laterality Date   • APPENDECTOMY     • BACK SURGERY     • CATARACT EXTRACTION Bilateral    • COLONOSCOPY     • MO CORRJ HALLUX VALGUS W/SESMDC W/RESCJ PROX PHAL Left 2023    Procedure: Lyric Foots and wound debridement;  Surgeon: Huong Abel DPM;  Location:  MAIN OR;  Service: Podiatry   • TONSILLECTOMY     • TOTAL SHOULDER REPLACEMENT Left    • WISDOM TOOTH EXTRACTION         Family History   Problem Relation Age of Onset   • Dementia Mother    • Blindness Mother    • Cancer Mother    • Dementia Father      I have reviewed and agree with the history as documented      E-Cigarette/Vaping   • E-Cigarette Use Never User      E-Cigarette/Vaping Substances     Social History     Tobacco Use   • Smoking status: Former     Packs/day: 1 00     Years: 50 00     Total pack years: 50 00     Types: Cigarettes     Quit date: 2023     Years since quittin 4   • Smokeless tobacco: Never   Vaping Use   • Vaping Use: Never used   Substance Use Topics   • Alcohol use: Yes     Comment: socially-2 drinks per week   • Drug use: Not Currently     Comment: " sporadic in young adulthood       Review of Systems   Constitutional: Negative for chills and fever  HENT: Negative for ear pain, hearing loss, sore throat, trouble swallowing and voice change  Eyes: Negative for pain and discharge  Respiratory: Negative for cough, shortness of breath and wheezing  Cardiovascular: Negative for chest pain and palpitations  Gastrointestinal: Negative for abdominal pain, blood in stool, constipation, diarrhea, nausea and vomiting  Genitourinary: Negative for dysuria, flank pain, frequency and hematuria  Musculoskeletal: Positive for joint swelling  Negative for neck pain and neck stiffness  Skin: Negative for rash and wound  Neurological: Negative for dizziness, seizures, loss of consciousness, syncope, facial asymmetry and headaches  Psychiatric/Behavioral: Negative for hallucinations, self-injury and suicidal ideas  All other systems reviewed and are negative  Physical Exam  Physical Exam  Musculoskeletal:         General: Swelling present  Comments: Swelling to the left foot especially at the first metatarsal and MTP  See attached pictures           Vital Signs  ED Triage Vitals [06/06/23 1526]   Temperature Pulse Respirations Blood Pressure SpO2   98 °F (36 7 °C) 74 18 120/76 98 %      Temp Source Heart Rate Source Patient Position - Orthostatic VS BP Location FiO2 (%)   Temporal Monitor Sitting Left arm --      Pain Score       --           Vitals:    06/06/23 1526   BP: 120/76   Pulse: 74   Patient Position - Orthostatic VS: Sitting         Visual Acuity      ED Medications  Medications   ampicillin-sulbactam (UNASYN) 3 g in sodium chloride 0 9 % 100 mL IVPB (has no administration in time range)   vancomycin (VANCOCIN) 1500 mg in sodium chloride 0 9% 250 mL IVPB (1,500 mg Intravenous New Bag 6/6/23 1620)       Diagnostic Studies  Results Reviewed     Procedure Component Value Units Date/Time    Lactic acid, plasma (w/reflex if result > 2 0) [[id number]]  (Normal) Collected: 06/06/23 1610    Lab Status: Final result Specimen: Blood from Arm, Right Updated: 06/06/23 1639     LACTIC ACID 1 3 mmol/L     Narrative:      Result may be elevated if tourniquet was used during collection      Comprehensive metabolic panel [[id number]]  (Abnormal) Collected: 06/06/23 1610    Lab Status: Final result Specimen: Blood from Arm, Right Updated: 06/06/23 1639     Sodium 137 mmol/L      Potassium 3 8 mmol/L      Chloride 103 mmol/L      CO2 25 mmol/L      ANION GAP 9 mmol/L      BUN 26 mg/dL      Creatinine 1 36 mg/dL      Glucose 97 mg/dL      Calcium 9 5 mg/dL      AST 14 U/L      ALT 11 U/L      Alkaline Phosphatase 71 U/L      Total Protein 7 1 g/dL      Albumin 3 9 g/dL      Total Bilirubin 0 23 mg/dL      eGFR 52 ml/min/1 73sq m     Narrative:      [place] guidelines for Chronic Kidney Disease (CKD):   •  Stage 1 with normal or high GFR (GFR > 90 mL/min/1 73 square meters)  •  Stage 2 Mild CKD (GFR = 60-89 mL/min/1 73 square meters)  •  Stage 3A Moderate CKD (GFR = 45-59 mL/min/1 73 square meters)  •  Stage 3B Moderate CKD (GFR = 30-44 mL/min/1 73 square meters)  •  Stage 4 Severe CKD (GFR = 15-29 mL/min/1 73 square meters)  •  Stage 5 End Stage CKD (GFR <15 mL/min/1 73 square meters)  Note: GFR calculation is accurate only with a steady state creatinine    Lipase [[id number]]  (Normal) Collected: 06/06/23 1610    Lab Status: Final result Specimen: Blood from Arm, Right Updated: 06/06/23 1639     Lipase 51 u/L     C-reactive protein [[id number]]  (Abnormal) Collected: 06/06/23 1610    Lab Status: Final result Specimen: Blood from Arm, Right Updated: 06/06/23 1639     CRP 21 3 mg/L     Sedimentation rate, automated [[id number]]  (Abnormal) Collected: 06/06/23 1610    Lab Status: Final result Specimen: Blood from Arm, Right Updated: 06/06/23 1623     Sed Rate 43 mm/hour     CBC and differential [[id number]]  (Abnormal) Collected: 06/06/23 5994 Lab Status: Final result Specimen: Blood from Arm, Right Updated: 06/06/23 1619     WBC 10 41 Thousand/uL      RBC 4 84 Million/uL      Hemoglobin 15 1 g/dL      Hematocrit 45 3 %      MCV 94 fL      MCH 31 2 pg      MCHC 33 3 g/dL      RDW 13 9 %      MPV 9 6 fL      Platelets 465 Thousands/uL      nRBC 0 /100 WBCs      Neutrophils Relative 62 %      Immat GRANS % 0 %      Lymphocytes Relative 21 %      Monocytes Relative 12 %      Eosinophils Relative 5 %      Basophils Relative 0 %      Neutrophils Absolute 6 38 Thousands/µL      Immature Grans Absolute 0 04 Thousand/uL      Lymphocytes Absolute 2 18 Thousands/µL      Monocytes Absolute 1 25 Thousand/µL      Eosinophils Absolute 0 52 Thousand/µL      Basophils Absolute 0 04 Thousands/µL     Blood culture #1 [[id number]] Collected: 06/06/23 1611    Lab Status: In process Specimen: Blood from Arm, Left Updated: 06/06/23 1616    Blood culture #2 [[id number]] Collected: 06/06/23 1610    Lab Status: In process Specimen: Blood from Arm, Right Updated: 06/06/23 [address] Rapp 10 [[id number]] Collected: 06/06/23 1610    Lab Status: In process Specimen: Blood from Arm, Right Updated: 06/06/23 1615    APTT [[id number]] Collected: 06/06/23 1610    Lab Status:  In process Specimen: Blood from Arm, Right Updated: 06/06/23 1615                 VAS lower limb arterial duplex, complete bilateral    (Results Pending)              Procedures  Procedures         ED Course  ED Course as of 06/06/23 1641   Tue Jun 06, 2023   1554 MRI from today:  IMPRESSION:     Findings consistent with definite osteomyelitis of the great toe proximal phalanx      Nonspecific marrow edema involving the distal 3 1 cm of the first metatarsal, possibly reactive, although early osteomyelitis cannot be excluded      Large first MTP joint effusion, nonspecific; septic arthritis cannot be excluded on the basis of imaging      No evidence of a soft tissue abscess      The study was marked in EPIC for immediate notification  Charo note from today:  I rx'd 7d bactrim DS BID       I ordered MRI left foot STAT to assess for OM  I instructed patient to report to ED if this is + for OM/abscess as he will likely need amputation/washout                               SBIRT 22yo+    Flowsheet Row Most Recent Value   Initial Alcohol Screen: US AUDIT-C     1  How often do you have a drink containing alcohol? 1 Filed at: 06/06/2023 1527   2  How many drinks containing alcohol do you have on a typical day you are drinking? 0 Filed at: 06/06/2023 1527   3a  Male UNDER 65: How often do you have five or more drinks on one occasion? 0 Filed at: 06/06/2023 1527   3b  FEMALE Any Age, or MALE 65+: How often do you have 4 or more drinks on one occassion? 0 Filed at: 06/06/2023 1527   Audit-C Score 1 Filed at: 06/06/2023 1527   NIC: How many times in the past year have you    Used an illegal drug or used a prescription medication for non-medical reasons? Never Filed at: 06/06/2023 1527                    Medical Decision Making              Amount and/or Complexity of Data Reviewed  Labs: ordered  Decision-making details documented in ED Course  Radiology: ordered  Decision-making details documented in ED Course  Discussion of management or test interpretation with external provider(s): Osteomyelitis on outpatient MRI  Will need admission for IV antibiotics and possible OR for washout and/or amputation          Disposition  Final diagnoses:   Acute hematogenous osteomyelitis of left foot ([address] )     Time reflects when diagnosis was documented in both MDM as applicable and the Disposition within this note     Time User Action Codes Description Comment    6/6/2023  3:49 PM Annika Smith Add [[id number]] Acute hematogenous osteomyelitis of left foot [facility])       ED Disposition     ED Disposition   Admit    Condition   Stable    Date/Time   Tue Jun 6, 2023  4:11 PM    Comment   Case was discussed with Dr Julien Jauregui and the patient's admission status was agreed to be to the service of Dr Karlos Esqueda            Follow-up Information    None         Patient's Medications   Discharge Prescriptions    No medications on file       No discharge procedures on file      PDMP Review     None          ED Provider  Electronically Signed by           Barbaraann Schilder, MD  06/06/23 6810

## 2023-06-06 NOTE — ASSESSMENT & PLAN NOTE
MRI Foot:Findings consistent with definite osteomyelitis of the great toe proximal phalanx  Nonspecific marrow edema involving the distal 3 1 cm of the first metatarsal, possibly reactive, although early osteomyelitis cannot be excluded  Large first MTP joint effusion, nonspecific; septic arthritis cannot be excluded on the basis of imaging  No evidence of a soft tissue abscess       Continue IV ceftriaxone vancomycin with pharmacy consult  Follow wound culture, blood culture  Check arterial duplex to rule out PAD  Follow podiatry consult  Follow cardiology consult for preop clearance since patient has cardiac history

## 2023-06-06 NOTE — ASSESSMENT & PLAN NOTE
Diastolic in nature  Last echocardiogram shows ejection fraction of 60% with grade 1 diastolic dysfunction  And also reports he gained 4 pounds over last 1 week even though he was taking Lasix  Aspiration IV Lasix 40 mg twice daily and monitor  Cardiology recommendation

## 2023-06-06 NOTE — ASSESSMENT & PLAN NOTE
Creatinine on 4/19/2023 was 1 01  Today creatinine is 1 36  Patient also reports he gained 4 pounds over last 1 week-could be related to CHF  Hold lisinopril  We will use IV Lasix  Monitor intake and output

## 2023-06-07 ENCOUNTER — ANESTHESIA EVENT (INPATIENT)
Dept: PERIOP | Facility: HOSPITAL | Age: 68
DRG: 474 | End: 2023-06-07
Payer: MEDICARE

## 2023-06-07 ENCOUNTER — APPOINTMENT (INPATIENT)
Dept: NON INVASIVE DIAGNOSTICS | Facility: HOSPITAL | Age: 68
DRG: 474 | End: 2023-06-07
Payer: MEDICARE

## 2023-06-07 ENCOUNTER — APPOINTMENT (INPATIENT)
Dept: ULTRASOUND IMAGING | Facility: HOSPITAL | Age: 68
DRG: 474 | End: 2023-06-07
Payer: MEDICARE

## 2023-06-07 ENCOUNTER — APPOINTMENT (INPATIENT)
Dept: RADIOLOGY | Facility: HOSPITAL | Age: 68
DRG: 474 | End: 2023-06-07
Payer: MEDICARE

## 2023-06-07 ENCOUNTER — ANESTHESIA (INPATIENT)
Dept: PERIOP | Facility: HOSPITAL | Age: 68
DRG: 474 | End: 2023-06-07
Payer: MEDICARE

## 2023-06-07 PROBLEM — Z01.810 PREOPERATIVE CARDIOVASCULAR EXAMINATION: Status: ACTIVE | Noted: 2023-06-07

## 2023-06-07 LAB
ALBUMIN SERPL BCP-MCNC: 3.6 G/DL (ref 3.5–5)
ALP SERPL-CCNC: 62 U/L (ref 34–104)
ALT SERPL W P-5'-P-CCNC: 7 U/L (ref 7–52)
ANION GAP SERPL CALCULATED.3IONS-SCNC: 6 MMOL/L (ref 4–13)
AST SERPL W P-5'-P-CCNC: 13 U/L (ref 13–39)
BASOPHILS # BLD AUTO: 0.04 THOUSANDS/ÂΜL (ref 0–0.1)
BASOPHILS NFR BLD AUTO: 0 % (ref 0–1)
BILIRUB SERPL-MCNC: 0.4 MG/DL (ref 0.2–1)
BNP SERPL-MCNC: 26 PG/ML (ref 0–100)
BUN SERPL-MCNC: 17 MG/DL (ref 5–25)
CALCIUM SERPL-MCNC: 9 MG/DL (ref 8.4–10.2)
CHLORIDE SERPL-SCNC: 106 MMOL/L (ref 96–108)
CO2 SERPL-SCNC: 25 MMOL/L (ref 21–32)
CREAT SERPL-MCNC: 1 MG/DL (ref 0.6–1.3)
EOSINOPHIL # BLD AUTO: 0.51 THOUSAND/ÂΜL (ref 0–0.61)
EOSINOPHIL NFR BLD AUTO: 6 % (ref 0–6)
ERYTHROCYTE [DISTWIDTH] IN BLOOD BY AUTOMATED COUNT: 13.9 % (ref 11.6–15.1)
GFR SERPL CREATININE-BSD FRML MDRD: 76 ML/MIN/1.73SQ M
GLUCOSE SERPL-MCNC: 111 MG/DL (ref 65–140)
HCT VFR BLD AUTO: 44.5 % (ref 36.5–49.3)
HGB BLD-MCNC: 14.7 G/DL (ref 12–17)
IMM GRANULOCYTES # BLD AUTO: 0.02 THOUSAND/UL (ref 0–0.2)
IMM GRANULOCYTES NFR BLD AUTO: 0 % (ref 0–2)
LYMPHOCYTES # BLD AUTO: 1.98 THOUSANDS/ÂΜL (ref 0.6–4.47)
LYMPHOCYTES NFR BLD AUTO: 21 % (ref 14–44)
MAGNESIUM SERPL-MCNC: 1.9 MG/DL (ref 1.9–2.7)
MCH RBC QN AUTO: 31.2 PG (ref 26.8–34.3)
MCHC RBC AUTO-ENTMCNC: 33 G/DL (ref 31.4–37.4)
MCV RBC AUTO: 95 FL (ref 82–98)
MONOCYTES # BLD AUTO: 1.22 THOUSAND/ÂΜL (ref 0.17–1.22)
MONOCYTES NFR BLD AUTO: 13 % (ref 4–12)
NEUTROPHILS # BLD AUTO: 5.49 THOUSANDS/ÂΜL (ref 1.85–7.62)
NEUTS SEG NFR BLD AUTO: 60 % (ref 43–75)
NRBC BLD AUTO-RTO: 0 /100 WBCS
PLATELET # BLD AUTO: 214 THOUSANDS/UL (ref 149–390)
PMV BLD AUTO: 9.5 FL (ref 8.9–12.7)
POTASSIUM SERPL-SCNC: 3.8 MMOL/L (ref 3.5–5.3)
PROT SERPL-MCNC: 6.5 G/DL (ref 6.4–8.4)
RBC # BLD AUTO: 4.71 MILLION/UL (ref 3.88–5.62)
SODIUM SERPL-SCNC: 137 MMOL/L (ref 135–147)
WBC # BLD AUTO: 9.26 THOUSAND/UL (ref 4.31–10.16)

## 2023-06-07 PROCEDURE — 88305 TISSUE EXAM BY PATHOLOGIST: CPT | Performed by: PATHOLOGY

## 2023-06-07 PROCEDURE — 87075 CULTR BACTERIA EXCEPT BLOOD: CPT | Performed by: PODIATRIST

## 2023-06-07 PROCEDURE — 93923 UPR/LXTR ART STDY 3+ LVLS: CPT | Performed by: SURGERY

## 2023-06-07 PROCEDURE — 83880 ASSAY OF NATRIURETIC PEPTIDE: CPT | Performed by: NURSE PRACTITIONER

## 2023-06-07 PROCEDURE — 87205 SMEAR GRAM STAIN: CPT | Performed by: PODIATRIST

## 2023-06-07 PROCEDURE — 88311 DECALCIFY TISSUE: CPT | Performed by: PATHOLOGY

## 2023-06-07 PROCEDURE — 73630 X-RAY EXAM OF FOOT: CPT

## 2023-06-07 PROCEDURE — 93005 ELECTROCARDIOGRAM TRACING: CPT

## 2023-06-07 PROCEDURE — 80053 COMPREHEN METABOLIC PANEL: CPT | Performed by: FAMILY MEDICINE

## 2023-06-07 PROCEDURE — 85025 COMPLETE CBC W/AUTO DIFF WBC: CPT | Performed by: FAMILY MEDICINE

## 2023-06-07 PROCEDURE — 93923 UPR/LXTR ART STDY 3+ LVLS: CPT

## 2023-06-07 PROCEDURE — 87070 CULTURE OTHR SPECIMN AEROBIC: CPT | Performed by: PODIATRIST

## 2023-06-07 PROCEDURE — 99024 POSTOP FOLLOW-UP VISIT: CPT | Performed by: PODIATRIST

## 2023-06-07 PROCEDURE — 28810 AMPUTATION TOE & METATARSAL: CPT | Performed by: PODIATRIST

## 2023-06-07 PROCEDURE — 99223 1ST HOSP IP/OBS HIGH 75: CPT | Performed by: INTERNAL MEDICINE

## 2023-06-07 PROCEDURE — 76775 US EXAM ABDO BACK WALL LIM: CPT

## 2023-06-07 PROCEDURE — 0Y6N0Z9 DETACHMENT AT LEFT FOOT, PARTIAL 1ST RAY, OPEN APPROACH: ICD-10-PCS | Performed by: PODIATRIST

## 2023-06-07 PROCEDURE — 87176 TISSUE HOMOGENIZATION CULTR: CPT | Performed by: PODIATRIST

## 2023-06-07 PROCEDURE — 99232 SBSQ HOSP IP/OBS MODERATE 35: CPT | Performed by: STUDENT IN AN ORGANIZED HEALTH CARE EDUCATION/TRAINING PROGRAM

## 2023-06-07 RX ORDER — MIDAZOLAM HYDROCHLORIDE 2 MG/2ML
INJECTION, SOLUTION INTRAMUSCULAR; INTRAVENOUS AS NEEDED
Status: DISCONTINUED | OUTPATIENT
Start: 2023-06-07 | End: 2023-06-07

## 2023-06-07 RX ORDER — VANCOMYCIN HYDROCHLORIDE 1 G/200ML
1000 INJECTION, SOLUTION INTRAVENOUS EVERY 12 HOURS
Status: DISCONTINUED | OUTPATIENT
Start: 2023-06-07 | End: 2023-06-07

## 2023-06-07 RX ORDER — SODIUM CHLORIDE, SODIUM LACTATE, POTASSIUM CHLORIDE, CALCIUM CHLORIDE 600; 310; 30; 20 MG/100ML; MG/100ML; MG/100ML; MG/100ML
INJECTION, SOLUTION INTRAVENOUS CONTINUOUS PRN
Status: DISCONTINUED | OUTPATIENT
Start: 2023-06-07 | End: 2023-06-07

## 2023-06-07 RX ORDER — LIDOCAINE HYDROCHLORIDE 10 MG/ML
INJECTION, SOLUTION EPIDURAL; INFILTRATION; INTRACAUDAL; PERINEURAL AS NEEDED
Status: DISCONTINUED | OUTPATIENT
Start: 2023-06-07 | End: 2023-06-07 | Stop reason: HOSPADM

## 2023-06-07 RX ORDER — LIDOCAINE HYDROCHLORIDE 10 MG/ML
INJECTION, SOLUTION EPIDURAL; INFILTRATION; INTRACAUDAL; PERINEURAL AS NEEDED
Status: DISCONTINUED | OUTPATIENT
Start: 2023-06-07 | End: 2023-06-07

## 2023-06-07 RX ORDER — OXYCODONE HYDROCHLORIDE AND ACETAMINOPHEN 5; 325 MG/1; MG/1
1 TABLET ORAL EVERY 6 HOURS PRN
Status: DISCONTINUED | OUTPATIENT
Start: 2023-06-07 | End: 2023-06-07

## 2023-06-07 RX ORDER — MAGNESIUM HYDROXIDE 1200 MG/15ML
LIQUID ORAL AS NEEDED
Status: DISCONTINUED | OUTPATIENT
Start: 2023-06-07 | End: 2023-06-07 | Stop reason: HOSPADM

## 2023-06-07 RX ORDER — OXYCODONE HYDROCHLORIDE AND ACETAMINOPHEN 5; 325 MG/1; MG/1
1 TABLET ORAL EVERY 6 HOURS PRN
Status: DISCONTINUED | OUTPATIENT
Start: 2023-06-07 | End: 2023-06-12 | Stop reason: HOSPADM

## 2023-06-07 RX ORDER — PROPOFOL 10 MG/ML
INJECTION, EMULSION INTRAVENOUS AS NEEDED
Status: DISCONTINUED | OUTPATIENT
Start: 2023-06-07 | End: 2023-06-07

## 2023-06-07 RX ORDER — VANCOMYCIN HYDROCHLORIDE 1 G/200ML
1000 INJECTION, SOLUTION INTRAVENOUS EVERY 12 HOURS
Status: DISCONTINUED | OUTPATIENT
Start: 2023-06-07 | End: 2023-06-10

## 2023-06-07 RX ORDER — OXYCODONE HYDROCHLORIDE AND ACETAMINOPHEN 5; 325 MG/1; MG/1
2 TABLET ORAL EVERY 6 HOURS PRN
Status: DISCONTINUED | OUTPATIENT
Start: 2023-06-07 | End: 2023-06-07

## 2023-06-07 RX ORDER — OXYCODONE HYDROCHLORIDE AND ACETAMINOPHEN 5; 325 MG/1; MG/1
2 TABLET ORAL EVERY 6 HOURS PRN
Status: DISCONTINUED | OUTPATIENT
Start: 2023-06-07 | End: 2023-06-12 | Stop reason: HOSPADM

## 2023-06-07 RX ORDER — FUROSEMIDE 10 MG/ML
40 INJECTION INTRAMUSCULAR; INTRAVENOUS DAILY
Status: DISCONTINUED | OUTPATIENT
Start: 2023-06-08 | End: 2023-06-07

## 2023-06-07 RX ORDER — METOCLOPRAMIDE HYDROCHLORIDE 5 MG/ML
10 INJECTION INTRAMUSCULAR; INTRAVENOUS ONCE AS NEEDED
Status: DISCONTINUED | OUTPATIENT
Start: 2023-06-07 | End: 2023-06-07

## 2023-06-07 RX ORDER — FENTANYL CITRATE 50 UG/ML
INJECTION, SOLUTION INTRAMUSCULAR; INTRAVENOUS AS NEEDED
Status: DISCONTINUED | OUTPATIENT
Start: 2023-06-07 | End: 2023-06-07

## 2023-06-07 RX ORDER — FENTANYL CITRATE/PF 50 MCG/ML
25 SYRINGE (ML) INJECTION
Status: DISCONTINUED | OUTPATIENT
Start: 2023-06-07 | End: 2023-06-07

## 2023-06-07 RX ORDER — CEFAZOLIN SODIUM 1 G/3ML
INJECTION, POWDER, FOR SOLUTION INTRAMUSCULAR; INTRAVENOUS AS NEEDED
Status: DISCONTINUED | OUTPATIENT
Start: 2023-06-07 | End: 2023-06-07

## 2023-06-07 RX ORDER — PROPOFOL 10 MG/ML
INJECTION, EMULSION INTRAVENOUS CONTINUOUS PRN
Status: DISCONTINUED | OUTPATIENT
Start: 2023-06-07 | End: 2023-06-07

## 2023-06-07 RX ADMIN — LEVOTHYROXINE SODIUM 75 MCG: 75 TABLET ORAL at 05:08

## 2023-06-07 RX ADMIN — PROPOFOL 100 MCG/KG/MIN: 10 INJECTION, EMULSION INTRAVENOUS at 14:29

## 2023-06-07 RX ADMIN — PROPOFOL 60 MG: 10 INJECTION, EMULSION INTRAVENOUS at 14:28

## 2023-06-07 RX ADMIN — POLYETHYLENE GLYCOL 3350 17 G: 17 POWDER, FOR SOLUTION ORAL at 08:26

## 2023-06-07 RX ADMIN — VANCOMYCIN HYDROCHLORIDE 1000 MG: 1 INJECTION, SOLUTION INTRAVENOUS at 09:42

## 2023-06-07 RX ADMIN — MIDAZOLAM 2 MG: 1 INJECTION INTRAMUSCULAR; INTRAVENOUS at 14:24

## 2023-06-07 RX ADMIN — HEPARIN SODIUM 5000 UNITS: 5000 INJECTION INTRAVENOUS; SUBCUTANEOUS at 21:11

## 2023-06-07 RX ADMIN — HEPARIN SODIUM 5000 UNITS: 5000 INJECTION INTRAVENOUS; SUBCUTANEOUS at 05:08

## 2023-06-07 RX ADMIN — OXYCODONE HYDROCHLORIDE AND ACETAMINOPHEN 1 TABLET: 5; 325 TABLET ORAL at 18:46

## 2023-06-07 RX ADMIN — CEFAZOLIN 2000 MG: 1 INJECTION, POWDER, FOR SOLUTION INTRAMUSCULAR; INTRAVENOUS at 14:44

## 2023-06-07 RX ADMIN — VANCOMYCIN HYDROCHLORIDE 1000 MG: 1 INJECTION, SOLUTION INTRAVENOUS at 20:36

## 2023-06-07 RX ADMIN — ESCITALOPRAM OXALATE 20 MG: 10 TABLET ORAL at 08:24

## 2023-06-07 RX ADMIN — FENTANYL CITRATE 25 MCG: 50 INJECTION INTRAMUSCULAR; INTRAVENOUS at 14:28

## 2023-06-07 RX ADMIN — BUPROPION HYDROCHLORIDE 300 MG: 150 TABLET, EXTENDED RELEASE ORAL at 08:25

## 2023-06-07 RX ADMIN — VANCOMYCIN HYDROCHLORIDE 750 MG: 750 INJECTION, SOLUTION INTRAVENOUS at 01:08

## 2023-06-07 RX ADMIN — DOCUSATE SODIUM 100 MG: 100 CAPSULE, LIQUID FILLED ORAL at 08:24

## 2023-06-07 RX ADMIN — AMLODIPINE BESYLATE 10 MG: 10 TABLET ORAL at 08:25

## 2023-06-07 RX ADMIN — SODIUM CHLORIDE, SODIUM LACTATE, POTASSIUM CHLORIDE, AND CALCIUM CHLORIDE: .6; .31; .03; .02 INJECTION, SOLUTION INTRAVENOUS at 14:21

## 2023-06-07 RX ADMIN — Medication 400 MG: at 08:24

## 2023-06-07 RX ADMIN — LIDOCAINE HYDROCHLORIDE 50 MG: 10 INJECTION, SOLUTION EPIDURAL; INFILTRATION; INTRACAUDAL at 14:28

## 2023-06-07 RX ADMIN — CEFTRIAXONE 1000 MG: 1 INJECTION, SOLUTION INTRAVENOUS at 18:13

## 2023-06-07 RX ADMIN — FUROSEMIDE 40 MG: 10 INJECTION, SOLUTION INTRAVENOUS at 08:26

## 2023-06-07 RX ADMIN — DOCUSATE SODIUM 100 MG: 100 CAPSULE, LIQUID FILLED ORAL at 17:30

## 2023-06-07 NOTE — ASSESSMENT & PLAN NOTE
Echocardiogram 1/30/2023 with EF 56% and mild diastolic dysfunction  Currently appears euvolemic  BNP 26   Stop IV furosemide  Will consider resuming PO furosemide after surgery

## 2023-06-07 NOTE — CONSULTS
Consult Cardiology    Lady Le 1955, 69 y o  male MRN: [id number]    Unit/Bed#: -01 Encounter: [id number]    Attending Provider: Olaf Michelle MD   Primary Care Provider: Misty Monreal MD   Date admitted to hospital: 6/6/2023       Inpatient consult to Cardiology  Consult performed by: JESUS Trejo  Consult ordered by: Jerzy Tavares MD          Preoperative cardiovascular examination  Assessment & Plan  Pre-operative cardiac risk stratification requested prior to left great toe amputation today  I did make physician aware that patient ate a small amount of food this morning  ECG today shows sinus rhythm with one PVC  BNP normal at 26  He currently appears euvolemic and is without anginal complaints  Aortic stenosis noted to be moderate by echocardiogram and MAKEDA in 2/2023  He is acceptable cardiac risk to undergo surgery  Please avoid hypotension in the setting of aortic stenosis  Optimize electrolytes for K+ > 4, Mag >2  Monitor volume status due to history of chronic diastolic heart failure  Moderate aortic stenosis  Assessment & Plan  Patient has a know history of aortic stenosis reported to be moderate on echo 11/2021 through [facility]  Echocardiogram 1/30/2023 continues to show moderate aortic stenosis with PV 3 52, mean gradient 30 mmHg, DI 0 30  Heart failure with preserved ejection fraction  Assessment & Plan  Echocardiogram 1/30/2023 with EF 22% and mild diastolic dysfunction  Currently appears euvolemic  BNP 26   Stop IV furosemide  Will consider resuming PO furosemide after surgery  ANNALEE (acute kidney injury) [facility])  Assessment & Plan  Renal function is at baseline today  Avoid nephrotoxic agents  Physician Requesting Consult: Olaf Michelle MD    Reason for Consult / Principal Problem: Pre-operative cardiac evaluation in preparation for toe amputation        HPI: Lady Le is a 69y o  year old male who has a history of aortic stenosis, chronic diastolic heart failure, PVC's, hypertension, and hypothyrodism who follows with our office in the outpatient setting  Echocardiogram completed 1/30/2023 showed EF 60% with moderate aortic stenosis  Moderate aortic stenosis was also noted on transesophageal echocardiogram done 2/3/2023  Patient presented to [address] [place] [address] 6/6/2023 at the direction of his podiatrist due to osteomyelitis of of the great toe on the left foot with plan for amputation  Cardiology was consulted for pre-operative cardiac risk stratification in preparation for surgery  At the time of my assessment he is sitting up, just started eating breakfast  I asked him to not eat breakfast as he is going to be going for surgery  He denies any anginal complaints  No chest pain  No current shortness of breath  He states that he did note intermittent shortness of breath over 1 week ago  He has been taking his furosemide 40 mg daily at home  He denies palpitations, lightheadedness, near syncope  No lower leg edema  He notes intermittent lower abdominal swelling and also constipation  Review of Systems   Constitutional: Negative for chills and fever  HENT: Negative for ear pain and sore throat  Eyes: Negative for pain and visual disturbance  Respiratory: Negative for cough and shortness of breath (reports over 1 week ago, but since resolved)  Cardiovascular: Negative for chest pain, palpitations and leg swelling  Gastrointestinal: Positive for abdominal distention (intermittent, lower abdomen) and constipation  Negative for abdominal pain and vomiting  Genitourinary: Negative for dysuria and hematuria  Musculoskeletal: Negative for arthralgias and back pain  Skin: Negative for color change and rash  Neurological: Negative for seizures and syncope  All other systems reviewed and are negative         Historical Information     Past Medical History:   Diagnosis Date   • Anxiety    • Aortic stenosis    • Borderline high "cholesterol    • CHF (congestive heart failure) ([facility] [address] )     Asaf Jackson-cardio-   • Colon polyp    • Depression    • Heart murmur    • Hypertension    • Hypothyroidism    • Prediabetes    • PVCs (premature ventricular contractions)     \"skip\"   • Wears glasses     readers     Past Surgical History:   Procedure Laterality Date   • APPENDECTOMY     • BACK SURGERY     • CATARACT EXTRACTION Bilateral    • COLONOSCOPY     • TN CORRJ HALLUX VALGUS W/SESMDC W/RESCJ PROX PHAL Left 2023    Procedure: Moss Litter and wound debridement;  Surgeon: Jn Garcia DPM;  Location:  MAIN OR;  Service: Podiatry   • TONSILLECTOMY     • TOTAL SHOULDER REPLACEMENT Left    • WISDOM TOOTH EXTRACTION       Social History     Substance and Sexual Activity   Alcohol Use Yes    Comment: socially-2 drinks per week     Social History     Substance and Sexual Activity   Drug Use Not Currently    Comment: sporadic in young adulthood     Social History     Tobacco Use   Smoking Status Former   • Packs/day: 1 00   • Years: 50 00   • Total pack years: 50 00   • Types: Cigarettes   • Quit date: 2023   • Years since quittin 4   Smokeless Tobacco Never       Family History:   Family History   Problem Relation Age of Onset   • Dementia Mother    • Blindness Mother    • Cancer Mother    • Dementia Father        Meds/Allergies     current meds:   Current Facility-Administered Medications   Medication Dose Route Frequency   • acetaminophen (TYLENOL) tablet 650 mg  650 mg Oral Q6H PRN   • amLODIPine (NORVASC) tablet 10 mg  10 mg Oral Daily   • buPROPion (WELLBUTRIN XL) 24 hr tablet 300 mg  300 mg Oral Daily   • cefTRIAXone (ROCEPHIN) IVPB (premix in dextrose) 1,000 mg 50 mL  1,000 mg Intravenous Q24H   • docusate sodium (COLACE) capsule 100 mg  100 mg Oral BID   • escitalopram (LEXAPRO) tablet 20 mg  20 mg Oral Daily   • heparin (porcine) subcutaneous injection 5,000 Units  5,000 Units Subcutaneous Q8H [address]   • levothyroxine " "tablet 75 mcg  75 mcg Oral Early Morning   • magnesium Oxide (MAG-OX) tablet 400 mg  400 mg Oral Daily   • morphine injection 2 mg  2 mg Intravenous Q6H PRN   • ondansetron (ZOFRAN) injection 4 mg  4 mg Intravenous Q6H PRN   • oxyCODONE-acetaminophen (PERCOCET) 5-325 mg per tablet 1 tablet  1 tablet Oral Q6H PRN   • oxyCODONE-acetaminophen (PERCOCET) 5-325 mg per tablet 2 tablet  2 tablet Oral Q6H PRN   • polyethylene glycol (MIRALAX) packet 17 g  17 g Oral Daily   • vancomycin (VANCOCIN) IVPB (premix in dextrose) 1,000 mg 200 mL  1,000 mg Intravenous Q12H          Allergies   Allergen Reactions   • Statins Myalgia     Other reaction(s): muscle aches and joint aches         Objective     Vitals: Blood pressure 135/80, pulse 71, temperature 98 1 °F (36 7 °C), resp  rate 18, height 6' 1\" (1 854 m), weight 99 1 kg (218 lb 6 4 oz), SpO2 94 %  , Body mass index is 28 81 kg/m²  Orthostatic Blood Pressures    Flowsheet Row Most Recent Value   Blood Pressure 135/80 filed at 06/07/2023 0607   Patient Position - Orthostatic VS Lying filed at 06/06/2023 2813          Systolic (58KSA), ZZI:136 , Min:120 , FSI:583     Diastolic (02NTK), CNW:46, Min:76, Max:83        Intake/Output Summary (Last 24 hours) at 6/7/2023 1236  Last data filed at 6/7/2023 0947  Gross per 24 hour   Intake 350 ml   Output 750 ml   Net -400 ml       Weight (last 2 days)     Date/Time Weight    06/07/23 0600 99 1 (218 4)    06/06/23 1526 99 8 (220)          Invasive Devices     Peripheral Intravenous Line  Duration           Peripheral IV 06/06/23 Left;Ventral (anterior) Forearm <1 day    Peripheral IV 06/06/23 Right;Ventral (anterior) Forearm <1 day                Physical Exam  Vitals and nursing note reviewed  Constitutional:       General: He is not in acute distress  Appearance: He is well-developed  HENT:      Head: Normocephalic and atraumatic     Eyes:      Conjunctiva/sclera: Conjunctivae normal    Cardiovascular:      Rate and Rhythm: " Normal rate and regular rhythm  No extrasystoles are present  Heart sounds: Murmur heard  Systolic murmur is present  Pulmonary:      Effort: Pulmonary effort is normal  No respiratory distress  Breath sounds: Normal breath sounds  Comments: Breathing comfortably on room air  Abdominal:      Palpations: Abdomen is soft  Tenderness: There is no abdominal tenderness  Musculoskeletal:         General: No swelling  Cervical back: Neck supple  Right lower leg: No edema  Left lower leg: No edema  Skin:     General: Skin is warm and dry  Capillary Refill: Capillary refill takes less than 2 seconds  Neurological:      Mental Status: He is alert  Psychiatric:         Mood and Affect: Mood normal          Speech: Speech normal          Behavior: Behavior normal  Behavior is cooperative           Cognition and Memory: Cognition and memory normal               Laboratory Results:          CBC with diff:   Results from last 7 days   Lab Units 06/07/23  0607 06/06/23  1610   HEMATOCRIT % 44 5 45 3   HEMOGLOBIN g/dL 14 7 15 1   MCH pg 31 2 31 2   MCHC g/dL 33 0 33 3   MCV fL 95 94   MPV fL 9 5 9 6   NRBC AUTO /100 WBCs 0 0   PLATELETS Thousands/uL 214 264   RBC Million/uL 4 71 4 84   RDW % 13 9 13 9   WBC Thousand/uL 9 26 10 41*         CMP:  Results from last 7 days   Lab Units 06/07/23  0504 06/06/23  1610   ALK PHOS U/L 62 71   ALT U/L 7 11   AST U/L 13 14   BUN mg/dL 17 26*   CALCIUM mg/dL 9 0 9 5   CHLORIDE mmol/L 106 103   CO2 mmol/L 25 25   CREATININE mg/dL 1 00 1 36*   EGFR ml/min/1 73sq m 76 52   POTASSIUM mmol/L 3 8 3 8       BMP:  Results from last 7 days   Lab Units 06/07/23  0504 06/06/23  1610   BUN mg/dL 17 26*   CALCIUM mg/dL 9 0 9 5   CHLORIDE mmol/L 106 103   CO2 mmol/L 25 25   CREATININE mg/dL 1 00 1 36*   POTASSIUM mmol/L 3 8 3 8       BNP:  26    Magnesium:   Results from last 7 days   Lab Units 06/06/23  1610   MAGNESIUM mg/dL 1 9       Coags:   Results from last 7 days   Lab Units 06/06/23  1610   INR  0 89   PTT seconds 29       Cardiac testing:     Reviewed echocardiogram, MAKEDA, and Holter monitor results from our [place]  Imaging: I have personally reviewed pertinent reports  MRI foot/forefoot toes left wo contrast    Result Date: 6/6/2023  Narrative: MRI LEFT FOOT INDICATION:   T81 31XD: Disruption of external operation (surgical) wound, not elsewhere classified, subsequent encounter  COMPARISON: Left foot radiographs 6/6/2023 and 5/17/2023 TECHNIQUE:  Multiplanar/multisequence MR of the left foot was performed  FINDINGS: SUBCUTANEOUS TISSUES: Dorsal edema in the midfoot  No evidence of a focal fluid collection  BONES: Erosion and T1 marrow replacement at the base of the first proximal phalanx with marrow edema extending throughout the entire phalanx (images #4/20 in #3/20)  Marrow edema involving the distal 3 1 cm of the first metatarsal, without T1 marrow replacement (image #3/20)  FIRST MTP JOINT: Large joint effusion  Status post first proximal phalangeal base osteotomy  SESAMOID BONES:  Intact  OTHER ARTICULAR SURFACE:  Normal  PLANTAR FASCIA:  Intact  LISFRANC LIGAMENT:  Intact  FOREFOOT TENDONS: Intact  INTERMETATARSAL REGIONS: No bursitis or Watson's neuroma  MUSCULATURE: Diffuse fatty atrophy of the visualized intrinsic foot musculature consistent with chronic denervation changes  Impression: Findings consistent with definite osteomyelitis of the great toe proximal phalanx  Nonspecific marrow edema involving the distal 3 1 cm of the first metatarsal, possibly reactive, although early osteomyelitis cannot be excluded  Large first MTP joint effusion, nonspecific; septic arthritis cannot be excluded on the basis of imaging  No evidence of a soft tissue abscess  The study was marked in [facility] for immediate notification   Workstation performed: [id number]     X-ray foot left 3+ views    Result Date: 5/22/2023  Narrative: LEFT FOOT INDICATION: Z44 832: Pain in left foot  COMPARISON: 5/1/2023 VIEWS:  XR FOOT 3+ VW LEFT FINDINGS: There are postoperative changes of the first proximal phalanx related to resection arthroplasty  No evidence of osseous complication  Stable alignment  No significant degenerative changes  Pes planus  No lytic or blastic osseous lesion  Diffuse soft tissue swelling of the first ray, correlate clinically for cellulitis  Impression: Stable postoperative changes  No evidence of osseous complication  Diffuse soft tissue swelling of the first ray, correlate clinically for cellulitis  Pes planus  Workstation performed: [id number]       EKG reviewed personally: EKG: Sinus rhythm with one PVC  Counseling / Coordination of Care  Total floor / unit time spent today 45 minutes  Greater than 50% of total time was spent with the patient and / or family counseling and / or coordination of care  A description of the counseling / coordination of care: Discussed case with Dr Jonathan Edouard          Code Status: Level 1 - Full Code

## 2023-06-07 NOTE — PHYSICAL THERAPY NOTE
PHYSICAL THERAPY NOTE          Patient Name: Titus Cabral  XKGHE'T Date: 6/7/2023 06/07/23 1410   Note Type   Note type Evaluation; Cancelled Session   Cancel Reasons Medical status     Received order for PT consult  Chart reviewed  Pt admitted with Acute hematogenous osteomyelitis of left foot requiring amputation of left toe this date  Will cancel PT services and see patient as medically appropriate at a later time post op      Dixon Pierre, PT,DPT Adult

## 2023-06-07 NOTE — PROGRESS NOTES
[address]  Progress Note  Name: Reinaldo Blanco I  MRN: [id number]  Unit/Bed#: -01 I Date of Admission: 6/6/2023   Date of Service: 6/7/2023 I Hospital Day: 1    Assessment/Plan   Heart failure with preserved ejection fraction  Assessment & Plan  Diastolic in nature  Last echocardiogram shows ejection fraction of 60% with grade 1 diastolic dysfunction  Appreciate cardiology input  Hold further diuretics, can resume likely postsurgery  Currently euvolemic    ANNALEE (acute kidney injury) ([facility] )  Assessment & Plan  Creatinine on 4/19/2023 was 1 01, creatinine on admission 1 36  Now improved after IV Lasix due to volume overload  Renal function improved, currently at baseline  Continue to monitor labs    Moderate aortic stenosis  Assessment & Plan  Known history of moderate aortic stenosis  Appreciate cardiology input, avoid aggressive diuresis    * Acute hematogenous osteomyelitis of left foot ([facility] )  Assessment & Plan  MRI Foot: Findings consistent with definite osteomyelitis of the great toe proximal phalanx  Nonspecific marrow edema involving the distal 3 1 cm of the first metatarsal, possibly reactive, although early osteomyelitis cannot be excluded  Large first MTP joint effusion, nonspecific; septic arthritis cannot be excluded on the basis of imaging  No evidence of a soft tissue abscess  Continue IV ceftriaxone vancomycin  Follow wound culture, blood culture  N p o , podiatry consulted with plans for surgical resection later this evening  Pain control           Acute on chronic diastolic (congestive) heart failure (POA) in the setting of ANNALEE as evidenced by increase in weight even though compliant with Lasix, rales noted being treated with IV Lasix, I&O monitoring       VTE Pharmacologic Prophylaxis:   Pharmacologic: Heparin  Mechanical VTE Prophylaxis in Place: Yes    Patient Centered Rounds: I have performed bedside rounds with nursing staff today      Discussions with Specialists or Other Care Team Provider: Cardiology    Current Length of Stay: 1 day(s)    Current Patient Status: Inpatient   Certification Statement: The patient will continue to require additional inpatient hospital stay due to IV abx pending surgery    Discharge Plan: pending    Code Status: Level 1 - Full Code      Subjective:   No events overnight  Pain controlled  Denies fevers, chills, CP or sob  Objective:     Vitals:   Temp (24hrs), Av 2 °F (36 8 °C), Min:98 °F (36 7 °C), Max:98 4 °F (36 9 °C)    Temp:  [98 °F (36 7 °C)-98 4 °F (36 9 °C)] 98 1 °F (36 7 °C)  HR:  [59-74] 71  Resp:  [16-22] 18  BP: (120-151)/(76-83) 135/80  SpO2:  [93 %-99 %] 94 %  Body mass index is 28 81 kg/m²  Input and Output Summary (last 24 hours): Intake/Output Summary (Last 24 hours) at 2023 1155  Last data filed at 2023 0947  Gross per 24 hour   Intake 350 ml   Output 750 ml   Net -400 ml       Physical Exam:     Physical Exam  Vitals and nursing note reviewed  Constitutional:       General: He is not in acute distress  Appearance: He is not ill-appearing  Eyes:      General:         Right eye: No discharge  Conjunctiva/sclera: Conjunctivae normal       Pupils: Pupils are equal, round, and reactive to light  Cardiovascular:      Rate and Rhythm: Normal rate  Heart sounds: Murmur heard  Pulmonary:      Effort: Pulmonary effort is normal    Abdominal:      General: Abdomen is flat  Bowel sounds are normal  There is no distension  Palpations: There is no mass  Musculoskeletal:         General: Swelling (left foot) and tenderness (left foot) present  Skin:     Findings: Erythema (left foot) present  Neurological:      Mental Status: He is alert and oriented to person, place, and time  Mental status is at baseline  Cranial Nerves: No cranial nerve deficit  Sensory: No sensory deficit     Psychiatric:         Mood and Affect: Mood normal          Additional Data: Labs:    Results from last 7 days   Lab Units 06/07/23  0607   EOS PCT % 6   HEMATOCRIT % 44 5   HEMOGLOBIN g/dL 14 7   LYMPHS PCT % 21   MONOS PCT % 13*   NEUTROS PCT % 60   PLATELETS Thousands/uL 214   WBC Thousand/uL 9 26     Results from last 7 days   Lab Units 06/07/23  0504   ANION GAP mmol/L 6   ALBUMIN g/dL 3 6   ALK PHOS U/L 62   ALT U/L 7   AST U/L 13   BUN mg/dL 17   CALCIUM mg/dL 9 0   CHLORIDE mmol/L 106   CO2 mmol/L 25   CREATININE mg/dL 1 00   GLUCOSE RANDOM mg/dL 111   POTASSIUM mmol/L 3 8   SODIUM mmol/L 137   TOTAL BILIRUBIN mg/dL 0 40     Results from last 7 days   Lab Units 06/06/23  1610   INR  0 89             Results from last 7 days   Lab Units 06/06/23  1610   LACTIC ACID mmol/L 1 3           * I Have Reviewed All Lab Data Listed Above  * Additional Pertinent Lab Tests Reviewed: [address] Admission Reviewed    Imaging:    X-ray foot left 3+ views    Result Date: 6/7/2023  Impression: Osteomyelitis of the base of the 1st proximal phalanx  Workstation performed: [id number]     MRI foot/forefoot toes left wo contrast    Result Date: 6/6/2023  Impression: Findings consistent with definite osteomyelitis of the great toe proximal phalanx  Nonspecific marrow edema involving the distal 3 1 cm of the first metatarsal, possibly reactive, although early osteomyelitis cannot be excluded  Large first MTP joint effusion, nonspecific; septic arthritis cannot be excluded on the basis of imaging  No evidence of a soft tissue abscess  The study was marked in [facility] for immediate notification  Workstation performed: [id number]       Recent Cultures (last 7 days):     Results from last 7 days   Lab Units 06/06/23  2109 06/06/23  1611 06/06/23  1610   BLOOD CULTURE   --  Received in Microbiology Lab  Culture in Progress  Received in Microbiology Lab  Culture in Progress     GRAM STAIN RESULT  1+ Polys  No bacteria seen  --   --        Last 24 Hours Medication List:   Current Facility-Administered Medications   Medication Dose Route Frequency Provider Last Rate   • acetaminophen  650 mg Oral Q6H PRN Jerzy Tavares MD     • amLODIPine  10 mg Oral Daily Jerzy Tavares MD     • buPROPion  300 mg Oral Daily Jerzy Tavares MD     • cefTRIAXone  1,000 mg Intravenous Q24H Jerzy Tavares MD 1,000 mg (06/06/23 2121)   • docusate sodium  100 mg Oral BID Jerzy Tavares MD     • escitalopram  20 mg Oral Daily Jerzy Tavares MD     • [START ON 6/8/2023] furosemide  40 mg Intravenous Daily JESUS Lozano     • heparin (porcine)  5,000 Units Subcutaneous Q8H Eri Martin MD     • levothyroxine  75 mcg Oral Early Morning Jerzy Tavares MD     • magnesium Oxide  400 mg Oral Daily Haresh Z MD Veronica     • morphine injection  2 mg Intravenous Q6H PRN Olaf Michelle MD     • ondansetron  4 mg Intravenous Q6H PRN Jerzy Tavares MD     • oxyCODONE-acetaminophen  1 tablet Oral Q6H PRN Olaf Michelle MD     • oxyCODONE-acetaminophen  2 tablet Oral Q6H PRN Olaf Michelle MD     • polyethylene glycol  17 g Oral Daily Jerzy Tavares MD     • vancomycin  1,000 mg Intravenous Q12H Priscila Bello DPM 1,000 mg (06/07/23 5878)        Today, Patient Was Seen By: Olaf Michelle MD    ** Please Note: Dictation voice to text software may have been used in the creation of this document   **

## 2023-06-07 NOTE — QUICK NOTE
- Significant infection of the left first MTPJ  - Cultures were taken aerobic, anaerobic from both the soft tissue and also the proximal bone margin of the first metatarsal cut  - He was packed open, dressing may stay intact until further return to the OR on 6/9   - Hopefully cultures will be resulted by then and we will plan for delayed primary closure, he is strict bedrest for the next 24 hours and following that he may be strict nonweightbearing to the left foot  - High probability for PICC line placement and continued IV antibiotics for 6 weeks, bone quality the first metatarsal is somewhat suboptimal

## 2023-06-07 NOTE — ASSESSMENT & PLAN NOTE
Patient has a know history of aortic stenosis reported to be moderate on echo 11/2021 through [facility]  Echocardiogram 1/30/2023 continues to show moderate aortic stenosis with PV 3 52, mean gradient 30 mmHg, DI 0 30

## 2023-06-07 NOTE — PROGRESS NOTES
Erick Evans is a 69 y o  male who is currently ordered Vancomycin IV with management by the Pharmacy Consult service  Relevant clinical data and objective / subjective history reviewed  Vancomycin Assessment:  Indication and Goal AUC/Trough: Bone/joint infection (goal -600, trough >10)  Clinical Status: improving  Micro:     Renal Function:  SCr: 1 0 mg/dL  CrCl: 86 4 mL/min  Renal replacement: Not on dialysis  Days of Therapy: 2  Current Dose: 750mg IV Q12H  Vancomycin Plan:  New Dosing: increase to 1000mg Q12H  Estimated AUC: 518 mcg*hr/mL  Estimated Trough: 17 2 mcg/mL  Next Level: 6/8/23 @0600  Renal Function Monitoring: Daily BMP and UOP  Pharmacy will continue to follow closely for s/sx of nephrotoxicity, infusion reactions and appropriateness of therapy  BMP and CBC will be ordered per protocol  We will continue to follow the patient’s culture results and clinical progress daily      Elayne Maker, Pharmacist

## 2023-06-07 NOTE — ASSESSMENT & PLAN NOTE
Pre-operative cardiac risk stratification requested prior to left great toe amputation today  I did make physician aware that patient ate a small amount of food this morning  ECG today shows sinus rhythm with one PVC  BNP normal at 26  He currently appears euvolemic and is without anginal complaints  Aortic stenosis noted to be moderate by echocardiogram and MAKEDA in 2/2023  He is acceptable cardiac risk to undergo surgery  Please avoid hypotension in the setting of aortic stenosis  Optimize electrolytes for K+ > 4, Mag >2  Monitor volume status due to history of chronic diastolic heart failure

## 2023-06-07 NOTE — ASSESSMENT & PLAN NOTE
Diastolic in nature  Last echocardiogram shows ejection fraction of 60% with grade 1 diastolic dysfunction  Appreciate cardiology input  Hold further diuretics, can resume likely postsurgery  Currently euvolemic

## 2023-06-07 NOTE — ANESTHESIA PREPROCEDURE EVALUATION
Procedure:  AMPUTATION TOE (Left: Toe)    Relevant Problems   ANESTHESIA (within normal limits)      CARDIO  Echo: 60% EF, mod AS   (+) Benign essential HTN   (+) Moderate aortic stenosis   (+) PVCs (premature ventricular contractions)      /RENAL   (+) ANNALEE (acute kidney injury) (HCC)      NEURO/PSYCH   (+) MDD (major depressive disorder)      PULMONARY   (+) Heart failure with preserved ejection fraction   (+) Other emphysema (HCC)      Nervous and Auditory   (+) Peripheral neuropathy      Other   (+) Acute hematogenous osteomyelitis of left foot (HCC)   (+) Status post foot surgery   (+) Ulcer of left foot (HCC)        Physical Exam    Airway    Mallampati score: II  TM Distance: >3 FB  Neck ROM: full     Dental   No notable dental hx     Cardiovascular      Pulmonary      Other Findings        Anesthesia Plan  ASA Score- 3     Anesthesia Type- IV sedation with anesthesia with ASA Monitors  Additional Monitors:   Airway Plan:     Comment: Mod AS on echo, but no C/P or angina or MONTANEZ  Pt ate small amount of food at 8:25a  He has no pain or nausea or hx of gastroparesis so will proceed with sedation after just under 6 hours  Plan Factors-Exercise tolerance (METS): >4 METS  Chart reviewed  EKG reviewed  Existing labs reviewed  Patient summary reviewed  Patient is not a current smoker  Induction-     Postoperative Plan-     Informed Consent- Anesthetic plan and risks discussed with patient  I personally reviewed this patient with the CRNA  Discussed and agreed on the Anesthesia Plan with the CRNA  Odin Montes

## 2023-06-07 NOTE — CONSULTS
[facility] - Consultation    Patient Information:   Belen Sanders 69 y o  male MRN: [id number]  Unit/Bed#: -01 Encounter: [id number]  PCP: Ladan Mckeon MD  Date of Admission:  6/6/2023  Date of Consultation: 06/07/23  Requesting Physician: Evgeny Carranza MD      ASSESSMENT:    Belen Sanders is a 69 y o  male with:    1  Osteomyelitis of left 1st proximal phalanx base, osteomyelitis of left first metatarsal  2  Septic arthritis of Left 1st MTPJ      PLAN:    · Patient is currently n p o , case was discussed detail with internal medicine  · MRI was reviewed and does show significant T1 and T2 abnormalities of the base the proximal phalanx which is consistent with osteomyelitis of base the proximal phalanx, base of the fluid collection and also the increased aching of the first metatarsal this is likely correlated for septic arthritis and also osteomyelitis of the left first metatarsal  · We will plan for left partial first ray based off of his exuberant soft tissue swelling he will likely need to further delayed primary closure later this week  · We will plan for proximal cultures possible bone biopsy first met and soft tissue cultures today, if these do return positive especially of the remnant portion of first metatarsal he will likely need 6 weeks IV antibiotics with PICC line placement  · Rest of care per primary team   · Dr Merna Sarkar previous notes were also reviewed, arterial studies reviewed as well and do show no need for vascular intervention, normal healing potential        SUBJECTIVE    History of Present Illness:    Belen Sanders is a 69 y o  male with past medical history of previous Shoemaker arthroplasty who presents with worsening swelling, drainage, redness, erythema of the left 1st MTPJ  Review of Systems:    Constitutional: Negative  HENT: Negative  Eyes: Negative  Respiratory: Negative  Cardiovascular: Negative  Gastrointestinal: Negative  "  Musculoskeletal: neg   Skin:as above   Neurological: neg   Psych: Negative       Past Medical and Surgical History:     Past Medical History:   Diagnosis Date   • Anxiety    • Aortic stenosis    • Borderline high cholesterol    • CHF (congestive heart failure) ([facility])     Asaf Jackson-cardio-   • Colon polyp    • Depression    • Heart murmur    • Hypertension    • Hypothyroidism    • Prediabetes    • PVCs (premature ventricular contractions)     \"skip\"   • Wears glasses     readers       Past Surgical History:   Procedure Laterality Date   • APPENDECTOMY     • BACK SURGERY     • CATARACT EXTRACTION Bilateral    • COLONOSCOPY     • MT CORRJ HALLUX VALGUS W/SESMDC W/RESCJ PROX PHAL Left 4/19/2023    Procedure: BUNIONECTOMY PERDOMO and wound debridement;  Surgeon: Rommel Pickard DPM;  Location:  MAIN OR;  Service: Podiatry   • TONSILLECTOMY     • TOTAL SHOULDER REPLACEMENT Left    • WISDOM TOOTH EXTRACTION         Meds/Allergies:    Medications Prior to Admission   Medication   • amLODIPine (NORVASC) 10 mg tablet   • buPROPion (WELLBUTRIN XL) 300 mg 24 hr tablet   • escitalopram (LEXAPRO) 20 mg tablet   • furosemide (LASIX) 40 mg tablet   • gabapentin (NEURONTIN) 100 mg capsule   • levothyroxine 75 mcg tablet   • lisinopril (ZESTRIL) 10 mg tablet   • Magnesium 400 MG CAPS   • Multiple Vitamins-Minerals (OCUVITE ADULT FORMULA PO)   • potassium chloride (MICRO-K) 10 MEQ CR capsule   • ammonium lactate (LAC-HYDRIN) 12 % cream   • sulfamethoxazole-trimethoprim (BACTRIM DS) 800-160 mg per tablet       Allergies   Allergen Reactions   • Statins Myalgia     Other reaction(s): muscle aches and joint aches         Social History:     Marital Status: /Civil Union    Substance Use History:   Social History     Substance and Sexual Activity   Alcohol Use Yes    Comment: socially-2 drinks per week     Social History     Tobacco Use   Smoking Status Former   • Packs/day: 1 00   • Years: 50 00   • Total pack years: " "50 00   • Types: Cigarettes   • Quit date: 2023   • Years since quittin 4   Smokeless Tobacco Never     Social History     Substance and Sexual Activity   Drug Use Not Currently    Comment: sporadic in young adulthood       Family History:    Family History   Problem Relation Age of Onset   • Dementia Mother    • Blindness Mother    • Cancer Mother    • Dementia Father          OBJECTIVE:    Vitals:   Blood Pressure: 135/80 (23)  Pulse: 71 (23)  Temperature: 98 1 °F (36 7 °C) (23)  Temp Source: Temporal (23)  Respirations: 18 (23)  Height: 6' 1\" (185 4 cm) (23 1526)  Weight - Scale: 99 1 kg (218 lb 6 4 oz) (23)  SpO2: 94 % (23)    Physical Exam:     General Appearance: Alert, cooperative, no distress  HEENT: Head normocephalic, atraumatic, without obvious abnormality  Heart: Normal rate and rhythm  Lungs: Non-labored breathing  No respiratory distress  Abdomen: Without distension  Psychiatric: AAOx3  Lower Extremity:  Vascular: There is exuberant soft tissue swelling to the left foot encompassing the left hallux and also first MTPJ, extensive swelling  No pain with palpation of the affected area, there is pinpoint ulceration overlying the incision with significant purulent and serous drainage emanating from this area    No ascending cellulitis        Additional Data:     Lab Results: I have personally reviewed pertinent labs including:    Results from last 7 days   Lab Units 23  0607   EOS PCT % 6   HEMATOCRIT % 44 5   HEMOGLOBIN g/dL 14 7   LYMPHS PCT % 21   MONOS PCT % 13*   NEUTROS PCT % 60   PLATELETS Thousands/uL 214   WBC Thousand/uL 9 26     Results from last 7 days   Lab Units 23  0504   ALK PHOS U/L 62   ALT U/L 7   AST U/L 13   BUN mg/dL 17   CALCIUM mg/dL 9 0   CHLORIDE mmol/L 106   CO2 mmol/L 25   CREATININE mg/dL 1 00   POTASSIUM mmol/L 3 8     Results from last 7 days   Lab Units 23  1610 " "  INR  0 89       Cultures: I have personally reviewed pertinent cultures including:    Results from last 7 days   Lab Units 06/06/23  2109 06/06/23  1611 06/06/23  1610   BLOOD CULTURE   --  Received in Microbiology Lab  Culture in Progress  Received in Microbiology Lab  Culture in Progress  GRAM STAIN RESULT  1+ Polys  No bacteria seen  --   --            Imaging: I have personally reviewed pertinent films in PACS  EKG, Pathology, and Other Studies: I have personally reviewed pertinent reports  ** Please Note: Portions of the record may have been created with voice recognition software  Occasional wrong word or \"sound a like\" substitutions may have occurred due to the inherent limitations of voice recognition software  Read the chart carefully and recognize, using context, where substitutions have occurred   **    "

## 2023-06-07 NOTE — OP NOTE
OPERATIVE REPORT  PATIENT NAME: Frank Gary    :  1955  MRN: [id number]  Pt Location: OW OR ROOM 01    SURGERY DATE: 2023    Surgeon(s) and Role:     * Jordan Chau DPM - Primary    Preop Diagnosis:  Other acute osteomyelitis of left foot ([facility] ) [M86 172]    Post-Op Diagnosis Codes:     * Other acute osteomyelitis of left foot ([facility] ) [M86 172]    Procedure(s):  Left - PARTIAL 1ST RAY AMPUTATION    Specimen(s):  ID Type Source Tests Collected by Time Destination   1 : left great toe  Tissue Toe, Left TISSUE EXAM Jordan Chau DPM 2023 1451    A : culture of left great toe  Tissue Toe, Left ANAEROBIC CULTURE AND GRAM STAIN, CULTURE, TISSUE AND GRAM STAIN Jordan Chau DPM 2023 1449    B : bone left great toe for aerobic and anaerobic culture Tissue Toe, Left ANAEROBIC CULTURE AND GRAM STAIN, CULTURE, TISSUE AND GRAM STAIN Jordan Chau DPM 2023 1455        Estimated Blood Loss:   Minimal    Drains:  * No LDAs found *    Anesthesia Type:   IV Sedation with Anesthesia    Operative Indications: Other acute osteomyelitis of left foot ([facility] ) [M86 172]      Operative Findings:  C/w dx    Complications:   None    Procedure and Technique:  Patient is brought the operating room and repair for the procedure on his litter  Attention was then directed left foot where a preoperative injection consisting of 10 cc of lidocaine plain was injected in the foot  The foot was then scrubbed prepped and draped in usual aseptic manner a full timeout was then performed identify the correct patient location and procedure    Attention was then directed to the left foot where a modified racquet type incision was made overlying the first MTPJ, this incision was carried down directly to bone  The left hallux was then disarticulated and placed on the back table    An exuberant mount of purulent drainage emanated from the left first MTPJ consistent with septic arthritis    Sagittal saw was then utilized to make a dorsal plantar cut and the first metatarsal which was then severed proximal to the metatarsal neck and placed in the back table  The intramedullary canal was viable, but very soft in nature, cultures were taken from this area and also from soft tissue post lavage  This was then packed open using half-inch iodoform packing  Patient tolerated procedure anesthesia well and will need to be returned to the OR hopefully on Friday for delayed primary closure pending culture results     I was present for the entire procedure      Patient Disposition:  PACU  and extubated and stable        SIGNATURE: Aurelio Mckeon  DATE: June 7, 2023  TIME: 3:14 PM

## 2023-06-07 NOTE — ANESTHESIA POSTPROCEDURE EVALUATION
Post-Op Assessment Note    CV Status:  Stable  Pain Score: 0    Pain management: adequate     Mental Status:  Alert and awake   Hydration Status:  Stable   PONV Controlled:  Controlled   Airway Patency:  Patent      Post Op Vitals Reviewed: Yes      Staff: CRNA         No notable events documented      BP   111/59   Temp      Pulse  71   Resp   16   SpO2   94

## 2023-06-07 NOTE — ASSESSMENT & PLAN NOTE
Creatinine on 4/19/2023 was 1 01, creatinine on admission 1 36  Now improved after IV Lasix due to volume overload  Renal function improved, currently at baseline  Continue to monitor labs

## 2023-06-07 NOTE — ASSESSMENT & PLAN NOTE
MRI Foot: Findings consistent with definite osteomyelitis of the great toe proximal phalanx  Nonspecific marrow edema involving the distal 3 1 cm of the first metatarsal, possibly reactive, although early osteomyelitis cannot be excluded  Large first MTP joint effusion, nonspecific; septic arthritis cannot be excluded on the basis of imaging  No evidence of a soft tissue abscess       Continue IV ceftriaxone vancomycin  Follow wound culture, blood culture  N p o , podiatry consulted with plans for surgical resection later this evening  Pain control

## 2023-06-07 NOTE — OCCUPATIONAL THERAPY NOTE
Problem: Physiological Stability  Goal: Vital signs and physical assessments stable and within expected parameters  Outcome: Outcome Not Met, Continue to Monitor  Goal: Remains free of signs and symptoms of infection  Outcome: Outcome Not Met, Continue to Monitor  Goal: Parent / caregiver verbalizes understanding of NICU care related to patient-specific condition and treatment  Description: Document on Patient Education Activity  Outcome: Outcome Not Met, Continue to Monitor  Goal: Parent / caregiver verbalizes / demonstrates comfort with their ability to care for infant and familiarity with community resources prior to discharge  Description: Document on Patient Education Activity  Outcome: Outcome Not Met, Continue to Monitor      Occupational Therapy Cancellation Note       Patient Name: Ambrocio THACKERPHomeN Date: 6/7/2023  Problem List  Principal Problem:    Acute hematogenous osteomyelitis of left foot ([facility] )  Active Problems: Moderate aortic stenosis    ANNALEE (acute kidney injury) ([facility] )    Heart failure with preserved ejection fraction          06/07/23 0731   Note Type   Note type Evaluation; Cancelled Session   Cancel Reasons Medical status       OT orders received  Chart review completed  Pt admitted to [address] [place] [address] 6/6/2023 with Dx: acute hematogenous osteomyelitis of L foot  Pt with LEADs pending and plan for possible amputation/washout  Will hold OT services at this time and re-address s/p podiatry intervention         PRISCILLA Diana/RYAN

## 2023-06-07 NOTE — PLAN OF CARE
Problem: Potential for Falls  Goal: Patient will remain free of falls  Description: INTERVENTIONS:  - Educate patient/family on patient safety including physical limitations  - Instruct patient to call for assistance with activity   - Consult OT/PT to assist with strengthening/mobility   - Keep Call bell within reach  - Keep bed low and locked with side rails adjusted as appropriate  - Keep care items and personal belongings within reach  - Initiate and maintain comfort rounds  - Make Fall Risk Sign visible to staff  - Offer Toileting every 2 Hours, in advance of need  - Apply yellow socks and bracelet for high fall risk patients  Outcome: Progressing     Problem: Nutrition/Hydration-ADULT  Goal: Nutrient/Hydration intake appropriate for improving, restoring or maintaining nutritional needs  Description: Monitor and assess patient's nutrition/hydration status for malnutrition  Collaborate with interdisciplinary team and initiate plan and interventions as ordered  Monitor patient's weight and dietary intake as ordered or per policy  Utilize nutrition screening tool and intervene as necessary  Determine patient's food preferences and provide high-protein, high-caloric foods as appropriate       INTERVENTIONS:  - Monitor oral intake, urinary output, labs, and treatment plans  - Assess nutrition and hydration status and recommend course of action  - Evaluate amount of meals eaten  - Assist patient with eating if necessary   - Allow adequate time for meals  - Recommend/ encourage appropriate diets, oral nutritional supplements, and vitamin/mineral supplements  - Order, calculate, and assess calorie counts as needed  - Recommend, monitor, and adjust tube feedings and TPN/PPN based on assessed needs  - Assess need for intravenous fluids  - Provide specific nutrition/hydration education as appropriate  - Include patient/family/caregiver in decisions related to nutrition  Outcome: Progressing     Problem: PAIN - ADULT  Goal: Verbalizes/displays adequate comfort level or baseline comfort level  Description: Interventions:  - Encourage patient to monitor pain and request assistance  - Assess pain using appropriate pain scale  - Administer analgesics based on type and severity of pain and evaluate response  - Implement non-pharmacological measures as appropriate and evaluate response  - Consider cultural and social influences on pain and pain management  - Notify physician/advanced practitioner if interventions unsuccessful or patient reports new pain  Outcome: Progressing     Problem: INFECTION - ADULT  Goal: Absence or prevention of progression during hospitalization  Description: INTERVENTIONS:  - Assess and monitor for signs and symptoms of infection  - Monitor lab/diagnostic results  - Monitor all insertion sites, i e  indwelling lines, tubes, and drains  - Monitor endotracheal if appropriate and nasal secretions for changes in amount and color  - [place] appropriate cooling/warming therapies per order  - Administer medications as ordered  - Instruct and encourage patient and family to use good hand hygiene technique  - Identify and instruct in appropriate isolation precautions for identified infection/condition  Outcome: Progressing     Problem: SAFETY ADULT  Goal: Patient will remain free of falls  Description: INTERVENTIONS:  - Educate patient/family on patient safety including physical limitations  - Instruct patient to call for assistance with activity   - Consult OT/PT to assist with strengthening/mobility   - Keep Call bell within reach  - Keep bed low and locked with side rails adjusted as appropriate  - Keep care items and personal belongings within reach  - Initiate and maintain comfort rounds  - Make Fall Risk Sign visible to staff  - Offer Toileting every 2 Hours, in advance of need  - Apply yellow socks and bracelet for high fall risk patients  Outcome: Progressing  Goal: Maintain or return to baseline ADL function  Description: INTERVENTIONS:  -  Assess patient's ability to carry out ADLs; assess patient's baseline for ADL function and identify physical deficits which impact ability to perform ADLs (bathing, care of mouth/teeth, toileting, grooming, dressing, etc )  - Assess/evaluate cause of self-care deficits   - Assess range of motion  - Assess patient's mobility; develop plan if impaired  - Assess patient's need for assistive devices and provide as appropriate  - Encourage maximum independence but intervene and supervise when necessary  - Involve family in performance of ADLs  - Assess for home care needs following discharge   - Consider OT consult to assist with ADL evaluation and planning for discharge  - Provide patient education as appropriate  Outcome: Progressing  Goal: Maintains/Returns to pre admission functional level  Description: INTERVENTIONS:  - Perform BMAT or MOVE assessment daily    - Set and communicate daily mobility goal to care team and patient/family/caregiver  - Collaborate with rehabilitation services on mobility goals if consulted  - Perform Range of Motion 3 times a day  - Reposition patient every 2 hours    - Dangle patient 3 times a day  - Stand patient 3 times a day  - Ambulate patient 3 times a day  - Out of bed to chair 3 times a day   - Out of bed for meals 3 times a day  - Out of bed for toileting  - Record patient progress and toleration of activity level   Outcome: Progressing     Problem: DISCHARGE PLANNING  Goal: Discharge to home or other facility with appropriate resources  Description: INTERVENTIONS:  - Identify barriers to discharge w/patient and caregiver  - Arrange for needed discharge resources and transportation as appropriate  - Identify discharge learning needs (meds, wound care, etc )  - Arrange for interpretive services to assist at discharge as needed  - Refer to Case Management Department for coordinating discharge planning if the patient needs post-hospital services based on physician/advanced practitioner order or complex needs related to functional status, cognitive ability, or social support system  Outcome: Progressing     Problem: Knowledge Deficit  Goal: Patient/family/caregiver demonstrates understanding of disease process, treatment plan, medications, and discharge instructions  Description: Complete learning assessment and assess knowledge base    Interventions:  - Provide teaching at level of understanding  - Provide teaching via preferred learning methods  Outcome: Progressing

## 2023-06-07 NOTE — CASE MANAGEMENT
Case Management Assessment & Discharge Planning Note    Patient name Ambrocio Daigle  Location [address] 338/-92 MRN [id number]  : 1955 Date 2023       Current Admission Date: 2023  Current Admission Diagnosis:Acute hematogenous osteomyelitis of left foot [facility])   Patient Active Problem List    Diagnosis Date Noted   • Preoperative cardiovascular examination 2023   • Acute hematogenous osteomyelitis of left foot ([address] ) 2023   • Status post foot surgery 2023   • PVCs (premature ventricular contractions) 2023   • Heart failure with preserved ejection fraction 2023   • ANNALEE (acute kidney injury) ([facility] ) 02/10/2023   • Hyperkalemia 02/10/2023   • MSSA bacteremia 02/10/2023   • Ulcer of left foot ([facility] ) 2023   • Other emphysema ([facility] [address] ) 2023   • Benign essential HTN 2023   • Moderate aortic stenosis 2023   • MDD (major depressive disorder) 2023   • Peripheral neuropathy 2023      LOS (days): 1  Geometric Mean LOS (GMLOS) (days): 4 40  Days to GMLOS:3 4     OBJECTIVE:    Risk of Unplanned Readmission Score: 14 79         Current admission status: Inpatient  Referral Reason:  (Post Acute Placement (specify)    Post Acute Home Needs (VNA/DME/Infusion)   Medications)    Preferred Pharmacy:   [facility] # [address] [place] [address]  Phone: [phone] Fax: [phone]    Primary Care Provider: Vern Cope MD    Primary Insurance: MEDICARE  Secondary Insurance: BLUE CROSS    ASSESSMENT:  Active Health Care Proxies    There are no active Health Care Proxies on file         Advance Directives  Does patient have a Health Care POA?: Yes  Does patient have Advance Directives?: Yes  Advance Directives: Living will, Power of  for health care  Primary Contact: Morgan Spine, spouse         Readmission Root Cause  30 Day Readmission: No    Patient Information  Admitted from[de-identified] Home  Mental Status: Alert  During Assessment patient was accompanied by: Not accompanied during assessment  Assessment information provided by[de-identified] Patient  Primary Caregiver: Self  Support Systems: Spouse/significant other  [place] of Residence: [facility] do you live in?: [address] entry access options   Select all that apply : Stairs  Number of steps to enter home : 2  Type of Current Residence: 2 story home  Upon entering residence, is there a bedroom on the main floor (no further steps)?: No  A bedroom is located on the following floor levels of residence (select all that apply):: 2nd Floor  Upon entering residence, is there a bathroom on the main floor (no further steps)?: Yes (1/2 bath)  Number of steps to 2nd floor from main floor: One Flight  In the last 12 months, was there a time when you were not able to pay the mortgage or rent on time?: No  In the last 12 months, how many places have you lived?: 1  In the last 12 months, was there a time when you did not have a steady place to sleep or slept in a shelter (including now)?: No  Homeless/housing insecurity resource given?: N/A  Living Arrangements: Lives w/ Spouse/significant other  Is patient a ?: No    Activities of Daily Living Prior to Admission  Functional Status: Independent  Completes ADLs independently?: Yes  Ambulates independently?: Yes  Does patient use assisted devices?: No  Does patient currently own DME?: No  Does patient have a history of Outpatient Therapy (PT/OT)?: Yes  Does the patient have a history of Short-Term Rehab?: No  Does patient have a history of HHC?: Yes ([facility])  Does patient currently have [facility]?: No         Patient Information Continued  Income Source: SSI/SSD  Does patient have prescription coverage?: Yes  Within the past 12 months, you worried that your food would run out before you got the money to buy more : Never true  Within the past 12 months, the food you bought just didn't last and you didn't have money to get more : Never true  Food insecurity resource given?: N/A  Does patient receive dialysis treatments?: No  Does patient have a history of substance abuse?: No  Does patient have a history of Mental Health Diagnosis?: No         Means of Transportation  Means of Transport to Appts[de-identified] Drives Self  In the past 12 months, has lack of transportation kept you from medical appointments or from getting medications?: No  In the past 12 months, has lack of transportation kept you from meetings, work, or from getting things needed for daily living?: No  Was application for public transport provided?: N/A        DISCHARGE DETAILS:    Discharge planning discussed with[de-identified] patient  Freedom of Choice: Yes     CM contacted family/caregiver?: No- see comments (patient declined)             CM met with patient at the bedside, baseline information was obtained  CM discussed the role of CM in helping the patient develop a discharge plan and assist the patient in carry out their plan  Patient lives in 2 story home with spouse, independent at baseline  Patient previously had [facility] for IV antibiotics  CM to follow for CM discharge referral needs

## 2023-06-08 ENCOUNTER — ANESTHESIA EVENT (INPATIENT)
Dept: PERIOP | Facility: HOSPITAL | Age: 68
DRG: 474 | End: 2023-06-08
Payer: MEDICARE

## 2023-06-08 LAB
ALBUMIN SERPL BCP-MCNC: 3.6 G/DL (ref 3.5–5)
ALP SERPL-CCNC: 62 U/L (ref 34–104)
ALT SERPL W P-5'-P-CCNC: 8 U/L (ref 7–52)
ANION GAP SERPL CALCULATED.3IONS-SCNC: 6 MMOL/L (ref 4–13)
AST SERPL W P-5'-P-CCNC: 10 U/L (ref 13–39)
BASOPHILS # BLD AUTO: 0.03 THOUSANDS/ÂΜL (ref 0–0.1)
BASOPHILS NFR BLD AUTO: 0 % (ref 0–1)
BILIRUB SERPL-MCNC: 0.43 MG/DL (ref 0.2–1)
BUN SERPL-MCNC: 17 MG/DL (ref 5–25)
CALCIUM SERPL-MCNC: 9.1 MG/DL (ref 8.4–10.2)
CHLORIDE SERPL-SCNC: 104 MMOL/L (ref 96–108)
CO2 SERPL-SCNC: 27 MMOL/L (ref 21–32)
CREAT SERPL-MCNC: 0.97 MG/DL (ref 0.6–1.3)
EOSINOPHIL # BLD AUTO: 0.3 THOUSAND/ÂΜL (ref 0–0.61)
EOSINOPHIL NFR BLD AUTO: 3 % (ref 0–6)
ERYTHROCYTE [DISTWIDTH] IN BLOOD BY AUTOMATED COUNT: 13.7 % (ref 11.6–15.1)
GFR SERPL CREATININE-BSD FRML MDRD: 79 ML/MIN/1.73SQ M
GLUCOSE SERPL-MCNC: 112 MG/DL (ref 65–140)
HCT VFR BLD AUTO: 43.4 % (ref 36.5–49.3)
HGB BLD-MCNC: 14.5 G/DL (ref 12–17)
IMM GRANULOCYTES # BLD AUTO: 0.05 THOUSAND/UL (ref 0–0.2)
IMM GRANULOCYTES NFR BLD AUTO: 1 % (ref 0–2)
LYMPHOCYTES # BLD AUTO: 1.64 THOUSANDS/ÂΜL (ref 0.6–4.47)
LYMPHOCYTES NFR BLD AUTO: 16 % (ref 14–44)
MAGNESIUM SERPL-MCNC: 2.1 MG/DL (ref 1.9–2.7)
MCH RBC QN AUTO: 31.2 PG (ref 26.8–34.3)
MCHC RBC AUTO-ENTMCNC: 33.4 G/DL (ref 31.4–37.4)
MCV RBC AUTO: 93 FL (ref 82–98)
MONOCYTES # BLD AUTO: 1.23 THOUSAND/ÂΜL (ref 0.17–1.22)
MONOCYTES NFR BLD AUTO: 12 % (ref 4–12)
NEUTROPHILS # BLD AUTO: 6.88 THOUSANDS/ÂΜL (ref 1.85–7.62)
NEUTS SEG NFR BLD AUTO: 68 % (ref 43–75)
NRBC BLD AUTO-RTO: 0 /100 WBCS
PLATELET # BLD AUTO: 238 THOUSANDS/UL (ref 149–390)
PMV BLD AUTO: 9.5 FL (ref 8.9–12.7)
POTASSIUM SERPL-SCNC: 4 MMOL/L (ref 3.5–5.3)
PROT SERPL-MCNC: 6.7 G/DL (ref 6.4–8.4)
RBC # BLD AUTO: 4.65 MILLION/UL (ref 3.88–5.62)
SODIUM SERPL-SCNC: 137 MMOL/L (ref 135–147)
VANCOMYCIN SERPL-MCNC: 11.6 UG/ML (ref 10–20)
WBC # BLD AUTO: 10.13 THOUSAND/UL (ref 4.31–10.16)

## 2023-06-08 PROCEDURE — 80202 ASSAY OF VANCOMYCIN: CPT | Performed by: PODIATRIST

## 2023-06-08 PROCEDURE — 83735 ASSAY OF MAGNESIUM: CPT | Performed by: PODIATRIST

## 2023-06-08 PROCEDURE — 80053 COMPREHEN METABOLIC PANEL: CPT | Performed by: PODIATRIST

## 2023-06-08 PROCEDURE — 99232 SBSQ HOSP IP/OBS MODERATE 35: CPT | Performed by: FAMILY MEDICINE

## 2023-06-08 PROCEDURE — 85025 COMPLETE CBC W/AUTO DIFF WBC: CPT | Performed by: PODIATRIST

## 2023-06-08 RX ORDER — FUROSEMIDE 40 MG/1
40 TABLET ORAL DAILY
Status: DISCONTINUED | OUTPATIENT
Start: 2023-06-09 | End: 2023-06-10

## 2023-06-08 RX ORDER — MULTIVITAMIN WITH IRON
TABLET ORAL DAILY
COMMUNITY
End: 2023-06-13 | Stop reason: DRUGHIGH

## 2023-06-08 RX ADMIN — VANCOMYCIN HYDROCHLORIDE 1000 MG: 1 INJECTION, SOLUTION INTRAVENOUS at 08:13

## 2023-06-08 RX ADMIN — CEFTRIAXONE 1000 MG: 1 INJECTION, SOLUTION INTRAVENOUS at 18:14

## 2023-06-08 RX ADMIN — HEPARIN SODIUM 5000 UNITS: 5000 INJECTION INTRAVENOUS; SUBCUTANEOUS at 05:34

## 2023-06-08 RX ADMIN — AMLODIPINE BESYLATE 10 MG: 10 TABLET ORAL at 08:13

## 2023-06-08 RX ADMIN — OXYCODONE HYDROCHLORIDE AND ACETAMINOPHEN 1 TABLET: 5; 325 TABLET ORAL at 18:18

## 2023-06-08 RX ADMIN — HEPARIN SODIUM 5000 UNITS: 5000 INJECTION INTRAVENOUS; SUBCUTANEOUS at 14:17

## 2023-06-08 RX ADMIN — HEPARIN SODIUM 5000 UNITS: 5000 INJECTION INTRAVENOUS; SUBCUTANEOUS at 21:09

## 2023-06-08 RX ADMIN — MORPHINE SULFATE 2 MG: 2 INJECTION, SOLUTION INTRAMUSCULAR; INTRAVENOUS at 21:17

## 2023-06-08 RX ADMIN — LEVOTHYROXINE SODIUM 75 MCG: 75 TABLET ORAL at 05:34

## 2023-06-08 RX ADMIN — VANCOMYCIN HYDROCHLORIDE 1000 MG: 1 INJECTION, SOLUTION INTRAVENOUS at 21:09

## 2023-06-08 RX ADMIN — Medication 400 MG: at 08:13

## 2023-06-08 RX ADMIN — ESCITALOPRAM OXALATE 20 MG: 10 TABLET ORAL at 08:13

## 2023-06-08 RX ADMIN — BUPROPION HYDROCHLORIDE 300 MG: 150 TABLET, EXTENDED RELEASE ORAL at 08:13

## 2023-06-08 NOTE — OCCUPATIONAL THERAPY NOTE
"  Occupational Therapy Cancellation Note       Patient Name: Titus Cabral  PMSZU'G Date: 6/8/2023  Problem List  Principal Problem:    Acute hematogenous osteomyelitis of left foot ([facility] )  Active Problems: Moderate aortic stenosis    ANNALEE (acute kidney injury) ([facility] )    Heart failure with preserved ejection fraction    Preoperative cardiovascular examination          06/08/23 0897   Note Type   Note type Evaluation; Cancelled Session   Cancel Reasons Medical status       Chart review completed  Pt went to OR yesterday for \"Left - PARTIAL 1ST RAY AMPUTATION\"  Per podiatry note, Pt's L foot was \"packed open\" and has plans to return to OR 6/9/2023  Pt is NWB LLE with strict bed rest orders  Will hold OT services at this time and re-address s/p further surgery and wound closure         Lucía Malloy, OTR/L    "

## 2023-06-08 NOTE — PHYSICAL THERAPY NOTE
PHYSICAL THERAPY NOTE          Patient Name: Herb Ireland  LGMIU'O Date: 6/8/2023 06/08/23 0855   PT Last Visit   PT Visit Date 06/08/23   Note Type   Note type Evaluation; Cancelled Session   Cancel Reasons Medical status   Additional Comments bed rest     Chart reviewed  Pt is s/p Left - PARTIAL 1ST RAY AMPUTATION on 6/7/23  Pt planned to return to OR on 6/9/23 for delayed primary closure pending results of cultures  Per podiatry note: he is strict bedrest for the next 24 hours and following that he may be strict nonweightbearing to the left foot  Pt not appropriate for participation in therapy services this date  Will see patient as medically appropriate post closure s/p left partial 1st ray amputation      Kathy Brandt, PT,DPT

## 2023-06-08 NOTE — PROGRESS NOTES
[address]  Progress Note  Name: Jered Valero  MRN: [id number]  Unit/Bed#: -01 I Date of Admission: 6/6/2023   Date of Service: 6/8/2023 I Hospital Day: 2    Assessment/Plan   * Acute hematogenous osteomyelitis of left foot [facility])  Assessment & Plan  MRI Foot: Findings consistent with definite osteomyelitis of the great toe proximal phalanx  Nonspecific marrow edema involving the distal 3 1 cm of the first metatarsal, possibly reactive, although early osteomyelitis cannot be excluded  Large first MTP joint effusion, nonspecific; septic arthritis cannot be excluded on the basis of imaging  No evidence of a soft tissue abscess  Continue IV ceftriaxone vancomycin  Follow wound culture, blood culture  Blood culture remained negative so far  Status status post OR yesterday-status post first ray amputation  Plan for OR tomorrow for wound closure  Surgical  culture pending  Antibiotics as outpatient-Case management is working for home antibiotics set up      Heart failure with preserved ejection fraction  Assessment & Plan  Diastolic in nature  Last echocardiogram shows ejection fraction of 60% with grade 1 diastolic dysfunction  Appreciate cardiology input  Hold further diuretics, can resume likely postsurgery  Currently euvolemic  Monitor volume status    ANNALEE (acute kidney injury) ([facility] )  Assessment & Plan  Creatinine on 4/19/2023 was 1 01, creatinine on admission 1 36  Now improved after IV Lasix due to volume overload  Renal function improved, currently at baseline  Creatinine today is 0 97    Moderate aortic stenosis  Assessment & Plan  Known history of moderate aortic stenosis  Appreciate cardiology input, avoid aggressive diuresis               VTE Pharmacologic Prophylaxis: VTE Score: 5 Moderate Risk (Score 3-4) - Pharmacological DVT Prophylaxis Ordered: heparin  Patient Centered Rounds: I performed bedside rounds with nursing staff today    Discussions with Specialists or Other Care Team Provider:     Education and Discussions with Family / Patient: Patient declined call to   Total Time Spent on Date of Encounter in care of patient: 35 minutes This time was spent on one or more of the following: performing physical exam; counseling and coordination of care; obtaining or reviewing history; documenting in the medical record; reviewing/ordering tests, medications or procedures; communicating with other healthcare professionals and discussing with patient's family/caregivers  Current Length of Stay: 2 day(s)  Current Patient Status: Inpatient   Certification Statement: The patient will continue to require additional inpatient hospital stay due to For or tomorrow  Discharge Plan: Anticipate discharge in 48-72 hrs to home with home services  Code Status: Level 1 - Full Code    Subjective:   Patient seen and examined  No events overnight  Status post OR with first ray amputation  Patient was asking about weightbearing status    Objective:     Vitals:   Temp (24hrs), Av 7 °F (36 5 °C), Min:97 5 °F (36 4 °C), Max:97 9 °F (36 6 °C)    Temp:  [97 5 °F (36 4 °C)-97 9 °F (36 6 °C)] 97 9 °F (36 6 °C)  HR:  [63-69] 67  Resp:  [16-18] 16  BP: (111-128)/(59-74) 128/74  SpO2:  [93 %-95 %] 95 %  Body mass index is 28 6 kg/m²  Input and Output Summary (last 24 hours): Intake/Output Summary (Last 24 hours) at 2023 1433  Last data filed at 2023 1422  Gross per 24 hour   Intake 540 ml   Output 1425 ml   Net -885 ml       Physical Exam:   Physical Exam  Constitutional:       General: He is not in acute distress  HENT:      Head: Normocephalic and atraumatic  Nose: Nose normal    Eyes:      General: No scleral icterus  Cardiovascular:      Rate and Rhythm: Normal rate and regular rhythm  Pulses: Normal pulses  Heart sounds: Murmur heard  Pulmonary:      Effort: Pulmonary effort is normal       Breath sounds: Normal breath sounds  Abdominal:      General: There is no distension  Tenderness: There is no abdominal tenderness  Musculoskeletal:      Comments: Left lower extremity covered in dressing   Skin:     General: Skin is warm  Neurological:      General: No focal deficit present  Mental Status: He is alert  Additional Data:     Labs:  Results from last 7 days   Lab Units 06/08/23  0525   EOS PCT % 3   HEMATOCRIT % 43 4   HEMOGLOBIN g/dL 14 5   LYMPHS PCT % 16   MONOS PCT % 12   NEUTROS PCT % 68   PLATELETS Thousands/uL 238   WBC Thousand/uL 10 13     Results from last 7 days   Lab Units 06/08/23  0525   ANION GAP mmol/L 6   ALBUMIN g/dL 3 6   ALK PHOS U/L 62   ALT U/L 8   AST U/L 10*   BUN mg/dL 17   CALCIUM mg/dL 9 1   CHLORIDE mmol/L 104   CO2 mmol/L 27   CREATININE mg/dL 0 97   GLUCOSE RANDOM mg/dL 112   POTASSIUM mmol/L 4 0   SODIUM mmol/L 137   TOTAL BILIRUBIN mg/dL 0 43     Results from last 7 days   Lab Units 06/06/23  1610   INR  0 89             Results from last 7 days   Lab Units 06/06/23  1610   LACTIC ACID mmol/L 1 3       Lines/Drains:  Invasive Devices     Peripheral Intravenous Line  Duration           Peripheral IV 06/06/23 Left;Ventral (anterior) Forearm 1 day    Peripheral IV 06/06/23 Right;Ventral (anterior) Forearm 1 day                      Imaging: Reviewed radiology reports from this admission including: MRI foot    Recent Cultures (last 7 days):   Results from last 7 days   Lab Units 06/07/23  1455 06/07/23  1449 06/06/23  2109 06/06/23  1611 06/06/23  1610   BLOOD CULTURE   --   --   --  No Growth at 24 hrs  No Growth at 24 hrs     GRAM STAIN RESULT  Rare Polys  No bacteria seen Rare Polys  No bacteria seen 1+ Polys  No bacteria seen  --   --    WOUND CULTURE   --   --  Culture too young- will reincubate  --   --        Last 24 Hours Medication List:   Current Facility-Administered Medications   Medication Dose Route Frequency Provider Last Rate   • acetaminophen  650 mg Oral Q6H PRN Eva Conrad DPM     • amLODIPine  10 mg Oral Daily [place], [place]     • buPROPion  300 mg Oral Daily [place]     • cefTRIAXone  1,000 mg Intravenous Q24H Eva Conrad DPM 1,000 mg (06/07/23 1813)   • docusate sodium  100 mg Oral BID Eva Conrad DPM     • escitalopram  20 mg Oral Daily Eva Conrad DPM     • [START ON 6/9/2023] furosemide  40 mg Oral Daily JESUS Buckley     • heparin (porcine)  5,000 Units Subcutaneous [place], [place]     • levothyroxine  75 mcg Oral Early Morning Eva Conrad DPM     • magnesium Oxide  400 mg Oral Daily Eva Conrad DPM     • morphine injection  2 mg Intravenous Q6H PRN Joana Parrish MD     • ondansetron  4 mg Intravenous Q6H PRN Eva Conrad DPM     • oxyCODONE-acetaminophen  1 tablet Oral Q6H PRN Joana Parrish MD     • oxyCODONE-acetaminophen  2 tablet Oral Q6H PRN Joana Parrish MD     • polyethylene glycol  17 g Oral Daily Eva Conrad DPM     • vancomycin  1,000 mg Intravenous Q12H Joana Parrish MD 1,000 mg (06/08/23 0813)        Today, Patient Was Seen By: Abiel Brenner MD    **Please Note: This note may have been constructed using a voice recognition system  **

## 2023-06-08 NOTE — PLAN OF CARE
Problem: Potential for Falls  Goal: Patient will remain free of falls  Description: INTERVENTIONS:  - Educate patient/family on patient safety including physical limitations  - Instruct patient to call for assistance with activity   - Consult OT/PT to assist with strengthening/mobility   - Keep Call bell within reach  - Keep bed low and locked with side rails adjusted as appropriate  - Keep care items and personal belongings within reach  - Initiate and maintain comfort rounds  - Make Fall Risk Sign visible to staff  - Offer Toileting every 2 Hours, in advance of need  - Apply yellow socks and bracelet for high fall risk patients  Outcome: Progressing     Problem: Nutrition/Hydration-ADULT  Goal: Nutrient/Hydration intake appropriate for improving, restoring or maintaining nutritional needs  Description: Monitor and assess patient's nutrition/hydration status for malnutrition  Collaborate with interdisciplinary team and initiate plan and interventions as ordered  Monitor patient's weight and dietary intake as ordered or per policy  Utilize nutrition screening tool and intervene as necessary  Determine patient's food preferences and provide high-protein, high-caloric foods as appropriate       INTERVENTIONS:  - Monitor oral intake, urinary output, labs, and treatment plans  - Assess nutrition and hydration status and recommend course of action  - Evaluate amount of meals eaten  - Assist patient with eating if necessary   - Allow adequate time for meals  - Recommend/ encourage appropriate diets, oral nutritional supplements, and vitamin/mineral supplements  - Order, calculate, and assess calorie counts as needed  - Recommend, monitor, and adjust tube feedings and TPN/PPN based on assessed needs  - Assess need for intravenous fluids  - Provide specific nutrition/hydration education as appropriate  - Include patient/family/caregiver in decisions related to nutrition  Outcome: Progressing     Problem: PAIN - ADULT  Goal: Verbalizes/displays adequate comfort level or baseline comfort level  Description: Interventions:  - Encourage patient to monitor pain and request assistance  - Assess pain using appropriate pain scale  - Administer analgesics based on type and severity of pain and evaluate response  - Implement non-pharmacological measures as appropriate and evaluate response  - Consider cultural and social influences on pain and pain management  - Notify physician/advanced practitioner if interventions unsuccessful or patient reports new pain  Outcome: Progressing     Problem: INFECTION - ADULT  Goal: Absence or prevention of progression during hospitalization  Description: INTERVENTIONS:  - Assess and monitor for signs and symptoms of infection  - Monitor lab/diagnostic results  - Monitor all insertion sites, i e  indwelling lines, tubes, and drains  - Monitor endotracheal if appropriate and nasal secretions for changes in amount and color  - [place] appropriate cooling/warming therapies per order  - Administer medications as ordered  - Instruct and encourage patient and family to use good hand hygiene technique  - Identify and instruct in appropriate isolation precautions for identified infection/condition  Outcome: Progressing     Problem: SAFETY ADULT  Goal: Patient will remain free of falls  Description: INTERVENTIONS:  - Educate patient/family on patient safety including physical limitations  - Instruct patient to call for assistance with activity   - Consult OT/PT to assist with strengthening/mobility   - Keep Call bell within reach  - Keep bed low and locked with side rails adjusted as appropriate  - Keep care items and personal belongings within reach  - Initiate and maintain comfort rounds  - Make Fall Risk Sign visible to staff  - Offer Toileting every 2 Hours, in advance of need  - Apply yellow socks and bracelet for high fall risk patients  Outcome: Progressing  Goal: Maintain or return to baseline ADL function  Description: INTERVENTIONS:  -  Assess patient's ability to carry out ADLs; assess patient's baseline for ADL function and identify physical deficits which impact ability to perform ADLs (bathing, care of mouth/teeth, toileting, grooming, dressing, etc )  - Assess/evaluate cause of self-care deficits   - Assess range of motion  - Assess patient's mobility; develop plan if impaired  - Assess patient's need for assistive devices and provide as appropriate  - Encourage maximum independence but intervene and supervise when necessary  - Involve family in performance of ADLs  - Assess for home care needs following discharge   - Consider OT consult to assist with ADL evaluation and planning for discharge  - Provide patient education as appropriate  Outcome: Progressing  Goal: Maintains/Returns to pre admission functional level  Description: INTERVENTIONS:  - Perform BMAT or MOVE assessment daily    - Set and communicate daily mobility goal to care team and patient/family/caregiver  - Collaborate with rehabilitation services on mobility goals if consulted  - Perform Range of Motion 3 times a day  - Reposition patient every 2 hours    - Dangle patient 3 times a day  - Stand patient 3 times a day  - Ambulate patient 3 times a day  - Out of bed to chair 3 times a day   - Out of bed for meals 3 times a day  - Out of bed for toileting  - Record patient progress and toleration of activity level   Outcome: Progressing

## 2023-06-08 NOTE — PROGRESS NOTES
Maik Lucio is a 69 y o  male who is currently ordered Vancomycin IV with management by the Pharmacy Consult service  Relevant clinical data and objective / subjective history reviewed  Vancomycin Assessment:  Indication and Goal AUC/Trough: Bone/joint infection (goal -600, trough >10)  Clinical Status: stable  Micro:     Renal Function:  SCr: 0 97 mg/dL  CrCl: 88 8 mL/min  Renal replacement: Not on dialysis  Days of Therapy: 2  Current Dose: 1000 mg IV q 12 hrs  Vancomycin Plan:  New Dosing: continue 1000 mg IV q 12 hrs  Estimated AUC: 418 mcg*hr/mL  Estimated Trough: 13 7 mcg/mL  Next Level: 6/15 at 0600  Renal Function Monitoring: Daily BMP and [place] will continue to follow closely for s/sx of nephrotoxicity, infusion reactions and appropriateness of therapy  BMP and CBC will be ordered per protocol  We will continue to follow the patient’s culture results and clinical progress daily      Michael Moore, Pharmacist

## 2023-06-08 NOTE — ASSESSMENT & PLAN NOTE
MRI Foot: Findings consistent with definite osteomyelitis of the great toe proximal phalanx  Nonspecific marrow edema involving the distal 3 1 cm of the first metatarsal, possibly reactive, although early osteomyelitis cannot be excluded  Large first MTP joint effusion, nonspecific; septic arthritis cannot be excluded on the basis of imaging  No evidence of a soft tissue abscess  Continue IV ceftriaxone vancomycin  Follow wound culture, blood culture  Blood culture remained negative so far  Status status post OR yesterday-status post first ray amputation  Plan for OR tomorrow for wound closure    Surgical  culture pending  Antibiotics as outpatient-Case management is working for home antibiotics set up

## 2023-06-08 NOTE — ASSESSMENT & PLAN NOTE
Creatinine on 4/19/2023 was 1 01, creatinine on admission 1 36  Now improved after IV Lasix due to volume overload  Renal function improved, currently at baseline  Creatinine today is 0 97

## 2023-06-08 NOTE — CASE MANAGEMENT
Case Management Discharge Planning Note    Patient name Oddis Lennox  Location [address] 338/-22 MRN [id number]  : 1955 Date 2023       Current Admission Date: 2023  Current Admission Diagnosis:Acute hematogenous osteomyelitis of left foot [facility])   Patient Active Problem List    Diagnosis Date Noted   • Preoperative cardiovascular examination 2023   • Acute hematogenous osteomyelitis of left foot ([facility] [facility] ) 2023   • Status post foot surgery 2023   • PVCs (premature ventricular contractions) 2023   • Heart failure with preserved ejection fraction 2023   • ANNALEE (acute kidney injury) ([facility] ) 02/10/2023   • Hyperkalemia 02/10/2023   • MSSA bacteremia 02/10/2023   • Ulcer of left foot ([facility] ) 2023   • Other emphysema ([facility] [facility] ) 2023   • Benign essential HTN 2023   • Moderate aortic stenosis 2023   • MDD (major depressive disorder) 2023   • Peripheral neuropathy 2023      LOS (days): 2  Geometric Mean LOS (GMLOS) (days): 4 40  Days to GMLOS:2 7     OBJECTIVE:  Risk of Unplanned Readmission Score: 14 46         Current admission status: Inpatient   Preferred Pharmacy:   [facility] # [address] [place] [address]  Phone: [phone] Fax: [phone]    Primary Care Provider: Brie Ashford MD    Primary Insurance: MEDICARE  Secondary Insurance: BLUE CROSS    DISCHARGE DETAILS:           CM submitted AIDIN referral to [facility] to explore if available for VN should IV antibiotics be needed  Patient had prior involvement with [facility]

## 2023-06-08 NOTE — ASSESSMENT & PLAN NOTE
Diastolic in nature  Last echocardiogram shows ejection fraction of 60% with grade 1 diastolic dysfunction  Appreciate cardiology input  Hold further diuretics, can resume likely postsurgery  Currently euvolemic    Monitor volume status

## 2023-06-09 ENCOUNTER — ANESTHESIA (INPATIENT)
Dept: PERIOP | Facility: HOSPITAL | Age: 68
DRG: 474 | End: 2023-06-09
Payer: MEDICARE

## 2023-06-09 ENCOUNTER — APPOINTMENT (INPATIENT)
Dept: RADIOLOGY | Facility: HOSPITAL | Age: 68
DRG: 474 | End: 2023-06-09
Payer: MEDICARE

## 2023-06-09 LAB
ANION GAP SERPL CALCULATED.3IONS-SCNC: 6 MMOL/L (ref 4–13)
BACTERIA WND AEROBE CULT: ABNORMAL
BACTERIA WND AEROBE CULT: ABNORMAL
BUN SERPL-MCNC: 15 MG/DL (ref 5–25)
CALCIUM SERPL-MCNC: 9.1 MG/DL (ref 8.4–10.2)
CHLORIDE SERPL-SCNC: 104 MMOL/L (ref 96–108)
CO2 SERPL-SCNC: 26 MMOL/L (ref 21–32)
CREAT SERPL-MCNC: 1.03 MG/DL (ref 0.6–1.3)
ERYTHROCYTE [DISTWIDTH] IN BLOOD BY AUTOMATED COUNT: 13.5 % (ref 11.6–15.1)
GFR SERPL CREATININE-BSD FRML MDRD: 73 ML/MIN/1.73SQ M
GLUCOSE SERPL-MCNC: 112 MG/DL (ref 65–140)
GRAM STN SPEC: ABNORMAL
GRAM STN SPEC: ABNORMAL
HCT VFR BLD AUTO: 41.1 % (ref 36.5–49.3)
HGB BLD-MCNC: 13.3 G/DL (ref 12–17)
MCH RBC QN AUTO: 30.4 PG (ref 26.8–34.3)
MCHC RBC AUTO-ENTMCNC: 32.4 G/DL (ref 31.4–37.4)
MCV RBC AUTO: 94 FL (ref 82–98)
PLATELET # BLD AUTO: 215 THOUSANDS/UL (ref 149–390)
PMV BLD AUTO: 9.3 FL (ref 8.9–12.7)
POTASSIUM SERPL-SCNC: 4 MMOL/L (ref 3.5–5.3)
RBC # BLD AUTO: 4.37 MILLION/UL (ref 3.88–5.62)
SODIUM SERPL-SCNC: 136 MMOL/L (ref 135–147)
WBC # BLD AUTO: 10.23 THOUSAND/UL (ref 4.31–10.16)

## 2023-06-09 PROCEDURE — 99232 SBSQ HOSP IP/OBS MODERATE 35: CPT | Performed by: INTERNAL MEDICINE

## 2023-06-09 PROCEDURE — 97116 GAIT TRAINING THERAPY: CPT

## 2023-06-09 PROCEDURE — 97163 PT EVAL HIGH COMPLEX 45 MIN: CPT

## 2023-06-09 PROCEDURE — 80048 BASIC METABOLIC PNL TOTAL CA: CPT | Performed by: FAMILY MEDICINE

## 2023-06-09 PROCEDURE — 0HQNXZZ REPAIR LEFT FOOT SKIN, EXTERNAL APPROACH: ICD-10-PCS | Performed by: PODIATRIST

## 2023-06-09 PROCEDURE — 73630 X-RAY EXAM OF FOOT: CPT

## 2023-06-09 PROCEDURE — 13160 SEC CLSR SURG WND/DEHSN XTN: CPT | Performed by: PODIATRIST

## 2023-06-09 PROCEDURE — 99024 POSTOP FOLLOW-UP VISIT: CPT | Performed by: PODIATRIST

## 2023-06-09 PROCEDURE — 85027 COMPLETE CBC AUTOMATED: CPT | Performed by: FAMILY MEDICINE

## 2023-06-09 PROCEDURE — 97166 OT EVAL MOD COMPLEX 45 MIN: CPT

## 2023-06-09 RX ORDER — FENTANYL CITRATE/PF 50 MCG/ML
25 SYRINGE (ML) INJECTION
Status: DISCONTINUED | OUTPATIENT
Start: 2023-06-09 | End: 2023-06-09 | Stop reason: HOSPADM

## 2023-06-09 RX ORDER — PROPOFOL 10 MG/ML
INJECTION, EMULSION INTRAVENOUS CONTINUOUS PRN
Status: DISCONTINUED | OUTPATIENT
Start: 2023-06-09 | End: 2023-06-09

## 2023-06-09 RX ORDER — ONDANSETRON 2 MG/ML
INJECTION INTRAMUSCULAR; INTRAVENOUS AS NEEDED
Status: DISCONTINUED | OUTPATIENT
Start: 2023-06-09 | End: 2023-06-09

## 2023-06-09 RX ORDER — LIDOCAINE HYDROCHLORIDE 10 MG/ML
INJECTION, SOLUTION EPIDURAL; INFILTRATION; INTRACAUDAL; PERINEURAL AS NEEDED
Status: DISCONTINUED | OUTPATIENT
Start: 2023-06-09 | End: 2023-06-09

## 2023-06-09 RX ORDER — SODIUM CHLORIDE, SODIUM LACTATE, POTASSIUM CHLORIDE, CALCIUM CHLORIDE 600; 310; 30; 20 MG/100ML; MG/100ML; MG/100ML; MG/100ML
50 INJECTION, SOLUTION INTRAVENOUS CONTINUOUS
Status: DISCONTINUED | OUTPATIENT
Start: 2023-06-09 | End: 2023-06-10

## 2023-06-09 RX ORDER — MAGNESIUM HYDROXIDE 1200 MG/15ML
LIQUID ORAL AS NEEDED
Status: DISCONTINUED | OUTPATIENT
Start: 2023-06-09 | End: 2023-06-09 | Stop reason: HOSPADM

## 2023-06-09 RX ORDER — ONDANSETRON 2 MG/ML
4 INJECTION INTRAMUSCULAR; INTRAVENOUS ONCE AS NEEDED
Status: DISCONTINUED | OUTPATIENT
Start: 2023-06-09 | End: 2023-06-09 | Stop reason: HOSPADM

## 2023-06-09 RX ORDER — LIDOCAINE HYDROCHLORIDE 10 MG/ML
INJECTION, SOLUTION EPIDURAL; INFILTRATION; INTRACAUDAL; PERINEURAL AS NEEDED
Status: DISCONTINUED | OUTPATIENT
Start: 2023-06-09 | End: 2023-06-09 | Stop reason: HOSPADM

## 2023-06-09 RX ORDER — SODIUM CHLORIDE, SODIUM LACTATE, POTASSIUM CHLORIDE, CALCIUM CHLORIDE 600; 310; 30; 20 MG/100ML; MG/100ML; MG/100ML; MG/100ML
INJECTION, SOLUTION INTRAVENOUS CONTINUOUS PRN
Status: DISCONTINUED | OUTPATIENT
Start: 2023-06-09 | End: 2023-06-09

## 2023-06-09 RX ORDER — FENTANYL CITRATE 50 UG/ML
INJECTION, SOLUTION INTRAMUSCULAR; INTRAVENOUS AS NEEDED
Status: DISCONTINUED | OUTPATIENT
Start: 2023-06-09 | End: 2023-06-09

## 2023-06-09 RX ORDER — MIDAZOLAM HYDROCHLORIDE 2 MG/2ML
INJECTION, SOLUTION INTRAMUSCULAR; INTRAVENOUS AS NEEDED
Status: DISCONTINUED | OUTPATIENT
Start: 2023-06-09 | End: 2023-06-09

## 2023-06-09 RX ADMIN — VANCOMYCIN HYDROCHLORIDE 1000 MG: 1 INJECTION, SOLUTION INTRAVENOUS at 10:37

## 2023-06-09 RX ADMIN — OXYCODONE HYDROCHLORIDE AND ACETAMINOPHEN 2 TABLET: 5; 325 TABLET ORAL at 05:15

## 2023-06-09 RX ADMIN — HEPARIN SODIUM 5000 UNITS: 5000 INJECTION INTRAVENOUS; SUBCUTANEOUS at 05:05

## 2023-06-09 RX ADMIN — MIDAZOLAM 2 MG: 1 INJECTION INTRAMUSCULAR; INTRAVENOUS at 16:51

## 2023-06-09 RX ADMIN — HEPARIN SODIUM 5000 UNITS: 5000 INJECTION INTRAVENOUS; SUBCUTANEOUS at 21:01

## 2023-06-09 RX ADMIN — SODIUM CHLORIDE, SODIUM LACTATE, POTASSIUM CHLORIDE, AND CALCIUM CHLORIDE: .6; .31; .03; .02 INJECTION, SOLUTION INTRAVENOUS at 16:51

## 2023-06-09 RX ADMIN — LEVOTHYROXINE SODIUM 75 MCG: 75 TABLET ORAL at 05:05

## 2023-06-09 RX ADMIN — ESCITALOPRAM OXALATE 20 MG: 10 TABLET ORAL at 09:07

## 2023-06-09 RX ADMIN — FENTANYL CITRATE 50 MCG: 50 INJECTION INTRAMUSCULAR; INTRAVENOUS at 16:53

## 2023-06-09 RX ADMIN — OXYCODONE HYDROCHLORIDE AND ACETAMINOPHEN 1 TABLET: 5; 325 TABLET ORAL at 21:09

## 2023-06-09 RX ADMIN — PROPOFOL 100 MCG/KG/MIN: 10 INJECTION, EMULSION INTRAVENOUS at 16:53

## 2023-06-09 RX ADMIN — MORPHINE SULFATE 2 MG: 2 INJECTION, SOLUTION INTRAMUSCULAR; INTRAVENOUS at 07:26

## 2023-06-09 RX ADMIN — VANCOMYCIN HYDROCHLORIDE 1000 MG: 1 INJECTION, SOLUTION INTRAVENOUS at 21:01

## 2023-06-09 RX ADMIN — CEFTRIAXONE 1000 MG: 1 INJECTION, SOLUTION INTRAVENOUS at 20:02

## 2023-06-09 RX ADMIN — LIDOCAINE HYDROCHLORIDE 50 MG: 10 INJECTION, SOLUTION EPIDURAL; INFILTRATION; INTRACAUDAL; PERINEURAL at 16:53

## 2023-06-09 RX ADMIN — AMLODIPINE BESYLATE 10 MG: 10 TABLET ORAL at 09:07

## 2023-06-09 RX ADMIN — MORPHINE SULFATE 2 MG: 2 INJECTION, SOLUTION INTRAMUSCULAR; INTRAVENOUS at 22:02

## 2023-06-09 RX ADMIN — FENTANYL CITRATE 25 MCG: 0.05 INJECTION, SOLUTION INTRAMUSCULAR; INTRAVENOUS at 18:12

## 2023-06-09 RX ADMIN — FENTANYL CITRATE 25 MCG: 0.05 INJECTION, SOLUTION INTRAMUSCULAR; INTRAVENOUS at 17:54

## 2023-06-09 RX ADMIN — ONDANSETRON 4 MG: 2 INJECTION INTRAMUSCULAR; INTRAVENOUS at 16:53

## 2023-06-09 RX ADMIN — FUROSEMIDE 40 MG: 40 TABLET ORAL at 09:07

## 2023-06-09 RX ADMIN — FENTANYL CITRATE 25 MCG: 0.05 INJECTION, SOLUTION INTRAMUSCULAR; INTRAVENOUS at 17:46

## 2023-06-09 RX ADMIN — FENTANYL CITRATE 25 MCG: 0.05 INJECTION, SOLUTION INTRAMUSCULAR; INTRAVENOUS at 18:07

## 2023-06-09 RX ADMIN — Medication 400 MG: at 09:07

## 2023-06-09 RX ADMIN — HEPARIN SODIUM 5000 UNITS: 5000 INJECTION INTRAVENOUS; SUBCUTANEOUS at 12:49

## 2023-06-09 RX ADMIN — BUPROPION HYDROCHLORIDE 300 MG: 150 TABLET, EXTENDED RELEASE ORAL at 09:07

## 2023-06-09 NOTE — OCCUPATIONAL THERAPY NOTE
"    Occupational Therapy Evaluation     Patient Name: Deborah Cavazos  QMFSF'O Date: 6/9/2023  Problem List  Principal Problem:    Acute hematogenous osteomyelitis of left foot (RUST 75 )  Active Problems: Moderate aortic stenosis    ANNALEE (acute kidney injury) ([facility] 75 )    Heart failure with preserved ejection fraction    Preoperative cardiovascular examination    Past Medical History  Past Medical History:   Diagnosis Date    Anxiety     Aortic stenosis     Borderline high cholesterol     CHF (congestive heart failure) ([facility] 75 )     [facility]-cardio-    Colon polyp     Depression     Heart murmur     Hypertension     Hypothyroidism     Prediabetes     PVCs (premature ventricular contractions)     \"skip\"    Wears glasses     readers     Past Surgical History  Past Surgical History:   Procedure Laterality Date    APPENDECTOMY      BACK SURGERY      CATARACT EXTRACTION Bilateral     COLONOSCOPY      [place] CORRJ HALLUX VALGUS W/SESMDC W/RESCJ PROX PHAL Left 4/19/2023    Procedure: Riccardo Aguila and wound debridement;  Surgeon: Diann Carrel, DPM;  Location:  MAIN OR;  Service: Podiatry    TOE AMPUTATION Left 6/7/2023    Procedure: PARTIAL 1ST RAY AMPUTATION;  Surgeon: Zulema Butt DPM;  Location: [facility];  Service: Podiatry    TONSILLECTOMY      TOTAL SHOULDER REPLACEMENT Left     WISDOM TOOTH EXTRACTION           06/09/23 0941   Note Type   Note type Evaluation   Pain Assessment   Pain Assessment Tool FLACC   Pain Location/Orientation Orientation: Left; Location: Foot   Pain Rating: FLACC (Rest) - Face 0   Pain Rating: FLACC (Rest) - Legs 0   Pain Rating: FLACC (Rest) - Activity 0   Pain Rating: FLACC (Rest) - Cry 0   Pain Rating: FLACC (Rest) - Consolability 0   Score: FLACC (Rest) 0   Pain Rating: FLACC (Activity) - Face 1   Pain Rating: FLACC (Activity) - Legs 0   Pain Rating: FLACC (Activity) - Activity 0   Pain Rating: FLACC (Activity) - Cry 1   Pain Rating: FLACC (Activity) - Consolability 0 " Score: FLACC (Activity) 2   Restrictions/Precautions   Weight Bearing Precautions Per Order Yes   LLE Weight Bearing Per Order NWB  (strict NWB )   Other Precautions WBS; Chair Alarm; Bed Alarm;Pain   Home Living   Type of Home House  (2 KULWINDER no HR)   Home Layout Two level;1/2 bath on main level;Bed/bath upstairs  (FF R HR)   Bathroom Shower/Tub Tub/shower unit  (tub shower downstairs, walk-in upstairs)   Home Equipment Walker;Cane;Crutches  (did not use PTA)   Additional Comments Pt reports living in a 2 story home with 2 KULWINDER  Pt has full bathroom on 1st and 2nd floor  Pt ambulates with no AD at baseline   Prior Function   Level of [place] Independent with ADLs; Independent with functional mobility; Independent with IADLS   Lives With Spouse   Receives Help From Family   IADLs Independent with driving; Independent with meal prep; Independent with medication management   Falls in the last 6 months 1 to 4  (fell off a ladder)   Comments Pt reports completing ADLs, IADLs, functional ambulation and community mobility + driving @ I   ADL   Where Assessed Edge of bed   Grooming Assistance 6  Modified Independent   Grooming Deficit Setup   UB Dressing Assistance 6  Modified independent   UB Dressing Deficit Setup   LB Dressing Assistance 5  Supervision/Setup   LB Dressing Deficit Increased time to complete; Requires assistive device for steadying   Additional Comments Pt completin ADL tasks while seated at EOB  UB Dressing and groomin tasks @ Mod I after set-up  LB Dressing @ S including donning socks/pants around feet and CM around waist with UB supported from RW PRN   Pt maintaining LLE NWB well with minimal cues   Bed Mobility   Supine to Sit 7  Independent   Sit to Supine 7  Independent   Additional Comments HOB flat, nobedrails   Transfers   Sit to Stand 5  Supervision   Additional items Verbal cues   Stand to Sit 5  Supervision   Additional items Verbal cues   Stand pivot 5  Supervision   Additional items Increased time required;Verbal cues   Additional Comments Pt completing functional transfes with use of RW for UB support  S fo rSTS and SPT with increased tiem PRN and occasional cues for safety and RW managemetn   Balance   Static Sitting Normal   Dynamic Sitting Good   Static Standing Fair +   Dynamic Standing Fair   Activity Tolerance   Activity Tolerance Patient limited by fatigue;Patient limited by pain   Medical Staff Made Aware Spoke wipasha PT, [address]   Nurse Made Aware SPoke with RN, trudi BAKER Assessment   RUE Assessment WFL   LUE Assessment   LUE Assessment WFL   Hand Function   Gross Motor Coordination Functional   Fine Motor Coordination Functional   Sensation   Light Touch No apparent deficits   Cognition   Overall Cognitive Status WFL   Arousal/Participation Alert; Responsive; Cooperative   Attention Attends with cues to redirect   Orientation Level Oriented X4   Memory Within functional limits   Following Commands Follows one step commands without difficulty   Assessment   Prognosis Good   Assessment Pt is a 69 y o  male, admitted to [address] [place] [address] 6/6/2023 d/t experiencing L foot swelling and redness  Dx: acute hematogenous osteomyelitis of L foot  Pt underwent L partial 1st RAY AMPUTATION on 6/7/2023 and has plans to return to OR later today  Pt is NWB LLE  Pt with PMHx impacting their performance during ADL tasks, including: anxiety, aortic stenosis, CHF, depression, heart murmur  Prior to admission to the hospital Pt was performing ADLs without physical assistance  IADLs without physical assistance  Functional transfers/ambulation without physical assistance  Cognitive status was PTA was intact  OT order placed to assess Pt's ADLs, cognitive status, and performance during functional tasks in order to maximize safety and independence while making most appropriate d/c recommendations   Pt's clinical presentation is currently evolving given new onset deficits that effect Pt's occupational performance and ability to safely return to PLOF including decrease activity tolerance, decrease standing balance, increased pain, decrease generalized strength, decrease activity engagement, decrease performance during functional transfers and steps to enter home combined with medical complications of pain impacting overall mobility status, wounds, decreased skin integrity and need for input for mobility technique/safety  Personal factors affecting Pt at time of initial evaluation include: step(s) to enter environment, multi-level environment, availability as recommended, inability to perform IADLs, new need for AD, inability to navigate community distances, limited insight into impairments and decreased initiation and engagement  Pt at this time is demonstrating ability to complete ADLs and functional tasks @ S/Mod I with RW  Pt has assistance at home if needed and verbalizes no concerns with returning home at this time  D/c OT effective 6/9/2023  If new concerns arise, please re-consult  Plan   OT Frequency Eval only   Recommendation   OT Discharge Recommendation No rehabilitation needs   AM-PAC Daily Activity Inpatient   Lower Body Dressing 3   Bathing 3   Toileting 3   Upper Body Dressing 4   Grooming 3   Eating 4   Daily Activity Raw Score 20   Daily Activity Standardized Score (Calc for Raw Score >=11) 42 03   AM-[facility] Applied Cognition Inpatient   Following a Speech/Presentation 4   Understanding Ordinary Conversation 4   Taking Medications 4   Remembering Where Things Are Placed or Put Away 4   Remembering List of 4-5 Errands 4   Taking Care of Complicated Tasks 4   Applied Cognition Raw Score 24   Applied Cognition Standardized Score 62 21     The patient's raw score on the AM-PAC Daily Activity Inpatient Short Form is 20  A raw score of greater than or equal to 19 suggests the patient may benefit from discharge to home  Please refer to the recommendation of the Occupational Therapist for safe discharge planning          Heidi Rice Andrew OTR/L

## 2023-06-09 NOTE — PLAN OF CARE
"  Problem: PHYSICAL THERAPY ADULT  Goal: Performs mobility at highest level of function for planned discharge setting  See evaluation for individualized goals  Description: Treatment/Interventions: ADL retraining, Functional transfer training, LE strengthening/ROM, Elevations, Therapeutic exercise, Endurance training, Patient/family training, Equipment eval/education, Bed mobility, Gait training, Compensatory technique education, Spoke to nursing, Spoke to case management, OT  Equipment Recommended:  (pt has RW, would benefit from knee scooter  pt interested in purchasing upon discharge)       See flowsheet documentation for full assessment, interventions and recommendations  4/7/3199 [id number] by Cuate Grayson PT  Note: Prognosis: Good  Problem List: Decreased strength, Decreased endurance, Decreased range of motion, Decreased mobility, Impaired balance, Decreased safety awareness, Decreased skin integrity, Pain, Orthopedic restrictions  Pt tolerated session fairly well with inc c/o fatigue after ambulation  Pt reports \"I have lost alot because of this\"  He was able to ambulate further distance and trial 1 step  He fatigues quickly and c/o right hip abd muscle soreness  He trialed knee scooter and was able to travel further distance wiht L LE NWB throughout with min cues for saftey wiht turning  He is motivated t return to home and for left foot to heal  He was understanding of education and importance of maintaining restrictions  He is limited by decreased strength, balance, endurance and pain  He will continue to benefit from PT services to maximize LOF  Barriers to Discharge: None  Barriers to Discharge Comments: has 1st floor set up available if needed and assistance from family prn  PT Discharge Recommendation:  (no needs versus OPPT)    See flowsheet documentation for full assessment          "

## 2023-06-09 NOTE — PLAN OF CARE
Problem: Potential for Falls  Goal: Patient will remain free of falls  Description: INTERVENTIONS:  - Educate patient/family on patient safety including physical limitations  - Instruct patient to call for assistance with activity   - Consult OT/PT to assist with strengthening/mobility   - Keep Call bell within reach  - Keep bed low and locked with side rails adjusted as appropriate  - Keep care items and personal belongings within reach  - Initiate and maintain comfort rounds  - Make Fall Risk Sign visible to staff  - Offer Toileting every 2 Hours, in advance of need  - Apply yellow socks and bracelet for high fall risk patients  Outcome: Progressing     Problem: Nutrition/Hydration-ADULT  Goal: Nutrient/Hydration intake appropriate for improving, restoring or maintaining nutritional needs  Description: Monitor and assess patient's nutrition/hydration status for malnutrition  Collaborate with interdisciplinary team and initiate plan and interventions as ordered  Monitor patient's weight and dietary intake as ordered or per policy  Utilize nutrition screening tool and intervene as necessary  Determine patient's food preferences and provide high-protein, high-caloric foods as appropriate       INTERVENTIONS:  - Monitor oral intake, urinary output, labs, and treatment plans  - Assess nutrition and hydration status and recommend course of action  - Evaluate amount of meals eaten  - Assist patient with eating if necessary   - Allow adequate time for meals  - Recommend/ encourage appropriate diets, oral nutritional supplements, and vitamin/mineral supplements  - Order, calculate, and assess calorie counts as needed  - Recommend, monitor, and adjust tube feedings and TPN/PPN based on assessed needs  - Assess need for intravenous fluids  - Provide specific nutrition/hydration education as appropriate  - Include patient/family/caregiver in decisions related to nutrition  Outcome: Progressing     Problem: PAIN - ADULT  Goal: Verbalizes/displays adequate comfort level or baseline comfort level  Description: Interventions:  - Encourage patient to monitor pain and request assistance  - Assess pain using appropriate pain scale  - Administer analgesics based on type and severity of pain and evaluate response  - Implement non-pharmacological measures as appropriate and evaluate response  - Consider cultural and social influences on pain and pain management  - Notify physician/advanced practitioner if interventions unsuccessful or patient reports new pain  Outcome: Progressing     Problem: INFECTION - ADULT  Goal: Absence or prevention of progression during hospitalization  Description: INTERVENTIONS:  - Assess and monitor for signs and symptoms of infection  - Monitor lab/diagnostic results  - Monitor all insertion sites, i e  indwelling lines, tubes, and drains  - Monitor endotracheal if appropriate and nasal secretions for changes in amount and color  - [place] appropriate cooling/warming therapies per order  - Administer medications as ordered  - Instruct and encourage patient and family to use good hand hygiene technique  - Identify and instruct in appropriate isolation precautions for identified infection/condition  Outcome: Progressing     Problem: SAFETY ADULT  Goal: Patient will remain free of falls  Description: INTERVENTIONS:  - Educate patient/family on patient safety including physical limitations  - Instruct patient to call for assistance with activity   - Consult OT/PT to assist with strengthening/mobility   - Keep Call bell within reach  - Keep bed low and locked with side rails adjusted as appropriate  - Keep care items and personal belongings within reach  - Initiate and maintain comfort rounds  - Make Fall Risk Sign visible to staff  - Offer Toileting every 2 Hours, in advance of need  - Apply yellow socks and bracelet for high fall risk patients  Outcome: Progressing  Goal: Maintain or return to baseline ADL function  Description: INTERVENTIONS:  -  Assess patient's ability to carry out ADLs; assess patient's baseline for ADL function and identify physical deficits which impact ability to perform ADLs (bathing, care of mouth/teeth, toileting, grooming, dressing, etc )  - Assess/evaluate cause of self-care deficits   - Assess range of motion  - Assess patient's mobility; develop plan if impaired  - Assess patient's need for assistive devices and provide as appropriate  - Encourage maximum independence but intervene and supervise when necessary  - Involve family in performance of ADLs  - Assess for home care needs following discharge   - Consider OT consult to assist with ADL evaluation and planning for discharge  - Provide patient education as appropriate  Outcome: Progressing  Goal: Maintains/Returns to pre admission functional level  Description: INTERVENTIONS:  - Perform BMAT or MOVE assessment daily    - Set and communicate daily mobility goal to care team and patient/family/caregiver  - Collaborate with rehabilitation services on mobility goals if consulted  - Perform Range of Motion 3 times a day  - Reposition patient every 2 hours    - Dangle patient 3 times a day  - Stand patient 3 times a day  - Ambulate patient 3 times a day  - Out of bed to chair 3 times a day   - Out of bed for meals 3 times a day  - Out of bed for toileting  - Record patient progress and toleration of activity level   Outcome: Progressing     Problem: DISCHARGE PLANNING  Goal: Discharge to home or other facility with appropriate resources  Description: INTERVENTIONS:  - Identify barriers to discharge w/patient and caregiver  - Arrange for needed discharge resources and transportation as appropriate  - Identify discharge learning needs (meds, wound care, etc )  - Arrange for interpretive services to assist at discharge as needed  - Refer to Case Management Department for coordinating discharge planning if the patient needs post-hospital services based on physician/advanced practitioner order or complex needs related to functional status, cognitive ability, or social support system  Outcome: Progressing     Problem: Knowledge Deficit  Goal: Patient/family/caregiver demonstrates understanding of disease process, treatment plan, medications, and discharge instructions  Description: Complete learning assessment and assess knowledge base    Interventions:  - Provide teaching at level of understanding  - Provide teaching via preferred learning methods  Outcome: Progressing     Problem: Prexisting or High Potential for Compromised Skin Integrity  Goal: Skin integrity is maintained or improved  Description: INTERVENTIONS:  - Identify patients at risk for skin breakdown  - Assess and monitor skin integrity  - Assess and monitor nutrition and hydration status  - Monitor labs   - Assess for incontinence   - Turn and reposition patient  - Assist with mobility/ambulation  - Relieve pressure over bony prominences  - Avoid friction and shearing  - Provide appropriate hygiene as needed including keeping skin clean and dry  - Evaluate need for skin moisturizer/barrier cream  - Collaborate with interdisciplinary team   - Patient/family teaching  - Consider wound care consult   Outcome: Progressing     Problem: MOBILITY - ADULT  Goal: Maintain or return to baseline ADL function  Description: INTERVENTIONS:  -  Assess patient's ability to carry out ADLs; assess patient's baseline for ADL function and identify physical deficits which impact ability to perform ADLs (bathing, care of mouth/teeth, toileting, grooming, dressing, etc )  - Assess/evaluate cause of self-care deficits   - Assess range of motion  - Assess patient's mobility; develop plan if impaired  - Assess patient's need for assistive devices and provide as appropriate  - Encourage maximum independence but intervene and supervise when necessary  - Involve family in performance of ADLs  - Assess for home care needs following discharge   - Consider OT consult to assist with ADL evaluation and planning for discharge  - Provide patient education as appropriate  Outcome: Progressing  Goal: Maintains/Returns to pre admission functional level  Description: INTERVENTIONS:  - Perform BMAT or MOVE assessment daily    - Set and communicate daily mobility goal to care team and patient/family/caregiver  - Collaborate with rehabilitation services on mobility goals if consulted  - Perform Range of Motion 3 times a day  - Reposition patient every 2 hours    - Dangle patient 3 times a day  - Stand patient 3 times a day  - Ambulate patient 3 times a day  - Out of bed to chair 3 times a day   - Out of bed for meals 3 times a day  - Out of bed for toileting  - Record patient progress and toleration of activity level   Outcome: Progressing

## 2023-06-09 NOTE — ASSESSMENT & PLAN NOTE
Creatinine on 4/19/2023 was 1 01, creatinine on admission 1 36  Now improved after IV Lasix due to volume overload  Renal function improved, currently at baseline  Creatinine today is 1 03

## 2023-06-09 NOTE — PROGRESS NOTES
[address]  Progress Note  Name: Myranda Paniagua I  MRN: [id number]  Unit/Bed#: -01 I Date of Admission: 6/6/2023   Date of Service: 6/9/2023 I Hospital Day: 3    Assessment/Plan   Heart failure with preserved ejection fraction  Assessment & Plan  Diastolic in nature  Last echocardiogram shows ejection fraction of 60% with grade 1 diastolic dysfunction  Appreciate cardiology input- as per their consult no evidence of acute HF  Hold further diuretics, can resume likely postsurgery  Currently euvolemic  Monitor volume status      ANNALEE (acute kidney injury) ([facility] )  Assessment & Plan  Creatinine on 4/19/2023 was 1 01, creatinine on admission 1 36  Now improved after IV Lasix due to volume overload  Renal function improved, currently at baseline  Creatinine today is 1 03    Moderate aortic stenosis  Assessment & Plan  Known history of moderate aortic stenosis  Appreciate cardiology input, avoid aggressive diuresis      * Acute hematogenous osteomyelitis of left foot ([facility] )  Assessment & Plan  MRI Foot: Findings consistent with definite osteomyelitis of the great toe proximal phalanx  Nonspecific marrow edema involving the distal 3 1 cm of the first metatarsal, possibly reactive, although early osteomyelitis cannot be excluded  Large first MTP joint effusion, nonspecific; septic arthritis cannot be excluded on the basis of imaging  No evidence of a soft tissue abscess  Continue IV ceftriaxone vancomycin  Follow wound culture, blood culture  Blood culture remained negative so far  Status status post OR 2 days ago-status post first ray amputation  Plan for OR today for wound closure  Surgical  culture pending   Antibiotics as outpatient-Case management is working for home antibiotics set up                 VTE Pharmacologic Prophylaxis: VTE Score: 5 Moderate Risk (Score 3-4) - Pharmacological DVT Prophylaxis Ordered: heparin      Patient Centered Rounds: I performed bedside rounds with nursing staff today  Discussions with Specialists or Other Care Team Provider: Discussed with nursing  Education and Discussions with Family / Patient: billy Jimenez -   Total Time Spent on Date of Encounter in care of patient: 30 min  This time was spent on one or more of the following: performing physical exam; counseling and coordination of care; obtaining or reviewing history; documenting in the medical record; reviewing/ordering tests, medications or procedures; communicating with other healthcare professionals and discussing with patient's family/caregivers  Current Length of Stay: 3 day(s)  Current Patient Status: Inpatient   Certification Statement: The patient will continue to require additional inpatient hospital stay due to OR today   Discharge Plan: Anticipate discharge in 24-48 hrs to to be determined    Code Status: Level 1 - Full Code    Subjective:     Patient seen and examined  No new symptoms    Objective:     Vitals:   Temp (24hrs), Av °F (36 7 °C), Min:97 2 °F (36 2 °C), Max:98 6 °F (37 °C)    Temp:  [97 2 °F (36 2 °C)-98 6 °F (37 °C)] 97 2 °F (36 2 °C)  HR:  [58-69] 58  Resp:  [15-18] 18  BP: (125-127)/(65-75) 125/65  SpO2:  [93 %-96 %] 93 %  Body mass index is 28 85 kg/m²  Input and Output Summary (last 24 hours): Intake/Output Summary (Last 24 hours) at 2023 1250  Last data filed at 2023 0909  Gross per 24 hour   Intake 300 ml   Output 1250 ml   Net -950 ml       Physical Exam:   Physical Exam  Constitutional:       General: He is not in acute distress  Appearance: He is not ill-appearing or toxic-appearing  HENT:      Head: Normocephalic  Eyes:      Pupils: Pupils are equal, round, and reactive to light  Cardiovascular:      Rate and Rhythm: Normal rate  Heart sounds: No murmur heard  No friction rub  No gallop  Pulmonary:      Effort: No respiratory distress  Breath sounds: No stridor  No rhonchi or rales     Abdominal:      General: Abdomen is flat  There is no distension  Palpations: There is no mass  Tenderness: There is no abdominal tenderness  There is no right CVA tenderness, guarding or rebound  Hernia: No hernia is present  Musculoskeletal:         General: Deformity (left foot wound, dressed  Oozing with strikethough ) present  No swelling  Skin:     Capillary Refill: Capillary refill takes less than 2 seconds  Coloration: Skin is not jaundiced  Findings: No bruising or lesion  Neurological:      General: No focal deficit present  Mental Status: He is alert  Cranial Nerves: No cranial nerve deficit  Sensory: No sensory deficit  Motor: No weakness        Coordination: Coordination normal       Gait: Gait normal       Deep Tendon Reflexes: Reflexes normal    Psychiatric:         Mood and Affect: Mood normal           Additional Data:     Labs:  Results from last 7 days   Lab Units 06/09/23  0505 06/08/23  0525   EOS PCT %  --  3   HEMATOCRIT % 41 1 43 4   HEMOGLOBIN g/dL 13 3 14 5   LYMPHS PCT %  --  16   MONOS PCT %  --  12   NEUTROS PCT %  --  68   PLATELETS Thousands/uL 215 238   WBC Thousand/uL 10 23* 10 13     Results from last 7 days   Lab Units 06/09/23  0505 06/08/23  0525   ANION GAP mmol/L 6 6   ALBUMIN g/dL  --  3 6   ALK PHOS U/L  --  62   ALT U/L  --  8   AST U/L  --  10*   BUN mg/dL 15 17   CALCIUM mg/dL 9 1 9 1   CHLORIDE mmol/L 104 104   CO2 mmol/L 26 27   CREATININE mg/dL 1 03 0 97   GLUCOSE RANDOM mg/dL 112 112   POTASSIUM mmol/L 4 0 4 0   SODIUM mmol/L 136 137   TOTAL BILIRUBIN mg/dL  --  0 43     Results from last 7 days   Lab Units 06/06/23  1610   INR  0 89             Results from last 7 days   Lab Units 06/06/23  1610   LACTIC ACID mmol/L 1 3       Lines/Drains:  Invasive Devices     Peripheral Intravenous Line  Duration           Peripheral IV 06/06/23 Left;Ventral (anterior) Forearm 2 days    Peripheral IV 06/06/23 Right;Ventral (anterior) Forearm 2 days Imaging: No pertinent imaging reviewed  Recent Cultures (last 7 days):   Results from last 7 days   Lab Units 06/07/23  1455 06/07/23  1449 06/06/23  2109 06/06/23  1611 06/06/23  1610   BLOOD CULTURE   --   --   --  No Growth at 48 hrs  No Growth at 48 hrs  GRAM STAIN RESULT  Rare Polys  No bacteria seen Rare Polys  No bacteria seen 1+ Polys  No bacteria seen  --   --    WOUND CULTURE   --   --  Culture too young- will reincubate  --   --        Last 24 Hours Medication List:   Current Facility-Administered Medications   Medication Dose Route Frequency Provider Last Rate   • acetaminophen  650 mg Oral Q6H PRN Flakita Hammer DPM     • amLODIPine  10 mg Oral Daily Flakita Hammer DPM     • buPROPion  300 mg Oral Daily Flakita Hammer DPM     • cefTRIAXone  1,000 mg Intravenous Q24H Flakita Hammer DPM 1,000 mg (06/08/23 1814)   • docusate sodium  100 mg Oral BID Flakita Hammer DPM     • escitalopram  20 mg Oral Daily Flakita Hammer DPM     • furosemide  40 mg Oral Daily JESUS Smith     • heparin (porcine)  5,000 Units Subcutaneous Counts include [facility] beds at the [facility] Flakita Hammer DPM     • levothyroxine  75 mcg Oral Early Morning Flakita Hammer DPM     • magnesium Oxide  400 mg Oral Daily Flakita Hammer DPM     • morphine injection  2 mg Intravenous Q6H PRN Karolina Beard MD     • ondansetron  4 mg Intravenous Q6H PRN Flakita Hammer DPM     • oxyCODONE-acetaminophen  1 tablet Oral Q6H PRN Karolina Beard MD     • oxyCODONE-acetaminophen  2 tablet Oral Q6H PRN Karolina Beard MD     • polyethylene glycol  17 g Oral Daily Flakita Hammer DPM     • vancomycin  1,000 mg Intravenous Q12H Karolina Beard MD 1,000 mg (06/09/23 1037)        Today, Patient Was Seen By: Madison Lawson MD    **Please Note: This note may have been constructed using a voice recognition system  **

## 2023-06-09 NOTE — OP NOTE
OPERATIVE REPORT  PATIENT NAME: Beatriz Piedra    :  1955  MRN: [id number]  Pt Location: OW OR ROOM 02    SURGERY DATE: 2023    Surgeon(s) and Role:     * Chidi Cheatham DPM - Primary    Preop Diagnosis:  Other acute osteomyelitis of left foot ([facility] ) [M86 172]    Post-Op Diagnosis Codes:     * Other acute osteomyelitis of left foot ([facility] ) [M86 172]    Procedure(s):  Left - CLOSURE DELAYED PRIMARY    Specimen(s):  * No specimens in log *    Estimated Blood Loss:   Minimal    Drains:  * No LDAs found *    Anesthesia Type:   IV Sedation with Anesthesia    Operative Indications: Other acute osteomyelitis of left foot ([facility] ) [M86 172]      Operative Findings:  Consistent with diagnosis, minimal necrotic tissue, bone margin clears viable, hard, bloody with well-corticated margins actually    Complications:   None    Procedure and Technique:  Patient brought to the operating room and prepped for the procedure on his stretcher  A full timeout was performed to identify the correct patient location and procedure  The foot was scrubbed prepped and draped in usual aseptic manner    Attention was then directed to the left foot where a large deficit was noted at the amputation site of the hallux  The deficit site was inspected and any tenderness, capsular tissues and anything that appeared somewhat nonviable was removed  Of note, he had exuberant granulation tissue which required extensive remodeling of the incisional edges to facilitate closure  Following remodeling of all soft tissues, the foot was rinsed with copious amounts of sterile saline  And closure was obtained utilizing 2-0 nylon  Bleeders were cauterized as necessary  The foot was then washed and dried and was dressed with Adaptic, 4 x 4's, ABD, Zbigniew, Ace  I was present for the entire procedure      Patient Disposition:  PACU  and extubated and stable        SIGNATURE: Aurelio Carson  DATE: 2023  TIME: 5:34 PM

## 2023-06-09 NOTE — PLAN OF CARE
Problem: PHYSICAL THERAPY ADULT  Goal: Performs mobility at highest level of function for planned discharge setting  See evaluation for individualized goals  Description: Treatment/Interventions: ADL retraining, Functional transfer training, LE strengthening/ROM, Elevations, Therapeutic exercise, Endurance training, Patient/family training, Equipment eval/education, Bed mobility, Gait training, Compensatory technique education, Spoke to nursing, Spoke to case management, OT  Equipment Recommended:  (pt has RW, would benefit from knee scooter  pt interested in purchasing upon discharge)       See flowsheet documentation for full assessment, interventions and recommendations  Note: Prognosis: Good  Problem List: Decreased strength, Decreased endurance, Decreased range of motion, Decreased mobility, Impaired balance, Decreased safety awareness, Decreased skin integrity, Pain, Orthopedic restrictions  Assessment: Pt is a 69 y o  male seen for PT evaluation s/p admission to [address] [place] [address] on 6/6/2023 with Acute hematogenous osteomyelitis of left foot ([facility] )  Order placed for PT services    Upon evaluation: Pt is presenting with impaired functional mobility due to pain, decreased strength, decreased ROM, decreased endurance, impaired balance, gait deviations, decreased safety awareness, impaired judgment, orthopedic restrictions, fall risk, and impaired skin integrity requiring  supervision assistance for transfers and stand by to supervision assistance for ambulation with RW   Pt's clinical presentation is currently unpredictable given the functional mobility deficits above, especially weakness, decreased ROM, edema of extremities, decreased skin integrity, decreased endurance, gait deviations, pain, decreased activity tolerance, decreased functional mobility tolerance, decreased safety awareness, and impaired judgement, coupled with fall risks as indicated by AM-PAC 6-Clicks: 88/93 as well as hx of falls, impaired balance, polypharmacy, and decreased safety awareness and combined with medical complications of pain impacting overall mobility status, abnormal WBCs, need for input for mobility technique/safety and abnormal CRP, acute hematogenous osteomyelitis of left foot s/p partial 1st ray amputation, ANNALEE on admission  Pt's PMHx and comorbidities that may affect physical performance and progress include: CHF and moderate aortic stenosis, peripheral neuropathy, anxiety, depression, HTN, prediabetes  Personal factors affecting pt at time of IE include: step(s) to enter environment, multi-level environment, inability to perform current job functions, inability to perform IADLs, inability to perform ADLs, inability to navigate level surfaces without external assistance and recent fall(s)/fall history  Pt will benefit from continued skilled PT services to address deficits as defined above and to maximize level of functional mobility to facilitate return toward PLOF and improved QOL  From PT/mobility standpoint, recommendation at time of d/c would be anticipate no needs vs OPPT pending progress in order to reduce fall risk and maximize pt's functional independence and consistency with mobility in order to facilitate return to PLOF  Recommend trial with walker next 1-2 sessions, ther ex next 1-2 sessions and knee scooter  Barriers to Discharge: None  Barriers to Discharge Comments: has 1st floor set up available if needed and assistance from family prn  PT Discharge Recommendation:  (no needs versus OPPT)    See flowsheet documentation for full assessment

## 2023-06-09 NOTE — ANESTHESIA PREPROCEDURE EVALUATION
"Procedure:  CLOSURE DELAYED PRIMARY (Left: Foot)    Relevant Problems   CARDIO   (+) Benign essential HTN   (+) Moderate aortic stenosis   (+) PVCs (premature ventricular contractions)      /RENAL   (+) ANNALEE (acute kidney injury) (HCC)      NEURO/PSYCH   (+) MDD (major depressive disorder)      PULMONARY   (+) Heart failure with preserved ejection fraction   (+) Other emphysema (HCC)      Nervous and Auditory   (+) Peripheral neuropathy      Other   (+) Acute hematogenous osteomyelitis of left foot (HCC)        Physical Exam    Airway    Mallampati score: III  TM Distance: >3 FB  Neck ROM: limited     Dental       Cardiovascular      Pulmonary      Other Findings        Anesthesia Plan  ASA Score- 3     Anesthesia Type- IV sedation with anesthesia with ASA Monitors  Additional Monitors:   Airway Plan:           Plan Factors-Exercise tolerance (METS): >4 METS  Chart reviewed  EKG reviewed  Imaging results reviewed  Existing labs reviewed  Patient summary reviewed  Patient is not a current smoker  Patient did not smoke on day of surgery  Induction- intravenous  Postoperative Plan- Plan for postoperative opioid use  Informed Consent- Anesthetic plan and risks discussed with patient  I personally reviewed this patient with the CRNA  Discussed and agreed on the Anesthesia Plan with the CRNA  Nighat Stockton           VITALS  /72   Pulse 56   Temp 98 3 °F (36 8 °C) (Oral)   Resp 18   Ht 6' 1\" (1 854 m)   Wt 98 9 kg (218 lb)   SpO2 97%   BMI 28 76 kg/m²   BP Readings from Last 3 Encounters:   06/09/23 127/72   05/10/23 120/78   05/05/23 120/76     LABS  Results from Last 12 Months   Lab Units 06/09/23  0505 06/08/23  0525 06/07/23  0504 06/06/23  1610   ANION GAP mmol/L 6 6 6 9   ALBUMIN g/dL  --  3 6 3 6 3 9   ALK PHOS U/L  --  62 62 71   ALT U/L  --  8 7 11   AST U/L  --  10* 13 14   BUN mg/dL 15 17 17 26*   CALCIUM mg/dL 9 1 9 1 9 0 9 5   CHLORIDE mmol/L 104 104 106 103   CO2 mmol/L 26 27 25 " 25   CREATININE mg/dL 1 03 0 97 1 00 1 36*   GLUCOSE RANDOM mg/dL 112 112 111 97   POTASSIUM mmol/L 4 0 4 0 3 8 3 8   MAGNESIUM mg/dL  --  2 1  --  1 9   SODIUM mmol/L 136 137 137 137   TOTAL BILIRUBIN mg/dL  --  0 43 0 40 0 23     Results from Last 12 Months   Lab Units 06/09/23  0505 06/08/23  0525   HEMATOCRIT % 41 1 43 4   HEMOGLOBIN g/dL 13 3 14 5   PLATELETS Thousands/uL 215 238   WBC Thousand/uL 10 23* 10 13     Results from Last 12 Months   Lab Units 06/06/23  1610 02/13/23  0947 02/12/23  1852 02/12/23  1153   INR  0 89  --   --  0 96   PTT seconds 29 75*   < > 29    < > = values in this interval not displayed  ECG  Encounter Date: 04/13/23   ECG 12 lead   Result Value    Ventricular Rate 76    Atrial Rate 76    MD Interval 152    QRSD Interval 92    QT Interval 376    QTC Interval 423    P Axis 49    QRS Axis 47    T Wave Axis 61    Narrative    Normal sinus rhythm  Normal ECG  When compared with ECG of 10-FEB-2023 11:44,  No significant change was found  Confirmed by [address] [place] [address] ([id number]) on 4/17/2023 8:02:35 AM     No results found for this or any previous visit  ECHOCARDIOGRAPHY, OTHER CARDIAC TESTING, AND IMAGING  2/2023 MAKEDA  •  Left Ventricle: Left ventricular cavity size is normal  The left ventricular ejection fraction is 60%  Systolic function is normal  Wall motion is normal   •  Left Atrium: The atrium is mildly dilated  •  Atrial Septum: No patent foramen ovale detected using color flow Doppler at rest   •  Aortic Valve: The leaflets are severely thickened  The leaflets are severely calcified  There is moderately reduced mobility  There is no evidence of regurgitation  There is moderate stenosis  Vegetation could not be excluded on the basis of this study as the valve cusps are thickened and heavily calcified  No definitive vegetation is seen  •  Mitral Valve: There is trace regurgitation  There is no evidence of stenosis  No vegetation present on the mitral valve    • Tricuspid Valve: There is trace regurgitation  There is no evidence of stenosis  No vegetation present on the tricuspid valve  •  Prior TTE study available for comparison  Prior study date: 1/30/2023  No significant changes noted compared to the prior study  ANESTHESIA RISK-BENEFIT DISCUSSION  BENEFITS INCLUDE THE FOLLOWING (Dawson Hyatt 81 [id number], PMID [id number]): (1) Involvement of a dedicated anesthesia team reduces mortality and morbidity for major surgeries, (2) The team provides as much analgesia/sedation/amnesia/akinesia as is reasonably possible, and (3) The team strives to reduce pain and discomfort as much as reasonably possible  RISKS (AND PLANS TO MITIGATE RISKS) INCLUDES THE FOLLOWING:    Neurologic Risks: IntraOp awareness (Risk is ~1:1,000 - 1:14,000; PMID [id number]), Stroke (Risk ~<0 1-2% for most cases; PMID [id number]), and POCD  Since regional anesthesia may be used, risks of block failure, bleeding, infection, and nerve injury were discussed  Airway and Pulmonary Risks: Dental or mouth injury, throat pain, critical hypoxia, pneumothorax, prolonged intubation, post-op respiratory compromise  • Airway/Intubation risks and information: No prior advanced airway notes in [facility] EMR  • Major ARISCAT risk factors include: none, (Score 0-2= Low risk, 1 6%)  Cardiovascular Risks: Hypotension, arrhythmias, and julián-op cardiac injury (MACE)  In cases where specialized vascular access is needed, then additional risks including bleeding, infection, or injury to adjacent structures  If a bypass circuit is needed then risk of stroke, blood clot, and vasoplegia  • Signs of active, severe cardiac instability: none  • Ishaan's RCRI score items: CHF  • MACE risk: An RCRI score of 1= 0 6% risk  • Are julián-op or intra-op beta blockers indicated?: no   FEN/GI Risks: Aspiration (Approximately 0 5% risk per the IRIS trial) and PONV (10-80% depending on Apfel criteria)    • Does the patient meet ASA NPO guidelines?: Yes Adverse drug reaction risks: Allergic reactions, overdoses, drug-drug interactions, injury to a fetus or  in pregnant or breastfeeding patients, increased risk of injury or accident if performing potentially hazardous tasks before anesthetic medications have been fully excreted/metabolized    • Recent medications relevant to anesthetic plan: none  • Personal or family history of anesthesia complications: no  • Pregnancy Status: Not applicable   Mortality risks associated with anesthesia based on ASA-PS (PMID [id number]): ASA-PS III: Estimated risk 1:3,500

## 2023-06-09 NOTE — PROGRESS NOTES
Roseann Krabbe is a 69 y o  male who is currently ordered Vancomycin IV with management by the Pharmacy Consult service  Relevant clinical data and objective / subjective history reviewed  Vancomycin Assessment:  Indication and Goal AUC/Trough: Bone/joint infection (goal -600, trough >10), -600, trough >10  Clinical Status: stable  Micro:   pending  Renal Function:  SCr: 1 03 mg/dL  CrCl: 83 9 mL/min  Renal replacement: Not on dialysis  Days of Therapy: 4  Current Dose: 1000mg iv q12h  Vancomycin Plan:     Estimated AUC: 435 mcg*hr/mL  Estimated Trough: 14 4 mcg/mL  Next Level: 06/15/23 0600  Renal Function Monitoring: Daily BMP and UOP  Pharmacy will continue to follow closely for s/sx of nephrotoxicity, infusion reactions and appropriateness of therapy  BMP and CBC will be ordered per protocol  We will continue to follow the patient’s culture results and clinical progress daily      Balwinder Wood, Pharmacist

## 2023-06-09 NOTE — DISCHARGE INSTR - OTHER ORDERS
Podiatry: Weight-bear as tolerated to the left foot in surgical shoe  He will need every other day dressing changes to the left foot upon discharge with Adaptic, 4 x 4's, ABD, plain, Ace    He is to follow with Dr Lula Lamb upon discharge within 1 week

## 2023-06-09 NOTE — INTERVAL H&P NOTE
H&P reviewed  After examining the patient I find no changes in the patients condition since the H&P had been written  No major changes  Foot still has some pain  Cardiopulmonary status is stable  Moderate AS and HFpEF      Vitals:    06/09/23 1403   BP: 127/72   Pulse: 56   Resp: 18   Temp: 98 3 °F (36 8 °C)   SpO2: 97%       Kellee Hylton MD PhD  [phone]  Anesthesiology and Critical Care  Staff Physician, [facility] [address] [place] [address]  June 9, 2023

## 2023-06-09 NOTE — PROGRESS NOTES
"Progress Note - Mark Mishra 69 y o  male MRN: [id number]    Unit/Bed#: [facility] Encounter: [id number]      Assessment:    1  Osteomyelitis of left foot  2  Neuropathy left foot    Plan:    -Cultures were preliminary but resulted is no growth, his previous wound culture did grow actinobacter and I advis to wait till final cultures come back prior to discharge  - N p o  after midnight today, will plan for further debridement and hopeful closure today in the OR  - I anticipate he will be weightbearing as tolerated in surgical shoe  -If cultures do return positive he would benefit from 6 weeks of antibiotic treatment for continued osteomyelitis  - Following intervention today he will be stable for discharge from podiatric standpoint once cultures do come back  He is to follow-up with Dr Irene Lozoya next week  -We will need every other day dressing change upon discharge with Adaptic, DSD to left foot      Subjective:   Patient seen and examined at bedside, complaining of pain in his feet, has been strict nonweightbearing compliant with the orders    Objective:     Vitals: Blood pressure 127/72, pulse 56, temperature 98 3 °F (36 8 °C), temperature source Oral, resp  rate 18, height 6' 1\" (1 854 m), weight 98 9 kg (218 lb), SpO2 97 %  ,Body mass index is 28 76 kg/m²        Intake/Output Summary (Last 24 hours) at 6/9/2023 1639  Last data filed at 6/9/2023 0909  Gross per 24 hour   Intake 300 ml   Output 1000 ml   Net -700 ml       Physical Exam: Dressing clean intact to the left foot, there is evidence of strikethrough along the amputation site although very minimal   No further issues  "

## 2023-06-09 NOTE — ASSESSMENT & PLAN NOTE
Diastolic in nature  Last echocardiogram shows ejection fraction of 60% with grade 1 diastolic dysfunction  Appreciate cardiology input- as per their consult no evidence of acute HF  Hold further diuretics, can resume likely postsurgery  Currently euvolemic    Monitor volume status

## 2023-06-09 NOTE — ANESTHESIA POSTPROCEDURE EVALUATION
Post-Op Assessment Note    CV Status:  Stable  Pain Score: 0    Pain management: satisfactory to patient     Mental Status:  Alert and awake   Hydration Status:  Stable   PONV Controlled:  None   Airway Patency:  Patent      Post Op Vitals Reviewed: Yes      Staff: CRNA         No notable events documented      /62 (06/09/23 1742)    Temp 99 2 °F (37 3 °C) (06/09/23 1742)    Pulse 62 (06/09/23 1742)   Resp 16 (06/09/23 1742)    SpO2 94 % (06/09/23 1742)

## 2023-06-09 NOTE — PLAN OF CARE
Problem: Potential for Falls  Goal: Patient will remain free of falls  Description: INTERVENTIONS:  - Educate patient/family on patient safety including physical limitations  - Instruct patient to call for assistance with activity   - Consult OT/PT to assist with strengthening/mobility   - Keep Call bell within reach  - Keep bed low and locked with side rails adjusted as appropriate  - Keep care items and personal belongings within reach  - Initiate and maintain comfort rounds  - Make Fall Risk Sign visible to staff  - Offer Toileting every 2 Hours, in advance of need  - Apply yellow socks and bracelet for high fall risk patients  Outcome: Progressing     Problem: Nutrition/Hydration-ADULT  Goal: Nutrient/Hydration intake appropriate for improving, restoring or maintaining nutritional needs  Description: Monitor and assess patient's nutrition/hydration status for malnutrition  Collaborate with interdisciplinary team and initiate plan and interventions as ordered  Monitor patient's weight and dietary intake as ordered or per policy  Utilize nutrition screening tool and intervene as necessary  Determine patient's food preferences and provide high-protein, high-caloric foods as appropriate       INTERVENTIONS:  - Monitor oral intake, urinary output, labs, and treatment plans  - Assess nutrition and hydration status and recommend course of action  - Evaluate amount of meals eaten  - Assist patient with eating if necessary   - Allow adequate time for meals  - Recommend/ encourage appropriate diets, oral nutritional supplements, and vitamin/mineral supplements  - Order, calculate, and assess calorie counts as needed  - Recommend, monitor, and adjust tube feedings and TPN/PPN based on assessed needs  - Assess need for intravenous fluids  - Provide specific nutrition/hydration education as appropriate  - Include patient/family/caregiver in decisions related to nutrition  Outcome: Progressing     Problem: PAIN - ADULT  Goal: Verbalizes/displays adequate comfort level or baseline comfort level  Description: Interventions:  - Encourage patient to monitor pain and request assistance  - Assess pain using appropriate pain scale  - Administer analgesics based on type and severity of pain and evaluate response  - Implement non-pharmacological measures as appropriate and evaluate response  - Consider cultural and social influences on pain and pain management  - Notify physician/advanced practitioner if interventions unsuccessful or patient reports new pain  Outcome: Progressing     Problem: INFECTION - ADULT  Goal: Absence or prevention of progression during hospitalization  Description: INTERVENTIONS:  - Assess and monitor for signs and symptoms of infection  - Monitor lab/diagnostic results  - Monitor all insertion sites, i e  indwelling lines, tubes, and drains  - Monitor endotracheal if appropriate and nasal secretions for changes in amount and color  - [place] appropriate cooling/warming therapies per order  - Administer medications as ordered  - Instruct and encourage patient and family to use good hand hygiene technique  - Identify and instruct in appropriate isolation precautions for identified infection/condition  Outcome: Progressing     Problem: DISCHARGE PLANNING  Goal: Discharge to home or other facility with appropriate resources  Description: INTERVENTIONS:  - Identify barriers to discharge w/patient and caregiver  - Arrange for needed discharge resources and transportation as appropriate  - Identify discharge learning needs (meds, wound care, etc )  - Arrange for interpretive services to assist at discharge as needed  - Refer to Case Management Department for coordinating discharge planning if the patient needs post-hospital services based on physician/advanced practitioner order or complex needs related to functional status, cognitive ability, or social support system  Outcome: Progressing     Problem: Knowledge Deficit  Goal: Patient/family/caregiver demonstrates understanding of disease process, treatment plan, medications, and discharge instructions  Description: Complete learning assessment and assess knowledge base    Interventions:  - Provide teaching at level of understanding  - Provide teaching via preferred learning methods  Outcome: Progressing     Problem: Prexisting or High Potential for Compromised Skin Integrity  Goal: Skin integrity is maintained or improved  Description: INTERVENTIONS:  - Identify patients at risk for skin breakdown  - Assess and monitor skin integrity  - Assess and monitor nutrition and hydration status  - Monitor labs   - Assess for incontinence   - Turn and reposition patient  - Assist with mobility/ambulation  - Relieve pressure over bony prominences  - Avoid friction and shearing  - Provide appropriate hygiene as needed including keeping skin clean and dry  - Evaluate need for skin moisturizer/barrier cream  - Collaborate with interdisciplinary team   - Patient/family teaching  - Consider wound care consult   Outcome: Progressing     Problem: MOBILITY - ADULT  Goal: Maintain or return to baseline ADL function  Description: INTERVENTIONS:  -  Assess patient's ability to carry out ADLs; assess patient's baseline for ADL function and identify physical deficits which impact ability to perform ADLs (bathing, care of mouth/teeth, toileting, grooming, dressing, etc )  - Assess/evaluate cause of self-care deficits   - Assess range of motion  - Assess patient's mobility; develop plan if impaired  - Assess patient's need for assistive devices and provide as appropriate  - Encourage maximum independence but intervene and supervise when necessary  - Involve family in performance of ADLs  - Assess for home care needs following discharge   - Consider OT consult to assist with ADL evaluation and planning for discharge  - Provide patient education as appropriate  Outcome: Progressing     Problem: MOBILITY - ADULT  Goal: Maintains/Returns to pre admission functional level  Description: INTERVENTIONS:  - Perform BMAT or MOVE assessment daily    - Set and communicate daily mobility goal to care team and patient/family/caregiver  - Collaborate with rehabilitation services on mobility goals if consulted  - Perform Range of Motion 3 times a day  - Reposition patient every 2 hours    - Dangle patient 3 times a day  - Stand patient 3 times a day  - Ambulate patient 3 times a day  - Out of bed to chair 3 times a day   - Out of bed for meals 3 times a day  - Out of bed for toileting  - Record patient progress and toleration of activity level   Outcome: Progressing

## 2023-06-09 NOTE — ASSESSMENT & PLAN NOTE
MRI Foot: Findings consistent with definite osteomyelitis of the great toe proximal phalanx  Nonspecific marrow edema involving the distal 3 1 cm of the first metatarsal, possibly reactive, although early osteomyelitis cannot be excluded  Large first MTP joint effusion, nonspecific; septic arthritis cannot be excluded on the basis of imaging  No evidence of a soft tissue abscess  Continue IV ceftriaxone vancomycin  Follow wound culture, blood culture  Blood culture remained negative so far  Status status post OR 2 days ago-status post first ray amputation  Plan for OR today for wound closure    Surgical  culture pending   Antibiotics as outpatient-Case management is working for home antibiotics set up

## 2023-06-09 NOTE — PHYSICAL THERAPY NOTE
"   PHYSICAL THERAPY EVALUATION  NAME:  Jorge L Valverde  DATE: 06/09/23    AGE:   69 y o   Mrn:   [id number]  ADMIT DX:  Shortness of breath [R06 02]  Pre-operative clearance [Z01 818]  Moderate aortic stenosis [I35 0]  Visit for wound check [Z51 89]  Acute hematogenous osteomyelitis of left foot (HCC) [S65 303]    Past Medical History:   Diagnosis Date   • Anxiety    • Aortic stenosis    • Borderline high cholesterol    • CHF (congestive heart failure) (HCC)     [facility]-cardio-   • Colon polyp    • Depression    • Heart murmur    • Hypertension    • Hypothyroidism    • Prediabetes    • PVCs (premature ventricular contractions)     \"skip\"   • Wears glasses     readers     Length Of Stay: 3  Performed at least 2 patient identifiers during session: Name and Birthday  PHYSICAL THERAPY EVALUATION :    06/09/23 0942   PT Last Visit   PT Visit Date 06/09/23   Note Type   Note type Evaluation   Pain Assessment   Pain Assessment Tool FLACC   Pain Location/Orientation Orientation: Left; Location: Foot   Pain Rating: FLACC (Rest) - Face 0   Pain Rating: FLACC (Rest) - Legs 0   Pain Rating: FLACC (Rest) - Activity 0   Pain Rating: FLACC (Rest) - Cry 0   Pain Rating: FLACC (Rest) - Consolability 0   Score: FLACC (Rest) 0   Pain Rating: FLACC (Activity) - Face 1   Pain Rating: FLACC (Activity) - Legs 0   Pain Rating: FLACC (Activity) - Activity 0   Pain Rating: FLACC (Activity) - Cry 1   Pain Rating: FLACC (Activity) - Consolability 0   Score: FLACC (Activity) 2   Restrictions/Precautions   Weight Bearing Precautions Per Order Yes   LLE Weight Bearing Per Order NWB  (strict NWB )   Other Precautions WBS; Chair Alarm; Bed Alarm; Fall Risk;Pain   Home Living   Type of Home House  (2 KULWINDER no HRs)   Home Layout Two level;1/2 bath on main level;Bed/bath upstairs  (FF R HR)   Bathroom Shower/Tub Tub/shower unit  (downstairs, walk in shower upstairs)   Bathroom Toilet Standard   Home Equipment Walker;Cane  (wasn't using PTA) " "  Additional Comments Reports living in a [facility] with 2 KULWINDER no HRs and FF R HR to 2nd floor  Reports not using an AD PTA  Prior Function   Level of [place] Independent with ADLs; Independent with functional mobility; Independent with IADLS   Lives With Spouse   Receives Help From Family   IADLs Independent with driving; Independent with meal prep; Independent with medication management   Falls in the last 6 months 1 to 4  (fell off a ladder)   Comments Reports being independent with mobility, ADLs and IADLs without AD  General   Additional Pertinent History Pt is s/p Left - PARTIAL 1ST RAY AMPUTATION on 6/7/23 with wound remaining open with hopeful closure this date  Cognition   Orientation Level Oriented X4   Following Commands Follows one step commands without difficulty   Subjective   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[employer]  \"   RLE Assessment   RLE Assessment WFL  (4+/5)   LLE Assessment   LLE Assessment WFL  (4/5  left ankle not assessed in ace wrap  pt with minimal ankle DF/PF AROM  strength grossly 2-/5)   Bed Mobility   Supine to Sit 7  Independent   Sit to Supine 7  Independent   Additional Comments HOB flat without bedail  Transfers   Sit to Stand 5  Supervision   Additional items Increased time required;Verbal cues   Stand to Sit 5  Supervision   Additional items Increased time required;Verbal cues   Stand pivot 5  Supervision   Additional items Increased time required;Verbal cues   Additional Comments educated patient on strict NWB  pt verbalized understanding  sit<>stand with RW with proper hand placement with L LE NWB throughout  inc time to complete  with static standing  min cues for continued NWB L LE to facilitate healing  spt with RW wiht supervision wiht min cues for technique and safety with use of RW     Ambulation/Elevation   Gait pattern Improper Weight shift;Decreased foot clearance  (3 point pattern with L LE NWB)   Gait Assistance   (SBA to supervision)   Additional items Verbal cues   Assistive " "Device Rolling walker   Distance ambulated 26'x1 with RW and 3 point gait pattern with L LE NWB throughout and verbal cues for technique wiht RW to not push RW too far anteriorly and attempt to hop and to stay withihn RUSSELL of RW and for R foot clearance on swing through  Balance   Static Sitting Good   Dynamic Sitting Fair +   Static Standing Fair +   Dynamic Standing Fair   Ambulatory Fair -   Activity Tolerance   Activity Tolerance Patient limited by fatigue;Patient limited by pain   Medical Staff Made Aware Dana LEGGETT   Nurse Made Aware RNLida   Assessment   Prognosis Good   Problem List Decreased strength;Decreased endurance;Decreased range of motion;Decreased mobility; Impaired balance;Decreased safety awareness;Decreased skin integrity;Pain;Orthopedic restrictions   Barriers to Discharge None   Barriers to Discharge Comments has 1st floor set up available if needed and assistance from family prn   Goals   Patient Goals \"Go home\"   STG Expiration Date 06/23/23   PT Treatment Day 1   Plan   Treatment/Interventions ADL retraining;Functional transfer training;LE strengthening/ROM; Elevations; Therapeutic exercise; Endurance training;Patient/family training;Equipment eval/education; Bed mobility;Gait training; Compensatory technique education;Spoke to nursing;Spoke to case management;OT   PT Frequency 1-2x/wk   Recommendation   PT Discharge Recommendation   (no needs versus OPPT)   Equipment Recommended   (pt has RW, would benefit from knee scooter  pt interested in purchasing upon discharge)   Additional Comments recommend 1st floor set up upon return to home  pt verbalized understanding  Additional Comments 2 pt was strict bedrest for 24 hours after procedure  bedrest orders discontinued per chart review     AM-PAC Basic Mobility Inpatient   Turning in Flat Bed Without Bedrails 4   Lying on Back to Sitting on Edge of Flat Bed Without Bedrails 4   Moving Bed to Chair 3   Standing Up From Chair Using Arms 3   Walk " "in Room 3   Climb 3-5 Stairs With Railing 3   Basic Mobility Inpatient Raw Score 20   Basic Mobility Standardized Score 43 99   Highest Level Of Mobility   -HLM Goal 6: Walk 10 steps or more   -HLM Achieved 7: Walk 25 feet or more   Additional Treatment Session   Start Time 0958   End Time 1021   Treatment Assessment Pt tolerated session fairly well with inc c/o fatigue after ambulation  Pt reports \"I have lost alot because of this\"  He was able to ambulate further distance and trial 1 step  He fatigues quickly and c/o right hip abd muscle soreness  He trialed knee scooter and was able to travel further distance wiht L LE NWB throughout with min cues for saftey wiht turning  He is motivated t return to home and for left foot to heal  He was understanding of education and importance of maintaining restrictions  He is limited by decreased strength, balance, endurance and pain  He will continue to benefit from PT services to maximize LOF  Equipment Use trialed use of crutches to trial for steps  pt hopped 4'x1 wiht B axillary crutches wiht L LE NWB with steayding to minAx1 with cues fo rtehcnique  deferred durther use of crutches  use of RW  ambulated 60'x1 and 50'x1 with RW with 3 point pattern with L LE NWb throughout with min cues for safety wiht use of RW and technique and R foot clearance  completed with supervision  pt wiht increased fatigue 2nd ambulation trial after stair completion  completed 1 step hopping up backward with B UE support on RW with steadying assistance with cues to then sit on a chair without arms to complete 2nd step and turn to then access home wiht RW  Pt verbalized understanding  hopped down 1 step with RW with steadying assistance  NWB L LE throughout  discussed use of knee scooter  pt then trialed use of knee scooter with L LE NWB  transfers with supervision to knee scooter and off scooter with L LE NWb and min cues for technique   went 100'x1 with knee scooter with supervision with " min cues for safety with turning  pt verbalized interest in knee scooter to facilitate access to home and community environment  Pt verbalized understanding of maintaining WB restriction to facilitate healing given noncompliance with toe offloading shoe in the recent past and risk for further limb loss  pt demonstrated stretch of right hip abductor post ambulation  End of Consult   Patient Position at End of Consult Bedside chair; All needs within reach; Other (comment)   End of Consult Comments verblized understanding to ring for assistance form nursing  (Please find full objective findings from PT assessment regarding body systems outlined above)  Assessment: Pt is a 69 y o  male seen for PT evaluation s/p admission to [address] [place] [address] on 6/6/2023 with Acute hematogenous osteomyelitis of left foot ([address] )  Order placed for PT services    Upon evaluation: Pt is presenting with impaired functional mobility due to pain, decreased strength, decreased ROM, decreased endurance, impaired balance, gait deviations, decreased safety awareness, impaired judgment, orthopedic restrictions, fall risk, and impaired skin integrity requiring  supervision assistance for transfers and stand by to supervision assistance for ambulation with RW   Pt's clinical presentation is currently unpredictable given the functional mobility deficits above, especially weakness, decreased ROM, edema of extremities, decreased skin integrity, decreased endurance, gait deviations, pain, decreased activity tolerance, decreased functional mobility tolerance, decreased safety awareness, and impaired judgement, coupled with fall risks as indicated by AM-PAC 6-Clicks: 26/75 as well as hx of falls, impaired balance, polypharmacy, and decreased safety awareness and combined with medical complications of pain impacting overall mobility status, abnormal WBCs, need for input for mobility technique/safety and abnormal CRP, acute hematogenous osteomyelitis of left foot s/p partial 1st ray amputation, ANNALEE on admission  Pt's PMHx and comorbidities that may affect physical performance and progress include: CHF and moderate aortic stenosis, peripheral neuropathy, anxiety, depression, HTN, prediabetes  Personal factors affecting pt at time of IE include: step(s) to enter environment, multi-level environment, inability to perform current job functions, inability to perform IADLs, inability to perform ADLs, inability to navigate level surfaces without external assistance and recent fall(s)/fall history  Pt will benefit from continued skilled PT services to address deficits as defined above and to maximize level of functional mobility to facilitate return toward PLOF and improved QOL  From PT/mobility standpoint, recommendation at time of d/c would be anticipate no needs vs OPPT pending progress in order to reduce fall risk and maximize pt's functional independence and consistency with mobility in order to facilitate return to PLOF  Recommend trial with walker next 1-2 sessions, ther ex next 1-2 sessions and knee scooter  The patient's AM-PAC Basic Mobility Inpatient Short Form Raw Score is 20  A Raw score of greater than 16 suggests the patient may benefit from discharge to home  Please also refer to the recommendation of the Physical Therapist for safe discharge planning  Goals: Pt will: Perform bed mobility tasks with independent to reposition in bed and prepare for transfers  Pt will perform transfers with modified Independent to decrease burden of care, decrease risk for falls, improve activity tolerance and demonstrate compliance with WB limitations and prepare for ambulation  Pt will ambulate with RW for >/= 150' with  modified Independent  to decrease burden of care, decrease risk for falls, improve activity tolerance, demonstrate compliance with WB limitations and improve gait quality and to access home environment   Pt will complete 1 step with LRAD and >/= 12 steps with unilateral handrail with modified Independent to decrease burden of care, return to home with KULWINDER, return to multilevel home, decrease risk for falls and improve activity tolerance  Pt will increase B LE strength >/= 1/2 MMT grade to facilitate functional mobility  Pt will complete transfers and mobility >/= 250' with knee scooter modified independently to access home and community environment       Remy Spears, PT,DPT

## 2023-06-10 LAB
ALBUMIN SERPL BCP-MCNC: 3.4 G/DL (ref 3.5–5)
ALP SERPL-CCNC: 55 U/L (ref 34–104)
ALT SERPL W P-5'-P-CCNC: 8 U/L (ref 7–52)
ANION GAP SERPL CALCULATED.3IONS-SCNC: 4 MMOL/L (ref 4–13)
AST SERPL W P-5'-P-CCNC: 10 U/L (ref 13–39)
BACTERIA SPEC ANAEROBE CULT: ABNORMAL
BACTERIA SPEC ANAEROBE CULT: ABNORMAL
BACTERIA SPEC ANAEROBE CULT: NORMAL
BACTERIA TISS AEROBE CULT: ABNORMAL
BACTERIA TISS AEROBE CULT: NO GROWTH
BASOPHILS # BLD AUTO: 0.02 THOUSANDS/ÂΜL (ref 0–0.1)
BASOPHILS NFR BLD AUTO: 0 % (ref 0–1)
BILIRUB SERPL-MCNC: 0.41 MG/DL (ref 0.2–1)
BUN SERPL-MCNC: 19 MG/DL (ref 5–25)
CALCIUM ALBUM COR SERPL-MCNC: 9.4 MG/DL (ref 8.3–10.1)
CALCIUM SERPL-MCNC: 8.9 MG/DL (ref 8.4–10.2)
CHLORIDE SERPL-SCNC: 103 MMOL/L (ref 96–108)
CO2 SERPL-SCNC: 29 MMOL/L (ref 21–32)
CREAT SERPL-MCNC: 1.34 MG/DL (ref 0.6–1.3)
EOSINOPHIL # BLD AUTO: 0.36 THOUSAND/ÂΜL (ref 0–0.61)
EOSINOPHIL NFR BLD AUTO: 4 % (ref 0–6)
ERYTHROCYTE [DISTWIDTH] IN BLOOD BY AUTOMATED COUNT: 13.3 % (ref 11.6–15.1)
GFR SERPL CREATININE-BSD FRML MDRD: 53 ML/MIN/1.73SQ M
GLUCOSE SERPL-MCNC: 102 MG/DL (ref 65–140)
GRAM STN SPEC: ABNORMAL
GRAM STN SPEC: ABNORMAL
GRAM STN SPEC: NORMAL
GRAM STN SPEC: NORMAL
HCT VFR BLD AUTO: 37.9 % (ref 36.5–49.3)
HGB BLD-MCNC: 12.2 G/DL (ref 12–17)
IMM GRANULOCYTES # BLD AUTO: 0.04 THOUSAND/UL (ref 0–0.2)
IMM GRANULOCYTES NFR BLD AUTO: 0 % (ref 0–2)
LYMPHOCYTES # BLD AUTO: 1.63 THOUSANDS/ÂΜL (ref 0.6–4.47)
LYMPHOCYTES NFR BLD AUTO: 18 % (ref 14–44)
MCH RBC QN AUTO: 30.9 PG (ref 26.8–34.3)
MCHC RBC AUTO-ENTMCNC: 32.2 G/DL (ref 31.4–37.4)
MCV RBC AUTO: 96 FL (ref 82–98)
MONOCYTES # BLD AUTO: 1.46 THOUSAND/ÂΜL (ref 0.17–1.22)
MONOCYTES NFR BLD AUTO: 16 % (ref 4–12)
NEUTROPHILS # BLD AUTO: 5.39 THOUSANDS/ÂΜL (ref 1.85–7.62)
NEUTS SEG NFR BLD AUTO: 62 % (ref 43–75)
NRBC BLD AUTO-RTO: 0 /100 WBCS
PLATELET # BLD AUTO: 231 THOUSANDS/UL (ref 149–390)
PMV BLD AUTO: 9.4 FL (ref 8.9–12.7)
POTASSIUM SERPL-SCNC: 4 MMOL/L (ref 3.5–5.3)
PROT SERPL-MCNC: 6.6 G/DL (ref 6.4–8.4)
RBC # BLD AUTO: 3.95 MILLION/UL (ref 3.88–5.62)
SODIUM SERPL-SCNC: 136 MMOL/L (ref 135–147)
WBC # BLD AUTO: 8.9 THOUSAND/UL (ref 4.31–10.16)

## 2023-06-10 PROCEDURE — 85025 COMPLETE CBC W/AUTO DIFF WBC: CPT | Performed by: INTERNAL MEDICINE

## 2023-06-10 PROCEDURE — 80053 COMPREHEN METABOLIC PANEL: CPT | Performed by: INTERNAL MEDICINE

## 2023-06-10 PROCEDURE — 99232 SBSQ HOSP IP/OBS MODERATE 35: CPT | Performed by: INTERNAL MEDICINE

## 2023-06-10 RX ORDER — SODIUM CHLORIDE 9 MG/ML
100 INJECTION, SOLUTION INTRAVENOUS CONTINUOUS
Status: DISCONTINUED | OUTPATIENT
Start: 2023-06-10 | End: 2023-06-11

## 2023-06-10 RX ORDER — SULFAMETHOXAZOLE AND TRIMETHOPRIM 800; 160 MG/1; MG/1
1 TABLET ORAL EVERY 12 HOURS SCHEDULED
Status: DISCONTINUED | OUTPATIENT
Start: 2023-06-10 | End: 2023-06-12 | Stop reason: HOSPADM

## 2023-06-10 RX ADMIN — SODIUM CHLORIDE 100 ML/HR: 0.9 INJECTION, SOLUTION INTRAVENOUS at 23:16

## 2023-06-10 RX ADMIN — DOCUSATE SODIUM 100 MG: 100 CAPSULE, LIQUID FILLED ORAL at 17:09

## 2023-06-10 RX ADMIN — SODIUM CHLORIDE 100 ML/HR: 0.9 INJECTION, SOLUTION INTRAVENOUS at 14:19

## 2023-06-10 RX ADMIN — ESCITALOPRAM OXALATE 20 MG: 10 TABLET ORAL at 08:53

## 2023-06-10 RX ADMIN — AMLODIPINE BESYLATE 10 MG: 10 TABLET ORAL at 08:53

## 2023-06-10 RX ADMIN — Medication 400 MG: at 08:53

## 2023-06-10 RX ADMIN — OXYCODONE HYDROCHLORIDE AND ACETAMINOPHEN 2 TABLET: 5; 325 TABLET ORAL at 08:53

## 2023-06-10 RX ADMIN — MORPHINE SULFATE 2 MG: 2 INJECTION, SOLUTION INTRAMUSCULAR; INTRAVENOUS at 22:10

## 2023-06-10 RX ADMIN — HEPARIN SODIUM 5000 UNITS: 5000 INJECTION INTRAVENOUS; SUBCUTANEOUS at 05:02

## 2023-06-10 RX ADMIN — BUPROPION HYDROCHLORIDE 300 MG: 150 TABLET, EXTENDED RELEASE ORAL at 08:53

## 2023-06-10 RX ADMIN — HEPARIN SODIUM 5000 UNITS: 5000 INJECTION INTRAVENOUS; SUBCUTANEOUS at 14:24

## 2023-06-10 RX ADMIN — FUROSEMIDE 40 MG: 40 TABLET ORAL at 08:53

## 2023-06-10 RX ADMIN — DOCUSATE SODIUM 100 MG: 100 CAPSULE, LIQUID FILLED ORAL at 08:53

## 2023-06-10 RX ADMIN — SULFAMETHOXAZOLE AND TRIMETHOPRIM 1 TABLET: 800; 160 TABLET ORAL at 14:25

## 2023-06-10 RX ADMIN — OXYCODONE HYDROCHLORIDE AND ACETAMINOPHEN 2 TABLET: 5; 325 TABLET ORAL at 17:09

## 2023-06-10 RX ADMIN — LEVOTHYROXINE SODIUM 75 MCG: 75 TABLET ORAL at 05:02

## 2023-06-10 RX ADMIN — VANCOMYCIN HYDROCHLORIDE 1000 MG: 1 INJECTION, SOLUTION INTRAVENOUS at 09:17

## 2023-06-10 RX ADMIN — HEPARIN SODIUM 5000 UNITS: 5000 INJECTION INTRAVENOUS; SUBCUTANEOUS at 22:05

## 2023-06-10 RX ADMIN — SULFAMETHOXAZOLE AND TRIMETHOPRIM 1 TABLET: 800; 160 TABLET ORAL at 22:05

## 2023-06-10 RX ADMIN — POLYETHYLENE GLYCOL 3350 17 G: 17 POWDER, FOR SOLUTION ORAL at 08:53

## 2023-06-10 RX ADMIN — OXYCODONE HYDROCHLORIDE AND ACETAMINOPHEN 2 TABLET: 5; 325 TABLET ORAL at 03:21

## 2023-06-10 NOTE — PLAN OF CARE
Problem: Potential for Falls  Goal: Patient will remain free of falls  Description: INTERVENTIONS:  - Educate patient/family on patient safety including physical limitations  - Instruct patient to call for assistance with activity   - Consult OT/PT to assist with strengthening/mobility   - Keep Call bell within reach  - Keep bed low and locked with side rails adjusted as appropriate  - Keep care items and personal belongings within reach  - Initiate and maintain comfort rounds  - Make Fall Risk Sign visible to staff  - Offer Toileting every 2 Hours, in advance of need  - Apply yellow socks and bracelet for high fall risk patients  Outcome: Progressing     Problem: Nutrition/Hydration-ADULT  Goal: Nutrient/Hydration intake appropriate for improving, restoring or maintaining nutritional needs  Description: Monitor and assess patient's nutrition/hydration status for malnutrition  Collaborate with interdisciplinary team and initiate plan and interventions as ordered  Monitor patient's weight and dietary intake as ordered or per policy  Utilize nutrition screening tool and intervene as necessary  Determine patient's food preferences and provide high-protein, high-caloric foods as appropriate       INTERVENTIONS:  - Monitor oral intake, urinary output, labs, and treatment plans  - Assess nutrition and hydration status and recommend course of action  - Evaluate amount of meals eaten  - Assist patient with eating if necessary   - Allow adequate time for meals  - Recommend/ encourage appropriate diets, oral nutritional supplements, and vitamin/mineral supplements  - Order, calculate, and assess calorie counts as needed  - Recommend, monitor, and adjust tube feedings and TPN/PPN based on assessed needs  - Assess need for intravenous fluids  - Provide specific nutrition/hydration education as appropriate  - Include patient/family/caregiver in decisions related to nutrition  Outcome: Progressing     Problem: PAIN - ADULT  Goal: Verbalizes/displays adequate comfort level or baseline comfort level  Description: Interventions:  - Encourage patient to monitor pain and request assistance  - Assess pain using appropriate pain scale  - Administer analgesics based on type and severity of pain and evaluate response  - Implement non-pharmacological measures as appropriate and evaluate response  - Consider cultural and social influences on pain and pain management  - Notify physician/advanced practitioner if interventions unsuccessful or patient reports new pain  Outcome: Progressing     Problem: INFECTION - ADULT  Goal: Absence or prevention of progression during hospitalization  Description: INTERVENTIONS:  - Assess and monitor for signs and symptoms of infection  - Monitor lab/diagnostic results  - Monitor all insertion sites, i e  indwelling lines, tubes, and drains  - Monitor endotracheal if appropriate and nasal secretions for changes in amount and color  - [place] appropriate cooling/warming therapies per order  - Administer medications as ordered  - Instruct and encourage patient and family to use good hand hygiene technique  - Identify and instruct in appropriate isolation precautions for identified infection/condition  Outcome: Progressing     Problem: SAFETY ADULT  Goal: Patient will remain free of falls  Description: INTERVENTIONS:  - Educate patient/family on patient safety including physical limitations  - Instruct patient to call for assistance with activity   - Consult OT/PT to assist with strengthening/mobility   - Keep Call bell within reach  - Keep bed low and locked with side rails adjusted as appropriate  - Keep care items and personal belongings within reach  - Initiate and maintain comfort rounds  - Make Fall Risk Sign visible to staff  - Offer Toileting every 2 Hours, in advance of need  - Apply yellow socks and bracelet for high fall risk patients  Outcome: Progressing  Goal: Maintain or return to baseline ADL function  Description: INTERVENTIONS:  -  Assess patient's ability to carry out ADLs; assess patient's baseline for ADL function and identify physical deficits which impact ability to perform ADLs (bathing, care of mouth/teeth, toileting, grooming, dressing, etc )  - Assess/evaluate cause of self-care deficits   - Assess range of motion  - Assess patient's mobility; develop plan if impaired  - Assess patient's need for assistive devices and provide as appropriate  - Encourage maximum independence but intervene and supervise when necessary  - Involve family in performance of ADLs  - Assess for home care needs following discharge   - Consider OT consult to assist with ADL evaluation and planning for discharge  - Provide patient education as appropriate  Outcome: Progressing  Goal: Maintains/Returns to pre admission functional level  Description: INTERVENTIONS:  - Perform BMAT or MOVE assessment daily    - Set and communicate daily mobility goal to care team and patient/family/caregiver  - Collaborate with rehabilitation services on mobility goals if consulted  - Perform Range of Motion 3 times a day  - Reposition patient every 2 hours    - Dangle patient 3 times a day  - Stand patient 3 times a day  - Ambulate patient 3 times a day  - Out of bed to chair 3 times a day   - Out of bed for meals 3 times a day  - Out of bed for toileting  - Record patient progress and toleration of activity level   Outcome: Progressing     Problem: DISCHARGE PLANNING  Goal: Discharge to home or other facility with appropriate resources  Description: INTERVENTIONS:  - Identify barriers to discharge w/patient and caregiver  - Arrange for needed discharge resources and transportation as appropriate  - Identify discharge learning needs (meds, wound care, etc )  - Arrange for interpretive services to assist at discharge as needed  - Refer to Case Management Department for coordinating discharge planning if the patient needs post-hospital services based on physician/advanced practitioner order or complex needs related to functional status, cognitive ability, or social support system  Outcome: Progressing     Problem: Knowledge Deficit  Goal: Patient/family/caregiver demonstrates understanding of disease process, treatment plan, medications, and discharge instructions  Description: Complete learning assessment and assess knowledge base    Interventions:  - Provide teaching at level of understanding  - Provide teaching via preferred learning methods  Outcome: Progressing     Problem: Prexisting or High Potential for Compromised Skin Integrity  Goal: Skin integrity is maintained or improved  Description: INTERVENTIONS:  - Identify patients at risk for skin breakdown  - Assess and monitor skin integrity  - Assess and monitor nutrition and hydration status  - Monitor labs   - Assess for incontinence   - Turn and reposition patient  - Assist with mobility/ambulation  - Relieve pressure over bony prominences  - Avoid friction and shearing  - Provide appropriate hygiene as needed including keeping skin clean and dry  - Evaluate need for skin moisturizer/barrier cream  - Collaborate with interdisciplinary team   - Patient/family teaching  - Consider wound care consult   Outcome: Progressing     Problem: MOBILITY - ADULT  Goal: Maintain or return to baseline ADL function  Description: INTERVENTIONS:  -  Assess patient's ability to carry out ADLs; assess patient's baseline for ADL function and identify physical deficits which impact ability to perform ADLs (bathing, care of mouth/teeth, toileting, grooming, dressing, etc )  - Assess/evaluate cause of self-care deficits   - Assess range of motion  - Assess patient's mobility; develop plan if impaired  - Assess patient's need for assistive devices and provide as appropriate  - Encourage maximum independence but intervene and supervise when necessary  - Involve family in performance of ADLs  - Assess for home care needs following discharge   - Consider OT consult to assist with ADL evaluation and planning for discharge  - Provide patient education as appropriate  Outcome: Progressing  Goal: Maintains/Returns to pre admission functional level  Description: INTERVENTIONS:  - Perform BMAT or MOVE assessment daily    - Set and communicate daily mobility goal to care team and patient/family/caregiver  - Collaborate with rehabilitation services on mobility goals if consulted  - Perform Range of Motion 3 times a day  - Reposition patient every 2 hours    - Dangle patient 3 times a day  - Stand patient 3 times a day  - Ambulate patient 3 times a day  - Out of bed to chair 3 times a day   - Out of bed for meals 3 times a day  - Out of bed for toileting  - Record patient progress and toleration of activity level   Outcome: Progressing

## 2023-06-10 NOTE — PROGRESS NOTES
Gomez Patel is a 69 y o  male who is currently ordered Vancomycin IV with management by the Pharmacy Consult service  Relevant clinical data and objective / subjective history reviewed  Vancomycin Assessment:  Indication and Goal AUC/Trough: Bone/joint infection (goal -600, trough >10), -600, trough >10  Clinical Status: stable  Micro:     Renal Function:  SCr: 1 34 mg/dL  CrCl: 64 5 mL/min  Renal replacement: Not on dialysis  Days of Therapy: 5  Current Dose: 1000 mg IV every 12 hours  Vancomycin Plan:  New Dosing: continue regimen  Estimated AUC: 536 mcg*hr/mL  Estimated Trough: 18 5 mcg/mL  Next Level: 6/15/23 @ 0600  Renal Function Monitoring: Daily BMP and [place] will continue to follow closely for s/sx of nephrotoxicity, infusion reactions and appropriateness of therapy  BMP and CBC will be ordered per protocol  We will continue to follow the patient’s culture results and clinical progress daily      Lorriane Kussmaul, Pharmacist

## 2023-06-10 NOTE — PROGRESS NOTES
[address]  Progress Note  Name: Gomez Patel I  MRN: [id number]  Unit/Bed#: -01 I Date of Admission: 6/6/2023   Date of Service: 6/10/2023 I Hospital Day: 4    Assessment/Plan   Heart failure with preserved ejection fraction  Assessment & Plan  Diastolic in nature  Last echocardiogram shows ejection fraction of 60% with grade 1 diastolic dysfunction  Appreciate cardiology input- as per their consult no evidence of acute HF  Looks dry today      ANNALEE (acute kidney injury) ([facility] [address] )  Assessment & Plan  Creatinine on 4/19/2023 was 1 01, creatinine on admission 1 36  Now improved after IV Lasix due to volume overload  Renal function improved, currently at baseline  Creatinine today is 1 34   Above Baseline - IVF and recheck in am     Moderate aortic stenosis  Assessment & Plan  Known history of moderate aortic stenosis  Appreciate cardiology input, avoid aggressive diuresis  Giving fluids now given rising creatinine but needs close monitoring for overload   I/O and daily weights        * Acute hematogenous osteomyelitis of left foot (HCC)  Assessment & Plan  MRI Foot: Findings consistent with definite osteomyelitis of the great toe proximal phalanx  Nonspecific marrow edema involving the distal 3 1 cm of the first metatarsal, possibly reactive, although early osteomyelitis cannot be excluded  Large first MTP joint effusion, nonspecific; septic arthritis cannot be excluded on the basis of imaging  No evidence of a soft tissue abscess  stoP IV abx - discussed with podiatry - likely okay to treat with 2 weeks of bactim  Follow wound culture, blood culture  Blood culture remained negative so far  Status status post OR 3 days ago-status post first ray amputation  Wound closure yesterday  Surgical  culture pending                 VTE Pharmacologic Prophylaxis: VTE Score: 5 Moderate Risk (Score 3-4) - Pharmacological DVT Prophylaxis Ordered: heparin  Patient Centered Rounds:  I performed bedside rounds with nursing staff today  Discussions with Specialists or Other Care Team Provider: Discussed with podiatry today     Education and Discussions with Family / Patient: called wife  -   Total Time Spent on Date of Encounter in care of patient: 27 miin  This time was spent on one or more of the following: performing physical exam; counseling and coordination of care; obtaining or reviewing history; documenting in the medical record; reviewing/ordering tests, medications or procedures; communicating with other healthcare professionals and discussing with patient's family/caregivers  Current Length of Stay: 4 day(s)  Current Patient Status: Inpatient   Certification Statement: The patient will continue to require additional inpatient hospital stay due to monitor kidney funciton   Discharge Plan: Anticipate discharge in 24-48 hrs to home with home services  Code Status: Level 1 - Full Code    Subjective:   Patient seen and examined  No new complaints       Objective:     Vitals:   Temp (24hrs), Av 9 °F (36 6 °C), Min:97 5 °F (36 4 °C), Max:99 2 °F (37 3 °C)    Temp:  [97 5 °F (36 4 °C)-99 2 °F (37 3 °C)] 97 9 °F (36 6 °C)  HR:  [49-62] 53  Resp:  [15-18] 18  BP: (107-130)/(53-72) 115/56  SpO2:  [91 %-97 %] 93 %  Body mass index is 28 85 kg/m²  Input and Output Summary (last 24 hours): Intake/Output Summary (Last 24 hours) at 6/10/2023 1342  Last data filed at 6/10/2023 1313  Gross per 24 hour   Intake 670 ml   Output 1450 ml   Net -780 ml       Physical Exam:   Physical Exam  Constitutional:       General: He is not in acute distress  Appearance: He is not ill-appearing, toxic-appearing or diaphoretic  HENT:      Head: Normocephalic  Mouth/Throat:      Mouth: Mucous membranes are moist    Eyes:      Pupils: Pupils are equal, round, and reactive to light  Cardiovascular:      Rate and Rhythm: Normal rate  Heart sounds: Murmur heard  No friction rub   No gallop  Abdominal:      General: There is no distension  Palpations: There is no mass  Tenderness: There is no abdominal tenderness  There is no right CVA tenderness, left CVA tenderness, guarding or rebound  Hernia: No hernia is present  Musculoskeletal:         General: Deformity present  No tenderness or signs of injury  Left lower leg: No edema  Skin:     Capillary Refill: Capillary refill takes less than 2 seconds  Coloration: Skin is pale  Skin is not jaundiced  Findings: No bruising, erythema, lesion or rash  Neurological:      General: No focal deficit present  Mental Status: He is alert  Cranial Nerves: No cranial nerve deficit  Sensory: No sensory deficit  Motor: No weakness  Coordination: Coordination normal       Gait: Gait normal       Deep Tendon Reflexes: Reflexes normal    Psychiatric:         Mood and Affect: Mood normal           Additional Data:     Labs:  Results from last 7 days   Lab Units 06/10/23  0504   EOS PCT % 4   HEMATOCRIT % 37 9   HEMOGLOBIN g/dL 12 2   LYMPHS PCT % 18   MONOS PCT % 16*   NEUTROS PCT % 62   PLATELETS Thousands/uL 231   WBC Thousand/uL 8 90     Results from last 7 days   Lab Units 06/10/23  0504   ANION GAP mmol/L 4   ALBUMIN g/dL 3 4*   ALK PHOS U/L 55   ALT U/L 8   AST U/L 10*   BUN mg/dL 19   CALCIUM mg/dL 8 9   CHLORIDE mmol/L 103   CO2 mmol/L 29   CREATININE mg/dL 1 34*   GLUCOSE RANDOM mg/dL 102   POTASSIUM mmol/L 4 0   SODIUM mmol/L 136   TOTAL BILIRUBIN mg/dL 0 41     Results from last 7 days   Lab Units 06/06/23  1610   INR  0 89             Results from last 7 days   Lab Units 06/06/23  1610   LACTIC ACID mmol/L 1 3       Lines/Drains:  Invasive Devices     Peripheral Intravenous Line  Duration           Peripheral IV 06/10/23 Left Antecubital <1 day                      Imaging: No pertinent imaging reviewed      Recent Cultures (last 7 days):   Results from last 7 days   Lab Units 06/07/23  1455 06/07/23  1449 06/06/23  2109 06/06/23  1611 06/06/23  1610   BLOOD CULTURE   --   --   --  No Growth at 72 hrs  No Growth at 72 hrs  GRAM STAIN RESULT  Rare Polys  No bacteria seen Rare Polys  No bacteria seen 1+ Polys  No bacteria seen  --   --    WOUND CULTURE   --   --  1+ Growth of Acinetobacter species*  1+ Growth of  --   --        Last 24 Hours Medication List:   Current Facility-Administered Medications   Medication Dose Route Frequency Provider Last Rate   • acetaminophen  650 mg Oral Q6H PRN [place] Abad DPM     • amLODIPine  10 mg Oral Daily [place] Abad DPM     • buPROPion  300 mg Oral Daily Wellington Abad DPM     • docusate sodium  100 mg Oral BID Wellington Abad DPM     • escitalopram  20 mg Oral Daily Wellington Abad DPM     • heparin (porcine)  5,000 Units Subcutaneous [facility] Wellington Abad DPM     • levothyroxine  75 mcg Oral Early Morning Wellington Abad DPM     • magnesium Oxide  400 mg Oral Daily Wellington Abad DPM     • morphine injection  2 mg Intravenous Q6H PRN Gi Carter MD     • ondansetron  4 mg Intravenous Q6H PRN Wellington Abad DPM     • oxyCODONE-acetaminophen  1 tablet Oral Q6H PRN Gi Carter MD     • oxyCODONE-acetaminophen  2 tablet Oral Q6H PRN Gi Carter MD     • polyethylene glycol  17 g Oral Daily Wellington Abad DPM     • sodium chloride  100 mL/hr Intravenous Continuous Roxane Reilly MD     • sulfamethoxazole-trimethoprim  1 tablet Oral Q12H Alexandra Epstein MD          Today, Patient Was Seen By: Roxane Reilly MD    **Please Note: This note may have been constructed using a voice recognition system  **

## 2023-06-10 NOTE — ASSESSMENT & PLAN NOTE
Diastolic in nature  Last echocardiogram shows ejection fraction of 60% with grade 1 diastolic dysfunction  Appreciate cardiology input- as per their consult no evidence of acute HF  Looks dry today

## 2023-06-10 NOTE — ASSESSMENT & PLAN NOTE
Known history of moderate aortic stenosis  Appreciate cardiology input, avoid aggressive diuresis  Giving fluids now given rising creatinine but needs close monitoring for overload   I/O and daily weights

## 2023-06-10 NOTE — ASSESSMENT & PLAN NOTE
Creatinine on 4/19/2023 was 1 01, creatinine on admission 1 36  Now improved after IV Lasix due to volume overload  Renal function improved, currently at baseline  Creatinine today is 1 34   Above Baseline - IVF and recheck in am

## 2023-06-10 NOTE — ASSESSMENT & PLAN NOTE
MRI Foot: Findings consistent with definite osteomyelitis of the great toe proximal phalanx  Nonspecific marrow edema involving the distal 3 1 cm of the first metatarsal, possibly reactive, although early osteomyelitis cannot be excluded  Large first MTP joint effusion, nonspecific; septic arthritis cannot be excluded on the basis of imaging  No evidence of a soft tissue abscess  stoP IV abx - discussed with podiatry - likely okay to treat with 2 weeks of bactim  Follow wound culture, blood culture  Blood culture remained negative so far  Status status post OR 3 days ago-status post first ray amputation   Wound closure yesterday  Surgical  culture pending   Antibiotics as outpatient-Case management is working for home antibiotics set up

## 2023-06-11 ENCOUNTER — APPOINTMENT (INPATIENT)
Dept: RADIOLOGY | Facility: HOSPITAL | Age: 68
DRG: 474 | End: 2023-06-11
Payer: MEDICARE

## 2023-06-11 LAB
ALBUMIN SERPL BCP-MCNC: 3.2 G/DL (ref 3.5–5)
ALP SERPL-CCNC: 47 U/L (ref 34–104)
ALT SERPL W P-5'-P-CCNC: 6 U/L (ref 7–52)
ANION GAP SERPL CALCULATED.3IONS-SCNC: 6 MMOL/L (ref 4–13)
AST SERPL W P-5'-P-CCNC: 16 U/L (ref 13–39)
BACTERIA BLD CULT: NORMAL
BACTERIA BLD CULT: NORMAL
BASOPHILS # BLD AUTO: 0.03 THOUSANDS/ÂΜL (ref 0–0.1)
BASOPHILS NFR BLD AUTO: 0 % (ref 0–1)
BILIRUB SERPL-MCNC: 0.3 MG/DL (ref 0.2–1)
BUN SERPL-MCNC: 16 MG/DL (ref 5–25)
CALCIUM ALBUM COR SERPL-MCNC: 9.2 MG/DL (ref 8.3–10.1)
CALCIUM SERPL-MCNC: 8.6 MG/DL (ref 8.4–10.2)
CHLORIDE SERPL-SCNC: 107 MMOL/L (ref 96–108)
CO2 SERPL-SCNC: 22 MMOL/L (ref 21–32)
CREAT SERPL-MCNC: 1.1 MG/DL (ref 0.6–1.3)
EOSINOPHIL # BLD AUTO: 0.44 THOUSAND/ÂΜL (ref 0–0.61)
EOSINOPHIL NFR BLD AUTO: 5 % (ref 0–6)
ERYTHROCYTE [DISTWIDTH] IN BLOOD BY AUTOMATED COUNT: 13.4 % (ref 11.6–15.1)
GFR SERPL CREATININE-BSD FRML MDRD: 68 ML/MIN/1.73SQ M
GLUCOSE SERPL-MCNC: 96 MG/DL (ref 65–140)
HCT VFR BLD AUTO: 37.7 % (ref 36.5–49.3)
HGB BLD-MCNC: 12.1 G/DL (ref 12–17)
IMM GRANULOCYTES # BLD AUTO: 0.04 THOUSAND/UL (ref 0–0.2)
IMM GRANULOCYTES NFR BLD AUTO: 0 % (ref 0–2)
LYMPHOCYTES # BLD AUTO: 1.82 THOUSANDS/ÂΜL (ref 0.6–4.47)
LYMPHOCYTES NFR BLD AUTO: 20 % (ref 14–44)
MCH RBC QN AUTO: 30.6 PG (ref 26.8–34.3)
MCHC RBC AUTO-ENTMCNC: 32.1 G/DL (ref 31.4–37.4)
MCV RBC AUTO: 95 FL (ref 82–98)
MONOCYTES # BLD AUTO: 1.28 THOUSAND/ÂΜL (ref 0.17–1.22)
MONOCYTES NFR BLD AUTO: 14 % (ref 4–12)
NEUTROPHILS # BLD AUTO: 5.32 THOUSANDS/ÂΜL (ref 1.85–7.62)
NEUTS SEG NFR BLD AUTO: 61 % (ref 43–75)
NRBC BLD AUTO-RTO: 0 /100 WBCS
PLATELET # BLD AUTO: 268 THOUSANDS/UL (ref 149–390)
PMV BLD AUTO: 9.4 FL (ref 8.9–12.7)
POTASSIUM SERPL-SCNC: 4.6 MMOL/L (ref 3.5–5.3)
PROT SERPL-MCNC: 6.5 G/DL (ref 6.4–8.4)
RBC # BLD AUTO: 3.96 MILLION/UL (ref 3.88–5.62)
SODIUM SERPL-SCNC: 135 MMOL/L (ref 135–147)
WBC # BLD AUTO: 8.93 THOUSAND/UL (ref 4.31–10.16)

## 2023-06-11 PROCEDURE — 99232 SBSQ HOSP IP/OBS MODERATE 35: CPT | Performed by: INTERNAL MEDICINE

## 2023-06-11 PROCEDURE — 80053 COMPREHEN METABOLIC PANEL: CPT | Performed by: INTERNAL MEDICINE

## 2023-06-11 PROCEDURE — 85025 COMPLETE CBC W/AUTO DIFF WBC: CPT | Performed by: INTERNAL MEDICINE

## 2023-06-11 PROCEDURE — 71046 X-RAY EXAM CHEST 2 VIEWS: CPT

## 2023-06-11 RX ORDER — FUROSEMIDE 10 MG/ML
40 INJECTION INTRAMUSCULAR; INTRAVENOUS ONCE
Status: COMPLETED | OUTPATIENT
Start: 2023-06-11 | End: 2023-06-11

## 2023-06-11 RX ADMIN — BUPROPION HYDROCHLORIDE 300 MG: 150 TABLET, EXTENDED RELEASE ORAL at 08:42

## 2023-06-11 RX ADMIN — SULFAMETHOXAZOLE AND TRIMETHOPRIM 1 TABLET: 800; 160 TABLET ORAL at 08:43

## 2023-06-11 RX ADMIN — DOCUSATE SODIUM 100 MG: 100 CAPSULE, LIQUID FILLED ORAL at 16:45

## 2023-06-11 RX ADMIN — FUROSEMIDE 40 MG: 10 INJECTION, SOLUTION INTRAMUSCULAR; INTRAVENOUS at 18:39

## 2023-06-11 RX ADMIN — HEPARIN SODIUM 5000 UNITS: 5000 INJECTION INTRAVENOUS; SUBCUTANEOUS at 21:16

## 2023-06-11 RX ADMIN — LEVOTHYROXINE SODIUM 75 MCG: 75 TABLET ORAL at 06:07

## 2023-06-11 RX ADMIN — OXYCODONE HYDROCHLORIDE AND ACETAMINOPHEN 2 TABLET: 5; 325 TABLET ORAL at 18:38

## 2023-06-11 RX ADMIN — OXYCODONE HYDROCHLORIDE AND ACETAMINOPHEN 2 TABLET: 5; 325 TABLET ORAL at 06:07

## 2023-06-11 RX ADMIN — HEPARIN SODIUM 5000 UNITS: 5000 INJECTION INTRAVENOUS; SUBCUTANEOUS at 06:07

## 2023-06-11 RX ADMIN — Medication 400 MG: at 08:42

## 2023-06-11 RX ADMIN — SULFAMETHOXAZOLE AND TRIMETHOPRIM 1 TABLET: 800; 160 TABLET ORAL at 21:16

## 2023-06-11 RX ADMIN — MORPHINE SULFATE 2 MG: 2 INJECTION, SOLUTION INTRAMUSCULAR; INTRAVENOUS at 21:18

## 2023-06-11 RX ADMIN — OXYCODONE HYDROCHLORIDE AND ACETAMINOPHEN 2 TABLET: 5; 325 TABLET ORAL at 11:44

## 2023-06-11 RX ADMIN — ESCITALOPRAM OXALATE 20 MG: 10 TABLET ORAL at 08:42

## 2023-06-11 RX ADMIN — HEPARIN SODIUM 5000 UNITS: 5000 INJECTION INTRAVENOUS; SUBCUTANEOUS at 16:44

## 2023-06-11 RX ADMIN — AMLODIPINE BESYLATE 10 MG: 10 TABLET ORAL at 08:42

## 2023-06-11 RX ADMIN — DOCUSATE SODIUM 100 MG: 100 CAPSULE, LIQUID FILLED ORAL at 08:43

## 2023-06-11 NOTE — ASSESSMENT & PLAN NOTE
Creatinine on 4/19/2023 was 1 01, creatinine on admission 1 36  Today creatinine is improving   Will hold off on further fluids though  Await CXR

## 2023-06-11 NOTE — ASSESSMENT & PLAN NOTE
"Known history of moderate aortic stenosis  Appreciate cardiology input, avoid aggressive diuresis      Today patient feels as if he is \"filling up with fluid\"  Stopping IVF  Will get CXR today   May need cardiology to see him again tomorrow      "

## 2023-06-11 NOTE — ASSESSMENT & PLAN NOTE
MRI Foot: Findings consistent with definite osteomyelitis of the great toe proximal phalanx  Nonspecific marrow edema involving the distal 3 1 cm of the first metatarsal, possibly reactive, although early osteomyelitis cannot be excluded  Large first MTP joint effusion, nonspecific; septic arthritis cannot be excluded on the basis of imaging  No evidence of a soft tissue abscess  stoP IV abx - discussed with podiatry - likely okay to treat with 2 weeks of bactim - started bactrim yetserday  Follow wound culture, blood culture  Blood culture remained negative so far  Status status post OR 3 days ago-status post first ray amputation   Wound closure 2 days ago  Surgical  culture pending   From podiatry standpoint stable for DC

## 2023-06-11 NOTE — PROGRESS NOTES
"[address]  Progress Note  Name: Nicholas Basurto  MRN: [id number]  Unit/Bed#: -01 I Date of Admission: 6/6/2023   Date of Service: 6/11/2023 I Hospital Day: 5    Assessment/Plan   Heart failure with preserved ejection fraction  Assessment & Plan  Diastolic in nature  Last echocardiogram shows ejection fraction of 60% with grade 1 diastolic dysfunction  Appreciate cardiology input- as per their consult no evidence of acute HF      ANNALEE (acute kidney injury) ([facility] )  Assessment & Plan  Creatinine on 4/19/2023 was 1 01, creatinine on admission 1 36  Today creatinine is improving   Will hold off on further fluids though  Await CXR     Moderate aortic stenosis  Assessment & Plan  Known history of moderate aortic stenosis  Appreciate cardiology input, avoid aggressive diuresis      Today patient feels as if he is \"filling up with fluid\"  Stopping IVF  Will get CXR today   May need cardiology to see him again tomorrow        * Acute hematogenous osteomyelitis of left foot ([facility] )  Assessment & Plan  MRI Foot: Findings consistent with definite osteomyelitis of the great toe proximal phalanx  Nonspecific marrow edema involving the distal 3 1 cm of the first metatarsal, possibly reactive, although early osteomyelitis cannot be excluded  Large first MTP joint effusion, nonspecific; septic arthritis cannot be excluded on the basis of imaging  No evidence of a soft tissue abscess  stoP IV abx - discussed with podiatry - likely okay to treat with 2 weeks of bactim - started bactrim yetserday  Follow wound culture, blood culture  Blood culture remained negative so far  Status status post OR 3 days ago-status post first ray amputation  Wound closure 2 days ago  Surgical  culture pending   From podiatry standpoint stable for DC                 VTE Pharmacologic Prophylaxis: VTE Score: 5 Moderate Risk (Score 3-4) - Pharmacological DVT Prophylaxis Ordered: heparin  Patient Centered Rounds:  I " "performed bedside rounds with nursing staff today  Discussions with Specialists or Other Care Team Provider: Discussed with nursing  Education and Discussions with Family / Patient: called wife -   initially conversation was interrupted  Called back and went to voicemail  Total Time Spent on Date of Encounter in care of patient: 30 min  This time was spent on one or more of the following: performing physical exam; counseling and coordination of care; obtaining or reviewing history; documenting in the medical record; reviewing/ordering tests, medications or procedures; communicating with other healthcare professionals and discussing with patient's family/caregivers  Current Length of Stay: 5 day(s)  Current Patient Status: Inpatient   Certification Statement: The patient will continue to require additional inpatient hospital stay due to monitor fluid status   Discharge Plan: Anticipate discharge in 24-48 hrs to home  Code Status: Level 1 - Full Code    Subjective:   Patient seen and examined  Feeling \"bloated\"     Objective:     Vitals:   Temp (24hrs), Av 8 °F (36 6 °C), Min:97 5 °F (36 4 °C), Max:98 1 °F (36 7 °C)    Temp:  [97 5 °F (36 4 °C)-98 1 °F (36 7 °C)] 97 7 °F (36 5 °C)  HR:  [51-57] 53  Resp:  [16-19] 18  BP: (105-121)/(54-80) 121/57  SpO2:  [94 %-97 %] 97 %  Body mass index is 29 29 kg/m²  Input and Output Summary (last 24 hours): Intake/Output Summary (Last 24 hours) at 2023 1135  Last data filed at 2023 1009  Gross per 24 hour   Intake 2925 33 ml   Output 3625 ml   Net -699 67 ml       Physical Exam:   Physical Exam  Constitutional:       General: He is not in acute distress  Appearance: He is not ill-appearing, toxic-appearing or diaphoretic  HENT:      Head: Normocephalic  Mouth/Throat:      Comments: No hepatojugular reflux  Cardiovascular:      Rate and Rhythm: Normal rate  Pulmonary:      Effort: No respiratory distress        Breath sounds: No " stridor  No wheezing, rhonchi or rales  Chest:      Chest wall: No tenderness  Abdominal:      Palpations: There is no mass  Tenderness: There is no abdominal tenderness  There is no right CVA tenderness, left CVA tenderness or guarding  Musculoskeletal:      Right lower leg: No edema  Left lower leg: No edema  Skin:     Capillary Refill: Capillary refill takes less than 2 seconds  Coloration: Skin is pale  Skin is not jaundiced  Findings: No erythema, lesion or rash  Neurological:      General: No focal deficit present  Mental Status: He is alert  Cranial Nerves: No cranial nerve deficit  Sensory: No sensory deficit  Motor: No weakness  Coordination: Coordination normal       Gait: Gait normal       Deep Tendon Reflexes: Reflexes normal    Psychiatric:         Mood and Affect: Mood normal           Additional Data:     Labs:  Results from last 7 days   Lab Units 06/11/23  0825   EOS PCT % 5   HEMATOCRIT % 37 7   HEMOGLOBIN g/dL 12 1   LYMPHS PCT % 20   MONOS PCT % 14*   NEUTROS PCT % 61   PLATELETS Thousands/uL 268   WBC Thousand/uL 8 93     Results from last 7 days   Lab Units 06/11/23  0601   ANION GAP mmol/L 6   ALBUMIN g/dL 3 2*   ALK PHOS U/L 47   ALT U/L 6*   AST U/L 16   BUN mg/dL 16   CALCIUM mg/dL 8 6   CHLORIDE mmol/L 107   CO2 mmol/L 22   CREATININE mg/dL 1 10   GLUCOSE RANDOM mg/dL 96   POTASSIUM mmol/L 4 6   SODIUM mmol/L 135   TOTAL BILIRUBIN mg/dL 0 30     Results from last 7 days   Lab Units 06/06/23  1610   INR  0 89             Results from last 7 days   Lab Units 06/06/23  1610   LACTIC ACID mmol/L 1 3       Lines/Drains:  Invasive Devices     Peripheral Intravenous Line  Duration           Peripheral IV 06/10/23 Left Antecubital 1 day                      Imaging: No pertinent imaging reviewed      Recent Cultures (last 7 days):   Results from last 7 days   Lab Units 06/07/23  1455 06/07/23  1449 06/06/23  2109 06/06/23  1611 06/06/23  1610 BLOOD CULTURE   --   --   --  No Growth After 4 Days  No Growth After 4 Days  GRAM STAIN RESULT  Rare Polys  No bacteria seen Rare Polys  No bacteria seen 1+ Polys  No bacteria seen  --   --    WOUND CULTURE   --   --  1+ Growth of Acinetobacter species*  1+ Growth of  --   --        Last 24 Hours Medication List:   Current Facility-Administered Medications   Medication Dose Route Frequency Provider Last Rate   • acetaminophen  650 mg Oral Q6H PRN Margo Ledesma DPM     • amLODIPine  10 mg Oral Daily Margo Ledesma DPM     • buPROPion  300 mg Oral Daily Margo Ledesma DPM     • docusate sodium  100 mg Oral BID Margo Ledesma DPM     • escitalopram  20 mg Oral Daily Margo Ledesma DPM     • heparin (porcine)  5,000 Units Subcutaneous [facility] Margo Ledesma DPM     • levothyroxine  75 mcg Oral Early Morning Margo Ledesma DPM     • magnesium Oxide  400 mg Oral Daily Margo Ledesma DPM     • morphine injection  2 mg Intravenous Q6H PRN Shahnaz Gandara MD     • ondansetron  4 mg Intravenous Q6H PRN Margo Ledesma DPM     • oxyCODONE-acetaminophen  1 tablet Oral Q6H PRN Shahnaz Gandara MD     • oxyCODONE-acetaminophen  2 tablet Oral Q6H PRN Shahnaz Gandara MD     • polyethylene glycol  17 g Oral Daily Margo Ledesma DPM     • sulfamethoxazole-trimethoprim  1 tablet Oral Q12H Taylor Salmon MD          Today, Patient Was Seen By: Oscar Razo MD    **Please Note: This note may have been constructed using a voice recognition system  **

## 2023-06-11 NOTE — PLAN OF CARE
Problem: Potential for Falls  Goal: Patient will remain free of falls  Description: INTERVENTIONS:  - Educate patient/family on patient safety including physical limitations  - Instruct patient to call for assistance with activity   - Consult OT/PT to assist with strengthening/mobility   - Keep Call bell within reach  - Keep bed low and locked with side rails adjusted as appropriate  - Keep care items and personal belongings within reach  - Initiate and maintain comfort rounds  - Make Fall Risk Sign visible to staff  - Offer Toileting every 2 Hours, in advance of need  - Apply yellow socks and bracelet for high fall risk patients  Outcome: Progressing     Problem: PAIN - ADULT  Goal: Verbalizes/displays adequate comfort level or baseline comfort level  Description: Interventions:  - Encourage patient to monitor pain and request assistance  - Assess pain using appropriate pain scale  - Administer analgesics based on type and severity of pain and evaluate response  - Implement non-pharmacological measures as appropriate and evaluate response  - Consider cultural and social influences on pain and pain management  - Notify physician/advanced practitioner if interventions unsuccessful or patient reports new pain  Outcome: Progressing     Problem: INFECTION - ADULT  Goal: Absence or prevention of progression during hospitalization  Description: INTERVENTIONS:  - Assess and monitor for signs and symptoms of infection  - Monitor lab/diagnostic results  - Monitor all insertion sites, i e  indwelling lines, tubes, and drains  - Monitor endotracheal if appropriate and nasal secretions for changes in amount and color  - [place] appropriate cooling/warming therapies per order  - Administer medications as ordered  - Instruct and encourage patient and family to use good hand hygiene technique  - Identify and instruct in appropriate isolation precautions for identified infection/condition  Outcome: Progressing     Problem: SAFETY ADULT  Goal: Patient will remain free of falls  Description: INTERVENTIONS:  - Educate patient/family on patient safety including physical limitations  - Instruct patient to call for assistance with activity   - Consult OT/PT to assist with strengthening/mobility   - Keep Call bell within reach  - Keep bed low and locked with side rails adjusted as appropriate  - Keep care items and personal belongings within reach  - Initiate and maintain comfort rounds  - Make Fall Risk Sign visible to staff  - Offer Toileting every 2 Hours, in advance of need  - Apply yellow socks and bracelet for high fall risk patients  Outcome: Progressing     Problem: DISCHARGE PLANNING  Goal: Discharge to home or other facility with appropriate resources  Description: INTERVENTIONS:  - Identify barriers to discharge w/patient and caregiver  - Arrange for needed discharge resources and transportation as appropriate  - Identify discharge learning needs (meds, wound care, etc )  - Arrange for interpretive services to assist at discharge as needed  - Refer to Case Management Department for coordinating discharge planning if the patient needs post-hospital services based on physician/advanced practitioner order or complex needs related to functional status, cognitive ability, or social support system  Outcome: Progressing     Problem: Knowledge Deficit  Goal: Patient/family/caregiver demonstrates understanding of disease process, treatment plan, medications, and discharge instructions  Description: Complete learning assessment and assess knowledge base    Interventions:  - Provide teaching at level of understanding  - Provide teaching via preferred learning methods  Outcome: Progressing

## 2023-06-11 NOTE — PLAN OF CARE
Problem: Potential for Falls  Goal: Patient will remain free of falls  Description: INTERVENTIONS:  - Educate patient/family on patient safety including physical limitations  - Instruct patient to call for assistance with activity   - Consult OT/PT to assist with strengthening/mobility   - Keep Call bell within reach  - Keep bed low and locked with side rails adjusted as appropriate  - Keep care items and personal belongings within reach  - Initiate and maintain comfort rounds  - Make Fall Risk Sign visible to staff  - Offer Toileting every 2 Hours, in advance of need  - Apply yellow socks and bracelet for high fall risk patients  Outcome: Progressing     Problem: Nutrition/Hydration-ADULT  Goal: Nutrient/Hydration intake appropriate for improving, restoring or maintaining nutritional needs  Description: Monitor and assess patient's nutrition/hydration status for malnutrition  Collaborate with interdisciplinary team and initiate plan and interventions as ordered  Monitor patient's weight and dietary intake as ordered or per policy  Utilize nutrition screening tool and intervene as necessary  Determine patient's food preferences and provide high-protein, high-caloric foods as appropriate       INTERVENTIONS:  - Monitor oral intake, urinary output, labs, and treatment plans  - Assess nutrition and hydration status and recommend course of action  - Evaluate amount of meals eaten  - Assist patient with eating if necessary   - Allow adequate time for meals  - Recommend/ encourage appropriate diets, oral nutritional supplements, and vitamin/mineral supplements  - Order, calculate, and assess calorie counts as needed  - Recommend, monitor, and adjust tube feedings and TPN/PPN based on assessed needs  - Assess need for intravenous fluids  - Provide specific nutrition/hydration education as appropriate  - Include patient/family/caregiver in decisions related to nutrition  Outcome: Progressing     Problem: PAIN - ADULT  Goal: Verbalizes/displays adequate comfort level or baseline comfort level  Description: Interventions:  - Encourage patient to monitor pain and request assistance  - Assess pain using appropriate pain scale  - Administer analgesics based on type and severity of pain and evaluate response  - Implement non-pharmacological measures as appropriate and evaluate response  - Consider cultural and social influences on pain and pain management  - Notify physician/advanced practitioner if interventions unsuccessful or patient reports new pain  Outcome: Progressing     Problem: INFECTION - ADULT  Goal: Absence or prevention of progression during hospitalization  Description: INTERVENTIONS:  - Assess and monitor for signs and symptoms of infection  - Monitor lab/diagnostic results  - Monitor all insertion sites, i e  indwelling lines, tubes, and drains  - Monitor endotracheal if appropriate and nasal secretions for changes in amount and color  - [place] appropriate cooling/warming therapies per order  - Administer medications as ordered  - Instruct and encourage patient and family to use good hand hygiene technique  - Identify and instruct in appropriate isolation precautions for identified infection/condition  Outcome: Progressing     Problem: DISCHARGE PLANNING  Goal: Discharge to home or other facility with appropriate resources  Description: INTERVENTIONS:  - Identify barriers to discharge w/patient and caregiver  - Arrange for needed discharge resources and transportation as appropriate  - Identify discharge learning needs (meds, wound care, etc )  - Arrange for interpretive services to assist at discharge as needed  - Refer to Case Management Department for coordinating discharge planning if the patient needs post-hospital services based on physician/advanced practitioner order or complex needs related to functional status, cognitive ability, or social support system  Outcome: Progressing     Problem: Knowledge Deficit  Goal: Patient/family/caregiver demonstrates understanding of disease process, treatment plan, medications, and discharge instructions  Description: Complete learning assessment and assess knowledge base    Interventions:  - Provide teaching at level of understanding  - Provide teaching via preferred learning methods  Outcome: Progressing     Problem: Prexisting or High Potential for Compromised Skin Integrity  Goal: Skin integrity is maintained or improved  Description: INTERVENTIONS:  - Identify patients at risk for skin breakdown  - Assess and monitor skin integrity  - Assess and monitor nutrition and hydration status  - Monitor labs   - Assess for incontinence   - Turn and reposition patient  - Assist with mobility/ambulation  - Relieve pressure over bony prominences  - Avoid friction and shearing  - Provide appropriate hygiene as needed including keeping skin clean and dry  - Evaluate need for skin moisturizer/barrier cream  - Collaborate with interdisciplinary team   - Patient/family teaching  - Consider wound care consult   Outcome: Progressing

## 2023-06-11 NOTE — ASSESSMENT & PLAN NOTE
Diastolic in nature  Last echocardiogram shows ejection fraction of 60% with grade 1 diastolic dysfunction  Appreciate cardiology input- as per their consult no evidence of acute HF Pocket formed.

## 2023-06-12 VITALS
WEIGHT: 218.03 LBS | SYSTOLIC BLOOD PRESSURE: 138 MMHG | HEIGHT: 73 IN | RESPIRATION RATE: 15 BRPM | HEART RATE: 54 BPM | BODY MASS INDEX: 28.9 KG/M2 | DIASTOLIC BLOOD PRESSURE: 65 MMHG | TEMPERATURE: 97.7 F | OXYGEN SATURATION: 95 %

## 2023-06-12 LAB
ALBUMIN SERPL BCP-MCNC: 3.6 G/DL (ref 3.5–5)
ALP SERPL-CCNC: 46 U/L (ref 34–104)
ALT SERPL W P-5'-P-CCNC: 14 U/L (ref 7–52)
ANION GAP SERPL CALCULATED.3IONS-SCNC: 7 MMOL/L (ref 4–13)
AST SERPL W P-5'-P-CCNC: 16 U/L (ref 13–39)
ATRIAL RATE: 64 BPM
BASOPHILS # BLD AUTO: 0.03 THOUSANDS/ÂΜL (ref 0–0.1)
BASOPHILS NFR BLD AUTO: 0 % (ref 0–1)
BILIRUB SERPL-MCNC: 0.25 MG/DL (ref 0.2–1)
BUN SERPL-MCNC: 16 MG/DL (ref 5–25)
CALCIUM SERPL-MCNC: 9.3 MG/DL (ref 8.4–10.2)
CHLORIDE SERPL-SCNC: 103 MMOL/L (ref 96–108)
CO2 SERPL-SCNC: 26 MMOL/L (ref 21–32)
CREAT SERPL-MCNC: 1.19 MG/DL (ref 0.6–1.3)
EOSINOPHIL # BLD AUTO: 0.54 THOUSAND/ÂΜL (ref 0–0.61)
EOSINOPHIL NFR BLD AUTO: 7 % (ref 0–6)
ERYTHROCYTE [DISTWIDTH] IN BLOOD BY AUTOMATED COUNT: 13.3 % (ref 11.6–15.1)
GFR SERPL CREATININE-BSD FRML MDRD: 61 ML/MIN/1.73SQ M
GLUCOSE SERPL-MCNC: 96 MG/DL (ref 65–140)
HCT VFR BLD AUTO: 38 % (ref 36.5–49.3)
HGB BLD-MCNC: 12.5 G/DL (ref 12–17)
IMM GRANULOCYTES # BLD AUTO: 0.02 THOUSAND/UL (ref 0–0.2)
IMM GRANULOCYTES NFR BLD AUTO: 0 % (ref 0–2)
LYMPHOCYTES # BLD AUTO: 1.74 THOUSANDS/ÂΜL (ref 0.6–4.47)
LYMPHOCYTES NFR BLD AUTO: 24 % (ref 14–44)
MCH RBC QN AUTO: 30.9 PG (ref 26.8–34.3)
MCHC RBC AUTO-ENTMCNC: 32.9 G/DL (ref 31.4–37.4)
MCV RBC AUTO: 94 FL (ref 82–98)
MONOCYTES # BLD AUTO: 1.2 THOUSAND/ÂΜL (ref 0.17–1.22)
MONOCYTES NFR BLD AUTO: 17 % (ref 4–12)
NEUTROPHILS # BLD AUTO: 3.76 THOUSANDS/ÂΜL (ref 1.85–7.62)
NEUTS SEG NFR BLD AUTO: 52 % (ref 43–75)
NRBC BLD AUTO-RTO: 0 /100 WBCS
P AXIS: 56 DEGREES
PLATELET # BLD AUTO: 288 THOUSANDS/UL (ref 149–390)
PMV BLD AUTO: 9.2 FL (ref 8.9–12.7)
POTASSIUM SERPL-SCNC: 4.1 MMOL/L (ref 3.5–5.3)
PR INTERVAL: 164 MS
PROT SERPL-MCNC: 7 G/DL (ref 6.4–8.4)
QRS AXIS: 51 DEGREES
QRSD INTERVAL: 94 MS
QT INTERVAL: 422 MS
QTC INTERVAL: 435 MS
RBC # BLD AUTO: 4.04 MILLION/UL (ref 3.88–5.62)
SODIUM SERPL-SCNC: 136 MMOL/L (ref 135–147)
T WAVE AXIS: 64 DEGREES
VENTRICULAR RATE: 64 BPM
WBC # BLD AUTO: 7.29 THOUSAND/UL (ref 4.31–10.16)

## 2023-06-12 PROCEDURE — 85025 COMPLETE CBC W/AUTO DIFF WBC: CPT | Performed by: INTERNAL MEDICINE

## 2023-06-12 PROCEDURE — 80053 COMPREHEN METABOLIC PANEL: CPT | Performed by: INTERNAL MEDICINE

## 2023-06-12 PROCEDURE — 93010 ELECTROCARDIOGRAM REPORT: CPT | Performed by: INTERNAL MEDICINE

## 2023-06-12 PROCEDURE — 99239 HOSP IP/OBS DSCHRG MGMT >30: CPT

## 2023-06-12 RX ORDER — SULFAMETHOXAZOLE AND TRIMETHOPRIM 800; 160 MG/1; MG/1
1 TABLET ORAL EVERY 12 HOURS SCHEDULED
Qty: 24 TABLET | Refills: 0 | Status: SHIPPED | OUTPATIENT
Start: 2023-06-12 | End: 2023-06-24

## 2023-06-12 RX ADMIN — DOCUSATE SODIUM 100 MG: 100 CAPSULE, LIQUID FILLED ORAL at 09:30

## 2023-06-12 RX ADMIN — SULFAMETHOXAZOLE AND TRIMETHOPRIM 1 TABLET: 800; 160 TABLET ORAL at 09:31

## 2023-06-12 RX ADMIN — ESCITALOPRAM OXALATE 20 MG: 10 TABLET ORAL at 09:30

## 2023-06-12 RX ADMIN — Medication 400 MG: at 09:30

## 2023-06-12 RX ADMIN — AMLODIPINE BESYLATE 10 MG: 10 TABLET ORAL at 09:30

## 2023-06-12 RX ADMIN — LEVOTHYROXINE SODIUM 75 MCG: 75 TABLET ORAL at 05:16

## 2023-06-12 RX ADMIN — BUPROPION HYDROCHLORIDE 300 MG: 150 TABLET, EXTENDED RELEASE ORAL at 09:30

## 2023-06-12 RX ADMIN — OXYCODONE HYDROCHLORIDE AND ACETAMINOPHEN 2 TABLET: 5; 325 TABLET ORAL at 05:31

## 2023-06-12 RX ADMIN — HEPARIN SODIUM 5000 UNITS: 5000 INJECTION INTRAVENOUS; SUBCUTANEOUS at 05:16

## 2023-06-12 NOTE — CASE MANAGEMENT
Case Management Discharge Planning Note    Patient name Ra Barber  Location [address] [id number] MRN [id number]  : 1955 Date 2023       Current Admission Date: 2023  Current Admission Diagnosis:Acute hematogenous osteomyelitis of left foot [facility])   Patient Active Problem List    Diagnosis Date Noted   • Borderline high cholesterol    • Preoperative cardiovascular examination 2023   • Acute hematogenous osteomyelitis of left foot ([facility] ) 2023   • Status post foot surgery 2023   • PVCs (premature ventricular contractions) 2023   • Heart failure with preserved ejection fraction 2023   • ANNALEE (acute kidney injury) ([facility] ) 02/10/2023   • Hyperkalemia 02/10/2023   • MSSA bacteremia 02/10/2023   • Ulcer of left foot ([facility] ) 2023   • Other emphysema ([facility] ) 2023   • Benign essential HTN 2023   • Moderate aortic stenosis 2023   • MDD (major depressive disorder) 2023   • Peripheral neuropathy 2023      LOS (days): 6  Geometric Mean LOS (GMLOS) (days): 5 80  Days to GMLOS:0 1     OBJECTIVE:  Risk of Unplanned Readmission Score: 15 35         Current admission status: Inpatient   Preferred Pharmacy:   [facility] [id number] [address]  Phone: [phone] Fax: [phone]    Primary Care Provider: Valentín Gardner MD    Primary Insurance: MEDICARE  Secondary Insurance: BLUE CROSS    DISCHARGE DETAILS:    Discharge planning discussed with[de-identified] patient  Freedom of Choice: Yes     CM contacted family/caregiver?: No- see comments (patient declined)  Were Treatment Team discharge recommendations reviewed with patient/caregiver?: Yes  Did patient/caregiver verbalize understanding of patient care needs?: Yes  Were patient/caregiver advised of the risks associated with not following Treatment Team discharge recommendations?: Yes         Requested  [address]         Is the patient interested in [facility] at discharge?: No    DME Referral Provided  Referral made for DME?: No         Would you like to participate in our [address] service program?  : No - Declined    Treatment Team Recommendation: Home  Discharge Destination Plan[de-identified] Home                                IMM Given (Date):: 06/12/23  IMM Given to[de-identified] Patient  Family notified[de-identified] appeal rights provided to patient              CM met with patient to review discharge today  Patient indicated he does not feel VN is necessary as spouse can change bandages  CM made [employer] [facility] aware in [address]  CM spoke to patient at the bedside, reviewed DC IMM with patient and informed that patient can stay an additional 4 hours for reconsidering appealing the discharge as the medicare rights were review on the day of discharge  Pt verbalized understanding and feels ready to go home and does not intend to stay 4 hours to reconsider  IMM placed in bin for filing  Patient indicated his spouse is at work and patient desires to leave [address] [place] [address]  CM provided patient with telephone number for [facility] but patient unable to secure transport  CM submitted [facility] referral for [facility] [place] for transport home

## 2023-06-12 NOTE — ASSESSMENT & PLAN NOTE
"Known history of moderate aortic stenosis  Appreciate cardiology input, avoid aggressive diuresis      Today patient feels as if he is \"filling up with fluid\"  Stopping IVF, given dose of IV Lasix -responded well -can resume home Lasix tomorrow avoid overdiuresis  Chest x-ray appropriate  Volume status is now euvolemic, recommend cardiology follow-up      "

## 2023-06-12 NOTE — DISCHARGE INSTR - AVS FIRST PAGE
Dear Titus Cabral,     It was our pleasure to care for you here at [address]  For follow up as well as any medication refills, we recommend that you follow up with your primary care physician  Here are the most important instructions/ recommendations at discharge:     Notable Medication Adjustments -   Start taking home lasix dose starting tomorrow 6/13  Continue taking bactrim to finish course   Testing Required after Discharge -   BMP labwork on Thursday   Important follow up information -   Follow up with PCP   Follow up with Dr Phillip Schultz (Podiatry) within 1 week   Other Instructions -   Weight bearing as tolerated  Please review this entire after visit summary as additional general instructions including medication list, appointments, activity, diet, any pertinent wound care, and other additional recommendations from your care team that may be provided for you        Sincerely,     Linus Menon PA-C

## 2023-06-12 NOTE — ASSESSMENT & PLAN NOTE
MRI Foot: Findings consistent with definite osteomyelitis of the great toe proximal phalanx  Nonspecific marrow edema involving the distal 3 1 cm of the first metatarsal, possibly reactive, although early osteomyelitis cannot be excluded  Large first MTP joint effusion, nonspecific; septic arthritis cannot be excluded on the basis of imaging  No evidence of a soft tissue abscess  stoP IV abx - discussed cultures with podiatry - likely okay to treat with 2 weeks of bactim     Blood culture remained negative so far  Status status post OR -status post first ray amputation   Delayed wound closure     From podiatry standpoint stable for DC

## 2023-06-12 NOTE — ASSESSMENT & PLAN NOTE
Diastolic in nature  Last echocardiogram shows ejection fraction of 60% with grade 1 diastolic dysfunction  Resume home Lasix tomorrow, required IV dose of Lasix yesterday secondary to fluids given for ANNALEE

## 2023-06-12 NOTE — DISCHARGE SUMMARY
"[address]  Discharge- Belen Sanders 1955, 69 y o  male MRN: [id number]  Unit/Bed#: -[facility] Encounter: [id number]  Primary Care Provider: Ladan Mckeon MD   Date and time admitted to hospital: 6/6/2023  3:46 PM    * Acute hematogenous osteomyelitis of left foot [facility])  Assessment & Plan  MRI Foot: Findings consistent with definite osteomyelitis of the great toe proximal phalanx  Nonspecific marrow edema involving the distal 3 1 cm of the first metatarsal, possibly reactive, although early osteomyelitis cannot be excluded  Large first MTP joint effusion, nonspecific; septic arthritis cannot be excluded on the basis of imaging  No evidence of a soft tissue abscess  stoP IV abx - discussed cultures with podiatry - likely okay to treat with 2 weeks of bactim     Blood culture remained negative so far  Status status post OR -status post first ray amputation   Delayed wound closure     From podiatry standpoint stable for DC      Heart failure with preserved ejection fraction  Assessment & Plan  Diastolic in nature  Last echocardiogram shows ejection fraction of 60% with grade 1 diastolic dysfunction  Resume home Lasix tomorrow, required IV dose of Lasix yesterday secondary to fluids given for ANNALEE      ANNALEE (acute kidney injury) ([address] )  Assessment & Plan  Creatinine on 4/19/2023 was 1 01, creatinine on admission 1 36  Lab Results   Component Value Date    CREATININE 1 19 06/12/2023    CREATININE 1 10 06/11/2023    CREATININE 1 34 (H) 06/10/2023     Today creatinine continues to be stable at baseline now  Received gentle fluids and then IV Lasix yesterday  Would resume home Lasix tomorrow  BMP at the end of the week  PCP follow-up    Moderate aortic stenosis  Assessment & Plan  Known history of moderate aortic stenosis  Appreciate cardiology input, avoid aggressive diuresis      Today patient feels as if he is \"filling up with fluid\"  Stopping IVF, given dose of IV Lasix -responded " well -can resume home Lasix tomorrow avoid overdiuresis  Chest x-ray appropriate  Volume status is now euvolemic, recommend cardiology follow-up        Medical Problems     Resolved Problems  Date Reviewed: 6/11/2023   None       Discharging Physician / Practitioner: Laura Oliva PA-C  PCP: Hallie Michaels MD  Admission Date:   Admission Orders (From admission, onward)     Ordered        06/06/23 1615  [facility],[address] [place]  Once                      Discharge Date: 06/12/23    Consultations During Hospital Stay:  · Podiatry   · Pharmacy   · Cardiology     Procedures Performed:   · 6/7 -left partial first ray amputation  · 6/9 -left delayed closure    Significant Findings / Test Results:   XR foot left 3+ views - Result Date: 6/9/2023  Status post transmetatarsal amputation of the first ray  US kidney and bladder - Result Date: 6/8/2023  Normal appearance of the bilateral kidneys; no hydronephrosis  Partially distended bladder  Other findings as above  VAS JACK & waveform analysis, multiple levels - Result Date: 6/7/2023  CONCLUSION: Impression RIGHT LOWER LIMB Ankle/Brachial Index: 1 23  which is in the normal range  PPG/PVR Tracings are normal   Triphasic waveforms at the ankle  Metatarsal Pressure 162 mmHg Great Toe Pressure: 117 mmHg, within the healing range  LEFT LOWER LIMB Ankle/Brachial Index: 1 22  which is in the normal range  PPG/PVR Tracings are normal   Triphasic waveforms at the ankle  Metatarsal Pressure 156 mmHg Great Toe Pressure: 129 mmHg, within the healing range  X-ray foot left 3+ views - Result Date: 6/7/2023  Osteomyelitis of the base of the 1st proximal phalanx  MRI foot/forefoot toes left wo contrast - Result Date: 6/6/2023  Findings consistent with definite osteomyelitis of the great toe proximal phalanx  Nonspecific marrow edema involving the distal 3 1 cm of the first metatarsal, possibly reactive, although early osteomyelitis cannot be excluded   Large first MTP joint effusion, nonspecific; septic arthritis cannot be excluded on the basis of imaging  No evidence of a soft tissue abscess  Lab Results   Component Value Date    WOUNDCULT 1+ Growth of Acinetobacter species (A) 06/06/2023    WOUNDCULT 1+ Growth of 06/06/2023       Incidental Findings:   · None   · I reviewed the above mentioned incidental findings with the patient and/or family and they expressed understanding  Test Results Pending at Discharge (will require follow up): · None     Outpatient Tests Requested:  · NOne    Complications:  None    Reason for Admission: Acute osteomyelitis of left foot    Hospital Course:   Kelsi Miller is a 69 y o  male patient with history of CHF, AS who originally presented to the hospital on 6/6/2023 due to left foot swelling and redness  Had previous surgery/bunionectomy on left foot was not healing properly, was following with podiatry  Podiatry ordered MRI showing osteomyelitis, recommending admission  Was continued on ceftriaxone, vancomycin with pharmacy consult  Arterial duplex was obtained to rule out PAD,  findings were adequate for healing  Cardiology consulted for preop clearance given CHF and AS history  Cardiology cleared patient  Due to some weight gain over the past week prior to admission was started on IV Lasix, cardiology discontinued in favor of no aggressive diuresis  Underwent left partial first ray amputation on 6/7  Podiatry favored a staged closure, wound was closed on 6/9  Wound cultures came back positive for Acinetobacter species and susceptible to Bactrim  Podiatry recommending a 2-week Bactrim course  Started Bactrim however patient noted to have rise in creatinine  Found to be slightly dry, on IV fluids and then appeared overloaded  Given trial of IV Lasix  Today appears euvolemic, creatinine down trended close to baseline  Recommended for BMP at the end of the week  Can continue oral Lasix tomorrow       Please see above list of "diagnoses and related plan for additional information  Condition at Discharge: fair    Discharge Day Visit / Exam:   Subjective: Seen and examined  Offers no acute concerns, states his bloating went away and he is feeling much better  Vitals: Blood Pressure: 138/65 (06/12/23 0754)  Pulse: (!) 54 (06/12/23 0754)  Temperature: 97 7 °F (36 5 °C) (06/12/23 0754)  Temp Source: Temporal (06/11/23 2210)  Respirations: 15 (06/11/23 2316)  Height: 6' 1\" (185 4 cm) (06/09/23 1403)  Weight - Scale: 98 9 kg (218 lb 0 6 oz) (06/12/23 0521)  SpO2: 95 % (06/12/23 0754)  Exam:   Physical Exam  Vitals and nursing note reviewed  Constitutional:       General: He is not in acute distress  Appearance: Normal appearance  HENT:      Head: Normocephalic and atraumatic  Nose: No congestion  Mouth/Throat:      Mouth: Mucous membranes are moist    Eyes:      Conjunctiva/sclera: Conjunctivae normal    Cardiovascular:      Rate and Rhythm: Normal rate and regular rhythm  Pulses: Normal pulses  Heart sounds: Normal heart sounds  No murmur heard  Pulmonary:      Effort: Pulmonary effort is normal  No respiratory distress  Breath sounds: Normal breath sounds  Abdominal:      General: Bowel sounds are normal       Palpations: Abdomen is soft  Tenderness: There is no abdominal tenderness  Musculoskeletal:         General: Normal range of motion  Right lower leg: No edema  Left lower leg: No edema  Skin:     General: Skin is warm and dry  Comments: Left surgical boot   Neurological:      Mental Status: He is alert and oriented to person, place, and time  Discussion with Family: Patient declined call to   Discharge instructions/Information to patient and family:   See after visit summary for information provided to patient and family        Provisions for Follow-Up Care:  See after visit summary for information related to follow-up care and any pertinent home " health orders  Disposition:   Home    Planned Readmission: None     Discharge Statement:  I spent 45 minutes discharging the patient  This time was spent on the day of discharge  I had direct contact with the patient on the day of discharge  Greater than 50% of the total time was spent examining patient, answering all patient questions, arranging and discussing plan of care with patient as well as directly providing post-discharge instructions  Additional time then spent on discharge activities  Discharge Medications:  See after visit summary for reconciled discharge medications provided to patient and/or family        **Please Note: This note may have been constructed using a voice recognition system**

## 2023-06-12 NOTE — ASSESSMENT & PLAN NOTE
Creatinine on 4/19/2023 was 1 01, creatinine on admission 1 36  Lab Results   Component Value Date    CREATININE 1 19 06/12/2023    CREATININE 1 10 06/11/2023    CREATININE 1 34 (H) 06/10/2023     Today creatinine continues to be stable at baseline now  Received gentle fluids and then IV Lasix yesterday  Would resume home Lasix tomorrow  BMP at the end of the week  PCP follow-up

## 2023-06-13 ENCOUNTER — OFFICE VISIT (OUTPATIENT)
Dept: PODIATRY | Facility: CLINIC | Age: 68
End: 2023-06-13
Payer: MEDICARE

## 2023-06-13 ENCOUNTER — OFFICE VISIT (OUTPATIENT)
Dept: CARDIOLOGY CLINIC | Facility: CLINIC | Age: 68
End: 2023-06-13
Payer: MEDICARE

## 2023-06-13 VITALS
HEART RATE: 66 BPM | WEIGHT: 261.6 LBS | HEIGHT: 72 IN | SYSTOLIC BLOOD PRESSURE: 132 MMHG | OXYGEN SATURATION: 99 % | DIASTOLIC BLOOD PRESSURE: 80 MMHG | BODY MASS INDEX: 35.43 KG/M2 | TEMPERATURE: 97.6 F

## 2023-06-13 VITALS — BODY MASS INDEX: 34.59 KG/M2 | HEIGHT: 73 IN | WEIGHT: 261 LBS

## 2023-06-13 DIAGNOSIS — I49.3 PVCS (PREMATURE VENTRICULAR CONTRACTIONS): ICD-10-CM

## 2023-06-13 DIAGNOSIS — T81.31XA DEHISCENCE OF OPERATIVE WOUND, INITIAL ENCOUNTER: ICD-10-CM

## 2023-06-13 DIAGNOSIS — R06.09 DYSPNEA ON EXERTION: ICD-10-CM

## 2023-06-13 DIAGNOSIS — R06.02 SHORTNESS OF BREATH: ICD-10-CM

## 2023-06-13 DIAGNOSIS — I35.0 MODERATE AORTIC STENOSIS: Primary | ICD-10-CM

## 2023-06-13 DIAGNOSIS — R42 EPISODIC LIGHTHEADEDNESS: ICD-10-CM

## 2023-06-13 DIAGNOSIS — Z98.890 STATUS POST FOOT SURGERY: Primary | ICD-10-CM

## 2023-06-13 DIAGNOSIS — I10 BENIGN ESSENTIAL HTN: ICD-10-CM

## 2023-06-13 PROBLEM — Z01.810 PREOPERATIVE CARDIOVASCULAR EXAMINATION: Status: RESOLVED | Noted: 2023-06-07 | Resolved: 2023-06-13

## 2023-06-13 PROCEDURE — 99214 OFFICE O/P EST MOD 30 MIN: CPT | Performed by: NURSE PRACTITIONER

## 2023-06-13 PROCEDURE — 88311 DECALCIFY TISSUE: CPT | Performed by: PATHOLOGY

## 2023-06-13 PROCEDURE — 88305 TISSUE EXAM BY PATHOLOGIST: CPT | Performed by: PATHOLOGY

## 2023-06-13 PROCEDURE — 99212 OFFICE O/P EST SF 10 MIN: CPT | Performed by: STUDENT IN AN ORGANIZED HEALTH CARE EDUCATION/TRAINING PROGRAM

## 2023-06-13 RX ORDER — METOPROLOL SUCCINATE 25 MG/1
12.5 TABLET, EXTENDED RELEASE ORAL DAILY
Qty: 15 TABLET | Refills: 11 | Status: SHIPPED | OUTPATIENT
Start: 2023-06-13

## 2023-06-13 NOTE — ASSESSMENT & PLAN NOTE
Echocardiogram 1/30/2023 with EF 43% and mild diastolic dysfunction, moderate AS  Currently appears euvolemic  BNP 26 on 6/7/2023  Continue furosemide 60 mg daily  Reviewed 2 g sodium diet, daily weights, and s/s to report  Will monitor

## 2023-06-13 NOTE — PROGRESS NOTES
Cardiology Follow Up    Classie Ahumada  1955  [id number]  [facility]  [address]  [address]  [facility] [address] [phone]    Meenakshi Daniel presents for routine follow up of aortic stenosis, chronic diastolic heart failure, hypertension, and frequent PVC's  1  Moderate aortic stenosis  Assessment & Plan:  Patient has a know history of aortic stenosis reported to be moderate on echo 11/2021 through [facility]  Echocardiogram 1/30/2023 continues to show moderate aortic stenosis with PV 3 52, mean gradient 30 mmHg, DI 0 30  Updated echocardiogram scheduled for 9/2023       2  PVCs (premature ventricular contractions)  Assessment & Plan:  ECG and telemetry monitoring during hospitalization revealed PVC's in the pattern of bigeminy  History of PVC's per patient report  Echocardiogram 1/30/2023 with EF 60%  Magnesium supplementation added with improvement in PVC's  Holter monitor 3/15/2023 showed 3 7% PVC burden with avg HR 90 bpm   Will start metoprolol succinate 12 5 mg daily  Continue magnesium  Stress test ordered  Will obtain updated Holter monitor on metoprolol  We reviewed s/s to report  Orders:  -     metoprolol succinate (TOPROL-XL) 25 mg 24 hr tablet; Take 0 5 tablets (12 5 mg total) by mouth daily  -     Holter monitor; Future; Expected date: 06/13/2023    3  Heart failure with preserved ejection fraction  Assessment & Plan:  Echocardiogram 1/30/2023 with EF 21% and mild diastolic dysfunction, moderate AS  Currently appears euvolemic  BNP 26 on 6/7/2023  Continue furosemide 60 mg daily  Reviewed 2 g sodium diet, daily weights, and s/s to report  Will monitor  4  Dyspnea on exertion  Assessment & Plan:  Ongoing dyspnea on exertion  Currently appears euvolemic  See discussion under diastolic heart failure  Will obtain stress test as this may be anginal equivalent      Orders:  -     NM myocardial perfusion spect (rx stress and/or rest); Future; Expected date: 06/13/2023    5  Episodic lightheadedness  Assessment & Plan:  Patient reports an episode over a month ago where he experienced a day of lightheadedness and shortness of breath  He did not seek medical attention and symptoms were resolved by the next day  Unclear etiology  Will obtain nuclear stress test for ischemic evaluation  See discussion under frequent PVC's  Orders:  -     NM myocardial perfusion spect (rx stress and/or rest); Future; Expected date: 06/13/2023    6  Benign essential HTN  Assessment & Plan:  Blood pressure is acceptable today  Continue amlodipine 10 mg daily and furosemide 60 mg daily  Previously on lisinopril 10 mg daily through PCP which was stopped due to dry cough  Will monitor and consider alternate therapy for BP persistently > 140/90  Updated BMP ordered  Reviewed 2 g sodium diet, regular exercise, weight control  HPI  Vishnu Vargas has a past medical history of aortic stenosis, chronic diastolic heart failure, HTN, hypothyroidism, and PVC's       He was admitted to [address] [place] [address] 1/30-2/6/2023 for sepsis  Cardiology was consulted for evaluation of CHF an MAKEDA for work up of bacteremia  HCTZ was discontinued due to hyponatremia  Furosemide was held due to dehydration  Echo 1/30/2023 showed EF 60% with moderate AS  MAKEDA showed no source of bacteremia  Ultimately source of bacteremia was from a toe wound      He was re-admitted to [address] [place] [address] 2/10-2/16/2023 for hyperkalemia and ANNALEE  His potassium had been continued despite discontinuation of HCTZ and furosemide  He was also on IV antibiotics via a PICC line  He underwent adjustment of antibiotic dosing  He was initially treated with IV fluids, but subsequently developed congestive changes on chest imaging and requiring IV furosemide  Nephrology was managing treatment during admission  He followed up with me on 3/8/2023   Bp was 808-064 systolic and PCP ordered lisinopril 10 mg daily and stopped furosemide  Furosemide was resumed due to recurrent swelling off the diuretic  Lisinopril was discontinued due to a dry cough which resolved with stopping the medication      He was admitted to [address] [place] [address] 6/6-6/12/2023 for amputation of his great toe of the left foot  Podiatry had ordered an MRI which showed osteomyelitis and he was directed to the hospital  He reported an episode of shortness of breath prior to admission and was ordered IV furosemide  Arterial duplex showed adequate blood flow  Cardiology was consulted for pre-operative evaluation  I evaluated him at which time he appeared euvolemic and IV furosemide was discontinued after he received 1 dose  He did undergo successful toe amputation on 6/7 with delayed closure which took place 6/9  He was started on Bactrim  Post op course was complicated by ANNALEE for which he received IV fluids  He reported shortness of breath and IV fluids were discontinued and he was given a dose of IV furosemide which corrected his symptoms  He was discharged home 6/12 on furosemide 60 mg PO daily and Bactrim for 2 weeks  Outpatient BMP was ordered  6/13/2023Dpavithra Deelon presents today for routine follow up accompanied by his spouse  He states he is feeling well  His post op pain in his left foot is steadily improving  He has mild swelling to the ankle/foot which is also improving  He denies any current shortness of breath, swelling/abdominal distention  He reports an episode over a month ago where he had lightheadedness and shortness of breath for 1 day  He did not seek medical attention  Symptoms were resolved when he woke the next day       Medical Problems     Problem List     Preoperative cardiovascular examination    Other emphysema (HCC)    Moderate aortic stenosis    Heart failure with preserved ejection fraction    Benign essential HTN    PVCs (premature ventricular contractions)    MDD (major depressive disorder)    Peripheral neuropathy    Ulcer of left foot (HCC)    ANNALEE (acute "kidney injury) ([facility] [address] )    Hyperkalemia    MSSA bacteremia    Status post foot surgery    Acute hematogenous osteomyelitis of left foot (HCC)    Borderline high cholesterol        Past Medical History:   Diagnosis Date   • Anxiety    • Aortic stenosis    • Borderline high cholesterol    • CHF (congestive heart failure) ([facility])     Asaf Anand-   • Colon polyp    • Depression    • Heart murmur    • Hypertension    • Hypothyroidism    • Prediabetes    • PVCs (premature ventricular contractions)     \"skip\"   • Wears glasses     readers     Social History     Socioeconomic History   • Marital status: /Civil Union     Spouse name: Not on file   • Number of children: Not on file   • Years of education: Not on file   • Highest education level: Not on file   Occupational History   • Not on file   Tobacco Use   • Smoking status: Former     Packs/day: 1 00     Years: 50 00     Total pack years: 50 00     Types: Cigarettes     Quit date: 2023     Years since quittin 4   • Smokeless tobacco: Never   Vaping Use   • Vaping Use: Never used   Substance and Sexual Activity   • Alcohol use: Yes     Comment: socially-2 drinks per week   • Drug use: Not Currently     Comment: sporadic in young adulthood   • Sexual activity: Not on file     Comment: defer   Other Topics Concern   • Not on file   Social History Narrative   • Not on file     Social Determinants of Health     Financial Resource Strain: Not on file   Food Insecurity: No Food Insecurity (2023)    Hunger Vital Sign    • Worried About Running Out of Food in the Last Year: Never true    • Ran Out of Food in the Last Year: Never true   Transportation Needs: No Transportation Needs (2023)    PRAPARE - Transportation    • Lack of Transportation (Medical): No    • Lack of Transportation (Non-Medical):  No   Physical Activity: Not on file   Stress: Not on file   Social Connections: Not on file   Intimate Partner Violence: Not on file   Housing " Stability: Low Risk  (6/7/2023)    Housing Stability Vital Sign    • Unable to Pay for Housing in the Last Year: No    • Number of Places Lived in the Last Year: 1    • Unstable Housing in the Last Year: No      Family History   Problem Relation Age of Onset   • Dementia Mother    • Blindness Mother    • Cancer Mother    • Dementia Father      Past Surgical History:   Procedure Laterality Date   • APPENDECTOMY     • BACK SURGERY     • CATARACT EXTRACTION Bilateral    • CLOSURE DELAYED PRIMARY Left 6/9/2023    Procedure: CLOSURE DELAYED PRIMARY;  Surgeon: Hui Byers DPM;  Location: [facility];  Service: Podiatry   • COLONOSCOPY     • DC CORRJ HALLUX VALGUS W/SESMDC W/RESCJ PROX PHAL Left 4/19/2023    Procedure: Candace Blotter and wound debridement;  Surgeon: Grace Cheney DPM;  Location: [facility];  Service: Podiatry   • TOE AMPUTATION Left 6/7/2023    Procedure: PARTIAL 1ST RAY AMPUTATION;  Surgeon: Hui Byers DPM;  Location: [facility];  Service: Podiatry   • TONSILLECTOMY     • TOTAL SHOULDER REPLACEMENT Left    • WISDOM TOOTH EXTRACTION         Current Outpatient Medications:   •  amLODIPine (NORVASC) 10 mg tablet, Take 10 mg by mouth daily, Disp: , Rfl: 0  •  buPROPion (WELLBUTRIN XL) 300 mg 24 hr tablet, , Disp: , Rfl:   •  escitalopram (LEXAPRO) 20 mg tablet, Take 20 mg by mouth daily, Disp: , Rfl: 1  •  furosemide (LASIX) 40 mg tablet, Take 1 5 tablets (60 mg total) by mouth daily, Disp: 45 tablet, Rfl: 11  •  gabapentin (NEURONTIN) 100 mg capsule, Take 100 mg by mouth 2 (two) times a day, Disp: , Rfl:   •  levothyroxine 75 mcg tablet, , Disp: , Rfl:   •  Magnesium 400 MG CAPS, Take 1 capsule (400 mg total) by mouth in the morning, Disp: 90 capsule, Rfl: 3  •  metoprolol succinate (TOPROL-XL) 25 mg 24 hr tablet, Take 0 5 tablets (12 5 mg total) by mouth daily, Disp: 15 tablet, Rfl: 11  •  potassium chloride (MICRO-K) 10 MEQ CR capsule, Take 1 capsule (10 mEq total) by mouth daily, Disp: 90 capsule, Rfl: 3  •  sulfamethoxazole-trimethoprim (BACTRIM DS) 800-160 mg per tablet, Take 1 tablet by mouth every 12 (twelve) hours for 12 days, Disp: 24 tablet, Rfl: 0  •  Multiple Vitamins-Minerals (OCUVITE ADULT FORMULA PO), Take 1 tablet by mouth Eye care vitamin daily (Patient not taking: Reported on 2023), Disp: , Rfl:   No current facility-administered medications for this visit  Allergies   Allergen Reactions   • Statins Myalgia     Other reaction(s): muscle aches and joint aches         Labs:     Chemistry        Component Value Date/Time    BUN 16 2023 0518     2023 0518    CO2 26 2023 0518    CREATININE 1 19 2023 0518    K 4 1 2023 0518        Component Value Date/Time    ALKPHOS 46 2023 0518    ALT 14 2023 0518    AST 16 2023 0518    CALCIUM 9 3 2023 0518        Cardiac testin hour Holter monitor 3/15/2023:  Predominantly sinus rhythm, HR , avg 90 bpm   3 7% PVC burden  Rare PAC's  One asymptomatic 4 beat atrial run  ECG 2/10/2023: Normal sinus rhythm      MAKEDA 2/3/2023:  EF 60%  LA mildly dilated  Mod AS  Trace MR  Trace TR      Echocardiogram 2023:  EF 60%, mild LVH  Grade 1 DD  Mod AS, PV 3 52 m/s, MG 30 mmHg, DI 0 30  Mild AI  MAC with trace MR  Trace TR  PASP 41 mmHg  Aortic root 3 8 cm  Ascending aorta 3 3  cm      ECG 2023: Sinus rhythm with frequent PVC's  Nonspecific ST abnormality  Review of Systems   Constitutional: Negative  HENT: Negative  Cardiovascular: Positive for dyspnea on exertion and leg swelling  Negative for chest pain, irregular heartbeat, near-syncope, orthopnea, palpitations and syncope  Respiratory: Positive for shortness of breath  Negative for cough and snoring  Endocrine: Negative  Skin: Negative  Musculoskeletal: Negative  Gastrointestinal: Negative  Genitourinary: Negative  Neurological: Positive for light-headedness  Psychiatric/Behavioral: Negative  Vitals:    06/13/23 0929   BP: 132/80   Pulse: 66   Temp:    SpO2:      Vitals:    06/13/23 0910   Weight: 119 kg (261 lb 9 6 oz)     Height: 6' (182 9 cm)   Body mass index is 35 48 kg/m²  Physical Exam  Vitals and nursing note reviewed  Constitutional:       General: He is not in acute distress  Appearance: He is well-developed  He is not diaphoretic  HENT:      Head: Normocephalic and atraumatic  Neck:      Vascular: No carotid bruit or JVD  Cardiovascular:      Rate and Rhythm: Normal rate and regular rhythm  No extrasystoles are present  Pulses: Intact distal pulses  Heart sounds: S1 normal and S2 normal  Murmur heard  Harsh midsystolic murmur is present at the upper right sternal border radiating to the neck  No friction rub  No gallop  Comments: Edema to left ankle only  Pulmonary:      Effort: Pulmonary effort is normal  No respiratory distress  Breath sounds: Normal breath sounds  Abdominal:      General: There is no distension  Palpations: Abdomen is soft  Tenderness: There is no abdominal tenderness  Skin:     General: Skin is warm and dry  Findings: No rash  Neurological:      Mental Status: He is alert and oriented to person, place, and time  Psychiatric:         Mood and Affect: Mood and affect normal          Speech: Speech normal          Behavior: Behavior normal  Behavior is cooperative           Cognition and Memory: Cognition and memory normal

## 2023-06-13 NOTE — ASSESSMENT & PLAN NOTE
Patient reports an episode over a month ago where he experienced a day of lightheadedness and shortness of breath  He did not seek medical attention and symptoms were resolved by the next day  Unclear etiology  Will obtain nuclear stress test for ischemic evaluation  See discussion under frequent PVC's

## 2023-06-13 NOTE — PROGRESS NOTES
Jose Ramon Miranda presents to clinic today s/p left partial 1st ray amputation w/ ESACAN [facility] (DOS: 6/7/23 & 6/9/23)  Been taking Bactrim as instructed  Has been WB and not resting as instructed  Condition: Dressing with sanguinous strike-through    Incision: proximal incision well coapted with sutures intact however distal portion with near complete dehiscence with maceration of skin edges, sanguinous drainage, mild hypergranular/hematoma-like tissue  No acute SOI  There is edema to surgical site  I removed hematoma-like material, flushed site out and reapproximated skin with nylon  Betadine adaptic dsd, ACE applied  Patient is at risk for needing further surgery  Instructions given to patient  Rest the foot as much as possible and elevate/ice  Change dressing Q3d with betadine soaked adaptic, dsd, ACE  Ambulatory status: Strict NWB LLE    Pathology and micro reviewed today  No bacterial growth in clean margins       RTC: 1 week for recheck

## 2023-06-13 NOTE — ASSESSMENT & PLAN NOTE
Blood pressure is acceptable today  Continue amlodipine 10 mg daily and furosemide 60 mg daily  Previously on lisinopril 10 mg daily through PCP which was stopped due to dry cough  Will monitor and consider alternate therapy for BP persistently > 140/90  Updated BMP ordered  Reviewed 2 g sodium diet, regular exercise, weight control

## 2023-06-13 NOTE — ASSESSMENT & PLAN NOTE
ECG and telemetry monitoring during hospitalization revealed PVC's in the pattern of bigeminy  History of PVC's per patient report  Echocardiogram 1/30/2023 with EF 60%  Magnesium supplementation added with improvement in PVC's  Holter monitor 3/15/2023 showed 3 7% PVC burden with avg HR 90 bpm   Will start metoprolol succinate 12 5 mg daily  Continue magnesium  Stress test ordered  Will obtain updated Holter monitor on metoprolol  We reviewed s/s to report

## 2023-06-13 NOTE — ASSESSMENT & PLAN NOTE
Patient has a know history of aortic stenosis reported to be moderate on echo 11/2021 through [facility]  Echocardiogram 1/30/2023 continues to show moderate aortic stenosis with PV 3 52, mean gradient 30 mmHg, DI 0 30  Updated echocardiogram scheduled for 9/2023

## 2023-06-13 NOTE — PATIENT INSTRUCTIONS
For your PVC's - continue magnesium  Add metoprolol succinate 12 5 mg daily  Repeat Holter monitor after a couple weeks on the metoprolol  Contact me if any negative side effects  Updated stress test to check for a blockage  Report immediately any new or worsening symptoms  We will watch for repeat blood results to monitor kidney function and potassium    Look into the [facility] for monitoring EKG at home

## 2023-06-13 NOTE — ASSESSMENT & PLAN NOTE
Ongoing dyspnea on exertion  Currently appears euvolemic  See discussion under diastolic heart failure  Will obtain stress test as this may be anginal equivalent

## 2023-06-14 ENCOUNTER — TELEPHONE (OUTPATIENT)
Dept: PALLIATIVE MEDICINE | Facility: CLINIC | Age: 68
End: 2023-06-14

## 2023-06-14 ENCOUNTER — TELEPHONE (OUTPATIENT)
Dept: PODIATRY | Age: 68
End: 2023-06-14

## 2023-06-14 NOTE — TELEPHONE ENCOUNTER
Left voicemail for patient with date and time of visit for next week and our direct phone number to call if that needs to be changed 
Other

## 2023-06-21 ENCOUNTER — OFFICE VISIT (OUTPATIENT)
Dept: PODIATRY | Age: 68
End: 2023-06-21

## 2023-06-21 VITALS — WEIGHT: 261 LBS | BODY MASS INDEX: 34.59 KG/M2 | HEIGHT: 73 IN

## 2023-06-21 DIAGNOSIS — Z98.890 STATUS POST FOOT SURGERY: ICD-10-CM

## 2023-06-21 DIAGNOSIS — T81.31XD DEHISCENCE OF OPERATIVE WOUND, SUBSEQUENT ENCOUNTER: Primary | ICD-10-CM

## 2023-06-21 DIAGNOSIS — G62.9 PERIPHERAL POLYNEUROPATHY: ICD-10-CM

## 2023-06-21 PROCEDURE — 99024 POSTOP FOLLOW-UP VISIT: CPT | Performed by: STUDENT IN AN ORGANIZED HEALTH CARE EDUCATION/TRAINING PROGRAM

## 2023-06-21 NOTE — PROGRESS NOTES
Shaun Alexander presents to clinic today POD#12 s/p left partial 1st ray amputation w/ [facility] (DOS: 6/7/23 & 6/9/23)  Finished Bactrim as instructed  Has been NWB and resting as instructed  Condition: Dressing C/D/I    Incision: proximal incision well healed  Small area of distal incision with dehiscence measuring approx 1 5x1x0  1cm with fibrogranular base and serous drainage  No acute SOI  There is resolved edema to surgical site  No erythema  Instructions given to patient  1  Rest the foot as much as possible and elevate  2  Change dressing Q5d with Acticoat 7, dsd, ACE  3  Ambulatory status: Strict NWB LLE    Wound care referral made      RTC: 1 week at [facility] wound care center with either myself or Dr Yelitza Russ

## 2023-06-22 ENCOUNTER — TELEPHONE (OUTPATIENT)
Dept: PODIATRY | Facility: CLINIC | Age: 68
End: 2023-06-22

## 2023-06-22 ENCOUNTER — TELEPHONE (OUTPATIENT)
Dept: CARDIOLOGY CLINIC | Facility: CLINIC | Age: 68
End: 2023-06-22

## 2023-06-22 DIAGNOSIS — N17.9 AKI (ACUTE KIDNEY INJURY) (HCC): Primary | ICD-10-CM

## 2023-06-22 NOTE — TELEPHONE ENCOUNTER
Pt's wife is wondering if you could look over the BMP that pt had done on the 19th  Says that she is concerned about his kidneys

## 2023-06-22 NOTE — TELEPHONE ENCOUNTER
Please thank her for reaching out  His kidney function has worsened, which is likely due to the Bactrim use  For now, hold furosemide for 2 days then resume at 40 mg dose  Please have them contact Dr Renate Eddy as I believe she is managing the Bactrim  He may need to be placed on a different antibiotic potentially  I ordered a repeat BMP for next week

## 2023-06-22 NOTE — TELEPHONE ENCOUNTER
Caller: Brooke Spence    Doctor/Office: Dr Mike Douglas    #: [phone]    Escalation: Medication Patient would like a return call to discuss current medication he is on  Another one of his drs is asking for Dr Dalia Bowden to prescribe something different  Please return call  Thank you

## 2023-06-26 ENCOUNTER — TELEPHONE (OUTPATIENT)
Dept: INFECTIOUS DISEASES | Facility: CLINIC | Age: 68
End: 2023-06-26

## 2023-06-26 ENCOUNTER — TELEPHONE (OUTPATIENT)
Dept: PODIATRY | Age: 68
End: 2023-06-26

## 2023-06-26 ENCOUNTER — APPOINTMENT (OUTPATIENT)
Dept: LAB | Facility: HOSPITAL | Age: 68
End: 2023-06-26
Payer: MEDICARE

## 2023-06-26 DIAGNOSIS — A41.9 SEPSIS, DUE TO UNSPECIFIED ORGANISM, UNSPECIFIED WHETHER ACUTE ORGAN DYSFUNCTION PRESENT (HCC): ICD-10-CM

## 2023-06-26 DIAGNOSIS — L97.521 ULCER OF LEFT FOOT, LIMITED TO BREAKDOWN OF SKIN (HCC): ICD-10-CM

## 2023-06-26 LAB
ALBUMIN SERPL BCP-MCNC: 4.3 G/DL (ref 3.5–5)
ALP SERPL-CCNC: 80 U/L (ref 34–104)
ALT SERPL W P-5'-P-CCNC: 13 U/L (ref 7–52)
ANION GAP SERPL CALCULATED.3IONS-SCNC: 8 MMOL/L
AST SERPL W P-5'-P-CCNC: 11 U/L (ref 13–39)
BASOPHILS # BLD AUTO: 0.06 THOUSANDS/ÂΜL (ref 0–0.1)
BASOPHILS NFR BLD AUTO: 1 % (ref 0–1)
BILIRUB SERPL-MCNC: 0.36 MG/DL (ref 0.2–1)
BUN SERPL-MCNC: 29 MG/DL (ref 5–25)
CALCIUM SERPL-MCNC: 10.3 MG/DL (ref 8.4–10.2)
CHLORIDE SERPL-SCNC: 101 MMOL/L (ref 96–108)
CO2 SERPL-SCNC: 28 MMOL/L (ref 21–32)
CREAT SERPL-MCNC: 1.63 MG/DL (ref 0.6–1.3)
EOSINOPHIL # BLD AUTO: 0.93 THOUSAND/ÂΜL (ref 0–0.61)
EOSINOPHIL NFR BLD AUTO: 8 % (ref 0–6)
ERYTHROCYTE [DISTWIDTH] IN BLOOD BY AUTOMATED COUNT: 13.3 % (ref 11.6–15.1)
GFR SERPL CREATININE-BSD FRML MDRD: 42 ML/MIN/1.73SQ M
GLUCOSE P FAST SERPL-MCNC: 100 MG/DL (ref 65–99)
HCT VFR BLD AUTO: 44.3 % (ref 36.5–49.3)
HGB BLD-MCNC: 14.3 G/DL (ref 12–17)
IMM GRANULOCYTES # BLD AUTO: 0.11 THOUSAND/UL (ref 0–0.2)
IMM GRANULOCYTES NFR BLD AUTO: 1 % (ref 0–2)
LYMPHOCYTES # BLD AUTO: 2.44 THOUSANDS/ÂΜL (ref 0.6–4.47)
LYMPHOCYTES NFR BLD AUTO: 21 % (ref 14–44)
MCH RBC QN AUTO: 29.9 PG (ref 26.8–34.3)
MCHC RBC AUTO-ENTMCNC: 32.3 G/DL (ref 31.4–37.4)
MCV RBC AUTO: 93 FL (ref 82–98)
MONOCYTES # BLD AUTO: 1.28 THOUSAND/ÂΜL (ref 0.17–1.22)
MONOCYTES NFR BLD AUTO: 11 % (ref 4–12)
NEUTROPHILS # BLD AUTO: 6.96 THOUSANDS/ÂΜL (ref 1.85–7.62)
NEUTS SEG NFR BLD AUTO: 58 % (ref 43–75)
NRBC BLD AUTO-RTO: 0 /100 WBCS
PLATELET # BLD AUTO: 332 THOUSANDS/UL (ref 149–390)
PMV BLD AUTO: 9.5 FL (ref 8.9–12.7)
POTASSIUM SERPL-SCNC: 4.9 MMOL/L (ref 3.5–5.3)
PROT SERPL-MCNC: 8.3 G/DL (ref 6.4–8.4)
RBC # BLD AUTO: 4.78 MILLION/UL (ref 3.88–5.62)
SODIUM SERPL-SCNC: 137 MMOL/L (ref 135–147)
WBC # BLD AUTO: 11.78 THOUSAND/UL (ref 4.31–10.16)

## 2023-06-26 NOTE — TELEPHONE ENCOUNTER
Caller:  Jada Hall    Doctor: Candi Lamb DPM    Reason for call: Wiley's lab work came back showing that the white blood count is elevated and so is the BUN and Creatin. He stopped the antibiotics 2 days ago.   Please Advise    Call back#: [phone]

## 2023-06-26 NOTE — TELEPHONE ENCOUNTER
Contacted patient's PCP office today to discuss lab results/specifically slight ANNALEE per Dr Salena Kyle message to us  Patient is currently an active patient of this office, which was confirmed by Romelia Dixon,  at that office  Faxed lab results to [phone]  They did send my call to the nurse line however they did not answer  I did leave a message for them to CB to ensure they received my message

## 2023-06-28 ENCOUNTER — OFFICE VISIT (OUTPATIENT)
Dept: PODIATRY | Age: 68
End: 2023-06-28

## 2023-06-28 ENCOUNTER — HOSPITAL ENCOUNTER (OUTPATIENT)
Dept: RADIOLOGY | Facility: CLINIC | Age: 68
Discharge: HOME | End: 2023-06-28
Payer: MEDICARE

## 2023-06-28 VITALS — WEIGHT: 261 LBS | BODY MASS INDEX: 34.59 KG/M2 | HEIGHT: 73 IN

## 2023-06-28 DIAGNOSIS — M79.672 LEFT FOOT PAIN: Primary | ICD-10-CM

## 2023-06-28 DIAGNOSIS — I73.9 PERIPHERAL VASCULAR DISEASE (HCC): ICD-10-CM

## 2023-06-28 DIAGNOSIS — L97.521 ULCER OF LEFT FOOT, LIMITED TO BREAKDOWN OF SKIN (HCC): ICD-10-CM

## 2023-06-28 DIAGNOSIS — T81.31XD DEHISCENCE OF OPERATIVE WOUND, SUBSEQUENT ENCOUNTER: ICD-10-CM

## 2023-06-28 DIAGNOSIS — L03.116 CELLULITIS OF LEFT FOOT: ICD-10-CM

## 2023-06-28 DIAGNOSIS — M79.672 LEFT FOOT PAIN: ICD-10-CM

## 2023-06-28 DIAGNOSIS — G62.9 PERIPHERAL POLYNEUROPATHY: ICD-10-CM

## 2023-06-28 PROCEDURE — 73630 X-RAY EXAM OF FOOT: CPT

## 2023-06-28 PROCEDURE — 99024 POSTOP FOLLOW-UP VISIT: CPT | Performed by: STUDENT IN AN ORGANIZED HEALTH CARE EDUCATION/TRAINING PROGRAM

## 2023-06-28 RX ORDER — DOXYCYCLINE 100 MG/1
100 TABLET ORAL 2 TIMES DAILY
Qty: 14 TABLET | Refills: 0 | Status: SHIPPED | OUTPATIENT
Start: 2023-06-28 | End: 2023-07-05

## 2023-06-28 NOTE — PROGRESS NOTES
Ruthclyde presents to clinic today s/p left partial 1st ray amputation w/ ESACAN [facility] (DOS: 6/7/23 & 6/9/23)  Finished Bactrim as instructed  Has been NWB and resting as instructed  Bactrim with dc'd due to elevation of BUN/creatinine  Condition: Dressing C/D/I    Incision: proximal incision well healed  Small area of distal incision with dehiscence measuring approx 1 5x1x0  1cm with fibrogranular base and serous drainage  No acute SOI  There is resolved edema to surgical site  Slight erythema  XR left foot 6/28/23 NWB: No KULWINDER nor osseous erosion noted  Stable amputation site    6/26/23 CBC: WBC mildly elevated 11 78  creatinine and BUN elevated  Instructions given to patient  1  Rest the foot as much as possible and elevate  2  Change dressing Q2d with dermagran and dsd plus ACE  3  Ambulatory status: Strict NWB LLE    Doxycicline rx'd due to mild cellulitic erythema  Repeat Labs (CBC, CMP) in 5d as ordered by hospitalist team  Will f/u with BUN/creatinine  I recommended hydration and avoidance of NSAIDs        Has appt 7/6/23 at [facility]    RTC: 1 week at [facility] wound care center

## 2023-07-03 ENCOUNTER — APPOINTMENT (OUTPATIENT)
Dept: LAB | Facility: HOSPITAL | Age: 68
End: 2023-07-03
Payer: MEDICARE

## 2023-07-03 DIAGNOSIS — L97.521 ULCER OF LEFT FOOT, LIMITED TO BREAKDOWN OF SKIN (HCC): ICD-10-CM

## 2023-07-03 DIAGNOSIS — A41.9 SEPSIS, DUE TO UNSPECIFIED ORGANISM, UNSPECIFIED WHETHER ACUTE ORGAN DYSFUNCTION PRESENT (HCC): ICD-10-CM

## 2023-07-03 LAB
ALBUMIN SERPL BCP-MCNC: 4.2 G/DL (ref 3.5–5)
ALP SERPL-CCNC: 84 U/L (ref 34–104)
ALT SERPL W P-5'-P-CCNC: 12 U/L (ref 7–52)
ANION GAP SERPL CALCULATED.3IONS-SCNC: 9 MMOL/L
AST SERPL W P-5'-P-CCNC: 15 U/L (ref 13–39)
BASOPHILS # BLD AUTO: 0.07 THOUSANDS/ÂΜL (ref 0–0.1)
BASOPHILS NFR BLD AUTO: 1 % (ref 0–1)
BILIRUB SERPL-MCNC: 0.49 MG/DL (ref 0.2–1)
BUN SERPL-MCNC: 26 MG/DL (ref 5–25)
CALCIUM SERPL-MCNC: 9.6 MG/DL (ref 8.4–10.2)
CHLORIDE SERPL-SCNC: 101 MMOL/L (ref 96–108)
CO2 SERPL-SCNC: 26 MMOL/L (ref 21–32)
CREAT SERPL-MCNC: 1.24 MG/DL (ref 0.6–1.3)
EOSINOPHIL # BLD AUTO: 0.47 THOUSAND/ÂΜL (ref 0–0.61)
EOSINOPHIL NFR BLD AUTO: 4 % (ref 0–6)
ERYTHROCYTE [DISTWIDTH] IN BLOOD BY AUTOMATED COUNT: 13.4 % (ref 11.6–15.1)
GFR SERPL CREATININE-BSD FRML MDRD: 58 ML/MIN/1.73SQ M
GLUCOSE P FAST SERPL-MCNC: 107 MG/DL (ref 65–99)
HCT VFR BLD AUTO: 44.6 % (ref 36.5–49.3)
HGB BLD-MCNC: 14.7 G/DL (ref 12–17)
IMM GRANULOCYTES # BLD AUTO: 0.09 THOUSAND/UL (ref 0–0.2)
IMM GRANULOCYTES NFR BLD AUTO: 1 % (ref 0–2)
LYMPHOCYTES # BLD AUTO: 2.77 THOUSANDS/ÂΜL (ref 0.6–4.47)
LYMPHOCYTES NFR BLD AUTO: 25 % (ref 14–44)
MCH RBC QN AUTO: 30.1 PG (ref 26.8–34.3)
MCHC RBC AUTO-ENTMCNC: 33 G/DL (ref 31.4–37.4)
MCV RBC AUTO: 91 FL (ref 82–98)
MONOCYTES # BLD AUTO: 1.16 THOUSAND/ÂΜL (ref 0.17–1.22)
MONOCYTES NFR BLD AUTO: 10 % (ref 4–12)
NEUTROPHILS # BLD AUTO: 6.73 THOUSANDS/ÂΜL (ref 1.85–7.62)
NEUTS SEG NFR BLD AUTO: 59 % (ref 43–75)
NRBC BLD AUTO-RTO: 0 /100 WBCS
PLATELET # BLD AUTO: 355 THOUSANDS/UL (ref 149–390)
PMV BLD AUTO: 9.2 FL (ref 8.9–12.7)
POTASSIUM SERPL-SCNC: 4.2 MMOL/L (ref 3.5–5.3)
PROT SERPL-MCNC: 7.7 G/DL (ref 6.4–8.4)
RBC # BLD AUTO: 4.88 MILLION/UL (ref 3.88–5.62)
SODIUM SERPL-SCNC: 136 MMOL/L (ref 135–147)
WBC # BLD AUTO: 11.29 THOUSAND/UL (ref 4.31–10.16)

## 2023-07-03 PROCEDURE — 36415 COLL VENOUS BLD VENIPUNCTURE: CPT

## 2023-07-03 PROCEDURE — 80053 COMPREHEN METABOLIC PANEL: CPT

## 2023-07-03 PROCEDURE — 85025 COMPLETE CBC W/AUTO DIFF WBC: CPT

## 2023-07-06 ENCOUNTER — OFFICE VISIT (OUTPATIENT)
Dept: WOUND CARE | Facility: CLINIC | Age: 68
End: 2023-07-06
Payer: MEDICARE

## 2023-07-06 VITALS
HEART RATE: 72 BPM | WEIGHT: 210 LBS | RESPIRATION RATE: 18 BRPM | HEIGHT: 72 IN | DIASTOLIC BLOOD PRESSURE: 62 MMHG | TEMPERATURE: 98.1 F | BODY MASS INDEX: 28.44 KG/M2 | SYSTOLIC BLOOD PRESSURE: 108 MMHG

## 2023-07-06 DIAGNOSIS — T81.31XA DEHISCENCE OF OPERATIVE WOUND, INITIAL ENCOUNTER: Primary | ICD-10-CM

## 2023-07-06 DIAGNOSIS — G62.9 PERIPHERAL POLYNEUROPATHY: ICD-10-CM

## 2023-07-06 PROCEDURE — 11042 DBRDMT SUBQ TIS 1ST 20SQCM/<: CPT | Performed by: STUDENT IN AN ORGANIZED HEALTH CARE EDUCATION/TRAINING PROGRAM

## 2023-07-06 PROCEDURE — 99213 OFFICE O/P EST LOW 20 MIN: CPT | Performed by: STUDENT IN AN ORGANIZED HEALTH CARE EDUCATION/TRAINING PROGRAM

## 2023-07-06 PROCEDURE — 11045 DBRDMT SUBQ TISS EACH ADDL: CPT | Performed by: STUDENT IN AN ORGANIZED HEALTH CARE EDUCATION/TRAINING PROGRAM

## 2023-07-06 NOTE — PATIENT INSTRUCTIONS
Orders Placed This Encounter   Procedures    Wound cleansing and dressings     Left Foot wound    Wash your hands with soap and water. Remove old dressing, discard into plastic bag and place in trash. Cleanse the wound with normal saline prior to applying a clean dressing. Do not use tissue or cotton balls. Do not scrub the wound. Pat dry using gauze. Shower no   Apply endoform AM to the wound. Cover with gauze  Secure with christine, then ACE   Change dressing 3x week    Continue non weight bearing. Use knee scooter. Follow up in 2 weeks.      Standing Status:   Future     Standing Expiration Date:   7/6/2024

## 2023-07-06 NOTE — PROGRESS NOTES
Patient ID: Kasey Hurst is a 69 y.o. male Date of Birth 1955       Chief Complaint   Patient presents with   • New Patient Visit     Patient has been seeing Dr. Megha Lanza in the podiatry office since February for left foot wound. Allergies  Statins    Diagnosis:  1. Dehiscence of operative wound, initial encounter  -     Wound cleansing and dressings; Future    2. Peripheral polyneuropathy        Diagnosis ICD-10-CM Associated Orders   1. Dehiscence of operative wound, initial encounter  T81.31XA Wound cleansing and dressings      2. Peripheral polyneuropathy  G62.9              Assessment & Plan:  See wound care orders. Endoform, dsd, ace  - s/p left partial 1st ray amputation w/ [facility] (DOS: 6/7/23 & 6/9/23). Left foot wound appears with clean, granular base without SOI. I surgically debrided as below and applied silver nitrate to prevent excessive granulation. - 7/3/23 labs reviewed: creatinine WNL, BUN only slightly elevated at 26. WBC 11.29. Will continue to monitor. Avoid nephrotoxic agents  - NWB LLE with scooter  - F/u 2 weeks (patient going on vacation)      Subjective:   Kade Steward presents to clinic today s/p left partial 1st ray amputation w/ [facility] (DOS: 6/7/23 & 6/9/23) with incision dehiscence. Doing well. Got labs. Just finished doxy. Has been compliant with WB and wound care. This is his first wound care visit however I have been following outpt.        The following portions of the patient's history were reviewed and updated as appropriate:   Patient Active Problem List   Diagnosis   • Benign essential HTN   • Moderate aortic stenosis   • MDD (major depressive disorder)   • Peripheral neuropathy   • Other emphysema (HCC)   • Ulcer of left foot (HCC)   • ANNALEE (acute kidney injury) ([address])   • Hyperkalemia   • MSSA bacteremia   • Heart failure with preserved ejection fraction   • PVCs (premature ventricular contractions)   • Status post foot surgery   • Acute hematogenous osteomyelitis of left foot ([address])   • Borderline high cholesterol   • Dyspnea on exertion   • Episodic lightheadedness     Past Medical History:   Diagnosis Date   • Anxiety    • Aortic stenosis    • Borderline high cholesterol    • CHF (congestive heart failure) ([facility])     Asaf Jackson-cardio-   • Colon polyp    • Depression    • Heart murmur    • Hypertension    • Hypothyroidism    • Prediabetes    • PVCs (premature ventricular contractions)     "skip"   • Wears glasses     readers     Past Surgical History:   Procedure Laterality Date   • APPENDECTOMY     • BACK SURGERY     • CATARACT EXTRACTION Bilateral    • CLOSURE DELAYED PRIMARY Left 2023    Procedure: CLOSURE DELAYED PRIMARY;  Surgeon: Tello Walker DPM;  Location:  MAIN OR;  Service: Podiatry   • COLONOSCOPY     • NC CORRJ HALLUX VALGUS W/SESMDC W/RESCJ PROX PHAL Left 2023    Procedure: Janifer Memos and wound debridement;  Surgeon: Robson Newman DPM;  Location:  MAIN OR;  Service: Podiatry   • TOE AMPUTATION Left 2023    Procedure: PARTIAL 1ST RAY AMPUTATION;  Surgeon: Tello Walker DPM;  Location:  MAIN OR;  Service: Podiatry   • TONSILLECTOMY     • TOTAL SHOULDER REPLACEMENT Left    • WISDOM TOOTH EXTRACTION       Social History     Socioeconomic History   • Marital status: /Civil Union     Spouse name: None   • Number of children: None   • Years of education: None   • Highest education level: None   Occupational History   • None   Tobacco Use   • Smoking status: Former     Packs/day: 1.00     Years: 50.00     Total pack years: 50.00     Types: Cigarettes     Quit date: 2023     Years since quittin.5   • Smokeless tobacco: Never   Vaping Use   • Vaping Use: Never used   Substance and Sexual Activity   • Alcohol use: Yes     Comment: socially-2 drinks per week   • Drug use: Not Currently     Comment: sporadic in young adulthood   • Sexual activity: None     Comment: defer   Other Topics Concern   • None Social History Narrative   • None     Social Determinants of Health     Financial Resource Strain: Not on file   Food Insecurity: No Food Insecurity (6/7/2023)    Hunger Vital Sign    • Worried About Running Out of Food in the Last Year: Never true    • Ran Out of Food in the Last Year: Never true   Transportation Needs: No Transportation Needs (6/7/2023)    PRAPARE - Transportation    • Lack of Transportation (Medical): No    • Lack of Transportation (Non-Medical):  No   Physical Activity: Not on file   Stress: Not on file   Social Connections: Not on file   Intimate Partner Violence: Not on file   Housing Stability: Low Risk  (6/7/2023)    Housing Stability Vital Sign    • Unable to Pay for Housing in the Last Year: No    • Number of Places Lived in the Last Year: 1    • Unstable Housing in the Last Year: No        Current Outpatient Medications:   •  amLODIPine (NORVASC) 10 mg tablet, Take 10 mg by mouth daily, Disp: , Rfl: 0  •  buPROPion (WELLBUTRIN XL) 300 mg 24 hr tablet, , Disp: , Rfl:   •  escitalopram (LEXAPRO) 20 mg tablet, Take 20 mg by mouth daily, Disp: , Rfl: 1  •  furosemide (LASIX) 40 mg tablet, Take 1.5 tablets (60 mg total) by mouth daily (Patient taking differently: Take 40 mg by mouth daily), Disp: 45 tablet, Rfl: 11  •  gabapentin (NEURONTIN) 100 mg capsule, Take 100 mg by mouth 2 (two) times a day, Disp: , Rfl:   •  levothyroxine 75 mcg tablet, , Disp: , Rfl:   •  Magnesium 400 MG CAPS, Take 1 capsule (400 mg total) by mouth in the morning, Disp: 90 capsule, Rfl: 3  •  metoprolol succinate (TOPROL-XL) 25 mg 24 hr tablet, Take 0.5 tablets (12.5 mg total) by mouth daily, Disp: 15 tablet, Rfl: 11  •  potassium chloride (MICRO-K) 10 MEQ CR capsule, Take 1 capsule (10 mEq total) by mouth daily, Disp: 90 capsule, Rfl: 3  •  Multiple Vitamins-Minerals (OCUVITE ADULT FORMULA PO), Take 1 tablet by mouth Eye care vitamin daily (Patient not taking: Reported on 6/13/2023), Disp: , Rfl:   Family History Problem Relation Age of Onset   • Dementia Mother    • Blindness Mother    • Cancer Mother    • Dementia Father       Review of Systems   Constitutional: Negative for activity change, chills and fever. HENT: Negative. Respiratory: Negative for cough, chest tightness and shortness of breath. Cardiovascular: Negative for chest pain and leg swelling. Endocrine: Negative. Genitourinary: Negative. Skin: Positive for wound (Left foot). Neurological: Negative. Negative for numbness. Psychiatric/Behavioral: Negative. Negative for agitation and behavioral problems. Objective:  /62   Pulse 72   Temp 98.1 °F (36.7 °C)   Resp 18   Ht 6' (1.829 m)   Wt 95.3 kg (210 lb)   BMI 28.48 kg/m²     Physical Exam  Constitutional:       General: He is not in acute distress. Appearance: Normal appearance. He is not ill-appearing. Cardiovascular:      Comments: Bilateral DP and PT pulses are palpable but slightly diminished 1/4. Pedal hair is diminished. Legs to toes warm to cool. Varicosities with mild LE edema noted. Pulmonary:      Effort: No respiratory distress. Musculoskeletal:         General: No tenderness or deformity. Normal range of motion. Comments: Left 1st MTPJ with limited DF (<20 degrees) causing overload to plantar hallux IPJ at site of HPL and ulceration   Skin:     Capillary Refill: Capillary refill takes less than 2 seconds. Comments: B/L LE skin is atrophic - thin, dry and shiny in appearance. Left foot partial 1st ray amputation site appears with 95% healing and 5% dehiscence. There is a granular base with small amount of excess granulation tissue. Appears clean without SOI nor deep probe. No purulence. Neurological:      General: No focal deficit present. Mental Status: He is alert and oriented to person, place, and time. Comments: Gross sensation to feet diminished. Patient endorses  numbness, tingling and burning to B/L feet.     Psychiatric: Mood and Affect: Mood normal.         Behavior: Behavior normal.             Wound 06/09/23 Foot Left (Active)   Wound Image   07/06/23 1318   Wound Description Pink;Slough 07/06/23 1325   Chantale-wound Assessment Scar Tissue;Edema 07/06/23 1325   Wound Length (cm) 1 cm 07/06/23 1325   Wound Width (cm) 0.2 cm 07/06/23 1325   Wound Depth (cm) 0.3 cm 07/06/23 1325   Wound Surface Area (cm^2) 0.2 cm^2 07/06/23 1325   Wound Volume (cm^3) 0.06 cm^3 07/06/23 1325   Calculated Wound Volume (cm^3) 0.06 cm^3 07/06/23 1325   Drainage Amount Small 07/06/23 1325   Drainage Description Serosanguineous 07/06/23 1325   Non-staged Wound Description Full thickness 07/06/23 1325   Treatments Cleansed 07/06/23 1325         Debridement   Wound 06/09/23 Foot Left    Universal Protocol:  Risks and benefits: risks, benefits and alternatives were discussed  Consent given by: patient  Patient understanding: patient states understanding of the procedure being performed  Patient consent: the patient's understanding of the procedure matches consent given  Patient identity confirmed: verbally with patient      Performed by: physician  Debridement type: surgical  Level of debridement: subcutaneous tissue    Pre-debridement measurements  Length (cm): 1  Width (cm): 0.2  Depth (cm): 0.3  Surface Area (cm^2): 0.2  Volume (cm^3): 0.06    Post-debridement measurements  Length (cm): 1  Width (cm): 0.2  Depth (cm): 0.35  Percent debrided: 100%  Surface Area (cm^2): 0.2  Area debrided (cm^2): 0.2  Volume (cm^3): 0.07  Tissue and other material debrided: hypergranulation and subcutaneous tissue  Devitalized tissue debrided: biofilm and exudate  Instrument(s) utilized: curette  Bleeding: small  Hemostasis obtained with: silver nitrate  Procedural pain (0-10): insensate  Post-procedural pain: insensate   Response to treatment: procedure was tolerated well           Results from last 6 Months   Lab Units 06/06/23  6904   WOUND CULTURE  1+ Growth of Acinetobacter species*  1+ Growth of       Wound Instructions:  Orders Placed This Encounter   Procedures   • Wound cleansing and dressings     Left Foot wound    Wash your hands with soap and water. Remove old dressing, discard into plastic bag and place in trash. Cleanse the wound with normal saline prior to applying a clean dressing. Do not use tissue or cotton balls. Do not scrub the wound. Pat dry using gauze. Shower no   Apply endoform AM to the wound. Cover with gauze  Secure with christine, then ACE   Change dressing 3x week    Continue non weight bearing. Use knee scooter. Follow up in 2 weeks. Standing Status:   Future     Standing Expiration Date:   7/6/2024   • Debridement     This order was created via procedure documentation         Vianca Memory, DPM    Portions of the record may have been created with voice recognition software. Occasional wrong word or "sound a like" substitutions may have occurred due to the inherent limitations of voice recognition software. Read the chart carefully and recognize, using context, where substitutions have occurred.

## 2023-07-18 ENCOUNTER — HOSPITAL ENCOUNTER (OUTPATIENT)
Dept: NUCLEAR MEDICINE | Facility: HOSPITAL | Age: 68
Discharge: HOME | End: 2023-07-18
Payer: MEDICARE

## 2023-07-18 ENCOUNTER — HOSPITAL ENCOUNTER (OUTPATIENT)
Dept: NON INVASIVE DIAGNOSTICS | Facility: HOSPITAL | Age: 68
Discharge: HOME | End: 2023-07-18
Payer: MEDICARE

## 2023-07-18 DIAGNOSIS — R42 EPISODIC LIGHTHEADEDNESS: ICD-10-CM

## 2023-07-18 DIAGNOSIS — R06.09 DYSPNEA ON EXERTION: ICD-10-CM

## 2023-07-18 DIAGNOSIS — I49.3 PVCS (PREMATURE VENTRICULAR CONTRACTIONS): ICD-10-CM

## 2023-07-18 PROCEDURE — 93016 CV STRESS TEST SUPVJ ONLY: CPT | Performed by: INTERNAL MEDICINE

## 2023-07-18 PROCEDURE — G1004 CDSM NDSC: HCPCS

## 2023-07-18 PROCEDURE — 93017 CV STRESS TEST TRACING ONLY: CPT

## 2023-07-18 PROCEDURE — 78452 HT MUSCLE IMAGE SPECT MULT: CPT

## 2023-07-18 PROCEDURE — 93226 XTRNL ECG REC<48 HR SCAN A/R: CPT

## 2023-07-18 PROCEDURE — A9502 TC99M TETROFOSMIN: HCPCS

## 2023-07-18 PROCEDURE — 93018 CV STRESS TEST I&R ONLY: CPT | Performed by: INTERNAL MEDICINE

## 2023-07-18 PROCEDURE — 93225 XTRNL ECG REC<48 HRS REC: CPT

## 2023-07-18 RX ORDER — REGADENOSON 0.08 MG/ML
0.4 INJECTION, SOLUTION INTRAVENOUS ONCE
Status: DISCONTINUED | OUTPATIENT
Start: 2023-07-18 | End: 2023-07-18

## 2023-07-18 RX ORDER — REGADENOSON 0.08 MG/ML
0.4 INJECTION, SOLUTION INTRAVENOUS ONCE
Status: COMPLETED | OUTPATIENT
Start: 2023-07-18 | End: 2023-07-18

## 2023-07-18 RX ADMIN — REGADENOSON 0.4 MG: 0.08 INJECTION, SOLUTION INTRAVENOUS at 11:14

## 2023-07-19 LAB
CHEST PAIN STATEMENT: NORMAL
MAX DIASTOLIC BP: 76 MMHG
MAX HEART RATE: 81 BPM
MAX HR: 81 BPM
MAX PREDICTED HEART RATE: 151 BPM
MAX. SYSTOLIC BP: 137 MMHG
NUC STRESS EJECTION FRACTION: 59 %
PROTOCOL NAME: NORMAL
RATE PRESSURE PRODUCT: NORMAL
REASON FOR TERMINATION: NORMAL
SL CV REST NUCLEAR ISOTOPE DOSE: 10.4 MCI
SL CV STRESS NUCLEAR ISOTOPE DOSE: 31.4 MCI
STRESS ANGINA INDEX: 0
STRESS BASELINE BP: NORMAL MMHG
STRESS BASELINE HR: 60 BPM
STRESS O2 SAT REST: 98 %
STRESS PEAK HR: 81 BPM
STRESS POST PEAK BP: 150 MMHG
STRESS/REST PERFUSION RATIO: 1.14
TARGET HR FORMULA: NORMAL
TEST INDICATION: NORMAL
TIME IN EXERCISE PHASE: NORMAL

## 2023-07-20 ENCOUNTER — TELEPHONE (OUTPATIENT)
Dept: CARDIOLOGY CLINIC | Facility: CLINIC | Age: 68
End: 2023-07-20

## 2023-07-20 ENCOUNTER — OFFICE VISIT (OUTPATIENT)
Dept: WOUND CARE | Facility: CLINIC | Age: 68
End: 2023-07-20
Payer: MEDICARE

## 2023-07-20 VITALS
SYSTOLIC BLOOD PRESSURE: 138 MMHG | RESPIRATION RATE: 18 BRPM | HEART RATE: 64 BPM | TEMPERATURE: 98.2 F | DIASTOLIC BLOOD PRESSURE: 66 MMHG

## 2023-07-20 DIAGNOSIS — Z89.412 STATUS POST AMPUTATION OF GREAT TOE, LEFT (HCC): ICD-10-CM

## 2023-07-20 DIAGNOSIS — T81.31XD DEHISCENCE OF OPERATIVE WOUND, SUBSEQUENT ENCOUNTER: Primary | ICD-10-CM

## 2023-07-20 DIAGNOSIS — G62.9 PERIPHERAL POLYNEUROPATHY: ICD-10-CM

## 2023-07-20 PROCEDURE — 99213 OFFICE O/P EST LOW 20 MIN: CPT | Performed by: STUDENT IN AN ORGANIZED HEALTH CARE EDUCATION/TRAINING PROGRAM

## 2023-07-20 NOTE — PATIENT INSTRUCTIONS
Orders Placed This Encounter   Procedures    Wound cleansing and dressings     Left Foot wound     Wash your hands with soap and water. Remove old dressing, discard into plastic bag and place in trash. Cleanse the wound with normal saline prior to applying a clean dressing. Do not use tissue or cotton balls. Do not scrub the wound. Pat dry using gauze. Shower no   Apply dermagran to wound. Cover with gauze and secure with christine. Change dressing 3x week     Full weight bearing. May wear regular shoe. Prescription sent for custom filler. May have filled at [facility]. Follow up in 2 weeks.      Standing Status:   Future     Standing Expiration Date:   7/20/2024

## 2023-07-20 NOTE — PROGRESS NOTES
Patient ID: Nat Peters is a 69 y.o. male Date of Birth 1955       Chief Complaint   Patient presents with   • Follow Up Wound Care Visit     Left foot wound. Allergies:  Statins    Diagnosis:  1. Dehiscence of operative wound, subsequent encounter  -     Wound cleansing and dressings; Future    2. Peripheral polyneuropathy  -     Custom shoe insert DME    3. Status post amputation of great toe, left (HCC)  -     Custom shoe insert DME       Diagnosis ICD-10-CM Associated Orders   1. Dehiscence of operative wound, subsequent encounter  T81. 31XD Wound cleansing and dressings      2. Peripheral polyneuropathy  G62.9 Custom shoe insert DME      3. Status post amputation of great toe, left ([facility])  Z89.412 Custom shoe insert DME           Assessment & Plan:  See wound care orders. Dermagran, dsd, ace  - s/p left partial 1st ray amputation w/ [facility] (DOS: 6/7/23 & 6/9/23). Left foot wound appears almost healed with dry stable eschar. I did not debride today given stability of wound. - WBAT in surgical shoe or supportive/cushioned sneaker. Check daily to make sure no new wound opens  - I ordered left foot multi-depth arch support with 1st toe filler. Patient is not diabetic thus shoes will not be covered. - F/u 2 weeks for recheck. Subjective:   Kelley Kim presents to clinic today s/p left partial 1st ray amputation w/ [facility] (DOS: 6/7/23 & 6/9/23) with incision dehiscence. Doing well. Has been compliant with WB and wound care. Just got back from vacation.       The following portions of the patient's history were reviewed and updated as appropriate:   Patient Active Problem List   Diagnosis   • Benign essential HTN   • Moderate aortic stenosis   • MDD (major depressive disorder)   • Peripheral neuropathy   • Other emphysema ([facility])   • Ulcer of left foot ([facility])   • ANNALEE (acute kidney injury) ([address])   • Hyperkalemia   • MSSA bacteremia   • Heart failure with preserved ejection fraction   • PVCs (premature ventricular contractions)   • Status post foot surgery   • Acute hematogenous osteomyelitis of left foot (HCC)   • Borderline high cholesterol   • Dyspnea on exertion   • Episodic lightheadedness   • Status post amputation of great toe, left [facility])     Past Medical History:   Diagnosis Date   • Anxiety    • Aortic stenosis    • Borderline high cholesterol    • CHF (congestive heart failure) (HCC)     Asaf Jackson-cardio-   • Colon polyp    • Depression    • Heart murmur    • Hypertension    • Hypothyroidism    • Prediabetes    • PVCs (premature ventricular contractions)     "skip"   • Wears glasses     readers     Past Surgical History:   Procedure Laterality Date   • APPENDECTOMY     • BACK SURGERY     • CATARACT EXTRACTION Bilateral    • CLOSURE DELAYED PRIMARY Left 2023    Procedure: CLOSURE DELAYED PRIMARY;  Surgeon: Nickolas Gruber DPM;  Location:  MAIN OR;  Service: Podiatry   • COLONOSCOPY     • [place] CORRJ HALLUX VALGUS W/SESMDC W/RESCJ PROX PHAL Left 2023    Procedure: Evern Pitcher and wound debridement;  Surgeon: Trinity Burleson DPM;  Location:  MAIN OR;  Service: Podiatry   • TOE AMPUTATION Left 2023    Procedure: PARTIAL 1ST RAY AMPUTATION;  Surgeon: Nickolas Gruber DPM;  Location: [facility];  Service: Podiatry   • TONSILLECTOMY     • TOTAL SHOULDER REPLACEMENT Left    • WISDOM TOOTH EXTRACTION       Social History     Socioeconomic History   • Marital status: /Civil Union     Spouse name: None   • Number of children: None   • Years of education: None   • Highest education level: None   Occupational History   • None   Tobacco Use   • Smoking status: Former     Packs/day: 1.00     Years: 50.00     Total pack years: 50.00     Types: Cigarettes     Quit date: 2023     Years since quittin.5   • Smokeless tobacco: Never   Vaping Use   • Vaping Use: Never used   Substance and Sexual Activity   • Alcohol use: Yes     Comment: socially-2 drinks per week   • Drug use: Not Currently     Comment: sporadic in young adulthood   • Sexual activity: None     Comment: defer   Other Topics Concern   • None   Social History Narrative   • None     Social Determinants of Health     Financial Resource Strain: Not on file   Food Insecurity: No Food Insecurity (6/7/2023)    Hunger Vital Sign    • Worried About Running Out of Food in the Last Year: Never true    • Ran Out of Food in the Last Year: Never true   Transportation Needs: No Transportation Needs (6/7/2023)    PRAPARE - Transportation    • Lack of Transportation (Medical): No    • Lack of Transportation (Non-Medical):  No   Physical Activity: Not on file   Stress: Not on file   Social Connections: Not on file   Intimate Partner Violence: Not on file   Housing Stability: Low Risk  (6/7/2023)    Housing Stability Vital Sign    • Unable to Pay for Housing in the Last Year: No    • Number of Places Lived in the Last Year: 1    • Unstable Housing in the Last Year: No        Current Outpatient Medications:   •  amLODIPine (NORVASC) 10 mg tablet, Take 10 mg by mouth daily, Disp: , Rfl: 0  •  buPROPion (WELLBUTRIN XL) 300 mg 24 hr tablet, , Disp: , Rfl:   •  escitalopram (LEXAPRO) 20 mg tablet, Take 20 mg by mouth daily, Disp: , Rfl: 1  •  furosemide (LASIX) 40 mg tablet, Take 1.5 tablets (60 mg total) by mouth daily (Patient taking differently: Take 40 mg by mouth daily), Disp: 45 tablet, Rfl: 11  •  gabapentin (NEURONTIN) 100 mg capsule, Take 100 mg by mouth 2 (two) times a day, Disp: , Rfl:   •  levothyroxine 75 mcg tablet, , Disp: , Rfl:   •  Magnesium 400 MG CAPS, Take 1 capsule (400 mg total) by mouth in the morning, Disp: 90 capsule, Rfl: 3  •  metoprolol succinate (TOPROL-XL) 25 mg 24 hr tablet, Take 0.5 tablets (12.5 mg total) by mouth daily, Disp: 15 tablet, Rfl: 11  •  Multiple Vitamins-Minerals (OCUVITE ADULT FORMULA PO), Take 1 tablet by mouth Eye care vitamin daily (Patient not taking: Reported on 6/13/2023), Disp: , Rfl: •  potassium chloride (MICRO-K) 10 MEQ CR capsule, Take 1 capsule (10 mEq total) by mouth daily, Disp: 90 capsule, Rfl: 3  Family History   Problem Relation Age of Onset   • Dementia Mother    • Blindness Mother    • Cancer Mother    • Dementia Father       Review of Systems   Constitutional: Negative for activity change, chills and fever. HENT: Negative. Respiratory: Negative for cough, chest tightness and shortness of breath. Cardiovascular: Negative for chest pain and leg swelling. Endocrine: Negative. Genitourinary: Negative. Skin: Positive for wound (Left foot). Neurological: Negative. Negative for numbness. Psychiatric/Behavioral: Negative. Negative for agitation and behavioral problems. Objective:  /66   Pulse 64   Temp 98.2 °F (36.8 °C)   Resp 18     Physical Exam  Constitutional:       General: He is not in acute distress. Appearance: Normal appearance. He is not ill-appearing. Cardiovascular:      Comments: Bilateral DP and PT pulses are palpable but slightly diminished 1/4. Pedal hair is diminished. Legs to toes warm to cool. Varicosities with mild LE edema noted. Pulmonary:      Effort: No respiratory distress. Musculoskeletal:         General: No tenderness or deformity. Normal range of motion. Comments: Left partial 1st ray amputation s/p. Skin:     Capillary Refill: Capillary refill takes less than 2 seconds. Comments: B/L LE skin is atrophic - thin, dry and shiny in appearance. Left foot partial 1st ray amputation site appears with 99% healing and small area of dry stable eschar without draining wound. Appears clean without SOI. No erythema or edema. Neurological:      General: No focal deficit present. Mental Status: He is alert and oriented to person, place, and time. Comments: Gross sensation to feet diminished. Patient endorses  numbness, tingling and burning to B/L feet.     Psychiatric:         Mood and Affect: Mood normal.         Behavior: Behavior normal.             Wound 06/09/23 Foot Left (Active)   Wound Image   07/20/23 1327   Wound Description Eschar 07/20/23 1328   Chantale-wound Assessment Callus 07/20/23 1328   Wound Length (cm) 0.1 cm 07/20/23 1328   Wound Width (cm) 0.1 cm 07/20/23 1328   Wound Depth (cm) 0.1 cm 07/20/23 1328   Wound Surface Area (cm^2) 0.01 cm^2 07/20/23 1328   Wound Volume (cm^3) 0.001 cm^3 07/20/23 1328   Calculated Wound Volume (cm^3) 0 cm^3 07/20/23 1328   Change in Wound Size % 100 07/20/23 1328   Drainage Amount None 07/20/23 1328   Drainage Description Serosanguineous 07/06/23 1325   Non-staged Wound Description Full thickness 07/06/23 1325   Treatments Cleansed 07/20/23 1328           Results from last 6 Months   Lab Units 06/06/23  2109   WOUND CULTURE  1+ Growth of Acinetobacter species*  1+ Growth of         Wound Instructions:  Orders Placed This Encounter   Procedures   • Wound cleansing and dressings     Left Foot wound     Wash your hands with soap and water. Remove old dressing, discard into plastic bag and place in trash. Cleanse the wound with normal saline prior to applying a clean dressing. Do not use tissue or cotton balls. Do not scrub the wound. Pat dry using gauze. Shower no   Apply dermagran to wound. Cover with gauze and secure with christine. Change dressing 3x week     Full weight bearing. May wear regular shoe. Prescription sent for custom filler. [facility].     Follow up in 2 weeks. Standing Status:   Future     Standing Expiration Date:   7/20/2024   • Custom shoe insert DME     Left foot multi depth insert with 1st toe filler         Catherine Evans DPM      Portions of the record may have been created with voice recognition software. Occasional wrong word or "sound a like" substitutions may have occurred due to the inherent limitations of voice recognition software.  Read the chart carefully and recognize, using context, where substitutions have occurred.

## 2023-07-20 NOTE — TELEPHONE ENCOUNTER
----- Message from 722 Nikko Ley sent at 7/20/2023  2:28 PM EDT -----  Please let Surya Lynn know that his stress test did not show evidence of a blockage and showed normal heart function. Thank you!

## 2023-07-24 ENCOUNTER — TELEPHONE (OUTPATIENT)
Dept: CARDIOLOGY CLINIC | Facility: CLINIC | Age: 68
End: 2023-07-24

## 2023-07-24 NOTE — TELEPHONE ENCOUNTER
----- Message from Ely2 Nikko Ley sent at 7/24/2023  9:27 AM EDT -----  Please let Dianelys Foley know that his Holter monitor showed improvement in his avg HR from 90 to 75. HR varied from 49( at 4 am) to 112 bpm. His PVC burden remains about the same at 5.5%. Continue the metoprolol the current dose of 12.5 mg daily. Thank you!

## 2023-08-03 ENCOUNTER — OFFICE VISIT (OUTPATIENT)
Dept: WOUND CARE | Facility: CLINIC | Age: 68
End: 2023-08-03
Payer: MEDICARE

## 2023-08-03 VITALS
HEART RATE: 88 BPM | RESPIRATION RATE: 16 BRPM | TEMPERATURE: 97.5 F | SYSTOLIC BLOOD PRESSURE: 122 MMHG | DIASTOLIC BLOOD PRESSURE: 72 MMHG

## 2023-08-03 DIAGNOSIS — G62.9 PERIPHERAL POLYNEUROPATHY: ICD-10-CM

## 2023-08-03 DIAGNOSIS — Z89.412 STATUS POST AMPUTATION OF GREAT TOE, LEFT (HCC): ICD-10-CM

## 2023-08-03 DIAGNOSIS — T81.31XD DEHISCENCE OF OPERATIVE WOUND, SUBSEQUENT ENCOUNTER: ICD-10-CM

## 2023-08-03 DIAGNOSIS — T81.31XA DEHISCENCE OF OPERATIVE WOUND, INITIAL ENCOUNTER: Primary | ICD-10-CM

## 2023-08-03 PROCEDURE — 99213 OFFICE O/P EST LOW 20 MIN: CPT | Performed by: STUDENT IN AN ORGANIZED HEALTH CARE EDUCATION/TRAINING PROGRAM

## 2023-08-03 PROCEDURE — 99212 OFFICE O/P EST SF 10 MIN: CPT | Performed by: STUDENT IN AN ORGANIZED HEALTH CARE EDUCATION/TRAINING PROGRAM

## 2023-08-03 NOTE — PROGRESS NOTES
Patient ID: Micheal White is a 69 y.o. male Date of Birth 1955       Chief Complaint   Patient presents with   • Follow Up Wound Care Visit     Left foot non healing surgical wound. Allergies:  Statins    Diagnosis:  1. Dehiscence of operative wound, initial encounter    2. Status post amputation of great toe, left (HCC)  -     Wound off loading; Future; Expected date: 08/03/2023  -     Custom shoe insert DME  -     XR foot 2 vw left; Future; Expected date: 08/03/2023    3. Peripheral polyneuropathy  -     Wound off loading; Future; Expected date: 08/03/2023  -     Custom shoe insert DME    4. Dehiscence of operative wound, subsequent encounter  -     Wound off loading; Future; Expected date: 08/03/2023       Diagnosis ICD-10-CM Associated Orders   1. Dehiscence of operative wound, initial encounter  T81.31XA       2. Status post amputation of great toe, left (HCC)  Z89.412 Wound off loading     Custom shoe insert DME     XR foot 2 vw left      3. Peripheral polyneuropathy  G62.9 Wound off loading     Custom shoe insert DME      4. Dehiscence of operative wound, subsequent encounter  T81. 31XD Wound off loading           Assessment & Plan:  See wound care orders  - Left foot wound has healed   - Left foot shoe insert with 1st toe filler ordered  - XR left foot ordered to assess slight lateral foot pain  - WBAT shoe with filler  - F/u Q9-10wks in podiatry office for at risk foot care    Subjective:   Praveena Peoples presents to clinic today s/p left partial 1st ray amputation w/ [facility] (DOS: 6/7/23 & 6/9/23) with incision dehiscence. Doing well. Thinks wound is fully healed.  Walking in normal shoes       The following portions of the patient's history were reviewed and updated as appropriate:   Patient Active Problem List   Diagnosis   • Benign essential HTN   • Moderate aortic stenosis   • MDD (major depressive disorder)   • Peripheral neuropathy   • Other emphysema (HCC)   • Ulcer of left foot (HCC)   • ANNALEE (acute kidney injury) ([facility])   • Hyperkalemia   • MSSA bacteremia   • Heart failure with preserved ejection fraction   • PVCs (premature ventricular contractions)   • Status post foot surgery   • Acute hematogenous osteomyelitis of left foot (HCC)   • Borderline high cholesterol   • Dyspnea on exertion   • Episodic lightheadedness   • Status post amputation of great toe, left [facility])     Past Medical History:   Diagnosis Date   • Anxiety    • Aortic stenosis    • Borderline high cholesterol    • CHF (congestive heart failure) ([facility])     Asaf Jackson-cardio-   • Colon polyp    • Depression    • Heart murmur    • Hypertension    • Hypothyroidism    • Prediabetes    • PVCs (premature ventricular contractions)     "skip"   • Wears glasses     readers     Past Surgical History:   Procedure Laterality Date   • APPENDECTOMY     • BACK SURGERY     • CATARACT EXTRACTION Bilateral    • CLOSURE DELAYED PRIMARY Left 6/9/2023    Procedure: CLOSURE DELAYED PRIMARY;  Surgeon: Ivy George DPM;  Location:  MAIN OR;  Service: Podiatry   • COLONOSCOPY     • [place] CORRJ HALLUX VALGUS W/SESMDC W/RESCJ PROX PHAL Left 4/19/2023    Procedure: Norlene Sires and wound debridement;  Surgeon: Davian White DPM;  Location:  MAIN OR;  Service: Podiatry   • TOE AMPUTATION Left 6/7/2023    Procedure: PARTIAL 1ST RAY AMPUTATION;  Surgeon: Ivy George DPM;  Location:  MAIN OR;  Service: Podiatry   • TONSILLECTOMY     • TOTAL SHOULDER REPLACEMENT Left    • WISDOM TOOTH EXTRACTION       Social History     Socioeconomic History   • Marital status: /Civil Union     Spouse name: Not on file   • Number of children: Not on file   • Years of education: Not on file   • Highest education level: Not on file   Occupational History   • Not on file   Tobacco Use   • Smoking status: Former     Packs/day: 1.00     Years: 50.00     Total pack years: 50.00     Types: Cigarettes     Quit date: 01/2023     Years since quittin.5   • Smokeless tobacco: Never   Vaping Use   • Vaping Use: Never used   Substance and Sexual Activity   • Alcohol use: Yes     Comment: socially-2 drinks per week   • Drug use: Not Currently     Comment: sporadic in young adulthood   • Sexual activity: Not on file     Comment: defer   Other Topics Concern   • Not on file   Social History Narrative   • Not on file     Social Determinants of Health     Financial Resource Strain: Not on file   Food Insecurity: No Food Insecurity (2023)    Hunger Vital Sign    • Worried About Running Out of Food in the Last Year: Never true    • Ran Out of Food in the Last Year: Never true   Transportation Needs: No Transportation Needs (2023)    PRAPARE - Transportation    • Lack of Transportation (Medical): No    • Lack of Transportation (Non-Medical):  No   Physical Activity: Not on file   Stress: Not on file   Social Connections: Not on file   Intimate Partner Violence: Not on file   Housing Stability: Low Risk  (2023)    Housing Stability Vital Sign    • Unable to Pay for Housing in the Last Year: No    • Number of Places Lived in the Last Year: 1    • Unstable Housing in the Last Year: No        Current Outpatient Medications:   •  amLODIPine (NORVASC) 10 mg tablet, Take 10 mg by mouth daily, Disp: , Rfl: 0  •  buPROPion (WELLBUTRIN XL) 300 mg 24 hr tablet, , Disp: , Rfl:   •  escitalopram (LEXAPRO) 20 mg tablet, Take 20 mg by mouth daily, Disp: , Rfl: 1  •  furosemide (LASIX) 40 mg tablet, Take 1.5 tablets (60 mg total) by mouth daily (Patient taking differently: Take 40 mg by mouth daily), Disp: 45 tablet, Rfl: 11  •  gabapentin (NEURONTIN) 100 mg capsule, Take 100 mg by mouth 2 (two) times a day, Disp: , Rfl:   •  levothyroxine 75 mcg tablet, , Disp: , Rfl:   •  Magnesium 400 MG CAPS, Take 1 capsule (400 mg total) by mouth in the morning, Disp: 90 capsule, Rfl: 3  •  metoprolol succinate (TOPROL-XL) 25 mg 24 hr tablet, Take 0.5 tablets (12.5 mg total) by mouth daily, Disp: 15 tablet, Rfl: 11  •  Multiple Vitamins-Minerals (OCUVITE ADULT FORMULA PO), Take 1 tablet by mouth Eye care vitamin daily (Patient not taking: Reported on 6/13/2023), Disp: , Rfl:   •  potassium chloride (MICRO-K) 10 MEQ CR capsule, Take 1 capsule (10 mEq total) by mouth daily, Disp: 90 capsule, Rfl: 3  Family History   Problem Relation Age of Onset   • Dementia Mother    • Blindness Mother    • Cancer Mother    • Dementia Father       Review of Systems   Constitutional: Negative for activity change, chills and fever. HENT: Negative. Respiratory: Negative for cough, chest tightness and shortness of breath. Cardiovascular: Negative for chest pain and leg swelling. Endocrine: Negative. Genitourinary: Negative. Skin: Negative for wound (Left foot resolved). Neurological: Negative. Negative for numbness. Psychiatric/Behavioral: Negative. Negative for agitation and behavioral problems. Objective:  /72   Pulse 88   Temp 97.5 °F (36.4 °C)   Resp 16     Physical Exam  Constitutional:       General: He is not in acute distress. Appearance: Normal appearance. He is not ill-appearing. Cardiovascular:      Comments: Bilateral DP and PT pulses are palpable but slightly diminished 1/4. Pedal hair is diminished. Legs to toes warm to cool. Varicosities with mild LE edema noted. Pulmonary:      Effort: No respiratory distress. Musculoskeletal:         General: No tenderness or deformity. Normal range of motion. Comments: Left partial 1st ray amputation s/p. Slight tenderness to distal-lateral left forefoot   Skin:     Capillary Refill: Capillary refill takes less than 2 seconds. Comments: B/L LE skin is atrophic - thin, dry and shiny in appearance. Left foot partial 1st ray amputation site appears healed. No erythema. Neurological:      General: No focal deficit present. Mental Status: He is alert and oriented to person, place, and time. Comments: Gross sensation to feet diminished. Patient endorses  numbness, tingling and burning to B/L feet. Psychiatric:         Mood and Affect: Mood normal.         Behavior: Behavior normal.             Wound 06/09/23 Foot Left (Active)   Wound Image   08/03/23 1522   Wound Description Yellow 08/03/23 1515   Chantale-wound Assessment Callus 07/20/23 1328   Wound Length (cm) 0 cm 08/03/23 1515   Wound Width (cm) 0 cm 08/03/23 1515   Wound Depth (cm) 0 cm 08/03/23 1515   Wound Surface Area (cm^2) 0 cm^2 08/03/23 1515   Wound Volume (cm^3) 0 cm^3 08/03/23 1515   Calculated Wound Volume (cm^3) 0 cm^3 08/03/23 1515   Change in Wound Size % 100 08/03/23 1515   Drainage Amount None 08/03/23 1515   Drainage Description Serosanguineous 07/06/23 1325   Non-staged Wound Description Full thickness 08/03/23 1515   Treatments Cleansed 07/20/23 1328         Results from last 6 Months   Lab Units 06/06/23  2109   WOUND CULTURE  1+ Growth of Acinetobacter species*  1+ Growth of         Wound Instructions:  Orders Placed This Encounter   Procedures   • Wound off loading     Left foot:  Shoe insert/filler ordered. Take Rx to [facility] in [facility]. Obtain Xray left foot to eval 2nd toe. Always walk with shoes on. Standing Status:   Future     Standing Expiration Date:   8/3/2024   • Custom shoe insert DME     Left 1st toe  multi-depth soft insert   • XR foot 2 vw left     Standing Status:   Future     Standing Expiration Date:   8/3/2027     Scheduling Instructions:      Bring along any outside films relating to this procedure. Zi Womack DPM      Portions of the record may have been created with voice recognition software. Occasional wrong word or "sound a like" substitutions may have occurred due to the inherent limitations of voice recognition software. Read the chart carefully and recognize, using context, where substitutions have occurred.

## 2023-08-03 NOTE — PATIENT INSTRUCTIONS
Orders Placed This Encounter   Procedures    Wound off loading     Left foot:  Shoe insert/filler ordered. Take Rx to [facility] in [facility]. Obtain Xray left foot to eval 2nd toe. Always walk with shoes on.      Standing Status:   Future     Standing Expiration Date:   8/3/2024

## 2023-08-09 ENCOUNTER — HOSPITAL ENCOUNTER (OUTPATIENT)
Dept: RADIOLOGY | Facility: HOSPITAL | Age: 68
Discharge: HOME | End: 2023-08-09
Payer: MEDICARE

## 2023-08-09 DIAGNOSIS — Z89.412 STATUS POST AMPUTATION OF GREAT TOE, LEFT (HCC): ICD-10-CM

## 2023-08-09 PROCEDURE — 73620 X-RAY EXAM OF FOOT: CPT

## 2023-08-29 ENCOUNTER — TELEPHONE (OUTPATIENT)
Age: 68
End: 2023-08-29

## 2023-08-29 NOTE — TELEPHONE ENCOUNTER
Caller:Patient    Doctor: Man Juan     Reason for call: Is calling about his Xray.     Call back#: [phone]

## 2023-08-30 ENCOUNTER — TELEPHONE (OUTPATIENT)
Age: 68
End: 2023-08-30

## 2023-08-30 NOTE — TELEPHONE ENCOUNTER
Caller: Viktor Arriola    Doctor/Office: Dr. Budd Collet    #: [phone]    Escalation: Care Patient returning Dr. Oniel Obregon call about test results. Please return call.  Thank you

## 2023-08-31 ENCOUNTER — TELEPHONE (OUTPATIENT)
Dept: WOUND CARE | Facility: CLINIC | Age: 68
End: 2023-08-31

## 2023-08-31 DIAGNOSIS — T81.31XD DEHISCENCE OF OPERATIVE WOUND, SUBSEQUENT ENCOUNTER: ICD-10-CM

## 2023-08-31 DIAGNOSIS — Z89.412 STATUS POST AMPUTATION OF GREAT TOE, LEFT (HCC): Primary | ICD-10-CM

## 2023-08-31 DIAGNOSIS — M86.9 OSTEOMYELITIS OF LEFT FOOT, UNSPECIFIED TYPE (HCC): ICD-10-CM

## 2023-08-31 DIAGNOSIS — Z87.39 HISTORY OF OSTEOMYELITIS: ICD-10-CM

## 2023-08-31 NOTE — TELEPHONE ENCOUNTER
XR reviewed with patient. ESR, CRP and repeat XR ordered. Will order MRI if abnormal. Clinically patient denies redness, swelling. Feels well. Notes scant soreness to site.

## 2023-09-05 ENCOUNTER — HOSPITAL ENCOUNTER (OUTPATIENT)
Dept: RADIOLOGY | Facility: HOSPITAL | Age: 68
Discharge: HOME | End: 2023-09-05
Payer: MEDICARE

## 2023-09-05 ENCOUNTER — APPOINTMENT (OUTPATIENT)
Dept: LAB | Facility: HOSPITAL | Age: 68
End: 2023-09-05
Payer: MEDICARE

## 2023-09-05 DIAGNOSIS — Z89.412 STATUS POST AMPUTATION OF GREAT TOE, LEFT (HCC): ICD-10-CM

## 2023-09-05 DIAGNOSIS — M86.9 OSTEOMYELITIS OF LEFT FOOT, UNSPECIFIED TYPE (HCC): ICD-10-CM

## 2023-09-05 LAB
BASOPHILS # BLD AUTO: 0.05 THOUSANDS/ÂΜL (ref 0–0.1)
BASOPHILS NFR BLD AUTO: 1 % (ref 0–1)
CRP SERPL QL: 5.4 MG/L
EOSINOPHIL # BLD AUTO: 0.47 THOUSAND/ÂΜL (ref 0–0.61)
EOSINOPHIL NFR BLD AUTO: 5 % (ref 0–6)
ERYTHROCYTE [DISTWIDTH] IN BLOOD BY AUTOMATED COUNT: 15.1 % (ref 11.6–15.1)
ERYTHROCYTE [SEDIMENTATION RATE] IN BLOOD: 32 MM/HOUR (ref 0–19)
HCT VFR BLD AUTO: 45.6 % (ref 36.5–49.3)
HGB BLD-MCNC: 15 G/DL (ref 12–17)
IMM GRANULOCYTES # BLD AUTO: 0.05 THOUSAND/UL (ref 0–0.2)
IMM GRANULOCYTES NFR BLD AUTO: 1 % (ref 0–2)
LYMPHOCYTES # BLD AUTO: 2.53 THOUSANDS/ÂΜL (ref 0.6–4.47)
LYMPHOCYTES NFR BLD AUTO: 26 % (ref 14–44)
MCH RBC QN AUTO: 30.9 PG (ref 26.8–34.3)
MCHC RBC AUTO-ENTMCNC: 32.9 G/DL (ref 31.4–37.4)
MCV RBC AUTO: 94 FL (ref 82–98)
MONOCYTES # BLD AUTO: 1.12 THOUSAND/ÂΜL (ref 0.17–1.22)
MONOCYTES NFR BLD AUTO: 12 % (ref 4–12)
NEUTROPHILS # BLD AUTO: 5.56 THOUSANDS/ÂΜL (ref 1.85–7.62)
NEUTS SEG NFR BLD AUTO: 55 % (ref 43–75)
NRBC BLD AUTO-RTO: 0 /100 WBCS
PLATELET # BLD AUTO: 276 THOUSANDS/UL (ref 149–390)
PMV BLD AUTO: 9.9 FL (ref 8.9–12.7)
RBC # BLD AUTO: 4.85 MILLION/UL (ref 3.88–5.62)
WBC # BLD AUTO: 9.78 THOUSAND/UL (ref 4.31–10.16)

## 2023-09-05 PROCEDURE — 73630 X-RAY EXAM OF FOOT: CPT

## 2023-09-05 PROCEDURE — 86140 C-REACTIVE PROTEIN: CPT

## 2023-09-05 PROCEDURE — 85025 COMPLETE CBC W/AUTO DIFF WBC: CPT

## 2023-09-05 PROCEDURE — 85652 RBC SED RATE AUTOMATED: CPT

## 2023-09-05 PROCEDURE — 36415 COLL VENOUS BLD VENIPUNCTURE: CPT

## 2023-09-08 ENCOUNTER — HOSPITAL ENCOUNTER (OUTPATIENT)
Dept: NON INVASIVE DIAGNOSTICS | Facility: HOSPITAL | Age: 68
Discharge: HOME | End: 2023-09-08
Payer: MEDICARE

## 2023-09-08 VITALS
DIASTOLIC BLOOD PRESSURE: 84 MMHG | HEIGHT: 72 IN | SYSTOLIC BLOOD PRESSURE: 138 MMHG | BODY MASS INDEX: 28.46 KG/M2 | WEIGHT: 210.1 LBS | HEART RATE: 72 BPM

## 2023-09-08 DIAGNOSIS — I35.0 MODERATE AORTIC STENOSIS: ICD-10-CM

## 2023-09-08 DIAGNOSIS — R06.02 SHORTNESS OF BREATH: ICD-10-CM

## 2023-09-08 LAB
AORTIC ROOT: 3.4 CM
AORTIC VALVE MEAN VELOCITY: 29.8 M/S
APICAL FOUR CHAMBER EJECTION FRACTION: 54 %
AV AREA BY CONTINUOUS VTI: 1 CM2
AV AREA PEAK VELOCITY: 0.9 CM2
AV LVOT MEAN GRADIENT: 2 MMHG
AV LVOT PEAK GRADIENT: 4 MMHG
AV MEAN GRADIENT: 41 MMHG
AV PEAK GRADIENT: 71 MMHG
AV VALVE AREA: 1 CM2
AV VELOCITY RATIO: 0.24
DOP CALC AO PEAK VEL: 4.2 M/S
DOP CALC AO VTI: 108.36 CM
DOP CALC LVOT AREA: 3.8 CM2
DOP CALC LVOT CARDIAC INDEX: 3.29 L/MIN/M2
DOP CALC LVOT CARDIAC OUTPUT: 7.18 L/MIN
DOP CALC LVOT DIAMETER: 2.2 CM
DOP CALC LVOT PEAK VEL VTI: 28.61 CM
DOP CALC LVOT PEAK VEL: 1.02 M/S
DOP CALC LVOT STROKE INDEX: 50.9 ML/M2
DOP CALC LVOT STROKE VOLUME: 108.7 CM3
E WAVE DECELERATION TIME: 243 MS
FRACTIONAL SHORTENING: 31 % (ref 28–44)
INTERVENTRICULAR SEPTUM IN DIASTOLE (PARASTERNAL SHORT AXIS VIEW): 1.4 CM
INTERVENTRICULAR SEPTUM: 1.4 CM (ref 0.6–1.1)
LAAS-AP2: 26.8 CM2
LAAS-AP4: 28.5 CM2
LEFT ATRIUM SIZE: 5 CM
LEFT ATRIUM VOLUME (MOD BIPLANE): 104 ML
LEFT INTERNAL DIMENSION IN SYSTOLE: 3.4 CM (ref 2.1–4)
LEFT VENTRICLE DIASTOLIC VOLUME (MOD BIPLANE): 127 ML
LEFT VENTRICLE SYSTOLIC VOLUME (MOD BIPLANE): 56 ML
LEFT VENTRICULAR INTERNAL DIMENSION IN DIASTOLE: 4.9 CM (ref 3.5–6)
LEFT VENTRICULAR POSTERIOR WALL IN END DIASTOLE: 1.3 CM
LEFT VENTRICULAR STROKE VOLUME: 64 ML
LV EF: 56 %
LVSV (TEICH): 64 ML
MV E'TISSUE VEL-LAT: 5 CM/S
MV E'TISSUE VEL-SEP: 5 CM/S
MV PEAK A VEL: 0.89 M/S
MV PEAK E VEL: 78 CM/S
MV STENOSIS PRESSURE HALF TIME: 70 MS
MV VALVE AREA P 1/2 METHOD: 3.14 CM2
SL CV LEFT ATRIUM LENGTH A2C: 6.1 CM
SL CV LV EF: 60
SL CV PED ECHO LEFT VENTRICLE DIASTOLIC VOLUME (MOD BIPLANE) 2D: 111 ML
SL CV PED ECHO LEFT VENTRICLE SYSTOLIC VOLUME (MOD BIPLANE) 2D: 48 ML

## 2023-09-08 PROCEDURE — 93308 TTE F-UP OR LMTD: CPT

## 2023-09-12 ENCOUNTER — TELEPHONE (OUTPATIENT)
Dept: CARDIOLOGY CLINIC | Facility: CLINIC | Age: 68
End: 2023-09-12

## 2023-09-12 DIAGNOSIS — I35.0 SEVERE AORTIC STENOSIS: Primary | ICD-10-CM

## 2023-09-12 NOTE — TELEPHONE ENCOUNTER
I reached out to patient regarding his echocardiogram results. He was not home but his wife who is on his HIPPA wished to discuss. We reviewed that his aortic stenosis has now progressed to severe and based on his symptoms which are concerning for severe aortic stenosis with shortness of breath/dyspnea on exertion which is limiting activity I recommend we proceed with CT surgery evaluation. Wife is agreeable. Consult placed. She will speak with Renan Last and contact me if he has additional questions. I instructed her that he should keep his appt with our office in October.

## 2023-09-19 ENCOUNTER — TELEPHONE (OUTPATIENT)
Dept: CARDIAC SURGERY | Facility: CLINIC | Age: 68
End: 2023-09-19

## 2023-09-28 ENCOUNTER — OFFICE VISIT (OUTPATIENT)
Dept: CARDIAC SURGERY | Facility: CLINIC | Age: 68
End: 2023-09-28
Payer: MEDICARE

## 2023-09-28 VITALS
SYSTOLIC BLOOD PRESSURE: 140 MMHG | DIASTOLIC BLOOD PRESSURE: 62 MMHG | OXYGEN SATURATION: 95 % | HEIGHT: 72 IN | HEART RATE: 59 BPM | WEIGHT: 220 LBS | BODY MASS INDEX: 29.8 KG/M2

## 2023-09-28 DIAGNOSIS — I35.0 SEVERE AORTIC STENOSIS: ICD-10-CM

## 2023-09-28 DIAGNOSIS — Z13.6 ENCOUNTER FOR SCREENING FOR STENOSIS OF CAROTID ARTERY: Primary | ICD-10-CM

## 2023-09-28 DIAGNOSIS — R09.89 OTHER SPECIFIED SYMPTOMS AND SIGNS INVOLVING THE CIRCULATORY AND RESPIRATORY SYSTEMS: ICD-10-CM

## 2023-09-28 PROCEDURE — 99205 OFFICE O/P NEW HI 60 MIN: CPT | Performed by: THORACIC SURGERY (CARDIOTHORACIC VASCULAR SURGERY)

## 2023-09-28 RX ORDER — AMOXICILLIN 500 MG
CAPSULE ORAL
COMMUNITY

## 2023-09-28 NOTE — LETTER
September 28, 2023     Zelalem Forrest MD  [address]    Patient: Ronal Cabrera   YOB: 1955   Date of Visit: 9/28/2023       Dear Dr. Maddy Campbell: Thank you for referring Ronal Cabrera to me for evaluation. Below are my notes for this consultation. If you have questions, please do not hesitate to call me. I look forward to following your patient along with you. Sincerely,        Paula Baez MD        CC: Daly Mead, [address] [place] [address]  9/28/2023  4:17 PM  Attested  Consultation - Cardiothoracic Surgery   Ronal Cabrera 69 y.o. male MRN: [id number]    Physician Requesting Consult: Cole Alamo, [address] [place] [address]    Reason for Consult / Principal Problem: Aortic stenosis, Non-Rheumatic    History of Present Illness: Ronal Cabrera is a 69y.o. year old male who presents for initial outpatient surgical consultation for symptomatic severe aortic stenosis. Patient's PMHx is notable for AS, HTN, HLD, CKD3, pre-DM (A1c 5.9%), hypothyroidism, diastolic CHF, PVC's, tobacco abuse, emphysema, h/o chronic L great toe wound w/ osteo s/p amputation (6/2023), peripheral neuropathy, colon polyp and polio in childhood (L leg atrophy). Patient gives a h/o lifelong heart murmur since childhood. He denies h/o rheumatic fever. He reports likely polio in childhood without official diagnosis but he has lifelong left leg atrophy. Echo in 2021 at [address]  Demonstrated moderate AS. Patient was admitted to [address] [place] [address] in Jan 2023 with generalized weakness, SOB, cough and fever. He was found to have staph aureus bacteremia/sepsis, likely source from a chronic left toe ulcer. He was also noted to be in CHF. TTE was performed to rule out endocarditis demonstrated moderate AS. Patient was treated with diuresis, antibiotics and seen podiatry to address toe ulcer. He subsequently underwent treatment and eventual L great toe amputation in June 2023 due to osteomyelitis.  He was seen for Cardiology follow-up and repeat Echo in early September demonstrates progression to severe AS. Patient is accompanied by his wife today. He reports progressive symptoms of fatigue, decrease in activity tolerance, MONTANEZ, "skipped" heart beats and fluid retention in the form of bloating. He has bilateral lower extremity neuropathy, mostly numbness. He denies chest pain, lightheadedness, peripheral edema, PND or orthopnea. His wife states he snores and has apnea at night but he has never had an official sleep study. He reports his left toe amputation site is completely healed. Patient admits to continued tobacco abuse, 4 cigarettes per day. He has on average 2 alcoholic drinks per week. He denies drug use or vaping. Patient is a retired steel work, previously employed at [employer] in [address]. Patient obtains routine dental care and next visit for cleaning is next week.       Past Medical History:  Past Medical History:   Diagnosis Date   • Anxiety    • Aortic stenosis    • Borderline high cholesterol    • CHF (congestive heart failure) ([facility])     Asaf Leal-   • Colon polyp    • Depression    • Heart murmur    • Hypertension    • Hypothyroidism    • Prediabetes    • PVCs (premature ventricular contractions)     "skip"   • Wears glasses     readers         Past Surgical History:   Past Surgical History:   Procedure Laterality Date   • APPENDECTOMY     • BACK SURGERY     • CATARACT EXTRACTION Bilateral    • CLOSURE DELAYED PRIMARY Left 06/09/2023    Procedure: CLOSURE DELAYED PRIMARY;  Surgeon: Dalton Tello DPM;  Location:  MAIN OR;  Service: Podiatry   • COLONOSCOPY  2023    NATHALIE Guerra   • [place] CORRJ HALLUX VALGUS W/SESMDC W/RESCJ PROX PHAL Left 04/19/2023    Procedure: Edward Rising and wound debridement;  Surgeon: Ely Ahn DPM;  Location:  MAIN OR;  Service: Podiatry   • TOE AMPUTATION Left 06/07/2023    Procedure: PARTIAL 1ST RAY AMPUTATION;  Surgeon: Marie Gaston DPM;  Location:  MAIN OR;  Service: Podiatry   • TONSILLECTOMY     • TOTAL SHOULDER REPLACEMENT Left    • WISDOM TOOTH EXTRACTION           Family History:  Family History   Problem Relation Age of Onset   • Dementia Mother    • Blindness Mother    • Cancer Mother    • Dementia Father          Social History:    Social History     Substance and Sexual Activity   Alcohol Use Yes    Comment: socially-2 drinks per week     Social History     Substance and Sexual Activity   Drug Use Not Currently    Comment: sporadic in young adulthood     Social History     Tobacco Use   Smoking Status Every Day   • Packs/day: 0.50   • Years: 50.00   • Total pack years: 25.00   • Types: Cigarettes   • Last attempt to quit: 2023   • Years since quittin.7   Smokeless Tobacco Never         Home Medications:   Prior to Admission medications    Medication Sig Start Date End Date Taking? Authorizing Provider   amLODIPine (NORVASC) 10 mg tablet Take 10 mg by mouth daily 19  Yes Historical Provider, MD   buPROPion (WELLBUTRIN XL) 300 mg 24 hr tablet  19  Yes Historical Provider, MD   escitalopram (LEXAPRO) 20 mg tablet Take 20 mg by mouth daily 19  Yes Historical Provider, MD   furosemide (LASIX) 40 mg tablet Take 1.5 tablets (60 mg total) by mouth daily  Patient taking differently: Take 40 mg by mouth daily 3/29/23  Yes JESUS Del Cid   gabapentin (NEURONTIN) 100 mg capsule Take 100 mg by mouth 1 (one) time Takes once a day at bedtime.    Yes Historical Provider, MD   levothyroxine 75 mcg tablet  19  Yes Historical Provider, MD   Magnesium 400 MG CAPS Take 1 capsule (400 mg total) by mouth in the morning 23  Yes JESUS Del Cid   metoprolol succinate (TOPROL-XL) 25 mg 24 hr tablet Take 0.5 tablets (12.5 mg total) by mouth daily 23  Yes JESUS Del Cid   Multiple Vitamins-Minerals (ICAPS AREDS 2 PO) Take by mouth   Yes Historical Provider, MD   Omega-3 Fatty Acids (Fish Oil) 1200 MG CAPS Take by mouth   Yes Historical Provider, MD   potassium chloride (MICRO-K) 10 MEQ CR capsule Take 1 capsule (10 mEq total) by mouth daily 3/8/23  Yes JESUS Cobb   Multiple Vitamins-Minerals (OCUVITE ADULT FORMULA PO) Take 1 tablet by mouth Eye care vitamin daily  Patient not taking: Reported on 6/13/2023    Historical Provider, MD       Allergies: Allergies   Allergen Reactions   • Statins Myalgia     Other reaction(s): muscle aches and joint aches         Review of Systems:  Review of Systems - History obtained from chart review and the patient  General ROS: positive for  - fatigue and change in activity tolerance   negative for - chills, fever or sleep disturbance  Psychological ROS: negative  Ophthalmic ROS: negative  ENT ROS: negative  Allergy and Immunology ROS: negative  Hematological and Lymphatic ROS: negative  Endocrine ROS: negative  Breast ROS: negative  Respiratory ROS: positive for - shortness of breath  negative for - cough, hemoptysis, orthopnea or pleuritic pain  Cardiovascular ROS: positive for - dyspnea on exertion, irregular heartbeat, murmur and shortness of breath  negative for - chest pain, edema, loss of consciousness, orthopnea, paroxysmal nocturnal dyspnea or rapid heart rate  Gastrointestinal ROS: no abdominal pain, change in bowel habits, or black or bloody stools  Genito-Urinary ROS: no dysuria, trouble voiding, or hematuria  Musculoskeletal ROS: negative  Neurological ROS: positive for - numbness/tingling- bilateral feet  Dermatological ROS: negative    Vital Signs:     Vitals:    09/28/23 1502 09/28/23 1505   BP: 149/67 140/62   BP Location: Left arm Right arm   Patient Position: Sitting Sitting   Cuff Size: Large Large   Pulse: 59    SpO2: 95%    Weight: 99.8 kg (220 lb)    Height: 6' (1.829 m)        Physical Exam:    General: Alert, oriented, well developed, no acute distress  HEENT/NECK:  PERRLA. No jugular venous distention.     Cardiac:Regular rate and rhythm, III/VI harsh systolic murmur RUSB   Carotid arteries: 1+ pulses,no bruits  Pulmonary:  Breath sounds clear bilaterally. Abdomen:  Non-tender, Non-distended. Positive bowel sounds. Upper extremities: 2+ radial pulses; brisk capillary refill  Lower extremities: Extremities warm/dry. PT/DP pulses 1+ bilaterally. No edema B/L  Neuro: Alert and oriented X 3. Sensation is grossly intact. No focal deficits. Musculoskeletal: MAEE, stable gait  Skin: Warm/Dry, without rashes or lesions. Lab Results:   Lab Results   Component Value Date    SODIUM 136 07/03/2023    K 4.2 07/03/2023     07/03/2023    CO2 26 07/03/2023    AGAP 9 07/03/2023    BUN 26 (H) 07/03/2023    CREATININE 1.24 07/03/2023    GLUC 96 06/12/2023    GLUF 107 (H) 07/03/2023    CALCIUM 9.6 07/03/2023    AST 15 07/03/2023    ALT 12 07/03/2023    ALKPHOS 84 07/03/2023    TP 7.7 07/03/2023    TBILI 0.49 07/03/2023    EGFR 58 07/03/2023     Lab Results   Component Value Date    WBC 9.78 09/05/2023    HGB 15.0 09/05/2023    HCT 45.6 09/05/2023    MCV 94 09/05/2023     09/05/2023     Lab Results   Component Value Date    HGBA1C 5.9 (H) 01/03/2023         Imaging Studies:     Echocardiogram: 9/8/23    •  Limited study for AS  •  Left Ventricle: Left ventricular cavity size is normal. Wall thickness is mildly increased. The left ventricular ejection fraction is 60% by visual estimation. . Systolic function is normal. Wall motion is normal. Diastolic function is mildly abnormal, consistent with grade I (abnormal) relaxation. •  Left Atrium: The atrium is dilated. •  Aortic Valve: The leaflets are moderately calcified. There is severe stenosis. The aortic valve peak velocity is 4.2 m/s. The aortic valve mean gradient is 41 mmHg. The dimensionless velocity index is 0.24. The aortic valve area is 1.00 cm2.   •  AS now appears severe compared to previous study from 1/2023     Findings    Left Ventricle Left ventricular cavity size is normal. Wall thickness is mildly increased. The left ventricular ejection fraction is 60% by visual estimation. . Systolic function is normal.  Wall motion is normal. Diastolic function is mildly abnormal, consistent with grade I (abnormal) relaxation. Right Ventricle Right ventricular cavity size is normal. Systolic function is normal.      Left Atrium The atrium is dilated. Aortic Valve The leaflets are moderately calcified. There is trace regurgitation. There is severe stenosis. The aortic valve peak velocity is 4.2 m/s. The aortic valve mean gradient is 41 mmHg. The dimensionless velocity index is 0.24. The aortic valve area is 1.00 cm2. Mitral Valve There is mild thickening. There is trace regurgitation. Tricuspid Valve The leaflets are not thickened. There is trace regurgitation. Ascending Aorta The aortic root is normal in size. IVC/SVC The inferior vena cava is not well visualized.         Left Ventricle Measurements    Function/Volumes   A4C EF 54 %         LVOT stroke volume 108.7 cm3         LVOT stroke volume index 50.9 ml/m2         LV Diastolic Volume (BP) 919 mL         LV Systolic Volume (BP) 56 mL         EF 56 %         LVOT Cardiac Output 7.18 l/min         LVOT Cardiac Index 3.29 l/min/m2         Dimensions   LVIDd 4.9 cm         LVIDS 3.4 cm         IVSd 1.4 cm         LVPWd 1.3 cm         LVOT area 3.8 cm2         FS 31 %         Diastolic Filling   MV E' Tissue Velocity Septal 5 cm/s         MV E' Tissue Velocity Lateral 5 cm/s         E wave deceleration time 243 ms         MV Peak E Roosevelt 78 cm/s         MV Peak A Roosevelt 0.89 m/s          Report Measurements   AV LVOT peak gradient 4 mmHg              Interventricular Septum Measurements    Shunt Ratio   LVOT peak VTI 28.61 cm         LVOT peak roosevelt 1.02 m/s              Left Atrium Measurements    Dimensions   LA size 5 cm         LA length (A2C) 6.1 cm         Volumes   LA volume (BP) 104 mL               Atrial Septum Measurements    Shunt Ratio   LVOT peak VTI 28.61 cm         LVOT peak abel 1.02 m/s               Aortic Valve Measurements    Stenosis   Aortic valve peak velocity 4.2 m/s         LVOT peak abel 1.02 m/s         Ao .36 cm         LVOT peak VTI 28.61 cm         AV mean gradient 41 mmHg         LVOT mn grad 2 mmHg         AV peak gradient 71 mmHg         AV LVOT peak gradient 4 mmHg         Area/Dimensions   DVI 0.24          AV valve area 1 cm2         AV area by cont VTI 1 cm2         AV area peak abel 0.9 cm2         LVOT diameter 2.2 cm         LVOT area 3.8 cm2               Mitral Valve Measurements    Stenosis   MV stenosis pressure 1/2 time 70 ms         MV valve area p 1/2 method 3.14 cm2               Aorta Measurements    Aortic Dimensions   Ao root 3.4 cm           NM Myocardial Perfusion stress: 23    •  Stress Function: Left ventricular function post-stress is normal. Stress ejection fraction is 59 %. Normal regional wall motion noted. •  Perfusion: There are no perfusion defects. •  Stress Combined Conclusion: The ECG and SPECT imaging portions of the stress study are concordant with no evidence of stress induced myocardial ischemia. Left ventricular perfusion is normal.  Post-rest ejection fraction is determined as 59%. •  Stress ECG: No ST deviation is noted. There were no arrhythmias during recovery. . The ECG was negative for ischemia. •  No anginal-like symptoms were reported during the stress test.  •  Normal resting blood pressure. Appropriate blood pressure response was noted during the stress test.     EC23    Sinus rhythm with occasional Premature ventricular complexes  Nonspecific ST and T wave abnormality    LE JACK: 23    CONCLUSION:  Impression  RIGHT LOWER LIMB  Ankle/Brachial Index: 1.23  which is in the normal range. PPG/PVR Tracings are normal.  Triphasic waveforms at the ankle.   Metatarsal Pressure 162 mmHg  Great Toe Pressure: 117 mmHg, within the healing range. LEFT LOWER LIMB  Ankle/Brachial Index: 1.22  which is in the normal range. PPG/PVR Tracings are normal.  Triphasic waveforms at the ankle. Metatarsal Pressure 156 mmHg  Great Toe Pressure: 129 mmHg, within the healing range. I have personally reviewed pertinent films in PACS     Cardiology notes reviewed    TAVR evaluation Assessment:     [address]: III    Aortic Stenosis Stage: D3    5 Meter Walk: 5 sec, 6 sec, 6 sec    STS risk score (preliminary): 1.0%    KCCQ-12 completed    Assessment:  Patient Active Problem List    Diagnosis Date Noted   • Severe aortic stenosis 09/28/2023   • Status post amputation of great toe, left ([address]) 07/20/2023   • Dyspnea on exertion 06/13/2023   • Episodic lightheadedness 06/13/2023   • Borderline high cholesterol    • Acute hematogenous osteomyelitis of left foot ([address]) 06/06/2023   • Status post foot surgery 05/05/2023   • PVCs (premature ventricular contractions) 03/12/2023   • Heart failure with preserved ejection fraction 02/12/2023   • ANNALEE (acute kidney injury) ([address]) 02/10/2023   • Hyperkalemia 02/10/2023   • MSSA bacteremia 02/10/2023   • Ulcer of left foot ([address]) 02/03/2023   • Other emphysema ([address]) 02/01/2023   • Benign essential HTN 01/30/2023   • Moderate aortic stenosis 01/30/2023   • MDD (major depressive disorder) 01/30/2023   • Peripheral neuropathy 01/30/2023         Impression/Plan:    Rhett See has symptomatic severe aortic stenosis. They will undergo the following testing for transcatheter aortic valve replacement: Gated CTA of the chest/abdomen/pelvis,  cardiac catheterization, dental clearance and carotid artery ultrasound. Once these studies have been completed, Rhett See will follow up in our office to review the results and to be evaluated to confirm the suitability of proceeding with transcatheter aortic valve replacment.      Rhett See was comfortable with our recommendations, and their questions were answered to their satisfaction. Thank you for allowing us to participate in the care of this patient. The patient recently had a screening colonoscopy in 2023. Therefore GI referral is not indicated at this time. SIGNATURE: JESUS Sheppard  DATE: September 28, 2023  TIME: 4:10 PM  Attestation signed by Jeanine Hernández MD at 9/28/2023  4:46 PM:  Patient seen and evaluated with [address] [place] [address] / AMANDA. I agree with the above assessment and plan with the following additions. The patient is a very pleasant 78-year-old man referred for evaluation of aortic stenosis. Complains of dyspnea with exertion and occasional chest pain, he denies syncope. I personally review his diagnostic images, TTE on 9/8/2023 shows EF 60%, aortic valve is trileaflet, peak velocity 4.2 m/s, mean gradient 41 mmHg, EMILI 1 cm² consistent with symptomatic severe aortic stenosis (stage D1). I explained the diagnosis to the patient and the treatment options. I recommend aortic valve replacement. We spoke about TAVR and SAVR. He would like to proceed with TAVR. I will order preoperative testing and he will return to schedule an elective operation. This note was completed in part utilizing [employer] direct voice recognition software. Grammatical errors, random word insertion, spelling mistakes, and incomplete sentences may be an occasional consequence of the system secondary to software limitations, ambient noise and hardware issues. At the time of dictation, efforts were made to edit, clarify and /or correct errors. Please read the chart carefully and recognize, using context, where substitutions have occurred. If you have any questions or concerns about the context, text or information contained within the body of this dictation, please contact myself, the provider, for further clarification.

## 2023-09-28 NOTE — PROGRESS NOTES
Consultation - Cardiothoracic Surgery   Flory Carranza 69 y.o. male MRN: [id number]    Physician Requesting Consult: Leslie Hess, [address] [facility]    Reason for Consult / Principal Problem: Aortic stenosis, Non-Rheumatic    History of Present Illness: Flory Carranza is a 69y.o. year old male who presents for initial outpatient surgical consultation for symptomatic severe aortic stenosis. Patient's PMHx is notable for AS, HTN, HLD, CKD3, pre-DM (A1c 5.9%), hypothyroidism, diastolic CHF, PVC's, tobacco abuse, emphysema, h/o chronic L great toe wound w/ osteo s/p amputation (6/2023), peripheral neuropathy, colon polyp and polio in childhood (L leg atrophy). Patient gives a h/o lifelong heart murmur since childhood. He denies h/o rheumatic fever. He reports likely polio in childhood without official diagnosis but he has lifelong left leg atrophy. Echo in 2021 at [address] [facility] [address]  Demonstrated moderate AS. Patient was admitted to [address] [place] [address] in Jan 2023 with generalized weakness, SOB, cough and fever. He was found to have staph aureus bacteremia/sepsis, likely source from a chronic left toe ulcer. He was also noted to be in CHF. TTE was performed to rule out endocarditis demonstrated moderate AS. Patient was treated with diuresis, antibiotics and seen podiatry to address toe ulcer. He subsequently underwent treatment and eventual L great toe amputation in June 2023 due to osteomyelitis. He was seen for Cardiology follow-up and repeat Echo in early September demonstrates progression to severe AS. Patient is accompanied by his wife today. He reports progressive symptoms of fatigue, decrease in activity tolerance, MONTANEZ, "skipped" heart beats and fluid retention in the form of bloating. He has bilateral lower extremity neuropathy, mostly numbness. He denies chest pain, lightheadedness, peripheral edema, PND or orthopnea. His wife states he snores and has apnea at night but he has never had an official sleep study.     He reports his left toe amputation site is completely healed. Patient admits to continued tobacco abuse, 4 cigarettes per day. He has on average 2 alcoholic drinks per week. He denies drug use or vaping. Patient is a retired steel work, previously employed at [employer] in [place], [place]. Patient obtains routine dental care and next visit for cleaning is next week.       Past Medical History:  Past Medical History:   Diagnosis Date   • Anxiety    • Aortic stenosis    • Borderline high cholesterol    • CHF (congestive heart failure) ([facility])     [facility] Jessica Mccrary-   • Colon polyp    • Depression    • Heart murmur    • Hypertension    • Hypothyroidism    • Prediabetes    • PVCs (premature ventricular contractions)     "skip"   • Wears glasses     readers         Past Surgical History:   Past Surgical History:   Procedure Laterality Date   • APPENDECTOMY     • BACK SURGERY     • CATARACT EXTRACTION Bilateral    • CLOSURE DELAYED PRIMARY Left 06/09/2023    Procedure: CLOSURE DELAYED PRIMARY;  Surgeon: Aleksey Waters DPM;  Location:  MAIN OR;  Service: Podiatry   • COLONOSCOPY  2023    NATHALIE Guerra   • HI CORRJ HALLUX VALGUS W/SESMDC W/RESCJ PROX PHAL Left 04/19/2023    Procedure: Sameera Hopkins and wound debridement;  Surgeon: Adrienne Rodriguez DPM;  Location: [facility] MAIN OR;  Service: Podiatry   • TOE AMPUTATION Left 06/07/2023    Procedure: PARTIAL 1ST RAY AMPUTATION;  Surgeon: Aleksey Waters DPM;  Location:  MAIN OR;  Service: Podiatry   • TONSILLECTOMY     • TOTAL SHOULDER REPLACEMENT Left    • WISDOM TOOTH EXTRACTION           Family History:  Family History   Problem Relation Age of Onset   • Dementia Mother    • Blindness Mother    • Cancer Mother    • Dementia Father          Social History:    Social History     Substance and Sexual Activity   Alcohol Use Yes    Comment: socially-2 drinks per week     Social History     Substance and Sexual Activity   Drug Use Not Currently    Comment: sporadic in young adulthood     Social History     Tobacco Use   Smoking Status Every Day   • Packs/day: 0.50   • Years: 50.00   • Total pack years: 25.00   • Types: Cigarettes   • Last attempt to quit: 2023   • Years since quittin.7   Smokeless Tobacco Never         Home Medications:   Prior to Admission medications    Medication Sig Start Date End Date Taking? Authorizing Provider   amLODIPine (NORVASC) 10 mg tablet Take 10 mg by mouth daily 19  Yes Historical Provider, MD   buPROPion (WELLBUTRIN XL) 300 mg 24 hr tablet  19  Yes Historical Provider, MD   escitalopram (LEXAPRO) 20 mg tablet Take 20 mg by mouth daily 19  Yes Historical Provider, MD   furosemide (LASIX) 40 mg tablet Take 1.5 tablets (60 mg total) by mouth daily  Patient taking differently: Take 40 mg by mouth daily 3/29/23  Yes JESUS Weeks   gabapentin (NEURONTIN) 100 mg capsule Take 100 mg by mouth 1 (one) time Takes once a day at bedtime. Yes Historical Provider, MD   levothyroxine 75 mcg tablet  19  Yes Historical Provider, MD   Magnesium 400 MG CAPS Take 1 capsule (400 mg total) by mouth in the morning 23  Yes JESUS Weeks   metoprolol succinate (TOPROL-XL) 25 mg 24 hr tablet Take 0.5 tablets (12.5 mg total) by mouth daily 23  Yes JESUS Weeks   Multiple Vitamins-Minerals (ICAPS AREDS 2 PO) Take by mouth   Yes Historical Provider, MD   Omega-3 Fatty Acids (Fish Oil) 1200 MG CAPS Take by mouth   Yes Historical Provider, MD   potassium chloride (MICRO-K) 10 MEQ CR capsule Take 1 capsule (10 mEq total) by mouth daily 3/8/23  Yes JESUS Weeks   Multiple Vitamins-Minerals (OCUVITE ADULT FORMULA PO) Take 1 tablet by mouth Eye care vitamin daily  Patient not taking: Reported on 2023    Historical Provider, MD       Allergies:   Allergies   Allergen Reactions   • Statins Myalgia     Other reaction(s): muscle aches and joint aches         Review of Systems:  Review of Systems - History obtained from chart review and the patient  General ROS: positive for  - fatigue and change in activity tolerance   negative for - chills, fever or sleep disturbance  Psychological ROS: negative  Ophthalmic ROS: negative  ENT ROS: negative  Allergy and Immunology ROS: negative  Hematological and Lymphatic ROS: negative  Endocrine ROS: negative  Breast ROS: negative  Respiratory ROS: positive for - shortness of breath  negative for - cough, hemoptysis, orthopnea or pleuritic pain  Cardiovascular ROS: positive for - dyspnea on exertion, irregular heartbeat, murmur and shortness of breath  negative for - chest pain, edema, loss of consciousness, orthopnea, paroxysmal nocturnal dyspnea or rapid heart rate  Gastrointestinal ROS: no abdominal pain, change in bowel habits, or black or bloody stools  Genito-Urinary ROS: no dysuria, trouble voiding, or hematuria  Musculoskeletal ROS: negative  Neurological ROS: positive for - numbness/tingling- bilateral feet  Dermatological ROS: negative    Vital Signs:     Vitals:    09/28/23 1502 09/28/23 1505   BP: 149/67 140/62   BP Location: Left arm Right arm   Patient Position: Sitting Sitting   Cuff Size: Large Large   Pulse: 59    SpO2: 95%    Weight: 99.8 kg (220 lb)    Height: 6' (1.829 m)        Physical Exam:    General: Alert, oriented, well developed, no acute distress  HEENT/NECK:  PERRLA. No jugular venous distention. Cardiac:Regular rate and rhythm, III/VI harsh systolic murmur RUSB   Carotid arteries: 1+ pulses,no bruits  Pulmonary:  Breath sounds clear bilaterally. Abdomen:  Non-tender, Non-distended. Positive bowel sounds. Upper extremities: 2+ radial pulses; brisk capillary refill  Lower extremities: Extremities warm/dry. PT/DP pulses 1+ bilaterally. No edema B/L  Neuro: Alert and oriented X 3. Sensation is grossly intact. No focal deficits. Musculoskeletal: MAEE, stable gait  Skin: Warm/Dry, without rashes or lesions.       Lab Results:   Lab Results Component Value Date    SODIUM 136 07/03/2023    K 4.2 07/03/2023     07/03/2023    CO2 26 07/03/2023    AGAP 9 07/03/2023    BUN 26 (H) 07/03/2023    CREATININE 1.24 07/03/2023    GLUC 96 06/12/2023    GLUF 107 (H) 07/03/2023    CALCIUM 9.6 07/03/2023    AST 15 07/03/2023    ALT 12 07/03/2023    ALKPHOS 84 07/03/2023    TP 7.7 07/03/2023    TBILI 0.49 07/03/2023    EGFR 58 07/03/2023     Lab Results   Component Value Date    WBC 9.78 09/05/2023    HGB 15.0 09/05/2023    HCT 45.6 09/05/2023    MCV 94 09/05/2023     09/05/2023     Lab Results   Component Value Date    HGBA1C 5.9 (H) 01/03/2023         Imaging Studies:     Echocardiogram: 9/8/23    •  Limited study for AS  •  Left Ventricle: Left ventricular cavity size is normal. Wall thickness is mildly increased. The left ventricular ejection fraction is 60% by visual estimation. . Systolic function is normal. Wall motion is normal. Diastolic function is mildly abnormal, consistent with grade I (abnormal) relaxation. •  Left Atrium: The atrium is dilated. •  Aortic Valve: The leaflets are moderately calcified. There is severe stenosis. The aortic valve peak velocity is 4.2 m/s. The aortic valve mean gradient is 41 mmHg. The dimensionless velocity index is 0.24. The aortic valve area is 1.00 cm2. •  AS now appears severe compared to previous study from 1/2023     Findings    Left Ventricle Left ventricular cavity size is normal. Wall thickness is mildly increased. The left ventricular ejection fraction is 60% by visual estimation. . Systolic function is normal.  Wall motion is normal. Diastolic function is mildly abnormal, consistent with grade I (abnormal) relaxation. Right Ventricle Right ventricular cavity size is normal. Systolic function is normal.      Left Atrium The atrium is dilated. Aortic Valve The leaflets are moderately calcified. There is trace regurgitation. There is severe stenosis. The aortic valve peak velocity is 4.2 m/s. The aortic valve mean gradient is 41 mmHg. The dimensionless velocity index is 0.24. The aortic valve area is 1.00 cm2. Mitral Valve There is mild thickening. There is trace regurgitation. Tricuspid Valve The leaflets are not thickened. There is trace regurgitation. Ascending Aorta The aortic root is normal in size. IVC/SVC The inferior vena cava is not well visualized.         Left Ventricle Measurements    Function/Volumes   A4C EF 54 %         LVOT stroke volume 108.7 cm3         LVOT stroke volume index 50.9 ml/m2         LV Diastolic Volume (BP) 378 mL         LV Systolic Volume (BP) 56 mL         EF 56 %         LVOT Cardiac Output 7.18 l/min         LVOT Cardiac Index 3.29 l/min/m2         Dimensions   LVIDd 4.9 cm         LVIDS 3.4 cm         IVSd 1.4 cm         LVPWd 1.3 cm         LVOT area 3.8 cm2         FS 31 %         Diastolic Filling   MV E' Tissue Velocity Septal 5 cm/s         MV E' Tissue Velocity Lateral 5 cm/s         E wave deceleration time 243 ms         MV Peak E Roosevelt 78 cm/s         MV Peak A Roosevelt 0.89 m/s          Report Measurements   AV LVOT peak gradient 4 mmHg              Interventricular Septum Measurements    Shunt Ratio   LVOT peak VTI 28.61 cm         LVOT peak roosevelt 1.02 m/s              Left Atrium Measurements    Dimensions   LA size 5 cm         LA length (A2C) 6.1 cm         Volumes   LA volume (BP) 104 mL               Atrial Septum Measurements    Shunt Ratio   LVOT peak VTI 28.61 cm         LVOT peak roosevelt 1.02 m/s               Aortic Valve Measurements    Stenosis   Aortic valve peak velocity 4.2 m/s         LVOT peak roosevelt 1.02 m/s         Ao .36 cm         LVOT peak VTI 28.61 cm         AV mean gradient 41 mmHg         LVOT mn grad 2 mmHg         AV peak gradient 71 mmHg         AV LVOT peak gradient 4 mmHg         Area/Dimensions   DVI 0.24          AV valve area 1 cm2         AV area by cont VTI 1 cm2         AV area peak roosevelt 0.9 cm2         LVOT diameter 2.2 cm         LVOT area 3.8 cm2               Mitral Valve Measurements    Stenosis   MV stenosis pressure 1/2 time 70 ms         MV valve area p 1/2 method 3.14 cm2               Aorta Measurements    Aortic Dimensions   Ao root 3.4 cm           NM Myocardial Perfusion stress: 23    •  Stress Function: Left ventricular function post-stress is normal. Stress ejection fraction is 59 %. Normal regional wall motion noted. •  Perfusion: There are no perfusion defects. •  Stress Combined Conclusion: The ECG and SPECT imaging portions of the stress study are concordant with no evidence of stress induced myocardial ischemia. Left ventricular perfusion is normal.  Post-rest ejection fraction is determined as 59%. •  Stress ECG: No ST deviation is noted. There were no arrhythmias during recovery. . The ECG was negative for ischemia. •  No anginal-like symptoms were reported during the stress test.  •  Normal resting blood pressure. Appropriate blood pressure response was noted during the stress test.     EC23    Sinus rhythm with occasional Premature ventricular complexes  Nonspecific ST and T wave abnormality    LE JACK: 23    CONCLUSION:  Impression  RIGHT LOWER LIMB  Ankle/Brachial Index: 1.23  which is in the normal range. PPG/PVR Tracings are normal.  Triphasic waveforms at the ankle. Metatarsal Pressure 162 mmHg  Great Toe Pressure: 117 mmHg, within the healing range. LEFT LOWER LIMB  Ankle/Brachial Index: 1.22  which is in the normal range. PPG/PVR Tracings are normal.  Triphasic waveforms at the ankle. Metatarsal Pressure 156 mmHg  Great Toe Pressure: 129 mmHg, within the healing range.       I have personally reviewed pertinent films in PACS     Cardiology notes reviewed    TAVR evaluation Assessment:     [address]: III    Aortic Stenosis Stage: D3    5 Meter Walk: 5 sec, 6 sec, 6 sec    STS risk score (preliminary): 1.0%    KCCQ-12 completed    Assessment:  Patient Active Problem List Diagnosis Date Noted   • Severe aortic stenosis 09/28/2023   • Status post amputation of great toe, left ([address]) 07/20/2023   • Dyspnea on exertion 06/13/2023   • Episodic lightheadedness 06/13/2023   • Borderline high cholesterol    • Acute hematogenous osteomyelitis of left foot ([address]) 06/06/2023   • Status post foot surgery 05/05/2023   • PVCs (premature ventricular contractions) 03/12/2023   • Heart failure with preserved ejection fraction 02/12/2023   • ANNALEE (acute kidney injury) ([address]) 02/10/2023   • Hyperkalemia 02/10/2023   • MSSA bacteremia 02/10/2023   • Ulcer of left foot ([address]) 02/03/2023   • Other emphysema ([address]) 02/01/2023   • Benign essential HTN 01/30/2023   • Moderate aortic stenosis 01/30/2023   • MDD (major depressive disorder) 01/30/2023   • Peripheral neuropathy 01/30/2023         Impression/Plan:    José Gonzalez has symptomatic severe aortic stenosis. They will undergo the following testing for transcatheter aortic valve replacement: Gated CTA of the chest/abdomen/pelvis,  cardiac catheterization, dental clearance and carotid artery ultrasound. Once these studies have been completed, José Gonzalez will follow up in our office to review the results and to be evaluated to confirm the suitability of proceeding with transcatheter aortic valve replacment. José Gonzalez was comfortable with our recommendations, and their questions were answered to their satisfaction. Thank you for allowing us to participate in the care of this patient. The patient recently had a screening colonoscopy in 2023. Therefore GI referral is not indicated at this time.      SIGNATURE: JESUS Diaz  DATE: September 28, 2023  TIME: 4:10 PM

## 2023-09-29 ENCOUNTER — TELEPHONE (OUTPATIENT)
Dept: CARDIOLOGY CLINIC | Facility: CLINIC | Age: 68
End: 2023-09-29

## 2023-09-29 ENCOUNTER — PREP FOR PROCEDURE (OUTPATIENT)
Dept: CARDIOLOGY CLINIC | Facility: CLINIC | Age: 68
End: 2023-09-29

## 2023-09-29 DIAGNOSIS — I35.0 SEVERE AORTIC STENOSIS: Primary | ICD-10-CM

## 2023-09-29 NOTE — TELEPHONE ENCOUNTER
----- Message from Jj Figueroa sent at 9/29/2023  9:41 AM EDT -----  Regarding: Cath  Please schedule patient for:     Pre TAVR Cardiac Cath: to be scheduled in the next few weeks. Patient has Medicare as primary insurance and is getting bloodwork done. Please schedule with either Dr. Srikanth Livingston, Dr. Mora Conley, Dr. Huy Vaz, or Dr. Nicolas Pritchett    To be done at: [facility]     To be done by:     Please address any questions regarding this request to Malcolm Augustine or Azar Sanchez,     Thank you.

## 2023-09-29 NOTE — TELEPHONE ENCOUNTER
Patient scheduled for Right + Left Heart Cath on 10/17/23 at [facility] with Dr. George Lancaster. Instructions sent to patient through [address] [place] [address]. Mailed patient instructions per his request.     Patient aware of all general instructions. Medication holds:   Furosemide (Lasix) - Do not take morning of procedure. Labs to be done on 10/3/23:  CMP / CBC (fasting 8 hours)     Insurance: Medicare     Please obtain auth.      Thank you,  Steph "Kelly Jones" CheekMowdo

## 2023-09-29 NOTE — LETTER
2023       Lester Jenkins              : 1955        MRN: [id number]  [address] [address]       Procedure Name: RIGHT + LEFT HEART CATHETERIZATION    Procedure date: 10/17/23    Location: [address] [facility]  Address: [address], [address] [facility]      The hospital will contact you the day prior to your procedure, usually between 4PM - 6PM to instruct you on the time and place to report. If you do not hear from a [facility]  by 6PM the evening prior to your procedure, please contact the campus that you are scheduled at. [place]: [address], [facility]     You may have nothing to eat or drink from midnight the night prior to your procedure. You may have a minimal amount of water with your morning medications. DO NOT stop taking Plavix or Aspirin unless advised otherwise. If your procedure is scheduled after 12:00 noon, you may have clear liquids until 8:00AM the morning of your procedure. Clear liquids are 7UP, Ginger Ale, Jello or broth. Arrange for a responsible person to drive you to and from the hospital.    Please shower/bathe the night before your procedure and do not use powders or lotions. Please notify us if you have been admitted to the hospital within the past 30 days. Bring a list of daily medications, vitamins, minerals, herbals and nutritional supplements you take. Include dosage and time you take them each day. If packing an overnight bag, pack minimal clothing, you will be given hospital sleepwear. Do not bring money, valuables or jewelry. Wedding band is OK. If you use CPAP machine, bring it to the hospital.      Have your Photo ID and Insurance cards with you. DO NOT take any diabetic medication, including insulin, the morning of the procedure.  Oral diabetic medications may include: Glucophage, Prandin, Glyburide, Micronase, Avandia, Glocovance, Precose, Glynase y Starlix. You should continue to take your morning dose of heart and/or blood pressure medications with a sip of water UNLESS ADVISED OTHERWISE. Special Instructions:      Medication holds:   Furosemide (Lasix) - Do not take morning of procedure.     Labs to be done on 10/3/23:  CMP / CBC (fasting 8 hours)         Thank you,   Steph "Bette" [address] Cardiology   [facility]  Cherise MEJÍA  Ph: [phone]  Teams: [phone]

## 2023-10-01 NOTE — PROGRESS NOTES
[facility]  [address] [facility] [address], [facility] [address]  Phone#  [phone]. Fax#  [phone]  *-*-*-*-*-*-*-*-*-*-*-*-*-*-*-*-*-*-*-*-*-*-*-*-*-*-*-*-*-*-*-*-*-*-*-*-*-*-*-*-*-*-*-*-*-*-*-*-*-*-*-*-*-*  ENCOUNTER DATE: 10/02/23 10:33 AM  PATIENT NAME: Isabel Chan   1955    [id number]  AGE:67 y.o. SEX: male  [address] [place] [address], MD     PRIMARY CARE PHYSICIAN: Blanquita Vegas MD    DIAGNOSES:  1. Severe aortic valve stenosis. 2. History of PVCs  3. Chronic diastolic heart failure, heart failure with reduced ejection fraction  4.  Episodic lightheadedness  5. Primary hypertension  6. Hypothyroidism  7. History of MSSA bacteremia  8. Peripheral neuropathy  9. PVD with ulcer of left foot  10. History of acute kidney injury  9. Prediabetes  10. Anxiety    LIMITED ECHOCARDIOGRAM for aortic valve stenosis 9/8/2023:  Normal left ventricle size, mild increased wall thickness, normal left ventricle systolic function, grade 1 diastolic dysfunction, EF 11%. Normal right ventricle size and systolic function. Dilated left atrium. Moderately calcified aortic valve with severe aortic valve stenosis and trace aortic valve regurgitation (peak transaortic velocity 4.2 m/s, mean gradient 41 mmHg, dimension velocity index 0.24, aortic valve area 1.0 cm²)  Mitral valve thickening with trace mitral valve regurgitation. Trace tricuspid valve regurgitation. No obvious pulm hypertension. No pericardial effusion. Normal aortic root. 48-HOUR HOLTER MONITOR 7/18/2023:  1. The patient had predominantly sinus rhythm. 2. Heart rate varied from 49 bpm to 112 bpm.  The patient's average heart rate was 75 bpm.    3. The patient had a holter monitor tracing done for 47 hours and 59 minutes. 4. The patient had 92483 ventricular ectopic beats accounting for 5.5% of total beats. 5. The patient had 35 supraventricular ectopic beats.   6. The longest R/R interval was 1.5 seconds. 7. Ventricular trigeminy and bigeminy was noted  8. PVCs in the pattern of triplets and couplets noted. 9. Patient noted feeling tired associated with NSR.     REGADENOSON NUCLEAR STRESS TEST 7/18/2023:  ·  Stress Function: Left ventricular function post-stress is normal. Stress ejection fraction is 59 %.  Normal regional wall motion noted. ·  Perfusion: There are no perfusion defects. ·  Stress Combined Conclusion: The ECG and SPECT imaging portions of the stress study are concordant with no evidence of stress induced myocardial ischemia. Left ventricular perfusion is normal.  Post-rest ejection fraction is determined as 59%. ·  Stress ECG: No ST deviation is noted. There were no arrhythmias during recovery. . The ECG was negative for ischemia. ·  No anginal-like symptoms were reported during the stress test.  ·  Normal resting blood pressure.  Appropriate blood pressure response was noted during the stress test.    TRANSESOPHAGEAL ECHOCARDIOGRAM 2/3/2023:    Left Ventricle: Left ventricular cavity size is normal. The left ventricular ejection fraction is 60%. Systolic function is normal. Wall motion is normal.  ·  Left Atrium: The atrium is mildly dilated. ·  Atrial Septum: No patent foramen ovale detected using color flow Doppler at rest.  ·  Aortic Valve: The leaflets are severely thickened. The leaflets are severely calcified. There is moderately reduced mobility. There is no evidence of regurgitation. There is moderate stenosis. Vegetation could not be excluded on the basis of this study as the valve cusps are thickened and heavily calcified. No definitive vegetation is seen. ·  Mitral Valve: There is trace regurgitation. There is no evidence of stenosis. No vegetation present on the mitral valve. ·  Tricuspid Valve: There is trace regurgitation. There is no evidence of stenosis. No vegetation present on the tricuspid valve. ·  Prior TTE study available for comparison.  Prior study date: 1/30/2023. No significant changes noted compared to the prior study. CURRENT ECG   No results found for this visit on 10/02/23. CARDIOLOGY ASSESSMENT & PLAN     1. Severe aortic valve stenosis        2. Mixed hyperlipidemia  ezetimibe (ZETIA) 10 mg tablet    pravastatin (PRAVACHOL) 20 mg tablet    CK    Comprehensive metabolic panel    Lipid Panel with Direct LDL reflex      3. PVCs (premature ventricular contractions)        4. Heart failure with preserved ejection fraction        5. Statin intolerance          Severe aortic valve stenosis  Ms. Cathie Robert has been diagnosed with stage D1 severe aortic valve stenosis. He has recently established care with cardiac surgeon Dr. Reynaldo Dooley and is scheduled to undergo TAVR sometime in November. He has a history of intolerance to statin. He is not sure which statins he tried but he did take atorvastatin in the past.  His lipids are not controlled. He does occasionally smoke and hopes to quit at. He gives no recent symptoms that suggest angina pectoris. His nuclear stress test in July did not identify ischemic changes. His blood pressure is noted to be elevated but he states that It fluctuates a lot. He is not on ACE inhibitor or ARB therapy. -- At this time I am advising him to continue current antihypertensive medication. I am providing him with a blood pressure tracking sheet with instructions to record random blood pressures. Our goal is to get his systolic blood pressures at or less than 361 mmHg and diastolic blood pressures less than 80 mmHg. -- I am adding ezetimibe 10 mg daily to his regimen. He is advised to take it every other day in the beginning and then make it every day as long as he is not having side effects of muscle pains. I am also adding pravastatin at 20 mg every other day. He is advised to begin this about 7 to 10 days after starting ezetimibe.   I reviewed with him possible side effects of this medication and advised him to call us and report if he develops muscle aches again. I also want him to have CK and a CMP and a lipid profile checked in 1 month earlier if he is having muscle aches. -- He will continue to have the preoperative testing regarding his planned aortic valve procedure. -- I am advising him to see his family physician in the interim or call us if he is having symptoms of muscle aches or any other symptoms. -- He is advised to continue normal activities but to avoid exerting himself. -- We talked about quitting smoking as this is a single major risk factor. -- Dietary and medical compliance are reinforced. -- He is advised  to report any concerning symptoms such as chest pain, shortness of breath, decline in exercise tolerance or presyncope/syncope. Time spent in reviewing patient's prior medical record, preparing for visit and interacting with the patient and documentation was 50 minutes. INTERVAL HISTORY & HISTORY OF PRESENT ILLNESS     Liss Emery is here for follow-up regarding his cardiac comorbidities which include: Severe aortic valve stenosis, hypertension, dyslipidemia, chronic diastolic heart failure and other comorbidities. He was last seen by advanced practitioner Ms. Nadja Flor in June 2023. I have reviewed his prior medical record and it is summarized above. Recently he underwent a follow-up echocardiogram results of which are summarized above. He has been seen by cardiac surgeon Dr. Renea Diamond and is in process of being scheduled for TAVR. He is here for follow-up today. He reports that his main symptom is feeling fatigued especially towards the end of the day. He denies any typical anginal-like chest pain or dizziness or palpitations. He occasionally feels skipped heartbeat when he is lying down in a quiet state. He denies any orthopnea or PND or pedal edema. He denies any anginal-like chest pain. Functional capacity status:  Moderate to good   (Excellent- >10 METs; Good: (7-10 METs); Moderate (4-7 METs); Poor (<= 4 METs)    Any chronic stressors: None   (feeling of poor health, financial problems, and social isolation etc). Tobacco or alcohol dependence: He reports that he has been on and off smoker and now smokes maybe 1 to 2 cigarettes a day. He reports trying to quit several times and restarting and he hopes to quit it again. He reports drinking alcohol occasionally. He denies any family history of premature coronary artery disease or sudden cardiac death or atrial fibrillation. He is a retired . Current cardiac medications: Metoprolol succinate 12.5 mg daily furosemide 60 mg daily amlodipine 10 mg daily omega-3 fatty acids potassium chloride 10 mg daily magnesium 400 mg daily. Last routine chemistry blood work is from 7/3/2023:  Sodium 136 potassium 4.2 chloride 101 bicarb 26 BUN 26 creatinine 1.24 GFR 58  Calcium borderline high at 9.6  Normal LFTs  CBC from 9/5/2023 indicates stable hemoglobin and hematocrit  ESR recently elevated at 32  CRP 5.4  Hemoglobin A1c 5.913 2023  TSH 3.53311 2023  Lipid profile from 1/3/2023 showed total cholesterol 255 triglyceride 302 HDL direct 48 calculated . REVIEW OF SYSTEMS   Positive for: As noted above in HPI  Negative for: All remaining as reviewed below and in HPI.     SYSTEM SYMPTOMS REVIEWED:  General--weight change, fever, night sweats  Respiratory--cough, wheezing, shortness of breath, sputum production  Cardiovascular--chest pain, syncope, dyspnea on exertion, edema, decline in exercise tolerance, claudication   Gastrointestinal--persistent vomiting, diarrhea, abdominal distention, blood in stool   Muscular or skeletal--joint pain or swelling   Neurologic--headaches, syncope, abnormal movement  Hematologic--history of easy bruising and bleeding   Endocrine--thyroid enlargement, heat or cold intolerance, polyuria   Psychiatric--anxiety, depression *-*-*-*-*-*-*-*-*-*-*-*-*-*-*-*-*-*-*-*-*-*-*-*-*-*-*-*-*-*-*-*-*-*-*-*-*-*-*-*-*-*-*-*-*-*-*-*-*-*-*-*-*-*-  VITAL SIGNS     CURRENT VITAL SIGNS:   Vitals:    10/02/23 1001   BP: 148/88   Pulse: 62   SpO2: 99%   Weight: 99.8 kg (220 lb)   Height: 6' (1.829 m)       BMI: Body mass index is 29.84 kg/m². WEIGHTS:   Wt Readings from Last 25 Encounters:   10/02/23 99.8 kg (220 lb)   09/28/23 99.8 kg (220 lb)   09/08/23 95.3 kg (210 lb 1.6 oz)   07/06/23 95.3 kg (210 lb)   06/28/23 118 kg (261 lb)   06/21/23 118 kg (261 lb)   06/13/23 118 kg (261 lb)   06/13/23 119 kg (261 lb 9.6 oz)   06/12/23 98.9 kg (218 lb 0.6 oz)   06/06/23 99.8 kg (220 lb)   05/17/23 99.8 kg (220 lb)   05/10/23 99.8 kg (220 lb)   05/05/23 99.8 kg (220 lb)   05/01/23 99.8 kg (220 lb)   04/24/23 99.8 kg (220 lb)   04/19/23 97.5 kg (215 lb)   04/10/23 100 kg (221 lb)   03/31/23 98.9 kg (218 lb)   03/29/23 98.9 kg (218 lb)   03/10/23 97.1 kg (214 lb)   03/08/23 97.3 kg (214 lb 9.6 oz)   02/24/23 101 kg (222 lb)   02/16/23 101 kg (222 lb 3.2 oz)   02/03/23 101 kg (223 lb)   10/03/22 104 kg (230 lb)        *-*-*-*-*-*-*-*-*-*-*-*-*-*-*-*-*-*-*-*-*-*-*-*-*-*-*-*-*-*-*-*-*-*-*-*-*-*-*-*-*-*-*-*-*-*-*-*-*-*-*-*-*-*-  PHYSICAL EXAM     General Appearance:    Alert, cooperative, no distress, appears stated age, large built   Head, Eyes, ENT:    No obvious abnormality, moist mucous mebranes. Neck:   Supple, no carotid bruit or JVD   Back:     Symmetric, no curvature. Lungs:     Respirations unlabored. Clear to auscultation bilaterally,    Chest wall:    No tenderness or deformity   Heart:    Regular rate and rhythm, S1 and S2 normal, grade 3/6 harsh systolic murmur along upper sternal border consistent with aortic valve stenosis   Abdomen:     Soft, non-tender,   Extremities:   Extremities warm, no cyanosis or edema    Skin:   No venostatic changes in lower extremities. Normal skin color, texture, and turgor.  No rashes or lesions *-*-*-*-*-*-*-*-*-*-*-*-*-*-*-*-*-*-*-*-*-*-*-*-*-*-*-*-*-*-*-*-*-*-*-*-*-*-*-*-*-*-*-*-*-*-*-*-*-*-*-*-*-*-  CURRENT MEDICATIONS LIST     Current Outpatient Medications:   •  amLODIPine (NORVASC) 10 mg tablet, Take 10 mg by mouth daily, Disp: , Rfl: 0  •  buPROPion (WELLBUTRIN XL) 300 mg 24 hr tablet, , Disp: , Rfl:   •  escitalopram (LEXAPRO) 20 mg tablet, Take 20 mg by mouth daily, Disp: , Rfl: 1  •  ezetimibe (ZETIA) 10 mg tablet, Take 1 tablet (10 mg total) by mouth daily, Disp: 90 tablet, Rfl: 3  •  furosemide (LASIX) 40 mg tablet, Take 1.5 tablets (60 mg total) by mouth daily (Patient taking differently: Take 40 mg by mouth daily), Disp: 45 tablet, Rfl: 11  •  gabapentin (NEURONTIN) 100 mg capsule, Take 100 mg by mouth 1 (one) time Takes once a day at bedtime. , Disp: , Rfl:   •  levothyroxine 75 mcg tablet, , Disp: , Rfl:   •  Magnesium 400 MG CAPS, Take 1 capsule (400 mg total) by mouth in the morning, Disp: 90 capsule, Rfl: 3  •  metoprolol succinate (TOPROL-XL) 25 mg 24 hr tablet, Take 0.5 tablets (12.5 mg total) by mouth daily, Disp: 15 tablet, Rfl: 11  •  Omega-3 Fatty Acids (Fish Oil) 1200 MG CAPS, Take by mouth, Disp: , Rfl:   •  potassium chloride (MICRO-K) 10 MEQ CR capsule, Take 1 capsule (10 mEq total) by mouth daily, Disp: 90 capsule, Rfl: 3  •  pravastatin (PRAVACHOL) 20 mg tablet, Take 1 tablet (20 mg total) by mouth every other day, Disp: 90 tablet, Rfl: 6  •  Multiple Vitamins-Minerals (ICAPS AREDS 2 PO), Take by mouth (Patient not taking: Reported on 10/2/2023), Disp: , Rfl:   •  Multiple Vitamins-Minerals (OCUVITE ADULT FORMULA PO), Take 1 tablet by mouth Eye care vitamin daily (Patient not taking: Reported on 6/13/2023), Disp: , Rfl:        ALLERGIES     Allergies   Allergen Reactions   • Statins Myalgia     Other reaction(s): muscle aches and joint aches         *-*-*-*-*-*-*-*-*-*-*-*-*-*-*-*-*-*-*-*-*-*-*-*-*-*-*-*-*-*-*-*-*-*-*-*-*-*-*-*-*-*-*-*-*-*-*-*-*-*-*-*-*-*-  LABORATORY DATA No results found for: "NA"  Potassium   Date Value Ref Range Status   07/03/2023 4.2 3.5 - 5.3 mmol/L Final   06/26/2023 4.9 3.5 - 5.3 mmol/L Final   06/12/2023 4.1 3.5 - 5.3 mmol/L Final     Chloride   Date Value Ref Range Status   07/03/2023 101 96 - 108 mmol/L Final   06/26/2023 101 96 - 108 mmol/L Final   06/12/2023 103 96 - 108 mmol/L Final     CO2   Date Value Ref Range Status   07/03/2023 26 21 - 32 mmol/L Final   06/26/2023 28 21 - 32 mmol/L Final   06/12/2023 26 21 - 32 mmol/L Final     BUN   Date Value Ref Range Status   07/03/2023 26 (H) 5 - 25 mg/dL Final   06/26/2023 29 (H) 5 - 25 mg/dL Final   06/12/2023 16 5 - 25 mg/dL Final     Creatinine   Date Value Ref Range Status   07/03/2023 1.24 0.60 - 1.30 mg/dL Final     Comment:     Standardized to IDMS reference method   06/26/2023 1.63 (H) 0.60 - 1.30 mg/dL Final     Comment:     Standardized to IDMS reference method   06/12/2023 1.19 0.60 - 1.30 mg/dL Final     Comment:     Standardized to IDMS reference method     eGFR   Date Value Ref Range Status   07/03/2023 58 ml/min/1.73sq m Final   06/26/2023 42 ml/min/1.73sq m Final   06/12/2023 61 ml/min/1.73sq m Final     Calcium   Date Value Ref Range Status   07/03/2023 9.6 8.4 - 10.2 mg/dL Final   06/26/2023 10.3 (H) 8.4 - 10.2 mg/dL Final   06/12/2023 9.3 8.4 - 10.2 mg/dL Final     AST   Date Value Ref Range Status   07/03/2023 15 13 - 39 U/L Final   06/26/2023 11 (L) 13 - 39 U/L Final   06/12/2023 16 13 - 39 U/L Final     ALT   Date Value Ref Range Status   07/03/2023 12 7 - 52 U/L Final     Comment:     Specimen collection should occur prior to Sulfasalazine administration due to the potential for falsely depressed results. 06/26/2023 13 7 - 52 U/L Final     Comment:     Specimen collection should occur prior to Sulfasalazine administration due to the potential for falsely depressed results.     06/12/2023 14 7 - 52 U/L Final     Comment:     Specimen collection should occur prior to Sulfasalazine administration due to the potential for falsely depressed results. Alkaline Phosphatase   Date Value Ref Range Status   07/03/2023 84 34 - 104 U/L Final   06/26/2023 80 34 - 104 U/L Final   06/12/2023 46 34 - 104 U/L Final     Magnesium   Date Value Ref Range Status   06/08/2023 2.1 1.9 - 2.7 mg/dL Final   06/06/2023 1.9 1.9 - 2.7 mg/dL Final   02/14/2023 2.2 1.9 - 2.7 mg/dL Final     WBC   Date Value Ref Range Status   09/05/2023 9.78 4.31 - 10.16 Thousand/uL Final   07/03/2023 11.29 (H) 4.31 - 10.16 Thousand/uL Final   06/26/2023 11.78 (H) 4.31 - 10.16 Thousand/uL Final     Hemoglobin   Date Value Ref Range Status   09/05/2023 15.0 12.0 - 17.0 g/dL Final   07/03/2023 14.7 12.0 - 17.0 g/dL Final   06/26/2023 14.3 12.0 - 17.0 g/dL Final     Platelets   Date Value Ref Range Status   09/05/2023 276 149 - 390 Thousands/uL Final   07/03/2023 355 149 - 390 Thousands/uL Final   06/26/2023 332 149 - 390 Thousands/uL Final     PTT   Date Value Ref Range Status   06/06/2023 29 23 - 37 seconds Final     Comment:     Therapeutic Heparin Range =  60-90 seconds   02/13/2023 75 (H) 23 - 37 seconds Final     Comment:     Therapeutic Heparin Range =  60-90 seconds     INR   Date Value Ref Range Status   06/06/2023 0.89 0.84 - 1.19 Final   02/12/2023 0.96 0.84 - 1.19 Final     No results found for: "CKMB", "DIGOXIN"  No results found for: "TSH"  No results found for: "CHOL"   Hemoglobin A1C   Date Value Ref Range Status   01/03/2023 5.9 (H) <5.7 % Final     Comment:     Reference Range  Non-diabetic                     <5.7  Pre-diabetic                     5.7-6.4  Diabetic                         >=6.5  ADA target for diabetic control  <=7     Blood Culture   Date Value Ref Range Status   06/06/2023 No Growth After 5 Days. Final   06/06/2023 No Growth After 5 Days. Final   02/12/2023 No Growth After 5 Days. Final   02/12/2023 No Growth After 5 Days. Final   02/02/2023 No Growth After 5 Days.   Final   02/02/2023 No Growth After 5 Days. Final   01/31/2023 Staphylococcus aureus (A)  Final     Comment:     see related culture for Identification and/or Susceptibilitiy   01/31/2023 Staphylococcus aureus (A)  Final     Gram Stain Result   Date Value Ref Range Status   06/07/2023 Rare Polys  Final   06/07/2023 No bacteria seen  Final   06/07/2023 Rare Polys  Final   06/07/2023 No bacteria seen  Final   06/06/2023 1+ Polys  Final   06/06/2023 No bacteria seen  Final   01/31/2023 Gram positive cocci in clusters (A)  Final     Comment: This is an appended report. These results have been appended to a previously preliminary verified report. 01/31/2023 Gram positive cocci in clusters (A)  Final     Comment: This is an appended report. These results have been appended to a previously preliminary verified report. Wound Culture   Date Value Ref Range Status   06/06/2023 1+ Growth of Acinetobacter species (A)  Final     Comment:     Amoxicillin/clavulanate is NOT active against this isolate. 06/06/2023 1+ Growth of  Final     Comment:     Mixed Skin Deanna       *-*-*-*-*-*-*-*-*-*-*-*-*-*-*-*-*-*-*-*-*-*-*-*-*-*-*-*-*-*-*-*-*-*-*-*-*-*-*-*-*-*-*-*-*-*-*-*-*-*-*-*-*-*-  PREVIOUS CARDIOLOGY & RADIOLOGY TEST RESULTS   I have personally reviewed pertinent results of cardiovascular tests noted below. No results found for this or any previous visit. No results found for this or any previous visit. No results found for this or any previous visit. No results found for this or any previous visit. XR foot 3+ vw left  Narrative: LEFT FOOT    INDICATION:   I63.965: Acquired absence of left great toe. COMPARISON: Left foot x-ray 8/9/2023    VIEWS:  XR FOOT 3+ VW LEFT  Images: 3    FINDINGS:    Stable dorsal dislocation of second phalanx at the MTP joint. Stable postsurgical changes of amputation at the level of the distal first metatarsal with stable cortical irregularity. Chronic deformity of third metatarsal head.     No significant degenerative changes. No lytic or blastic osseous lesion. Soft tissues are unremarkable. Impression: Stable postsurgical changes of amputation at the distal first metatarsal.    Stable dorsal dislocation at the second MTP joint.     Workstation performed: [id number]        *-*-*-*-*-*-*-*-*-*-*-*-*-*-*-*-*-*-*-*-*-*-*-*-*-*-*-*-*-*-*-*-*-*-*-*-*-*-*-*-*-*-*-*-*-*-*-*-*-*-*-*-*-*-  SIGNATURES:   @[facility]@   Neyda Ramirez MD; [facility]    *-*-*-*-*-*-*-*-*-*-*-*-*-*-*-*-*-*-*-*-*-*-*-*-*-*-*-*-*-*-*-*-*-*-*-*-*-*-*-*-*-*-*-*-*-*-*-*-*-*-*-*-*-*-  PAST MEDICAL HISTORY:  Past Medical History:   Diagnosis Date   • Anxiety    • Aortic stenosis    • Borderline high cholesterol    • CHF (congestive heart failure) ([facility])     [facility] Dr Glenda Jackson-cardio-   • Colon polyp    • Depression    • Heart murmur    • Hypertension    • Hypothyroidism    • Prediabetes    • PVCs (premature ventricular contractions)     "skip"   • Wears glasses     readers    PAST SURGICAL HISTORY:  Past Surgical History:   Procedure Laterality Date   • APPENDECTOMY     • BACK SURGERY     • CATARACT EXTRACTION Bilateral    • CLOSURE DELAYED PRIMARY Left 06/09/2023    Procedure: CLOSURE DELAYED PRIMARY;  Surgeon: Fawad Bull DPM;  Location:  MAIN OR;  Service: Podiatry   • COLONOSCOPY  2023    NATHALIE Guerra   • NH CORRJ HALLUX VALGUS W/SESMDC W/RESCJ PROX PHAL Left 04/19/2023    Procedure: Herb Jose Ramon and wound debridement;  Surgeon: Roseanne Esquivel DPM;  Location: [facility];  Service: Podiatry   • TOE AMPUTATION Left 06/07/2023    Procedure: PARTIAL 1ST RAY AMPUTATION;  Surgeon: Fawad Bull DPM;  Location: [facility];  Service: Podiatry   • TONSILLECTOMY     • TOTAL SHOULDER REPLACEMENT Left    • WISDOM TOOTH EXTRACTION           FAMILY HISTORY:  Family History   Problem Relation Age of Onset   • Dementia Mother    • Blindness Mother    • Cancer Mother    • Dementia Father     SOCIAL HISTORY:  Social History     Tobacco Use   Smoking Status Every Day   • Packs/day: 0.50   • Years: 50.00   • Total pack years: 25.00   • Types: Cigarettes   • Last attempt to quit: 2023   • Years since quittin.7   Smokeless Tobacco Never      Social History     Substance and Sexual Activity   Alcohol Use Yes    Comment: socially-2 drinks per week     Social History     Substance and Sexual Activity   Drug Use Not Currently    Comment: sporadic in young adulthood    [unfilled]     *-*-*-*-*-*-*-*-*-*-*-*-*-*-*-*-*-*-*-*-*-*-*-*-*-*-*-*-*-*-*-*-*-*-*-*-*-*-*-*-*-*-*-*-*-*-*-*-*-*-*-*-*-*  ALLERGIES:  Allergies   Allergen Reactions   • Statins Myalgia     Other reaction(s): muscle aches and joint aches      CURRENT SCHEDULED MEDICATIONS:    Current Outpatient Medications:   •  amLODIPine (NORVASC) 10 mg tablet, Take 10 mg by mouth daily, Disp: , Rfl: 0  •  buPROPion (WELLBUTRIN XL) 300 mg 24 hr tablet, , Disp: , Rfl:   •  escitalopram (LEXAPRO) 20 mg tablet, Take 20 mg by mouth daily, Disp: , Rfl: 1  •  ezetimibe (ZETIA) 10 mg tablet, Take 1 tablet (10 mg total) by mouth daily, Disp: 90 tablet, Rfl: 3  •  furosemide (LASIX) 40 mg tablet, Take 1.5 tablets (60 mg total) by mouth daily (Patient taking differently: Take 40 mg by mouth daily), Disp: 45 tablet, Rfl: 11  •  gabapentin (NEURONTIN) 100 mg capsule, Take 100 mg by mouth 1 (one) time Takes once a day at bedtime. , Disp: , Rfl:   •  levothyroxine 75 mcg tablet, , Disp: , Rfl:   •  Magnesium 400 MG CAPS, Take 1 capsule (400 mg total) by mouth in the morning, Disp: 90 capsule, Rfl: 3  •  metoprolol succinate (TOPROL-XL) 25 mg 24 hr tablet, Take 0.5 tablets (12.5 mg total) by mouth daily, Disp: 15 tablet, Rfl: 11  •  Omega-3 Fatty Acids (Fish Oil) 1200 MG CAPS, Take by mouth, Disp: , Rfl:   •  potassium chloride (MICRO-K) 10 MEQ CR capsule, Take 1 capsule (10 mEq total) by mouth daily, Disp: 90 capsule, Rfl: 3  •  pravastatin (PRAVACHOL) 20 mg tablet, Take 1 tablet (20 mg total) by mouth every other day, Disp: 90 tablet, Rfl: 6  •  Multiple Vitamins-Minerals (ICAPS AREDS 2 PO), Take by mouth (Patient not taking: Reported on 10/2/2023), Disp: , Rfl:   •  Multiple Vitamins-Minerals (OCUVITE ADULT FORMULA PO), Take 1 tablet by mouth Eye care vitamin daily (Patient not taking: Reported on 6/13/2023), Disp: , Rfl:      *-*-*-*-*-*-*-*-*-*-*-*-*-*-*-*-*-*-*-*-*-*-*-*-*-*-*-*-*-*-*-*-*-*-*-*-*-*-*-*-*-*-*-*-*-*-*-*-*-*-*-*-*-*

## 2023-10-01 NOTE — H&P (VIEW-ONLY)
[facility]  [address] [facility] [address] [address]  Phone#  [phone]. Fax#  [phone]  *-*-*-*-*-*-*-*-*-*-*-*-*-*-*-*-*-*-*-*-*-*-*-*-*-*-*-*-*-*-*-*-*-*-*-*-*-*-*-*-*-*-*-*-*-*-*-*-*-*-*-*-*-*  ENCOUNTER DATE: 10/02/23 10:33 AM  PATIENT NAME: Carmen Cabral   1955    [id number]  AGE:67 y.o. SEX: male  [address] [address], MD     PRIMARY CARE PHYSICIAN: Ruslan Gaytan MD    DIAGNOSES:  1. Severe aortic valve stenosis. 2. History of PVCs  3. Chronic diastolic heart failure, heart failure with reduced ejection fraction  4.  Episodic lightheadedness  5. Primary hypertension  6. Hypothyroidism  7. History of MSSA bacteremia  8. Peripheral neuropathy  9. PVD with ulcer of left foot  10. History of acute kidney injury  9. Prediabetes  10. Anxiety    LIMITED ECHOCARDIOGRAM for aortic valve stenosis 9/8/2023:  Normal left ventricle size, mild increased wall thickness, normal left ventricle systolic function, grade 1 diastolic dysfunction, EF 35%. Normal right ventricle size and systolic function. Dilated left atrium. Moderately calcified aortic valve with severe aortic valve stenosis and trace aortic valve regurgitation (peak transaortic velocity 4.2 m/s, mean gradient 41 mmHg, dimension velocity index 0.24, aortic valve area 1.0 cm²)  Mitral valve thickening with trace mitral valve regurgitation. Trace tricuspid valve regurgitation. No obvious pulm hypertension. No pericardial effusion. Normal aortic root. 48-HOUR HOLTER MONITOR 7/18/2023:  1. The patient had predominantly sinus rhythm. 2. Heart rate varied from 49 bpm to 112 bpm.  The patient's average heart rate was 75 bpm.    3. The patient had a holter monitor tracing done for 47 hours and 59 minutes. 4. The patient had 31307 ventricular ectopic beats accounting for 5.5% of total beats. 5. The patient had 35 supraventricular ectopic beats.   6. The longest R/R interval was 1.5 seconds. 7. Ventricular trigeminy and bigeminy was noted  8. PVCs in the pattern of triplets and couplets noted. 9. Patient noted feeling tired associated with NSR.     REGADENOSON NUCLEAR STRESS TEST 7/18/2023:  ·  Stress Function: Left ventricular function post-stress is normal. Stress ejection fraction is 59 %.  Normal regional wall motion noted. ·  Perfusion: There are no perfusion defects. ·  Stress Combined Conclusion: The ECG and SPECT imaging portions of the stress study are concordant with no evidence of stress induced myocardial ischemia. Left ventricular perfusion is normal.  Post-rest ejection fraction is determined as 59%. ·  Stress ECG: No ST deviation is noted. There were no arrhythmias during recovery. . The ECG was negative for ischemia. ·  No anginal-like symptoms were reported during the stress test.  ·  Normal resting blood pressure.  Appropriate blood pressure response was noted during the stress test.    TRANSESOPHAGEAL ECHOCARDIOGRAM 2/3/2023:    Left Ventricle: Left ventricular cavity size is normal. The left ventricular ejection fraction is 60%. Systolic function is normal. Wall motion is normal.  ·  Left Atrium: The atrium is mildly dilated. ·  Atrial Septum: No patent foramen ovale detected using color flow Doppler at rest.  ·  Aortic Valve: The leaflets are severely thickened. The leaflets are severely calcified. There is moderately reduced mobility. There is no evidence of regurgitation. There is moderate stenosis. Vegetation could not be excluded on the basis of this study as the valve cusps are thickened and heavily calcified. No definitive vegetation is seen. ·  Mitral Valve: There is trace regurgitation. There is no evidence of stenosis. No vegetation present on the mitral valve. ·  Tricuspid Valve: There is trace regurgitation. There is no evidence of stenosis. No vegetation present on the tricuspid valve. ·  Prior TTE study available for comparison.  Prior study date: 1/30/2023. No significant changes noted compared to the prior study. CURRENT ECG   No results found for this visit on 10/02/23. CARDIOLOGY ASSESSMENT & PLAN     1. Severe aortic valve stenosis        2. Mixed hyperlipidemia  ezetimibe (ZETIA) 10 mg tablet    pravastatin (PRAVACHOL) 20 mg tablet    CK    Comprehensive metabolic panel    Lipid Panel with Direct LDL reflex      3. PVCs (premature ventricular contractions)        4. Heart failure with preserved ejection fraction        5. Statin intolerance          Severe aortic valve stenosis  Ms. Cyril Reyes has been diagnosed with stage D1 severe aortic valve stenosis. He has recently established care with cardiac surgeon Dr. Quyen Escobedo and is scheduled to undergo TAVR sometime in November. He has a history of intolerance to statin. He is not sure which statins he tried but he did take atorvastatin in the past.  His lipids are not controlled. He does occasionally smoke and hopes to quit at. He gives no recent symptoms that suggest angina pectoris. His nuclear stress test in July did not identify ischemic changes. His blood pressure is noted to be elevated but he states that It fluctuates a lot. He is not on ACE inhibitor or ARB therapy. -- At this time I am advising him to continue current antihypertensive medication. I am providing him with a blood pressure tracking sheet with instructions to record random blood pressures. Our goal is to get his systolic blood pressures at or less than 853 mmHg and diastolic blood pressures less than 80 mmHg. -- I am adding ezetimibe 10 mg daily to his regimen. He is advised to take it every other day in the beginning and then make it every day as long as he is not having side effects of muscle pains. I am also adding pravastatin at 20 mg every other day. He is advised to begin this about 7 to 10 days after starting ezetimibe.   I reviewed with him possible side effects of this medication and advised him to call us and report if he develops muscle aches again. I also want him to have CK and a CMP and a lipid profile checked in 1 month earlier if he is having muscle aches. -- He will continue to have the preoperative testing regarding his planned aortic valve procedure. -- I am advising him to see his family physician in the interim or call us if he is having symptoms of muscle aches or any other symptoms. -- He is advised to continue normal activities but to avoid exerting himself. -- We talked about quitting smoking as this is a single major risk factor. -- Dietary and medical compliance are reinforced. -- He is advised  to report any concerning symptoms such as chest pain, shortness of breath, decline in exercise tolerance or presyncope/syncope. Time spent in reviewing patient's prior medical record, preparing for visit and interacting with the patient and documentation was 50 minutes. INTERVAL HISTORY & HISTORY OF PRESENT ILLNESS     Dimple Aleman is here for follow-up regarding his cardiac comorbidities which include: Severe aortic valve stenosis, hypertension, dyslipidemia, chronic diastolic heart failure and other comorbidities. He was last seen by advanced practitioner Ms. Luna Reynolds in June 2023. I have reviewed his prior medical record and it is summarized above. Recently he underwent a follow-up echocardiogram results of which are summarized above. He has been seen by cardiac surgeon Dr. Riddhi Wade and is in process of being scheduled for TAVR. He is here for follow-up today. He reports that his main symptom is feeling fatigued especially towards the end of the day. He denies any typical anginal-like chest pain or dizziness or palpitations. He occasionally feels skipped heartbeat when he is lying down in a quiet state. He denies any orthopnea or PND or pedal edema. He denies any anginal-like chest pain. Functional capacity status:  Moderate to good   (Excellent- >10 METs; Good: (7-10 METs); Moderate (4-7 METs); Poor (<= 4 METs)    Any chronic stressors: None   (feeling of poor health, financial problems, and social isolation etc). Tobacco or alcohol dependence: He reports that he has been on and off smoker and now smokes maybe 1 to 2 cigarettes a day. He reports trying to quit several times and restarting and he hopes to quit it again. He reports drinking alcohol occasionally. He denies any family history of premature coronary artery disease or sudden cardiac death or atrial fibrillation. He is a retired . Current cardiac medications: Metoprolol succinate 12.5 mg daily furosemide 60 mg daily amlodipine 10 mg daily omega-3 fatty acids potassium chloride 10 mg daily magnesium 400 mg daily. Last routine chemistry blood work is from 7/3/2023:  Sodium 136 potassium 4.2 chloride 101 bicarb 26 BUN 26 creatinine 1.24 GFR 58  Calcium borderline high at 9.6  Normal LFTs  CBC from 9/5/2023 indicates stable hemoglobin and hematocrit  ESR recently elevated at 32  CRP 5.4  Hemoglobin A1c 5.913 2023  TSH 3.31801 2023  Lipid profile from 1/3/2023 showed total cholesterol 255 triglyceride 302 HDL direct 48 calculated . REVIEW OF SYSTEMS   Positive for: As noted above in HPI  Negative for: All remaining as reviewed below and in HPI.     SYSTEM SYMPTOMS REVIEWED:  General--weight change, fever, night sweats  Respiratory--cough, wheezing, shortness of breath, sputum production  Cardiovascular--chest pain, syncope, dyspnea on exertion, edema, decline in exercise tolerance, claudication   Gastrointestinal--persistent vomiting, diarrhea, abdominal distention, blood in stool   Muscular or skeletal--joint pain or swelling   Neurologic--headaches, syncope, abnormal movement  Hematologic--history of easy bruising and bleeding   Endocrine--thyroid enlargement, heat or cold intolerance, polyuria   Psychiatric--anxiety, depression *-*-*-*-*-*-*-*-*-*-*-*-*-*-*-*-*-*-*-*-*-*-*-*-*-*-*-*-*-*-*-*-*-*-*-*-*-*-*-*-*-*-*-*-*-*-*-*-*-*-*-*-*-*-  VITAL SIGNS     CURRENT VITAL SIGNS:   Vitals:    10/02/23 1001   BP: 148/88   Pulse: 62   SpO2: 99%   Weight: 99.8 kg (220 lb)   Height: 6' (1.829 m)       BMI: Body mass index is 29.84 kg/m². WEIGHTS:   Wt Readings from Last 25 Encounters:   10/02/23 99.8 kg (220 lb)   09/28/23 99.8 kg (220 lb)   09/08/23 95.3 kg (210 lb 1.6 oz)   07/06/23 95.3 kg (210 lb)   06/28/23 118 kg (261 lb)   06/21/23 118 kg (261 lb)   06/13/23 118 kg (261 lb)   06/13/23 119 kg (261 lb 9.6 oz)   06/12/23 98.9 kg (218 lb 0.6 oz)   06/06/23 99.8 kg (220 lb)   05/17/23 99.8 kg (220 lb)   05/10/23 99.8 kg (220 lb)   05/05/23 99.8 kg (220 lb)   05/01/23 99.8 kg (220 lb)   04/24/23 99.8 kg (220 lb)   04/19/23 97.5 kg (215 lb)   04/10/23 100 kg (221 lb)   03/31/23 98.9 kg (218 lb)   03/29/23 98.9 kg (218 lb)   03/10/23 97.1 kg (214 lb)   03/08/23 97.3 kg (214 lb 9.6 oz)   02/24/23 101 kg (222 lb)   02/16/23 101 kg (222 lb 3.2 oz)   02/03/23 101 kg (223 lb)   10/03/22 104 kg (230 lb)        *-*-*-*-*-*-*-*-*-*-*-*-*-*-*-*-*-*-*-*-*-*-*-*-*-*-*-*-*-*-*-*-*-*-*-*-*-*-*-*-*-*-*-*-*-*-*-*-*-*-*-*-*-*-  PHYSICAL EXAM     General Appearance:    Alert, cooperative, no distress, appears stated age, large built   Head, Eyes, ENT:    No obvious abnormality, moist mucous mebranes. Neck:   Supple, no carotid bruit or JVD   Back:     Symmetric, no curvature. Lungs:     Respirations unlabored. Clear to auscultation bilaterally,    Chest wall:    No tenderness or deformity   Heart:    Regular rate and rhythm, S1 and S2 normal, grade 3/6 harsh systolic murmur along upper sternal border consistent with aortic valve stenosis   Abdomen:     Soft, non-tender,   Extremities:   Extremities warm, no cyanosis or edema    Skin:   No venostatic changes in lower extremities. Normal skin color, texture, and turgor.  No rashes or lesions *-*-*-*-*-*-*-*-*-*-*-*-*-*-*-*-*-*-*-*-*-*-*-*-*-*-*-*-*-*-*-*-*-*-*-*-*-*-*-*-*-*-*-*-*-*-*-*-*-*-*-*-*-*-  CURRENT MEDICATIONS LIST     Current Outpatient Medications:   •  amLODIPine (NORVASC) 10 mg tablet, Take 10 mg by mouth daily, Disp: , Rfl: 0  •  buPROPion (WELLBUTRIN XL) 300 mg 24 hr tablet, , Disp: , Rfl:   •  escitalopram (LEXAPRO) 20 mg tablet, Take 20 mg by mouth daily, Disp: , Rfl: 1  •  ezetimibe (ZETIA) 10 mg tablet, Take 1 tablet (10 mg total) by mouth daily, Disp: 90 tablet, Rfl: 3  •  furosemide (LASIX) 40 mg tablet, Take 1.5 tablets (60 mg total) by mouth daily (Patient taking differently: Take 40 mg by mouth daily), Disp: 45 tablet, Rfl: 11  •  gabapentin (NEURONTIN) 100 mg capsule, Take 100 mg by mouth 1 (one) time Takes once a day at bedtime. , Disp: , Rfl:   •  levothyroxine 75 mcg tablet, , Disp: , Rfl:   •  Magnesium 400 MG CAPS, Take 1 capsule (400 mg total) by mouth in the morning, Disp: 90 capsule, Rfl: 3  •  metoprolol succinate (TOPROL-XL) 25 mg 24 hr tablet, Take 0.5 tablets (12.5 mg total) by mouth daily, Disp: 15 tablet, Rfl: 11  •  Omega-3 Fatty Acids (Fish Oil) 1200 MG CAPS, Take by mouth, Disp: , Rfl:   •  potassium chloride (MICRO-K) 10 MEQ CR capsule, Take 1 capsule (10 mEq total) by mouth daily, Disp: 90 capsule, Rfl: 3  •  pravastatin (PRAVACHOL) 20 mg tablet, Take 1 tablet (20 mg total) by mouth every other day, Disp: 90 tablet, Rfl: 6  •  Multiple Vitamins-Minerals (ICAPS AREDS 2 PO), Take by mouth (Patient not taking: Reported on 10/2/2023), Disp: , Rfl:   •  Multiple Vitamins-Minerals (OCUVITE ADULT FORMULA PO), Take 1 tablet by mouth Eye care vitamin daily (Patient not taking: Reported on 6/13/2023), Disp: , Rfl:        ALLERGIES     Allergies   Allergen Reactions   • Statins Myalgia     Other reaction(s): muscle aches and joint aches         *-*-*-*-*-*-*-*-*-*-*-*-*-*-*-*-*-*-*-*-*-*-*-*-*-*-*-*-*-*-*-*-*-*-*-*-*-*-*-*-*-*-*-*-*-*-*-*-*-*-*-*-*-*-  LABORATORY DATA No results found for: "NA"  Potassium   Date Value Ref Range Status   07/03/2023 4.2 3.5 - 5.3 mmol/L Final   06/26/2023 4.9 3.5 - 5.3 mmol/L Final   06/12/2023 4.1 3.5 - 5.3 mmol/L Final     Chloride   Date Value Ref Range Status   07/03/2023 101 96 - 108 mmol/L Final   06/26/2023 101 96 - 108 mmol/L Final   06/12/2023 103 96 - 108 mmol/L Final     CO2   Date Value Ref Range Status   07/03/2023 26 21 - 32 mmol/L Final   06/26/2023 28 21 - 32 mmol/L Final   06/12/2023 26 21 - 32 mmol/L Final     BUN   Date Value Ref Range Status   07/03/2023 26 (H) 5 - 25 mg/dL Final   06/26/2023 29 (H) 5 - 25 mg/dL Final   06/12/2023 16 5 - 25 mg/dL Final     Creatinine   Date Value Ref Range Status   07/03/2023 1.24 0.60 - 1.30 mg/dL Final     Comment:     Standardized to IDMS reference method   06/26/2023 1.63 (H) 0.60 - 1.30 mg/dL Final     Comment:     Standardized to IDMS reference method   06/12/2023 1.19 0.60 - 1.30 mg/dL Final     Comment:     Standardized to IDMS reference method     eGFR   Date Value Ref Range Status   07/03/2023 58 ml/min/1.73sq m Final   06/26/2023 42 ml/min/1.73sq m Final   06/12/2023 61 ml/min/1.73sq m Final     Calcium   Date Value Ref Range Status   07/03/2023 9.6 8.4 - 10.2 mg/dL Final   06/26/2023 10.3 (H) 8.4 - 10.2 mg/dL Final   06/12/2023 9.3 8.4 - 10.2 mg/dL Final     AST   Date Value Ref Range Status   07/03/2023 15 13 - 39 U/L Final   06/26/2023 11 (L) 13 - 39 U/L Final   06/12/2023 16 13 - 39 U/L Final     ALT   Date Value Ref Range Status   07/03/2023 12 7 - 52 U/L Final     Comment:     Specimen collection should occur prior to Sulfasalazine administration due to the potential for falsely depressed results. 06/26/2023 13 7 - 52 U/L Final     Comment:     Specimen collection should occur prior to Sulfasalazine administration due to the potential for falsely depressed results.     06/12/2023 14 7 - 52 U/L Final     Comment:     Specimen collection should occur prior to Sulfasalazine administration due to the potential for falsely depressed results. Alkaline Phosphatase   Date Value Ref Range Status   07/03/2023 84 34 - 104 U/L Final   06/26/2023 80 34 - 104 U/L Final   06/12/2023 46 34 - 104 U/L Final     Magnesium   Date Value Ref Range Status   06/08/2023 2.1 1.9 - 2.7 mg/dL Final   06/06/2023 1.9 1.9 - 2.7 mg/dL Final   02/14/2023 2.2 1.9 - 2.7 mg/dL Final     WBC   Date Value Ref Range Status   09/05/2023 9.78 4.31 - 10.16 Thousand/uL Final   07/03/2023 11.29 (H) 4.31 - 10.16 Thousand/uL Final   06/26/2023 11.78 (H) 4.31 - 10.16 Thousand/uL Final     Hemoglobin   Date Value Ref Range Status   09/05/2023 15.0 12.0 - 17.0 g/dL Final   07/03/2023 14.7 12.0 - 17.0 g/dL Final   06/26/2023 14.3 12.0 - 17.0 g/dL Final     Platelets   Date Value Ref Range Status   09/05/2023 276 149 - 390 Thousands/uL Final   07/03/2023 355 149 - 390 Thousands/uL Final   06/26/2023 332 149 - 390 Thousands/uL Final     PTT   Date Value Ref Range Status   06/06/2023 29 23 - 37 seconds Final     Comment:     Therapeutic Heparin Range =  60-90 seconds   02/13/2023 75 (H) 23 - 37 seconds Final     Comment:     Therapeutic Heparin Range =  60-90 seconds     INR   Date Value Ref Range Status   06/06/2023 0.89 0.84 - 1.19 Final   02/12/2023 0.96 0.84 - 1.19 Final     No results found for: "CKMB", "DIGOXIN"  No results found for: "TSH"  No results found for: "CHOL"   Hemoglobin A1C   Date Value Ref Range Status   01/03/2023 5.9 (H) <5.7 % Final     Comment:     Reference Range  Non-diabetic                     <5.7  Pre-diabetic                     5.7-6.4  Diabetic                         >=6.5  ADA target for diabetic control  <=7     Blood Culture   Date Value Ref Range Status   06/06/2023 No Growth After 5 Days. Final   06/06/2023 No Growth After 5 Days. Final   02/12/2023 No Growth After 5 Days. Final   02/12/2023 No Growth After 5 Days. Final   02/02/2023 No Growth After 5 Days.   Final   02/02/2023 No Growth After 5 Days. Final   01/31/2023 Staphylococcus aureus (A)  Final     Comment:     see related culture for Identification and/or Susceptibilitiy   01/31/2023 Staphylococcus aureus (A)  Final     Gram Stain Result   Date Value Ref Range Status   06/07/2023 Rare Polys  Final   06/07/2023 No bacteria seen  Final   06/07/2023 Rare Polys  Final   06/07/2023 No bacteria seen  Final   06/06/2023 1+ Polys  Final   06/06/2023 No bacteria seen  Final   01/31/2023 Gram positive cocci in clusters (A)  Final     Comment: This is an appended report. These results have been appended to a previously preliminary verified report. 01/31/2023 Gram positive cocci in clusters (A)  Final     Comment: This is an appended report. These results have been appended to a previously preliminary verified report. Wound Culture   Date Value Ref Range Status   06/06/2023 1+ Growth of Acinetobacter species (A)  Final     Comment:     Amoxicillin/clavulanate is NOT active against this isolate. 06/06/2023 1+ Growth of  Final     Comment:     Mixed Skin Deanna       *-*-*-*-*-*-*-*-*-*-*-*-*-*-*-*-*-*-*-*-*-*-*-*-*-*-*-*-*-*-*-*-*-*-*-*-*-*-*-*-*-*-*-*-*-*-*-*-*-*-*-*-*-*-  PREVIOUS CARDIOLOGY & RADIOLOGY TEST RESULTS   I have personally reviewed pertinent results of cardiovascular tests noted below. No results found for this or any previous visit. No results found for this or any previous visit. No results found for this or any previous visit. No results found for this or any previous visit. XR foot 3+ vw left  Narrative: LEFT FOOT    INDICATION:   H50.129: Acquired absence of left great toe. COMPARISON: Left foot x-ray 8/9/2023    VIEWS:  XR FOOT 3+ VW LEFT  Images: 3    FINDINGS:    Stable dorsal dislocation of second phalanx at the MTP joint. Stable postsurgical changes of amputation at the level of the distal first metatarsal with stable cortical irregularity. Chronic deformity of third metatarsal head.     No significant degenerative changes. No lytic or blastic osseous lesion. Soft tissues are unremarkable. Impression: Stable postsurgical changes of amputation at the distal first metatarsal.    Stable dorsal dislocation at the second MTP joint.     Workstation performed: [id number]        *-*-*-*-*-*-*-*-*-*-*-*-*-*-*-*-*-*-*-*-*-*-*-*-*-*-*-*-*-*-*-*-*-*-*-*-*-*-*-*-*-*-*-*-*-*-*-*-*-*-*-*-*-*-  SIGNATURES:   [unfilled]   Neyda Ramirez MD; [facility]    *-*-*-*-*-*-*-*-*-*-*-*-*-*-*-*-*-*-*-*-*-*-*-*-*-*-*-*-*-*-*-*-*-*-*-*-*-*-*-*-*-*-*-*-*-*-*-*-*-*-*-*-*-*-  PAST MEDICAL HISTORY:  Past Medical History:   Diagnosis Date   • Anxiety    • Aortic stenosis    • Borderline high cholesterol    • CHF (congestive heart failure) ([facility])     Asaf Jackson-cardio-   • Colon polyp    • Depression    • Heart murmur    • Hypertension    • Hypothyroidism    • Prediabetes    • PVCs (premature ventricular contractions)     "skip"   • Wears glasses     readers    PAST SURGICAL HISTORY:  Past Surgical History:   Procedure Laterality Date   • APPENDECTOMY     • BACK SURGERY     • CATARACT EXTRACTION Bilateral    • CLOSURE DELAYED PRIMARY Left 06/09/2023    Procedure: CLOSURE DELAYED PRIMARY;  Surgeon: Marie Gaston DPM;  Location: [facility];  Service: Podiatry   • COLONOSCOPY  2023    NATHALIE Guerra   • AL CORRJ HALLUX VALGUS W/SESMDC W/RESCJ PROX PHAL Left 04/19/2023    Procedure: Joel Matt and wound debridement;  Surgeon: Marcelle Mayo DPM;  Location: [facility];  Service: Podiatry   • TOE AMPUTATION Left 06/07/2023    Procedure: PARTIAL 1ST RAY AMPUTATION;  Surgeon: Marie Gaston DPM;  Location:  MAIN OR;  Service: Podiatry   • TONSILLECTOMY     • TOTAL SHOULDER REPLACEMENT Left    • WISDOM TOOTH EXTRACTION           FAMILY HISTORY:  Family History   Problem Relation Age of Onset   • Dementia Mother    • Blindness Mother    • Cancer Mother    • Dementia Father     SOCIAL HISTORY:  Social History     Tobacco Use   Smoking Status Every Day   • Packs/day: 0.50   • Years: 50.00   • Total pack years: 25.00   • Types: Cigarettes   • Last attempt to quit: 2023   • Years since quittin.7   Smokeless Tobacco Never      Social History     Substance and Sexual Activity   Alcohol Use Yes    Comment: socially-2 drinks per week     Social History     Substance and Sexual Activity   Drug Use Not Currently    Comment: sporadic in young adulthood    [unfilled]     *-*-*-*-*-*-*-*-*-*-*-*-*-*-*-*-*-*-*-*-*-*-*-*-*-*-*-*-*-*-*-*-*-*-*-*-*-*-*-*-*-*-*-*-*-*-*-*-*-*-*-*-*-*  ALLERGIES:  Allergies   Allergen Reactions   • Statins Myalgia     Other reaction(s): muscle aches and joint aches      CURRENT SCHEDULED MEDICATIONS:    Current Outpatient Medications:   •  amLODIPine (NORVASC) 10 mg tablet, Take 10 mg by mouth daily, Disp: , Rfl: 0  •  buPROPion (WELLBUTRIN XL) 300 mg 24 hr tablet, , Disp: , Rfl:   •  escitalopram (LEXAPRO) 20 mg tablet, Take 20 mg by mouth daily, Disp: , Rfl: 1  •  ezetimibe (ZETIA) 10 mg tablet, Take 1 tablet (10 mg total) by mouth daily, Disp: 90 tablet, Rfl: 3  •  furosemide (LASIX) 40 mg tablet, Take 1.5 tablets (60 mg total) by mouth daily (Patient taking differently: Take 40 mg by mouth daily), Disp: 45 tablet, Rfl: 11  •  gabapentin (NEURONTIN) 100 mg capsule, Take 100 mg by mouth 1 (one) time Takes once a day at bedtime. , Disp: , Rfl:   •  levothyroxine 75 mcg tablet, , Disp: , Rfl:   •  Magnesium 400 MG CAPS, Take 1 capsule (400 mg total) by mouth in the morning, Disp: 90 capsule, Rfl: 3  •  metoprolol succinate (TOPROL-XL) 25 mg 24 hr tablet, Take 0.5 tablets (12.5 mg total) by mouth daily, Disp: 15 tablet, Rfl: 11  •  Omega-3 Fatty Acids (Fish Oil) 1200 MG CAPS, Take by mouth, Disp: , Rfl:   •  potassium chloride (MICRO-K) 10 MEQ CR capsule, Take 1 capsule (10 mEq total) by mouth daily, Disp: 90 capsule, Rfl: 3  •  pravastatin (PRAVACHOL) 20 mg tablet, Take 1 tablet (20 mg total) by mouth every other day, Disp: 90 tablet, Rfl: 6  •  Multiple Vitamins-Minerals (ICAPS AREDS 2 PO), Take by mouth (Patient not taking: Reported on 10/2/2023), Disp: , Rfl:   •  Multiple Vitamins-Minerals (OCUVITE ADULT FORMULA PO), Take 1 tablet by mouth Eye care vitamin daily (Patient not taking: Reported on 6/13/2023), Disp: , Rfl:      *-*-*-*-*-*-*-*-*-*-*-*-*-*-*-*-*-*-*-*-*-*-*-*-*-*-*-*-*-*-*-*-*-*-*-*-*-*-*-*-*-*-*-*-*-*-*-*-*-*-*-*-*-*

## 2023-10-02 ENCOUNTER — TELEPHONE (OUTPATIENT)
Dept: CARDIOLOGY CLINIC | Facility: CLINIC | Age: 68
End: 2023-10-02

## 2023-10-02 ENCOUNTER — APPOINTMENT (OUTPATIENT)
Dept: LAB | Facility: HOSPITAL | Age: 68
End: 2023-10-02
Payer: MEDICARE

## 2023-10-02 ENCOUNTER — OFFICE VISIT (OUTPATIENT)
Dept: CARDIOLOGY CLINIC | Facility: CLINIC | Age: 68
End: 2023-10-02
Payer: MEDICARE

## 2023-10-02 VITALS
HEIGHT: 72 IN | WEIGHT: 220 LBS | OXYGEN SATURATION: 99 % | SYSTOLIC BLOOD PRESSURE: 148 MMHG | DIASTOLIC BLOOD PRESSURE: 88 MMHG | BODY MASS INDEX: 29.8 KG/M2 | HEART RATE: 62 BPM

## 2023-10-02 DIAGNOSIS — I35.0 SEVERE AORTIC VALVE STENOSIS: Primary | ICD-10-CM

## 2023-10-02 DIAGNOSIS — Z13.6 ENCOUNTER FOR SCREENING FOR STENOSIS OF CAROTID ARTERY: ICD-10-CM

## 2023-10-02 DIAGNOSIS — Z78.9 STATIN INTOLERANCE: ICD-10-CM

## 2023-10-02 DIAGNOSIS — R06.02 SHORTNESS OF BREATH: ICD-10-CM

## 2023-10-02 DIAGNOSIS — E78.2 MIXED HYPERLIPIDEMIA: ICD-10-CM

## 2023-10-02 DIAGNOSIS — I49.3 PVCS (PREMATURE VENTRICULAR CONTRACTIONS): ICD-10-CM

## 2023-10-02 LAB
ALBUMIN SERPL BCP-MCNC: 4.2 G/DL (ref 3.5–5)
ALP SERPL-CCNC: 76 U/L (ref 34–104)
ALT SERPL W P-5'-P-CCNC: 12 U/L (ref 7–52)
ANION GAP SERPL CALCULATED.3IONS-SCNC: 7 MMOL/L
AST SERPL W P-5'-P-CCNC: 16 U/L (ref 13–39)
BASOPHILS # BLD AUTO: 0.07 THOUSANDS/ÂΜL (ref 0–0.1)
BASOPHILS NFR BLD AUTO: 1 % (ref 0–1)
BILIRUB SERPL-MCNC: 0.41 MG/DL (ref 0.2–1)
BUN SERPL-MCNC: 22 MG/DL (ref 5–25)
CALCIUM SERPL-MCNC: 9.9 MG/DL (ref 8.4–10.2)
CHLORIDE SERPL-SCNC: 102 MMOL/L (ref 96–108)
CO2 SERPL-SCNC: 27 MMOL/L (ref 21–32)
CREAT SERPL-MCNC: 1.14 MG/DL (ref 0.6–1.3)
EOSINOPHIL # BLD AUTO: 0.44 THOUSAND/ÂΜL (ref 0–0.61)
EOSINOPHIL NFR BLD AUTO: 5 % (ref 0–6)
ERYTHROCYTE [DISTWIDTH] IN BLOOD BY AUTOMATED COUNT: 14.2 % (ref 11.6–15.1)
GFR SERPL CREATININE-BSD FRML MDRD: 65 ML/MIN/1.73SQ M
GLUCOSE P FAST SERPL-MCNC: 100 MG/DL (ref 65–99)
HCT VFR BLD AUTO: 49.7 % (ref 36.5–49.3)
HGB BLD-MCNC: 16.1 G/DL (ref 12–17)
IMM GRANULOCYTES # BLD AUTO: 0.04 THOUSAND/UL (ref 0–0.2)
IMM GRANULOCYTES NFR BLD AUTO: 1 % (ref 0–2)
LYMPHOCYTES # BLD AUTO: 1.97 THOUSANDS/ÂΜL (ref 0.6–4.47)
LYMPHOCYTES NFR BLD AUTO: 22 % (ref 14–44)
MCH RBC QN AUTO: 30.9 PG (ref 26.8–34.3)
MCHC RBC AUTO-ENTMCNC: 32.4 G/DL (ref 31.4–37.4)
MCV RBC AUTO: 95 FL (ref 82–98)
MONOCYTES # BLD AUTO: 0.93 THOUSAND/ÂΜL (ref 0.17–1.22)
MONOCYTES NFR BLD AUTO: 11 % (ref 4–12)
NEUTROPHILS # BLD AUTO: 5.33 THOUSANDS/ÂΜL (ref 1.85–7.62)
NEUTS SEG NFR BLD AUTO: 60 % (ref 43–75)
NRBC BLD AUTO-RTO: 0 /100 WBCS
PLATELET # BLD AUTO: 279 THOUSANDS/UL (ref 149–390)
PMV BLD AUTO: 9.6 FL (ref 8.9–12.7)
POTASSIUM SERPL-SCNC: 4.4 MMOL/L (ref 3.5–5.3)
PROT SERPL-MCNC: 7.9 G/DL (ref 6.4–8.4)
RBC # BLD AUTO: 5.21 MILLION/UL (ref 3.88–5.62)
SODIUM SERPL-SCNC: 136 MMOL/L (ref 135–147)
WBC # BLD AUTO: 8.78 THOUSAND/UL (ref 4.31–10.16)

## 2023-10-02 PROCEDURE — 80053 COMPREHEN METABOLIC PANEL: CPT

## 2023-10-02 PROCEDURE — 36415 COLL VENOUS BLD VENIPUNCTURE: CPT

## 2023-10-02 PROCEDURE — 99215 OFFICE O/P EST HI 40 MIN: CPT | Performed by: INTERNAL MEDICINE

## 2023-10-02 PROCEDURE — 85025 COMPLETE CBC W/AUTO DIFF WBC: CPT

## 2023-10-02 RX ORDER — PRAVASTATIN SODIUM 20 MG
20 TABLET ORAL EVERY OTHER DAY
Qty: 90 TABLET | Refills: 6 | Status: SHIPPED | OUTPATIENT
Start: 2023-10-02

## 2023-10-02 RX ORDER — EZETIMIBE 10 MG/1
10 TABLET ORAL DAILY
Qty: 90 TABLET | Refills: 3 | Status: SHIPPED | OUTPATIENT
Start: 2023-10-02

## 2023-10-02 NOTE — TELEPHONE ENCOUNTER
[facility] is not required. Verified that Medicare is primary with a secondary on file.

## 2023-10-02 NOTE — PATIENT INSTRUCTIONS
CARDIOLOGY ASSESSMENT & PLAN     1. Severe aortic valve stenosis        2. Mixed hyperlipidemia  ezetimibe (ZETIA) 10 mg tablet    pravastatin (PRAVACHOL) 20 mg tablet    CK    Comprehensive metabolic panel    Lipid Panel with Direct LDL reflex      3. PVCs (premature ventricular contractions)        4. Heart failure with preserved ejection fraction        5. Statin intolerance          Severe aortic valve stenosis  Ms. José Gonzalez has been diagnosed with stage D1 severe aortic valve stenosis. He has recently established care with cardiac surgeon Dr. Genevieve Galaviz and is scheduled to undergo TAVR sometime in November. He has a history of intolerance to statin. He is not sure which statins he tried but he did take atorvastatin in the past.  His lipids are not controlled. He does occasionally smoke and hopes to quit at. He gives no recent symptoms that suggest angina pectoris. His nuclear stress test in July did not identify ischemic changes. His blood pressure is noted to be elevated but he states that It fluctuates a lot. He is not on ACE inhibitor or ARB therapy. -- At this time I am advising him to continue current antihypertensive medication. I am providing him with a blood pressure tracking sheet with instructions to record random blood pressures. Our goal is to get his systolic blood pressures at or less than 439 mmHg and diastolic blood pressures less than 80 mmHg. -- I am adding ezetimibe 10 mg daily to his regimen. He is advised to take it every other day in the beginning and then make it every day as long as he is not having side effects of muscle pains. I am also adding pravastatin at 20 mg every other day. He is advised to begin this about 7 to 10 days after starting ezetimibe. I reviewed with him possible side effects of this medication and advised him to call us and report if he develops muscle aches again.   I also want him to have CK and a CMP and a lipid profile checked in 1 month earlier if he is having muscle aches. -- He will continue to have the preoperative testing regarding his planned aortic valve procedure. -- I am advising him to see his family physician in the interim or call us if he is having symptoms of muscle aches or any other symptoms. -- He is advised to continue normal activities but to avoid exerting himself. -- We talked about quitting smoking as this is a single major risk factor. -- Dietary and medical compliance are reinforced. -- He is advised  to report any concerning symptoms such as chest pain, shortness of breath, decline in exercise tolerance or presyncope/syncope.

## 2023-10-02 NOTE — ASSESSMENT & PLAN NOTE
Ms. Braulio Deras has been diagnosed with stage D1 severe aortic valve stenosis. He has recently established care with cardiac surgeon Dr. Clair Calvo and is scheduled to undergo TAVR sometime in November. He has a history of intolerance to statin. He is not sure which statins he tried but he did take atorvastatin in the past.  His lipids are not controlled. He does occasionally smoke and hopes to quit at. He gives no recent symptoms that suggest angina pectoris. His nuclear stress test in July did not identify ischemic changes. His blood pressure is noted to be elevated but he states that It fluctuates a lot. He is not on ACE inhibitor or ARB therapy. -- At this time I am advising him to continue current antihypertensive medication. I am providing him with a blood pressure tracking sheet with instructions to record random blood pressures. Our goal is to get his systolic blood pressures at or less than 610 mmHg and diastolic blood pressures less than 80 mmHg. -- I am adding ezetimibe 10 mg daily to his regimen. He is advised to take it every other day in the beginning and then make it every day as long as he is not having side effects of muscle pains. I am also adding pravastatin at 20 mg every other day. He is advised to begin this about 7 to 10 days after starting ezetimibe. I reviewed with him possible side effects of this medication and advised him to call us and report if he develops muscle aches again. I also want him to have CK and a CMP and a lipid profile checked in 1 month earlier if he is having muscle aches. -- He will continue to have the preoperative testing regarding his planned aortic valve procedure. -- I am advising him to see his family physician in the interim or call us if he is having symptoms of muscle aches or any other symptoms. -- He is advised to continue normal activities but to avoid exerting himself.   -- We talked about quitting smoking as this is a single major risk factor. -- Dietary and medical compliance are reinforced. -- He is advised  to report any concerning symptoms such as chest pain, shortness of breath, decline in exercise tolerance or presyncope/syncope. Time spent in reviewing patient's prior medical record, preparing for visit and interacting with the patient and documentation was 50 minutes.

## 2023-10-02 NOTE — TELEPHONE ENCOUNTER
----- Message from [facility] sent at 10/2/2023  2:28 PM EDT -----  Please let Katiana Sheriff know his blood work shows stable blood count and kidney function. Thank you!

## 2023-10-06 ENCOUNTER — HOSPITAL ENCOUNTER (OUTPATIENT)
Dept: NON INVASIVE DIAGNOSTICS | Facility: HOSPITAL | Age: 68
Discharge: HOME | End: 2023-10-06
Payer: MEDICARE

## 2023-10-06 ENCOUNTER — HOSPITAL ENCOUNTER (OUTPATIENT)
Dept: RADIOLOGY | Facility: HOSPITAL | Age: 68
Discharge: HOME | End: 2023-10-06
Payer: MEDICARE

## 2023-10-06 DIAGNOSIS — Z13.6 ENCOUNTER FOR SCREENING FOR STENOSIS OF CAROTID ARTERY: ICD-10-CM

## 2023-10-06 DIAGNOSIS — R09.89 OTHER SPECIFIED SYMPTOMS AND SIGNS INVOLVING THE CIRCULATORY AND RESPIRATORY SYSTEMS: ICD-10-CM

## 2023-10-06 PROCEDURE — 74174 CTA ABD&PLVS W/CONTRAST: CPT

## 2023-10-06 PROCEDURE — 93880 EXTRACRANIAL BILAT STUDY: CPT | Performed by: SURGERY

## 2023-10-06 PROCEDURE — G1004 CDSM NDSC: HCPCS

## 2023-10-06 PROCEDURE — 93880 EXTRACRANIAL BILAT STUDY: CPT

## 2023-10-06 PROCEDURE — 75572 CT HRT W/3D IMAGE: CPT

## 2023-10-06 RX ORDER — IODIXANOL 320 MG/ML
120 INJECTION, SOLUTION INTRAVASCULAR
Status: COMPLETED | OUTPATIENT
Start: 2023-10-06 | End: 2023-10-06

## 2023-10-06 RX ADMIN — IODIXANOL 120 ML: 320 INJECTION, SOLUTION INTRAVASCULAR at 11:15

## 2023-10-17 ENCOUNTER — HOSPITAL ENCOUNTER (OUTPATIENT)
Facility: HOSPITAL | Age: 68
Setting detail: OUTPATIENT SURGERY
Discharge: HOME | End: 2023-10-17
Attending: INTERNAL MEDICINE | Admitting: INTERNAL MEDICINE
Payer: MEDICARE

## 2023-10-17 ENCOUNTER — TELEPHONE (OUTPATIENT)
Dept: CARDIOLOGY CLINIC | Facility: CLINIC | Age: 68
End: 2023-10-17

## 2023-10-17 VITALS
BODY MASS INDEX: 30.61 KG/M2 | SYSTOLIC BLOOD PRESSURE: 138 MMHG | OXYGEN SATURATION: 95 % | HEART RATE: 54 BPM | RESPIRATION RATE: 16 BRPM | HEIGHT: 72 IN | DIASTOLIC BLOOD PRESSURE: 76 MMHG | WEIGHT: 226 LBS | TEMPERATURE: 97.8 F

## 2023-10-17 DIAGNOSIS — E78.2 MIXED HYPERLIPIDEMIA: Primary | ICD-10-CM

## 2023-10-17 DIAGNOSIS — I35.0 SEVERE AORTIC STENOSIS: ICD-10-CM

## 2023-10-17 DIAGNOSIS — Z78.9 STATIN INTOLERANCE: ICD-10-CM

## 2023-10-17 DIAGNOSIS — Z95.5 S/P DRUG ELUTING CORONARY STENT PLACEMENT: Primary | ICD-10-CM

## 2023-10-17 DIAGNOSIS — I25.10 CAD (CORONARY ARTERY DISEASE): ICD-10-CM

## 2023-10-17 DIAGNOSIS — N18.2 CKD (CHRONIC KIDNEY DISEASE) STAGE 2, GFR 60-89 ML/MIN: ICD-10-CM

## 2023-10-17 LAB
ATRIAL RATE: 55 BPM
ATRIAL RATE: 57 BPM
ATRIAL RATE: 60 BPM
KCT BLD-ACNC: 246 SEC (ref 89–137)
KCT BLD-ACNC: 306 SEC (ref 89–137)
P AXIS: 46 DEGREES
P AXIS: 47 DEGREES
P AXIS: 58 DEGREES
PR INTERVAL: 166 MS
PR INTERVAL: 166 MS
PR INTERVAL: 174 MS
QRS AXIS: 38 DEGREES
QRS AXIS: 46 DEGREES
QRS AXIS: 54 DEGREES
QRSD INTERVAL: 94 MS
QT INTERVAL: 438 MS
QT INTERVAL: 438 MS
QT INTERVAL: 444 MS
QTC INTERVAL: 419 MS
QTC INTERVAL: 432 MS
QTC INTERVAL: 438 MS
SPECIMEN SOURCE: ABNORMAL
SPECIMEN SOURCE: ABNORMAL
T WAVE AXIS: 34 DEGREES
T WAVE AXIS: 46 DEGREES
T WAVE AXIS: 48 DEGREES
VENTRICULAR RATE: 55 BPM
VENTRICULAR RATE: 57 BPM
VENTRICULAR RATE: 60 BPM

## 2023-10-17 PROCEDURE — 99152 MOD SED SAME PHYS/QHP 5/>YRS: CPT | Performed by: INTERNAL MEDICINE

## 2023-10-17 PROCEDURE — C1769 GUIDE WIRE: HCPCS | Performed by: INTERNAL MEDICINE

## 2023-10-17 PROCEDURE — C1725 CATH, TRANSLUMIN NON-LASER: HCPCS | Performed by: INTERNAL MEDICINE

## 2023-10-17 PROCEDURE — 85347 COAGULATION TIME ACTIVATED: CPT

## 2023-10-17 PROCEDURE — 93454 CORONARY ARTERY ANGIO S&I: CPT | Performed by: INTERNAL MEDICINE

## 2023-10-17 PROCEDURE — 99153 MOD SED SAME PHYS/QHP EA: CPT | Performed by: INTERNAL MEDICINE

## 2023-10-17 PROCEDURE — C1887 CATHETER, GUIDING: HCPCS | Performed by: INTERNAL MEDICINE

## 2023-10-17 PROCEDURE — C1874 STENT, COATED/COV W/DEL SYS: HCPCS | Performed by: INTERNAL MEDICINE

## 2023-10-17 PROCEDURE — 93571 IV DOP VEL&/PRESS C FLO 1ST: CPT | Performed by: INTERNAL MEDICINE

## 2023-10-17 PROCEDURE — 92928 PRQ TCAT PLMT NTRAC ST 1 LES: CPT | Performed by: INTERNAL MEDICINE

## 2023-10-17 PROCEDURE — 93005 ELECTROCARDIOGRAM TRACING: CPT

## 2023-10-17 PROCEDURE — 93010 ELECTROCARDIOGRAM REPORT: CPT | Performed by: INTERNAL MEDICINE

## 2023-10-17 PROCEDURE — C9600 PERC DRUG-EL COR STENT SING: HCPCS | Performed by: INTERNAL MEDICINE

## 2023-10-17 PROCEDURE — C1894 INTRO/SHEATH, NON-LASER: HCPCS | Performed by: INTERNAL MEDICINE

## 2023-10-17 PROCEDURE — 92978 ENDOLUMINL IVUS OCT C 1ST: CPT | Performed by: INTERNAL MEDICINE

## 2023-10-17 PROCEDURE — C1753 CATH, INTRAVAS ULTRASOUND: HCPCS | Performed by: INTERNAL MEDICINE

## 2023-10-17 DEVICE — STENT ONYXNG40026UX ONYX 4.00X26RX
Type: IMPLANTABLE DEVICE | Status: FUNCTIONAL
Brand: ONYX FRONTIER™

## 2023-10-17 RX ORDER — MIDAZOLAM HYDROCHLORIDE 2 MG/2ML
INJECTION, SOLUTION INTRAMUSCULAR; INTRAVENOUS CODE/TRAUMA/SEDATION MEDICATION
Status: DISCONTINUED | OUTPATIENT
Start: 2023-10-17 | End: 2023-10-17 | Stop reason: HOSPADM

## 2023-10-17 RX ORDER — CLOPIDOGREL BISULFATE 75 MG/1
75 TABLET ORAL DAILY
Status: DISCONTINUED | OUTPATIENT
Start: 2023-10-18 | End: 2023-10-17 | Stop reason: HOSPADM

## 2023-10-17 RX ORDER — CLOPIDOGREL BISULFATE 75 MG/1
75 TABLET ORAL DAILY
Qty: 30 TABLET | Refills: 0 | Status: SHIPPED | OUTPATIENT
Start: 2023-10-17

## 2023-10-17 RX ORDER — LIDOCAINE HYDROCHLORIDE 10 MG/ML
INJECTION, SOLUTION EPIDURAL; INFILTRATION; INTRACAUDAL; PERINEURAL CODE/TRAUMA/SEDATION MEDICATION
Status: DISCONTINUED | OUTPATIENT
Start: 2023-10-17 | End: 2023-10-17 | Stop reason: HOSPADM

## 2023-10-17 RX ORDER — VERAPAMIL HCL 2.5 MG/ML
AMPUL (ML) INTRAVENOUS CODE/TRAUMA/SEDATION MEDICATION
Status: DISCONTINUED | OUTPATIENT
Start: 2023-10-17 | End: 2023-10-17 | Stop reason: HOSPADM

## 2023-10-17 RX ORDER — SODIUM CHLORIDE 9 MG/ML
125 INJECTION, SOLUTION INTRAVENOUS CONTINUOUS
Status: DISCONTINUED | OUTPATIENT
Start: 2023-10-17 | End: 2023-10-17 | Stop reason: HOSPADM

## 2023-10-17 RX ORDER — SODIUM CHLORIDE 9 MG/ML
100 INJECTION, SOLUTION INTRAVENOUS CONTINUOUS
Status: DISCONTINUED | OUTPATIENT
Start: 2023-10-17 | End: 2023-10-17 | Stop reason: HOSPADM

## 2023-10-17 RX ORDER — CLOPIDOGREL BISULFATE 75 MG/1
600 TABLET ORAL ONCE
Status: COMPLETED | OUTPATIENT
Start: 2023-10-17 | End: 2023-10-17

## 2023-10-17 RX ORDER — NITROGLYCERIN 0.4 MG/1
0.4 TABLET SUBLINGUAL
Status: DISCONTINUED | OUTPATIENT
Start: 2023-10-17 | End: 2023-10-17 | Stop reason: HOSPADM

## 2023-10-17 RX ORDER — ASPIRIN 81 MG/1
324 TABLET, CHEWABLE ORAL ONCE
Status: COMPLETED | OUTPATIENT
Start: 2023-10-17 | End: 2023-10-17

## 2023-10-17 RX ORDER — ASPIRIN 81 MG/1
TABLET, CHEWABLE ORAL
Status: DISCONTINUED
Start: 2023-10-17 | End: 2023-10-17 | Stop reason: HOSPADM

## 2023-10-17 RX ORDER — NITROGLYCERIN 0.4 MG/1
TABLET SUBLINGUAL
Status: DISCONTINUED
Start: 2023-10-17 | End: 2023-10-17 | Stop reason: HOSPADM

## 2023-10-17 RX ORDER — NITROGLYCERIN 20 MG/100ML
INJECTION INTRAVENOUS CODE/TRAUMA/SEDATION MEDICATION
Status: DISCONTINUED | OUTPATIENT
Start: 2023-10-17 | End: 2023-10-17 | Stop reason: HOSPADM

## 2023-10-17 RX ORDER — FENTANYL CITRATE 50 UG/ML
INJECTION, SOLUTION INTRAMUSCULAR; INTRAVENOUS CODE/TRAUMA/SEDATION MEDICATION
Status: DISCONTINUED | OUTPATIENT
Start: 2023-10-17 | End: 2023-10-17 | Stop reason: HOSPADM

## 2023-10-17 RX ORDER — ASPIRIN 81 MG/1
81 TABLET, CHEWABLE ORAL DAILY
Qty: 30 TABLET | Refills: 0 | Status: SHIPPED | OUTPATIENT
Start: 2023-10-17

## 2023-10-17 RX ORDER — HEPARIN SODIUM 1000 [USP'U]/ML
INJECTION, SOLUTION INTRAVENOUS; SUBCUTANEOUS CODE/TRAUMA/SEDATION MEDICATION
Status: DISCONTINUED | OUTPATIENT
Start: 2023-10-17 | End: 2023-10-17 | Stop reason: HOSPADM

## 2023-10-17 RX ADMIN — SODIUM CHLORIDE 125 ML/HR: 0.9 INJECTION, SOLUTION INTRAVENOUS at 07:56

## 2023-10-17 RX ADMIN — NITROGLYCERIN 0.4 MG: 0.4 TABLET SUBLINGUAL at 11:17

## 2023-10-17 RX ADMIN — CLOPIDOGREL BISULFATE 600 MG: 75 TABLET ORAL at 07:55

## 2023-10-17 RX ADMIN — ASPIRIN 81 MG CHEWABLE TABLET 324 MG: 81 TABLET CHEWABLE at 07:54

## 2023-10-17 NOTE — INTERVAL H&P NOTE
H&P reviewed. After examining the patient, I find no changed to the H&P since it had been written. Patient is a 78 y/o male with pmh of severe AS, HLD, HTN, diastolic HF, and tobacco use who presents for [facility] for preop TAVR. /83 (BP Location: Right arm)   Pulse 62   Temp 97.6 °F (36.4 °C) (Temporal)   Resp 16   Ht 6' (1.829 m)   Wt 103 kg (226 lb)   SpO2 96%   BMI 30.65 kg/m²      Patient re-evaluated.  Accept as history and physical.    Gene Jigna, DO/October 17, 2023/8:07 AM

## 2023-10-17 NOTE — TELEPHONE ENCOUNTER
I called and spoke with Corin Person and his wife. They are agreeable to the PCSK 9 inhibitor. It appears Praluent is preferred so that is what I ordered to [facility] if you can please do the prior auth. Thank you!

## 2023-10-17 NOTE — DISCHARGE SUMMARY
Discharge Summary - Cyril Burn 77 y.o. male MRN: [id number]    Unit/Bed#:  CATH LAB ROOM Encounter: [id number]    Admission Date: 10/17/2023   Discharge Date: 10/17/2023  Disposition: Home    Condition at Discharge: good     Frances Gardner MD      OP Cardiologist: Junior Kelly, [facility]    Interventional cardiologist: Dr. Juan Bray    Admitting Diagnosis:  Severe aortic stenosis    Secondary Diagnoses:   Chronic diastolic heart failure with reduced ejection fraction  Hypertension  MSSA bacteremia  History of acute kidney injury  Anxiety      Discharge Diagnosis:  CAD status post PCI and drug-eluting stent to 80% proximal mid LAD lesion. CAD with nonobstructive plaque of the R mid RCA    /68   Pulse 55   Temp 97.8 °F (36.6 °C) (Temporal)   Resp 16   Ht 6' (1.829 m)   Wt 103 kg (226 lb)   SpO2 95%   BMI 30.65 kg/m²       Review of Systems    Physical Exam        HPI and Hospital Course: 78-year-old male with history of severe aortic stenosis. Patient has a history of hypertension, acute kidney injury chronic diastolic heart failure. He has been seen by Dr. Quyen Escobedo in the office and is planning to proceed with TAVR surgical procedure. Patient was electively admitted for his cardiac catheterization. The results with catheter are as follows:    Left Main   The vessel is large and is angiographically normal.      Left Anterior Descending   The vessel is large. Prox LAD to Mid LAD lesion is 80% stenosed. PLACIDO flow is 3. iFR was measured in the Prox LAD to Mid LAD. iFR ratio: 0.9. The iFR was significant, intervened. Ultrasound (IVUS) was performed on the Prox LAD to Mid LAD. Ramus Intermedius   The vessel is large. There is mild diffuse disease throughout the vessel. Left Circumflex   The vessel is large. There is mild diffuse disease throughout the vessel. Right Coronary Artery   The vessel is moderate in size and dominant.  There is moderate diffuse disease throughout the vessel. Mid RCA lesion is 40% stenosed. PLACIDO flow is 3. Intervention to proximal mid LAD:    PCI and drug-eluting stent (3.5; STENT CHALINO FRONTIER 4MM X 26MM) to proximal mid LAD. iFR for was significant at 0.9. Procedure was performed formed through the right radial artery. Patient was monitored for 4 hours post procedure. Right radial artery has +2 and he has excellent capillary refill to his hand and fingers. He has received his IV fluids. He is a same-day discharge PCI. He will have a BMP and CBC tomorrow morning. Phone call will be made in the a.m. to check on the patient's status. Patient is being discharged home on aspirin 81 mg daily, Plavix 75 mg daily, Zetia 10 mg daily, and metoprolol succinate 25 mg to and pravastatin 20 mg daily. Cardiac rehab referral has been made. We will follow-up with Catarino Benavidez in the [place] office in December.       Current Facility-Administered Medications   Medication Dose Route Frequency    aspirin 81 mg chewable tablet **ADS Override Pull**        [START ON 10/18/2023] clopidogrel (PLAVIX) tablet 75 mg  75 mg Oral Daily    nitroglycerin (NITROSTAT) 0.4 mg SL tablet **ADS Override Pull**        nitroglycerin (NITROSTAT) SL tablet 0.4 mg  0.4 mg Sublingual Q5 Min PRN    sodium chloride 0.9 % infusion  125 mL/hr Intravenous Continuous    sodium chloride 0.9 % infusion  100 mL/hr Intravenous Continuous       Pertinent Labs/diagnostics:        Lab Ressults:  Recent Results (from the past 24 hour(s))   ECG 12 lead    Collection Time: 10/17/23  7:41 AM   Result Value Ref Range    Ventricular Rate 55 BPM    Atrial Rate 55 BPM    OH Interval 166 ms    QRSD Interval 94 ms    QT Interval 438 ms    QTC Interval 419 ms    P Axis 46 degrees    QRS Axis 46 degrees    T Wave Axis 48 degrees   POCT activated clotting time    Collection Time: 10/17/23  9:20 AM   Result Value Ref Range    Activated Clotting Time, i-STAT 306 (H) 89 - 137 sec    Specimen Type ARTERIAL    POCT activated clotting time    Collection Time: 10/17/23  9:43 AM   Result Value Ref Range    Activated Clotting Time, i-STAT 246 (H) 89 - 137 sec    Specimen Type ARTERIAL    ECG 12 lead    Collection Time: 10/17/23 10:28 AM   Result Value Ref Range    Ventricular Rate 60 BPM    Atrial Rate 60 BPM    NE Interval 174 ms    QRSD Interval 94 ms    QT Interval 438 ms    QTC Interval 438 ms    P Axis 58 degrees    QRS Axis 54 degrees    T Wave Axis 46 degrees   ECG 12 lead    Collection Time: 10/17/23 10:58 AM   Result Value Ref Range    Ventricular Rate 57 BPM    Atrial Rate 57 BPM    NE Interval 166 ms    QRSD Interval 94 ms    QT Interval 444 ms    QTC Interval 432 ms    P [place] 47 degrees    QRS Axis 38 degrees    T Wave Axis 34 degrees         Discharge instructions/Information to patient and family:   See after visit summary for information provided to patient and family. Provisions for Follow-Up Care:  See after visit summary for information related to follow-up care and any pertinent home health orders. Planned Readmission: Yes TAVR    Discharge Statement:  I spent 45 minutes minutes discharging the patient. This time was spent on the day of discharge. I had direct contact with the patient on the day of discharge. Additional documentation is required if more than 30 minutes were spent on discharge. ** Please Note: Fluency Direct Dictation voice to text software may have been used in the creation of this document.  **

## 2023-10-17 NOTE — DISCHARGE INSTR - AVS FIRST PAGE
1. Please see the post angioplasty discharge instructions. No heavy lifting, greater than 10 lbs. or strenuous activity for 5 days. Follow angioplasty discharge instructions. 2.Remove band aid tomorrow. Shower and wash area- wrist gently with soap and water- beginning tomorrow. Rinse and pat dry. Apply new water seal band aid. Repeat this process for 5 days. No powders, creams lotions or antibiotic ointments  for 5 days. No tub baths, hot tubs or swimming for 5 days. 3. Call [facility] Cardiology Office ([phone]) if you develop a fever, redness or drainage at your wrist access site. 4. No driving for 2 days    5. Do not stop aspirin or Plavix (clopiogrel) any reason without a cardiologist’s consent, or the stent could block up and cause a heart attack. 6. Stent card and book.       Cardiac rehab

## 2023-10-17 NOTE — TELEPHONE ENCOUNTER
----- Message from Aisha Wen sent at 10/17/2023  4:22 PM EDT -----    ----- Message -----  From: Elias Drake  Sent: 10/17/2023   2:55 PM EDT  To: [facility] [place] [facility]      ----- Message -----  From: Bryant Hill  Sent: 10/17/2023   2:07 PM EDT  To: Elias Drake; Cardiology [facility] [place]    Please see Dr. Castro Linker request below regarding Andie Cisneros. Thanks.  ----- Message -----  From: Elio Bee DO  Sent: 10/17/2023  10:12 AM EDT  To: Cardiology [place] [facility]    Good morning,     We saw Mr. Peyman Leon in the cath lab today and he received a stent to the LAD. His LDL is uncontrolled at 147 and he is on pravastatin 20 as well as fish oil and zetia. He has been intolerant of atorvastatin in the past. Can we start a prior auth for repatha please?      Thank you,   Elio Bee DO FY-1

## 2023-10-18 ENCOUNTER — TELEPHONE (OUTPATIENT)
Dept: CARDIAC SURGERY | Facility: CLINIC | Age: 68
End: 2023-10-18

## 2023-10-18 ENCOUNTER — APPOINTMENT (OUTPATIENT)
Dept: LAB | Facility: HOSPITAL | Age: 68
End: 2023-10-18
Payer: MEDICARE

## 2023-10-18 ENCOUNTER — TELEPHONE (OUTPATIENT)
Dept: CARDIOLOGY CLINIC | Facility: CLINIC | Age: 68
End: 2023-10-18

## 2023-10-18 DIAGNOSIS — Z95.5 S/P DRUG ELUTING CORONARY STENT PLACEMENT: ICD-10-CM

## 2023-10-18 DIAGNOSIS — N18.2 CKD (CHRONIC KIDNEY DISEASE) STAGE 2, GFR 60-89 ML/MIN: ICD-10-CM

## 2023-10-18 LAB
ANION GAP SERPL CALCULATED.3IONS-SCNC: 6 MMOL/L
BUN SERPL-MCNC: 18 MG/DL (ref 5–25)
CALCIUM SERPL-MCNC: 9.8 MG/DL (ref 8.4–10.2)
CHLORIDE SERPL-SCNC: 101 MMOL/L (ref 96–108)
CO2 SERPL-SCNC: 29 MMOL/L (ref 21–32)
CREAT SERPL-MCNC: 1.08 MG/DL (ref 0.6–1.3)
ERYTHROCYTE [DISTWIDTH] IN BLOOD BY AUTOMATED COUNT: 13.3 % (ref 11.6–15.1)
GFR SERPL CREATININE-BSD FRML MDRD: 69 ML/MIN/1.73SQ M
GLUCOSE P FAST SERPL-MCNC: 102 MG/DL (ref 65–99)
HCT VFR BLD AUTO: 49.4 % (ref 36.5–49.3)
HGB BLD-MCNC: 16.3 G/DL (ref 12–17)
MCH RBC QN AUTO: 30.5 PG (ref 26.8–34.3)
MCHC RBC AUTO-ENTMCNC: 33 G/DL (ref 31.4–37.4)
MCV RBC AUTO: 92 FL (ref 82–98)
PLATELET # BLD AUTO: 226 THOUSANDS/UL (ref 149–390)
PMV BLD AUTO: 9.4 FL (ref 8.9–12.7)
POTASSIUM SERPL-SCNC: 4.1 MMOL/L (ref 3.5–5.3)
RBC # BLD AUTO: 5.35 MILLION/UL (ref 3.88–5.62)
SODIUM SERPL-SCNC: 136 MMOL/L (ref 135–147)
WBC # BLD AUTO: 10.07 THOUSAND/UL (ref 4.31–10.16)

## 2023-10-18 PROCEDURE — 80048 BASIC METABOLIC PNL TOTAL CA: CPT

## 2023-10-18 PROCEDURE — 85027 COMPLETE CBC AUTOMATED: CPT

## 2023-10-18 PROCEDURE — 36415 COLL VENOUS BLD VENIPUNCTURE: CPT

## 2023-10-18 NOTE — TELEPHONE ENCOUNTER
Wife is going to call Dr. Mela Gaytan office because they need the dental work in order to have another surgery done.

## 2023-10-18 NOTE — TELEPHONE ENCOUNTER
Spouse called and is asking if patient needs to be pre medicated for dental work now that he had a stent placed yesterday 10/17/23    Patient was suppose to have dental work done 10/19/23 but spouse canceled appointment due to having stent placed    Spouse can be reached at [phone]

## 2023-10-18 NOTE — TELEPHONE ENCOUNTER
Patient's wife called asking about patient needing to have dental filling in two teeth. Informed her I spoke with Katie Jeffers who states  approves patient moving forward with fillings post stent. No need to hold off on fillings.

## 2023-10-18 NOTE — TELEPHONE ENCOUNTER
Called pt to assess his overnight events. He had no issues with radial site. His lab work is WNL. He picked up his plavix. Answered all the patient questions. Reminded him not lift anything heavy with the right hand.

## 2023-10-18 NOTE — TELEPHONE ENCOUNTER
He should wait at least 6 months from stent placement before having dental work done. He does not need pre-medication though, just the 6 month wait. Thank you!

## 2023-10-30 ENCOUNTER — TELEPHONE (OUTPATIENT)
Dept: CARDIOLOGY CLINIC | Facility: CLINIC | Age: 68
End: 2023-10-30

## 2023-10-30 NOTE — TELEPHONE ENCOUNTER
Pt's wife Oniel Benitez called and said that pt went into tachycardia today while he was at the hospital for his cardiac rehab. Oniel Benitez would like to speak to Nohemi Ozuna. Or someone in the office. [facility] [place]   [phone]

## 2023-10-30 NOTE — TELEPHONE ENCOUNTER
[facility] stating that [place] was in [place] for two runs. One of 20 secs, and one of 30 secs. States that pt states he has been feeling "off" all weekend and today as well. He went down to their ER but wife is going to try to have him sent here.

## 2023-11-01 ENCOUNTER — ANESTHESIA EVENT (INPATIENT)
Dept: NON INVASIVE DIAGNOSTICS | Facility: HOSPITAL | Age: 68
End: 2023-11-01
Payer: MEDICARE

## 2023-11-01 ENCOUNTER — HOSPITAL ENCOUNTER (INPATIENT)
Facility: HOSPITAL | Age: 68
LOS: 3 days | Discharge: HOME | DRG: 277 | End: 2023-11-04
Attending: INTERNAL MEDICINE | Admitting: INTERNAL MEDICINE
Payer: MEDICARE

## 2023-11-01 DIAGNOSIS — R06.02 SHORTNESS OF BREATH: ICD-10-CM

## 2023-11-01 DIAGNOSIS — I47.20 VENTRICULAR TACHYCARDIA (HCC): Primary | ICD-10-CM

## 2023-11-01 LAB
ALBUMIN SERPL BCP-MCNC: 3.8 G/DL (ref 3.5–5)
ALP SERPL-CCNC: 80 U/L (ref 34–104)
ALT SERPL W P-5'-P-CCNC: 11 U/L (ref 7–52)
ANION GAP SERPL CALCULATED.3IONS-SCNC: 6 MMOL/L
APTT PPP: 28 SECONDS (ref 23–37)
AST SERPL W P-5'-P-CCNC: 14 U/L (ref 13–39)
BASOPHILS # BLD AUTO: 0.04 THOUSANDS/ÂΜL (ref 0–0.1)
BASOPHILS NFR BLD AUTO: 0 % (ref 0–1)
BILIRUB SERPL-MCNC: 0.53 MG/DL (ref 0.2–1)
BUN SERPL-MCNC: 21 MG/DL (ref 5–25)
CALCIUM SERPL-MCNC: 8.9 MG/DL (ref 8.4–10.2)
CHLORIDE SERPL-SCNC: 103 MMOL/L (ref 96–108)
CO2 SERPL-SCNC: 29 MMOL/L (ref 21–32)
CREAT SERPL-MCNC: 1.04 MG/DL (ref 0.6–1.3)
EOSINOPHIL # BLD AUTO: 0.49 THOUSAND/ÂΜL (ref 0–0.61)
EOSINOPHIL NFR BLD AUTO: 5 % (ref 0–6)
ERYTHROCYTE [DISTWIDTH] IN BLOOD BY AUTOMATED COUNT: 13.4 % (ref 11.6–15.1)
GFR SERPL CREATININE-BSD FRML MDRD: 72 ML/MIN/1.73SQ M
GLUCOSE SERPL-MCNC: 108 MG/DL (ref 65–140)
HCT VFR BLD AUTO: 42.9 % (ref 36.5–49.3)
HGB BLD-MCNC: 14.5 G/DL (ref 12–17)
IMM GRANULOCYTES # BLD AUTO: 0.03 THOUSAND/UL (ref 0–0.2)
IMM GRANULOCYTES NFR BLD AUTO: 0 % (ref 0–2)
INR PPP: 1.02 (ref 0.84–1.19)
LYMPHOCYTES # BLD AUTO: 1.94 THOUSANDS/ÂΜL (ref 0.6–4.47)
LYMPHOCYTES NFR BLD AUTO: 21 % (ref 14–44)
MAGNESIUM SERPL-MCNC: 2.1 MG/DL (ref 1.9–2.7)
MCH RBC QN AUTO: 31.1 PG (ref 26.8–34.3)
MCHC RBC AUTO-ENTMCNC: 33.8 G/DL (ref 31.4–37.4)
MCV RBC AUTO: 92 FL (ref 82–98)
MONOCYTES # BLD AUTO: 0.94 THOUSAND/ÂΜL (ref 0.17–1.22)
MONOCYTES NFR BLD AUTO: 10 % (ref 4–12)
NEUTROPHILS # BLD AUTO: 5.77 THOUSANDS/ÂΜL (ref 1.85–7.62)
NEUTS SEG NFR BLD AUTO: 64 % (ref 43–75)
NRBC BLD AUTO-RTO: 0 /100 WBCS
PLATELET # BLD AUTO: 202 THOUSANDS/UL (ref 149–390)
PMV BLD AUTO: 9.3 FL (ref 8.9–12.7)
POTASSIUM SERPL-SCNC: 3.7 MMOL/L (ref 3.5–5.3)
PROT SERPL-MCNC: 6.8 G/DL (ref 6.4–8.4)
PROTHROMBIN TIME: 13.3 SECONDS (ref 11.6–14.5)
RBC # BLD AUTO: 4.66 MILLION/UL (ref 3.88–5.62)
SODIUM SERPL-SCNC: 138 MMOL/L (ref 135–147)
WBC # BLD AUTO: 9.21 THOUSAND/UL (ref 4.31–10.16)

## 2023-11-01 PROCEDURE — 85730 THROMBOPLASTIN TIME PARTIAL: CPT | Performed by: INTERNAL MEDICINE

## 2023-11-01 PROCEDURE — 97162 PT EVAL MOD COMPLEX 30 MIN: CPT

## 2023-11-01 PROCEDURE — 99223 1ST HOSP IP/OBS HIGH 75: CPT | Performed by: PHYSICIAN ASSISTANT

## 2023-11-01 PROCEDURE — 97166 OT EVAL MOD COMPLEX 45 MIN: CPT

## 2023-11-01 PROCEDURE — 80053 COMPREHEN METABOLIC PANEL: CPT | Performed by: INTERNAL MEDICINE

## 2023-11-01 PROCEDURE — 99223 1ST HOSP IP/OBS HIGH 75: CPT | Performed by: INTERNAL MEDICINE

## 2023-11-01 PROCEDURE — 85610 PROTHROMBIN TIME: CPT | Performed by: INTERNAL MEDICINE

## 2023-11-01 PROCEDURE — 83735 ASSAY OF MAGNESIUM: CPT | Performed by: INTERNAL MEDICINE

## 2023-11-01 PROCEDURE — 85025 COMPLETE CBC W/AUTO DIFF WBC: CPT | Performed by: INTERNAL MEDICINE

## 2023-11-01 RX ORDER — FUROSEMIDE 40 MG/1
40 TABLET ORAL DAILY
Status: DISCONTINUED | OUTPATIENT
Start: 2023-11-01 | End: 2023-11-01

## 2023-11-01 RX ORDER — METOPROLOL SUCCINATE 25 MG/1
12.5 TABLET, EXTENDED RELEASE ORAL DAILY
Status: DISCONTINUED | OUTPATIENT
Start: 2023-11-01 | End: 2023-11-03

## 2023-11-01 RX ORDER — PRAVASTATIN SODIUM 20 MG
20 TABLET ORAL EVERY OTHER DAY
Status: DISCONTINUED | OUTPATIENT
Start: 2023-11-01 | End: 2023-11-01

## 2023-11-01 RX ORDER — HEPARIN SODIUM 5000 [USP'U]/ML
5000 INJECTION, SOLUTION INTRAVENOUS; SUBCUTANEOUS EVERY 8 HOURS SCHEDULED
Status: DISCONTINUED | OUTPATIENT
Start: 2023-11-01 | End: 2023-11-01

## 2023-11-01 RX ORDER — NICOTINE 21 MG/24HR
1 PATCH, TRANSDERMAL 24 HOURS TRANSDERMAL DAILY PRN
Status: DISCONTINUED | OUTPATIENT
Start: 2023-11-01 | End: 2023-11-04 | Stop reason: HOSPADM

## 2023-11-01 RX ORDER — ASPIRIN 81 MG/1
81 TABLET, CHEWABLE ORAL DAILY
Status: DISCONTINUED | OUTPATIENT
Start: 2023-11-01 | End: 2023-11-04 | Stop reason: HOSPADM

## 2023-11-01 RX ORDER — CEFAZOLIN SODIUM 2 G/50ML
2000 SOLUTION INTRAVENOUS ONCE
Status: COMPLETED | OUTPATIENT
Start: 2023-11-02 | End: 2023-11-02

## 2023-11-01 RX ORDER — POTASSIUM CHLORIDE 20 MEQ/1
20 TABLET, EXTENDED RELEASE ORAL ONCE
Status: COMPLETED | OUTPATIENT
Start: 2023-11-01 | End: 2023-11-01

## 2023-11-01 RX ORDER — AMIODARONE HYDROCHLORIDE 200 MG/1
400 TABLET ORAL 2 TIMES DAILY WITH MEALS
Status: DISCONTINUED | OUTPATIENT
Start: 2023-11-01 | End: 2023-11-04 | Stop reason: HOSPADM

## 2023-11-01 RX ORDER — CLOPIDOGREL BISULFATE 75 MG/1
75 TABLET ORAL DAILY
Status: DISCONTINUED | OUTPATIENT
Start: 2023-11-01 | End: 2023-11-04 | Stop reason: HOSPADM

## 2023-11-01 RX ORDER — EZETIMIBE 10 MG/1
10 TABLET ORAL DAILY
Status: DISCONTINUED | OUTPATIENT
Start: 2023-11-01 | End: 2023-11-04 | Stop reason: HOSPADM

## 2023-11-01 RX ORDER — AMIODARONE HYDROCHLORIDE 200 MG/1
400 TABLET ORAL
Status: DISCONTINUED | OUTPATIENT
Start: 2023-11-01 | End: 2023-11-01

## 2023-11-01 RX ORDER — ESCITALOPRAM OXALATE 20 MG/1
20 TABLET ORAL DAILY
Status: DISCONTINUED | OUTPATIENT
Start: 2023-11-01 | End: 2023-11-04 | Stop reason: HOSPADM

## 2023-11-01 RX ADMIN — POTASSIUM CHLORIDE 20 MEQ: 1500 TABLET, EXTENDED RELEASE ORAL at 06:04

## 2023-11-01 RX ADMIN — METOPROLOL SUCCINATE 12.5 MG: 25 TABLET, FILM COATED, EXTENDED RELEASE ORAL at 08:16

## 2023-11-01 RX ADMIN — ASPIRIN 81 MG CHEWABLE TABLET 81 MG: 81 TABLET CHEWABLE at 08:16

## 2023-11-01 RX ADMIN — CLOPIDOGREL BISULFATE 75 MG: 75 TABLET ORAL at 08:16

## 2023-11-01 RX ADMIN — HEPARIN SODIUM 5000 UNITS: 5000 INJECTION INTRAVENOUS; SUBCUTANEOUS at 05:07

## 2023-11-01 RX ADMIN — AMIODARONE HYDROCHLORIDE 400 MG: 200 TABLET ORAL at 16:30

## 2023-11-01 RX ADMIN — EZETIMIBE 10 MG: 10 TABLET ORAL at 08:16

## 2023-11-01 RX ADMIN — HEPARIN SODIUM 5000 UNITS: 5000 INJECTION INTRAVENOUS; SUBCUTANEOUS at 13:50

## 2023-11-01 RX ADMIN — ESCITALOPRAM OXALATE 20 MG: 20 TABLET, FILM COATED ORAL at 08:16

## 2023-11-01 RX ADMIN — AMIODARONE HYDROCHLORIDE 400 MG: 200 TABLET ORAL at 08:16

## 2023-11-01 NOTE — ASSESSMENT & PLAN NOTE
Had a 30 beat run of Ventricular Tachycardia while at Cardiac Rehab on Monday (10/30)   Has hx of Severe Aortic Stenosis and as part of planned TAVR later this month, patient underwent LHC on 10/17 where he underwent DEON to the Mid LAD for an 80% stenosed lesion   From cardiac rehab, patient went to [facility] ER where patient's case was discussed with cardiology and patient was initially started on IV amiodarone infusion; Appears from notes as patient was an ER hold until bed became available that patient had episode of bradycardia with IV infusion and patient was then switched to PO; Notes from  ER show that CV recommended patient take amiodarone 400mg daily for 2 weeks, followed by 200mg daily thereafter  Transferred to [facility] on early AM hours of 11/1  Denies any chest pain or sob currently; Does endorse sob with exertion   Discussed with cardiology. .  EP for ICD placement.   Amiodarone

## 2023-11-01 NOTE — ASSESSMENT & PLAN NOTE
S/P DEON to Mid LAD in Mid October  Continue pta aspirin, plavix, beta blocker and zetia; has reported statin intolerance?  And as such statin held

## 2023-11-01 NOTE — H&P
[address] [facility]  H&P  Name: Siddhartha Guadarrama 77 y.o. male I MRN: [id number]  [facility] I Date of Admission: 11/1/2023   Date of Service: 11/1/2023 I Hospital Day: 0      Assessment/Plan   * Ventricular tachycardia [facility])  Assessment & Plan  Had a 30 beat run of Ventricular Tachycardia while at Cardiac Rehab on Monday (10/30)   Has hx of Severe Aortic Stenosis and as part of planned TAVR later this month, patient underwent LHC on 10/17 where he underwent DEON to the Mid LAD for an 80% stenosed lesion   From cardiac rehab, patient went to [facility] ER where patient's case was discussed with cardiology and patient was initially started on IV amiodarone infusion; Appears from notes as patient was an ER hold until bed became available that patient had episode of bradycardia with IV infusion and patient was then switched to PO; Notes from [facility] ER show that CV recommended patient take amiodarone 400mg daily for 2 weeks, followed by 200mg daily thereafter  Transferred to [facility] on early AM hours of 11/1  Denies any chest pain or sob currently; Does endorse sob with exertion   Admit to medicine on telemetry. Continue Amiodarone po 400mg daily for now given CV recs while in [facility] ER. Monitor for any recurrence of ventricular arrhythmia. Keep K>4, Mg>2. Consult CV team to see if patient potentially needs TAVR moved up if patient's symptoms of VT thought 2/2 to underlying Aortic Stenosis. Coronary artery disease  Assessment & Plan  S/P DEON to Mid LAD in Mid October  Continue pta aspirin, plavix, beta blocker and zetia; has reported statin intolerance? And as such statin held    Heart failure with preserved ejection fraction  Assessment & Plan  Appears euvolemic on exam   On pta lasix 60mg daily  Holding lasix overnight until evaluated by CV team for VT episode.  Appreciate CV recs     Severe aortic valve stenosis  Assessment & Plan  Most recent echo with LVEF 29%, Grade 1 Diastolic Dysfunction, Severe Aortic stenosis   Has TAVR originally scheduled for end of November, but in setting of VT episode appreciate CV thoughts on this needing to be moved up             VTE Prophylaxis: Heparin  / sequential compression device   Code Status: Level 1 - Full Code       Anticipated Length of Stay:  Patient will be admitted on an Inpatient basis with an anticipated length of stay of  > 2 midnights. Justification for Hospital Stay: Please see detailed plans noted above. Chief Complaint:     Ventricular Tachycardia   History of Present Illness:  Pal Stone is a 77 y.o. male who has past medical history significant for Severe Aortic Stenosis, CAD s/p DEON to mid LAD in October 202, hx MSSA Bacteremia who presents as transfer from [facility] ER on the early AM hours of 11/1 after initially presenting to their ER on 10/30 for an episode of Ventricular Tachycardia. Patient reports that he was at Cardiac rehab on Monday (10/30) and was using an exercise bike when he reports feeling a bit lightheaded and noted to have a 30 beat run of VT. Patient was immediately told to stop and sent to the local ER. At [facility] ER, patient was started on IV amiodarone infusion but he reportedly had some bradycardia with the infusion and thus was switched to PO amiodarone per review of notes. It appears their ER team spoke with Cardiology who recommended switching to Amio 400mg daily for 2 weeks followed by Daily amio 200mg. Patient does not appear to have had any more episodes of VT in the [facility] ER. He was in their ER for about 48 hours as he was awaiting transfer to [facility] but hospital filled to capacity.  Patient denies any current chest pain, sob or palpitations on arrival.       Review of Systems:    Constitutional:  Denies fever or chills   Eyes:  Denies change in visual acuity   HENT:  Denies nasal congestion or sore throat   Respiratory:  Denies sob at rest. Endorses sob with exertion  Cardiovascular: Denies chest pain or edema. Positive for arrhythmia  GI:  Denies abdominal pain or bloody stools  :  Denies dysuria   Musculoskeletal:  Denies back pain or joint pain   Integument:  Denies rash   Neurologic:  Denies headache or sensory changes   Endocrine:  Denies polyuria or polydipsia   Lymphatic:  Denies swollen glands   Psychiatric:  Denies depression or anxiety     Past Medical and Surgical History:   Past Medical History:   Diagnosis Date    Anxiety     Aortic stenosis     Borderline high cholesterol     CHF (congestive heart failure) ([address])     Asaf Jackson-cardio-    Colon polyp     Depression     Heart murmur     Hypertension     Hypothyroidism     Prediabetes     PVCs (premature ventricular contractions)     "skip"    Wears glasses     readers     Past Surgical History:   Procedure Laterality Date    APPENDECTOMY      BACK SURGERY      CARDIAC CATHETERIZATION N/A 10/17/2023    Procedure: Cardiac RHC/LHC; Surgeon: Sara Blackman MD;  Location:  CARDIAC CATH LAB;   Service: Cardiology    CATARACT EXTRACTION Bilateral     CLOSURE DELAYED PRIMARY Left 06/09/2023    Procedure: CLOSURE DELAYED PRIMARY;  Surgeon: Pola Aguilar DPM;  Location:  MAIN OR;  Service: Podiatry    COLONOSCOPY  2023    NATHALIE Guerra    KS CORRJ HALLUX VALGUS W/SESMDC W/RESCJ PROX PHAL Left 04/19/2023    Procedure: Caty Sell and wound debridement;  Surgeon: Dino Quintero DPM;  Location:  MAIN OR;  Service: Podiatry    TOE AMPUTATION Left 06/07/2023    Procedure: PARTIAL 1ST RAY AMPUTATION;  Surgeon: Pola Aguilar DPM;  Location:  MAIN OR;  Service: Podiatry    TONSILLECTOMY      TOTAL SHOULDER REPLACEMENT Left     WISDOM TOOTH EXTRACTION         Meds/Allergies:  Medications Prior to Admission   Medication Sig Dispense Refill Last Dose    Alirocumab 150 MG/ML SOAJ Inject 150 mg under the skin every 14 (fourteen) days 2 mL 11     amLODIPine (NORVASC) 10 mg tablet Take 10 mg by mouth daily 0     aspirin 81 mg chewable tablet Chew 1 tablet (81 mg total) daily 30 tablet 0     buPROPion (WELLBUTRIN XL) 300 mg 24 hr tablet        clopidogrel (Plavix) 75 mg tablet Take 1 tablet (75 mg total) by mouth daily 30 tablet 0     escitalopram (LEXAPRO) 20 mg tablet Take 20 mg by mouth daily  1     ezetimibe (ZETIA) 10 mg tablet Take 1 tablet (10 mg total) by mouth daily 90 tablet 3     furosemide (LASIX) 40 mg tablet Take 1.5 tablets (60 mg total) by mouth daily (Patient taking differently: Take 40 mg by mouth daily) 45 tablet 11     gabapentin (NEURONTIN) 100 mg capsule Take 100 mg by mouth 1 (one) time Takes once a day at bedtime. levothyroxine 75 mcg tablet        Magnesium 400 MG CAPS Take 1 capsule (400 mg total) by mouth in the morning 90 capsule 3     metoprolol succinate (TOPROL-XL) 25 mg 24 hr tablet Take 0.5 tablets (12.5 mg total) by mouth daily 15 tablet 11     Multiple Vitamins-Minerals (OCUVITE ADULT FORMULA PO) Take 1 tablet by mouth Eye care vitamin daily (Patient not taking: Reported on 2023)       Omega-3 Fatty Acids (Fish Oil) 1200 MG CAPS Take by mouth       potassium chloride (MICRO-K) 10 MEQ CR capsule Take 1 capsule (10 mEq total) by mouth daily 90 capsule 3     pravastatin (PRAVACHOL) 20 mg tablet Take 1 tablet (20 mg total) by mouth every other day 90 tablet 6        Allergies:    Allergies   Allergen Reactions    Statins Myalgia     Other reaction(s): muscle aches and joint aches         History:  Marital Status: /Civil Union     Substance Use History:   Social History     Substance and Sexual Activity   Alcohol Use Yes    Comment: socially-2 drinks per week     Social History     Tobacco Use   Smoking Status Every Day    Packs/day: 0.50    Years: 50.00    Total pack years: 25.00    Types: Cigarettes    Last attempt to quit: 2023    Years since quittin.8   Smokeless Tobacco Never     Social History     Substance and Sexual Activity   Drug Use Not Currently Comment: sporadic in young adulthood       Family History:  Family History   Problem Relation Age of Onset    Dementia Mother     Blindness Mother     Cancer Mother     Dementia Father        Physical Exam:     Vitals:   Blood Pressure: 141/75 (11/01/23 0324)  Pulse: (!) 51 (11/01/23 0324)  Temperature: 97.6 °F (36.4 °C) (11/01/23 0324)  Temp Source: Oral (11/01/23 0324)  Respirations: 16 (11/01/23 0324)  Height: 6' (182.9 cm) (11/01/23 0324)  Weight - Scale: 98.4 kg (217 lb) (11/01/23 0324)    Constitutional:  A&Ox4, Non-toxic appearance  Eyes:  EOMI, No scleral icterus   HENT:   oropharynx moist, external ears normal, external nose normal   Respiratory:  No respiratory distress, no wheezing   Cardiovascular:  mild bradycardia, audible murmur  GI:  Soft, nondistended, no guarding   :  No costovertebral angle tenderness   Musculoskeletal:  no tenderness, no deformities.  Back- no tenderness  Integument:  no jaundice, no rash   Neurologic:  Alert &awake, communicative, CN 2-12 normal,  no focal deficits noted   Psychiatric:  Speech and behavior appropriate       Lab Results: I have personally reviewed pertinent reports.  , I have personally reviewed pertinent films in PACS, and I have personally reviewed pertinent films in PACS with a Radiologist.    Results from last 7 days   Lab Units 11/01/23  0353   WBC Thousand/uL 9.21   HEMOGLOBIN g/dL 14.5   HEMATOCRIT % 42.9   PLATELETS Thousands/uL 202   NEUTROS PCT % 64   LYMPHS PCT % 21   MONOS PCT % 10   EOS PCT % 5     Results from last 7 days   Lab Units 11/01/23  0353   POTASSIUM mmol/L 3.7   CHLORIDE mmol/L 103   CO2 mmol/L 29   BUN mg/dL 21   CREATININE mg/dL 1.04   CALCIUM mg/dL 8.9   ALK PHOS U/L 80   ALT U/L 11   AST U/L 14     Results from last 7 days   Lab Units 11/01/23  0353   INR  1.02         Imaging: I have personally reviewed pertinent reports.  , I have personally reviewed pertinent films in PACS, and I have personally reviewed pertinent films in PACS with a Radiologist.    XR chest portable    Result Date: 10/30/2023  Narrative: Clinical: Ventricular tachycardia. TECHNIQUE: Portable AP upright frontal chest x-ray. COMPARISON: Chest x-ray dated 5/12/2020. FINDINGS: The heart is not enlarged. Calcifications are seen in the thoracic aorta. Left shoulder replacement is again noted. The lungs are clear without mass or consolidation. I do not identify a pleural effusion or pneumothorax. Impression: IMPRESSION: No acute pulmonary disease. Workstation:[id number]    Cardiac catheterization    Result Date: 10/17/2023  Narrative:   Mid RCA lesion is 40% stenosed. Prox LAD to Mid LAD lesion is 80% stenosed. Significant CAD, with a long 80% stenosis of the proximal/mid LAD. There was also non-obstructive plaque of the mid RCA. The LAD lesion was interrogated by iFR and was borderline, varying from 0.88 to 0.91. IVUS was also performed, and there was a marked reduction in cross sectional area. PCI was performed, with placement of a 4.0x26mm DEON. There was no residual stenosis. There was full stent expansion and apposition, with 0% residual stenosis. Plan: DAPT. The patient has been intolerant of statins and is on a low potency statin and ezetimibe. With an LDL above 140 mg/dL and pre-diabetes, and established CAD, he is an appropriate candidate for PCSK9 inhibition. He will undergo TAVR in the near future. CTA chest abdomen pelvis w wo contrast TAVR    Result Date: 10/10/2023  Narrative: CT ANGIOGRAM OF THE CHEST, ABDOMEN AND PELVIS WITH AND WITHOUT IV CONTRAST INDICATION:  Preoperative evaluation for TAVR COMPARISON: CT chest 1/30/2023 TECHNIQUE:  CT angiogram examination of the chest, abdomen and pelvis was performed according to standard protocol with cardiac gating. Axial, sagittal, and coronal reformatted images were submitted for interpretation. 3D reconstructions were performed an independent workstation, and are supplied for review.  Radiation dose length product (DLP) for this visit:  2388. 77 mGy-cm . This examination, like all CT scans performed in the [facility], was performed utilizing techniques to minimize radiation dose exposure, including the use of iterative reconstruction and automated exposure control. IV Contrast:  120 mL of iodixanol (VISIPAQUE) Enteric Contrast: Enteric contrast was not administered. FINDINGS: VASCULAR STRUCTURES: Annulus: diameter 29.8 x 23.9 mm area: 597.2 sq mm Annulus to LCA: 17.5 mm Annulus to RCA: 21.3 mm Minimal diameter right iliofemoral segment: 7.5 mm Minimal diameter left iliofemoral segment: 6.3 mm The ascending aorta is nonaneurysmal measuring 36 mm with mild atherosclerosis. There is a type II aortic arch with classic branching anatomy and no stenosis in the visualized great vessels. The aortic arch is nonaneurysmal with mild atherosclerosis. The  descending thoracic aorta is nonaneurysmal with mild atherosclerosis. The abdominal aorta is nonaneurysmal with mild atherosclerosis. Bilateral iliofemoral arteries are nonaneurysmal with mild atherosclerosis. Celiac, superior mesenteric, inferior mesenteric, and bilateral renal arteries are patent. Cardiac findings: There is moderate calcification of the aortic valve. The left ventricle is normal. The ventricular septum is normal. No prior valvular surgery. No prior bypass surgery. No pericardial effusion. No cardiac mass or thrombus. Coronary artery calcifications present. OTHER FINDINGS: CHEST LUNGS:  Lungs are clear. There is no tracheal or endobronchial lesion. PLEURA:  Unremarkable. MEDIASTINUM AND CYNDIE:  Unremarkable. CHEST WALL:   Unremarkable. ABDOMEN LIVER/BILIARY TREE:  Unremarkable. GALLBLADDER:  No calcified gallstones. No pericholecystic inflammatory change. SPLEEN: Scattered calcified granulomas. PANCREAS:  Unremarkable. ADRENAL GLANDS:  Unremarkable.  KIDNEYS/URETERS: Hypodense lesion in the superior pole of left kidney like represents a cyst. Right kidney is unremarkable. No hydronephrosis. STOMACH AND BOWEL: Duodenal diverticulum present. Colonic diverticulosis present. APPENDIX: Surgically absent. ABDOMINOPELVIC CAVITY:  No ascites or free intraperitoneal air. No lymphadenopathy. PELVIS REPRODUCTIVE ORGANS: Bilateral hydroceles present. URINARY BLADDER:  Unremarkable. ABDOMINAL WALL/INGUINAL REGIONS:  Unremarkable. OSSEOUS STRUCTURES:  No acute fracture or destructive osseous lesion. Chronic nondisplaced fractures of right 11th and 12th ribs. 6th non rib-bearing lumbar vertebra is present with bilateral pars defect. Impression: TAVR measurements as above. Workstation performed: [id number]     VAS carotid complete study    Result Date: 10/6/2023  Narrative:  [facility] REPORT CLINICAL: Indications: Patient presents for a general health evaluation secondary to future TAVR. Patient is asymptomatic at this time. Physician wants to rule out bilateral internal carotid artery stenosis. Operative History: No Cardiovascular Surgeries Risk Factors The patient has history of HTN., CHF, anxiety, current smoking, aortic stenosis, depression, heart murmur, hypothyroidism, pre diabetes, PVC's, hyperlipidemia,  Height:  72 inches. Weight:  220 lbs. Clinical: Brachial BP: Right:  144/80 mmHg. Left:  138/78 mmHg. FINDINGS:  Right        Impression  PSV  EDV (cm/s)  Direction of Flow  Ratio  Dist. ICA                 48          12                      0.51  Mid. ICA                 104          19                      1.12  Prox.  ICA    1 - 49%      44          11                      0.47  Dist CCA                  55          10                            Mid CCA                   93          13                      0.83  Prox CCA                 112          13                            Ext Carotid               87           9                      0.94  Prox Vert                 35          10  Antegrade                 Subclavian 86           0                             Left         Impression  PSV  EDV (cm/s)  Direction of Flow  Ratio  Dist. ICA                 73          20                      0.79  Mid. ICA                  58          16                      0.63  Prox. ICA    1 - 49%      94          26                      1.02  Dist CCA                  48          14                            Mid CCA                   93          11                      1.11  Prox CCA                  83          11                            Ext Carotid               56          10                      0.60  Prox Vert                 29           8  Antegrade                    CONCLUSION:  Impression  RIGHT: There is <50% stenosis noted in the tortuous internal carotid artery. Plaque is heterogenous and irregular. Vertebral artery flow is antegrade. There is no significant subclavian artery disease. LEFT: There is <50% stenosis noted in the tortuous internal carotid artery. Plaque is heterogenous and smooth. Vertebral artery flow is antegrade. There is no significant subclavian artery disease. Technical findings posted on EPIC. SIGNATURE: Electronically Signed by: Chris Avitia MD on 2023-10-06 01:29:19 PM      Total time for visit, including counseling/coordination of care: 45 minutes. Greater than 50% of this total time spent on direct patient counseling and coorination of care. Epic Records Reviewed as well as Records in Care Everywhere    ** Please Note: Dragon 360 Dictation voice to text software was used in the creation of this document.  **

## 2023-11-01 NOTE — PROGRESS NOTES
Note input from admitting team.  Patient with presentation of VT with initial placement on IV amiodarone and converted to oral amiodarone. Patient with severe aortic stenosis. Transferred here for cardiology assessment. Tavr evaluation?

## 2023-11-01 NOTE — ASSESSMENT & PLAN NOTE
Appears euvolemic on exam   On pta lasix 60mg daily  Holding lasix overnight until evaluated by CV team for VT episode.  Appreciate CV recs

## 2023-11-01 NOTE — Clinical Note
The ICD [place] XT DR MRI IS1 DF4 - [id number] device was inserted. The leads were placed into the connector and visually verified to be in correct position.

## 2023-11-01 NOTE — OCCUPATIONAL THERAPY NOTE
Occupational Therapy Evaluation     Patient Name: Cathie Robert  [id number] Date: 11/1/2023  Problem List  Principal Problem:    Ventricular tachycardia ([address])  Active Problems:    Severe aortic valve stenosis    Heart failure with preserved ejection fraction    Coronary artery disease    Past Medical History  Past Medical History:   Diagnosis Date    Anxiety     Aortic stenosis     Borderline high cholesterol     CHF (congestive heart failure) ([address])     Asaf Jackson-cardio-    Colon polyp     Depression     Heart murmur     Hypertension     Hypothyroidism     Prediabetes     PVCs (premature ventricular contractions)     "skip"    Wears glasses     readers     Past Surgical History  Past Surgical History:   Procedure Laterality Date    APPENDECTOMY      BACK SURGERY      CARDIAC CATHETERIZATION N/A 10/17/2023    Procedure: Cardiac RHC/LHC; Surgeon: Woody Perez MD;  [facility];   Service: Cardiology    CATARACT EXTRACTION Bilateral     CLOSURE DELAYED PRIMARY Left 06/09/2023    Procedure: CLOSURE DELAYED PRIMARY;  Surgeon: Blake Bishop DPM;  Location: [facility];  Service: Podiatry    COLONOSCOPY  2023    NATHALIE Guerra    LA CORRJ HALLUX VALGUS W/SESMDC W/RESCJ PROX PHAL Left 04/19/2023    Procedure: Smita Bracket and wound debridement;  Surgeon: Jennifer Sol DPM;  [facility];  Service: Podiatry    TOE AMPUTATION Left 06/07/2023    Procedure: PARTIAL 1ST RAY AMPUTATION;  Surgeon: Blake Bishop DPM;  [facility];  Service: Podiatry    TONSILLECTOMY      TOTAL SHOULDER REPLACEMENT Left     WISDOM TOOTH EXTRACTION           11/01/23 0928   OT Last Visit   OT Visit Date 11/01/23   Note Type   Note type Evaluation   Pain Assessment   Pain Assessment Tool 0-10   Pain Score No Pain   Restrictions/Precautions   Weight Bearing Precautions Per Order No   Other Precautions Telemetry;Multiple lines   Home Living   Type of [address] [place] [address]  Two level;Stairs to enter with rails;Bed/bath upstairs  (2 rosalba)   Bathroom Shower/Tub Tub/shower unit  (tub shower on first floor, walk in shower upstairs)   Bathroom Toilet Standard   Bathroom Accessibility Accessible   Home Equipment Walker;Cane;Crutches  (not using any pta)   Prior Function   Level of [place] Independent with ADLs; Independent with functional mobility; Independent with IADLS   Lives With Spouse   Receives Help From Family   IADLs Independent with driving; Independent with meal prep; Independent with medication management   Falls in the last 6 months 0   Vocational Retired   Lifestyle   Autonomy Pta, pt was I W ADL/IADL, no AD mobility.  +    Reciprocal Relationships supportive spouse   Service to Others retired   Intrinsic Gratification keeping active   Subjective   Subjective "I am doing ok"   ADL   Where Assessed Edge of bed   Eating Assistance 6  Modified independent   Grooming Assistance 6  Modified Independent   UB Bathing Assistance 6  Modified Independent    N [facility] 5  Supervision/Setup   [address] 6  Modified independent   [address] 5  [address]  5  Supervision/Setup   Functional Assistance 5  Supervision/Setup   Bed Mobility   Supine to Sit 6  Modified independent   Additional items Increased time required   Additional Comments found supine in bed, left in chair w all needs in reach   Transfers   Sit to Stand 5  Supervision   Additional items Increased time required   Stand to Sit 5  Supervision   Additional items Increased time required   Additional Comments no AD   Functional Mobility   Functional Mobility 5  Supervision   Additional Comments household distance, no AD   Balance   Static Sitting Normal   Dynamic Sitting Good   Static Standing Fair +   Dynamic Standing Fair   Ambulatory Fair   Activity Tolerance   Activity Tolerance Patient tolerated treatment well   Medical Staff Made Aware ASHLEY Hale 2' pts med complexity, comorbidities and regression from baseline. Nurse Made Aware yes   RUE Assessment   RUE Assessment WFL   LUE Assessment   LUE Assessment WFL   Hand Function   Gross Motor Coordination Functional   Fine Motor Coordination Functional   Psychosocial   Psychosocial (WDL) WDL   Cognition   Overall Cognitive Status WFL   Arousal/Participation Alert; Responsive; Cooperative   Attention Within functional limits   Orientation Level Oriented X4   Memory Within functional limits   Following Commands Follows one step commands without difficulty   Comments pt pleasant and cooperative, overall G safety awareness and insight to condition. Assessment   Prognosis Good   Assessment Pt is a 77 y.o. male admitted 11/1/23 as a transfer from [facility] with v-tach s/p engagement in cardiac rehab program. Pt w active OT eval and treat orders at this time. Miriam Banks PMH includes  has a past medical history of Anxiety, Aortic stenosis, Borderline high cholesterol, CHF (congestive heart failure) ([address]), Colon polyp, Depression, Heart murmur, Hypertension, Hypothyroidism, Prediabetes, PVCs (premature ventricular contractions), and Wears glasses. Pt lives w spouse in a [facility] with first floor set-up, including tub shower and standard toilet. Has walk in shower on second floor of  the home. Pta, pt was independent w/ ADL/IADL and functional mobility, was driving. Currently, pt is mod I for UB ADL, S for LB ADL and completed transfers/FM W S w/o the use of AD. From OT standpoint, pt is functioning at baseline level and does not appear to require additional acute OT at this time. Discussed pt's comfort with d/c from OT and any concerns with returning to previous environment; pt does not report any at this time. The patient's raw score on the AM-PAC Daily Activity inpatient short form is 23, standardized score is 51.12, greater than 39.4. Patients at this level are likely to benefit from discharge to home.  Please refer to the recommendation of the Occupational Therapist for safe discharge planning. From OT perspective, pt should D/C HOME when medically stable. Acute OT no longer rq at this time, D/C OT. Goals   Patient Goals go home   Discharge Recommendation   Rehab Resource Intensity Level, OT No post-acute rehabilitation needs   AM-PAC Daily Activity Inpatient   Lower Body Dressing 3   Bathing 4   Toileting 4   Upper Body Dressing 4   Grooming 4   Eating 4   Daily Activity Raw Score 23   Daily Activity Standardized Score (Calc for Raw Score >=11) 51.12   AM-PAC Applied Cognition Inpatient   Following a Speech/Presentation 4   Understanding Ordinary Conversation 4   Taking Medications 4   Remembering Where Things Are Placed or Put Away 4   Remembering List of 4-5 Errands 4   Taking Care of Complicated Tasks 4   Applied Cognition Raw Score 24   Applied Cognition Standardized Score 62.21   Modified [place] Scale   Modified Karma Scale 2   End of Consult   Education Provided Yes   Patient Position at End of Consult Bedside chair; All needs within reach   Nurse Communication Nurse aware of consult       Emilee Huang, MOT, OTR/L

## 2023-11-01 NOTE — CONSULTS
Consultation - Electrophysiology - Cardiology  Lay Taylor 77 y.o. male MRN: [id number]  Unit/Bed#: [facility] 522-01 Encounter: [id number]      Inpatient consult to Electrophysiology  Consult performed by: Sheyla Hu PA-C  Consult ordered by: Olya Hernandez DO          History of Present Illness   Physician Requesting Consult: Bhavna Mittal DO  Reason for Consult / Principal Problem: ventricular tachycardia     Assessment/Plan   Assessment:  Ventricular tachycardia   Recently underwent DEON for mid LAD 80% lesions was at cardiac rehab and 30 second run of NSVT self terminating   Repeat 20 sec self terminating VT run   Had two further episodes at [facility] ED which he was symptomatic   Started on IV amio developed bradycardia and placed on po amio   Transferred for ICD evaluation   Sevre aortic stenosis   Noted on echo 9/8 with severe aortic stenosis   Planning for valve replacement with CTS later this month   CAD s/p DEON to Mid LAD 10/17/23  S/p DEON for 80% lesion   Currently on DAPT with ASA and plavix   LVEF 60% on recent echo in 9/8/23  HTN   On amlodipine   Last bp 148/96    Plan:  - plan for dual chamber ICD implantation for secondary prevention tomorrow   - npo at midnight   - hold heparin now and 72 hours after procedure   - in terms of amiodarone, we will continue with 400 mg BID to hasten load. - he did get amio at [facility] however, was only on IV for about 1 day   -discussed with CTS and no TAVR will be done during this admission   - telemetry monitoring     HPI: Lay Taylor is a 77y.o. year old male with past medical history significant for coronary artery disease status post drug-eluting stent, diastolic heart failure, severe aortic stenosis, hypertension, major depressive disorder who presents as a transfer from [facility] for ventricular tachycardia. He is being evaluated by EP for ICD evaluation.     Briefly, patient has history of recent drug-eluting stent placed in mid LAD for 80% stenosis noted on left heart cath. He was found to have severe aortic stenosis at that time and is being worked up for aortic valve replacement to be done later this month. He was at cardiac rehab and was noted to have a period of NSVT lasting about 30 seconds which broke spontaneously. He has another 20 second run of NSVT shortly after prompting RRT and ER visit. In the ER patient had two further runs of self terminating NSVT. He was started on IV amiodarone but this was changed to po amiodarone after patient developed bradycardia. He was transferred to [facility] for ICD eval.       Primary Cardiologist: Elenita Zimmerman     Cardiac testing: cardiac cath 80% mid LAD lesion     TELE: NSR no runs of NSVT     EKG:     Historical Information   Past Medical History:   Diagnosis Date    Anxiety     Aortic stenosis     Borderline high cholesterol     CHF (congestive heart failure) ([address])     [facility] Dr Jasiel Jackson-cardio-    Colon polyp     Depression     Heart murmur     Hypertension     Hypothyroidism     Prediabetes     PVCs (premature ventricular contractions)     "skip"    Wears glasses     readers     Past Surgical History:   Procedure Laterality Date    APPENDECTOMY      BACK SURGERY      CARDIAC CATHETERIZATION N/A 10/17/2023    Procedure: Cardiac RHC/LHC; Surgeon: Lulu Fitzpatrick MD;  [facility];   Service: Cardiology    CATARACT EXTRACTION Bilateral     CLOSURE DELAYED PRIMARY Left 06/09/2023    Procedure: CLOSURE DELAYED PRIMARY;  Surgeon: Ashvin Brooks DPM;  Location:  MAIN OR;  Service: Podiatry    COLONOSCOPY  2023    NATHALIE Guerra    DC CORRJ HALLUX VALGUS W/SESMDC W/RESCJ PROX PHAL Left 04/19/2023    Procedure: Lorraine Barrett and wound debridement;  Surgeon: Anh Potter DPM;  [facility];  Service: Podiatry    TOE AMPUTATION Left 06/07/2023    Procedure: PARTIAL 1ST RAY AMPUTATION;  Surgeon: Ashvin Brooks DPM;  Location[facility];  Service: Podiatry TONSILLECTOMY      TOTAL SHOULDER REPLACEMENT Left     WISDOM TOOTH EXTRACTION       Social History     Substance and Sexual Activity   Alcohol Use Yes    Comment: socially-2 drinks per week     Social History     Substance and Sexual Activity   Drug Use Not Currently    Comment: sporadic in young adulthood     Social History     Tobacco Use   Smoking Status Every Day    Packs/day: 0.50    Years: 50.00    Total pack years: 25.00    Types: Cigarettes    Last attempt to quit: 2023    Years since quittin.8   Smokeless Tobacco Never     Family History: non-contributory    Meds/Allergies   Hospital Medications:   Current Facility-Administered Medications   Medication Dose Route Frequency    amiodarone tablet 400 mg  400 mg Oral Daily With Breakfast    aspirin chewable tablet 81 mg  81 mg Oral Daily    clopidogrel (PLAVIX) tablet 75 mg  75 mg Oral Daily    escitalopram (LEXAPRO) tablet 20 mg  20 mg Oral Daily    ezetimibe (ZETIA) tablet 10 mg  10 mg Oral Daily    heparin (porcine) subcutaneous injection 5,000 Units  5,000 Units Subcutaneous Q8H [facility] & longterm    metoprolol succinate (TOPROL-XL) 24 hr tablet 12.5 mg  12.5 mg Oral Daily    nicotine (NICODERM CQ) 14 mg/24hr TD 24 hr patch 1 patch  1 patch Transdermal Daily PRN     Home Medications:   Medications Prior to Admission   Medication    Alirocumab 150 MG/ML SOAJ    amLODIPine (NORVASC) 10 mg tablet    aspirin 81 mg chewable tablet    buPROPion (WELLBUTRIN XL) 300 mg 24 hr tablet    clopidogrel (Plavix) 75 mg tablet    escitalopram (LEXAPRO) 20 mg tablet    ezetimibe (ZETIA) 10 mg tablet    furosemide (LASIX) 40 mg tablet    gabapentin (NEURONTIN) 100 mg capsule    levothyroxine 75 mcg tablet    Magnesium 400 MG CAPS    metoprolol succinate (TOPROL-XL) 25 mg 24 hr tablet    Multiple Vitamins-Minerals (OCUVITE ADULT FORMULA PO)    Omega-3 Fatty Acids (Fish Oil) 1200 MG CAPS    potassium chloride (MICRO-K) 10 MEQ CR capsule    pravastatin (PRAVACHOL) 20 mg tablet Allergies   Allergen Reactions    Statins Myalgia     Other reaction(s): muscle aches and joint aches         Objective   Vitals: Blood pressure 148/96, pulse 60, temperature 97.6 °F (36.4 °C), temperature source Oral, resp. rate 18, height 6' (1.829 m), weight 98.4 kg (217 lb), SpO2 97 %. Orthostatic Blood Pressures      Flowsheet Row Most Recent Value   Blood Pressure 148/96 filed at 11/01/2023 0808   Patient Position - Orthostatic VS Lying filed at 11/01/2023 7930              Intake/Output Summary (Last 24 hours) at 11/1/2023 1450  Last data filed at 11/1/2023 1353  Gross per 24 hour   Intake 360 ml   Output 800 ml   Net -440 ml       Invasive Devices       Peripheral Intravenous Line  Duration             Peripheral IV 11/01/23 Left;Ventral (anterior) Forearm <1 day                    Review of Systems:  Review of Systems   Constitutional: Negative for fever. Cardiovascular:  Negative for chest pain, dyspnea on exertion, irregular heartbeat, near-syncope, palpitations and syncope. Respiratory:  Negative for shortness of breath. Neurological:  Positive for dizziness and light-headedness. All other systems reviewed and are negative. ROS as noted above, otherwise 12 point review of systems was performed and is negative. Physical Exam:   Physical Exam  Vitals and nursing note reviewed. Constitutional:       General: He is not in acute distress. HENT:      Head: Normocephalic and atraumatic. Cardiovascular:      Rate and Rhythm: Normal rate and regular rhythm. Heart sounds: Murmur heard. Pulmonary:      Effort: Pulmonary effort is normal.      Breath sounds: Normal breath sounds. Abdominal:      General: Abdomen is flat. Palpations: Abdomen is soft. Musculoskeletal:      Right lower leg: No edema. Left lower leg: No edema. Skin:     General: Skin is warm and dry. Neurological:      General: No focal deficit present.       Mental Status: He is alert and oriented to person, place, and time. Psychiatric:         Mood and Affect: Mood normal.         Lab Results: I have personally reviewed pertinent lab results. Results from last 7 days   Lab Units 11/01/23  0353   WBC Thousand/uL 9.21   HEMOGLOBIN g/dL 14.5   HEMATOCRIT % 42.9   PLATELETS Thousands/uL 202     Results from last 7 days   Lab Units 11/01/23  0353   POTASSIUM mmol/L 3.7   CHLORIDE mmol/L 103   CO2 mmol/L 29   BUN mg/dL 21   CREATININE mg/dL 1.04   CALCIUM mg/dL 8.9     Results from last 7 days   Lab Units 11/01/23  0353   INR  1.02   PTT seconds 28     Results from last 7 days   Lab Units 11/01/23  0353   MAGNESIUM mg/dL 2.1       Imaging: I have personally reviewed pertinent reports. ECHO: No results found for this or any previous visit. Cardiac testing:   ECHO:   No results found for this or any previous visit. No results found for this or any previous visit. CATH:  No results found for this or any previous visit. STRESS TEST:  No results found for this or any previous visit.       VTE Prophylaxis: Heparin

## 2023-11-01 NOTE — RESPIRATORY THERAPY NOTE
RT Protocol Note  Anabella Mahoney 77 y.o. male MRN: [id number]  Unit/Bed#: [facility] 522-01 Encounter: [id number]    Assessment    Active Problems: There are no active Hospital Problems. Home Pulmonary Medications:  none       Past Medical History:   Diagnosis Date    Anxiety     Aortic stenosis     Borderline high cholesterol     CHF (congestive heart failure) ([facility])     Asaf Gambleble-cardio-    Colon polyp     Depression     Heart murmur     Hypertension     Hypothyroidism     Prediabetes     PVCs (premature ventricular contractions)     "skip"    Wears glasses     readers     Social History     Socioeconomic History    Marital status: /Civil Union     Spouse name: Not on file    Number of children: Not on file    Years of education: Not on file    Highest education level: Not on file   Occupational History    Not on file   Tobacco Use    Smoking status: Every Day     Packs/day: 0.50     Years: 50.00     Total pack years: 25.00     Types: Cigarettes     Last attempt to quit: 2023     Years since quittin.8    Smokeless tobacco: Never   Vaping Use    Vaping Use: Never used   Substance and Sexual Activity    Alcohol use: Yes     Comment: socially-2 drinks per week    Drug use: Not Currently     Comment: sporadic in young adulthood    Sexual activity: Not on file     Comment: defer   Other Topics Concern    Not on file   Social History Narrative    Not on file     Social Determinants of Health     Financial Resource Strain: Not on file   Food Insecurity: No Food Insecurity (2023)    Hunger Vital Sign     Worried About Running Out of Food in the Last Year: Never true     Ran Out of Food in the Last Year: Never true   Transportation Needs: No Transportation Needs (2023)    PRAPARE - Transportation     Lack of Transportation (Medical): No     Lack of Transportation (Non-Medical):  No   Physical Activity: Not on file   Stress: Not on file   Social Connections: Not on file   Intimate Partner Violence: Not on file   Housing Stability: Low Risk  (6/7/2023)    Housing Stability Vital Sign     Unable to Pay for Housing in the Last Year: No     Number of Places Lived in the Last Year: 1     Unstable Housing in the Last Year: No       Subjective         Objective    Physical Exam:   Assessment Type: Assess only  General Appearance: Alert, Awake  Respiratory Pattern: Normal  Chest Assessment: Chest expansion symmetrical  Bilateral Breath Sounds: Clear  R Breath Sounds: Clear  L Breath Sounds: Clear    Vitals:  Blood pressure 141/75, pulse (!) 51, temperature 97.6 °F (36.4 °C), temperature source Oral, resp. rate 16, height 6' (1.829 m), weight 98.4 kg (217 lb). Imaging and other studies:    11/01/23 0400   Respiratory Protocol   Protocol Initiated? Yes   Protocol Selection Respiratory   Language Barrier? No   Medical & Social History Reviewed? Yes   Diagnostic Studies Reviewed? Yes   Physical Assessment Performed? Yes   Respiratory Plan Discontinue Protocol   Respiratory Assessment   Assessment Type Assess only   General Appearance Alert; Awake   Respiratory Pattern Normal   Chest Assessment Chest expansion symmetrical   Bilateral Breath Sounds Clear   R Breath Sounds Clear   L Breath Sounds Clear   Resp Comments Assessment of Respiratory protocol. Patient was a transfer from [facility]. Patient see a cardiologist here. Pt had runs of VTach. Pt not in any distress. BS clear, 97% on 2 l/m. Does not take any respiratory tx at home. He did smoke but quit. He denies any respiratory issues. He is not here for respiratory issues. Will DC protocol. Plan    Respiratory Plan: Discontinue Protocol        Resp Comments: Assessment of Respiratory protocol. Patient was a transfer from [facility]. Patient see a cardiologist here. Pt had runs of VTach. Pt not in any distress. BS clear, 97% on 2 l/m. Does not take any respiratory tx at home. He did smoke but quit.  He denies any respiratory issues. He is not here for respiratory issues. Will DC protocol.

## 2023-11-01 NOTE — ASSESSMENT & PLAN NOTE
Had a 30 beat run of Ventricular Tachycardia while at Cardiac Rehab on Monday (10/30)   Has hx of Severe Aortic Stenosis and as part of planned TAVR later this month, patient underwent LHC on 10/17 where he underwent DEON to the Mid LAD for an 80% stenosed lesion   From cardiac rehab, patient went to [facility] ER where patient's case was discussed with cardiology and patient was initially started on IV amiodarone infusion; Appears from notes as patient was an ER hold until bed became available that patient had episode of bradycardia with IV infusion and patient was then switched to PO; Notes from [facility] ER show that CV recommended patient take amiodarone 400mg daily for 2 weeks, followed by 200mg daily thereafter  Transferred to [facility] on early AM hours of 11/1  Denies any chest pain or sob currently; Does endorse sob with exertion   Admit to medicine on telemetry. Continue Amiodarone po 400mg daily for now given CV recs while in [facility] ER. Monitor for any recurrence of ventricular arrhythmia. Keep K>4, Mg>2. Consult CV team to see if patient potentially needs TAVR moved up if patient's symptoms of VT thought 2/2 to underlying Aortic Stenosis.

## 2023-11-01 NOTE — PHYSICAL THERAPY NOTE
PHYSICAL THERAPY EVALUATION  NAME:  Lili Lanza  DATE: 11/01/23    AGE:   77 y.o. Mrn:   [id number]  ADMIT DX:  SVT (supraventricular tachycardia) [I47.10]  Problem List:   Patient Active Problem List   Diagnosis    Benign essential HTN    Severe aortic valve stenosis    MDD (major depressive disorder)    Peripheral neuropathy    Other emphysema (HCC)    Ulcer of left foot (HCC)    ANNALEE (acute kidney injury) ([address])    Hyperkalemia    MSSA bacteremia    Heart failure with preserved ejection fraction    PVCs (premature ventricular contractions)    Status post foot surgery    Acute hematogenous osteomyelitis of left foot (HCC)    Borderline high cholesterol    Dyspnea on exertion    Episodic lightheadedness    Status post amputation of great toe, left (HCC)    Mixed hyperlipidemia    Statin intolerance    Coronary artery disease    Ventricular tachycardia ([address])       Past Medical History  Past Medical History:   Diagnosis Date    Anxiety     Aortic stenosis     Borderline high cholesterol     CHF (congestive heart failure) ([address])     [facility] Jackson-cardio-    Colon polyp     Depression     Heart murmur     Hypertension     Hypothyroidism     Prediabetes     PVCs (premature ventricular contractions)     "skip"    Wears glasses     readers       Past Surgical History  Past Surgical History:   Procedure Laterality Date    APPENDECTOMY      BACK SURGERY      CARDIAC CATHETERIZATION N/A 10/17/2023    Procedure: Cardiac RHC/LHC; Surgeon: Vianey Sotomayor MD;  [facility];   Service: Cardiology    CATARACT EXTRACTION Bilateral     CLOSURE DELAYED PRIMARY Left 06/09/2023    Procedure: CLOSURE DELAYED PRIMARY;  Surgeon: Migue Torres DPM;  Location: [facility] OR;  Service: Podiatry    COLONOSCOPY  2023    NATHALIE Guerra    SD CORRJ HALLUX VALGUS W/SESMDC W/RESCJ PROX PHAL Left 04/19/2023    Procedure: Sydna Pound and wound debridement;  Surgeon: Jose Manuel Marcelino DPM;  Location:  MAIN OR;  Service: Podiatry    TOE AMPUTATION Left 06/07/2023    Procedure: PARTIAL 1ST RAY AMPUTATION;  Surgeon: Bonnie Monaco DPM;  Location:  MAIN OR;  Service: Podiatry    TONSILLECTOMY      TOTAL SHOULDER REPLACEMENT Left     WISDOM TOOTH EXTRACTION         Length Of Stay: 0  Performed at least 2 patient identifiers during session: Name and Birthday       11/01/23 0932   Restrictions/Precautions   Weight Bearing Precautions Per Order No   Braces or Orthoses   (none reported)   Other Precautions Telemetry  (step down)   Home Living   Type of [address] [place] [address] Two level;Bed/bath upstairs;1/2 bath on main level;Stairs to enter without rails  (2 KULWINDER)   Bathroom Shower/Tub Tub/shower unit   Bathroom Toilet Standard   Bathroom Equipment   (none reported)   Home Equipment Walker;Cane  (no AD used at [facility])   Prior Function   Level of [place] Independent with ADLs; Independent with functional mobility; Independent with IADLS   Lives With Spouse   Receives Help From Family   IADLs Independent with driving; Independent with meal prep; Independent with medication management   Falls in the last 6 months 0   Vocational Retired   General   Family/Caregiver Present No   Cognition   Overall Cognitive Status WFL   Arousal/Participation Alert   Attention Within functional limits   Orientation Level Oriented X4   Memory Within functional limits   Following Commands Follows one step commands without difficulty   Comments pt reported difficulty with word finding due to recent TIA   RUE Assessment   RUE Assessment WFL   LUE Assessment   LUE Assessment WFL   RLE Assessment   RLE Assessment WFL   LLE Assessment   LLE Assessment WFL   Vision-Basic Assessment   Current Vision No visual deficits   Coordination   Sensation X  (B hands)   Bed Mobility   Supine to Sit 6  Modified independent   Additional items HOB elevated; Increased time required   Additional Comments pt denied dizziness with transitional movement Transfers   Sit to Stand 5  Supervision   Additional items Increased time required   Stand to Sit 5  Supervision   Additional items Increased time required   Ambulation/Elevation   Gait pattern Decreased foot clearance  (pt reporting decreased balance due to recent TIA however very mild)   Gait Assistance 5  Supervision   Assistive Device None   Distance 250 ft   Stair Management Assistance 5  Supervision   Stair Management Technique No rails; Alternating pattern; Foreward   Number of Stairs 7   Balance   Static Sitting Normal   Dynamic Sitting Good   Static Standing Fair +   Dynamic Standing Fair   Ambulatory Fair   Endurance Deficit   Endurance Deficit No   Activity Tolerance   Activity Tolerance Patient tolerated treatment well   Assessment   Prognosis Good   Assessment Pt is 77 y.o. male seen for PT evaluation s/p admit to [address] [place] [address] on 11/1/2023 w/ Ventricular tachycardia ([address] PT consulted to assess pt's functional mobility and d/c needs. Order placed for PT eval and tx, w/ up and OOB as tolerated order. Pt agreeable to PT  session upon arrival, pt found supine in bed. The following objective measures performed on IE also reveal limitations: AM-PAC 6 Clicks 19/98. Patient was independent w/ all functional mobility w/ no AD. Pt presents at [facility] with transfers, ambulation, and bed mobility. From PT/mobility standpoint, recommendation at time of d/c would be anticipate no needs. Upon conclusion pt supine in bed. D/c PT services at this time. Complexity: Comorbidities affecting pt's physical performance at time of assessment include: CHF, CAD, anxiety, depression, and TIA . Personal factors affecting pt at time of IE include: lives in [facility] and steps to enter home. Please find objective findings from PT assessment regarding body systems outlined above with impairments and limitations including impaired balance and word finding difficulty .   Pt's clinical presentation is currently evolving seen in pt's presentation of tachycardia and telemetry use. The patient's AM-PAC Basic Mobility Inpatient Short Form Raw Score is 24. A Raw score of  greater than 16 suggests the patient may benefit from discharge to home. Please also refer to the recommendation of the Physical Therapist for safe discharge planning. RN verbalized pt appropriate for PT session.    Barriers to Discharge None   Goals   Patient Goals go home   Discharge Recommendation   Rehab Resource Intensity Level, PT No post-acute rehabilitation needs   AM-PAC Basic Mobility Inpatient   Turning in Flat Bed Without Bedrails 4   Lying on Back to Sitting on Edge of Flat Bed Without Bedrails 4   Moving Bed to Chair 4   Standing Up From Chair Using Arms 4   Walk in Room 4   Climb 3-5 Stairs With Railing 4   Basic Mobility Inpatient Raw Score 24   Basic Mobility Standardized Score 57.68   Highest Level Of Mobility   JH-HLM Goal 8: Walk 250 feet or more   JH-HLM Achieved 8: Walk 250 feet ot more       Time In: 0916  Time Out: 0926  Total Evaluation Minutes: [facility], PT

## 2023-11-01 NOTE — CONSULTS
Consultation - General Cardiology Team 2  Mahnaz Carlin 77 y.o. male MRN: [id number]  Unit/Bed#: [facility] 522-01 Encounter: [id number]      Inpatient consult to Cardiology  Consult performed by: Vance Lal DO  Consult ordered by: Rubi Grant DO        PCP: Jet Piña MD   Outpatient Cardiologist: JESUS Delarosa     History of Present Illness   Physician Requesting Consult: Gunnar Panchal DO  Reason for Consult / Principal Problem: Ventricular tachycardia        Assessment/Plan     Assessment:  67yo M with severe AS pending TAVR workup and CAD s/p recent DEON to prox-mid LAD presented from cardiac rehab due to several episodes of monomorphic nonsustained ventricular tachycardia. Patient transferred to [facility] for ICD evaluation. Plan:  - Continue amiodarone 400mg daily  - Continue metoprolol succinate 12.5mg daily (home dose)  - Would continue home amlodipine 10mg daily  - Would resume home Lasix 60mg PO daily given mild bibasilar rales on exam today  - Continue DAPT with ASA, plavix  - Continue home statin/zetia  - EP consultation for ICD discussion  - Continue telemetry  - Monitor BMP per primary to keep K>4, P>3, Mg>2        HPI: Mahnaz Carlin is a 77y.o. year old male with a history of severe AS, HTN, HFpEF (60%), h/o PVCs with 3.7-5.5% burden on Holter who presented for sustained VT at cardiac rehab. He had LHC done on 10/17 (as a part of TAVR workup) which showed prox to mid LAD 80% stenosis for which he received DEON and referral to cardiac rehab. On 10/30, he had a 30 second and 20 second runs of NSVT at cardiac rehab which self terminated. He was seen at [facility] ED during which he had another 2 runs of monomorphic NSVT (symptomatic). He was started on amiodarone and beta blocker at the time and recommended transfer for ICD placement, for which he arrived at [address] [place], [place] [address] [place] this morning. Upon discussion with the patient, he is feeling better today.  He was able to walk with physical therapy this morning without any chest pain/discomfort or dyspnea on exertion. At baseline, he does not have any chest pain/discomfort, dyspnea, orthopnea, lightheadedness/dizziness, palpitations. He is a little apprehensive of receiving ICD, but open to further discussion or risks/benefits. Review of Systems  ROS as noted above. Historical Information   Past Medical History:   Diagnosis Date    Anxiety     Aortic stenosis     Borderline high cholesterol     CHF (congestive heart failure) ([address])     Asaf Jackson-cardio-    Colon polyp     Depression     Heart murmur     Hypertension     Hypothyroidism     Prediabetes     PVCs (premature ventricular contractions)     "skip"    Wears glasses     readers     Past Surgical History:   Procedure Laterality Date    APPENDECTOMY      BACK SURGERY      CARDIAC CATHETERIZATION N/A 10/17/2023    Procedure: Cardiac RHC/LHC; Surgeon: James Bojorquez MD;  [facility];   Service: Cardiology    CATARACT EXTRACTION Bilateral     CLOSURE DELAYED PRIMARY Left 06/09/2023    Procedure: CLOSURE DELAYED PRIMARY;  Surgeon: Anahi Ruiz DPM;  Location: [facility];  Service: Podiatry    COLONOSCOPY  2023    NATHALIE Guerra    MO CORRJ HALLUX VALGUS W/SESMDC W/RESCJ PROX PHAL Left 04/19/2023    Procedure: Case Cheeks and wound debridement;  Surgeon: Belem Fitzgerald DPM;  Location: [facility];  Service: Podiatry    TOE AMPUTATION Left 06/07/2023    Procedure: PARTIAL 1ST RAY AMPUTATION;  Surgeon: Anahi Ruiz DPM;  Location: [facility];  Service: Podiatry    TONSILLECTOMY      TOTAL SHOULDER REPLACEMENT Left     WISDOM TOOTH EXTRACTION       Social History     Substance and Sexual Activity   Alcohol Use Yes    Comment: socially-2 drinks per week     Social History     Substance and Sexual Activity   Drug Use Not Currently    Comment: sporadic in young adulthood     Social History     Tobacco Use   Smoking Status Every Day Packs/day: 0.50    Years: 50.00    Total pack years: 25.00    Types: Cigarettes    Last attempt to quit: 2023    Years since quittin.8   Smokeless Tobacco Never       Meds/Allergies   Hospital Medications:   Current Facility-Administered Medications   Medication Dose Route Frequency    amiodarone tablet 400 mg  400 mg Oral Daily With Breakfast    aspirin chewable tablet 81 mg  81 mg Oral Daily    clopidogrel (PLAVIX) tablet 75 mg  75 mg Oral Daily    escitalopram (LEXAPRO) tablet 20 mg  20 mg Oral Daily    ezetimibe (ZETIA) tablet 10 mg  10 mg Oral Daily    heparin (porcine) subcutaneous injection 5,000 Units  5,000 Units Subcutaneous Q8H [address]    metoprolol succinate (TOPROL-XL) 24 hr tablet 12.5 mg  12.5 mg Oral Daily    nicotine (NICODERM CQ) 14 mg/24hr TD 24 hr patch 1 patch  1 patch Transdermal Daily PRN     Home Medications:   Medications Prior to Admission   Medication    Alirocumab 150 MG/ML SOAJ    amLODIPine (NORVASC) 10 mg tablet    aspirin 81 mg chewable tablet    buPROPion (WELLBUTRIN XL) 300 mg 24 hr tablet    clopidogrel (Plavix) 75 mg tablet    escitalopram (LEXAPRO) 20 mg tablet    ezetimibe (ZETIA) 10 mg tablet    furosemide (LASIX) 40 mg tablet    gabapentin (NEURONTIN) 100 mg capsule    levothyroxine 75 mcg tablet    Magnesium 400 MG CAPS    metoprolol succinate (TOPROL-XL) 25 mg 24 hr tablet    Multiple Vitamins-Minerals (OCUVITE ADULT FORMULA PO)    Omega-3 Fatty Acids (Fish Oil) 1200 MG CAPS    potassium chloride (MICRO-K) 10 MEQ CR capsule    pravastatin (PRAVACHOL) 20 mg tablet       Allergies   Allergen Reactions    Statins Myalgia     Other reaction(s): muscle aches and joint aches         Objective   Vitals: Blood pressure 148/96, pulse 60, temperature 97.6 °F (36.4 °C), temperature source Oral, resp. rate 18, height 6' (1.829 m), weight 98.4 kg (217 lb), SpO2 97 %.   Orthostatic Blood Pressures      Flowsheet Row Most Recent Value   Blood Pressure 148/96 filed at 2023 0967 Patient Position - Orthostatic VS Lying filed at 11/01/2023 0808              Invasive Devices       Peripheral Intravenous Line  Duration             Peripheral IV 11/01/23 Left;Ventral (anterior) Forearm <1 day                    Physical Exam    GEN: Stacie Garcia appears well, alert and oriented x 3, pleasant and cooperative   HEENT:  Normocephalic, atraumatic, anicteric, moist mucous membranes  NECK: JVD to level of ear lobe  HEART: Normal sinus rhythm, bradycardic rate, normal S1 and S2, no murmurs, clicks, gallops or rubs   LUNGS: Mild basilar rales present bilaterally; no wheezes or rhonchi; respiration nonlabored   ABDOMEN:  Normoactive bowel sounds, soft, no tenderness, no distention  EXTREMITIES: peripheral pulses palpable; no edema  NEURO: no gross focal findings; cranial nerves grossly intact   SKIN:  Dry, intact, warm to touch    Lab Results: I have personally reviewed pertinent lab results. Results from last 7 days   Lab Units 11/01/23  0353   POTASSIUM mmol/L 3.7   CO2 mmol/L 29   CHLORIDE mmol/L 103   BUN mg/dL 21   CREATININE mg/dL 1.04     Results from last 7 days   Lab Units 11/01/23  0353   HEMOGLOBIN g/dL 14.5   HEMATOCRIT % 42.9   PLATELETS Thousands/uL 202     Results from last 7 days   Lab Units 11/01/23  0353   PTT seconds 28             Imaging: I have personally reviewed pertinent reports. Holter/Telemetry:   Telemetry hasn't been started at time of evaluation    ECHO: No results found for this or any previous visit. Complete TTE 1/2023    Left Ventricle: Left ventricular cavity size is normal. Wall thickness is increased. There is mild concentric hypertrophy. The left ventricular ejection fraction is 60%. Systolic function is normal. Wall motion is normal. Diastolic function is mildly abnormal, consistent with grade I (abnormal) relaxation. Left atrial filling pressure is elevated. E/E' 15. IVS: There is sigmoid appearance of the septum.     Right Ventricle: Right ventricular cavity size is mildly dilated. Systolic function is normal. Normal tricuspid annular plane systolic excursion (TAPSE) > 1.7 cm. Left Atrium: The atrium is mildly dilated. Aortic Valve: The leaflets are moderately calcified. There is moderately reduced mobility. There is trace to mild regurgitation. There is moderate stenosis. The aortic valve peak velocity is 3.52 m/s. The aortic valve mean gradient is 30 mmHg. The dimensionless velocity index is 0.30. Mitral Valve: There is annular calcification. There is trace regurgitation. There is no evidence of stenosis. Tricuspid Valve: There is trace regurgitation. There is no evidence of stenosis. The right ventricular systolic pressure is mildly elevated. The estimated right ventricular systolic pressure is 39.43 mmHg. Aorta: The aortic root is mildly dilated. The ascending aorta is normal in size. The aortic root is 3.80 cm. The ascending aorta is 3.3 cm. IVC/SVC: The right atrial pressure is estimated at 15.0 mmHg. The inferior vena cava is dilated. Respirophasic changes in dimension were absent. Prior TTE study available for comparison. Prior study date: 11/11/2021. Changes noted when compared to prior study. Changes include: Moderate AS noted on prior study with peak transaortic velocity of 2.9 m/s with a mean gradient of 20 mmHg. DI 0.38. Peak velocity and mean gradient have increased since prior study. DI is now lower suggesting progression of the degree of aortic stenosis (still moderate aortic stenosis). Aortic root previously reported as normal in size. Peg Thomas CATH/STRESS TEST:   [facility] 10/17  Significant CAD, with a long 80% stenosis of the proximal/mid LAD. There was also non-obstructive plaque of the mid RCA. The LAD lesion was interrogated by iFR and was borderline, varying from 0.88 to 0.91. IVUS was also performed, and there was a marked reduction in cross sectional area. PCI was performed, with placement of a 4.0x26mm DEON. There was no residual stenosis. There was full stent expansion and apposition, with 0% residual stenosis. NM perfusion spect 7/18     Stress Function: Left ventricular function post-stress is normal. Stress ejection fraction is 59 %. Normal regional wall motion noted. Perfusion: There are no perfusion defects. Stress Combined Conclusion: The ECG and SPECT imaging portions of the stress study are concordant with no evidence of stress induced myocardial ischemia. Left ventricular perfusion is normal.  Post-rest ejection fraction is determined as 59%. Stress ECG: No ST deviation is noted. There were no arrhythmias during recovery. . The ECG was negative for ischemia. No anginal-like symptoms were reported during the stress test.    Normal resting blood pressure. Appropriate blood pressure response was noted during the stress test.    EKG:   None this admission  See media tab for strips from pre-transfer monomorphic VT      VTE Prophylaxis: Heparin           Case discussed and reviewed with Dr. Alexander Brandt who agrees with my assessment and plan. Thank you for involving us in the care of your patient. Madeleine Keller, DO PGY-3  [facility] SPECIALTY \[facility]\"" - [facility].      ==========================================================================================    Counseling / Coordination of Care  Total floor / unit time spent today 1 hour minutes. Greater than 50% of total time was spent with the patient and / or family counseling and / or coordination of care. ** Please Note: Fluency DirectDictation voice to text software may have been used in the creation of this document.  **

## 2023-11-01 NOTE — ANESTHESIA PREPROCEDURE EVALUATION
Procedure:  Cardiac icd implant (Chest)    Severe AS undergoing TAVR workup recently had CAD 80% LAD s/p DEON and had vtach while at rehab here for ICD implant    TTE: EF 60%, RV wnl, Severe AS mG 41    Cardiac meds: amlodipine, ASA, Plavix, lasix, metoprolol, statin     Denies the following: CP/SOB with exertion, asthma, COPD, JUSTO, stroke/TIA, seizure    Relevant Problems   CARDIO   (+) Benign essential HTN   (+) Coronary artery disease   (+) Dyspnea on exertion   (+) Mixed hyperlipidemia   (+) PVCs (premature ventricular contractions)   (+) Severe aortic valve stenosis   (+) Ventricular tachycardia (HCC)      /RENAL   (+) ANNALEE (acute kidney injury) (HCC)      NEURO/PSYCH   (+) MDD (major depressive disorder)      PULMONARY   (+) Dyspnea on exertion   (+) Heart failure with preserved ejection fraction   (+) Other emphysema (HCC)      Other   (+) Acute hematogenous osteomyelitis of left foot (HCC)        Physical Exam    Airway    Mallampati score: II  TM Distance: >3 FB  Neck ROM: full     Dental   No notable dental hx     Cardiovascular      Pulmonary      Other Findings        Anesthesia Plan  ASA Score- 4     Anesthesia Type- IV sedation with anesthesia with ASA Monitors. Additional Monitors:     Airway Plan:            Plan Factors-Exercise tolerance (METS): >4 METS. Chart reviewed. EKG reviewed. Existing labs reviewed. Patient summary reviewed. Patient is not a current smoker. Obstructive sleep apnea risk education given perioperatively. Induction-     Postoperative Plan-     Informed Consent- Anesthetic plan and risks discussed with patient. I personally reviewed this patient with the CRNA. Discussed and agreed on the Anesthesia Plan with the CRNA. Sahra Saavedra

## 2023-11-01 NOTE — ASSESSMENT & PLAN NOTE
Most recent echo with LVEF 22%, Grade 1 Diastolic Dysfunction, Severe Aortic stenosis   Has TAVR originally scheduled for end of November, but in setting of VT episode appreciate CV thoughts on this needing to be moved up

## 2023-11-02 ENCOUNTER — APPOINTMENT (INPATIENT)
Dept: RADIOLOGY | Facility: HOSPITAL | Age: 68
DRG: 277 | End: 2023-11-02
Payer: MEDICARE

## 2023-11-02 ENCOUNTER — ANESTHESIA (INPATIENT)
Dept: NON INVASIVE DIAGNOSTICS | Facility: HOSPITAL | Age: 68
End: 2023-11-02
Payer: MEDICARE

## 2023-11-02 LAB
ANION GAP SERPL CALCULATED.3IONS-SCNC: 10 MMOL/L
BUN SERPL-MCNC: 21 MG/DL (ref 5–25)
CALCIUM SERPL-MCNC: 9.5 MG/DL (ref 8.4–10.2)
CHLORIDE SERPL-SCNC: 103 MMOL/L (ref 96–108)
CO2 SERPL-SCNC: 26 MMOL/L (ref 21–32)
CREAT SERPL-MCNC: 0.98 MG/DL (ref 0.6–1.3)
ERYTHROCYTE [DISTWIDTH] IN BLOOD BY AUTOMATED COUNT: 13.2 % (ref 11.6–15.1)
GFR SERPL CREATININE-BSD FRML MDRD: 77 ML/MIN/1.73SQ M
GLUCOSE SERPL-MCNC: 96 MG/DL (ref 65–140)
HCT VFR BLD AUTO: 45.3 % (ref 36.5–49.3)
HGB BLD-MCNC: 14.9 G/DL (ref 12–17)
MAGNESIUM SERPL-MCNC: 2.2 MG/DL (ref 1.9–2.7)
MCH RBC QN AUTO: 31.1 PG (ref 26.8–34.3)
MCHC RBC AUTO-ENTMCNC: 32.9 G/DL (ref 31.4–37.4)
MCV RBC AUTO: 95 FL (ref 82–98)
PLATELET # BLD AUTO: 199 THOUSANDS/UL (ref 149–390)
PMV BLD AUTO: 9.5 FL (ref 8.9–12.7)
POTASSIUM SERPL-SCNC: 3.8 MMOL/L (ref 3.5–5.3)
RBC # BLD AUTO: 4.79 MILLION/UL (ref 3.88–5.62)
SODIUM SERPL-SCNC: 139 MMOL/L (ref 135–147)
WBC # BLD AUTO: 9.23 THOUSAND/UL (ref 4.31–10.16)

## 2023-11-02 PROCEDURE — 02H63KZ INSERTION OF DEFIBRILLATOR LEAD INTO RIGHT ATRIUM, PERCUTANEOUS APPROACH: ICD-10-PCS | Performed by: INTERNAL MEDICINE

## 2023-11-02 PROCEDURE — 0JH608Z INSERTION OF DEFIBRILLATOR GENERATOR INTO CHEST SUBCUTANEOUS TISSUE AND FASCIA, OPEN APPROACH: ICD-10-PCS | Performed by: INTERNAL MEDICINE

## 2023-11-02 PROCEDURE — 02HK3KZ INSERTION OF DEFIBRILLATOR LEAD INTO RIGHT VENTRICLE, PERCUTANEOUS APPROACH: ICD-10-PCS | Performed by: INTERNAL MEDICINE

## 2023-11-02 PROCEDURE — C1892 INTRO/SHEATH,FIXED,PEEL-AWAY: HCPCS | Performed by: INTERNAL MEDICINE

## 2023-11-02 PROCEDURE — C1777 LEAD, AICD, ENDO SINGLE COIL: HCPCS | Performed by: INTERNAL MEDICINE

## 2023-11-02 PROCEDURE — 33249 INSJ/RPLCMT DEFIB W/LEAD(S): CPT | Performed by: INTERNAL MEDICINE

## 2023-11-02 PROCEDURE — 3E0102A INTRODUCTION OF ANTI-INFECTIVE ENVELOPE INTO SUBCUTANEOUS TISSUE, OPEN APPROACH: ICD-10-PCS | Performed by: INTERNAL MEDICINE

## 2023-11-02 PROCEDURE — 71045 X-RAY EXAM CHEST 1 VIEW: CPT

## 2023-11-02 PROCEDURE — 83735 ASSAY OF MAGNESIUM: CPT | Performed by: PHYSICIAN ASSISTANT

## 2023-11-02 PROCEDURE — 85027 COMPLETE CBC AUTOMATED: CPT | Performed by: PHYSICIAN ASSISTANT

## 2023-11-02 PROCEDURE — 99233 SBSQ HOSP IP/OBS HIGH 50: CPT | Performed by: INTERNAL MEDICINE

## 2023-11-02 PROCEDURE — C1721 AICD, DUAL CHAMBER: HCPCS | Performed by: INTERNAL MEDICINE

## 2023-11-02 PROCEDURE — 80048 BASIC METABOLIC PNL TOTAL CA: CPT | Performed by: PHYSICIAN ASSISTANT

## 2023-11-02 PROCEDURE — C1898 LEAD, PMKR, OTHER THAN TRANS: HCPCS | Performed by: INTERNAL MEDICINE

## 2023-11-02 PROCEDURE — 93005 ELECTROCARDIOGRAM TRACING: CPT

## 2023-11-02 DEVICE — LEAD 6935M62 QUATTRO SECURE S MRI US
Type: IMPLANTABLE DEVICE | Site: HEART | Status: FUNCTIONAL
Brand: SPRINT QUATTRO SECURE S MRI™ SURESCAN™

## 2023-11-02 DEVICE — ENVELOPE CMRM6133 ABSORB LRG MR
Type: IMPLANTABLE DEVICE | Site: CHEST | Status: FUNCTIONAL
Brand: TYRX™

## 2023-11-02 DEVICE — LEAD 457453 MRI US BI RCMCRD MVC
Type: IMPLANTABLE DEVICE | Site: HEART | Status: FUNCTIONAL
Brand: CAPSURE SENSE MRI™ SURESCAN™

## 2023-11-02 DEVICE — ICD DDPA2D4 COBALT XT DR MRI DF4 USA
Type: IMPLANTABLE DEVICE | Site: CHEST | Status: FUNCTIONAL
Brand: COBALT™ XT DR MRI SURESCAN™

## 2023-11-02 RX ORDER — SODIUM CHLORIDE 9 MG/ML
50 INJECTION, SOLUTION INTRAVENOUS CONTINUOUS
Status: DISCONTINUED | OUTPATIENT
Start: 2023-11-02 | End: 2023-11-03

## 2023-11-02 RX ORDER — ONDANSETRON 2 MG/ML
4 INJECTION INTRAMUSCULAR; INTRAVENOUS ONCE AS NEEDED
Status: DISCONTINUED | OUTPATIENT
Start: 2023-11-02 | End: 2023-11-04 | Stop reason: HOSPADM

## 2023-11-02 RX ORDER — LIDOCAINE HYDROCHLORIDE 10 MG/ML
INJECTION, SOLUTION EPIDURAL; INFILTRATION; INTRACAUDAL; PERINEURAL CODE/TRAUMA/SEDATION MEDICATION
Status: DISCONTINUED | OUTPATIENT
Start: 2023-11-02 | End: 2023-11-02 | Stop reason: HOSPADM

## 2023-11-02 RX ORDER — LIDOCAINE HYDROCHLORIDE 10 MG/ML
INJECTION, SOLUTION EPIDURAL; INFILTRATION; INTRACAUDAL; PERINEURAL AS NEEDED
Status: DISCONTINUED | OUTPATIENT
Start: 2023-11-02 | End: 2023-11-02

## 2023-11-02 RX ORDER — FENTANYL CITRATE/PF 50 MCG/ML
25 SYRINGE (ML) INJECTION
Status: DISCONTINUED | OUTPATIENT
Start: 2023-11-02 | End: 2023-11-04 | Stop reason: HOSPADM

## 2023-11-02 RX ORDER — FENTANYL CITRATE 50 UG/ML
INJECTION, SOLUTION INTRAMUSCULAR; INTRAVENOUS AS NEEDED
Status: DISCONTINUED | OUTPATIENT
Start: 2023-11-02 | End: 2023-11-02

## 2023-11-02 RX ORDER — ONDANSETRON 2 MG/ML
INJECTION INTRAMUSCULAR; INTRAVENOUS AS NEEDED
Status: DISCONTINUED | OUTPATIENT
Start: 2023-11-02 | End: 2023-11-02

## 2023-11-02 RX ORDER — SODIUM CHLORIDE 9 MG/ML
INJECTION, SOLUTION INTRAVENOUS CONTINUOUS PRN
Status: DISCONTINUED | OUTPATIENT
Start: 2023-11-02 | End: 2023-11-02

## 2023-11-02 RX ORDER — ALBUTEROL SULFATE 2.5 MG/3ML
2.5 SOLUTION RESPIRATORY (INHALATION) ONCE AS NEEDED
Status: DISCONTINUED | OUTPATIENT
Start: 2023-11-02 | End: 2023-11-02

## 2023-11-02 RX ORDER — GENTAMICIN SULFATE 40 MG/ML
INJECTION, SOLUTION INTRAMUSCULAR; INTRAVENOUS CODE/TRAUMA/SEDATION MEDICATION
Status: DISCONTINUED | OUTPATIENT
Start: 2023-11-02 | End: 2023-11-02 | Stop reason: HOSPADM

## 2023-11-02 RX ORDER — GLYCOPYRROLATE 0.2 MG/ML
INJECTION INTRAMUSCULAR; INTRAVENOUS AS NEEDED
Status: DISCONTINUED | OUTPATIENT
Start: 2023-11-02 | End: 2023-11-02

## 2023-11-02 RX ORDER — ACETAMINOPHEN 325 MG/1
650 TABLET ORAL EVERY 4 HOURS PRN
Status: DISCONTINUED | OUTPATIENT
Start: 2023-11-02 | End: 2023-11-04 | Stop reason: HOSPADM

## 2023-11-02 RX ORDER — DEXAMETHASONE SODIUM PHOSPHATE 10 MG/ML
INJECTION, SOLUTION INTRAMUSCULAR; INTRAVENOUS AS NEEDED
Status: DISCONTINUED | OUTPATIENT
Start: 2023-11-02 | End: 2023-11-02

## 2023-11-02 RX ORDER — PROPOFOL 10 MG/ML
INJECTION, EMULSION INTRAVENOUS CONTINUOUS PRN
Status: DISCONTINUED | OUTPATIENT
Start: 2023-11-02 | End: 2023-11-02

## 2023-11-02 RX ORDER — MIDAZOLAM HYDROCHLORIDE 2 MG/2ML
INJECTION, SOLUTION INTRAMUSCULAR; INTRAVENOUS AS NEEDED
Status: DISCONTINUED | OUTPATIENT
Start: 2023-11-02 | End: 2023-11-02

## 2023-11-02 RX ADMIN — PHENYLEPHRINE HYDROCHLORIDE 100 MCG: 10 INJECTION INTRAVENOUS at 13:52

## 2023-11-02 RX ADMIN — MIDAZOLAM 2 MG: 1 INJECTION INTRAMUSCULAR; INTRAVENOUS at 13:27

## 2023-11-02 RX ADMIN — LIDOCAINE HYDROCHLORIDE 50 MG: 10 INJECTION, SOLUTION EPIDURAL; INFILTRATION; INTRACAUDAL; PERINEURAL at 13:32

## 2023-11-02 RX ADMIN — PHENYLEPHRINE HYDROCHLORIDE 200 MCG: 10 INJECTION INTRAVENOUS at 13:44

## 2023-11-02 RX ADMIN — CLOPIDOGREL BISULFATE 75 MG: 75 TABLET ORAL at 08:24

## 2023-11-02 RX ADMIN — PROPOFOL 75 MCG/KG/MIN: 10 INJECTION, EMULSION INTRAVENOUS at 13:04

## 2023-11-02 RX ADMIN — CEFAZOLIN SODIUM 2000 MG: 2 SOLUTION INTRAVENOUS at 13:27

## 2023-11-02 RX ADMIN — DEXAMETHASONE SODIUM PHOSPHATE 10 MG: 10 INJECTION, SOLUTION INTRAMUSCULAR; INTRAVENOUS at 13:42

## 2023-11-02 RX ADMIN — EZETIMIBE 10 MG: 10 TABLET ORAL at 08:24

## 2023-11-02 RX ADMIN — FENTANYL CITRATE 50 MCG: 50 INJECTION INTRAMUSCULAR; INTRAVENOUS at 13:52

## 2023-11-02 RX ADMIN — AMIODARONE HYDROCHLORIDE 400 MG: 200 TABLET ORAL at 08:24

## 2023-11-02 RX ADMIN — PROPOFOL 50 MG: 10 INJECTION, EMULSION INTRAVENOUS at 13:32

## 2023-11-02 RX ADMIN — AMIODARONE HYDROCHLORIDE 400 MG: 200 TABLET ORAL at 17:27

## 2023-11-02 RX ADMIN — SODIUM CHLORIDE: 0.9 INJECTION, SOLUTION INTRAVENOUS at 13:27

## 2023-11-02 RX ADMIN — GLYCOPYRROLATE 0.2 MCG: 0.2 INJECTION, SOLUTION INTRAMUSCULAR; INTRAVENOUS at 13:48

## 2023-11-02 RX ADMIN — ESCITALOPRAM OXALATE 20 MG: 20 TABLET, FILM COATED ORAL at 08:24

## 2023-11-02 RX ADMIN — SODIUM CHLORIDE 50 ML/HR: 0.9 INJECTION, SOLUTION INTRAVENOUS at 14:55

## 2023-11-02 RX ADMIN — ONDANSETRON 4 MG: 2 INJECTION INTRAMUSCULAR; INTRAVENOUS at 14:08

## 2023-11-02 RX ADMIN — FENTANYL CITRATE 50 MCG: 50 INJECTION INTRAMUSCULAR; INTRAVENOUS at 13:32

## 2023-11-02 RX ADMIN — ASPIRIN 81 MG CHEWABLE TABLET 81 MG: 81 TABLET CHEWABLE at 08:24

## 2023-11-02 RX ADMIN — METOPROLOL SUCCINATE 12.5 MG: 25 TABLET, FILM COATED, EXTENDED RELEASE ORAL at 08:24

## 2023-11-02 RX ADMIN — PHENYLEPHRINE HYDROCHLORIDE 200 MCG: 10 INJECTION INTRAVENOUS at 13:56

## 2023-11-02 RX ADMIN — PHENYLEPHRINE HYDROCHLORIDE 200 MCG: 10 INJECTION INTRAVENOUS at 14:14

## 2023-11-02 NOTE — ANESTHESIA POSTPROCEDURE EVALUATION
Post-Op Assessment Note    CV Status:  Stable  Pain Score: 0    Pain management: adequate     Mental Status:  Alert and awake   Hydration Status:  Euvolemic   PONV Controlled:  Controlled   Airway Patency:  Patent      Post Op Vitals Reviewed: Yes      Staff: CRNA         No notable events documented.     BP   135/74   Temp      Pulse 62   Resp   22   SpO2   98

## 2023-11-02 NOTE — PROGRESS NOTES
[address] [facility]  Progress Note  Name: Sreekanth Pepper  MRN: [id number]  Unit/Bed#: [facility] 522-01 I Date of Admission: 11/1/2023   Date of Service: 11/2/2023 I Hospital Day: 1    Assessment/Plan   * Ventricular tachycardia [facility])  Assessment & Plan  Had a 30 beat run of Ventricular Tachycardia while at Cardiac Rehab on Monday (10/30)   Has hx of Severe Aortic Stenosis and as part of planned TAVR later this month, patient underwent LHC on 10/17 where he underwent DEON to the Mid LAD for an 80% stenosed lesion   From cardiac rehab, patient went to [facility] ER where patient's case was discussed with cardiology and patient was initially started on IV amiodarone infusion; Appears from notes as patient was an ER hold until bed became available that patient had episode of bradycardia with IV infusion and patient was then switched to PO; Notes from  ER show that CV recommended patient take amiodarone 400mg daily for 2 weeks, followed by 200mg daily thereafter  Transferred to [facility] on early AM hours of 11/1  Denies any chest pain or sob currently; Does endorse sob with exertion   Discussed with cardiology. .  EP for ICD placement. Amiodarone    Coronary artery disease  Assessment & Plan  S/P DEON to Mid LAD in Mid October  Continue pta aspirin, plavix, beta blocker and zetia; has reported statin intolerance? And as such statin held    Heart failure with preserved ejection fraction  Assessment & Plan  Appears euvolemic on exam   On pta lasix 60mg daily  Holding lasix overnight until evaluated by CV team for VT episode. Appreciate CV recs     Severe aortic valve stenosis  Assessment & Plan  Most recent echo with LVEF 82%, Grade 1 Diastolic Dysfunction, Severe Aortic stenosis   Has TAVR originally scheduled for end of November, but in setting of VT episode appreciate CV thoughts on this needing to be moved up             Mechanical VTE Prophylaxis in Place: No    Patient Centered Rounds:  I have performed bedside rounds with nursing staff today. Time Spent for Care:  54 . More than 50% of total time spent on counseling and coordination of care as described above. Current Length of Stay: 1 day(s)    Current Patient Status: Inpatient       Code Status: Level 1 - Full Code      Subjective:   nad    Objective:     Vitals:   Temp (24hrs), Av.5 °F (36.9 °C), Min:98.1 °F (36.7 °C), Max:99 °F (37.2 °C)    Temp:  [98.1 °F (36.7 °C)-99 °F (37.2 °C)] 99 °F (37.2 °C)  HR:  [53-62] 53  Resp:  [18] 18  BP: (143-169)/(70-89) 148/70  SpO2:  [96 %-97 %] 96 %  Body mass index is 29.43 kg/m². Input and Output Summary (last 24 hours): Intake/Output Summary (Last 24 hours) at 2023 1241  Last data filed at 2023 0700  Gross per 24 hour   Intake 960 ml   Output 1975 ml   Net -1015 ml       Physical Exam:     Physical Exam  Constitutional:       Appearance: Normal appearance. HENT:      Head: Normocephalic and atraumatic. Cardiovascular:      Rate and Rhythm: Normal rate and regular rhythm. Heart sounds: No murmur heard. Pulmonary:      Effort: Pulmonary effort is normal.      Breath sounds: Normal breath sounds. Abdominal:      General: Abdomen is flat. Palpations: Abdomen is soft. Neurological:      General: No focal deficit present. Mental Status: He is alert and oriented to person, place, and time.          Additional Data:     Labs:    Results from last 7 days   Lab Units 23  0440 23  0353   WBC Thousand/uL 9.23 9.21   HEMOGLOBIN g/dL 14.9 14.5   HEMATOCRIT % 45.3 42.9   PLATELETS Thousands/uL 199 202   NEUTROS PCT %  --  64   LYMPHS PCT %  --  21   MONOS PCT %  --  10   EOS PCT %  --  5     Results from last 7 days   Lab Units 23  0440 23  0353   POTASSIUM mmol/L 3.8 3.7   CHLORIDE mmol/L 103 103   CO2 mmol/L 26 29   BUN mg/dL 21 21   CREATININE mg/dL 0.98 1.04   CALCIUM mg/dL 9.5 8.9   ALK PHOS U/L  --  80   ALT U/L  --  11   AST U/L  --  14 Results from last 7 days   Lab Units 11/01/23  0353   INR  1.02       * I Have Reviewed All Lab Data Listed Above. * Additional Pertinent Lab Tests Reviewed: All Labs Within Last 24 Hours Reviewed      Recent Cultures (last 7 days):           Last 24 Hours Medication List:   Current Facility-Administered Medications   Medication Dose Route Frequency Provider Last Rate    acetaminophen  650 mg Oral Q4H PRN Aparan Winters PA-C      amiodarone  400 mg Oral BID With Meals Dg Roper PA-C      aspirin  81 mg Oral Daily Celestina Estimable, DO      cefazolin  2,000 mg Intravenous Once Haylee Yang PA-C      clopidogrel  75 mg Oral Daily Celestina Estimable, DO      escitalopram  20 mg Oral Daily Celestina Estimable, DO      ezetimibe  10 mg Oral Daily Celestina Estimable, DO      metoprolol succinate  12.5 mg Oral Daily Celestina Estimable, DO      nicotine  1 patch Transdermal Daily PRN Celestina Estimable, DO          Today, Patient Was Seen By: Bhavna Mittal DO    ** Please Note: Dictation voice to text software may have been used in the creation of this document.  **

## 2023-11-02 NOTE — PLAN OF CARE
Problem: PAIN - ADULT  Goal: Verbalizes/displays adequate comfort level or baseline comfort level  Description: Interventions:  - Encourage patient to monitor pain and request assistance  - Assess pain using appropriate pain scale  - Administer analgesics based on type and severity of pain and evaluate response  - Implement non-pharmacological measures as appropriate and evaluate response  - Consider cultural and social influences on pain and pain management  - Notify physician/advanced practitioner if interventions unsuccessful or patient reports new pain  Outcome: Progressing     Problem: INFECTION - ADULT  Goal: Absence or prevention of progression during hospitalization  Description: INTERVENTIONS:  - Assess and monitor for signs and symptoms of infection  - Monitor lab/diagnostic results  - Monitor all insertion sites, i.e. indwelling lines, tubes, and drains  - Monitor endotracheal if appropriate and nasal secretions for changes in amount and color  - [place] appropriate cooling/warming therapies per order  - Administer medications as ordered  - Instruct and encourage patient and family to use good hand hygiene technique  - Identify and instruct in appropriate isolation precautions for identified infection/condition  Outcome: Progressing  Goal: Absence of fever/infection during neutropenic period  Description: INTERVENTIONS:  - Monitor WBC    Outcome: Progressing     Problem: SAFETY ADULT  Goal: Patient will remain free of falls  Description: INTERVENTIONS:  - Educate patient/family on patient safety including physical limitations  - Instruct patient to call for assistance with activity   - Consult OT/PT to assist with strengthening/mobility   - Keep Call bell within reach  - Keep bed low and locked with side rails adjusted as appropriate  - Keep care items and personal belongings within reach  - Initiate and maintain comfort rounds  - Make Fall Risk Sign visible to staff  - Apply yellow socks and bracelet for high fall risk patients  - Consider moving patient to room near nurses station  Outcome: Progressing  Goal: Maintain or return to baseline ADL function  Description: INTERVENTIONS:  -  Assess patient's ability to carry out ADLs; assess patient's baseline for ADL function and identify physical deficits which impact ability to perform ADLs (bathing, care of mouth/teeth, toileting, grooming, dressing, etc.)  - Assess/evaluate cause of self-care deficits   - Assess range of motion  - Assess patient's mobility; develop plan if impaired  - Assess patient's need for assistive devices and provide as appropriate  - Encourage maximum independence but intervene and supervise when necessary  - Involve family in performance of ADLs  - Assess for home care needs following discharge   - Consider OT consult to assist with ADL evaluation and planning for discharge  - Provide patient education as appropriate  Outcome: Progressing  Goal: Maintains/Returns to pre admission functional level  Description: INTERVENTIONS:  - Perform BMAT or MOVE assessment daily.   - Set and communicate daily mobility goal to care team and patient/family/caregiver. - Collaborate with rehabilitation services on mobility goals if consulted  - Perform Range of Motion 3 times a day. - Reposition patient every 3 hours.   - Dangle patient 3 times a day  - Stand patient 3 times a day  - Ambulate patient 3 times a day  - Out of bed to chair 3 times a day   - Out of bed for meals 3 times a day  - Out of bed for toileting  - Record patient progress and toleration of activity level   Outcome: Progressing     Problem: DISCHARGE PLANNING  Goal: Discharge to home or other facility with appropriate resources  Description: INTERVENTIONS:  - Identify barriers to discharge w/patient and caregiver  - Arrange for needed discharge resources and transportation as appropriate  - Identify discharge learning needs (meds, wound care, etc.)  - Arrange for interpretive services to assist at discharge as needed  - Refer to Case Management Department for coordinating discharge planning if the patient needs post-hospital services based on physician/advanced practitioner order or complex needs related to functional status, cognitive ability, or social support system  Outcome: Progressing     Problem: Knowledge Deficit  Goal: Patient/family/caregiver demonstrates understanding of disease process, treatment plan, medications, and discharge instructions  Description: Complete learning assessment and assess knowledge base.   Interventions:  - Provide teaching at level of understanding  - Provide teaching via preferred learning methods  Outcome: Progressing

## 2023-11-02 NOTE — ASSESSMENT & PLAN NOTE
Most recent echo with LVEF 67%, Grade 1 Diastolic Dysfunction, Severe Aortic stenosis   Has TAVR originally scheduled for end of November, but in setting of VT episode appreciate CV thoughts on this needing to be moved up

## 2023-11-03 ENCOUNTER — APPOINTMENT (INPATIENT)
Dept: RADIOLOGY | Facility: HOSPITAL | Age: 68
DRG: 277 | End: 2023-11-03
Payer: MEDICARE

## 2023-11-03 LAB
ANION GAP SERPL CALCULATED.3IONS-SCNC: 9 MMOL/L
ATRIAL RATE: 60 BPM
BUN SERPL-MCNC: 29 MG/DL (ref 5–25)
CALCIUM SERPL-MCNC: 9.4 MG/DL (ref 8.4–10.2)
CHLORIDE SERPL-SCNC: 105 MMOL/L (ref 96–108)
CO2 SERPL-SCNC: 24 MMOL/L (ref 21–32)
CREAT SERPL-MCNC: 1.04 MG/DL (ref 0.6–1.3)
ERYTHROCYTE [DISTWIDTH] IN BLOOD BY AUTOMATED COUNT: 13.2 % (ref 11.6–15.1)
GFR SERPL CREATININE-BSD FRML MDRD: 72 ML/MIN/1.73SQ M
GLUCOSE SERPL-MCNC: 124 MG/DL (ref 65–140)
HCT VFR BLD AUTO: 40.9 % (ref 36.5–49.3)
HGB BLD-MCNC: 13.4 G/DL (ref 12–17)
MCH RBC QN AUTO: 30.7 PG (ref 26.8–34.3)
MCHC RBC AUTO-ENTMCNC: 32.8 G/DL (ref 31.4–37.4)
MCV RBC AUTO: 94 FL (ref 82–98)
P AXIS: 10 DEGREES
PLATELET # BLD AUTO: 203 THOUSANDS/UL (ref 149–390)
PMV BLD AUTO: 9.4 FL (ref 8.9–12.7)
POTASSIUM SERPL-SCNC: 4.3 MMOL/L (ref 3.5–5.3)
PR INTERVAL: 176 MS
QRS AXIS: 42 DEGREES
QRSD INTERVAL: 98 MS
QT INTERVAL: 458 MS
QTC INTERVAL: 458 MS
RBC # BLD AUTO: 4.36 MILLION/UL (ref 3.88–5.62)
SODIUM SERPL-SCNC: 138 MMOL/L (ref 135–147)
T WAVE AXIS: 59 DEGREES
VENTRICULAR RATE: 60 BPM
WBC # BLD AUTO: 12.46 THOUSAND/UL (ref 4.31–10.16)

## 2023-11-03 PROCEDURE — 99024 POSTOP FOLLOW-UP VISIT: CPT | Performed by: INTERNAL MEDICINE

## 2023-11-03 PROCEDURE — 99233 SBSQ HOSP IP/OBS HIGH 50: CPT | Performed by: INTERNAL MEDICINE

## 2023-11-03 PROCEDURE — 71250 CT THORAX DX C-: CPT

## 2023-11-03 PROCEDURE — NC001 PR NO CHARGE: Performed by: PHYSICIAN ASSISTANT

## 2023-11-03 PROCEDURE — 71046 X-RAY EXAM CHEST 2 VIEWS: CPT

## 2023-11-03 PROCEDURE — 80048 BASIC METABOLIC PNL TOTAL CA: CPT | Performed by: INTERNAL MEDICINE

## 2023-11-03 PROCEDURE — 85027 COMPLETE CBC AUTOMATED: CPT | Performed by: INTERNAL MEDICINE

## 2023-11-03 PROCEDURE — 93010 ELECTROCARDIOGRAM REPORT: CPT | Performed by: INTERNAL MEDICINE

## 2023-11-03 PROCEDURE — G1004 CDSM NDSC: HCPCS

## 2023-11-03 RX ORDER — METOPROLOL SUCCINATE 25 MG/1
25 TABLET, EXTENDED RELEASE ORAL DAILY
Status: DISCONTINUED | OUTPATIENT
Start: 2023-11-04 | End: 2023-11-04 | Stop reason: HOSPADM

## 2023-11-03 RX ORDER — AMIODARONE HYDROCHLORIDE 200 MG/1
200 TABLET ORAL DAILY
Qty: 90 TABLET | Refills: 0 | Status: SHIPPED | OUTPATIENT
Start: 2023-11-03 | End: 2024-02-01

## 2023-11-03 RX ADMIN — EZETIMIBE 10 MG: 10 TABLET ORAL at 09:30

## 2023-11-03 RX ADMIN — METOPROLOL SUCCINATE 12.5 MG: 25 TABLET, FILM COATED, EXTENDED RELEASE ORAL at 09:30

## 2023-11-03 RX ADMIN — ASPIRIN 81 MG CHEWABLE TABLET 81 MG: 81 TABLET CHEWABLE at 09:30

## 2023-11-03 RX ADMIN — ESCITALOPRAM OXALATE 20 MG: 20 TABLET, FILM COATED ORAL at 09:30

## 2023-11-03 RX ADMIN — AMIODARONE HYDROCHLORIDE 400 MG: 200 TABLET ORAL at 17:56

## 2023-11-03 RX ADMIN — AMIODARONE HYDROCHLORIDE 400 MG: 200 TABLET ORAL at 12:41

## 2023-11-03 RX ADMIN — CLOPIDOGREL BISULFATE 75 MG: 75 TABLET ORAL at 09:30

## 2023-11-03 NOTE — PROGRESS NOTES
Progress Note - Electrophysiology - Cardiology  Melinda Barakat 77 y.o. male MRN: [id number]  [facility] Encounter: [id number]      Assessment:  Sustained VT s/p secondary prevention Medtronic dual chamber ICD 11/2/2023 by Dr. Allyssa See   -- post op chest x-ray confirmed no pneumothorax  -- device interrogation post procedure showed normal device function, A lead threshold improved   -- pressure dressing applied overnight, incision looks good today, pressure dressing removed  -- atrial paced at 50 bpm, if HR does not improve with activity once discharged consider increasing lower rate to 60 bpm  -- arm restrictions x 6 weeks  -- keep implant site dry x 1 week  -- continue amiodarone 400 mg bid x 1 week then 200 mg once daily  -- remote monitor paired with device  -- discharge instructions reviewed with patient in detail  -- F/U in 2 weeks for wound check in [place]  -- F/U with EP in 1 month, F/U with cardiology as scheduled  -- stable for D/C from EP standpoint    Crepitus   -- CT scan shows no pneumothorax, likely due to air in the pocket and will improve with time    Severe AS   -- undergoing work up for TAVR with CTS    CAD s/p DEON to mid LAD  -- continue aspirin, Plavix, Zetia, Toprol XL  -- F/U with cardiology as outpatient    LVEF 60%     HTN   -- continue Toprol XL      Subjective/Objective   Subjective: Melinda Barakat is a 77 y.o. man with ventricular tachycardia, CAD who underwent drug-eluting stent to mid LAD, severe aortic stenosis undergoing work-up for TAVR, hypertension, who was noted to have sustained ventricular tachycardia at cardiac rehab after recently receiving drug-eluting stent to mid LAD. He likely has underlying scar due to coronary disease. He underwent implantation of a Medtronic dual chamber ICD on 11/2/2023 by Dr. Allyssa See. Post op chest x-ray showed proper lead placement and no pneumothorax. Device interrogation showed stable lead parameters. He had crepitus.  CT chest confirmed no pneumothorax. Pressure dressing was applied over the pocket. Today, he is feeling well and would like to go home. He denies pain at the implant site. He denies chest pain, SOB, lightheadedness, dizziness, orthopnea, or PND. TELE: A paced V sensed HR 50 bpm    EKG 11/2: atrial paced rhythm HR 60 bpm    Objective:  Vitals: /66 (BP Location: Right arm)   Pulse (!) 51   Temp 99.5 °F (37.5 °C) (Oral)   Resp 19   Ht 6' (1.829 m)   Wt 98.4 kg (217 lb)   SpO2 97%   BMI 29.43 kg/m²     Vitals:    11/01/23 0324   Weight: 98.4 kg (217 lb)     Orthostatic Blood Pressures      Flowsheet Row Most Recent Value   Blood Pressure 151/66 filed at 11/04/2023 1125   Patient Position - Orthostatic VS Lying filed at 11/04/2023 1125            Intake/Output Summary (Last 24 hours) at 11/4/2023 1203  Last data filed at 11/4/2023 0301  Gross per 24 hour   Intake 461 ml   Output --   Net 461 ml       Invasive Devices       Peripheral Intravenous Line  Duration             Peripheral IV 11/01/23 Left;Ventral (anterior) Forearm 3 days                  Scheduled Meds:  Current Facility-Administered Medications   Medication Dose Route Frequency Provider Last Rate    acetaminophen  650 mg Oral Q4H PRN Hetul Steven, DO      amiodarone  400 mg Oral BID With Meals Hetul Steven, DO      aspirin  81 mg Oral Daily Hetul Steven, DO      clopidogrel  75 mg Oral Daily Hetul Steven, DO      escitalopram  20 mg Oral Daily Hetul Steven, DO      ezetimibe  10 mg Oral Daily Hetul Steven, DO      fentaNYL  25 mcg Intravenous Q3 min PRN Zachary Baez MD      metoprolol succinate  25 mg Oral Daily Dg Roper PA-C      nicotine  1 patch Transdermal Daily PRN Hetul Steven, DO      ondansetron  4 mg Intravenous Once PRN Zachary Baez MD       Continuous Infusions:   PRN Meds:.  acetaminophen    fentaNYL    nicotine    ondansetron    Review of Systems:  Review of Systems   Constitutional: Negative. HENT: Negative. Eyes: Negative. Cardiovascular:  Negative for chest pain, palpitations and syncope. Endocrine: Negative. Hematologic/Lymphatic: Negative. Musculoskeletal:  Negative for back pain. Gastrointestinal:  Negative for abdominal pain, nausea and vomiting. Genitourinary: Negative. Neurological: Negative. Psychiatric/Behavioral: Negative. Allergic/Immunologic: Negative. ROS as noted above, otherwise 12 point review of systems was performed and is negative. Physical Exam:   GEN: NAD, alert and oriented x 3, well appearing  SKIN: warm, dry without significant lesions or rashes. Left chest implant site C/D/I, no erythema or hematoma, no signs of infection  HEENT: NCAT  NECK: supple, no JVD appreciated  CARDIOVASCULAR: RRR, normal S1, S2 without murmurs, rubs, or gallops   LUNGS: CTA bilaterally without wheezes, rhonchi, or rales  ABDOMEN: Soft, nontender, nondistended. EXTREMITIES/VASCULAR: perfused without clubbing, cyanosis, or LE edema b/l  PSYCH: Normal mood and affect  NEURO: CN ll-Xll grossly intact       Lab Results: I have personally reviewed pertinent lab results. Results from last 7 days   Lab Units 11/03/23 0456 11/02/23 0440 11/01/23 0353   WBC Thousand/uL 12.46* 9.23 9.21   HEMOGLOBIN g/dL 13.4 14.9 14.5   HEMATOCRIT % 40.9 45.3 42.9   PLATELETS Thousands/uL 203 199 202     Results from last 7 days   Lab Units 11/03/23 0456 11/02/23 0440 11/01/23  0353   POTASSIUM mmol/L 4.3 3.8 3.7   CHLORIDE mmol/L 105 103 103   CO2 mmol/L 24 26 29   BUN mg/dL 29* 21 21   CREATININE mg/dL 1.04 0.98 1.04   CALCIUM mg/dL 9.4 9.5 8.9     Results from last 7 days   Lab Units 11/01/23  0353   INR  1.02   PTT seconds 28     Results from last 7 days   Lab Units 11/02/23 0440 11/01/23  0353   MAGNESIUM mg/dL 2.2 2.1       Imaging: I have personally reviewed pertinent reports. No results found for this or any previous visit.

## 2023-11-03 NOTE — DISCHARGE INSTR - AVS FIRST PAGE
Please refer to post ICD implantation discharge instructions and restrictions below and your ICD booklet/temporary card. Keep incision dry for one week. Do not use lotions/powders/creams on incision. Leave outer bandage in place for 1 week - it is water proof, and as long as it is fully adhered to your skin you may shower with it. If it appears as though the bandage is coming off and/or there is any communication to the area of device incision, please then keep the whole area dry for the remaining week. After 1 week, please remove by pulling all edges away from the center of the bandage. No overhead reaching/pushing/pulling/lifting greater than 5-10lbs with left arm for six weeks. Please call the office if you notice redness, swelling, bleeding, or drainage from incision or if you develop fevers    Implantable Cardioverter Defibrillator      If you have any questions, please call [phone] to speak with a nurse (8:30am-4pm, or [phone] after hours). For appointments, please call [phone]. WHAT YOU SHOULD KNOW:    An implantable cardioverter defibrillator (ICD) is a small device that monitors your heart rate and rhythm. It is commonly placed inside your upper chest region. It may be used if you have a ventricular arrhythmia, which is an irregular, dangerous rhythm from the bottom chamber of your heart. Some arrhythmias may cause your heart to suddenly stop beating. An ICD can give a shock to your heart to make it start beating again, or it can give pacing therapy (also known as pain-free therapy) to return your heart to normal rhythm. It is also a pacemaker, so it will pace your heart if needed to prevent it from beating too slowly. AFTER YOU LEAVE:      Follow up with your primary healthcare provider or cardiologist as directed: You will need to follow-up to have your ICD checked and make sure you are not having problems.  Write down your questions so you remember to ask them during your visits. Driving: you are ok to drive 48 hours after device is implanted     Self-care:   Ask about activity: Ask if you need to avoid moving your shoulder or arm, and for how long. Ask if you should avoid lifting heavy objects. Do not play any contact sports, such as football or wrestling, until your primary healthcare provider [facility] or cardiologist tells you it is okay. You may only be able to drive for a certain amount of time per day, or during certain hours. Ask when you can return to work. Care for your skin over the ICD: see instructions above     When you get a shock from your ICD: A shock may feel like someone has hit you, or you may feel a thump in the chest. If someone is touching you when you get a shock, they may feel a tingling feeling. The first time you feel a shock, it may scare you. Sit or lie down and stay calm. Ask someone to stay with you if possible. Please either call your cardiologist or report to an emergency room. Safety instructions when you have an ICD:   Carry an ID card for the ICD: This card has important information about your ICD. Stay away from magnets or machines with electric fields: This includes MRI machines. Avoid leaning into a car engine or doing welding. These things can interfere with how your ICD works. Tell airport security you have an ICD: You may need to be searched by hand when you go through a security gate. The security gate or handheld wand could harm your ICD. Keep an ICD diary: Record when you get a shock and what you were doing before you got the shock. Keep track of how you felt before and after the shock, as well as how many shocks you received. Write down the day and time of each shock. Bring the diary with you when you see your PHP or cardiologist.   Henrietta Aguilar medical alert identification: Wear medical alert jewelry or carry a card that says you have an ICD. Ask your PHP or cardiologist where to get these items.     Contact your primary healthcare provider or cardiologist if:   You have a fever. You feel 1 or more shocks from your ICD and feel fine afterwards. Your feet or ankles swell. The area around your ICD is painful or tender after surgery. The skin around your stitches or staples is red, swollen, or draining pus or fluid. You have chills, a cough, and feel weak or achy. You are sad or anxious and find it hard to do your usual activities. You have questions or concerns about your condition or care. Seek care immediately or call 911 if:   Your stitches or staples come apart. Blood soaks through your bandage. You feel more than 3 shocks in a row from your ICD. You become weak, dizzy, or faint. You feel your heart skip beats or beat very fast or slow, but you do not feel a shock from your ICD. You have chest pain that does not go away with rest or medicine. © 2014 [address] [facility] is for End User's use only and may not be sold, redistributed or otherwise used for commercial purposes. All illustrations and images included in CareNotes® are the copyrighted property of [employer] or [employer]. The above information is an  only. It is not intended as medical advice for individual conditions or treatments. Talk to your doctor, nurse or pharmacist before following any medical regimen to see if it is safe and effective for you.

## 2023-11-03 NOTE — ASSESSMENT & PLAN NOTE
Had a 30 beat run of Ventricular Tachycardia while at Cardiac Rehab on Monday (10/30)   Has hx of Severe Aortic Stenosis and as part of planned TAVR later this month, patient underwent LHC on 10/17 where he underwent DEON to the Mid LAD for an 80% stenosed lesion   From cardiac rehab, patient went to [facility] ER where patient's case was discussed with cardiology and patient was initially started on IV amiodarone infusion; Appears from notes as patient was an ER hold until bed became available that patient had episode of bradycardia with IV infusion and patient was then switched to PO; Notes from  ER show that CV recommended patient take amiodarone 400mg daily for 2 weeks, followed by 200mg daily thereafter  Transferred to [facility] on early AM hours of 11/1  Denies any chest pain or sob currently; Does endorse sob with exertion   Discussed with cardiology. .  EPs/pICD placement.   Amiodarone

## 2023-11-03 NOTE — PROGRESS NOTES
[address] [facility]  Progress Note  Name: Vikki Cervantes  MRN: [id number]  Unit/Bed#: [facility] 522-01 I Date of Admission: 11/1/2023   Date of Service: 11/3/2023 I Hospital Day: 2    Assessment/Plan   * Ventricular tachycardia [facility])  Assessment & Plan  Had a 30 beat run of Ventricular Tachycardia while at Cardiac Rehab on Monday (10/30)   Has hx of Severe Aortic Stenosis and as part of planned TAVR later this month, patient underwent LHC on 10/17 where he underwent DEON to the Mid LAD for an 80% stenosed lesion   From cardiac rehab, patient went to [facility] ER where patient's case was discussed with cardiology and patient was initially started on IV amiodarone infusion; Appears from notes as patient was an ER hold until bed became available that patient had episode of bradycardia with IV infusion and patient was then switched to PO; Notes from  ER show that CV recommended patient take amiodarone 400mg daily for 2 weeks, followed by 200mg daily thereafter  Transferred to [facility] on early AM hours of 11/1  Denies any chest pain or sob currently; Does endorse sob with exertion   Discussed with cardiology. .  EPs/pICD placement. Amiodarone  CT ordered by EP regarding fluctuance in chest wall. Will observe for at least through tomorrow in the hospital.    Coronary artery disease  Assessment & Plan  S/P DEON to Mid LAD in Mid October  Continue pta aspirin, plavix, beta blocker and zetia; has reported statin intolerance? And as such statin held    Heart failure with preserved ejection fraction  Assessment & Plan  Appears euvolemic on exam   On pta lasix 60mg daily  Holding lasix overnight until evaluated by CV team for VT episode.  Appreciate CV recs     Severe aortic valve stenosis  Assessment & Plan  Most recent echo with LVEF 85%, Grade 1 Diastolic Dysfunction, Severe Aortic stenosis   Has TAVR originally scheduled for end of November, but in setting of VT episode appreciate CV thoughts on this needing to be moved up  We will follow-up as outpatient with TAVR               Mechanical VTE Prophylaxis in Place: No    Patient Centered Rounds: I have performed bedside rounds with nursing staff today. D    Time Spent for Care:  54 . More than 50% of total time spent on counseling and coordination of care as described above. Current Length of Stay: 2 day(s)    Current Patient Status: Inpatient       Code Status: Level 1 - Full Code      Subjective:   nad    Objective:     Vitals:   Temp (24hrs), Av.9 °F (36.6 °C), Min:97.5 °F (36.4 °C), Max:99 °F (37.2 °C)    Temp:  [97.5 °F (36.4 °C)-99 °F (37.2 °C)] 97.5 °F (36.4 °C)  HR:  [57-62] 61  Resp:  [16-19] 17  BP: (104-132)/(58-69) 132/63  SpO2:  [92 %-97 %] 96 %  Body mass index is 29.43 kg/m². Input and Output Summary (last 24 hours): Intake/Output Summary (Last 24 hours) at 11/3/2023 0914  Last data filed at 11/3/2023 0527  Gross per 24 hour   Intake 1560 ml   Output 0 ml   Net 1560 ml       Physical Exam:     Physical Exam  Constitutional:       Appearance: Normal appearance. HENT:      Head: Normocephalic and atraumatic. Mouth/Throat:      Mouth: Mucous membranes are dry. Cardiovascular:      Rate and Rhythm: Normal rate and regular rhythm. Heart sounds: No murmur heard. Pulmonary:      Effort: Pulmonary effort is normal.      Breath sounds: Normal breath sounds. Abdominal:      General: There is no distension. Palpations: There is no mass. Musculoskeletal:      Cervical back: Normal range of motion and neck supple. Skin:     General: Skin is warm and dry. Neurological:      General: No focal deficit present. Mental Status: He is alert and oriented to person, place, and time.    Psychiatric:         Mood and Affect: Mood normal.         Behavior: Behavior normal.         Additional Data:     Labs:    Results from last 7 days   Lab Units 23  0456 23  0440 23  0353   WBC Thousand/uL 12.46* < > 9.21   HEMOGLOBIN g/dL 13.4   < > 14.5   HEMATOCRIT % 40.9   < > 42.9   PLATELETS Thousands/uL 203   < > 202   NEUTROS PCT %  --   --  64   LYMPHS PCT %  --   --  21   MONOS PCT %  --   --  10   EOS PCT %  --   --  5    < > = values in this interval not displayed. Results from last 7 days   Lab Units 11/03/23  0456 11/02/23  0440 11/01/23  0353   POTASSIUM mmol/L 4.3   < > 3.7   CHLORIDE mmol/L 105   < > 103   CO2 mmol/L 24   < > 29   BUN mg/dL 29*   < > 21   CREATININE mg/dL 1.04   < > 1.04   CALCIUM mg/dL 9.4   < > 8.9   ALK PHOS U/L  --   --  80   ALT U/L  --   --  11   AST U/L  --   --  14    < > = values in this interval not displayed. Results from last 7 days   Lab Units 11/01/23  0353   INR  1.02           Recent Cultures (last 7 days):           Last 24 Hours Medication List:   Current Facility-Administered Medications   Medication Dose Route Frequency Provider Last Rate    acetaminophen  650 mg Oral Q4H PRN Hetul Steven, DO      amiodarone  400 mg Oral BID With Meals Hetul Steven, DO      aspirin  81 mg Oral Daily Hetul Steven, DO      clopidogrel  75 mg Oral Daily Hetul Steven, DO      escitalopram  20 mg Oral Daily Hetul Steven, DO      ezetimibe  10 mg Oral Daily Hetul Steven, DO      fentaNYL  25 mcg Intravenous Q3 min PRN Erica Cam MD      metoprolol succinate  12.5 mg Oral Daily Hetul Steven, DO      nicotine  1 patch Transdermal Daily PRN Hetul Steven, DO      ondansetron  4 mg Intravenous Once PRN Erica Cam MD          Today, Patient Was Seen By: Eloisa Carreon DO    ** Please Note: Dictation voice to text software may have been used in the creation of this document.  **

## 2023-11-03 NOTE — PROGRESS NOTES
Progress Note - Electrophysiology - Cardiology  Burak Barrett 77 y.o. male MRN: [id number]  Unit/Bed#: [facility] 522-01 Encounter: [id number]      Assessment:  Ventricular tachycardia   No significant runs of VT noted on telemetry  Status post dual-chamber ICD  Initial concern for dislodged a lead, however capture was down to 0.5 mV on testing today  Crepitus  Noted around left neck at site of ICD implant  Chest x-ray does not show any subcutaneous air  Severe aortic stenosis  Noted on echo 9/8 with severe aortic stenosis  Planning for valve replacement with CT surgery later this month  Coronary artery disease status post DEON to mid LAD 10/17/2023  S/p DEON for 80% mid LAD lesion  Currently on DAPT with Plavix and aspirin  LVEF 60% on recent echo 9/8/2023  Hypertension  On amlodipine    Plan:  -Chest x-ray with what appears to be dislodged atrial lead, however device testing showed stable capture this morning.  -Continue with amiodarone 400 mg twice daily for 1 week then switch to 200 mg daily  -Uptitrate beta-blocker in the outpatient setting as able per blood pressure  -Did palpate crepitus on left neck today after pacer implant, will check CT chest to rule out pneumothorax although none seen on chest x-ray  -Plan to keep 24 more hours for observation  -Place pressure dressing for suspected hematoma post ICD implant given some swelling  - increased metoprolol to 25 mg daily now that he has device in place and atrial lead is functioning. Subjective/Objective   Subjective: Patient is without symptoms today. He denies any shortness of breath, chest pain, or any other symptoms.     TELE: Atrial pacing      Objective:  Vitals: /61   Pulse 69   Temp 98.2 °F (36.8 °C)   Resp 16   Ht 6' (1.829 m)   Wt 98.4 kg (217 lb)   SpO2 93%   BMI 29.43 kg/m²     Vitals:    11/01/23 0324   Weight: 98.4 kg (217 lb)     Orthostatic Blood Pressures      Flowsheet Row Most Recent Value   Blood Pressure 132/61 filed at 11/03/2023 1121   Patient Position - Orthostatic VS Lying filed at 11/02/2023 2157              Intake/Output Summary (Last 24 hours) at 11/3/2023 1213  Last data filed at 11/3/2023 6567  Gross per 24 hour   Intake 1560 ml   Output 0 ml   Net 1560 ml       Invasive Devices       Peripheral Intravenous Line  Duration             Peripheral IV 11/01/23 Left;Ventral (anterior) Forearm 2 days                              Scheduled Meds:  Current Facility-Administered Medications   Medication Dose Route Frequency Provider Last Rate    acetaminophen  650 mg Oral Q4H PRN Hetul Steven, DO      amiodarone  400 mg Oral BID With Meals Hetul Steven, DO      aspirin  81 mg Oral Daily Hetul Steven, DO      clopidogrel  75 mg Oral Daily Hetul Steven, DO      escitalopram  20 mg Oral Daily Hetul Steven, DO      ezetimibe  10 mg Oral Daily Hetul Steven, DO      fentaNYL  25 mcg Intravenous Q3 min PRN Bernadette Palmer MD      metoprolol succinate  12.5 mg Oral Daily Hetul Steven, DO      nicotine  1 patch Transdermal Daily PRN Hetul Steven, DO      ondansetron  4 mg Intravenous Once PRN Bernadette Palmer MD       Continuous Infusions:   PRN Meds:.  acetaminophen    fentaNYL    nicotine    ondansetron    Review of Systems:  Review of Systems   Constitutional: Negative for fever and malaise/fatigue. Cardiovascular:  Negative for chest pain, dyspnea on exertion, near-syncope, palpitations and syncope. Respiratory:  Negative for shortness of breath. All other systems reviewed and are negative. ROS as noted above, otherwise 12 point review of systems was performed and is negative. Physical Exam:   Physical Exam  Vitals and nursing note reviewed. Constitutional:       General: He is not in acute distress. Appearance: He is obese. HENT:      Head: Normocephalic and atraumatic. Neck:      Comments: Crepitus left neck just superior to device   Cardiovascular:      Rate and Rhythm: Normal rate and regular rhythm.    Pulmonary: Effort: Pulmonary effort is normal. No respiratory distress. Breath sounds: Normal breath sounds. Abdominal:      General: Abdomen is flat. Palpations: Abdomen is soft. Musculoskeletal:      Right lower leg: No edema. Left lower leg: No edema. Skin:     General: Skin is warm and dry. Neurological:      General: No focal deficit present. Mental Status: He is alert and oriented to person, place, and time. Lab Results: I have personally reviewed pertinent lab results. Results from last 7 days   Lab Units 11/03/23 0456 11/02/23 0440 11/01/23  0353   WBC Thousand/uL 12.46* 9.23 9.21   HEMOGLOBIN g/dL 13.4 14.9 14.5   HEMATOCRIT % 40.9 45.3 42.9   PLATELETS Thousands/uL 203 199 202     Results from last 7 days   Lab Units 11/03/23 0456 11/02/23 0440 11/01/23  0353   POTASSIUM mmol/L 4.3 3.8 3.7   CHLORIDE mmol/L 105 103 103   CO2 mmol/L 24 26 29   BUN mg/dL 29* 21 21   CREATININE mg/dL 1.04 0.98 1.04   CALCIUM mg/dL 9.4 9.5 8.9     Results from last 7 days   Lab Units 11/01/23  0353   INR  1.02   PTT seconds 28     Results from last 7 days   Lab Units 11/02/23 0440 11/01/23  0353   MAGNESIUM mg/dL 2.2 2.1       Imaging: I have personally reviewed pertinent reports. No results found for this or any previous visit.       VTE Pharmacologic Prophylaxis: Reason for no pharmacologic prophylaxis risk of hematoma   VTE Mechanical Prophylaxis: sequential compression device

## 2023-11-03 NOTE — ASSESSMENT & PLAN NOTE
Most recent echo with LVEF 95%, Grade 1 Diastolic Dysfunction, Severe Aortic stenosis   Has TAVR originally scheduled for end of November, but in setting of VT episode appreciate CV thoughts on this needing to be moved up  We will follow-up as outpatient with TAVR

## 2023-11-03 NOTE — DISCHARGE SUMMARY
[address] [facility]  Discharge- Pal Stone 1955, 77 y.o. male MRN: [id number]  Unit/Bed#: [facility] 522-01 Encounter: [id number]  Primary Care Provider: Grace Voss MD   Date and time admitted to hospital: 11/1/2023  3:22 AM    * Ventricular tachycardia [facility])  Assessment & Plan  Had a 30 beat run of Ventricular Tachycardia while at Cardiac Rehab on Monday (10/30)   Has hx of Severe Aortic Stenosis and as part of planned TAVR later this month, patient underwent LHC on 10/17 where he underwent DEON to the Mid LAD for an 80% stenosed lesion   From cardiac rehab, patient went to [facility] ER where patient's case was discussed with cardiology and patient was initially started on IV amiodarone infusion; Appears from notes as patient was an ER hold until bed became available that patient had episode of bradycardia with IV infusion and patient was then switched to PO; Notes from  ER show that CV recommended patient take amiodarone 400mg daily for 2 weeks, followed by 200mg daily thereafter  Transferred to [facility] on early AM hours of 11/1  Status post ICD placement    Coronary artery disease  Assessment & Plan  S/P DEON to Mid LAD in Mid October  Continue pta aspirin, plavix, beta blocker and zetia; has reported statin intolerance? And as such statin held    Heart failure with preserved ejection fraction  Assessment & Plan  Appears euvolemic on exam   On pta lasix 40 daily reported. Restart home Lasix at discharge  Post ICD.     Severe aortic valve stenosis  Assessment & Plan  Most recent echo with LVEF 71%, Grade 1 Diastolic Dysfunction, Severe Aortic stenosis   Has TAVR originally scheduled for end of November, but in setting of VT episode appreciate CV thoughts on this needing to be moved up  We will follow-up as outpatient with TAVR                Medical Problems       Resolved Problems  Date Reviewed: 10/17/2023   None         Admission Date:   Admission Orders (From admission, onward)       Ordered        11/01/23 0325  Inpatient Admission  Once                            Admitting Diagnosis: SVT (supraventricular tachycardia) [I47.10]    HPI: This is a  very pleasant 49-year-old male with past medical history of severe aortic stenosis, coronary disease with prior drug-eluting stent to the mid LAD, history of MSSA bacteremia who presents to the hospital as a transfer from [facility] emergency room to the mid [facility], history of MSSA bacteremia who presents to the hospital as a transfer from [facility] emergency room in the early a.m. hours of November 1st.  Patient was noted to present with ventricular tachycardia on presentation to the ED initially. Patient reports that he was at cardiac rehab on the 30th where while on a exercise bike he reported feeling the sensation of lightheadedness. Patient was noted to have a 30 beat run of VT at that time. he was sent to the local ER at [facility]. He was initially placed on a amiodarone infusion but unfortunately became bradycardic with the infusion and thus was switched to oral amiodarone. Patient was evaluated by cardiology in the emergency room at [facility] and was placed on amiodarone 400 mg twice a day with a plan to taper to daily dosing. Procedures Performed:   Orders Placed This Encounter   Procedures    Cardiac ep lab eps/ablations       Summary of Hospital Course: Patient was evaluated by general cardiology initially regarding severe aortic stenosis and ventricular tachycardia. .  After consultation with cardiology, EP evaluated for ICD placement. He is status post ICD placement on November 2, 2023. He currently is undergoing an extensive TAVR assessment and has a follow-up in about 2 weeks with cardiology regarding further intervention.       Condition at Discharge: fair         Discharge instructions/Information to patient and family:   See after visit summary for information provided to patient and family. Provisions for Follow-Up Care:  See after visit summary for information related to follow-up care and any pertinent home health orders. PCP: Zelalem Forrest MD    Disposition: Home    Planned Readmission: No    Discharge Statement   I spent 85 minutes discharging the patient. This time was spent on the day of discharge. I had direct contact with the patient on the day of discharge. Additional documentation is required if more than 30 minutes were spent on discharge. Discharge Medications:  See after visit summary for reconciled discharge medications provided to patient and family.

## 2023-11-04 VITALS
HEART RATE: 51 BPM | OXYGEN SATURATION: 97 % | WEIGHT: 217 LBS | SYSTOLIC BLOOD PRESSURE: 151 MMHG | HEIGHT: 72 IN | DIASTOLIC BLOOD PRESSURE: 66 MMHG | TEMPERATURE: 99.5 F | BODY MASS INDEX: 29.39 KG/M2 | RESPIRATION RATE: 19 BRPM

## 2023-11-04 PROBLEM — Z95.810 ICD (IMPLANTABLE CARDIOVERTER-DEFIBRILLATOR) IN PLACE: Status: ACTIVE | Noted: 2023-11-04

## 2023-11-04 PROCEDURE — 99239 HOSP IP/OBS DSCHRG MGMT >30: CPT | Performed by: INTERNAL MEDICINE

## 2023-11-04 RX ORDER — FUROSEMIDE 40 MG/1
40 TABLET ORAL DAILY
Start: 2023-11-04

## 2023-11-04 RX ADMIN — AMIODARONE HYDROCHLORIDE 400 MG: 200 TABLET ORAL at 15:50

## 2023-11-04 RX ADMIN — ASPIRIN 81 MG CHEWABLE TABLET 81 MG: 81 TABLET CHEWABLE at 08:47

## 2023-11-04 RX ADMIN — ESCITALOPRAM OXALATE 20 MG: 20 TABLET, FILM COATED ORAL at 08:47

## 2023-11-04 RX ADMIN — EZETIMIBE 10 MG: 10 TABLET ORAL at 08:47

## 2023-11-04 RX ADMIN — CLOPIDOGREL BISULFATE 75 MG: 75 TABLET ORAL at 08:47

## 2023-11-04 RX ADMIN — METOPROLOL SUCCINATE 25 MG: 25 TABLET, FILM COATED, EXTENDED RELEASE ORAL at 08:47

## 2023-11-04 RX ADMIN — AMIODARONE HYDROCHLORIDE 400 MG: 200 TABLET ORAL at 08:47

## 2023-11-04 NOTE — PLAN OF CARE
Problem: PAIN - ADULT  Goal: Verbalizes/displays adequate comfort level or baseline comfort level  Description: Interventions:  - Encourage patient to monitor pain and request assistance  - Assess pain using appropriate pain scale  - Administer analgesics based on type and severity of pain and evaluate response  - Implement non-pharmacological measures as appropriate and evaluate response  - Consider cultural and social influences on pain and pain management  - Notify physician/advanced practitioner if interventions unsuccessful or patient reports new pain  Outcome: Progressing     Problem: INFECTION - ADULT  Goal: Absence or prevention of progression during hospitalization  Description: INTERVENTIONS:  - Assess and monitor for signs and symptoms of infection  - Monitor lab/diagnostic results  - Monitor all insertion sites, i.e. indwelling lines, tubes, and drains  - Monitor endotracheal if appropriate and nasal secretions for changes in amount and color  - [place] appropriate cooling/warming therapies per order  - Administer medications as ordered  - Instruct and encourage patient and family to use good hand hygiene technique  - Identify and instruct in appropriate isolation precautions for identified infection/condition  Outcome: Progressing  Goal: Absence of fever/infection during neutropenic period  Description: INTERVENTIONS:  - Monitor WBC    Outcome: Progressing     Problem: SAFETY ADULT  Goal: Patient will remain free of falls  Description: INTERVENTIONS:  - Educate patient/family on patient safety including physical limitations  - Instruct patient to call for assistance with activity   - Consult OT/PT to assist with strengthening/mobility   - Keep Call bell within reach  - Keep bed low and locked with side rails adjusted as appropriate  - Keep care items and personal belongings within reach  - Initiate and maintain comfort rounds  - Make Fall Risk Sign visible to staff  - Offer Toileting every  Hours, in advance of need  - Initiate/Maintain alarm  - Obtain necessary fall risk management equipment:   - Apply yellow socks and bracelet for high fall risk patients  - Consider moving patient to room near nurses station  Outcome: Progressing  Goal: Maintain or return to baseline ADL function  Description: INTERVENTIONS:  -  Assess patient's ability to carry out ADLs; assess patient's baseline for ADL function and identify physical deficits which impact ability to perform ADLs (bathing, care of mouth/teeth, toileting, grooming, dressing, etc.)  - Assess/evaluate cause of self-care deficits   - Assess range of motion  - Assess patient's mobility; develop plan if impaired  - Assess patient's need for assistive devices and provide as appropriate  - Encourage maximum independence but intervene and supervise when necessary  - Involve family in performance of ADLs  - Assess for home care needs following discharge   - Consider OT consult to assist with ADL evaluation and planning for discharge  - Provide patient education as appropriate  Outcome: Progressing  Goal: Maintains/Returns to pre admission functional level  Description: INTERVENTIONS:  - Perform BMAT or MOVE assessment daily.   - Set and communicate daily mobility goal to care team and patient/family/caregiver. - Collaborate with rehabilitation services on mobility goals if consulted  - Perform Range of Motion times a day. - Reposition patient every  hours.   - Dangle patient  times a day  - Stand patient  times a day  - Ambulate patient  times a day  - Out of bed to chair  times a day   - Out of bed for meals times a day  - Out of bed for toileting  - Record patient progress and toleration of activity level   Outcome: Progressing     Problem: DISCHARGE PLANNING  Goal: Discharge to home or other facility with appropriate resources  Description: INTERVENTIONS:  - Identify barriers to discharge w/patient and caregiver  - Arrange for needed discharge resources and transportation as appropriate  - Identify discharge learning needs (meds, wound care, etc.)  - Arrange for interpretive services to assist at discharge as needed  - Refer to Case Management Department for coordinating discharge planning if the patient needs post-hospital services based on physician/advanced practitioner order or complex needs related to functional status, cognitive ability, or social support system  Outcome: Progressing     Problem: Knowledge Deficit  Goal: Patient/family/caregiver demonstrates understanding of disease process, treatment plan, medications, and discharge instructions  Description: Complete learning assessment and assess knowledge base.   Interventions:  - Provide teaching at level of understanding  - Provide teaching via preferred learning methods  Outcome: Progressing

## 2023-11-06 ENCOUNTER — APPOINTMENT (EMERGENCY)
Dept: CT IMAGING | Facility: HOSPITAL | Age: 68
DRG: 378 | End: 2023-11-06
Payer: MEDICARE

## 2023-11-06 ENCOUNTER — HOSPITAL ENCOUNTER (INPATIENT)
Facility: HOSPITAL | Age: 68
LOS: 3 days | Discharge: HOME | DRG: 378 | End: 2023-11-09
Attending: EMERGENCY MEDICINE | Admitting: FAMILY MEDICINE
Payer: MEDICARE

## 2023-11-06 ENCOUNTER — APPOINTMENT (EMERGENCY)
Dept: RADIOLOGY | Facility: HOSPITAL | Age: 68
DRG: 378 | End: 2023-11-06
Payer: MEDICARE

## 2023-11-06 DIAGNOSIS — I25.10 CORONARY ARTERY DISEASE DUE TO LIPID RICH PLAQUE: ICD-10-CM

## 2023-11-06 DIAGNOSIS — K92.2 GI BLEED: ICD-10-CM

## 2023-11-06 DIAGNOSIS — D62 ABLA (ACUTE BLOOD LOSS ANEMIA): ICD-10-CM

## 2023-11-06 DIAGNOSIS — K92.2 ACUTE GI BLEEDING: ICD-10-CM

## 2023-11-06 DIAGNOSIS — R55 SYNCOPE: Primary | ICD-10-CM

## 2023-11-06 DIAGNOSIS — I25.83 CORONARY ARTERY DISEASE DUE TO LIPID RICH PLAQUE: ICD-10-CM

## 2023-11-06 LAB
2HR DELTA HS TROPONIN: -3 NG/L
4HR DELTA HS TROPONIN: -2 NG/L
ABO GROUP BLD: NORMAL
ABO GROUP BLD: NORMAL
ALBUMIN SERPL BCP-MCNC: 3.7 G/DL (ref 3.5–5)
ALP SERPL-CCNC: 56 U/L (ref 34–104)
ALT SERPL W P-5'-P-CCNC: 16 U/L (ref 7–52)
ANION GAP SERPL CALCULATED.3IONS-SCNC: 11 MMOL/L
APTT PPP: 25 SECONDS (ref 23–37)
AST SERPL W P-5'-P-CCNC: 14 U/L (ref 13–39)
BASOPHILS # BLD MANUAL: 0.33 THOUSAND/UL (ref 0–0.1)
BASOPHILS NFR MAR MANUAL: 3 % (ref 0–1)
BILIRUB SERPL-MCNC: 0.34 MG/DL (ref 0.2–1)
BLD GP AB SCN SERPL QL: NEGATIVE
BUN SERPL-MCNC: 42 MG/DL (ref 5–25)
CALCIUM SERPL-MCNC: 8.9 MG/DL (ref 8.4–10.2)
CARDIAC TROPONIN I PNL SERPL HS: 19 NG/L
CARDIAC TROPONIN I PNL SERPL HS: 20 NG/L
CARDIAC TROPONIN I PNL SERPL HS: 22 NG/L
CHLORIDE SERPL-SCNC: 104 MMOL/L (ref 96–108)
CO2 SERPL-SCNC: 24 MMOL/L (ref 21–32)
CREAT SERPL-MCNC: 1.22 MG/DL (ref 0.6–1.3)
EOSINOPHIL # BLD MANUAL: 0.44 THOUSAND/UL (ref 0–0.4)
EOSINOPHIL NFR BLD MANUAL: 4 % (ref 0–6)
ERYTHROCYTE [DISTWIDTH] IN BLOOD BY AUTOMATED COUNT: 13.5 % (ref 11.6–15.1)
FERRITIN SERPL-MCNC: 74 NG/ML (ref 24–336)
GFR SERPL CREATININE-BSD FRML MDRD: 59 ML/MIN/1.73SQ M
GLUCOSE SERPL-MCNC: 140 MG/DL (ref 65–140)
HCT VFR BLD AUTO: 24.4 % (ref 36.5–49.3)
HCT VFR BLD AUTO: 29.6 % (ref 36.5–49.3)
HGB BLD-MCNC: 8 G/DL (ref 12–17)
HGB BLD-MCNC: 9.5 G/DL (ref 12–17)
INR PPP: 0.95 (ref 0.84–1.19)
IRON SATN MFR SERPL: 28 % (ref 15–50)
IRON SERPL-MCNC: 91 UG/DL (ref 50–212)
LYMPHOCYTES # BLD AUTO: 2.74 THOUSAND/UL (ref 0.6–4.47)
LYMPHOCYTES # BLD AUTO: 25 % (ref 14–44)
MCH RBC QN AUTO: 30.3 PG (ref 26.8–34.3)
MCHC RBC AUTO-ENTMCNC: 32.1 G/DL (ref 31.4–37.4)
MCV RBC AUTO: 94 FL (ref 82–98)
MONOCYTES # BLD AUTO: 1.31 THOUSAND/UL (ref 0–1.22)
MONOCYTES NFR BLD: 12 % (ref 4–12)
NEUTROPHILS # BLD MANUAL: 6.13 THOUSAND/UL (ref 1.85–7.62)
NEUTS SEG NFR BLD AUTO: 56 % (ref 43–75)
PLATELET # BLD AUTO: 276 THOUSANDS/UL (ref 149–390)
PLATELET BLD QL SMEAR: ADEQUATE
PMV BLD AUTO: 9.4 FL (ref 8.9–12.7)
POLYCHROMASIA BLD QL SMEAR: PRESENT
POTASSIUM SERPL-SCNC: 3.5 MMOL/L (ref 3.5–5.3)
PROT SERPL-MCNC: 6.4 G/DL (ref 6.4–8.4)
PROTHROMBIN TIME: 13 SECONDS (ref 11.6–14.5)
RBC # BLD AUTO: 3.14 MILLION/UL (ref 3.88–5.62)
RBC MORPH BLD: PRESENT
RH BLD: POSITIVE
RH BLD: POSITIVE
SODIUM SERPL-SCNC: 139 MMOL/L (ref 135–147)
SPECIMEN EXPIRATION DATE: NORMAL
TIBC SERPL-MCNC: 323 UG/DL (ref 250–450)
UIBC SERPL-MCNC: 232 UG/DL (ref 155–355)
VIT B12 SERPL-MCNC: 309 PG/ML (ref 180–914)
WBC # BLD AUTO: 10.94 THOUSAND/UL (ref 4.31–10.16)

## 2023-11-06 PROCEDURE — 70450 CT HEAD/BRAIN W/O DYE: CPT

## 2023-11-06 PROCEDURE — 85007 BL SMEAR W/DIFF WBC COUNT: CPT | Performed by: EMERGENCY MEDICINE

## 2023-11-06 PROCEDURE — 86901 BLOOD TYPING SEROLOGIC RH(D): CPT | Performed by: EMERGENCY MEDICINE

## 2023-11-06 PROCEDURE — C9113 INJ PANTOPRAZOLE SODIUM, VIA: HCPCS | Performed by: FAMILY MEDICINE

## 2023-11-06 PROCEDURE — 86900 BLOOD TYPING SEROLOGIC ABO: CPT | Performed by: EMERGENCY MEDICINE

## 2023-11-06 PROCEDURE — 93005 ELECTROCARDIOGRAM TRACING: CPT

## 2023-11-06 PROCEDURE — 85027 COMPLETE CBC AUTOMATED: CPT | Performed by: EMERGENCY MEDICINE

## 2023-11-06 PROCEDURE — 82607 VITAMIN B-12: CPT | Performed by: FAMILY MEDICINE

## 2023-11-06 PROCEDURE — 36415 COLL VENOUS BLD VENIPUNCTURE: CPT | Performed by: EMERGENCY MEDICINE

## 2023-11-06 PROCEDURE — 85610 PROTHROMBIN TIME: CPT | Performed by: EMERGENCY MEDICINE

## 2023-11-06 PROCEDURE — 80053 COMPREHEN METABOLIC PANEL: CPT | Performed by: EMERGENCY MEDICINE

## 2023-11-06 PROCEDURE — 84484 ASSAY OF TROPONIN QUANT: CPT | Performed by: EMERGENCY MEDICINE

## 2023-11-06 PROCEDURE — 85018 HEMOGLOBIN: CPT | Performed by: FAMILY MEDICINE

## 2023-11-06 PROCEDURE — 99285 EMERGENCY DEPT VISIT HI MDM: CPT

## 2023-11-06 PROCEDURE — 71045 X-RAY EXAM CHEST 1 VIEW: CPT

## 2023-11-06 PROCEDURE — 85730 THROMBOPLASTIN TIME PARTIAL: CPT | Performed by: EMERGENCY MEDICINE

## 2023-11-06 PROCEDURE — 86920 COMPATIBILITY TEST SPIN: CPT

## 2023-11-06 PROCEDURE — 85014 HEMATOCRIT: CPT | Performed by: FAMILY MEDICINE

## 2023-11-06 PROCEDURE — 83550 IRON BINDING TEST: CPT | Performed by: FAMILY MEDICINE

## 2023-11-06 PROCEDURE — 86850 RBC ANTIBODY SCREEN: CPT | Performed by: EMERGENCY MEDICINE

## 2023-11-06 PROCEDURE — 99285 EMERGENCY DEPT VISIT HI MDM: CPT | Performed by: EMERGENCY MEDICINE

## 2023-11-06 PROCEDURE — 82728 ASSAY OF FERRITIN: CPT | Performed by: FAMILY MEDICINE

## 2023-11-06 PROCEDURE — 83540 ASSAY OF IRON: CPT | Performed by: FAMILY MEDICINE

## 2023-11-06 PROCEDURE — G1004 CDSM NDSC: HCPCS

## 2023-11-06 PROCEDURE — 99223 1ST HOSP IP/OBS HIGH 75: CPT | Performed by: FAMILY MEDICINE

## 2023-11-06 RX ORDER — ONDANSETRON 2 MG/ML
4 INJECTION INTRAMUSCULAR; INTRAVENOUS EVERY 6 HOURS PRN
Status: DISCONTINUED | OUTPATIENT
Start: 2023-11-06 | End: 2023-11-09 | Stop reason: HOSPADM

## 2023-11-06 RX ORDER — SODIUM CHLORIDE 9 MG/ML
50 INJECTION, SOLUTION INTRAVENOUS CONTINUOUS
Status: DISCONTINUED | OUTPATIENT
Start: 2023-11-06 | End: 2023-11-09

## 2023-11-06 RX ORDER — ESCITALOPRAM OXALATE 10 MG/1
20 TABLET ORAL DAILY
Status: DISCONTINUED | OUTPATIENT
Start: 2023-11-06 | End: 2023-11-09 | Stop reason: HOSPADM

## 2023-11-06 RX ORDER — SODIUM CHLORIDE 9 MG/ML
3 INJECTION INTRAVENOUS
Status: DISCONTINUED | OUTPATIENT
Start: 2023-11-06 | End: 2023-11-09 | Stop reason: HOSPADM

## 2023-11-06 RX ORDER — EZETIMIBE 10 MG/1
10 TABLET ORAL DAILY
Status: DISCONTINUED | OUTPATIENT
Start: 2023-11-06 | End: 2023-11-09 | Stop reason: HOSPADM

## 2023-11-06 RX ORDER — LEVOTHYROXINE SODIUM 0.07 MG/1
75 TABLET ORAL
Status: DISCONTINUED | OUTPATIENT
Start: 2023-11-07 | End: 2023-11-09 | Stop reason: HOSPADM

## 2023-11-06 RX ORDER — METOPROLOL SUCCINATE 25 MG/1
12.5 TABLET, EXTENDED RELEASE ORAL DAILY
Status: DISCONTINUED | OUTPATIENT
Start: 2023-11-06 | End: 2023-11-09 | Stop reason: HOSPADM

## 2023-11-06 RX ORDER — ACETAMINOPHEN 325 MG/1
650 TABLET ORAL EVERY 6 HOURS PRN
Status: DISCONTINUED | OUTPATIENT
Start: 2023-11-06 | End: 2023-11-09 | Stop reason: HOSPADM

## 2023-11-06 RX ORDER — AMIODARONE HYDROCHLORIDE 200 MG/1
200 TABLET ORAL DAILY
Status: DISCONTINUED | OUTPATIENT
Start: 2023-11-06 | End: 2023-11-09 | Stop reason: HOSPADM

## 2023-11-06 RX ORDER — PRAVASTATIN SODIUM 20 MG
20 TABLET ORAL EVERY OTHER DAY
Status: DISCONTINUED | OUTPATIENT
Start: 2023-11-06 | End: 2023-11-09 | Stop reason: HOSPADM

## 2023-11-06 RX ORDER — PANTOPRAZOLE SODIUM 40 MG/10ML
40 INJECTION, POWDER, LYOPHILIZED, FOR SOLUTION INTRAVENOUS ONCE
Status: COMPLETED | OUTPATIENT
Start: 2023-11-06 | End: 2023-11-06

## 2023-11-06 RX ORDER — BUPROPION HYDROCHLORIDE 150 MG/1
300 TABLET ORAL DAILY
Status: DISCONTINUED | OUTPATIENT
Start: 2023-11-07 | End: 2023-11-09 | Stop reason: HOSPADM

## 2023-11-06 RX ADMIN — PANTOPRAZOLE SODIUM 8 MG/HR: 40 INJECTION, POWDER, FOR SOLUTION INTRAVENOUS at 16:11

## 2023-11-06 RX ADMIN — SODIUM CHLORIDE 50 ML/HR: 0.9 INJECTION, SOLUTION INTRAVENOUS at 16:07

## 2023-11-06 RX ADMIN — PANTOPRAZOLE SODIUM 40 MG: 40 INJECTION, POWDER, FOR SOLUTION INTRAVENOUS at 16:11

## 2023-11-06 RX ADMIN — PANTOPRAZOLE SODIUM 8 MG/HR: 40 INJECTION, POWDER, FOR SOLUTION INTRAVENOUS at 22:46

## 2023-11-06 NOTE — ASSESSMENT & PLAN NOTE
Secondary to GI bleed baseline hemoglobin appears to be around 14 currently down to 9.5.   If hemoglobin drops under 7.5 will require blood transfusion  Monitor H&H every 12 hrs  Check iron panel and B12 as well

## 2023-11-06 NOTE — ED NOTES
[employer]onic pacemaker interrogate using Med[employer] device interrogator      Vicente Anand, [address]  83/18/91 [id number]

## 2023-11-06 NOTE — ASSESSMENT & PLAN NOTE
Patient presents with dark stools for the last 5 days patient is having 1-2 bowel movements per day denies any abdominal pain or fevers or chills. Denies any vomiting. Patient had coronary disease stent placed in the LAD 2 weeks ago is currently on aspirin and Plavix. Denies any NSAID use or any prior history of endoscopies or ulcers. He took his aspirin and Plavix already this morning we will hold for now placed on Protonix drip. Consulted GI and cardiology. Will need EGD in a.m.

## 2023-11-06 NOTE — ED PROVIDER NOTES
History  Chief Complaint   Patient presents with    Syncope     C/o dizziness, vomiting, and a syncopal episode at [facility]; had a pacemaker placed on 11/1. Denies HS    Black or Bloody Stool     77 yr old male with complaints of syncopal episode while at [employer] today. Patient states he felt light headed, hot and sweaty. He then became diaphoretic and does not recall much else. He denies any chest pain or sob. He had a stent placed in his heart two weeks ago and a pacemaker was placed on 11/1. He states he has had black tarry stools since Wednesday. He currently takes aspirin and plavix. Prior to Admission Medications   Prescriptions Last Dose Informant Patient Reported? Taking?    Alirocumab 150 MG/ML SOAJ 10/29/2023  No Yes   Sig: Inject 150 mg under the skin every 14 (fourteen) days   Magnesium 400 MG CAPS 11/6/2023 Self No Yes   Sig: Take 1 capsule (400 mg total) by mouth in the morning   Omega-3 Fatty Acids (Fish Oil) 1200 MG CAPS  Self Yes No   Sig: Take by mouth   amLODIPine (NORVASC) 10 mg tablet 11/6/2023 Self Yes Yes   Sig: Take 10 mg by mouth daily   amiodarone 200 mg tablet 11/6/2023  No Yes   Sig: Take 1 tablet (200 mg total) by mouth daily   aspirin 81 mg chewable tablet 11/6/2023  No Yes   Sig: Chew 1 tablet (81 mg total) daily   buPROPion (WELLBUTRIN XL) 300 mg 24 hr tablet 11/6/2023 Self Yes Yes   clopidogrel (Plavix) 75 mg tablet 11/6/2023  No Yes   Sig: Take 1 tablet (75 mg total) by mouth daily   escitalopram (LEXAPRO) 20 mg tablet 11/6/2023 Self Yes Yes   Sig: Take 20 mg by mouth daily   ezetimibe (ZETIA) 10 mg tablet 11/6/2023  No Yes   Sig: Take 1 tablet (10 mg total) by mouth daily   furosemide (LASIX) 40 mg tablet 11/6/2023  No Yes   Sig: Take 1 tablet (40 mg total) by mouth daily   levothyroxine 75 mcg tablet 11/6/2023 Self Yes Yes   metoprolol succinate (TOPROL-XL) 25 mg 24 hr tablet 11/6/2023 Self No Yes   Sig: Take 0.5 tablets (12.5 mg total) by mouth daily   potassium chloride (MICRO-K) 10 MEQ CR capsule 11/6/2023 Self No Yes   Sig: Take 1 capsule (10 mEq total) by mouth daily   pravastatin (PRAVACHOL) 20 mg tablet Not Taking  No No   Sig: Take 1 tablet (20 mg total) by mouth every other day   Patient not taking: Reported on 11/6/2023      Facility-Administered Medications: None       Past Medical History:   Diagnosis Date    Anxiety     Aortic stenosis     Borderline high cholesterol     CHF (congestive heart failure) ([address])     [facility]-    Colon polyp     Depression     Heart murmur     Hypertension     Hypothyroidism     Prediabetes     PVCs (premature ventricular contractions)     "skip"    s/p [employer] dual chamber ICD 11/2/2023 11/04/2023    Wears glasses     readers       Past Surgical History:   Procedure Laterality Date    APPENDECTOMY      BACK SURGERY      CARDIAC CATHETERIZATION N/A 10/17/2023    Procedure: Cardiac RHC/LHC; Surgeon: Regina Jerome MD;  [facility]; Service: Cardiology    CARDIAC ELECTROPHYSIOLOGY PROCEDURE N/A 11/2/2023    Procedure: Cardiac icd implant;  Surgeon: Apolinar Arellano MD;  Location: [facility];   Service: Cardiology    CATARACT EXTRACTION Bilateral     CLOSURE DELAYED PRIMARY Left 06/09/2023    Procedure: CLOSURE DELAYED PRIMARY;  Surgeon: Norris Benavidez DPM;  Location: [facility];  Service: Podiatry    COLONOSCOPY  2023    NATHALIE Guerra    ND CORRJ HALLUX VALGUS W/SESMDC W/RESCJ PROX PHAL Left 04/19/2023    Procedure: Luis Archer and wound debridement;  Surgeon: Lucinda Schmidt DPM;  Location: [facility];  Service: Podiatry    TOE AMPUTATION Left 06/07/2023    Procedure: PARTIAL 1ST RAY AMPUTATION;  Surgeon: Norris Benavidez DPM;  Location: [facility];  Service: Podiatry    TONSILLECTOMY      TOTAL SHOULDER REPLACEMENT Left     WISDOM TOOTH EXTRACTION         Family History   Problem Relation Age of Onset    Dementia Mother     Blindness Mother     Cancer Mother Dementia Father      I have reviewed and agree with the history as documented. E-Cigarette/Vaping    E-Cigarette Use Never User      E-Cigarette/Vaping Substances    Nicotine No     THC No     CBD No     Flavoring No      Social History     Tobacco Use    Smoking status: Every Day     Packs/day: 0.50     Years: 50.00     Total pack years: 25.00     Types: Cigarettes     Last attempt to quit: 2023     Years since quittin.8    Smokeless tobacco: Never   Vaping Use    Vaping Use: Never used   Substance Use Topics    Alcohol use: Yes     Comment: socially-2 drinks per week    Drug use: Not Currently     Comment: sporadic in young adulthood       Review of Systems   Constitutional:  Positive for activity change. Negative for chills and fever. HENT:  Negative for ear pain and sore throat. Eyes:  Negative for pain and visual disturbance. Respiratory:  Negative for cough and shortness of breath. Cardiovascular:  Negative for chest pain and palpitations. Gastrointestinal:  Negative for abdominal pain and vomiting. Dark stool     Genitourinary:  Negative for dysuria and hematuria. Musculoskeletal:  Negative for arthralgias and back pain. Skin:  Negative for color change and rash. Neurological:  Positive for dizziness and syncope. Negative for seizures. All other systems reviewed and are negative. Physical Exam  Physical Exam  Vitals and nursing note reviewed. Constitutional:       General: He is not in acute distress. Appearance: Normal appearance. He is well-developed and normal weight. He is not ill-appearing, toxic-appearing or diaphoretic. HENT:      Head: Normocephalic and atraumatic. Right Ear: External ear normal.      Left Ear: External ear normal.      Nose: No congestion or rhinorrhea. Mouth/Throat:      Mouth: Mucous membranes are moist.   Eyes:      Extraocular Movements: Extraocular movements intact.       Conjunctiva/sclera: Conjunctivae normal. Cardiovascular:      Rate and Rhythm: Normal rate and regular rhythm. Pulses: Normal pulses. Heart sounds: Normal heart sounds. No murmur heard. Pulmonary:      Effort: Pulmonary effort is normal. No respiratory distress. Breath sounds: Normal breath sounds. No stridor. No wheezing, rhonchi or rales. Comments: Pacemaker to the anterior chest wall  Chest:      Chest wall: No tenderness. Abdominal:      General: Bowel sounds are normal. There is no distension. Palpations: Abdomen is soft. There is no mass. Tenderness: There is no abdominal tenderness. There is no guarding or rebound. Hernia: No hernia is present. Musculoskeletal:         General: No swelling, tenderness, deformity or signs of injury. Normal range of motion. Cervical back: Normal range of motion and neck supple. No rigidity or tenderness. Right lower leg: No edema. Left lower leg: No edema. Comments: Significant ecchymosis across the chest wall   Lymphadenopathy:      Cervical: No cervical adenopathy. Skin:     General: Skin is warm and dry. Capillary Refill: Capillary refill takes less than 2 seconds. Neurological:      General: No focal deficit present. Mental Status: He is alert and oriented to person, place, and time. Mental status is at baseline. Cranial Nerves: No cranial nerve deficit. Sensory: No sensory deficit. Motor: No weakness.       Coordination: Coordination normal.      Gait: Gait normal.      Deep Tendon Reflexes: Reflexes normal.   Psychiatric:         Mood and Affect: Mood normal.         Behavior: Behavior normal.         Vital Signs  ED Triage Vitals   Temperature Pulse Respirations Blood Pressure SpO2   11/06/23 1249 11/06/23 1249 11/06/23 1249 11/06/23 1249 11/06/23 1249   98.6 °F (37 °C) 63 18 107/50 97 %      Temp Source Heart Rate Source Patient Position - Orthostatic VS BP Location FiO2 (%)   11/06/23 1249 11/06/23 1249 11/06/23 1249 11/06/23 1249 --   Temporal Monitor Lying Right arm       Pain Score       11/06/23 1540       No Pain           Vitals:    11/06/23 1249 11/06/23 1415 11/06/23 1600 11/06/23 1830   BP: 107/50 114/78 150/74 121/60   Pulse: 63 59 69 72   Patient Position - Orthostatic VS: Lying Lying Lying          Visual Acuity      ED Medications  Medications   sodium chloride (PF) 0.9 % injection 3 mL (has no administration in time range)   pantoprazole (PROTONIX) 80 mg in sodium chloride 0.9 % 100 mL infusion (8 mg/hr Intravenous New Bag 11/6/23 1611)   amiodarone tablet 200 mg (200 mg Oral Not Given 11/6/23 1606)   buPROPion (WELLBUTRIN XL) 24 hr tablet 300 mg (has no administration in time range)   escitalopram (LEXAPRO) tablet 20 mg (20 mg Oral Not Given 11/6/23 1606)   ezetimibe (ZETIA) tablet 10 mg (10 mg Oral Not Given 11/6/23 1606)   levothyroxine tablet 75 mcg (has no administration in time range)   metoprolol succinate (TOPROL-XL) 24 hr tablet 12.5 mg (12.5 mg Oral Not Given 11/6/23 1606)   pravastatin (PRAVACHOL) tablet 20 mg (20 mg Oral Not Given 11/6/23 1607)   sodium chloride 0.9 % infusion (50 mL/hr Intravenous New Bag 11/6/23 1607)   acetaminophen (TYLENOL) tablet 650 mg (has no administration in time range)   ondansetron (ZOFRAN) injection 4 mg (has no administration in time range)   pantoprazole (PROTONIX) injection 40 mg (40 mg Intravenous Given 11/6/23 1611)       Diagnostic Studies  Results Reviewed       Procedure Component Value Units Date/Time    HS Troponin I 4hr [[id number]]  (Normal) Collected: 11/06/23 1719    Lab Status: Final result Specimen: Blood from Arm, Right Updated: 11/06/23 1748     hs TnI 4hr 20 ng/L      Delta 4hr hsTnI -2 ng/L     TIBC Panel (incl. Iron, TIBC, % Iron Saturation) [[id number]] Collected: 11/06/23 1258    Lab Status: In process Specimen: Blood from Arm, Left Updated: 11/06/23 1542    Ferritin [[id number]] Collected: 11/06/23 1258    Lab Status:  In process Specimen: Blood from Arm, Left Updated: 11/06/23 1542    Vitamin B12 [[id number]] Collected: 11/06/23 1258    Lab Status:  In process Specimen: Blood from Arm, Left Updated: 11/06/23 1542    HS Troponin I 2hr [[id number]]  (Normal) Collected: 11/06/23 1506    Lab Status: Final result Specimen: Blood Updated: 11/06/23 1532     hs TnI 2hr 19 ng/L      Delta 2hr hsTnI -3 ng/L     Manual Differential(PHLEBS Do Not Order) [[id number]]  (Abnormal) Collected: 11/06/23 1258    Lab Status: Final result Specimen: Blood from Arm, Left Updated: 11/06/23 1350     Segmented % 56 %      Lymphocytes % 25 %      Monocytes % 12 %      Eosinophils, % 4 %      Basophils % 3 %      Absolute Neutrophils 6.13 Thousand/uL      Lymphocytes Absolute 2.74 Thousand/uL      Monocytes Absolute 1.31 Thousand/uL      Eosinophils Absolute 0.44 Thousand/uL      Basophils Absolute 0.33 Thousand/uL      Total Counted --     RBC Morphology Present     Platelet Estimate Adequate     Polychromasia Present    Protime-INR [[id number]]  (Normal) Collected: 11/06/23 1310    Lab Status: Final result Specimen: Blood from Arm, Left Updated: 11/06/23 1331     Protime 13.0 seconds      INR 0.95    APTT [[id number]]  (Normal) Collected: 11/06/23 1310    Lab Status: Final result Specimen: Blood from Arm, Left Updated: 11/06/23 1331     PTT 25 seconds     HS Troponin 0hr (reflex protocol) [[id number]]  (Normal) Collected: 11/06/23 1258    Lab Status: Final result Specimen: Blood from Arm, Left Updated: 11/06/23 1329     hs TnI 0hr 22 ng/L     Comprehensive metabolic panel [[id number]]  (Abnormal) Collected: 11/06/23 1258    Lab Status: Final result Specimen: Blood from Arm, Left Updated: 11/06/23 1321     Sodium 139 mmol/L      Potassium 3.5 mmol/L      Chloride 104 mmol/L      CO2 24 mmol/L      ANION GAP 11 mmol/L      BUN 42 mg/dL      Creatinine 1.22 mg/dL      Glucose 140 mg/dL      Calcium 8.9 mg/dL      AST 14 U/L      ALT 16 U/L      Alkaline Phosphatase 56 U/L      Total Protein 6.4 g/dL      Albumin 3.7 g/dL      Total Bilirubin 0.34 mg/dL      eGFR 59 ml/min/1.73sq m     Narrative:      [place] guidelines for Chronic Kidney Disease (CKD):     Stage 1 with normal or high GFR (GFR > 90 mL/min/1.73 square meters)    Stage 2 Mild CKD (GFR = 60-89 mL/min/1.73 square meters)    Stage 3A Moderate CKD (GFR = 45-59 mL/min/1.73 square meters)    Stage 3B Moderate CKD (GFR = 30-44 mL/min/1.73 square meters)    Stage 4 Severe CKD (GFR = 15-29 mL/min/1.73 square meters)    Stage 5 End Stage CKD (GFR <15 mL/min/1.73 square meters)  Note: GFR calculation is accurate only with a steady state creatinine    CBC and differential [[id number]]  (Abnormal) Collected: 11/06/23 1258    Lab Status: Final result Specimen: Blood from Arm, Left Updated: 11/06/23 1310     WBC 10.94 Thousand/uL      RBC 3.14 Million/uL      Hemoglobin 9.5 g/dL      Hematocrit 29.6 %      MCV 94 fL      MCH 30.3 pg      MCHC 32.1 g/dL      RDW 13.5 %      MPV 9.4 fL      Platelets 665 Thousands/uL                    CT head without contrast   Final Result by Shaheed Coy MD (11/06 1335)      No evidence of acute intracranial process. Chronic microangiopathy. Workstation performed: [id number]         X-ray chest 1 view portable    (Results Pending)              Procedures  Procedures         ED Course  ED Course as of 11/06/23 2029   [facility] Nov 06, 2023   1430 Pacemaker interrogated   1504 Case discussed with the hospitalist and GI                               SBIRT 22yo+      Flowsheet Row Most Recent Value   Initial Alcohol Screen: US AUDIT-C     1. How often do you have a drink containing alcohol? 0 Filed at: 11/06/2023 1248   2. How many drinks containing alcohol do you have on a typical day you are drinking? 0 Filed at: 11/06/2023 1248   3b. FEMALE Any Age, or MALE 65+: How often do you have 4 or more drinks on one occassion?  0 Filed at: 11/06/2023 1248   Audit-C Score 0 Filed at: 11/06/2023 1248   NIC: How many times in the past year have you. .. Used an illegal drug or used a prescription medication for non-medical reasons? Never Filed at: 11/06/2023 1248                      Medical Decision Making  Amount and/or Complexity of Data Reviewed  Labs: ordered. Radiology: ordered. Risk  Prescription drug management. Decision regarding hospitalization.              Disposition  Final diagnoses:   Syncope   GI bleed     Time reflects when diagnosis was documented in both MDM as applicable and the Disposition within this note       Time User Action Codes Description Comment    11/6/2023  3:03 PM Liborio Dougherty Add [R55] Syncope     11/6/2023  3:03 PM Liborio Dougherty Add [K92.2] GI bleed     11/6/2023  3:12 PM Jenna Cali Add [I25.10,  I25.83] Coronary artery disease due to lipid rich plaque     11/6/2023  3:28 PM Reece Katiaon Add [K92.2] Acute GI bleeding           ED Disposition       ED Disposition   Admit    Condition   Stable    Date/Time   Mon Nov 6, 2023 1503    Comment                  Follow-up Information    None         Current Discharge Medication List        CONTINUE these medications which have NOT CHANGED    Details   Alirocumab 150 MG/ML SOAJ Inject 150 mg under the skin every 14 (fourteen) days  Qty: 2 mL, Refills: 11    Associated Diagnoses: Mixed hyperlipidemia; Statin intolerance      amiodarone 200 mg tablet Take 1 tablet (200 mg total) by mouth daily  Qty: 90 tablet, Refills: 0    Comments: Take 400 mg BID through 11/10/23 then take 200 mg daily  Associated Diagnoses: Ventricular tachycardia (HCC)      amLODIPine (NORVASC) 10 mg tablet Take 10 mg by mouth daily  Refills: 0      aspirin 81 mg chewable tablet Chew 1 tablet (81 mg total) daily  Qty: 30 tablet, Refills: 0    Associated Diagnoses: CAD (coronary artery disease)      buPROPion (WELLBUTRIN XL) 300 mg 24 hr tablet       clopidogrel (Plavix) 75 mg tablet Take 1 tablet (75 mg total) by mouth daily  Qty: 30 tablet, Refills: 0    Associated Diagnoses: CAD (coronary artery disease)      escitalopram (LEXAPRO) 20 mg tablet Take 20 mg by mouth daily  Refills: 1      ezetimibe (ZETIA) 10 mg tablet Take 1 tablet (10 mg total) by mouth daily  Qty: 90 tablet, Refills: 3    Associated Diagnoses: Mixed hyperlipidemia      furosemide (LASIX) 40 mg tablet Take 1 tablet (40 mg total) by mouth daily    Associated Diagnoses: Shortness of breath      levothyroxine 75 mcg tablet       Magnesium 400 MG CAPS Take 1 capsule (400 mg total) by mouth in the morning  Qty: 90 capsule, Refills: 3    Associated Diagnoses: PVCs (premature ventricular contractions)      metoprolol succinate (TOPROL-XL) 25 mg 24 hr tablet Take 0.5 tablets (12.5 mg total) by mouth daily  Qty: 15 tablet, Refills: 11    Associated Diagnoses: PVCs (premature ventricular contractions)      potassium chloride (MICRO-K) 10 MEQ CR capsule Take 1 capsule (10 mEq total) by mouth daily  Qty: 90 capsule, Refills: 3    Associated Diagnoses: Shortness of breath      Omega-3 Fatty Acids (Fish Oil) 1200 MG CAPS Take by mouth      pravastatin (PRAVACHOL) 20 mg tablet Take 1 tablet (20 mg total) by mouth every other day  Qty: 90 tablet, Refills: 6    Associated Diagnoses: Mixed hyperlipidemia             No discharge procedures on file.     PDMP Review         Value Time User    PDMP Reviewed  Yes 6/6/2023  6:28 PM Robert Cruz MD            ED Provider  Electronically Signed by             Elmira Flynn DO  11/06/23 2029

## 2023-11-06 NOTE — ASSESSMENT & PLAN NOTE
History of aortic stenosis. Being evaluated for TAVR however it was delayed due to recent admission for CAD s/p stent and followed by episode of ventricular tachycardia. Further follow-up outpatient with cardiology  Syncopal episode while at [facility] today. May be secondary to GI bleed. Pacemaker interrogated in the ED check orthostatics and monitor hemoglobin.

## 2023-11-06 NOTE — H&P
[address]  H&P  Name: Thomas Srinivasan 77 y.o. male I MRN: [id number]  Unit/Bed#: -01 I Date of Admission: 11/6/2023   Date of Service: 11/6/2023 I Hospital Day: 0      Assessment/Plan   * Acute GI bleeding  Assessment & Plan  Patient presents with dark stools for the last 5 days patient is having 1-2 bowel movements per day denies any abdominal pain or fevers or chills. Denies any vomiting. Patient had coronary disease stent placed in the LAD 2 weeks ago is currently on aspirin and Plavix. Denies any NSAID use or any prior history of endoscopies or ulcers. He took his aspirin and Plavix already this morning we will hold for now placed on Protonix drip. Consulted GI and cardiology. Will need EGD in a.m. Vasovagal syncope  Assessment & Plan  History of aortic stenosis. Being evaluated for TAVR however it was delayed due to recent admission for CAD s/p stent and followed by episode of ventricular tachycardia. Further follow-up outpatient with cardiology  Syncopal episode while at [facility] today. May be secondary to GI bleed. Pacemaker interrogated in the ED check orthostatics and monitor hemoglobin. ABLA (acute blood loss anemia)  Assessment & Plan  Secondary to GI bleed baseline hemoglobin appears to be around 14 currently down to 9.5. If hemoglobin drops under 7.5 will require blood transfusion  Monitor H&H every 12 hrs  Check iron panel and B12 as well    Ventricular tachycardia ([address])  Assessment & Plan  history of V. tach. None noted so far.   Continue amiodarone and metoprolol    Mixed hyperlipidemia  Assessment & Plan  Continue statin therapy    Benign essential HTN  Assessment & Plan  Blood Pressures currently controlled continue Toprol-XL and hold Norvasc and Lasix for now         VTE Prophylaxis: Pharmacologic VTE Prophylaxis contraindicated due to gi bleed   / sequential compression device   Code Status: full code but no intubation  POLST: There is no POLST form on file for this patient (pre-hospital)  Discussion with family: none    Anticipated Length of Stay:  Patient will be admitted on an Inpatient basis with an anticipated length of stay of  more than 2 midnights. Justification for Hospital Stay: Acute GI bleed    Total Time for Visit, including Counseling / Coordination of Care: 90 minutes. Greater than 50% of this total time spent on direct patient counseling and coordination of care. Chief Complaint:   Dark stools    History of Present Illness:    Srinivasa London is a 77 y.o. male who presents with black tarry stools for the last 5 days. Patient states that he recently had a stent placed 2 weeks ago and is on aspirin and Plavix denies any NSAID use or any prior history of stomach ulcers. Denies any abdominal pain nausea or vomiting. The patient was at [facility] and had a syncopal episode and came to the ED to be evaluated. He was hoping that the stools would go away and he thought it might be secondary to something that he ate    Review of Systems:    Review of Systems   Constitutional:  Positive for appetite change. Negative for chills, fatigue and fever. HENT:  Negative for hearing loss, sore throat and trouble swallowing. Eyes:  Negative for photophobia, discharge and visual disturbance. Respiratory:  Negative for chest tightness and shortness of breath. Cardiovascular:  Negative for chest pain and palpitations. Gastrointestinal:  Positive for blood in stool. Negative for abdominal pain and vomiting. Endocrine: Negative for polydipsia and polyuria. Genitourinary:  Negative for difficulty urinating, dysuria, flank pain and hematuria. Musculoskeletal:  Negative for back pain and gait problem. Skin:  Negative for rash. Allergic/Immunologic: Negative for environmental allergies and food allergies. Neurological:  Positive for syncope. Negative for dizziness, seizures and headaches. Hematological:  Does not bruise/bleed easily. Psychiatric/Behavioral:  Negative for behavioral problems. All other systems reviewed and are negative. Past Medical and Surgical History:     Past Medical History:   Diagnosis Date    Anxiety     Aortic stenosis     Borderline high cholesterol     CHF (congestive heart failure) ([address])     Asaf Jackson-cardio-    Colon polyp     Depression     Heart murmur     Hypertension     Hypothyroidism     Prediabetes     PVCs (premature ventricular contractions)     "skip"    s/p Medtronic dual chamber ICD 11/2/2023 11/04/2023    Wears glasses     readers       Past Surgical History:   Procedure Laterality Date    APPENDECTOMY      BACK SURGERY      CARDIAC CATHETERIZATION N/A 10/17/2023    Procedure: Cardiac RHC/LHC; Surgeon: Yehuda Erazo MD;  [facility]; Service: Cardiology    CARDIAC ELECTROPHYSIOLOGY PROCEDURE N/A 11/2/2023    Procedure: Cardiac icd implant;  Surgeon: José Antonio Gregory MD;  [facility]; Service: Cardiology    CATARACT EXTRACTION Bilateral     CLOSURE DELAYED PRIMARY Left 06/09/2023    Procedure: CLOSURE DELAYED PRIMARY;  Surgeon: Rik Camp DPM;  Location: [facility];  Service: Podiatry    COLONOSCOPY  2023    NATHALIE Guerra    CO CORRJ HALLUX VALGUS W/SESMDC W/RESCJ PROX PHAL Left 04/19/2023    Procedure: Jhon Nail and wound debridement;  Surgeon: Joce Huerta DPM;  Location: [facility];  Service: Podiatry    TOE AMPUTATION Left 06/07/2023    Procedure: PARTIAL 1ST RAY AMPUTATION;  Surgeon: Rik Camp DPM;  Location: [facility];  Service: Podiatry    TONSILLECTOMY      TOTAL SHOULDER REPLACEMENT Left     WISDOM TOOTH EXTRACTION         Meds/Allergies:    Prior to Admission medications    Medication Sig Start Date End Date Taking?  Authorizing Provider   Alirocumab 150 MG/ML SOAJ Inject 150 mg under the skin every 14 (fourteen) days 10/17/23   JESUS Monte   amiodarone 200 mg tablet Take 1 tablet (200 mg total) by mouth daily 11/3/23 2/1/24  Malissa Jones PA-C   amLODIPine (NORVASC) 10 mg tablet Take 10 mg by mouth daily 19   Historical Provider, MD   aspirin 81 mg chewable tablet Chew 1 tablet (81 mg total) daily 10/17/23   Zion Howell DO   buPROPion (WELLBUTRIN XL) 300 mg 24 hr tablet  19   Historical Provider, MD   clopidogrel (Plavix) 75 mg tablet Take 1 tablet (75 mg total) by mouth daily 10/17/23   Zion Howell DO   escitalopram (LEXAPRO) 20 mg tablet Take 20 mg by mouth daily 19   Historical Provider, MD   ezetimibe (ZETIA) 10 mg tablet Take 1 tablet (10 mg total) by mouth daily 10/2/23   Neyda Ramirez MD   furosemide (LASIX) 40 mg tablet Take 1 tablet (40 mg total) by mouth daily 23   Hamida Schmid DO   levothyroxine 75 mcg tablet  19   Historical Provider, MD   Magnesium 400 MG CAPS Take 1 capsule (400 mg total) by mouth in the morning 23   JESUS Keith   metoprolol succinate (TOPROL-XL) 25 mg 24 hr tablet Take 0.5 tablets (12.5 mg total) by mouth daily 23   JESUS Keith   Omega-3 Fatty Acids (Fish Oil) 1200 MG CAPS Take by mouth    Historical Provider, MD   potassium chloride (MICRO-K) 10 MEQ CR capsule Take 1 capsule (10 mEq total) by mouth daily 3/8/23   JESUS Keith   pravastatin (PRAVACHOL) 20 mg tablet Take 1 tablet (20 mg total) by mouth every other day 10/2/23   Neyda Ramirez MD     I have reviewed home medications with patient personally. Allergies:    Allergies   Allergen Reactions    Statins Myalgia     Other reaction(s): muscle aches and joint aches         Social History:     Marital Status: /Civil Union     Social History     Substance and Sexual Activity   Alcohol Use Yes    Comment: socially-2 drinks per week     Social History     Tobacco Use   Smoking Status Every Day    Packs/day: 0.50    Years: 50.00    Total pack years: 25.00    Types: Cigarettes    Last attempt to quit: 2023    Years since quittin.8 Smokeless Tobacco Never     Social History     Substance and Sexual Activity   Drug Use Not Currently    Comment: sporadic in young adulthood       Family History:    Family History   Problem Relation Age of Onset    Dementia Mother     Blindness Mother     Cancer Mother     Dementia Father        Physical Exam:     Vitals:   Blood Pressure: 114/78 (11/06/23 1415)  Pulse: 59 (11/06/23 1415)  Temperature: 98.6 °F (37 °C) (11/06/23 1249)  Temp Source: Temporal (11/06/23 1249)  Respirations: 17 (11/06/23 1415)  SpO2: 94 % (11/06/23 1415)    Physical Exam  Vitals and nursing note reviewed. Constitutional:       Appearance: He is ill-appearing. HENT:      Head: Normocephalic and atraumatic. Right Ear: External ear normal.      Left Ear: External ear normal.      Nose: Nose normal.      Mouth/Throat:      Pharynx: Oropharynx is clear. Cardiovascular:      Rate and Rhythm: Normal rate and regular rhythm. Heart sounds: Normal heart sounds. Pulmonary:      Effort: Pulmonary effort is normal.      Breath sounds: Normal breath sounds. Abdominal:      General: Bowel sounds are normal.      Palpations: Abdomen is soft. Tenderness: There is no abdominal tenderness. Musculoskeletal:         General: Normal range of motion. Cervical back: Normal range of motion and neck supple. Skin:     General: Skin is warm and dry. Capillary Refill: Capillary refill takes less than 2 seconds. Coloration: Skin is pale. Neurological:      General: No focal deficit present. Mental Status: He is alert and oriented to person, place, and time. Psychiatric:         Mood and Affect: Mood normal.           Additional Data:     Lab Results: I have personally reviewed pertinent reports.       Results from last 7 days   Lab Units 11/06/23  1258 11/02/23  0440 11/01/23  0353   WBC Thousand/uL 10.94*   < > 9.21   HEMOGLOBIN g/dL 9.5*   < > 14.5   HEMATOCRIT % 29.6*   < > 42.9   PLATELETS Thousands/uL 276 < > 202   NEUTROS PCT %  --   --  64   LYMPHS PCT %  --   --  21   LYMPHO PCT % 25  --   --    MONOS PCT %  --   --  10   MONO PCT % 12  --   --    EOS PCT % 4  --  5    < > = values in this interval not displayed. Results from last 7 days   Lab Units 11/06/23  1258   SODIUM mmol/L 139   POTASSIUM mmol/L 3.5   CHLORIDE mmol/L 104   CO2 mmol/L 24   BUN mg/dL 42*   CREATININE mg/dL 1.22   ANION GAP mmol/L 11   CALCIUM mg/dL 8.9   ALBUMIN g/dL 3.7   TOTAL BILIRUBIN mg/dL 0.34   ALK PHOS U/L 56   ALT U/L 16   AST U/L 14   GLUCOSE RANDOM mg/dL 140     Results from last 7 days   Lab Units 11/06/23  1310   INR  0.95                   Imaging: I have personally reviewed pertinent reports. CT head without contrast   Final Result by Jenny Muhammad MD (11/06 1335)      No evidence of acute intracranial process. Chronic microangiopathy. Workstation performed: [id number]         X-ray chest 1 view portable    (Results Pending)       EKG, Pathology, and Other Studies Reviewed on Admission:   EKG: Normal sinus rhythm    Allscripts / Epic Records Reviewed: Yes     ** Please Note: This note has been constructed using a voice recognition system.  **

## 2023-11-07 ENCOUNTER — ANESTHESIA (INPATIENT)
Dept: PERIOP | Facility: HOSPITAL | Age: 68
DRG: 378 | End: 2023-11-07
Payer: MEDICARE

## 2023-11-07 ENCOUNTER — ANESTHESIA EVENT (INPATIENT)
Dept: PERIOP | Facility: HOSPITAL | Age: 68
DRG: 378 | End: 2023-11-07
Payer: MEDICARE

## 2023-11-07 ENCOUNTER — APPOINTMENT (INPATIENT)
Dept: PERIOP | Facility: HOSPITAL | Age: 68
DRG: 378 | End: 2023-11-07
Attending: HOSPITALIST
Payer: MEDICARE

## 2023-11-07 PROBLEM — Z95.5 STATUS POST INSERTION OF DRUG-ELUTING STENT INTO LEFT ANTERIOR DESCENDING (LAD) ARTERY: Status: ACTIVE | Noted: 2023-10-17

## 2023-11-07 LAB
ANION GAP SERPL CALCULATED.3IONS-SCNC: 4 MMOL/L
ATRIAL RATE: 61 BPM
BUN SERPL-MCNC: 42 MG/DL (ref 5–25)
CALCIUM SERPL-MCNC: 8.6 MG/DL (ref 8.4–10.2)
CHLORIDE SERPL-SCNC: 110 MMOL/L (ref 96–108)
CO2 SERPL-SCNC: 27 MMOL/L (ref 21–32)
CREAT SERPL-MCNC: 1.03 MG/DL (ref 0.6–1.3)
ERYTHROCYTE [DISTWIDTH] IN BLOOD BY AUTOMATED COUNT: 13.6 % (ref 11.6–15.1)
GFR SERPL CREATININE-BSD FRML MDRD: 73 ML/MIN/1.73SQ M
GLUCOSE SERPL-MCNC: 111 MG/DL (ref 65–140)
GLUCOSE SERPL-MCNC: 117 MG/DL (ref 65–140)
HCT VFR BLD AUTO: 23.4 % (ref 36.5–49.3)
HCT VFR BLD AUTO: 25.2 % (ref 36.5–49.3)
HCT VFR BLD AUTO: 26.1 % (ref 36.5–49.3)
HGB BLD-MCNC: 7.4 G/DL (ref 12–17)
HGB BLD-MCNC: 8.4 G/DL (ref 12–17)
HGB BLD-MCNC: 8.5 G/DL (ref 12–17)
MCH RBC QN AUTO: 30.8 PG (ref 26.8–34.3)
MCHC RBC AUTO-ENTMCNC: 31.6 G/DL (ref 31.4–37.4)
MCV RBC AUTO: 98 FL (ref 82–98)
P AXIS: 52 DEGREES
PLATELET # BLD AUTO: 263 THOUSANDS/UL (ref 149–390)
PMV BLD AUTO: 9.2 FL (ref 8.9–12.7)
POTASSIUM SERPL-SCNC: 3.9 MMOL/L (ref 3.5–5.3)
PR INTERVAL: 158 MS
QRS AXIS: 48 DEGREES
QRSD INTERVAL: 100 MS
QT INTERVAL: 434 MS
QTC INTERVAL: 436 MS
RBC # BLD AUTO: 2.4 MILLION/UL (ref 3.88–5.62)
SODIUM SERPL-SCNC: 141 MMOL/L (ref 135–147)
T WAVE AXIS: 74 DEGREES
VENTRICULAR RATE: 61 BPM
WBC # BLD AUTO: 10.58 THOUSAND/UL (ref 4.31–10.16)

## 2023-11-07 PROCEDURE — 80048 BASIC METABOLIC PNL TOTAL CA: CPT | Performed by: FAMILY MEDICINE

## 2023-11-07 PROCEDURE — 82948 REAGENT STRIP/BLOOD GLUCOSE: CPT

## 2023-11-07 PROCEDURE — 85027 COMPLETE CBC AUTOMATED: CPT | Performed by: FAMILY MEDICINE

## 2023-11-07 PROCEDURE — 30233N1 TRANSFUSION OF NONAUTOLOGOUS RED BLOOD CELLS INTO PERIPHERAL VEIN, PERCUTANEOUS APPROACH: ICD-10-PCS | Performed by: HOSPITALIST

## 2023-11-07 PROCEDURE — C9113 INJ PANTOPRAZOLE SODIUM, VIA: HCPCS | Performed by: FAMILY MEDICINE

## 2023-11-07 PROCEDURE — 3E0G8GC INTRODUCTION OF OTHER THERAPEUTIC SUBSTANCE INTO UPPER GI, VIA NATURAL OR ARTIFICIAL OPENING ENDOSCOPIC: ICD-10-PCS | Performed by: HOSPITALIST

## 2023-11-07 PROCEDURE — 93010 ELECTROCARDIOGRAM REPORT: CPT | Performed by: INTERNAL MEDICINE

## 2023-11-07 PROCEDURE — 85014 HEMATOCRIT: CPT | Performed by: NURSE PRACTITIONER

## 2023-11-07 PROCEDURE — P9016 RBC LEUKOCYTES REDUCED: HCPCS

## 2023-11-07 PROCEDURE — 99223 1ST HOSP IP/OBS HIGH 75: CPT | Performed by: STUDENT IN AN ORGANIZED HEALTH CARE EDUCATION/TRAINING PROGRAM

## 2023-11-07 PROCEDURE — 99233 SBSQ HOSP IP/OBS HIGH 50: CPT | Performed by: HOSPITALIST

## 2023-11-07 PROCEDURE — 0W3P8ZZ CONTROL BLEEDING IN GASTROINTESTINAL TRACT, VIA NATURAL OR ARTIFICIAL OPENING ENDOSCOPIC: ICD-10-PCS | Performed by: HOSPITALIST

## 2023-11-07 PROCEDURE — 85018 HEMOGLOBIN: CPT | Performed by: NURSE PRACTITIONER

## 2023-11-07 PROCEDURE — 85018 HEMOGLOBIN: CPT | Performed by: HOSPITALIST

## 2023-11-07 PROCEDURE — 85014 HEMATOCRIT: CPT | Performed by: HOSPITALIST

## 2023-11-07 RX ORDER — LIDOCAINE HYDROCHLORIDE 10 MG/ML
INJECTION, SOLUTION EPIDURAL; INFILTRATION; INTRACAUDAL; PERINEURAL AS NEEDED
Status: DISCONTINUED | OUTPATIENT
Start: 2023-11-07 | End: 2023-11-07

## 2023-11-07 RX ORDER — PROPOFOL 10 MG/ML
INJECTION, EMULSION INTRAVENOUS AS NEEDED
Status: DISCONTINUED | OUTPATIENT
Start: 2023-11-07 | End: 2023-11-07

## 2023-11-07 RX ORDER — SODIUM CHLORIDE, SODIUM LACTATE, POTASSIUM CHLORIDE, CALCIUM CHLORIDE 600; 310; 30; 20 MG/100ML; MG/100ML; MG/100ML; MG/100ML
INJECTION, SOLUTION INTRAVENOUS CONTINUOUS PRN
Status: DISCONTINUED | OUTPATIENT
Start: 2023-11-07 | End: 2023-11-07

## 2023-11-07 RX ORDER — EPINEPHRINE 1 MG/ML
INJECTION, SOLUTION, CONCENTRATE INTRAVENOUS AS NEEDED
Status: COMPLETED | OUTPATIENT
Start: 2023-11-07 | End: 2023-11-07

## 2023-11-07 RX ORDER — SUCRALFATE 1 G/1
1 TABLET ORAL
Status: DISCONTINUED | OUTPATIENT
Start: 2023-11-07 | End: 2023-11-09 | Stop reason: HOSPADM

## 2023-11-07 RX ORDER — ONDANSETRON 2 MG/ML
INJECTION INTRAMUSCULAR; INTRAVENOUS AS NEEDED
Status: DISCONTINUED | OUTPATIENT
Start: 2023-11-07 | End: 2023-11-07

## 2023-11-07 RX ORDER — PANTOPRAZOLE SODIUM 40 MG/1
40 TABLET, DELAYED RELEASE ORAL
Status: DISCONTINUED | OUTPATIENT
Start: 2023-11-07 | End: 2023-11-09 | Stop reason: HOSPADM

## 2023-11-07 RX ORDER — CLOPIDOGREL BISULFATE 75 MG/1
75 TABLET ORAL DAILY
Status: DISCONTINUED | OUTPATIENT
Start: 2023-11-07 | End: 2023-11-09 | Stop reason: HOSPADM

## 2023-11-07 RX ADMIN — Medication 40 MG: at 13:54

## 2023-11-07 RX ADMIN — PHENYLEPHRINE HYDROCHLORIDE 50 MCG/MIN: 10 INJECTION INTRAVENOUS at 13:51

## 2023-11-07 RX ADMIN — SUCRALFATE 1 G: 1 TABLET ORAL at 16:24

## 2023-11-07 RX ADMIN — EPINEPHRINE 1.5 MG: 1 INJECTION, SOLUTION, CONCENTRATE INTRAVENOUS at 13:57

## 2023-11-07 RX ADMIN — METOPROLOL SUCCINATE 12.5 MG: 25 TABLET, EXTENDED RELEASE ORAL at 08:37

## 2023-11-07 RX ADMIN — PANTOPRAZOLE SODIUM 40 MG: 40 TABLET, DELAYED RELEASE ORAL at 16:24

## 2023-11-07 RX ADMIN — PROPOFOL 100 MG: 10 INJECTION, EMULSION INTRAVENOUS at 13:49

## 2023-11-07 RX ADMIN — LIDOCAINE HYDROCHLORIDE 100 MG: 10 INJECTION, SOLUTION EPIDURAL; INFILTRATION; INTRACAUDAL at 13:49

## 2023-11-07 RX ADMIN — SODIUM CHLORIDE 50 ML/HR: 0.9 INJECTION, SOLUTION INTRAVENOUS at 11:22

## 2023-11-07 RX ADMIN — ASPIRIN 81 MG: 81 TABLET, COATED ORAL at 09:44

## 2023-11-07 RX ADMIN — ESCITALOPRAM OXALATE 20 MG: 10 TABLET ORAL at 08:38

## 2023-11-07 RX ADMIN — ONDANSETRON 4 MG: 2 INJECTION INTRAMUSCULAR; INTRAVENOUS at 13:52

## 2023-11-07 RX ADMIN — LEVOTHYROXINE SODIUM 75 MCG: 75 TABLET ORAL at 05:39

## 2023-11-07 RX ADMIN — PROPOFOL 100 MCG/KG/MIN: 10 INJECTION, EMULSION INTRAVENOUS at 13:51

## 2023-11-07 RX ADMIN — CLOPIDOGREL BISULFATE 75 MG: 75 TABLET ORAL at 16:24

## 2023-11-07 RX ADMIN — BUPROPION HYDROCHLORIDE 300 MG: 150 TABLET, EXTENDED RELEASE ORAL at 08:37

## 2023-11-07 RX ADMIN — SUCRALFATE 1 G: 1 TABLET ORAL at 21:04

## 2023-11-07 RX ADMIN — EZETIMIBE 10 MG: 10 TABLET ORAL at 08:37

## 2023-11-07 RX ADMIN — AMIODARONE HYDROCHLORIDE 200 MG: 200 TABLET ORAL at 08:38

## 2023-11-07 RX ADMIN — PANTOPRAZOLE SODIUM 8 MG/HR: 40 INJECTION, POWDER, FOR SOLUTION INTRAVENOUS at 09:04

## 2023-11-07 RX ADMIN — SODIUM CHLORIDE, SODIUM LACTATE, POTASSIUM CHLORIDE, AND CALCIUM CHLORIDE: .6; .31; .03; .02 INJECTION, SOLUTION INTRAVENOUS at 13:43

## 2023-11-07 NOTE — CASE MANAGEMENT
Case Management Assessment & Discharge Planning Note    Patient name Srinivasa London  Location [address] [facility] [place] 229/-99 MRN [id number]  : 1955 Date 2023       Current Admission Date: 2023  Current Admission Diagnosis:Acute GI bleeding   Patient Active Problem List    Diagnosis Date Noted    Acute GI bleeding 2023    ABLA (acute blood loss anemia) 2023    s/p Medtronic dual chamber ICD 2023    Ventricular tachycardia ([address]) 2023    Coronary artery disease 10/17/2023    Mixed hyperlipidemia 10/02/2023    Statin intolerance 10/02/2023    Status post amputation of great toe, left ([address]) 2023    Dyspnea on exertion 2023    Episodic lightheadedness 2023    Borderline high cholesterol     Acute hematogenous osteomyelitis of left foot ([address]) 2023    Status post foot surgery 2023    PVCs (premature ventricular contractions) 2023    Heart failure with preserved ejection fraction 2023    ANNALEE (acute kidney injury) ([address]) 02/10/2023    Hyperkalemia 02/10/2023    MSSA bacteremia 02/10/2023    Ulcer of left foot ([address]) 2023    Other emphysema ([address]) 2023    Benign essential HTN 2023    Vasovagal syncope 2023    MDD (major depressive disorder) 2023    Peripheral neuropathy 2023      LOS (days): 1  Geometric Mean LOS (GMLOS) (days):   Days to GMLOS:     OBJECTIVE:  PATIENT READMITTED TO HOSPITAL  Risk of Unplanned Readmission Score: 18.33         Current admission status: Inpatient  Referral Reason:  (dc planning and readmission screening)    Preferred Pharmacy:   [facility] # [address] [place] [address] [place]  [address] [place] [address]  Phone: [phone] Fax: [phone]    Primary Care Provider: Alberto Coates MD    Primary Insurance: MEDICARE  Secondary Insurance: BLUE CROSS    KALLI met with patient at the bedside,baseline information  was obtained.  CM discussed the role of CM in helping the patient develop a discharge plan and assist the patient in carry out their plan. ASSESSMENT:  Active Health Care Proxies    There are no active Health Care Proxies on file. Advance Directives  Does patient have a [address]?: Yes  Does patient have Advance Directives?: Yes  Advance Directives: Living will  Primary Contact: Prudence Lay (Spouse)  [phone]         Readmission Root Cause  30 Day Readmission: Yes  Who directed you to return to the hospital?: Self  Did you understand whom to contact if you had questions or problems?: Yes  Did you get your prescriptions before you left the hospital?: No  Reason[de-identified] Preference for own pharmacy  Were you able to get your prescriptions filled when you left the hospital?: Yes  Did you take your medications as prescribed?: Yes  Were you able to get to your follow-up appointments?: No  Reason[de-identified] Readmitted prior to appointment  During previous admission, was a post-acute recommendation made?: No  Patient was readmitted due to: dizziness, with concerns of GI bleed- was at [facility] from 11/1-4 with arrhythmia  Action Plan: currently no needs at this time, PCP appointment would be benefitial    Patient Information  Admitted from[de-identified] Home  Mental Status: Alert  During Assessment patient was accompanied by: Not accompanied during assessment  Assessment information provided by[de-identified] Patient  Primary Caregiver: Self  Support Systems: Spouse/significant other, Family members  [facility]: [address] [place] [address] do you live in?: [address] entry access options.  Select all that apply.: Stairs  Number of steps to enter home.: 2  Type of Current Residence: 2 story home  Upon entering residence, is there a bedroom on the main floor (no further steps)?: No  A bedroom is located on the following floor levels of residence (select all that apply):: 2nd Floor  Upon entering residence, is there a bathroom on the main floor (no further steps)?:  (Powder room)  Number of steps to 2nd floor from main floor: One Flight  In the last 12 months, was there a time when you were not able to pay the mortgage or rent on time?: No  In the last 12 months, how many places have you lived?: 1  In the last 12 months, was there a time when you did not have a steady place to sleep or slept in a shelter (including now)?: No  Homeless/housing insecurity resource given?: No  Living Arrangements: Lives w/ Spouse/significant other  Is patient a ?: No    Activities of Daily Living Prior to Admission  Functional Status: Independent  Completes ADLs independently?: Yes  Ambulates independently?: Yes  Does patient use assisted devices?: No  Does patient currently own DME?: No  Does patient have a history of Outpatient Therapy (PT/OT)?: Yes  Does the patient have a history of Short-Term Rehab?: No  Does patient have a history of HHC?: Yes ([facility])  Does patient currently have [address]?: No         Patient Information Continued  Income Source: Pension/half-way  Does patient have prescription coverage?: Yes  Within the past 12 months, you worried that your food would run out before you got the money to buy more.: Never true  Within the past 12 months, the food you bought just didn't last and you didn't have money to get more.: Never true  Food insecurity resource given?: No  Does patient receive dialysis treatments?: No  Does patient have a history of substance abuse?: No  Does patient have a history of Mental Health Diagnosis?: No         Means of Transportation  Means of Transport to Appts[de-identified] Drives Self  In the past 12 months, has lack of transportation kept you from medical appointments or from getting medications?: No  In the past 12 months, has lack of transportation kept you from meetings, work, or from getting things needed for daily living?: No  Was application for public transport provided?: No    I/pta.      DISCHARGE DETAILS:    Discharge planning discussed with[de-identified] patient  Freedom of Choice: Yes     CM contacted family/caregiver?: No- see comments (n/a)                  Requested [address]         Is the patient interested in [address] [address] [address] at discharge?: No    DME Referral Provided  Referral made for DME?: No              Treatment Team Recommendation: Home  Discharge Destination Plan[de-identified] Home  Transport at Discharge : Family

## 2023-11-07 NOTE — PROGRESS NOTES
[address] [facility]  Progress Note  Name: Viktor Arriola I  MRN: [id number]  Unit/Bed#: -01 I Date of Admission: 11/6/2023   Date of Service: 11/7/2023 I Hospital Day: 1    Assessment/Plan   * Acute GI bleeding  Assessment & Plan  Patient presents with dark stools for the last 5 days patient is having 1-2 bowel movements per day denies any abdominal pain or fevers or chills. Denies any vomiting. Patient had coronary disease stent placed in the LAD 2 weeks ago is currently on aspirin and Plavix. Denies any NSAID use or any prior history of endoscopies or ulcers. He took his aspirin and Plavix morning of admission 11/6  Spoke with Cardiology  Continue ASA  Plan to restart Plavix pending EGD, would not delay for long given recent stent  Consulted GI  EGD today, hopeful for around 1300  Protonix gtt  Patient fortunately not having any symptoms, HD stable, no further melena since admission, no BRBPR    ABLA (acute blood loss anemia)  Assessment & Plan  Secondary to GI bleed baseline hemoglobin appears to be around 14  Hgb trending down, 7.4 this morning  2u PRBC ordered  Goal hgb 9-10 given recent cardiac stent  Monitor Hgb  Iron panel and b12 wnl    Ventricular tachycardia (HCC)  Assessment & Plan  history of V. tach. None noted so far. Continue amiodarone and metoprolol    Status post insertion of drug-eluting stent into left anterior descending (LAD) artery  Assessment & Plan  DEON placed roughly 2 weeks ago  On ASA/Plavix  Cardiology following  Continuing ASA  Holding plavix given notable bleed, however hope to restart today, see above  No CP, no symptoms    Mixed hyperlipidemia  Assessment & Plan  Continue statin therapy    Vasovagal syncope  Assessment & Plan  History of aortic stenosis. Being evaluated for TAVR however it was delayed due to recent admission for CAD s/p stent and followed by episode of ventricular tachycardia.   Further follow-up outpatient with cardiology  Syncopal episode while at [facility] today. May be secondary to GI bleed. Pacemaker interrogated in the ED  Hold on orthostatics given drop in Hgb    Benign essential HTN  Assessment & Plan  Blood Pressures currently controlled continue Toprol-XL and hold Norvasc and Lasix for now               VTE Pharmacologic Prophylaxis: VTE Score: 2 Moderate Risk (Score 3-4) - Pharmacological DVT Prophylaxis Contraindicated. Sequential Compression Devices Ordered. Patient Centered Rounds: I performed bedside rounds with nursing staff today. Discussions with Specialists or Other Care Team Provider: Cardiology, GI    Education and Discussions with Family / Patient:  called wife, no answer. Total Time Spent on Date of Encounter in care of patient: 45 mins. This time was spent on one or more of the following: performing physical exam; counseling and coordination of care; obtaining or reviewing history; documenting in the medical record; reviewing/ordering tests, medications or procedures; communicating with other healthcare professionals and discussing with patient's family/caregivers. Current Length of Stay: 1 day(s)  Current Patient Status: Inpatient   Certification Statement: The patient will continue to require additional inpatient hospital stay due to GIB, ABLA  Discharge Plan: Anticipate discharge in 24-48 hrs to home. Code Status: Level 1 - Full Code    Subjective:   Patient reports that he overall feels fine, he feels maybe a little weak but no lightheadedness, no dizziness, denies any chest pain or shortness of breath. He states he has not had any bowel movement since he has been here so no melena, he never had bright red blood per rectum.     Objective:     Vitals:   Temp (24hrs), Av.3 °F (36.8 °C), Min:97.6 °F (36.4 °C), Max:98.8 °F (37.1 °C)    Temp:  [97.6 °F (36.4 °C)-98.8 °F (37.1 °C)] 98.2 °F (36.8 °C)  HR:  [59-79] 59  Resp:  [17-20] 20  BP: (107-150)/(50-78) 132/58  SpO2:  [94 %-100 %] 98 %  Body mass index is 29.16 kg/m². Input and Output Summary (last 24 hours): Intake/Output Summary (Last 24 hours) at 11/7/2023 0950  Last data filed at 11/7/2023 0947  Gross per 24 hour   Intake 0 ml   Output 300 ml   Net -300 ml       Physical Exam:   Physical Exam  Vitals and nursing note reviewed. Constitutional:       General: He is not in acute distress. Appearance: He is well-developed. HENT:      Head: Normocephalic and atraumatic. Eyes:      Comments: Conjunctival pallor   Cardiovascular:      Rate and Rhythm: Normal rate and regular rhythm. Heart sounds: No murmur heard. Comments: Significant systolic ejection murmur  Pulmonary:      Effort: Pulmonary effort is normal. No respiratory distress. Breath sounds: Normal breath sounds. Abdominal:      Palpations: Abdomen is soft. Tenderness: There is no abdominal tenderness. Musculoskeletal:         General: No swelling. Cervical back: Neck supple. Skin:     General: Skin is warm and dry. Capillary Refill: Capillary refill takes less than 2 seconds. Neurological:      Mental Status: He is alert. Psychiatric:         Mood and Affect: Mood normal.          Additional Data:     Labs:  Results from last 7 days   Lab Units 11/07/23  0547 11/06/23  2111 11/06/23  1258 11/02/23  0440 11/01/23  0353   WBC Thousand/uL 10.58*  --  10.94*   < > 9.21   HEMOGLOBIN g/dL 7.4*   < > 9.5*   < > 14.5   HEMATOCRIT % 23.4*   < > 29.6*   < > 42.9   PLATELETS Thousands/uL 263  --  276   < > 202   NEUTROS PCT %  --   --   --   --  64   LYMPHS PCT %  --   --   --   --  21   LYMPHO PCT %  --   --  25  --   --    MONOS PCT %  --   --   --   --  10   MONO PCT %  --   --  12  --   --    EOS PCT %  --   --  4  --  5    < > = values in this interval not displayed.      Results from last 7 days   Lab Units 11/07/23  0547 11/06/23  1258   SODIUM mmol/L 141 139   POTASSIUM mmol/L 3.9 3.5   CHLORIDE mmol/L 110* 104   CO2 mmol/L 27 24   BUN mg/dL 42* 42* CREATININE mg/dL 1.03 1.22   ANION GAP mmol/L 4 11   CALCIUM mg/dL 8.6 8.9   ALBUMIN g/dL  --  3.7   TOTAL BILIRUBIN mg/dL  --  0.34   ALK PHOS U/L  --  56   ALT U/L  --  16   AST U/L  --  14   GLUCOSE RANDOM mg/dL 117 140     Results from last 7 days   Lab Units 11/06/23  1310   INR  0.95                   Lines/Drains:  Invasive Devices       Peripheral Intravenous Line  Duration             Peripheral IV 11/06/23 Left Antecubital <1 day    Peripheral IV 11/07/23 Dorsal (posterior); Left Hand <1 day                          Imaging: No pertinent imaging reviewed. Recent Cultures (last 7 days):         Last 24 Hours Medication List:   Current Facility-Administered Medications   Medication Dose Route Frequency Provider Last Rate    acetaminophen  650 mg Oral Q6H PRN Alaine Oppenheim, MD      amiodarone  200 mg Oral Daily Alaine Oppenheim, MD      aspirin  81 mg Oral Daily Braden Baldwin DO      buPROPion  300 mg Oral Daily Alaine Oppenheim, MD      escitalopram  20 mg Oral Daily Alaine Oppenheim, MD      ezetimibe  10 mg Oral Daily Alaine Oppenheim, MD      levothyroxine  75 mcg Oral Early Morning Alaine Oppenheim, MD      metoprolol succinate  12.5 mg Oral Daily Alaine Oppenheim, MD      ondansetron  4 mg Intravenous Q6H PRN Alaine Oppenheim, MD      pantoprazole (PROTONIX) 80 mg in sodium chloride 0.9 % 100 mL infusion  8 mg/hr Intravenous Continuous Alaine Oppenheim, MD 8 mg/hr (11/07/23 0904)    pravastatin  20 mg Oral Every Other Day Alaine Oppenheim, MD      sodium chloride (PF)  3 mL Intravenous Q1H PRN Mayda Arnold DO      sodium chloride  50 mL/hr Intravenous Continuous Alaine Oppenheim, MD 50 mL/hr (11/06/23 1607)        Today, Patient Was Seen By: Juarez Baldwin DO    **Please Note: This note may have been constructed using a voice recognition system. **

## 2023-11-07 NOTE — ANESTHESIA PREPROCEDURE EVALUATION
Procedure:  EGD    Severe AS undergoing TAVR workup recently had CAD 80% LAD s/p DEON and had vtach while at rehab here for ICD implant    TTE: EF 60%, RV wnl, Severe AS mG 41    -Hb 7.4--> 8.4 after 1 unit, receiving 2nd unit on arrival  Relevant Problems   ANESTHESIA (within normal limits)      CARDIO   (+) Benign essential HTN   (+) Dyspnea on exertion   (+) Mixed hyperlipidemia   (+) PVCs (premature ventricular contractions)   (+) Ventricular tachycardia (HCC)   (-) Angina at rest   (-) Angina of effort   (-) Chest pain   (-) MI (myocardial infarction) (HCC)      GI/HEPATIC  PO confirmed  BMI 29.2   (+) Acute GI bleeding      /RENAL   (+) ANNALEE (acute kidney injury) ([address])      HEMATOLOGY   (+) ABLA (acute blood loss anemia)      NEURO/PSYCH   (+) MDD (major depressive disorder)   (-) TIA (transient ischemic attack)      PULMONARY   (+) Dyspnea on exertion   (+) Heart failure with preserved ejection fraction   (+) Other emphysema (HCC)   (-) URI (upper respiratory infection)      Other   (+) Acute hematogenous osteomyelitis of left foot (HCC)   -ICD in situ      Allergies   Allergen Reactions    Statins Myalgia     Other reaction(s): muscle aches and joint aches       Social History     Tobacco Use    Smoking status: Every Day     Packs/day: 0.50     Years: 50.00     Total pack years: 25.00     Types: Cigarettes     Last attempt to quit: 2023     Years since quittin.8    Smokeless tobacco: Never   Vaping Use    Vaping Use: Never used   Substance Use Topics    Alcohol use: Yes     Comment: socially-2 drinks per week    Drug use: Not Currently     Comment: sporadic in young adulthood     Current Outpatient Medications   Medication Instructions    Alirocumab 150 mg, Subcutaneous, Every 14 days    amiodarone 200 mg, Oral, Daily    amLODIPine (NORVASC) 10 mg, Oral, Daily    aspirin 81 mg, Oral, Daily    buPROPion (WELLBUTRIN XL) 300 mg 24 hr tablet No dose, route, or frequency recorded.     clopidogrel (PLAVIX) 75 mg, Oral, Daily    escitalopram (LEXAPRO) 20 mg, Oral, Daily    ezetimibe (ZETIA) 10 mg, Oral, Daily    furosemide (LASIX) 40 mg, Oral, Daily    levothyroxine 75 mcg tablet No dose, route, or frequency recorded. Magnesium 400 mg, Oral, Daily    metoprolol succinate (TOPROL-XL) 12.5 mg, Oral, Daily    Omega-3 Fatty Acids (Fish Oil) 1200 MG CAPS Oral    potassium chloride (MICRO-K) 10 MEQ CR capsule 10 mEq, Oral, Daily    pravastatin (PRAVACHOL) 20 mg, Oral, Every other day     Lab Results   Component Value Date    WBC 10.58 (H) 11/07/2023    HGB 8.4 (L) 11/07/2023    HCT 25.2 (L) 11/07/2023     11/07/2023    SODIUM 141 11/07/2023    K 3.9 11/07/2023     (H) 11/07/2023    CO2 27 11/07/2023    BUN 42 (H) 11/07/2023    CREATININE 1.03 11/07/2023    GLUC 117 11/07/2023    HGBA1C 5.9 (H) 01/03/2023    AST 14 11/06/2023    ALT 16 11/06/2023    ALKPHOS 56 11/06/2023    TBILI 0.34 11/06/2023    ALB 3.7 11/06/2023    PROTIME 13.0 11/06/2023    PTT 25 11/06/2023    INR 0.95 11/06/2023     Vitals:    11/07/23 1248   BP: 109/60   Pulse: 57   Resp: 18   Temp: 98.1 °F (36.7 °C)   SpO2: 97%       Physical Exam    Airway    Mallampati score: II  TM Distance: >3 FB  Neck ROM: full     Dental   No notable dental hx     Cardiovascular  Rhythm: irregular, Rate: normal    Pulmonary   Breath sounds clear to auscultation    Other Findings        Anesthesia Plan  ASA Score- 4     Anesthesia Type- IV sedation with anesthesia with ASA Monitors. Additional Monitors:     Airway Plan:     Comment: O2 mask, natural airway, EtCO2 monitor. Risks discussed including awareness, aspiration, drug reactions and conversion to GA. Tilmon Fluke Plan Factors-Exercise tolerance (METS): >4 METS. Chart reviewed. EKG reviewed. Existing labs reviewed. Patient summary reviewed. Patient is not a current smoker. Obstructive sleep apnea risk education given perioperatively.     Induction-     Postoperative Plan-     Informed Consent- Anesthetic plan and risks discussed with patient. I personally reviewed this patient with the CRNA. Discussed and agreed on the Anesthesia Plan with the CRNA. Abdirahman Ji

## 2023-11-07 NOTE — CONSULTS
Consultation - GI   Manuelito Viramontes 77 y.o. male MRN: [id number]  Unit/Bed#: -01 Encounter: [id number]      Assessment/Plan   1. Suspected Upper GI bleeding  77 YOM male with no prior hx of GI bleeding who underwent PCI and stenting 2 weeks ago and was Placed on Dual antiplatelet therapy who started to experience frequent melanotic stool started on Wed and still ongoing. He had orthostatic symptoms and came to the ED yesterday and it was noted he had signifcant drop in Hgb. He dropped to 7g/dl from 13g/dl baseline. Plan:  EGD today  Continue PPI  Avoid NSAIDs  Continue antiplatelet given his recent stents placement to avoid stent occlusion      Consent: We have discussed the procedure in detail. We reviewed risks, benefits and alternative as well as protentional complications including and not limited to medication side effect , infection, bleeding, perforation and the potential need for surgery, ICU admission, CPR, as well as the need for blood product transfusion. Patient verbalized understanding and agreement. All patient questions were answered. History of Present Illness   Physician Requesting Consult: Rafat Baldwin, DO    Hx and PE limited by:   HPI: Manuelito Viramontes is a 77y.o. year old male  with no prior hx of GI bleeding who underwent PCI and stenting 2 weeks ago and was Placed on Dual antiplatelet therapy who started to experience frequent melanotic stool started on Wed and still ongoing. He had orthostatic symptoms and came to the ED yesterday and it was noted he had signifcant drop in Hgb. He dropped to 7g/dl from 13g/dl baseline. Patient was transfused PRBCs and now his hgb is 8.4  He had no more BM since admissions    Consults    Review of Systems   All other systems reviewed and are negative.       Historical Information   Past Medical History:   Diagnosis Date    Anxiety     Aortic stenosis     Borderline high cholesterol     CHF (congestive heart failure) ([address]) [facility] Dr Sigifredo What[facility]-    Colon polyp     Depression     Heart murmur     Hypertension     Hypothyroidism     Prediabetes     PVCs (premature ventricular contractions)     "skip"    s/p [employer] dual chamber ICD 11/2/2023 11/04/2023    Wears glasses     readers     Past Surgical History:   Procedure Laterality Date    APPENDECTOMY      BACK SURGERY      CARDIAC CATHETERIZATION N/A 10/17/2023    Procedure: Cardiac RHC/LHC; Surgeon: Cesar Whitaker MD;  [facility]; Service: Cardiology    CARDIAC ELECTROPHYSIOLOGY PROCEDURE N/A 11/2/2023    Procedure: Cardiac icd implant;  Surgeon: Lillard Schlatter, MD;  Location: [facility];   Service: Cardiology    CATARACT EXTRACTION Bilateral     CLOSURE DELAYED PRIMARY Left 06/09/2023    Procedure: CLOSURE DELAYED PRIMARY;  Surgeon: Shivani Mc DPM;  Location: [facility];  Service: Podiatry    COLONOSCOPY  2023    NATHALIE Guerra    AK CORRJ HALLUX VALGUS W/SESMDC W/RESCJ PROX PHAL Left 04/19/2023    Procedure: Esta Gurney and wound debridement;  Surgeon: Alyce Garcia DPM;  Location: [facility];  Service: Podiatry    TOE AMPUTATION Left 06/07/2023    Procedure: PARTIAL 1ST RAY AMPUTATION;  Surgeon: Shivani Mc DPM;  Location: [facility];  Service: Podiatry    TONSILLECTOMY      TOTAL SHOULDER REPLACEMENT Left     WISDOM TOOTH EXTRACTION       Social History   Social History     Substance and Sexual Activity   Alcohol Use Yes    Comment: socially-2 drinks per week     Social History     Substance and Sexual Activity   Drug Use Not Currently    Comment: sporadic in young adulthood     E-Cigarette/Vaping    E-Cigarette Use Never User      E-Cigarette/Vaping Substances    Nicotine No     THC No     CBD No     Flavoring No      Social History     Tobacco Use   Smoking Status Every Day    Packs/day: 0.50    Years: 50.00    Total pack years: 25.00    Types: Cigarettes    Last attempt to quit: 01/2023    Years since quittin.8   Smokeless Tobacco Never     Family History: non-contributory    Meds/Allergies   all current active meds have been reviewed    Allergies   Allergen Reactions    Statins Myalgia     Other reaction(s): muscle aches and joint aches         Objective       Intake/Output Summary (Last 24 hours) at 2023 1337  Last data filed at 2023 1122  Gross per 24 hour   Intake 1219.17 ml   Output 300 ml   Net 919.17 ml       Invasive Devices:   Peripheral IV 23 Left Antecubital (Active)   Site Assessment Clean;Dry; Intact 23 1246   Dressing Type Transparent 23 1246   Line Status Infusing 23 1246   Dressing Status Clean;Dry; Intact 23 1246   Dressing Change Due 11/10/23 11/06/23 2020   Reason Not Rotated Not due 23       Peripheral IV 23 Dorsal (posterior); Left Hand (Active)   Site Assessment Clean;Dry; Intact 23 1247   Dressing Type Transparent 23 1247   Line Status Infusing 23 1247   Dressing Status Clean;Dry; Intact 23 1247       Physical Exam  HENT:      Nose: Nose normal.   Eyes:      Conjunctiva/sclera: Conjunctivae normal.   Cardiovascular:      Rate and Rhythm: Normal rate. Pulmonary:      Effort: Pulmonary effort is normal.   Abdominal:      Palpations: Abdomen is soft. Neurological:      General: No focal deficit present. Mental Status: He is alert. Psychiatric:         Mood and Affect: Mood normal.         Lab Results: I have personally reviewed pertinent reports. Imaging Studies: I have personally reviewed pertinent reports. Counseling / Coordination of Care  Total floor / unit time spent today 15 minutes. Greater than 50% of total time was spent with the patient and / or family counseling and / or coordination of care.  A description of the counseling / coordination of care: 35

## 2023-11-07 NOTE — QUICK NOTE
Now status post EGD  A bleeding vessel was found with blood in the stomach as well as clots, vessel was clipped by GI  We will start patient on Plavix now that bleeding has been addressed  Recheck H&H 1800 tonight  Appreciate GI recommendations, will add Carafate for additional protection, in addition to PPI which will transition to p.o.  PPI twice daily

## 2023-11-07 NOTE — PLAN OF CARE
Problem: PAIN - ADULT  Goal: Verbalizes/displays adequate comfort level or baseline comfort level  Description: Interventions:  - Encourage patient to monitor pain and request assistance  - Assess pain using appropriate pain scale  - Administer analgesics based on type and severity of pain and evaluate response  - Implement non-pharmacological measures as appropriate and evaluate response  - Consider cultural and social influences on pain and pain management  - Notify physician/advanced practitioner if interventions unsuccessful or patient reports new pain  Outcome: Progressing     Problem: INFECTION - ADULT  Goal: Absence or prevention of progression during hospitalization  Description: INTERVENTIONS:  - Assess and monitor for signs and symptoms of infection  - Monitor lab/diagnostic results  - Monitor all insertion sites, i.e. indwelling lines, tubes, and drains  - Monitor endotracheal if appropriate and nasal secretions for changes in amount and color  - [place] appropriate cooling/warming therapies per order  - Administer medications as ordered  - Instruct and encourage patient and family to use good hand hygiene technique  - Identify and instruct in appropriate isolation precautions for identified infection/condition  Outcome: Progressing  Goal: Absence of fever/infection during neutropenic period  Description: INTERVENTIONS:  - Monitor WBC    Outcome: Progressing     Problem: SAFETY ADULT  Goal: Patient will remain free of falls  Description: INTERVENTIONS:  - Educate patient/family on patient safety including physical limitations  - Instruct patient to call for assistance with activity   - Consult OT/PT to assist with strengthening/mobility   - Keep Call bell within reach  - Keep bed low and locked with side rails adjusted as appropriate  - Keep care items and personal belongings within reach  - Initiate and maintain comfort rounds  - Make Fall Risk Sign visible to staff  - Offer Toileting every 2 Hours, in advance of need  - Initiate/Maintain bed alarm  - Obtain necessary fall risk management equipment:   - Apply yellow socks and bracelet for high fall risk patients  - Consider moving patient to room near nurses station  Outcome: Progressing  Goal: Maintain or return to baseline ADL function  Description: INTERVENTIONS:  -  Assess patient's ability to carry out ADLs; assess patient's baseline for ADL function and identify physical deficits which impact ability to perform ADLs (bathing, care of mouth/teeth, toileting, grooming, dressing, etc.)  - Assess/evaluate cause of self-care deficits   - Assess range of motion  - Assess patient's mobility; develop plan if impaired  - Assess patient's need for assistive devices and provide as appropriate  - Encourage maximum independence but intervene and supervise when necessary  - Involve family in performance of ADLs  - Assess for home care needs following discharge   - Consider OT consult to assist with ADL evaluation and planning for discharge  - Provide patient education as appropriate  Outcome: Progressing  Goal: Maintains/Returns to pre admission functional level  Description: INTERVENTIONS:  - Perform BMAT or MOVE assessment daily.   - Set and communicate daily mobility goal to care team and patient/family/caregiver. - Collaborate with rehabilitation services on mobility goals if consulted  - Perform Range of Motion 3 times a day. - Reposition patient every 2 hours.   - Dangle patient 3 times a day  - Stand patient 3 times a day  - Ambulate patient 3 times a day  - Out of bed to chair 3 times a day   - Out of bed for meals 3 times a day  - Out of bed for toileting  - Record patient progress and toleration of activity level   Outcome: Progressing     Problem: DISCHARGE PLANNING  Goal: Discharge to home or other facility with appropriate resources  Description: INTERVENTIONS:  - Identify barriers to discharge w/patient and caregiver  - Arrange for needed discharge resources and transportation as appropriate  - Identify discharge learning needs (meds, wound care, etc.)  - Arrange for interpretive services to assist at discharge as needed  - Refer to Case Management Department for coordinating discharge planning if the patient needs post-hospital services based on physician/advanced practitioner order or complex needs related to functional status, cognitive ability, or social support system  Outcome: Progressing     Problem: Knowledge Deficit  Goal: Patient/family/caregiver demonstrates understanding of disease process, treatment plan, medications, and discharge instructions  Description: Complete learning assessment and assess knowledge base.   Interventions:  - Provide teaching at level of understanding  - Provide teaching via preferred learning methods  Outcome: Progressing

## 2023-11-07 NOTE — ASSESSMENT & PLAN NOTE
Patient presents with dark stools for the last 5 days patient is having 1-2 bowel movements per day denies any abdominal pain or fevers or chills. Denies any vomiting. Patient had coronary disease stent placed in the LAD 2 weeks ago is currently on aspirin and Plavix. Denies any NSAID use or any prior history of endoscopies or ulcers.   He took his aspirin and Plavix morning of admission 11/6  Spoke with Cardiology  Continue ASA  Plan to restart Plavix pending EGD, would not delay for long given recent stent  Consulted GI  EGD today, hopeful for around 1300  Protonix gtt  Patient fortunately not having any symptoms, HD stable, no further melena since admission, no BRBPR

## 2023-11-07 NOTE — ASSESSMENT & PLAN NOTE
Cardiac catheterization done for pre-TAVR work up 10/17/2023 resulting in DEON x 1 to mid LAD. Patient is currently on aspirin 81 mg daily and Plavix 75 mg daily. Ideally these should not be held as stenting was done within 1 month. Resume aspirin 81 mg EC now. Please resume Plavix 75 mg daily this afternoon pending the results of his EGD. Continue beta blocker, statin therapy.

## 2023-11-07 NOTE — CONSULTS
Consult Cardiology    Allyson Bowden 1955, 77 y.o. male MRN: [id number]    Unit/Bed#: -01 Encounter: [id number]    Attending Provider: Celena Baldwin DO   Primary Care Provider: Marge Marcum MD   Date admitted to hospital: 11/6/2023       Inpatient consult to Cardiology  Consult performed by: JESUS Currie  Consult ordered by: Darell Stockton MD          ABLA (acute blood loss anemia)  Assessment & Plan  Currently awaiting EGD through GI  Received 1 unit PRBC. Ventricular tachycardia [facility])  Assessment & Plan  Recent history of NSVT s/p AICD insertion. On Amiodarone 200 mg daily. ICD interrogated in the ER with no events noted. Preserved LVEF on echocardiogram 10/30/2023      Status post insertion of drug-eluting stent into left anterior descending (LAD) artery  Assessment & Plan  Cardiac catheterization done for pre-TAVR work up 10/17/2023 resulting in DEON x 1 to mid LAD. Patient is currently on aspirin 81 mg daily and Plavix 75 mg daily. Ideally these should not be held as stenting was done within 1 month. Resume aspirin 81 mg EC now. Please resume Plavix 75 mg daily this afternoon pending the results of his EGD. Continue beta blocker, statin therapy. Mixed hyperlipidemia  Assessment & Plan  Continue statin therapy    * Acute GI bleeding  Assessment & Plan  Awaiting EGD through GI today. Plan to resume Plavix today based on EGD results. Vasovagal syncope  Assessment & Plan  Syncopal episode while shopping. Device interrogated in ER with no events noted. ECG shows no acute changes. HS trop neg x 3. He is without anginal complaints. Echocardiogram 10/30/2023 with preserved EF. Suspect syncope related to anemia. Receiving transfusion at present. BP acceptable. Will monitor. Benign essential HTN  Assessment & Plan  BP acceptable. Continue home regimen.               Physician Requesting Consult: Celena Baldwin DO    Reason for Consult / Principal Problem: GI Bleed, input on DAPT      HPI: Santi Bishop is a 77y.o. year old male who has a history of severe aortic stenosis awaiting TAVR, CAD s/p DEON x 1 to mid LAD 10/17/2023, NSVT s/p AICD 11/2/23 on amiodarone, HFpEF, HTN, hypothyroidism who follows with [facility] Cardiology. He underwent cardiac catheterization 10/17/2023 as part of pre-TAVR work up where a stent was placed to his mid LAD. He was noted to have NSVT while at cardiac rehab and was sent to [facility] ER 10/30/23. Echocardiogram showed preserved EF. He was ultimately transferred to [facility]. He was evaluated by EP and underwent AICD insertion for primary prevention on 11/2/2023 as well as started on amiodarone loading. Patient presented to [address] [place] [address] ER 11/6/2023 with syncopal event x 1 while shopping as well as tarry black stools. He reports tarry stools x 4 days. ECG at arrival shows Normal sinus rhythm, rate 61 bpm. ICD interrogated with reportedly no events. CT head and chest xray imaging WNL. HS trop 22->19->20. Initial CBC showed hemoglobin of 9.5, however repeat dropped to 7.4 and he was transfused with 1 unit PRBC. GI was consulted and plans EGD today. He took his DAPT 11/6 with doses today currently on hold. At the time of my assessment he is resting comfortably in bed. He reports fatigue but denies lightheadedness, palpitations, chest discomfort, or shortness of breath. No edema/fluid retention. He continues with stable dyspnea on exertion. He tells me that he has been taking chewable aspirin daily. Denies abdominal pain, N/V. He vomited x 1 during his syncopal event but otherwise no other GI complaints. He thought the dark stool was from purple potatoes he had been eating. No history of GI bleed. Review of Systems   Constitutional:  Negative for chills and fever. HENT:  Negative for ear pain and sore throat. Eyes:  Negative for pain and visual disturbance. Respiratory:  Negative for cough and shortness of breath. Cardiovascular:  Negative for chest pain and palpitations. Gastrointestinal:  Negative for abdominal pain and vomiting. Genitourinary:  Negative for dysuria and hematuria. Musculoskeletal:  Negative for arthralgias and back pain. Skin:  Negative for color change and rash. Neurological:  Negative for seizures and syncope. All other systems reviewed and are negative. Historical Information     Past Medical History:   Diagnosis Date    Anxiety     Aortic stenosis     Borderline high cholesterol     CHF (congestive heart failure) ([address])     [facility]-    Colon polyp     Depression     Heart murmur     Hypertension     Hypothyroidism     Prediabetes     PVCs (premature ventricular contractions)     "skip"    s/p Medtronic dual chamber ICD 11/2/2023 11/04/2023    Wears glasses     readers     Past Surgical History:   Procedure Laterality Date    APPENDECTOMY      BACK SURGERY      CARDIAC CATHETERIZATION N/A 10/17/2023    Procedure: Cardiac RHC/LHC; Surgeon: Cate Ventura MD;  Location: [facility]; Service: Cardiology    CARDIAC ELECTROPHYSIOLOGY PROCEDURE N/A 11/2/2023    Procedure: Cardiac icd implant;  Surgeon: Vicente Wilson MD;  Location: [facility];   Service: Cardiology    CATARACT EXTRACTION Bilateral     CLOSURE DELAYED PRIMARY Left 06/09/2023    Procedure: CLOSURE DELAYED PRIMARY;  Surgeon: Bonnie Blanco DPM;  Location: [facility];  Service: Podiatry    COL[place]OSCOPY  2023    [place]    [place] CORRJ HALLUX VALGUS W/SESMDC W/RESCJ PROX PHAL Left 04/19/2023    Procedure: Kim Mettle and wound debridement;  Surgeon: Christiane Valdez DPM;  Location: [facility];  Service: Podiatry    TOE AMPUTATION Left 06/07/2023    Procedure: PARTIAL 1ST RAY AMPUTATION;  Surgeon: Bonnie Blanco DPM;  Location: [facility];  Service: Podiatry    TONSILLECTOMY      TOTAL SHOULDER REPLACEMENT Left     WISDOM TOOTH EXTRACTION       Social History Substance and Sexual Activity   Alcohol Use Yes    Comment: socially-2 drinks per week     Social History     Substance and Sexual Activity   Drug Use Not Currently    Comment: sporadic in young adulthood     Social History     Tobacco Use   Smoking Status Every Day    Packs/day: 0.50    Years: 50.00    Total pack years: 25.00    Types: Cigarettes    Last attempt to quit: 2023    Years since quittin.8   Smokeless Tobacco Never       Family History:   Family History   Problem Relation Age of Onset    Dementia Mother     Blindness Mother     Cancer Mother     Dementia Father        Meds/Allergies     current meds:   Current Facility-Administered Medications   Medication Dose Route Frequency    acetaminophen (TYLENOL) tablet 650 mg  650 mg Oral Q6H PRN    amiodarone tablet 200 mg  200 mg Oral Daily    aspirin (ECOTRIN LOW STRENGTH) EC tablet 81 mg  81 mg Oral Daily    buPROPion (WELLBUTRIN XL) 24 hr tablet 300 mg  300 mg Oral Daily    escitalopram (LEXAPRO) tablet 20 mg  20 mg Oral Daily    ezetimibe (ZETIA) tablet 10 mg  10 mg Oral Daily    levothyroxine tablet 75 mcg  75 mcg Oral Early Morning    metoprolol succinate (TOPROL-XL) 24 hr tablet 12.5 mg  12.5 mg Oral Daily    ondansetron (ZOFRAN) injection 4 mg  4 mg Intravenous Q6H PRN    pantoprazole (PROTONIX) 80 mg in sodium chloride 0.9 % 100 mL infusion  8 mg/hr Intravenous Continuous    pravastatin (PRAVACHOL) tablet 20 mg  20 mg Oral Every Other Day    sodium chloride (PF) 0.9 % injection 3 mL  3 mL Intravenous Q1H PRN    sodium chloride 0.9 % infusion  50 mL/hr Intravenous Continuous     pantoprazole (PROTONIX) 80 mg in sodium chloride 0.9 % 100 mL infusion, 8 mg/hr, Last Rate: 8 mg/hr (23 0904)  sodium chloride, 50 mL/hr, Last Rate: 50 mL/hr (23 1122)        Allergies   Allergen Reactions    Statins Myalgia     Other reaction(s): muscle aches and joint aches         Objective     Vitals: Blood pressure 109/60, pulse 57, temperature 98.1 °F (36.7 °C), temperature source Oral, resp. rate 18, height 6' (1.829 m), weight 97.5 kg (215 lb), SpO2 97 %. , Body mass index is 29.16 kg/m². Orthostatic Blood Pressures      Flowsheet Row Most Recent Value   Blood Pressure 109/60 filed at 11/07/2023 1248   Patient Position - Orthostatic VS Lying filed at 11/06/2023 2247            Systolic (45XTU), GXS:797 , Min:109 , PDP:334     Diastolic (41KFX), VIM:35, Min:57, Max:78        Intake/Output Summary (Last 24 hours) at 11/7/2023 1331  Last data filed at 11/7/2023 1122  Gross per 24 hour   Intake 1219.17 ml   Output 300 ml   Net 919.17 ml       Weight (last 2 days)       Date/Time Weight    11/07/23 0542 97.5 (215)    11/06/23 1555 99.6 (219.6)            Invasive Devices       Peripheral Intravenous Line  Duration             Peripheral IV 11/06/23 Left Antecubital 1 day    Peripheral IV 11/07/23 Dorsal (posterior); Left Hand <1 day                    Physical Exam  Vitals and nursing note reviewed. Constitutional:       General: He is not in acute distress. Appearance: He is well-developed. HENT:      Head: Normocephalic and atraumatic. Eyes:      Conjunctiva/sclera: Conjunctivae normal.   Neck:      Vascular: No JVD. Cardiovascular:      Rate and Rhythm: Normal rate and regular rhythm. No extrasystoles are present. Heart sounds: Murmur heard. Systolic murmur is present. Pulmonary:      Effort: Pulmonary effort is normal. No respiratory distress. Breath sounds: Normal breath sounds. Comments: Breathing comfortably on room air  Abdominal:      Palpations: Abdomen is soft. Tenderness: There is no abdominal tenderness. Musculoskeletal:         General: No swelling. Cervical back: Neck supple. Right lower leg: No edema. Left lower leg: No edema. Skin:     General: Skin is warm and dry. Capillary Refill: Capillary refill takes less than 2 seconds. Neurological:      Mental Status: He is alert. Psychiatric:         Mood and Affect: Mood normal.         Speech: Speech normal.         Behavior: Behavior normal. Behavior is cooperative. Cognition and Memory: Cognition normal.              Laboratory Results:          CBC with diff:   Results from last 7 days   Lab Units 11/07/23  1145 11/07/23  0547 11/06/23  2111 11/06/23  1258 11/03/23  0456 11/02/23 0440 11/01/23  0353   WBC Thousand/uL  --  10.58*  --  10.94* 12.46*   < > 9.21   HEMOGLOBIN g/dL 8.4* 7.4* 8.0* 9.5* 13.4   < > 14.5   HEMATOCRIT % 25.2* 23.4* 24.4* 29.6* 40.9   < > 42.9   MCV fL  --  98  --  94 94   < > 92   PLATELETS Thousands/uL  --  263  --  276 203   < > 202   RBC Million/uL  --  2.40*  --  3.14* 4.36   < > 4.66   MCH pg  --  30.8  --  30.3 30.7   < > 31.1   MCHC g/dL  --  31.6  --  32.1 32.8   < > 33.8   RDW %  --  13.6  --  13.5 13.2   < > 13.4   MPV fL  --  9.2  --  9.4 9.4   < > 9.3   NRBC AUTO /100 WBCs  --   --   --   --   --   --  0    < > = values in this interval not displayed. CMP:  Results from last 7 days   Lab Units 11/07/23  0547 11/06/23  1258 11/03/23 0456 11/02/23 0440 11/01/23  0353   POTASSIUM mmol/L 3.9 3.5 4.3   < > 3.7   CHLORIDE mmol/L 110* 104 105   < > 103   CO2 mmol/L 27 24 24   < > 29   BUN mg/dL 42* 42* 29*   < > 21   CREATININE mg/dL 1.03 1.22 1.04   < > 1.04   CALCIUM mg/dL 8.6 8.9 9.4   < > 8.9   AST U/L  --  14  --   --  14   ALT U/L  --  16  --   --  11   ALK PHOS U/L  --  56  --   --  80   EGFR ml/min/1.73sq m 73 59 72   < > 72    < > = values in this interval not displayed.        BMP:  Results from last 7 days   Lab Units 11/07/23  0547 11/06/23  1258 11/03/23  0456   POTASSIUM mmol/L 3.9 3.5 4.3   CHLORIDE mmol/L 110* 104 105   CO2 mmol/L 27 24 24   BUN mg/dL 42* 42* 29*   CREATININE mg/dL 1.03 1.22 1.04   CALCIUM mg/dL 8.6 8.9 9.4     HS troponin: 22->19->20    Magnesium:   Results from last 7 days   Lab Units 11/02/23  0440 11/01/23  0353   MAGNESIUM mg/dL 2.2 2.1       Coags: Results from last 7 days   Lab Units 11/06/23  1310 11/01/23  0353   PTT seconds 25 28   INR  0.95 1.02       Cardiac testing:     Reviewed echocardiogram dated 10/30/2023 at [facility]      Imaging: I have personally reviewed pertinent reports. X-ray chest 1 view portable    Result Date: 11/7/2023  Narrative: CHEST INDICATION:   chest pain. COMPARISON: 11/3/2023 EXAM PERFORMED/VIEWS:  XR CHEST PORTABLE FINDINGS: Cardiomediastinal silhouette appears unremarkable. Redemonstrated left-sided pacemaker/AICD with stable position of electrodes. The lungs are clear. No pneumothorax or pleural effusion. Osseous structures appear within normal limits for patient age. Impression: No acute cardiopulmonary disease. Workstation performed: [id number]     CT head without contrast    Result Date: 11/6/2023  Narrative: CT BRAIN - WITHOUT CONTRAST INDICATION:   syncope. COMPARISON: CT head 2/1/2023 TECHNIQUE:  CT examination of the brain was performed. Multiplanar 2D reformatted images were created from the source data. Radiation dose length product (DLP) for this visit:  812.61 mGy-cm . This examination, like all CT scans performed in the [address] [place] [address], was performed utilizing techniques to minimize radiation dose exposure, including the use of iterative  reconstruction and automated exposure control. IMAGE QUALITY:  Diagnostic. FINDINGS: PARENCHYMA:  No intracranial mass, mass effect or midline shift. No CT signs of acute infarction. No acute parenchymal hemorrhage. Periventricular and centrum semiovale white matter hypodensity is a nonspecific finding likely representing chronic microangiopathy. Calcification bilateral cavernous internal carotid arteries. VENTRICLES AND EXTRA-AXIAL SPACES: No acute hydrocephalus. Mild prominence of CSF spaces diffusely attributed to generalized volume loss. VISUALIZED ORBITS: Changes of bilateral lens replacements noted.  PARANASAL SINUSES: Trace mucosal thickening scattered in the paranasal sinuses. CALVARIUM AND EXTRACRANIAL SOFT TISSUES:  Normal.     Impression: No evidence of acute intracranial process. Chronic microangiopathy. Workstation performed: [id number]     XR outside images    Result Date: 11/5/2023  Narrative: [id number]    CT chest wo contrast    Result Date: 11/3/2023  Narrative: CT CHEST WITHOUT IV CONTRAST INDICATION:   Crepitus, chest, soft tissue gas suspected crepitus on exam s/p ppm. r/o pneumo. Crepitus noted around left neck at site of ICD implant. COMPARISON: CTA chest, abdomen, pelvis 10/6/2023. TECHNIQUE: CT examination of the chest was performed without intravenous contrast. Multiplanar 2D reformatted images were created from the source data. This examination, like all CT scans performed in the [facility], was performed utilizing techniques to minimize radiation dose exposure, including the use of iterative reconstruction and automated exposure control. Radiation dose length product (DLP) for this visit:  854.78 mGy-cm FINDINGS: LUNGS: Mild emphysema. There is no tracheal or endobronchial lesion. PLEURA: No pneumothorax or pleural effusion. HEART/GREAT VESSELS: The heart is not enlarged. Three-vessel coronary artery calcifications. Coronary artery stent in the left anterior descending coronary artery. Aortic valve calcifications. No thoracic aortic aneurysm. MEDIASTINUM AND CYNDIE: Calcified mediastinal and right hilar lymph nodes, likely due to sequela of prior granulomatous disease. CHEST WALL AND LOWER NECK:  Left chest wall cardiac device with surrounding subcutaneous emphysema extending into the left neck and the leads terminating in the right atrial appendage and right ventricle. No evidence of a subcutaneous abscess. VISUALIZED STRUCTURES IN THE UPPER ABDOMEN: Splenic calcifications, likely due to sequela of prior granulomatous disease.  OSSEOUS STRUCTURES: Spinal degenerative changes are noted. No acute fracture or destructive osseous lesion. Old right rib fractures. Degenerative changes of the right shoulder. Left total shoulder arthroplasty. Impression: Left chest wall cardiac device with surrounding subcutaneous emphysema extending into the left neck and the leads terminating in the right atrial appendage and right ventricle. No pneumothorax. Workstation performed: [id number]     XR chest 2 views    Result Date: 11/3/2023  Narrative: CHEST INDICATION:   Patient s/p Pacemaker/ICD Insertion - To be done post-procedure day one at 7a. m. and read before 9 a.m. Do not lift affected arm above shoulder. . COMPARISON: 2023 EXAM PERFORMED/VIEWS:  XR CHEST PA & LATERAL The frontal view was performed utilizing dual energy radiographic technique. FINDINGS: Stable left AICD right atrial and right ventricular lead placement. Cardiomediastinal silhouette appears unremarkable. The lungs are clear. Calcified right hilar nodes and left shoulder prosthesis. No pneumothorax or pleural effusion. Osseous structures appear within normal limits for patient age. Impression: Stable left AICD Workstation performed: [id number]     XR chest portable    Result Date: 2023  Narrative: CHEST INDICATION:   Patient s/p Pacemaker/ICD Insertion. COMPARISON: 2023 EXAM PERFORMED/VIEWS:  XR CHEST PORTABLE FINDINGS: Left AICD with right atrial and right ventricular lead placement. Cardiomediastinal silhouette appears unremarkable. The lungs are clear. Left shoulder prosthesis. No pneumothorax or pleural effusion. Osseous structures appear within normal limits for patient age. Impression: Left AICD in place. Workstation performed: [id number]     Cardiac ep lab eps/ablations    Result Date: 2023  Narrative: Images from the original result were not included.  ELECTROPHYSIOLOGY OPERATIVE REPORT PATIENT NAME: Lili Lanza :  1955 MRN: [id number] Date of surgery: 23 Surgeon: Orion Garcia Megha Benson MD Pt Location: Cath Lab PROCEDURE PERFORMED: 1)ICD Implantable Cardioverter Defibrillator Preoperative Medications: Ancef ANESTHESIA: Conscious sedation PREOPERATIVE DIAGNOSIS: 1. Sustained VT, symptomatic 2. Severe AS 3. CAD post DEON NCDR ICD REGISTRY INFO Heart Failure: No NYHA Functional Classification: Class II. Ischemic Cardiomyopathy: No. Non-Ischemic Cardiomyopathy: No. Ejection Fraction: 60 QRS Morphology: Narrow, Ventricular Tachycardia: No. Indications for Permanent Pacemaker: No. ICD Indication: Secondary Prevention. A formal shared decision making encounter has occurred with the patient using an evidence-based decision tool on ICDs prior to initial ICD implantation: Yes Generator changes only- Patients clinical condition is unchanged and the LVEF will not be assessed: N/A Syndrome(s) w/Risk of Sudden Death: No. POSTOPERATIVE DIAGNOSIS: Successful Implantable Cardioverter Defibrillator implant. Same as Preop. Informed Consent: Risks, benefits, and alternatives discussed with patient and any family present. He understands risks, which include but are not limited to life threatening  bleeding, infection, air around lungs, blood around the heart and reoperation dislodged or malfunctioning device. Procedure Description: After informed consent was obtained, the patient was brought to the electrophysiology laboratory in the post-absorptive state. A time out was called and the patient was properly identified. The patient was pre-medicated as above. The left infraclavicular area was prepped and draped in the usual sterile fashion. After local anesthetic infiltration with 1% lidocaine, an incision was made below the left clavicle. The incision was extended down to the level of the pectoral fascia. A pocket was formed above the pectoral fascia using cautery and blunt dissection. Venous access was done with needle puncture using Seldinger technique in the axillary vein.   A safe sheath introducer was advanced over a wire into the axillary vein, the wire was confirmed to be in the right atrium on flouroscopy, the wire was removed. Through the introducer the pacing lead was passed into the right heart. Under fluoroscopic guidance the right ventricular lead was positioned on the right ventricular septum. After satisfactory ventricular sensing and pacing thresholds were confirmed, the lead was sewn to the pectoral fascia/muscle using 2-0 Ethibond sutures. The right atrial lead was placed in the right atrial appendage with similar fashion using a preformed J stylet, the helix was deployed, tissue contact was confirmed and good lead parameters were seen, the Safe-sheath was removed and the lead was tied down with 2-0 Ethibond sutures to the muscle. A Medtronic absorbable ICD pouch was used. The lead and pulse generator were placed in the pre-pectoral pocket. The pocket was then closed with 3 layers of 2-0, 3-0, 4-0 -Vicryl suture was used to close the skin. EBL: Minimal Complications: None [id number] Findings: The patient tolerated the procedure well. Plan: Routine postoperative care, CXR Follow-up in 2 weeks with incision check and interrogation. ECHO 2D LIMITED WITH CONTRAST - NO DOPPLER/COLOR FLOW    Result Date: 10/31/2023  Narrative: This result has an attachment that is not available. Limited echo Normal LV Ejection Fraction. Study Details A limited 2D echocardiogram was performed. Definity contrast was used during the study. Overall the study quality was adequate. The study had technical difficulties. The study was difficult due to patient's clinical status and body habitus. XR chest portable    Result Date: 10/30/2023  Narrative: Clinical: Ventricular tachycardia. TECHNIQUE: Portable AP upright frontal chest x-ray. COMPARISON: Chest x-ray dated 5/12/2020. FINDINGS: The heart is not enlarged. Calcifications are seen in the thoracic aorta. Left shoulder replacement is again noted.  The lungs are clear without mass or consolidation. I do not identify a pleural effusion or pneumothorax. Impression: IMPRESSION: No acute pulmonary disease. Workstation:[id number]    Cardiac catheterization    Result Date: 10/17/2023  Narrative:   Mid RCA lesion is 40% stenosed. Prox LAD to Mid LAD lesion is 80% stenosed. Significant CAD, with a long 80% stenosis of the proximal/mid LAD. There was also non-obstructive plaque of the mid RCA. The LAD lesion was interrogated by iFR and was borderline, varying from 0.88 to 0.91. IVUS was also performed, and there was a marked reduction in cross sectional area. PCI was performed, with placement of a 4.0x26mm DEON. There was no residual stenosis. There was full stent expansion and apposition, with 0% residual stenosis. Plan: DAPT. The patient has been intolerant of statins and is on a low potency statin and ezetimibe. With an LDL above 140 mg/dL and pre-diabetes, and established CAD, he is an appropriate candidate for PCSK9 inhibition. He will undergo TAVR in the near future. EKG reviewed personally: EKG: Normal sinus rhythm, nonspecific ST/T wave abnormality, rate 61 bpm.       Counseling / Coordination of Care  Total floor / unit time spent today 45 minutes. Greater than 50% of total time was spent with the patient and / or family counseling and / or coordination of care. A description of the counseling / coordination of care: Discussed case with Dr. Dariana Oseguera.         Code Status: Level 1 - Full Code

## 2023-11-07 NOTE — ASSESSMENT & PLAN NOTE
DEON placed roughly 2 weeks ago  On ASA/Plavix  Cardiology following  Continuing ASA  Holding plavix given notable bleed, however hope to restart today, see above  No CP, no symptoms

## 2023-11-07 NOTE — ASSESSMENT & PLAN NOTE
Syncopal episode while shopping. Device interrogated in ER with no events noted. ECG shows no acute changes. HS trop neg x 3. He is without anginal complaints. Echocardiogram 10/30/2023 with preserved EF. Suspect syncope related to anemia. Receiving transfusion at present. BP acceptable. Will monitor.

## 2023-11-07 NOTE — ASSESSMENT & PLAN NOTE
Recent history of NSVT s/p AICD insertion. On Amiodarone 200 mg daily. ICD interrogated in the ER with no events noted.   Preserved LVEF on echocardiogram 10/30/2023

## 2023-11-07 NOTE — ANESTHESIA POSTPROCEDURE EVALUATION
Post-Op Assessment Note    CV Status:  Stable  Pain Score: 0    Pain management: adequate     Mental Status:  Alert and awake   Hydration Status:  Euvolemic   PONV Controlled:  Controlled   Airway Patency:  Patent      Post Op Vitals Reviewed: Yes      Staff: CRNA     No notable events documented.     /63 (11/07/23 1415)    Temp   98.0   Pulse 65 (11/07/23 1415)   Resp   18   SpO2 98 % (11/07/23 1415)

## 2023-11-07 NOTE — ASSESSMENT & PLAN NOTE
Secondary to GI bleed baseline hemoglobin appears to be around 14  Hgb trending down, 7.4 this morning  2u PRBC ordered  Goal hgb 9-10 given recent cardiac stent  Monitor Hgb  Iron panel and b12 wnl

## 2023-11-08 LAB
ABO GROUP BLD BPU: NORMAL
ABO GROUP BLD BPU: NORMAL
ANION GAP SERPL CALCULATED.3IONS-SCNC: 3 MMOL/L
BPU ID: NORMAL
BPU ID: NORMAL
BUN SERPL-MCNC: 26 MG/DL (ref 5–25)
CALCIUM SERPL-MCNC: 8.6 MG/DL (ref 8.4–10.2)
CHLORIDE SERPL-SCNC: 110 MMOL/L (ref 96–108)
CO2 SERPL-SCNC: 28 MMOL/L (ref 21–32)
CREAT SERPL-MCNC: 1.01 MG/DL (ref 0.6–1.3)
CROSSMATCH: NORMAL
CROSSMATCH: NORMAL
ERYTHROCYTE [DISTWIDTH] IN BLOOD BY AUTOMATED COUNT: 14.3 % (ref 11.6–15.1)
GFR SERPL CREATININE-BSD FRML MDRD: 75 ML/MIN/1.73SQ M
GLUCOSE SERPL-MCNC: 108 MG/DL (ref 65–140)
HCT VFR BLD AUTO: 26.2 % (ref 36.5–49.3)
HGB BLD-MCNC: 8.6 G/DL (ref 12–17)
MCH RBC QN AUTO: 31.7 PG (ref 26.8–34.3)
MCHC RBC AUTO-ENTMCNC: 32.8 G/DL (ref 31.4–37.4)
MCV RBC AUTO: 97 FL (ref 82–98)
PLATELET # BLD AUTO: 242 THOUSANDS/UL (ref 149–390)
PMV BLD AUTO: 9 FL (ref 8.9–12.7)
POTASSIUM SERPL-SCNC: 4.1 MMOL/L (ref 3.5–5.3)
RBC # BLD AUTO: 2.71 MILLION/UL (ref 3.88–5.62)
SODIUM SERPL-SCNC: 141 MMOL/L (ref 135–147)
UNIT DISPENSE STATUS: NORMAL
UNIT DISPENSE STATUS: NORMAL
UNIT PRODUCT CODE: NORMAL
UNIT PRODUCT CODE: NORMAL
UNIT PRODUCT VOLUME: 350 ML
UNIT PRODUCT VOLUME: 350 ML
UNIT RH: NORMAL
UNIT RH: NORMAL
WBC # BLD AUTO: 10.23 THOUSAND/UL (ref 4.31–10.16)

## 2023-11-08 PROCEDURE — 99233 SBSQ HOSP IP/OBS HIGH 50: CPT | Performed by: HOSPITALIST

## 2023-11-08 PROCEDURE — 85027 COMPLETE CBC AUTOMATED: CPT | Performed by: HOSPITALIST

## 2023-11-08 PROCEDURE — 80048 BASIC METABOLIC PNL TOTAL CA: CPT | Performed by: HOSPITALIST

## 2023-11-08 RX ADMIN — AMIODARONE HYDROCHLORIDE 200 MG: 200 TABLET ORAL at 08:47

## 2023-11-08 RX ADMIN — SODIUM CHLORIDE 50 ML/HR: 0.9 INJECTION, SOLUTION INTRAVENOUS at 06:41

## 2023-11-08 RX ADMIN — PRAVASTATIN SODIUM 20 MG: 20 TABLET ORAL at 08:47

## 2023-11-08 RX ADMIN — SUCRALFATE 1 G: 1 TABLET ORAL at 11:28

## 2023-11-08 RX ADMIN — SUCRALFATE 1 G: 1 TABLET ORAL at 16:39

## 2023-11-08 RX ADMIN — SUCRALFATE 1 G: 1 TABLET ORAL at 06:09

## 2023-11-08 RX ADMIN — ESCITALOPRAM OXALATE 20 MG: 10 TABLET ORAL at 08:47

## 2023-11-08 RX ADMIN — PANTOPRAZOLE SODIUM 40 MG: 40 TABLET, DELAYED RELEASE ORAL at 16:39

## 2023-11-08 RX ADMIN — SUCRALFATE 1 G: 1 TABLET ORAL at 21:02

## 2023-11-08 RX ADMIN — CLOPIDOGREL BISULFATE 75 MG: 75 TABLET ORAL at 08:47

## 2023-11-08 RX ADMIN — EZETIMIBE 10 MG: 10 TABLET ORAL at 08:47

## 2023-11-08 RX ADMIN — LEVOTHYROXINE SODIUM 75 MCG: 75 TABLET ORAL at 06:09

## 2023-11-08 RX ADMIN — METOPROLOL SUCCINATE 12.5 MG: 25 TABLET, EXTENDED RELEASE ORAL at 08:47

## 2023-11-08 RX ADMIN — BUPROPION HYDROCHLORIDE 300 MG: 150 TABLET, EXTENDED RELEASE ORAL at 08:47

## 2023-11-08 RX ADMIN — PANTOPRAZOLE SODIUM 40 MG: 40 TABLET, DELAYED RELEASE ORAL at 06:09

## 2023-11-08 RX ADMIN — ASPIRIN 81 MG: 81 TABLET, COATED ORAL at 08:47

## 2023-11-08 NOTE — ASSESSMENT & PLAN NOTE
DEON placed roughly 2 weeks ago  On ASA/Plavix  Cardiology consulted, appreciate recs  Back on ASA and plavix  No CP, no symptoms

## 2023-11-08 NOTE — PLAN OF CARE
Problem: PAIN - ADULT  Goal: Verbalizes/displays adequate comfort level or baseline comfort level  Description: Interventions:  - Encourage patient to monitor pain and request assistance  - Assess pain using appropriate pain scale  - Administer analgesics based on type and severity of pain and evaluate response  - Implement non-pharmacological measures as appropriate and evaluate response  - Consider cultural and social influences on pain and pain management  - Notify physician/advanced practitioner if interventions unsuccessful or patient reports new pain  Outcome: Progressing     Problem: INFECTION - ADULT  Goal: Absence or prevention of progression during hospitalization  Description: INTERVENTIONS:  - Assess and monitor for signs and symptoms of infection  - Monitor lab/diagnostic results  - Monitor all insertion sites, i.e. indwelling lines, tubes, and drains  - Monitor endotracheal if appropriate and nasal secretions for changes in amount and color  - [place] appropriate cooling/warming therapies per order  - Administer medications as ordered  - Instruct and encourage patient and family to use good hand hygiene technique  - Identify and instruct in appropriate isolation precautions for identified infection/condition  Outcome: Progressing  Goal: Absence of fever/infection during neutropenic period  Description: INTERVENTIONS:  - Monitor WBC    Outcome: Progressing     Problem: SAFETY ADULT  Goal: Patient will remain free of falls  Description: INTERVENTIONS:  - Educate patient/family on patient safety including physical limitations  - Instruct patient to call for assistance with activity   - Consult OT/PT to assist with strengthening/mobility   - Keep Call bell within reach  - Keep bed low and locked with side rails adjusted as appropriate  - Keep care items and personal belongings within reach  - Initiate and maintain comfort rounds  - Make Fall Risk Sign visible to staff  - Apply yellow socks and bracelet for high fall risk patients  - Consider moving patient to room near nurses station  Outcome: Progressing  Goal: Maintain or return to baseline ADL function  Description: INTERVENTIONS:  -  Assess patient's ability to carry out ADLs; assess patient's baseline for ADL function and identify physical deficits which impact ability to perform ADLs (bathing, care of mouth/teeth, toileting, grooming, dressing, etc.)  - Assess/evaluate cause of self-care deficits   - Assess range of motion  - Assess patient's mobility; develop plan if impaired  - Assess patient's need for assistive devices and provide as appropriate  - Encourage maximum independence but intervene and supervise when necessary  - Involve family in performance of ADLs  - Assess for home care needs following discharge   - Consider OT consult to assist with ADL evaluation and planning for discharge  - Provide patient education as appropriate  Outcome: Progressing  Goal: Maintains/Returns to pre admission functional level  Description: INTERVENTIONS:  - Perform BMAT or MOVE assessment daily.   - Set and communicate daily mobility goal to care team and patient/family/caregiver.    - Collaborate with rehabilitation services on mobility goals if consulted  - Stand patient 3 times a day  - Ambulate patient 3 times a day  - Out of bed to chair 3 times a day   - Out of bed for meals 3 times a day  - Out of bed for toileting  - Record patient progress and toleration of activity level   Outcome: Progressing     Problem: DISCHARGE PLANNING  Goal: Discharge to home or other facility with appropriate resources  Description: INTERVENTIONS:  - Identify barriers to discharge w/patient and caregiver  - Arrange for needed discharge resources and transportation as appropriate  - Identify discharge learning needs (meds, wound care, etc.)  - Arrange for interpretive services to assist at discharge as needed  - Refer to Case Management Department for coordinating discharge planning if the patient needs post-hospital services based on physician/advanced practitioner order or complex needs related to functional status, cognitive ability, or social support system  Outcome: Progressing     Problem: Knowledge Deficit  Goal: Patient/family/caregiver demonstrates understanding of disease process, treatment plan, medications, and discharge instructions  Description: Complete learning assessment and assess knowledge base.   Interventions:  - Provide teaching at level of understanding  - Provide teaching via preferred learning methods  Outcome: Progressing     Problem: Potential for Falls  Goal: Patient will remain free of falls  Description: INTERVENTIONS:  - Educate patient/family on patient safety including physical limitations  - Instruct patient to call for assistance with activity   - Consult OT/PT to assist with strengthening/mobility   - Keep Call bell within reach  - Keep bed low and locked with side rails adjusted as appropriate  - Keep care items and personal belongings within reach  - Initiate and maintain comfort rounds  - Make Fall Risk Sign visible to staff  - Apply yellow socks and bracelet for high fall risk patients  - Consider moving patient to room near nurses station  Outcome: Progressing     Problem: CARDIOVASCULAR - ADULT  Goal: Maintains optimal cardiac output and hemodynamic stability  Description: INTERVENTIONS:  - Monitor I/O, vital signs and rhythm  - Monitor for S/S and trends of decreased cardiac output  - Administer and titrate ordered vasoactive medications to optimize hemodynamic stability  - Assess quality of pulses, skin color and temperature  - Assess for signs of decreased coronary artery perfusion  - Instruct patient to report change in severity of symptoms  Outcome: Progressing  Goal: Absence of cardiac dysrhythmias or at baseline rhythm  Description: INTERVENTIONS:  - Continuous cardiac monitoring, vital signs, obtain 12 lead EKG if ordered  - Administer antiarrhythmic and heart rate control medications as ordered  - Monitor electrolytes and administer replacement therapy as ordered  Outcome: Progressing     Problem: GASTROINTESTINAL - ADULT  Goal: Minimal or absence of nausea and/or vomiting  Description: INTERVENTIONS:  - Administer IV fluids if ordered to ensure adequate hydration  - Maintain NPO status until nausea and vomiting are resolved  - Nasogastric tube if ordered  - Administer ordered antiemetic medications as needed  - Provide nonpharmacologic comfort measures as appropriate  - Advance diet as tolerated, if ordered  - Consider nutrition services referral to assist patient with adequate nutrition and appropriate food choices  Outcome: Progressing  Goal: Maintains or returns to baseline bowel function  Description: INTERVENTIONS:  - Assess bowel function  - Encourage oral fluids to ensure adequate hydration  - Administer IV fluids if ordered to ensure adequate hydration  - Administer ordered medications as needed  - Encourage mobilization and activity  - Consider nutritional services referral to assist patient with adequate nutrition and appropriate food choices  Outcome: Progressing  Goal: Maintains adequate nutritional intake  Description: INTERVENTIONS:  - Monitor percentage of each meal consumed  - Identify factors contributing to decreased intake, treat as appropriate  - Assist with meals as needed  - Monitor I&O, weight, and lab values if indicated  - Obtain nutrition services referral as needed  Outcome: Progressing  Goal: Oral mucous membranes remain intact  Description: INTERVENTIONS  - Assess oral mucosa and hygiene practices  - Implement preventative oral hygiene regimen  - Implement oral medicated treatments as ordered  - Initiate Nutrition services referral as needed  Outcome: Progressing     Problem: HEMATOLOGIC - ADULT  Goal: Maintains hematologic stability  Description: INTERVENTIONS  - Assess for signs and symptoms of bleeding or hemorrhage  - Monitor labs  - Administer supportive blood products/factors as ordered and appropriate  Outcome: Progressing

## 2023-11-08 NOTE — ASSESSMENT & PLAN NOTE
History of aortic stenosis. Being evaluated for TAVR however it was delayed due to recent admission for CAD s/p stent and followed by episode of ventricular tachycardia. Further follow-up outpatient with cardiology  Syncopal episode while at [facility] today. Likely secondary to GI bleed.     Pacemaker interrogated in the ED

## 2023-11-08 NOTE — PLAN OF CARE
Problem: PAIN - ADULT  Goal: Verbalizes/displays adequate comfort level or baseline comfort level  Description: Interventions:  - Encourage patient to monitor pain and request assistance  - Assess pain using appropriate pain scale  - Administer analgesics based on type and severity of pain and evaluate response  - Implement non-pharmacological measures as appropriate and evaluate response  - Consider cultural and social influences on pain and pain management  - Notify physician/advanced practitioner if interventions unsuccessful or patient reports new pain  Outcome: Progressing     Problem: INFECTION - ADULT  Goal: Absence or prevention of progression during hospitalization  Description: INTERVENTIONS:  - Assess and monitor for signs and symptoms of infection  - Monitor lab/diagnostic results  - Monitor all insertion sites, i.e. indwelling lines, tubes, and drains  - Monitor endotracheal if appropriate and nasal secretions for changes in amount and color  - [place] appropriate cooling/warming therapies per order  - Administer medications as ordered  - Instruct and encourage patient and family to use good hand hygiene technique  - Identify and instruct in appropriate isolation precautions for identified infection/condition  Outcome: Progressing  Goal: Absence of fever/infection during neutropenic period  Description: INTERVENTIONS:  - Monitor WBC    Outcome: Progressing     Problem: SAFETY ADULT  Goal: Patient will remain free of falls  Description: INTERVENTIONS:  - Educate patient/family on patient safety including physical limitations  - Instruct patient to call for assistance with activity   - Consult OT/PT to assist with strengthening/mobility   - Keep Call bell within reach  - Keep bed low and locked with side rails adjusted as appropriate  - Keep care items and personal belongings within reach  - Initiate and maintain comfort rounds  - Make Fall Risk Sign visible to staff  - Offer Toileting every 2 Hours, in advance of need  - Initiate/Maintain bed alarm  - Obtain necessary fall risk management equipment  - Apply yellow socks and bracelet for high fall risk patients  - Consider moving patient to room near nurses station  Outcome: Progressing  Goal: Maintain or return to baseline ADL function  Description: INTERVENTIONS:  -  Assess patient's ability to carry out ADLs; assess patient's baseline for ADL function and identify physical deficits which impact ability to perform ADLs (bathing, care of mouth/teeth, toileting, grooming, dressing, etc.)  - Assess/evaluate cause of self-care deficits   - Assess range of motion  - Assess patient's mobility; develop plan if impaired  - Assess patient's need for assistive devices and provide as appropriate  - Encourage maximum independence but intervene and supervise when necessary  - Involve family in performance of ADLs  - Assess for home care needs following discharge   - Consider OT consult to assist with ADL evaluation and planning for discharge  - Provide patient education as appropriate  Outcome: Progressing  Goal: Maintains/Returns to pre admission functional level  Description: INTERVENTIONS:  - Perform BMAT or MOVE assessment daily.   - Set and communicate daily mobility goal to care team and patient/family/caregiver. - Collaborate with rehabilitation services on mobility goals if consulted  - Perform Range of Motion 3 times a day. - Reposition patient every 2 hours.   - Dangle patient 3 times a day  - Stand patient 3 times a day  - Ambulate patient 3 times a day  - Out of bed to chair 3 times a day   - Out of bed for meals 3 times a day  - Out of bed for toileting  - Record patient progress and toleration of activity level   Outcome: Progressing     Problem: DISCHARGE PLANNING  Goal: Discharge to home or other facility with appropriate resources  Description: INTERVENTIONS:  - Identify barriers to discharge w/patient and caregiver  - Arrange for needed discharge resources and transportation as appropriate  - Identify discharge learning needs (meds, wound care, etc.)  - Arrange for interpretive services to assist at discharge as needed  - Refer to Case Management Department for coordinating discharge planning if the patient needs post-hospital services based on physician/advanced practitioner order or complex needs related to functional status, cognitive ability, or social support system  Outcome: Progressing     Problem: Knowledge Deficit  Goal: Patient/family/caregiver demonstrates understanding of disease process, treatment plan, medications, and discharge instructions  Description: Complete learning assessment and assess knowledge base.   Interventions:  - Provide teaching at level of understanding  - Provide teaching via preferred learning methods  Outcome: Progressing     Problem: Potential for Falls  Goal: Patient will remain free of falls  Description: INTERVENTIONS:  - Educate patient/family on patient safety including physical limitations  - Instruct patient to call for assistance with activity   - Consult OT/PT to assist with strengthening/mobility   - Keep Call bell within reach  - Keep bed low and locked with side rails adjusted as appropriate  - Keep care items and personal belongings within reach  - Initiate and maintain comfort rounds  - Make Fall Risk Sign visible to staff  - Offer Toileting every 2 Hours, in advance of need  - Initiate/Maintain bed alarm  - Obtain necessary fall risk management equipment  - Apply yellow socks and bracelet for high fall risk patients  - Consider moving patient to room near nurses station  Outcome: Progressing     Problem: CARDIOVASCULAR - ADULT  Goal: Maintains optimal cardiac output and hemodynamic stability  Description: INTERVENTIONS:  - Monitor I/O, vital signs and rhythm  - Monitor for S/S and trends of decreased cardiac output  - Administer and titrate ordered vasoactive medications to optimize hemodynamic stability  - Assess quality of pulses, skin color and temperature  - Assess for signs of decreased coronary artery perfusion  - Instruct patient to report change in severity of symptoms  Outcome: Progressing  Goal: Absence of cardiac dysrhythmias or at baseline rhythm  Description: INTERVENTIONS:  - Continuous cardiac monitoring, vital signs, obtain 12 lead EKG if ordered  - Administer antiarrhythmic and heart rate control medications as ordered  - Monitor electrolytes and administer replacement therapy as ordered  Outcome: Progressing     Problem: GASTROINTESTINAL - ADULT  Goal: Minimal or absence of nausea and/or vomiting  Description: INTERVENTIONS:  - Administer IV fluids if ordered to ensure adequate hydration  - Maintain NPO status until nausea and vomiting are resolved  - Nasogastric tube if ordered  - Administer ordered antiemetic medications as needed  - Provide nonpharmacologic comfort measures as appropriate  - Advance diet as tolerated, if ordered  - Consider nutrition services referral to assist patient with adequate nutrition and appropriate food choices  Outcome: Progressing  Goal: Maintains or returns to baseline bowel function  Description: INTERVENTIONS:  - Assess bowel function  - Encourage oral fluids to ensure adequate hydration  - Administer IV fluids if ordered to ensure adequate hydration  - Administer ordered medications as needed  - Encourage mobilization and activity  - Consider nutritional services referral to assist patient with adequate nutrition and appropriate food choices  Outcome: Progressing  Goal: Maintains adequate nutritional intake  Description: INTERVENTIONS:  - Monitor percentage of each meal consumed  - Identify factors contributing to decreased intake, treat as appropriate  - Assist with meals as needed  - Monitor I&O, weight, and lab values if indicated  - Obtain nutrition services referral as needed  Outcome: Progressing  Goal: Oral mucous membranes remain intact  Description: INTERVENTIONS  - Assess oral mucosa and hygiene practices  - Implement preventative oral hygiene regimen  - Implement oral medicated treatments as ordered  - Initiate Nutrition services referral as needed  Outcome: Progressing     Problem: HEMATOLOGIC - ADULT  Goal: Maintains hematologic stability  Description: INTERVENTIONS  - Assess for signs and symptoms of bleeding or hemorrhage  - Monitor labs  - Administer supportive blood products/factors as ordered and appropriate  Outcome: Progressing

## 2023-11-08 NOTE — ASSESSMENT & PLAN NOTE
Secondary to GI bleed baseline hemoglobin appears to be around 14  Zain 7.4  S/p 2u pRBC  Hgb appears stable  Goal hgb 9-10 given recent cardiac stent  Monitor Hgb  Iron panel and b12 wnl

## 2023-11-08 NOTE — ASSESSMENT & PLAN NOTE
Patient presents with dark stools for the last 5 days patient is having 1-2 bowel movements per day denies any abdominal pain or fevers or chills. Denies any vomiting. Patient had coronary disease stent placed in the LAD 2 weeks ago is currently on aspirin and Plavix. Denies any NSAID use or any prior history of endoscopies or ulcers.   He took his aspirin and Plavix morning of admission 11/6  Consulted GI  EGD: Dieulafoy's lesion in the body of the stomach; there was oozing blood and a blood clot; placed 1 clip successfully   Carafate, PPI  Started back on ASA and Plavix given stent  Monitor further, currently appears no further bleeding, but with significant blood on EGD, dual antiplatelet -- safest to monitor additional 24hrs, patient agrees

## 2023-11-08 NOTE — PROGRESS NOTES
[address] [facility]  Progress Note  Name: Haritha Cordoba  MRN: [id number]  Unit/Bed#: -01 I Date of Admission: 11/6/2023   Date of Service: 11/8/2023 I Hospital Day: 2    Assessment/Plan   * Acute GI bleeding  Assessment & Plan  Patient presents with dark stools for the last 5 days patient is having 1-2 bowel movements per day denies any abdominal pain or fevers or chills. Denies any vomiting. Patient had coronary disease stent placed in the LAD 2 weeks ago is currently on aspirin and Plavix. Denies any NSAID use or any prior history of endoscopies or ulcers. He took his aspirin and Plavix morning of admission 11/6  Consulted GI  EGD: Dieulafoy's lesion in the body of the stomach; there was oozing blood and a blood clot; placed 1 clip successfully   Carafate, PPI  Started back on ASA and Plavix given stent  Monitor further, currently appears no further bleeding, but with significant blood on EGD, dual antiplatelet -- safest to monitor additional 24hrs, patient agrees    ABLA (acute blood loss anemia)  Assessment & Plan  Secondary to GI bleed baseline hemoglobin appears to be around 14  Zain 7.4  S/p 2u pRBC  Hgb appears stable  Goal hgb 9-10 given recent cardiac stent  Monitor Hgb  Iron panel and b12 wnl    Ventricular tachycardia ([address])  Assessment & Plan  history of V. tach. None noted so far. Continue amiodarone and metoprolol    Status post insertion of drug-eluting stent into left anterior descending (LAD) artery  Assessment & Plan  DEON placed roughly 2 weeks ago  On ASA/Plavix  Cardiology consulted, appreciate recs  Back on ASA and plavix  No CP, no symptoms    Mixed hyperlipidemia  Assessment & Plan  Continue statin therapy    Vasovagal syncope  Assessment & Plan  History of aortic stenosis. Being evaluated for TAVR however it was delayed due to recent admission for CAD s/p stent and followed by episode of ventricular tachycardia.   Further follow-up outpatient with cardiology  Syncopal episode while at [facility] today. Likely secondary to GI bleed. Pacemaker interrogated in the ED    Benign essential HTN  Assessment & Plan  Blood Pressures currently controlled continue Toprol-XL and hold Norvasc and Lasix for now               VTE Pharmacologic Prophylaxis: VTE Score: 2 Low Risk (Score 0-2) - Encourage Ambulation. Patient Centered Rounds: I performed bedside rounds with nursing staff today. Discussions with Specialists or Other Care Team Provider: none    Education and Discussions with Family / Patient: Patient declined call to . Total Time Spent on Date of Encounter in care of patient: 34 mins. This time was spent on one or more of the following: performing physical exam; counseling and coordination of care; obtaining or reviewing history; documenting in the medical record; reviewing/ordering tests, medications or procedures; communicating with other healthcare professionals and discussing with patient's family/caregivers. Current Length of Stay: 2 day(s)  Current Patient Status: Inpatient   Certification Statement: The patient will continue to require additional inpatient hospital stay due to further monitoring  Discharge Plan: Anticipate discharge tomorrow to home. Code Status: Level 1 - Full Code    Subjective:   Patient still has tarry black stools, no BRBPR, no CP/SOB and overall feels well    Objective:     Vitals:   Temp (24hrs), Av °F (36.7 °C), Min:97.9 °F (36.6 °C), Max:98.1 °F (36.7 °C)    Temp:  [97.9 °F (36.6 °C)-98.1 °F (36.7 °C)] 97.9 °F (36.6 °C)  HR:  [55-63] 59  Resp:  [18-19] 19  BP: (108-138)/(52-67) 125/64  SpO2:  [95 %-99 %] 97 %  Body mass index is 29.59 kg/m². Input and Output Summary (last 24 hours):      Intake/Output Summary (Last 24 hours) at 2023 1715  Last data filed at 2023 1527  Gross per 24 hour   Intake 220 ml   Output 1725 ml   Net -1505 ml       Physical Exam:   Physical Exam  Vitals and nursing note reviewed. Constitutional:       General: He is not in acute distress. Appearance: He is well-developed. HENT:      Head: Normocephalic and atraumatic. Eyes:      Comments: Conjunctival pallor   Cardiovascular:      Rate and Rhythm: Normal rate and regular rhythm. Heart sounds: No murmur heard. Comments: Significant systolic ejection murmur  Pulmonary:      Effort: Pulmonary effort is normal. No respiratory distress. Breath sounds: Normal breath sounds. Abdominal:      Palpations: Abdomen is soft. Tenderness: There is no abdominal tenderness. Musculoskeletal:         General: No swelling. Cervical back: Neck supple. Skin:     General: Skin is warm and dry. Capillary Refill: Capillary refill takes less than 2 seconds. Neurological:      Mental Status: He is alert. Psychiatric:         Mood and Affect: Mood normal.          Additional Data:     Labs:  Results from last 7 days   Lab Units 11/08/23  0543 11/06/23  2111 11/06/23  1258   WBC Thousand/uL 10.23*   < > 10.94*   HEMOGLOBIN g/dL 8.6*   < > 9.5*   HEMATOCRIT % 26.2*   < > 29.6*   PLATELETS Thousands/uL 242   < > 276   LYMPHO PCT %  --   --  25   MONO PCT %  --   --  12   EOS PCT %  --   --  4    < > = values in this interval not displayed. Results from last 7 days   Lab Units 11/08/23  0543 11/07/23  0547 11/06/23  1258   SODIUM mmol/L 141   < > 139   POTASSIUM mmol/L 4.1   < > 3.5   CHLORIDE mmol/L 110*   < > 104   CO2 mmol/L 28   < > 24   BUN mg/dL 26*   < > 42*   CREATININE mg/dL 1.01   < > 1.22   ANION GAP mmol/L 3   < > 11   CALCIUM mg/dL 8.6   < > 8.9   ALBUMIN g/dL  --   --  3.7   TOTAL BILIRUBIN mg/dL  --   --  0.34   ALK PHOS U/L  --   --  56   ALT U/L  --   --  16   AST U/L  --   --  14   GLUCOSE RANDOM mg/dL 108   < > 140    < > = values in this interval not displayed.      Results from last 7 days   Lab Units 11/06/23  1310   INR  0.95     Results from last 7 days   Lab Units 11/07/23  1616   POC GLUCOSE mg/dl 111               Lines/Drains:  Invasive Devices       Peripheral Intravenous Line  Duration             Peripheral IV 11/06/23 Left Antecubital 2 days    Peripheral IV 11/07/23 Dorsal (posterior); Left Hand 1 day                          Imaging: No pertinent imaging reviewed. Recent Cultures (last 7 days):         Last 24 Hours Medication List:   Current Facility-Administered Medications   Medication Dose Route Frequency Provider Last Rate    acetaminophen  650 mg Oral Q6H PRN Nalini Smith MD      amiodarone  200 mg Oral Daily Nalini Smith MD      aspirin  81 mg Oral Daily Braden Baldwin, DO      buPROPion  300 mg Oral Daily Nalini Smith MD      clopidogrel  75 mg Oral Daily Braden Baldwin, DO      escitalopram  20 mg Oral Daily Nalini Smith MD      ezetimibe  10 mg Oral Daily Nalini Smith MD      levothyroxine  75 mcg Oral Early Morning Nalini Smith MD      metoprolol succinate  12.5 mg Oral Daily Nalini Smith MD      ondansetron  4 mg Intravenous Q6H PRN Nalini Smith MD      pantoprazole  40 mg Oral BID AC Braden Baldwin,       pravastatin  20 mg Oral Every Other Day Nalini Smith MD      sodium chloride (PF)  3 mL Intravenous Q1H PRN Alia Mccormack DO      sodium chloride  50 mL/hr Intravenous Continuous Nalini Smith MD 50 mL/hr (11/08/23 0641)    sucralfate  1 g Oral 4x Daily (AC & HS) Braden Baldwin DO          Today, Patient Was Seen By: Cheng Baldwin DO    **Please Note: This note may have been constructed using a voice recognition system. **

## 2023-11-09 VITALS
TEMPERATURE: 98.1 F | HEART RATE: 56 BPM | BODY MASS INDEX: 29.62 KG/M2 | DIASTOLIC BLOOD PRESSURE: 78 MMHG | WEIGHT: 218.7 LBS | HEIGHT: 72 IN | SYSTOLIC BLOOD PRESSURE: 131 MMHG | RESPIRATION RATE: 18 BRPM | OXYGEN SATURATION: 97 %

## 2023-11-09 LAB
ERYTHROCYTE [DISTWIDTH] IN BLOOD BY AUTOMATED COUNT: 14.3 % (ref 11.6–15.1)
HCT VFR BLD AUTO: 25.4 % (ref 36.5–49.3)
HGB BLD-MCNC: 8.3 G/DL (ref 12–17)
MCH RBC QN AUTO: 31.2 PG (ref 26.8–34.3)
MCHC RBC AUTO-ENTMCNC: 32.7 G/DL (ref 31.4–37.4)
MCV RBC AUTO: 96 FL (ref 82–98)
PLATELET # BLD AUTO: 284 THOUSANDS/UL (ref 149–390)
PMV BLD AUTO: 9.5 FL (ref 8.9–12.7)
RBC # BLD AUTO: 2.66 MILLION/UL (ref 3.88–5.62)
WBC # BLD AUTO: 10.15 THOUSAND/UL (ref 4.31–10.16)

## 2023-11-09 PROCEDURE — 85027 COMPLETE CBC AUTOMATED: CPT | Performed by: HOSPITALIST

## 2023-11-09 PROCEDURE — 99239 HOSP IP/OBS DSCHRG MGMT >30: CPT | Performed by: HOSPITALIST

## 2023-11-09 RX ORDER — SUCRALFATE 1 G/1
1 TABLET ORAL
Qty: 56 TABLET | Refills: 0 | Status: SHIPPED | OUTPATIENT
Start: 2023-11-09 | End: 2023-11-23

## 2023-11-09 RX ORDER — PANTOPRAZOLE SODIUM 40 MG/1
40 TABLET, DELAYED RELEASE ORAL
Qty: 60 TABLET | Refills: 0 | Status: SHIPPED | OUTPATIENT
Start: 2023-11-09

## 2023-11-09 RX ADMIN — PANTOPRAZOLE SODIUM 40 MG: 40 TABLET, DELAYED RELEASE ORAL at 05:34

## 2023-11-09 RX ADMIN — EZETIMIBE 10 MG: 10 TABLET ORAL at 08:25

## 2023-11-09 RX ADMIN — CLOPIDOGREL BISULFATE 75 MG: 75 TABLET ORAL at 08:25

## 2023-11-09 RX ADMIN — AMIODARONE HYDROCHLORIDE 200 MG: 200 TABLET ORAL at 08:25

## 2023-11-09 RX ADMIN — SODIUM CHLORIDE 50 ML/HR: 0.9 INJECTION, SOLUTION INTRAVENOUS at 02:20

## 2023-11-09 RX ADMIN — ASPIRIN 81 MG: 81 TABLET, COATED ORAL at 08:25

## 2023-11-09 RX ADMIN — BUPROPION HYDROCHLORIDE 300 MG: 150 TABLET, EXTENDED RELEASE ORAL at 08:25

## 2023-11-09 RX ADMIN — LEVOTHYROXINE SODIUM 75 MCG: 75 TABLET ORAL at 05:34

## 2023-11-09 RX ADMIN — SUCRALFATE 1 G: 1 TABLET ORAL at 05:34

## 2023-11-09 RX ADMIN — ESCITALOPRAM OXALATE 20 MG: 10 TABLET ORAL at 08:25

## 2023-11-09 RX ADMIN — METOPROLOL SUCCINATE 12.5 MG: 25 TABLET, EXTENDED RELEASE ORAL at 08:25

## 2023-11-09 NOTE — PLAN OF CARE
Problem: PAIN - ADULT  Goal: Verbalizes/displays adequate comfort level or baseline comfort level  Description: Interventions:  - Encourage patient to monitor pain and request assistance  - Assess pain using appropriate pain scale  - Administer analgesics based on type and severity of pain and evaluate response  - Implement non-pharmacological measures as appropriate and evaluate response  - Consider cultural and social influences on pain and pain management  - Notify physician/advanced practitioner if interventions unsuccessful or patient reports new pain  Outcome: Progressing     Problem: INFECTION - ADULT  Goal: Absence or prevention of progression during hospitalization  Description: INTERVENTIONS:  - Assess and monitor for signs and symptoms of infection  - Monitor lab/diagnostic results  - Monitor all insertion sites, i.e. indwelling lines, tubes, and drains  - Monitor endotracheal if appropriate and nasal secretions for changes in amount and color  - [place] appropriate cooling/warming therapies per order  - Administer medications as ordered  - Instruct and encourage patient and family to use good hand hygiene technique  - Identify and instruct in appropriate isolation precautions for identified infection/condition  Outcome: Progressing  Goal: Absence of fever/infection during neutropenic period  Description: INTERVENTIONS:  - Monitor WBC    Outcome: Progressing

## 2023-11-09 NOTE — DISCHARGE INSTR - AVS FIRST PAGE
- New medication Protonix, take twice daily for 1 months, then should be reduced down to daily afterwards by PCP  - New medication Carafate, this helps calm stomach acid to protect against a rebleed, take this for next 14 days  - Follow up with Cardiology for ongoing management and work up for valve replacement  - Obtain bloodwork 11/13 to follow with PCP

## 2023-11-09 NOTE — ASSESSMENT & PLAN NOTE
Secondary to GI bleed baseline hemoglobin appears to be around 14  Zain 7.4  S/p 2u pRBC  Hgb appears improved and stable  Iron panel and b12 wnl

## 2023-11-09 NOTE — CASE MANAGEMENT
Case Management Discharge Planning Note    Patient name Thomas Srinivasan  Location [address] [facility] 229/-75 MRN [id number]  : 1955 Date 2023       Current Admission Date: 2023  Current Admission Diagnosis:Acute GI bleeding   Patient Active Problem List    Diagnosis Date Noted    Acute GI bleeding 2023    ABLA (acute blood loss anemia) 2023    s/p [employer] dual chamber ICD 2023    Ventricular tachycardia ([address]) 2023    Status post insertion of drug-eluting stent into left anterior descending (LAD) artery 10/17/2023    Mixed hyperlipidemia 10/02/2023    Statin intolerance 10/02/2023    Status post amputation of great toe, left ([address]) 2023    Dyspnea on exertion 2023    Episodic lightheadedness 2023    Borderline high cholesterol     Acute hematogenous osteomyelitis of left foot ([address]) 2023    Status post foot surgery 2023    PVCs (premature ventricular contractions) 2023    Heart failure with preserved ejection fraction 2023    ANNALEE (acute kidney injury) ([address]) 02/10/2023    Hyperkalemia 02/10/2023    MSSA bacteremia 02/10/2023    Ulcer of left foot ([address]) 2023    Other emphysema ([address]) 2023    Benign essential HTN 2023    Vasovagal syncope 2023    MDD (major depressive disorder) 2023    Peripheral neuropathy 2023      LOS (days): 3  Geometric Mean LOS (GMLOS) (days): 3.00  Days to GMLOS:0.2     OBJECTIVE:  Risk of Unplanned Readmission Score: 19.48         Current admission status: Inpatient   Preferred Pharmacy:   [facility] [address] [place] [address]  Phone: [phone] Fax: [phone]    Primary Care Provider: Shubham Lincoln MD    Primary Insurance: MEDICARE  Secondary Insurance: BLUE CROSS    DISCHARGE DETAILS:    Discharge planning discussed with[de-identified] patients wife via phone, pt already left facility prior to talking with CM        CM contacted family/caregiver?: Yes  Were Treatment Team discharge recommendations reviewed with patient/caregiver?: Yes  Did patient/caregiver verbalize understanding of patient care needs?: Yes  Were patient/caregiver advised of the risks associated with not following Treatment Team discharge recommendations?: Yes    Contacts  Contact Method: Phone  Reason/Outcome: Discharge [address]         Is the patient interested in [facility] at discharge?: No         Other Referral/Resources/Interventions Provided:  Interventions: Other (Specify)  Referral Comments: PCP appt  made with Dr Melinda Dowd nov 14th at [address] [place] [address]    Would you like to participate in our [facility] service program?  : No - Declined    Treatment Team Recommendation: Home  Discharge Destination Plan[de-identified] Home       IMM Given (Date):: 11/09/23  IMM Given to[de-identified] Family (reviewed with wife via phone, pt left facility prior to meeting with CM)   Spoke with wife via phone they had to leave prior to meeting with CM. Provided wife with PCP appt made. Nurse reviewed AVS, all follow up providers listed. Wife transported home.

## 2023-11-09 NOTE — ASSESSMENT & PLAN NOTE
History of aortic stenosis. Being evaluated for TAVR however it was delayed due to recent admission for CAD s/p stent and followed by episode of ventricular tachycardia. Further follow-up outpatient with cardiology  Syncopal episode while at [facility] today. Likely secondary to GI bleed.     Pacemaker interrogated in the ED  No further symptoms, has been ambulating without issue  Continue follow up with Cardiology for aortic valve work up and management

## 2023-11-09 NOTE — ASSESSMENT & PLAN NOTE
Continue Toprol XL  Resume Lasix on discharge  BP have been normal to mild hypertensive, will hold amlodipine at discharge

## 2023-11-09 NOTE — PLAN OF CARE
Problem: PAIN - ADULT  Goal: Verbalizes/displays adequate comfort level or baseline comfort level  Description: Interventions:  - Encourage patient to monitor pain and request assistance  - Assess pain using appropriate pain scale  - Administer analgesics based on type and severity of pain and evaluate response  - Implement non-pharmacological measures as appropriate and evaluate response  - Consider cultural and social influences on pain and pain management  - Notify physician/advanced practitioner if interventions unsuccessful or patient reports new pain  Outcome: Progressing     Problem: INFECTION - ADULT  Goal: Absence or prevention of progression during hospitalization  Description: INTERVENTIONS:  - Assess and monitor for signs and symptoms of infection  - Monitor lab/diagnostic results  - Monitor all insertion sites, i.e. indwelling lines, tubes, and drains  - Monitor endotracheal if appropriate and nasal secretions for changes in amount and color  - [place] appropriate cooling/warming therapies per order  - Administer medications as ordered  - Instruct and encourage patient and family to use good hand hygiene technique  - Identify and instruct in appropriate isolation precautions for identified infection/condition  Outcome: Progressing  Goal: Absence of fever/infection during neutropenic period  Description: INTERVENTIONS:  - Monitor WBC    Outcome: Progressing     Problem: SAFETY ADULT  Goal: Patient will remain free of falls  Description: INTERVENTIONS:  - Educate patient/family on patient safety including physical limitations  - Instruct patient to call for assistance with activity   - Consult OT/PT to assist with strengthening/mobility   - Keep Call bell within reach  - Keep bed low and locked with side rails adjusted as appropriate  - Keep care items and personal belongings within reach  - Initiate and maintain comfort rounds  - Make Fall Risk Sign visible to staff  - Offer Toileting every 2 Hours, in advance of need  - Initiate/Maintain alarm  - Obtain necessary fall risk management equipment: yellow socks  - Apply yellow socks and bracelet for high fall risk patients  - Consider moving patient to room near nurses station  Outcome: Progressing  Goal: Maintain or return to baseline ADL function  Description: INTERVENTIONS:  -  Assess patient's ability to carry out ADLs; assess patient's baseline for ADL function and identify physical deficits which impact ability to perform ADLs (bathing, care of mouth/teeth, toileting, grooming, dressing, etc.)  - Assess/evaluate cause of self-care deficits   - Assess range of motion  - Assess patient's mobility; develop plan if impaired  - Assess patient's need for assistive devices and provide as appropriate  - Encourage maximum independence but intervene and supervise when necessary  - Involve family in performance of ADLs  - Assess for home care needs following discharge   - Consider OT consult to assist with ADL evaluation and planning for discharge  - Provide patient education as appropriate  Outcome: Progressing  Goal: Maintains/Returns to pre admission functional level  Description: INTERVENTIONS:  - Perform BMAT or MOVE assessment daily.   - Set and communicate daily mobility goal to care team and patient/family/caregiver. - Collaborate with rehabilitation services on mobility goals if consulted  - Perform Range of Motion 3 times a day. - Reposition patient every 3 hours.   - Dangle patient 3 times a day  - Stand patient 3 times a day  - Ambulate patient 3 times a day  - Out of bed to chair 3 times a day   - Out of bed for meals 3 times a day  - Out of bed for toileting  - Record patient progress and toleration of activity level   Outcome: Progressing     Problem: DISCHARGE PLANNING  Goal: Discharge to home or other facility with appropriate resources  Description: INTERVENTIONS:  - Identify barriers to discharge w/patient and caregiver  - Arrange for needed discharge resources and transportation as appropriate  - Identify discharge learning needs (meds, wound care, etc.)  - Arrange for interpretive services to assist at discharge as needed  - Refer to Case Management Department for coordinating discharge planning if the patient needs post-hospital services based on physician/advanced practitioner order or complex needs related to functional status, cognitive ability, or social support system  Outcome: Progressing     Problem: Knowledge Deficit  Goal: Patient/family/caregiver demonstrates understanding of disease process, treatment plan, medications, and discharge instructions  Description: Complete learning assessment and assess knowledge base.   Interventions:  - Provide teaching at level of understanding  - Provide teaching via preferred learning methods  Outcome: Progressing     Problem: Potential for Falls  Goal: Patient will remain free of falls  Description: INTERVENTIONS:  - Educate patient/family on patient safety including physical limitations  - Instruct patient to call for assistance with activity   - Consult OT/PT to assist with strengthening/mobility   - Keep Call bell within reach  - Keep bed low and locked with side rails adjusted as appropriate  - Keep care items and personal belongings within reach  - Initiate and maintain comfort rounds  - Make Fall Risk Sign visible to staff  - Apply yellow socks and bracelet for high fall risk patients  - Consider moving patient to room near nurses station  Outcome: Progressing     Problem: CARDIOVASCULAR - ADULT  Goal: Maintains optimal cardiac output and hemodynamic stability  Description: INTERVENTIONS:  - Monitor I/O, vital signs and rhythm  - Monitor for S/S and trends of decreased cardiac output  - Administer and titrate ordered vasoactive medications to optimize hemodynamic stability  - Assess quality of pulses, skin color and temperature  - Assess for signs of decreased coronary artery perfusion  - Instruct patient to report change in severity of symptoms  Outcome: Progressing  Goal: Absence of cardiac dysrhythmias or at baseline rhythm  Description: INTERVENTIONS:  - Continuous cardiac monitoring, vital signs, obtain 12 lead EKG if ordered  - Administer antiarrhythmic and heart rate control medications as ordered  - Monitor electrolytes and administer replacement therapy as ordered  Outcome: Progressing     Problem: GASTROINTESTINAL - ADULT  Goal: Minimal or absence of nausea and/or vomiting  Description: INTERVENTIONS:  - Administer IV fluids if ordered to ensure adequate hydration  - Maintain NPO status until nausea and vomiting are resolved  - Nasogastric tube if ordered  - Administer ordered antiemetic medications as needed  - Provide nonpharmacologic comfort measures as appropriate  - Advance diet as tolerated, if ordered  - Consider nutrition services referral to assist patient with adequate nutrition and appropriate food choices  Outcome: Progressing  Goal: Maintains or returns to baseline bowel function  Description: INTERVENTIONS:  - Assess bowel function  - Encourage oral fluids to ensure adequate hydration  - Administer IV fluids if ordered to ensure adequate hydration  - Administer ordered medications as needed  - Encourage mobilization and activity  - Consider nutritional services referral to assist patient with adequate nutrition and appropriate food choices  Outcome: Progressing  Goal: Maintains adequate nutritional intake  Description: INTERVENTIONS:  - Monitor percentage of each meal consumed  - Identify factors contributing to decreased intake, treat as appropriate  - Assist with meals as needed  - Monitor I&O, weight, and lab values if indicated  - Obtain nutrition services referral as needed  Outcome: Progressing  Goal: Oral mucous membranes remain intact  Description: INTERVENTIONS  - Assess oral mucosa and hygiene practices  - Implement preventative oral hygiene regimen  - Implement oral medicated treatments as ordered  - Initiate Nutrition services referral as needed  Outcome: Progressing     Problem: HEMATOLOGIC - ADULT  Goal: Maintains hematologic stability  Description: INTERVENTIONS  - Assess for signs and symptoms of bleeding or hemorrhage  - Monitor labs  - Administer supportive blood products/factors as ordered and appropriate  Outcome: Progressing

## 2023-11-09 NOTE — ASSESSMENT & PLAN NOTE
Patient presents with dark stools for the last 5 days patient is having 1-2 bowel movements per day denies any abdominal pain or fevers or chills. Denies any vomiting. Patient had coronary disease stent placed in the LAD 2 weeks ago is currently on aspirin and Plavix. Denies any NSAID use or any prior history of endoscopies or ulcers. He took his aspirin and Plavix morning of admission 11/6  Consulted GI  EGD: Dieulafoy's lesion in the body of the stomach; there was oozing blood and a blood clot; placed 1 clip successfully   Carafate, PPI  Started back on ASA and Plavix given stent  Hgb stable, no further evidence bleeding  Having melena but with visualized blood on EGD, suspect this will continue for awhile as blood clears out of GI tract.  Discussed and explained to patient  CBC x4 days from discharge

## 2023-11-09 NOTE — DISCHARGE SUMMARY
[address] [facility]  Discharge- Liss Emery 1955, 77 y.o. male MRN: [id number]  Unit/Bed#: -01 Encounter: [id number]  Primary Care Provider: Franki Najera MD   Date and time admitted to hospital: 11/6/2023 12:45 PM    * Acute GI bleeding  Assessment & Plan  Patient presents with dark stools for the last 5 days patient is having 1-2 bowel movements per day denies any abdominal pain or fevers or chills. Denies any vomiting. Patient had coronary disease stent placed in the LAD 2 weeks ago is currently on aspirin and Plavix. Denies any NSAID use or any prior history of endoscopies or ulcers. He took his aspirin and Plavix morning of admission 11/6  Consulted GI  EGD: Dieulafoy's lesion in the body of the stomach; there was oozing blood and a blood clot; placed 1 clip successfully   Carafate, PPI  Started back on ASA and Plavix given stent  Hgb stable, no further evidence bleeding  Having melena but with visualized blood on EGD, suspect this will continue for awhile as blood clears out of GI tract. Discussed and explained to patient  CBC x4 days from discharge    ABLA (acute blood loss anemia)  Assessment & Plan  Secondary to GI bleed baseline hemoglobin appears to be around 14  Zain 7.4  S/p 2u pRBC  Hgb appears improved and stable  Iron panel and b12 wnl    Ventricular tachycardia (HCC)  Assessment & Plan  history of V. tach. None noted so far. Continue amiodarone and metoprolol    Status post insertion of drug-eluting stent into left anterior descending (LAD) artery  Assessment & Plan  DEON placed roughly 2 weeks ago  On ASA/Plavix  Cardiology consulted, appreciate recs  Back on ASA and plavix  No CP, no symptoms    Mixed hyperlipidemia  Assessment & Plan  Continue statin therapy    Vasovagal syncope  Assessment & Plan  History of aortic stenosis.   Being evaluated for TAVR however it was delayed due to recent admission for CAD s/p stent and followed by episode of ventricular tachycardia. Further follow-up outpatient with cardiology  [facility]. Likely secondary to GI bleed. Pacemaker interrogated in the ED  No further symptoms, has been ambulating without issue  Continue follow up with Cardiology for aortic valve work up and management    Benign essential HTN  Assessment & Plan  Continue Toprol XL  Resume Lasix on discharge  BP have been normal to mild hypertensive, will hold amlodipine at discharge        Medical Problems       Resolved Problems  Date Reviewed: 11/9/2023   None       Discharging Physician / Practitioner: Travis Baldwin DO  PCP: Pamela Cuenca MD  Admission Date:   Admission Orders (From admission, onward)       Ordered        11/06/23 1504  INPATIENT ADMISSION  Once                          Discharge Date: 11/09/23    Consultations During Hospital Stay:  Cardiology, GI    Procedures Performed:   EGD    Significant Findings / Test Results:   X-ray chest 1 view portable   Final Result by Emily Jama MD (11/07 2119)      No acute cardiopulmonary disease. Workstation performed: [id number]         CT head without contrast   Final Result by Ramon Landis MD (11/06 1335)      No evidence of acute intracranial process. Chronic microangiopathy.                   Workstation performed: [id number]           Lab Results   Component Value Date    WBC 10.15 11/09/2023    HGB 8.3 (L) 11/09/2023    HCT 25.4 (L) 11/09/2023    MCV 96 11/09/2023     11/09/2023     Lab Results   Component Value Date    SODIUM 141 11/08/2023    K 4.1 11/08/2023     (H) 11/08/2023    CO2 28 11/08/2023    BUN 26 (H) 11/08/2023    CREATININE 1.01 11/08/2023    GLUC 108 11/08/2023    CALCIUM 8.6 11/08/2023     EGD  Irregular Z-line  Medium hiatal hernia  Significant amount of fresh blood and blood clotting in the body of the stomach  Single large Dieulafoy's lesion in the body of the stomach; there was oozing blood and a blood clot; placed 1 clip successfully and 1 clip unsuccessfully; hemostasis achieved; injected epinephrine to address bleeding; hemostasis achieved  Abnormal mucosa, consistent with gastritis in the body of the stomach and antrum  Moderate abnormal mucosa in the stomach, consistent with gastritis  The duodenal bulb, 1st part of the duodenum and 2nd part of the duodenum appeared normal.    Incidental Findings:   See above   I reviewed the above mentioned incidental findings with the patient and/or family and they expressed understanding. Test Results Pending at Discharge (will require follow up):   none     Outpatient Tests Requested:  CBC on 11/13 to follow with PCP    Complications:  None    Reason for Admission: Syncope, GIB    Hospital Course:   Dimple Aleman is a 77 y.o. male patient who originally presented to the hospital on 11/6/2023 due to an episode of vasovagal syncope and GI bleed. Patient was found to have significant acute blood loss anemia. He was started on IV Protonix, seen by GI and went for EGD which showed a bleeding vessel which was clipped, and significant mount of blood in the stomach. Given his recent drug-eluting stent, he was monitored as he was restarted on Plavix and aspirin. No further signs of bleeding. He did receive 2 units of packed red blood cells during admission, his hemoglobin ultimately stabilized and clinically and symptomatically improved. Patient is medically stable at time of discharge. Please see above list of diagnoses and related plan for additional information. Condition at Discharge: good    Discharge Day Visit / Exam:   Subjective: Patient states he has been walking around without any symptoms, denies any chest pain, shortness of breath, lightheadedness, dizziness. He still has some melena but it was discussed that given the amount of blood he had in his stomach during EGD it is likely this to continue for some time.   Vitals: Blood Pressure: 131/78 (11/09/23 0734)  Pulse: 56 (11/08/23 2223)  Temperature: 98.1 °F (36.7 °C) (11/09/23 0734)  Temp Source: Oral (11/07/23 1440)  Respirations: 18 (11/09/23 0734)  Height: 6' (182.9 cm) (11/06/23 1555)  Weight - Scale: 99.2 kg (218 lb 11.1 oz) (11/09/23 0558)  SpO2: 97 % (11/09/23 0820)  Exam:   Physical Exam  Vitals and nursing note reviewed. Constitutional:       General: He is not in acute distress. Appearance: He is well-developed. HENT:      Head: Normocephalic and atraumatic. Eyes:      Comments: Conjunctival pallor   Cardiovascular:      Rate and Rhythm: Normal rate and regular rhythm. Heart sounds: No murmur heard. Comments: Significant systolic ejection murmur  Pulmonary:      Effort: Pulmonary effort is normal. No respiratory distress. Breath sounds: Normal breath sounds. Abdominal:      Palpations: Abdomen is soft. Tenderness: There is no abdominal tenderness. Musculoskeletal:         General: No swelling. Cervical back: Neck supple. Skin:     General: Skin is warm and dry. Capillary Refill: Capillary refill takes less than 2 seconds. Neurological:      Mental Status: He is alert. Psychiatric:         Mood and Affect: Mood normal.          Discussion with Family: Patient declined call to . Discharge instructions/Information to patient and family:   See after visit summary for information provided to patient and family. Provisions for Follow-Up Care:  See after visit summary for information related to follow-up care and any pertinent home health orders. Disposition:   Home    Planned Readmission: no     Discharge Statement:  I spent 42 minutes discharging the patient. This time was spent on the day of discharge. I had direct contact with the patient on the day of discharge.  Greater than 50% of the total time was spent examining patient, answering all patient questions, arranging and discussing plan of care with patient as well as directly providing post-discharge instructions. Additional time then spent on discharge activities. Discharge Medications:  See after visit summary for reconciled discharge medications provided to patient and/or family.       **Please Note: This note may have been constructed using a voice recognition system**

## 2023-11-10 DIAGNOSIS — I25.10 CAD (CORONARY ARTERY DISEASE): ICD-10-CM

## 2023-11-12 RX ORDER — CLOPIDOGREL BISULFATE 75 MG/1
75 TABLET ORAL DAILY
Qty: 30 TABLET | Refills: 0 | Status: SHIPPED | OUTPATIENT
Start: 2023-11-12

## 2023-11-13 ENCOUNTER — APPOINTMENT (OUTPATIENT)
Dept: LAB | Facility: HOSPITAL | Age: 68
End: 2023-11-13
Payer: MEDICARE

## 2023-11-13 DIAGNOSIS — K92.2 GI BLEED: ICD-10-CM

## 2023-11-13 DIAGNOSIS — E78.2 MIXED HYPERLIPIDEMIA: ICD-10-CM

## 2023-11-13 LAB
ALBUMIN SERPL BCP-MCNC: 3.8 G/DL (ref 3.5–5)
ALP SERPL-CCNC: 73 U/L (ref 34–104)
ALT SERPL W P-5'-P-CCNC: 12 U/L (ref 7–52)
ANION GAP SERPL CALCULATED.3IONS-SCNC: 6 MMOL/L
AST SERPL W P-5'-P-CCNC: 12 U/L (ref 13–39)
BILIRUB SERPL-MCNC: 0.43 MG/DL (ref 0.2–1)
BUN SERPL-MCNC: 17 MG/DL (ref 5–25)
CALCIUM SERPL-MCNC: 9.3 MG/DL (ref 8.4–10.2)
CHLORIDE SERPL-SCNC: 104 MMOL/L (ref 96–108)
CHOLEST SERPL-MCNC: 132 MG/DL
CK SERPL-CCNC: 36 U/L (ref 39–308)
CO2 SERPL-SCNC: 30 MMOL/L (ref 21–32)
CREAT SERPL-MCNC: 1.18 MG/DL (ref 0.6–1.3)
ERYTHROCYTE [DISTWIDTH] IN BLOOD BY AUTOMATED COUNT: 14.4 % (ref 11.6–15.1)
GFR SERPL CREATININE-BSD FRML MDRD: 62 ML/MIN/1.73SQ M
GLUCOSE SERPL-MCNC: 114 MG/DL (ref 65–140)
HCT VFR BLD AUTO: 30.2 % (ref 36.5–49.3)
HDLC SERPL-MCNC: 43 MG/DL
HGB BLD-MCNC: 9.4 G/DL (ref 12–17)
LDLC SERPL CALC-MCNC: 56 MG/DL (ref 0–100)
MCH RBC QN AUTO: 30.6 PG (ref 26.8–34.3)
MCHC RBC AUTO-ENTMCNC: 31.1 G/DL (ref 31.4–37.4)
MCV RBC AUTO: 98 FL (ref 82–98)
PLATELET # BLD AUTO: 433 THOUSANDS/UL (ref 149–390)
PMV BLD AUTO: 8.9 FL (ref 8.9–12.7)
POTASSIUM SERPL-SCNC: 4.2 MMOL/L (ref 3.5–5.3)
PROT SERPL-MCNC: 6.4 G/DL (ref 6.4–8.4)
RBC # BLD AUTO: 3.07 MILLION/UL (ref 3.88–5.62)
SODIUM SERPL-SCNC: 140 MMOL/L (ref 135–147)
TRIGL SERPL-MCNC: 165 MG/DL
WBC # BLD AUTO: 12.38 THOUSAND/UL (ref 4.31–10.16)

## 2023-11-13 PROCEDURE — 82550 ASSAY OF CK (CPK): CPT

## 2023-11-13 PROCEDURE — 80061 LIPID PANEL: CPT

## 2023-11-13 PROCEDURE — 80053 COMPREHEN METABOLIC PANEL: CPT

## 2023-11-13 PROCEDURE — 85027 COMPLETE CBC AUTOMATED: CPT

## 2023-11-13 PROCEDURE — 36415 COLL VENOUS BLD VENIPUNCTURE: CPT

## 2023-11-16 ENCOUNTER — IN-CLINIC DEVICE VISIT (OUTPATIENT)
Dept: CARDIOLOGY CLINIC | Facility: CLINIC | Age: 68
End: 2023-11-16

## 2023-11-16 DIAGNOSIS — Z95.810 PRESENCE OF AUTOMATIC CARDIOVERTER/DEFIBRILLATOR (AICD): Primary | ICD-10-CM

## 2023-11-16 PROCEDURE — 99024 POSTOP FOLLOW-UP VISIT: CPT | Performed by: INTERNAL MEDICINE

## 2023-11-16 NOTE — PROGRESS NOTES
Results for orders placed or performed in visit on 11/16/23   Cardiac EP device report    Narrative    MDT DC ICD (MVP ON) - ACTIVE SYSTEM IS MRI CONDITIONAL  DEVICE INTERROGATED IN THE [place] OFFICE: WOUND CHECK: INCISION CLEAN AND DRY WITH EDGES APPROXIMATED;  WOUND CARE AND RESTRICTIONS REVIEWED WITH PATIENT (PDF # 2 ATTACHED) BRUISING ON CHEST WALL, PT TAKES PLAVIX, ASA 81MG, AMIODARONE, METOPROLOL SUCC. EF: 60% (ECHO 9/8/23). BATTERY VOLTAGE ADEQUATE (12.2 YRS). AP: 13%. : 0.1% (MVP-ON). ALL  LEAD PARAMETERS WITHIN NORMAL LIMITS (RA [email or web address]). NO SIGNIFICANT HIGH RATE EPISODES. NO PROGRAMMING CHANGES MADE TO DEVICE PARAMETERS. NORMAL DEVICE FUNCTION.  [id number] [address]

## 2023-11-21 ENCOUNTER — APPOINTMENT (OUTPATIENT)
Dept: LAB | Facility: HOSPITAL | Age: 68
End: 2023-11-21
Payer: MEDICARE

## 2023-11-21 DIAGNOSIS — L97.521 ULCER OF LEFT FOOT, LIMITED TO BREAKDOWN OF SKIN (HCC): ICD-10-CM

## 2023-11-21 DIAGNOSIS — A41.9 SEPSIS, DUE TO UNSPECIFIED ORGANISM, UNSPECIFIED WHETHER ACUTE ORGAN DYSFUNCTION PRESENT (HCC): ICD-10-CM

## 2023-11-21 LAB
ALBUMIN SERPL BCP-MCNC: 4 G/DL (ref 3.5–5)
ALP SERPL-CCNC: 81 U/L (ref 34–104)
ALT SERPL W P-5'-P-CCNC: 11 U/L (ref 7–52)
ANION GAP SERPL CALCULATED.3IONS-SCNC: 7 MMOL/L
AST SERPL W P-5'-P-CCNC: 14 U/L (ref 13–39)
BILIRUB SERPL-MCNC: 0.36 MG/DL (ref 0.2–1)
BUN SERPL-MCNC: 20 MG/DL (ref 5–25)
CALCIUM SERPL-MCNC: 9.2 MG/DL (ref 8.4–10.2)
CHLORIDE SERPL-SCNC: 104 MMOL/L (ref 96–108)
CO2 SERPL-SCNC: 29 MMOL/L (ref 21–32)
CREAT SERPL-MCNC: 1.28 MG/DL (ref 0.6–1.3)
GFR SERPL CREATININE-BSD FRML MDRD: 56 ML/MIN/1.73SQ M
GLUCOSE P FAST SERPL-MCNC: 108 MG/DL (ref 65–99)
POTASSIUM SERPL-SCNC: 4.2 MMOL/L (ref 3.5–5.3)
PROT SERPL-MCNC: 7 G/DL (ref 6.4–8.4)
SODIUM SERPL-SCNC: 140 MMOL/L (ref 135–147)

## 2023-11-21 PROCEDURE — 36415 COLL VENOUS BLD VENIPUNCTURE: CPT

## 2023-11-21 PROCEDURE — 80053 COMPREHEN METABOLIC PANEL: CPT

## 2023-11-30 ENCOUNTER — OFFICE VISIT (OUTPATIENT)
Dept: CARDIAC SURGERY | Facility: CLINIC | Age: 68
End: 2023-11-30
Payer: MEDICARE

## 2023-11-30 VITALS
BODY MASS INDEX: 30.2 KG/M2 | HEART RATE: 67 BPM | HEIGHT: 72 IN | SYSTOLIC BLOOD PRESSURE: 157 MMHG | WEIGHT: 223 LBS | OXYGEN SATURATION: 98 % | DIASTOLIC BLOOD PRESSURE: 73 MMHG

## 2023-11-30 DIAGNOSIS — Z95.810 ICD (IMPLANTABLE CARDIOVERTER-DEFIBRILLATOR) IN PLACE: ICD-10-CM

## 2023-11-30 DIAGNOSIS — I35.0 SEVERE AORTIC STENOSIS: Primary | ICD-10-CM

## 2023-11-30 DIAGNOSIS — I47.20 VENTRICULAR TACHYCARDIA (HCC): ICD-10-CM

## 2023-11-30 PROCEDURE — 99214 OFFICE O/P EST MOD 30 MIN: CPT | Performed by: THORACIC SURGERY (CARDIOTHORACIC VASCULAR SURGERY)

## 2023-11-30 RX ORDER — CEFAZOLIN SODIUM 2 G/50ML
2000 SOLUTION INTRAVENOUS ONCE
Status: CANCELLED | OUTPATIENT
Start: 2023-11-30 | End: 2023-11-30

## 2023-11-30 RX ORDER — CHLORHEXIDINE GLUCONATE ORAL RINSE 1.2 MG/ML
15 SOLUTION DENTAL ONCE
Status: CANCELLED | OUTPATIENT
Start: 2023-11-30 | End: 2023-11-30

## 2023-11-30 NOTE — LETTER
November 30, 2023     Blanquita Vegas MD  [address] [facility] [address]    Patient: Isabel Chan   YOB: 1955   Date of Visit: 11/30/2023       Dear Dr. Lena Goldman: Thank you for referring Isabel Chan to me for evaluation. Below are my notes for this consultation. If you have questions, please do not hesitate to call me. I look forward to following your patient along with you. Sincerely,        Judith Cabrera MD        CC: Daly Delgado, [address] [place] [address]  11/30/2023 12:24 PM  Attested  Pre-Op History & Physical - Cardiothoracic Surgery   Isabel Chan 77 y.o. male MRN: [id number]    Physician Requesting Consult: Guillermo Sykes [address] [place] [address]    Reason for Consult / Principal Problem: Aortic stenosis, Non-Rheumatic    History of Present Illness: Isabel Chan is a 77y.o. year old male who was previously evaluated in our office by Judith Cabrera M.D. for transcatheter aortic valve replacement. During this initial consultation, arrangements were made for the following studies to be completed: Gated CTA of the chest/abdomen/pelvis,  cardiac catheterization, dental clearance and carotid artery ultrasound. Isabel Chan now presents to review the results of these tests and obtain a second surgeon to confirm the suitability of proceeding with transcatheter aortic valve replacment. In review, Patient's PMHx is notable for AS, CAD s/p PCI/DEON LAD (10/17/23), VT s/p ICD (11/2/23), HTN, HLD, CKD3, pre-DM (A1c 5.9%), hypothyroidism, diastolic CHF, PVC's, tobacco abuse, emphysema, h/o chronic L great toe wound w/ osteo s/p amputation (6/2023), peripheral neuropathy, colon polyp and polio in childhood (L leg atrophy). Patient gives a h/o lifelong heart murmur since childhood. He denies h/o rheumatic fever. He reports likely polio in childhood without official diagnosis but he has lifelong left leg atrophy. Echo in 2021 at [address]  Demonstrated moderate AS.       Patient was admitted to [address] [place] [address] in Jan 2023 with generalized weakness, SOB, cough and fever. He was found to have staph aureus bacteremia/sepsis, likely source from a chronic left toe ulcer. He was also noted to be in CHF. TTE was performed to rule out endocarditis demonstrated moderate AS. Patient was treated with diuresis, antibiotics and seen podiatry to address toe ulcer. He subsequently underwent treatment and eventual L great toe amputation in June 2023 due to osteomyelitis. He was seen for Cardiology follow-up and repeat Echo in early September demonstrates progression to severe AS. At his initial visit in our office in September,  the patient reported symptoms of  progressive symptoms of fatigue, decrease in activity tolerance, MONTANEZ, "skipped" heart beats and fluid retention in the form of bloating. He has bilateral lower extremity neuropathy, mostly numbness. He denied chest pain, lightheadedness, peripheral edema, PND or orthopnea. Since his initial visit in our office in September, her underwent cardiac cath in October that demonstrated 80% stenosis of prox-mid LAD, treated with PCI/DEON. On 10/30/23 while riding a stationary bike at cardiac rehab, patient became dizzy and was noted to be in VT. He was transferred to the local ER [facility] and was subsequently transferred to [address] [place] [address] and underwent ICD implantation on 11/2/23. Patient is accompanied by his wife today. He reports same symptoms or MONTANEZ, fatigue and poor activity tolerance. He denies new symptoms. He reports his left toe amputation site is completely healed. Patient reports he has quit smoking completley since 11/2/23. He has on average 2 alcoholic drinks per week. He denies drug use or vaping. Patient is a retired steel work, previously employed at [employer] in [address]. Patient up to date with dental care.        Past Medical History:  Past Medical History:   Diagnosis Date   • Anxiety    • Aortic stenosis    • Borderline high cholesterol    • CHF (congestive heart failure) ([address])     Jenna Dr Morenita Andersen-   • Colon polyp    • Depression    • Heart murmur    • Hypertension    • Hypothyroidism    • Prediabetes    • PVCs (premature ventricular contractions)     "skip"   • s/p Medtronic dual chamber ICD 11/2/2023 11/04/2023   • Wears glasses     readers         Past Surgical History:   Past Surgical History:   Procedure Laterality Date   • APPENDECTOMY     • BACK SURGERY     • CARDIAC CATHETERIZATION N/A 10/17/2023    Procedure: Cardiac RHC/LHC; Surgeon: Susana Low MD;  Location: [facility]; Service: Cardiology   • CARDIAC ELECTROPHYSIOLOGY PROCEDURE N/A 11/2/2023    Procedure: Cardiac icd implant;  Surgeon: Cary Degroot MD;  Location: [facility];   Service: Cardiology   • CATARACT EXTRACTION Bilateral    • CLOSURE DELAYED PRIMARY Left 06/09/2023    Procedure: CLOSURE DELAYED PRIMARY;  Surgeon: Yoko Payne DPM;  Location: [facility];  Service: Podiatry   • COLONOSCOPY  2023    NATHALIE Guerra   • AK CORRJ HALLUX VALGUS W/SESMDC W/RESCJ PROX PHAL Left 04/19/2023    Procedure: [facility] and wound debridement;  Surgeon: Emily Christian DPM;  Location: [facility];  Service: Podiatry   • TOE AMPUTATION Left 06/07/2023    Procedure: PARTIAL 1ST RAY AMPUTATION;  Surgeon: Yoko Payne DPM;  Location: [facility];  Service: Podiatry   • TONSILLECTOMY     • TOTAL SHOULDER REPLACEMENT Left    • WISDOM TOOTH EXTRACTION           Family History:  Family History   Problem Relation Age of Onset   • Dementia Mother    • Blindness Mother    • Cancer Mother    • Dementia Father          Social History:    Social History     Substance and Sexual Activity   Alcohol Use Yes    Comment: socially-2 drinks per week     Social History     Substance and Sexual Activity   Drug Use Not Currently    Comment: sporadic in young adulthood     Social History     Tobacco Use   Smoking Status Former   • Packs/day: 0.50   • Years: 50.00   • Total pack years: 25.00   • Types: Cigarettes   • Quit date: 11/3/2023   • Years since quittin.0   Smokeless Tobacco Never       Home Medications:   Prior to Admission medications    Medication Sig Start Date End Date Taking? Authorizing Provider   Alirocumab 150 MG/ML SOAJ Inject 150 mg under the skin every 14 (fourteen) days 10/17/23  Yes JESUS Meraz   amiodarone 200 mg tablet Take 1 tablet (200 mg total) by mouth daily 11/3/23 2/1/24 Yes Sim Spears PA-C   aspirin 81 mg chewable tablet Chew 1 tablet (81 mg total) daily 10/17/23  Yes Carmelita Ye,    buPROPion (WELLBUTRIN XL) 300 mg 24 hr tablet  19  Yes Historical Provider, MD   clopidogrel (PLAVIX) 75 mg tablet TAKE ONE TABLET BY MOUTH EVERY DAY 23  Yes Neyda Ramirez MD   escitalopram (LEXAPRO) 20 mg tablet Take 20 mg by mouth daily 19  Yes Historical Provider, MD   ezetimibe (ZETIA) 10 mg tablet Take 1 tablet (10 mg total) by mouth daily 10/2/23  Yes Neyda Ramirez MD   furosemide (LASIX) 40 mg tablet Take 1 tablet (40 mg total) by mouth daily 23  Yes Hamida Steven DO   levothyroxine 75 mcg tablet  19  Yes Historical Provider, MD   Magnesium 400 MG CAPS Take 1 capsule (400 mg total) by mouth in the morning 23  Yes JESUS Meraz   metoprolol succinate (TOPROL-XL) 25 mg 24 hr tablet Take 0.5 tablets (12.5 mg total) by mouth daily 23  Yes JESUS Meraz   mupirocin (BACTROBAN) 2 % ointment Apply topically 2 (two) times a day for 5 days Apply to each nostril twice daily for five days before your operation.  23 Yes JESUS Olivia   Omega-3 Fatty Acids (Fish Oil) 1200 MG CAPS Take by mouth   Yes Historical Provider, MD   pantoprazole (PROTONIX) 40 mg tablet Take 1 tablet (40 mg total) by mouth 2 (two) times a day before meals 23  Yes Braden Espinoza Counts, DO   potassium chloride (MICRO-K) 10 MEQ CR capsule Take 1 capsule (10 mEq total) by mouth daily 3/8/23  Yes JESUS Delgado   pravastatin (PRAVACHOL) 20 mg tablet Take 1 tablet (20 mg total) by mouth every other day  Patient not taking: Reported on 11/6/2023 10/2/23   Neyda Ramirez MD   sucralfate (CARAFATE) 1 g tablet Take 1 tablet (1 g total) by mouth 4 (four) times a day (before meals and at bedtime) for 14 days  Patient not taking: Reported on 11/30/2023 11/9/23 11/30/23  Braden Baldwin, DO       Allergies:   Allergies   Allergen Reactions   • Statins Myalgia     Other reaction(s): muscle aches and joint aches         Review of Systems:  Review of Systems - History obtained from chart review and the patient  General ROS: positive for  - fatigue and change in activity tolerance  negative for - chills, fever, sleep disturbance, or weight gain  Psychological ROS: negative  Ophthalmic ROS: negative  ENT ROS: negative  Allergy and Immunology ROS: negative  Hematological and Lymphatic ROS: negative  Endocrine ROS: negative  Breast ROS: negative  Respiratory ROS: positive for - shortness of breath  negative for - cough, hemoptysis, orthopnea, or pleuritic pain  Cardiovascular ROS: positive for - dyspnea on exertion, irregular heartbeat, murmur, and shortness of breath  negative for - chest pain, edema, loss of consciousness, orthopnea, palpitations, paroxysmal nocturnal dyspnea, or rapid heart rate  Gastrointestinal ROS: no abdominal pain, change in bowel habits, or black or bloody stools  Genito-Urinary ROS: no dysuria, trouble voiding, or hematuria  Musculoskeletal ROS: negative  Neurological ROS: positive for - numbness/tingling- feet  Dermatological ROS: negative    Vital Signs:     Vitals:    11/30/23 1126 11/30/23 1128   BP: 133/61 157/73   BP Location: Left arm Right arm   Patient Position: Sitting Sitting   Cuff Size: Standard Standard   Pulse: 67    SpO2: 98%    Weight: 101 kg (223 lb)    Height: 6' (1.829 m)        Physical Exam:    General: Alert, oriented, well developed, no acute distress  HEENT/NECK:  PERRLA. No jugular venous distention. Cardiac:Regular rate and rhythm, III/VI harsh systolic murmur RUSB. Carotids: 1+ pulses, no bruits   Pulmonary:  Breath sounds clear bilaterally. Abdomen:  Non-tender, Non-distended. Positive bowel sounds. Upper extremities: 2+ radial pulses; brisk capillary refill  Lower extremities: Extremities warm/dry. PT/DP pulses 1+ bilaterally. No edema B/L  Neuro: Alert and oriented X 3. Sensation is grossly intact. No focal deficits. Musculoskeletal: MAEE, stable gait  Skin: Warm/Dry, without rashes or lesions. Lab Results:   Lab Results   Component Value Date    WBC 12.38 (H) 11/13/2023    HGB 9.4 (L) 11/13/2023    HCT 30.2 (L) 11/13/2023    MCV 98 11/13/2023     (H) 11/13/2023     Lab Results   Component Value Date    SODIUM 140 11/21/2023    K 4.2 11/21/2023     11/21/2023    CO2 29 11/21/2023    AGAP 7 11/21/2023    BUN 20 11/21/2023    CREATININE 1.28 11/21/2023    GLUC 114 11/13/2023    GLUF 108 (H) 11/21/2023    CALCIUM 9.2 11/21/2023    AST 14 11/21/2023    ALT 11 11/21/2023    ALKPHOS 81 11/21/2023    TP 7.0 11/21/2023    TBILI 0.36 11/21/2023    EGFR 56 11/21/2023         Lab Results   Component Value Date    HGBA1C 5.9 (H) 01/03/2023     Lab Results   Component Value Date    CKTOTAL 36 (L) 11/13/2023       Imaging Studies:     Echocardiogram:  9/8/23     •  Limited study for AS  •  Left Ventricle: Left ventricular cavity size is normal. Wall thickness is mildly increased. The left ventricular ejection fraction is 60% by visual estimation. . Systolic function is normal. Wall motion is normal. Diastolic function is mildly abnormal, consistent with grade I (abnormal) relaxation. •  Left Atrium: The atrium is dilated. •  Aortic Valve: The leaflets are moderately calcified. There is severe stenosis. The aortic valve peak velocity is 4.2 m/s. The aortic valve mean gradient is 41 mmHg.  The dimensionless velocity index is 0.24. The aortic valve area is 1.00 cm2. •  AS now appears severe compared to previous study from 1/2023     Findings    Left Ventricle Left ventricular cavity size is normal. Wall thickness is mildly increased. The left ventricular ejection fraction is 60% by visual estimation. . Systolic function is normal.  Wall motion is normal. Diastolic function is mildly abnormal, consistent with grade I (abnormal) relaxation. Right Ventricle Right ventricular cavity size is normal. Systolic function is normal.   Left Atrium The atrium is dilated. Aortic Valve The leaflets are moderately calcified. There is trace regurgitation. There is severe stenosis. The aortic valve peak velocity is 4.2 m/s. The aortic valve mean gradient is 41 mmHg. The dimensionless velocity index is 0.24. The aortic valve area is 1.00 cm2. Mitral Valve There is mild thickening. There is trace regurgitation. Tricuspid Valve The leaflets are not thickened. There is trace regurgitation. Ascending Aorta The aortic root is normal in size. IVC/SVC The inferior vena cava is not well visualized.      Left Ventricle Measurements    Function/Volumes   A4C EF 54 %         LVOT stroke volume 108.7 cm3         LVOT stroke volume index 50.9 ml/m2         LV Diastolic Volume (BP) 711 mL         LV Systolic Volume (BP) 56 mL         EF 56 %         LVOT Cardiac Output 7.18 l/min         LVOT Cardiac Index 3.29 l/min/m2         Dimensions   LVIDd 4.9 cm         LVIDS 3.4 cm         IVSd 1.4 cm         LVPWd 1.3 cm         LVOT area 3.8 cm2         FS 31 %         Diastolic Filling   MV E' Tissue Velocity Septal 5 cm/s         MV E' Tissue Velocity Lateral 5 cm/s         E wave deceleration time 243 ms         MV Peak E Roosevelt 78 cm/s         MV Peak A Roosevelt 0.89 m/s          Report Measurements   AV LVOT peak gradient 4 mmHg              Interventricular Septum Measurements    Shunt Ratio   LVOT peak VTI 28.61 cm         LVOT peak roosevelt 1.02 m/s Left Atrium Measurements    Dimensions   LA size 5 cm         LA length (A2C) 6.1 cm         Volumes   LA volume (BP) 104 mL               Atrial Septum Measurements    Shunt Ratio   LVOT peak VTI 28.61 cm         LVOT peak abel 1.02 m/s               Aortic Valve Measurements    Stenosis   Aortic valve peak velocity 4.2 m/s         LVOT peak abel 1.02 m/s         Ao .36 cm         LVOT peak VTI 28.61 cm         AV mean gradient 41 mmHg         LVOT mn grad 2 mmHg         AV peak gradient 71 mmHg         AV LVOT peak gradient 4 mmHg         Area/Dimensions   DVI 0.24         AV valve area 1 cm2         AV area by cont VTI 1 cm2         AV area peak abel 0.9 cm2         LVOT diameter 2.2 cm         LVOT area 3.8 cm2               Mitral Valve Measurements    Stenosis   MV stenosis pressure 1/2 time 70 ms         MV valve area p 1/2 method 3.14 cm2               Aorta Measurements      Aortic Dimensions   Ao root 3.4 cm                Gated CTA of the chest/abdomen/pelvis:  10/6/23    VASCULAR STRUCTURES:  Annulus: diameter 29.8 x 23.9 mm  area: 597.2 sq mm  Annulus to LCA: 17.5 mm  Annulus to RCA: 21.3 mm  Minimal diameter right iliofemoral segment: 7.5 mm  Minimal diameter left iliofemoral segment: 6.3 mm     The ascending aorta is nonaneurysmal measuring 36 mm with mild atherosclerosis. There is a type II aortic arch with classic branching anatomy and no stenosis in the visualized great vessels. The aortic arch is nonaneurysmal with mild atherosclerosis. The   descending thoracic aorta is nonaneurysmal with mild atherosclerosis. The abdominal aorta is nonaneurysmal with mild atherosclerosis. Bilateral iliofemoral arteries are nonaneurysmal with mild atherosclerosis. Celiac, superior mesenteric, inferior mesenteric, and bilateral renal arteries are patent. Cardiac findings: There is moderate calcification of the aortic valve.  The left ventricle is normal. The ventricular septum is normal. No prior valvular surgery. No prior bypass surgery. No pericardial effusion. No cardiac mass or thrombus. Coronary artery calcifications   present. Cardiac catheterization:  10/17/23      Mid RCA lesion is 40% stenosed. Prox LAD to Mid LAD lesion is 80% stenosed. The LAD lesion was interrogated by iFR and was borderline, varying from 0.88 to 0.91. IVUS was also performed, and there was a marked reduction in cross sectional area. PCI was performed, with placement of a 4.0x26mm DEON. There was no residual stenosis. There was full stent expansion and apposition, with 0% residual stenosis. Carotid artery ultrasound:  10/6/23      RIGHT:  There is <50% stenosis noted in the tortuous internal carotid artery. Plaque is  heterogenous and irregular. Vertebral artery flow is antegrade. There is no significant subclavian artery  disease. LEFT:  There is <50% stenosis noted in the tortuous internal carotid artery. Plaque is  heterogenous and smooth. Vertebral artery flow is antegrade. There is no significant subclavian artery  disease. I have personally reviewed pertinent films in PACS    TAVR evaluation Assessment:     5 Meter Walk Test:      Attempt 1: 5 sec   Attempt 2: 6 sec   Attempt 3: 6 sec    STS Risk Score: 1.8%    Aortic Stenosis Stage: D3    [facility]: III    AVRP-01 completed        Impression/Plan:    Lay Taylor has symptomatic severe aortic stenosis. They have undergone testing for transcatheter aortic valve replacement. The results of these studies have been interpreted by the multidisciplinary TAVR team who have determined the patient to be Intermediate risk for open aortic valve replacement based on other pre-existing comobidities not reflected in the STS risk score. The risks, benefits, and alternatives to TAVR were discussed in detail with the patient today. They understand and wish to proceed with transfemoral transcatheter aortic valve replacement.   Informed consent was obtained and a date for the intervention has been set. Pre-op instructions reviewed with patient and they were instructed to stop Omega 3 now but continue PLAVIX for recent PCI/DOEN. Ronalviky Cabrera was comfortable with our recommendations, and their questions were answered to their satisfaction. SIGNATURE: JESUS Perales  DATE: November 30, 2023  TIME: 12:02 PM  Attestation signed by Paula Baez MD at 11/30/2023  1:19 PM:  Patient seen and evaluated with [address] [place] [address] / [place]. I agree with the above assessment and plan with the following additions. The patient is a 64-year-old man who evaluated for symptomatic severe aortic stenosis (stage D1) on initial consultation I offered SAVR and TAVR, he chose TAVR, he has completed preoperative testing and return to schedule surgery. As part of preoperative testing he was found to have LAD disease, interventional cardiologist performed PCI. I reviewed the diagnosis once again and the indication for surgery, I explained the procedure, benefits, risk and possible complications. He understands and agrees to proceed with surgery. He will return for elective TAVR. This note was completed in part utilizing [employer] direct voice recognition software. Grammatical errors, random word insertion, spelling mistakes, and incomplete sentences may be an occasional consequence of the system secondary to software limitations, ambient noise and hardware issues. At the time of dictation, efforts were made to edit, clarify and /or correct errors. Please read the chart carefully and recognize, using context, where substitutions have occurred. If you have any questions or concerns about the context, text or information contained within the body of this dictation, please contact myself, the provider, for further clarification.

## 2023-11-30 NOTE — PROGRESS NOTES
Pre-Op History & Physical - Cardiothoracic Surgery   Burak Barrett 77 y.o. male MRN: [id number]    Physician Requesting Consult: Shruti Dent, [facility]    Reason for Consult / Principal Problem: Aortic stenosis, Non-Rheumatic    History of Present Illness: Burak Barrett is a 77y.o. year old male who was previously evaluated in our office by Chen Macias M.D. for transcatheter aortic valve replacement. During this initial consultation, arrangements were made for the following studies to be completed: Gated CTA of the chest/abdomen/pelvis,  cardiac catheterization, dental clearance and carotid artery ultrasound. Burak Barrett now presents to review the results of these tests and obtain a second surgeon to confirm the suitability of proceeding with transcatheter aortic valve replacment. In review, Patient's PMHx is notable for AS, CAD s/p PCI/DEON LAD (10/17/23), VT s/p ICD (11/2/23), HTN, HLD, CKD3, pre-DM (A1c 5.9%), hypothyroidism, diastolic CHF, PVC's, tobacco abuse, emphysema, h/o chronic L great toe wound w/ osteo s/p amputation (6/2023), peripheral neuropathy, colon polyp and polio in childhood (L leg atrophy). Patient gives a h/o lifelong heart murmur since childhood. He denies h/o rheumatic fever. He reports likely polio in childhood without official diagnosis but he has lifelong left leg atrophy. Echo in 2021 at [facility]  Demonstrated moderate AS. Patient was admitted to [address] [place] [address] in Jan 2023 with generalized weakness, SOB, cough and fever. He was found to have staph aureus bacteremia/sepsis, likely source from a chronic left toe ulcer. He was also noted to be in CHF. TTE was performed to rule out endocarditis demonstrated moderate AS. Patient was treated with diuresis, antibiotics and seen podiatry to address toe ulcer. He subsequently underwent treatment and eventual L great toe amputation in June 2023 due to osteomyelitis.  He was seen for Cardiology follow-up and repeat Echo in early September demonstrates progression to severe AS. At his initial visit in our office in September,  the patient reported symptoms of  progressive symptoms of fatigue, decrease in activity tolerance, MONTANEZ, "skipped" heart beats and fluid retention in the form of bloating. He has bilateral lower extremity neuropathy, mostly numbness. He denied chest pain, lightheadedness, peripheral edema, PND or orthopnea. Since his initial visit in our office in September, her underwent cardiac cath in October that demonstrated 80% stenosis of prox-mid LAD, treated with PCI/DEON. On 10/30/23 while riding a stationary bike at cardiac rehab, patient became dizzy and was noted to be in VT. He was transferred to the local ER [facility] and was subsequently transferred to [address] [place] [address] and underwent ICD implantation on 11/2/23. Patient is accompanied by his wife today. He reports same symptoms or MONTANEZ, fatigue and poor activity tolerance. He denies new symptoms. He reports his left toe amputation site is completely healed. Patient reports he has quit smoking completley since 11/2/23. He has on average 2 alcoholic drinks per week. He denies drug use or vaping. Patient is a retired steel work, previously employed at [employer] in [place], [place]. Patient up to date with dental care. Past Medical History:  Past Medical History:   Diagnosis Date    Anxiety     Aortic stenosis     Borderline high cholesterol     CHF (congestive heart failure) (HCC)     Asaf Jackson-cardio-    Colon polyp     Depression     Heart murmur     Hypertension     Hypothyroidism     Prediabetes     PVCs (premature ventricular contractions)     "skip"    s/p Medtronic dual chamber ICD 11/2/2023 11/04/2023    Wears glasses     readers         Past Surgical History:   Past Surgical History:   Procedure Laterality Date    APPENDECTOMY      BACK SURGERY      CARDIAC CATHETERIZATION N/A 10/17/2023    Procedure: Cardiac RHC/LHC;   Surgeon: Estephania Solis Philippe Schilling MD;  Location: [facility]; Service: Cardiology    CARDIAC ELECTROPHYSIOLOGY PROCEDURE N/A 2023    Procedure: Cardiac icd implant;  Surgeon: Karin Narayan MD;  Location: [facility]; Service: Cardiology    CATARACT EXTRACTION Bilateral     CLOSURE DELAYED PRIMARY Left 2023    Procedure: CLOSURE DELAYED PRIMARY;  Surgeon: Simone Jerez DPM;  Location: [facility];  Service: Podiatry    COLONOSCOPY      Reading, PA    IN CORRJ HALLUX VALGUS W/SESMDC W/RESCJ PROX PHAL Left 2023    Procedure: Sherra Isaura and wound debridement;  Surgeon: Lester Hamilton DPM;  Location: [facility];  Service: Podiatry    TOE AMPUTATION Left 2023    Procedure: PARTIAL 1ST RAY AMPUTATION;  Surgeon: Simone Jerez DPM;  Location: [facility];  Service: Podiatry    TONSILLECTOMY      TOTAL SHOULDER REPLACEMENT Left     WISDOM TOOTH EXTRACTION           Family History:  Family History   Problem Relation Age of Onset    Dementia Mother     Blindness Mother     Cancer Mother     Dementia Father          Social History:    Social History     Substance and Sexual Activity   Alcohol Use Yes    Comment: socially-2 drinks per week     Social History     Substance and Sexual Activity   Drug Use Not Currently    Comment: sporadic in young adulthood     Social History     Tobacco Use   Smoking Status Former    Packs/day: 0.50    Years: 50.00    Total pack years: 25.00    Types: Cigarettes    Quit date: 11/3/2023    Years since quittin.0   Smokeless Tobacco Never       Home Medications:   Prior to Admission medications    Medication Sig Start Date End Date Taking?  Authorizing Provider   Alirocumab 150 MG/ML SOAJ Inject 150 mg under the skin every 14 (fourteen) days 10/17/23  Yes JESUS Greene   amiodarone 200 mg tablet Take 1 tablet (200 mg total) by mouth daily 11/3/23 2/1/24 Yes Dg Roper PA-C   aspirin 81 mg chewable tablet Chew 1 tablet (81 mg total) daily 10/17/23  Yes Carmelita Ye, DO   buPROPion (WELLBUTRIN XL) 300 mg 24 hr tablet  5/25/19  Yes Historical Provider, MD   clopidogrel (PLAVIX) 75 mg tablet TAKE ONE TABLET BY MOUTH EVERY DAY 11/12/23  Yes Neyda Ramirez MD   escitalopram (LEXAPRO) 20 mg tablet Take 20 mg by mouth daily 4/5/19  Yes Historical Provider, MD   ezetimibe (ZETIA) 10 mg tablet Take 1 tablet (10 mg total) by mouth daily 10/2/23  Yes Neyda Ramirez MD   furosemide (LASIX) 40 mg tablet Take 1 tablet (40 mg total) by mouth daily 11/4/23  Yes Hamida Steven, DO   levothyroxine 75 mcg tablet  5/17/19  Yes Historical Provider, MD   Magnesium 400 MG CAPS Take 1 capsule (400 mg total) by mouth in the morning 5/5/23  Yes JESUS Frost   metoprolol succinate (TOPROL-XL) 25 mg 24 hr tablet Take 0.5 tablets (12.5 mg total) by mouth daily 6/13/23  Yes JESUS Frost   mupirocin (BACTROBAN) 2 % ointment Apply topically 2 (two) times a day for 5 days Apply to each nostril twice daily for five days before your operation. 11/30/23 12/5/23 Yes JESUS Parikh   Omega-3 Fatty Acids (Fish Oil) 1200 MG CAPS Take by mouth   Yes Historical Provider, MD   pantoprazole (PROTONIX) 40 mg tablet Take 1 tablet (40 mg total) by mouth 2 (two) times a day before meals 11/9/23  Yes Braden Baldwin,    potassium chloride (MICRO-K) 10 MEQ CR capsule Take 1 capsule (10 mEq total) by mouth daily 3/8/23  Yes JESUS Frost   pravastatin (PRAVACHOL) 20 mg tablet Take 1 tablet (20 mg total) by mouth every other day  Patient not taking: Reported on 11/6/2023 10/2/23   Neyda Ramirez MD   sucralfate (CARAFATE) 1 g tablet Take 1 tablet (1 g total) by mouth 4 (four) times a day (before meals and at bedtime) for 14 days  Patient not taking: Reported on 11/30/2023 11/9/23 11/30/23  Braden Espinoza Counts, DO       Allergies:   Allergies   Allergen Reactions    Statins Myalgia     Other reaction(s): muscle aches and joint aches         Review of Systems:  Review of Systems - History obtained from chart review and the patient  General ROS: positive for  - fatigue and change in activity tolerance  negative for - chills, fever, sleep disturbance, or weight gain  Psychological ROS: negative  Ophthalmic ROS: negative  ENT ROS: negative  Allergy and Immunology ROS: negative  Hematological and Lymphatic ROS: negative  Endocrine ROS: negative  Breast ROS: negative  Respiratory ROS: positive for - shortness of breath  negative for - cough, hemoptysis, orthopnea, or pleuritic pain  Cardiovascular ROS: positive for - dyspnea on exertion, irregular heartbeat, murmur, and shortness of breath  negative for - chest pain, edema, loss of consciousness, orthopnea, palpitations, paroxysmal nocturnal dyspnea, or rapid heart rate  Gastrointestinal ROS: no abdominal pain, change in bowel habits, or black or bloody stools  Genito-Urinary ROS: no dysuria, trouble voiding, or hematuria  Musculoskeletal ROS: negative  Neurological ROS: positive for - numbness/tingling- feet  Dermatological ROS: negative    Vital Signs:     Vitals:    11/30/23 1126 11/30/23 1128   BP: 133/61 157/73   BP Location: Left arm Right arm   Patient Position: Sitting Sitting   Cuff Size: Standard Standard   Pulse: 67    SpO2: 98%    Weight: 101 kg (223 lb)    Height: 6' (1.829 m)        Physical Exam:    General: Alert, oriented, well developed, no acute distress  HEENT/NECK:  PERRLA. No jugular venous distention. Cardiac:Regular rate and rhythm, III/VI harsh systolic murmur RUSB. Carotids: 1+ pulses, no bruits   Pulmonary:  Breath sounds clear bilaterally. Abdomen:  Non-tender, Non-distended. Positive bowel sounds. Upper extremities: 2+ radial pulses; brisk capillary refill  Lower extremities: Extremities warm/dry. PT/DP pulses 1+ bilaterally. No edema B/L  Neuro: Alert and oriented X 3. Sensation is grossly intact. No focal deficits.   Musculoskeletal: MAEE, stable gait  Skin: Warm/Dry, without rashes or lesions. Lab Results:   Lab Results   Component Value Date    WBC 12.38 (H) 11/13/2023    HGB 9.4 (L) 11/13/2023    HCT 30.2 (L) 11/13/2023    MCV 98 11/13/2023     (H) 11/13/2023     Lab Results   Component Value Date    SODIUM 140 11/21/2023    K 4.2 11/21/2023     11/21/2023    CO2 29 11/21/2023    AGAP 7 11/21/2023    BUN 20 11/21/2023    CREATININE 1.28 11/21/2023    GLUC 114 11/13/2023    GLUF 108 (H) 11/21/2023    CALCIUM 9.2 11/21/2023    AST 14 11/21/2023    ALT 11 11/21/2023    ALKPHOS 81 11/21/2023    TP 7.0 11/21/2023    TBILI 0.36 11/21/2023    EGFR 56 11/21/2023         Lab Results   Component Value Date    HGBA1C 5.9 (H) 01/03/2023     Lab Results   Component Value Date    CKTOTAL 36 (L) 11/13/2023       Imaging Studies:     Echocardiogram:  9/8/23       Limited study for AS    Left Ventricle: Left ventricular cavity size is normal. Wall thickness is mildly increased. The left ventricular ejection fraction is 60% by visual estimation. . Systolic function is normal. Wall motion is normal. Diastolic function is mildly abnormal, consistent with grade I (abnormal) relaxation. Left Atrium: The atrium is dilated. Aortic Valve: The leaflets are moderately calcified. There is severe stenosis. The aortic valve peak velocity is 4.2 m/s. The aortic valve mean gradient is 41 mmHg. The dimensionless velocity index is 0.24. The aortic valve area is 1.00 cm2. AS now appears severe compared to previous study from 1/2023     Findings    Left Ventricle Left ventricular cavity size is normal. Wall thickness is mildly increased. The left ventricular ejection fraction is 60% by visual estimation. . Systolic function is normal.  Wall motion is normal. Diastolic function is mildly abnormal, consistent with grade I (abnormal) relaxation. Right Ventricle Right ventricular cavity size is normal. Systolic function is normal.   Left Atrium The atrium is dilated.    Aortic Valve The leaflets are moderately calcified. There is trace regurgitation. There is severe stenosis. The aortic valve peak velocity is 4.2 m/s. The aortic valve mean gradient is 41 mmHg. The dimensionless velocity index is 0.24. The aortic valve area is 1.00 cm2. Mitral Valve There is mild thickening. There is trace regurgitation. Tricuspid Valve The leaflets are not thickened. There is trace regurgitation. Ascending Aorta The aortic root is normal in size. IVC/SVC The inferior vena cava is not well visualized.      Left Ventricle Measurements    Function/Volumes   A4C EF 54 %         LVOT stroke volume 108.7 cm3         LVOT stroke volume index 50.9 ml/m2         LV Diastolic Volume (BP) 683 mL         LV Systolic Volume (BP) 56 mL         EF 56 %         LVOT Cardiac Output 7.18 l/min         LVOT Cardiac Index 3.29 l/min/m2         Dimensions   LVIDd 4.9 cm         LVIDS 3.4 cm         IVSd 1.4 cm         LVPWd 1.3 cm         LVOT area 3.8 cm2         FS 31 %         Diastolic Filling   MV E' Tissue Velocity Septal 5 cm/s         MV E' Tissue Velocity Lateral 5 cm/s         E wave deceleration time 243 ms         MV Peak E Roosevelt 78 cm/s         MV Peak A Roosevelt 0.89 m/s          Report Measurements   AV LVOT peak gradient 4 mmHg              Interventricular Septum Measurements    Shunt Ratio   LVOT peak VTI 28.61 cm         LVOT peak roosevelt 1.02 m/s              Left Atrium Measurements    Dimensions   LA size 5 cm         LA length (A2C) 6.1 cm         Volumes   LA volume (BP) 104 mL               Atrial Septum Measurements    Shunt Ratio   LVOT peak VTI 28.61 cm         LVOT peak roosevelt 1.02 m/s               Aortic Valve Measurements    Stenosis   Aortic valve peak velocity 4.2 m/s         LVOT peak roosevelt 1.02 m/s         Ao .36 cm         LVOT peak VTI 28.61 cm         AV mean gradient 41 mmHg         LVOT mn grad 2 mmHg         AV peak gradient 71 mmHg         AV LVOT peak gradient 4 mmHg         Area/Dimensions   DVI 0.24         AV valve area 1 cm2         AV area by cont VTI 1 cm2         AV area peak abel 0.9 cm2         LVOT diameter 2.2 cm         LVOT area 3.8 cm2               Mitral Valve Measurements    Stenosis   MV stenosis pressure 1/2 time 70 ms         MV valve area p 1/2 method 3.14 cm2               Aorta Measurements      Aortic Dimensions   Ao root 3.4 cm                Gated CTA of the chest/abdomen/pelvis:  10/6/23    VASCULAR STRUCTURES:  Annulus: diameter 29.8 x 23.9 mm  area: 597.2 sq mm  Annulus to LCA: 17.5 mm  Annulus to RCA: 21.3 mm  Minimal diameter right iliofemoral segment: 7.5 mm  Minimal diameter left iliofemoral segment: 6.3 mm     The ascending aorta is nonaneurysmal measuring 36 mm with mild atherosclerosis. There is a type II aortic arch with classic branching anatomy and no stenosis in the visualized great vessels. The aortic arch is nonaneurysmal with mild atherosclerosis. The   descending thoracic aorta is nonaneurysmal with mild atherosclerosis. The abdominal aorta is nonaneurysmal with mild atherosclerosis. Bilateral iliofemoral arteries are nonaneurysmal with mild atherosclerosis. Celiac, superior mesenteric, inferior mesenteric, and bilateral renal arteries are patent. Cardiac findings: There is moderate calcification of the aortic valve. The left ventricle is normal. The ventricular septum is normal. No prior valvular surgery. No prior bypass surgery. No pericardial effusion. No cardiac mass or thrombus. Coronary artery calcifications   present. Cardiac catheterization:  10/17/23      Mid RCA lesion is 40% stenosed. Prox LAD to Mid LAD lesion is 80% stenosed. The LAD lesion was interrogated by iFR and was borderline, varying from 0.88 to 0.91. IVUS was also performed, and there was a marked reduction in cross sectional area. PCI was performed, with placement of a 4.0x26mm DEON. There was no residual stenosis. There was full stent expansion and apposition, with 0% residual stenosis. Carotid artery ultrasound:  10/6/23      RIGHT:  There is <50% stenosis noted in the tortuous internal carotid artery. Plaque is  heterogenous and irregular. Vertebral artery flow is antegrade. There is no significant subclavian artery  disease. LEFT:  There is <50% stenosis noted in the tortuous internal carotid artery. Plaque is  heterogenous and smooth. Vertebral artery flow is antegrade. There is no significant subclavian artery  disease. I have personally reviewed pertinent films in PACS    TAVR evaluation Assessment:     5 Meter Walk Test:      Attempt 1: 5 sec   Attempt 2: 6 sec   Attempt 3: 6 sec    STS Risk Score: 1.8%    Aortic Stenosis Stage: D3    [facility]: III    [id number] completed        Impression/Plan:    Rabia Polo has symptomatic severe aortic stenosis. They have undergone testing for transcatheter aortic valve replacement. The results of these studies have been interpreted by the multidisciplinary TAVR team who have determined the patient to be Intermediate risk for open aortic valve replacement based on other pre-existing comobidities not reflected in the STS risk score. The risks, benefits, and alternatives to TAVR were discussed in detail with the patient today. They understand and wish to proceed with transfemoral transcatheter aortic valve replacement. Informed consent was obtained and a date for the intervention has been set. Pre-op instructions reviewed with patient and they were instructed to stop Omega 3 now but continue PLAVIX for recent PCI/DEON. Rabia Polo was comfortable with our recommendations, and their questions were answered to their satisfaction.        SIGNATURE: JESUS Clark  DATE: November 30, 2023  TIME: 12:02 PM

## 2023-11-30 NOTE — H&P (VIEW-ONLY)
Pre-Op History & Physical - Cardiothoracic Surgery   Andrey Lund 77 y.o. male MRN: [id number]    Physician Requesting Consult: Lincoln Mccray, [address] [facility]    Reason for Consult / Principal Problem: Aortic stenosis, Non-Rheumatic    History of Present Illness: Andrey Lund is a 77y.o. year old male who was previously evaluated in our office by Juana Davis M.D. for transcatheter aortic valve replacement. During this initial consultation, arrangements were made for the following studies to be completed: Gated CTA of the chest/abdomen/pelvis,  cardiac catheterization, dental clearance and carotid artery ultrasound. Andrey Lund now presents to review the results of these tests and obtain a second surgeon to confirm the suitability of proceeding with transcatheter aortic valve replacment. In review, Patient's PMHx is notable for AS, CAD s/p PCI/DEON LAD (10/17/23), VT s/p ICD (11/2/23), HTN, HLD, CKD3, pre-DM (A1c 5.9%), hypothyroidism, diastolic CHF, PVC's, tobacco abuse, emphysema, h/o chronic L great toe wound w/ osteo s/p amputation (6/2023), peripheral neuropathy, colon polyp and polio in childhood (L leg atrophy). Patient gives a h/o lifelong heart murmur since childhood. He denies h/o rheumatic fever. He reports likely polio in childhood without official diagnosis but he has lifelong left leg atrophy. Echo in 2021 at [address]  Demonstrated moderate AS. Patient was admitted to [address] [place] [address] in Jan 2023 with generalized weakness, SOB, cough and fever. He was found to have staph aureus bacteremia/sepsis, likely source from a chronic left toe ulcer. He was also noted to be in CHF. TTE was performed to rule out endocarditis demonstrated moderate AS. Patient was treated with diuresis, antibiotics and seen podiatry to address toe ulcer. He subsequently underwent treatment and eventual L great toe amputation in June 2023 due to osteomyelitis.  He was seen for Cardiology follow-up and repeat Echo in early 18-May-2023 22:55 September demonstrates progression to severe AS. At his initial visit in our office in September,  the patient reported symptoms of  progressive symptoms of fatigue, decrease in activity tolerance, MONTANEZ, "skipped" heart beats and fluid retention in the form of bloating. He has bilateral lower extremity neuropathy, mostly numbness. He denied chest pain, lightheadedness, peripheral edema, PND or orthopnea. Since his initial visit in our office in September, her underwent cardiac cath in October that demonstrated 80% stenosis of prox-mid LAD, treated with PCI/DEON. On 10/30/23 while riding a stationary bike at cardiac rehab, patient became dizzy and was noted to be in VT. He was transferred to the local ER [facility] and was subsequently transferred to [address] [place] [address] and underwent ICD implantation on 11/2/23. Patient is accompanied by his wife today. He reports same symptoms or MONTANEZ, fatigue and poor activity tolerance. He denies new symptoms. He reports his left toe amputation site is completely healed. Patient reports he has quit smoking completley since 11/2/23. He has on average 2 alcoholic drinks per week. He denies drug use or vaping. Patient is a retired steel work, previously employed at [employer] in [address]. Patient up to date with dental care. Past Medical History:  Past Medical History:   Diagnosis Date    Anxiety     Aortic stenosis     Borderline high cholesterol     CHF (congestive heart failure) (HCC)     Asaf Walker January Renetta-cardio-    Colon polyp     Depression     Heart murmur     Hypertension     Hypothyroidism     Prediabetes     PVCs (premature ventricular contractions)     "skip"    s/p Medtronic dual chamber ICD 11/2/2023 11/04/2023    Wears glasses     readers         Past Surgical History:   Past Surgical History:   Procedure Laterality Date    APPENDECTOMY      BACK SURGERY      CARDIAC CATHETERIZATION N/A 10/17/2023    Procedure: Cardiac RHC/LHC;   Surgeon: Imelda Kwok Radha Wu MD;  Location: [facility]; Service: Cardiology    CARDIAC ELECTROPHYSIOLOGY PROCEDURE N/A 2023    Procedure: Cardiac icd implant;  Surgeon: Lon Valdez MD;  Location: [facility]; Service: Cardiology    CATARACT EXTRACTION Bilateral     CLOSURE DELAYED PRIMARY Left 2023    Procedure: CLOSURE DELAYED PRIMARY;  Surgeon: Marge Conn DPM;  Location: [facility];  Service: Podiatry    COLONOSCOPY      NATHALIE Guerra    ME CORRJ HALLUX VALGUS W/SESMDC W/RESCJ PROX PHAL Left 2023    Procedure: Hedwig Grounds and wound debridement;  Surgeon: Logan Cleaning DPM;  Location: [facility];  Service: Podiatry    TOE AMPUTATION Left 2023    Procedure: PARTIAL 1ST RAY AMPUTATION;  Surgeon: Marge Conn DPM;  Location: [facility];  Service: Podiatry    TONSILLECTOMY      TOTAL SHOULDER REPLACEMENT Left     WISDOM TOOTH EXTRACTION           Family History:  Family History   Problem Relation Age of Onset    Dementia Mother     Blindness Mother     Cancer Mother     Dementia Father          Social History:    Social History     Substance and Sexual Activity   Alcohol Use Yes    Comment: socially-2 drinks per week     Social History     Substance and Sexual Activity   Drug Use Not Currently    Comment: sporadic in young adulthood     Social History     Tobacco Use   Smoking Status Former    Packs/day: 0.50    Years: 50.00    Total pack years: 25.00    Types: Cigarettes    Quit date: 11/3/2023    Years since quittin.0   Smokeless Tobacco Never       Home Medications:   Prior to Admission medications    Medication Sig Start Date End Date Taking?  Authorizing Provider   Alirocumab 150 MG/ML SOAJ Inject 150 mg under the skin every 14 (fourteen) days 10/17/23  Yes JESUS Delgado   amiodarone 200 mg tablet Take 1 tablet (200 mg total) by mouth daily 11/3/23 2/1/24 Yes gD Roper PA-C   aspirin 81 mg chewable tablet Chew 1 tablet (81 mg total) daily 10/17/23  Yes Carmelita Ye, DO   buPROPion (WELLBUTRIN XL) 300 mg 24 hr tablet  5/25/19  Yes Historical Provider, MD   clopidogrel (PLAVIX) 75 mg tablet TAKE ONE TABLET BY MOUTH EVERY DAY 11/12/23  Yes Neyda Ramirez MD   escitalopram (LEXAPRO) 20 mg tablet Take 20 mg by mouth daily 4/5/19  Yes Historical Provider, MD   ezetimibe (ZETIA) 10 mg tablet Take 1 tablet (10 mg total) by mouth daily 10/2/23  Yes Neyda Ramirez MD   furosemide (LASIX) 40 mg tablet Take 1 tablet (40 mg total) by mouth daily 11/4/23  Yes Hamida Steven,    levothyroxine 75 mcg tablet  5/17/19  Yes Historical Provider, MD   Magnesium 400 MG CAPS Take 1 capsule (400 mg total) by mouth in the morning 5/5/23  Yes JESUS Milligan   metoprolol succinate (TOPROL-XL) 25 mg 24 hr tablet Take 0.5 tablets (12.5 mg total) by mouth daily 6/13/23  Yes JESUS Milligan   mupirocin (BACTROBAN) 2 % ointment Apply topically 2 (two) times a day for 5 days Apply to each nostril twice daily for five days before your operation. 11/30/23 12/5/23 Yes JESUS Diaz   Omega-3 Fatty Acids (Fish Oil) 1200 MG CAPS Take by mouth   Yes Historical Provider, MD   pantoprazole (PROTONIX) 40 mg tablet Take 1 tablet (40 mg total) by mouth 2 (two) times a day before meals 11/9/23  Yes Braden Baldwin DO   potassium chloride (MICRO-K) 10 MEQ CR capsule Take 1 capsule (10 mEq total) by mouth daily 3/8/23  Yes JESUS Milligan   pravastatin (PRAVACHOL) 20 mg tablet Take 1 tablet (20 mg total) by mouth every other day  Patient not taking: Reported on 11/6/2023 10/2/23   Neyda Ramirez MD   sucralfate (CARAFATE) 1 g tablet Take 1 tablet (1 g total) by mouth 4 (four) times a day (before meals and at bedtime) for 14 days  Patient not taking: Reported on 11/30/2023 11/9/23 11/30/23  Braden Alexis Counts, DO       Allergies:   Allergies   Allergen Reactions    Statins Myalgia     Other reaction(s): muscle aches and joint aches         Review of Systems:  Review of Systems - History obtained from chart review and the patient  General ROS: positive for  - fatigue and change in activity tolerance  negative for - chills, fever, sleep disturbance, or weight gain  Psychological ROS: negative  Ophthalmic ROS: negative  ENT ROS: negative  Allergy and Immunology ROS: negative  Hematological and Lymphatic ROS: negative  Endocrine ROS: negative  Breast ROS: negative  Respiratory ROS: positive for - shortness of breath  negative for - cough, hemoptysis, orthopnea, or pleuritic pain  Cardiovascular ROS: positive for - dyspnea on exertion, irregular heartbeat, murmur, and shortness of breath  negative for - chest pain, edema, loss of consciousness, orthopnea, palpitations, paroxysmal nocturnal dyspnea, or rapid heart rate  Gastrointestinal ROS: no abdominal pain, change in bowel habits, or black or bloody stools  Genito-Urinary ROS: no dysuria, trouble voiding, or hematuria  Musculoskeletal ROS: negative  Neurological ROS: positive for - numbness/tingling- feet  Dermatological ROS: negative    Vital Signs:     Vitals:    11/30/23 1126 11/30/23 1128   BP: 133/61 157/73   BP Location: Left arm Right arm   Patient Position: Sitting Sitting   Cuff Size: Standard Standard   Pulse: 67    SpO2: 98%    Weight: 101 kg (223 lb)    Height: 6' (1.829 m)        Physical Exam:    General: Alert, oriented, well developed, no acute distress  HEENT/NECK:  PERRLA. No jugular venous distention. Cardiac:Regular rate and rhythm, III/VI harsh systolic murmur RUSB. Carotids: 1+ pulses, no bruits   Pulmonary:  Breath sounds clear bilaterally. Abdomen:  Non-tender, Non-distended. Positive bowel sounds. Upper extremities: 2+ radial pulses; brisk capillary refill  Lower extremities: Extremities warm/dry. PT/DP pulses 1+ bilaterally. No edema B/L  Neuro: Alert and oriented X 3. Sensation is grossly intact. No focal deficits.   Musculoskeletal: MAEE, stable gait  Skin: Warm/Dry, without rashes or lesions. Lab Results:   Lab Results   Component Value Date    WBC 12.38 (H) 11/13/2023    HGB 9.4 (L) 11/13/2023    HCT 30.2 (L) 11/13/2023    MCV 98 11/13/2023     (H) 11/13/2023     Lab Results   Component Value Date    SODIUM 140 11/21/2023    K 4.2 11/21/2023     11/21/2023    CO2 29 11/21/2023    AGAP 7 11/21/2023    BUN 20 11/21/2023    CREATININE 1.28 11/21/2023    GLUC 114 11/13/2023    GLUF 108 (H) 11/21/2023    CALCIUM 9.2 11/21/2023    AST 14 11/21/2023    ALT 11 11/21/2023    ALKPHOS 81 11/21/2023    TP 7.0 11/21/2023    TBILI 0.36 11/21/2023    EGFR 56 11/21/2023         Lab Results   Component Value Date    HGBA1C 5.9 (H) 01/03/2023     Lab Results   Component Value Date    CKTOTAL 36 (L) 11/13/2023       Imaging Studies:     Echocardiogram:  9/8/23       Limited study for AS    Left Ventricle: Left ventricular cavity size is normal. Wall thickness is mildly increased. The left ventricular ejection fraction is 60% by visual estimation. . Systolic function is normal. Wall motion is normal. Diastolic function is mildly abnormal, consistent with grade I (abnormal) relaxation. Left Atrium: The atrium is dilated. Aortic Valve: The leaflets are moderately calcified. There is severe stenosis. The aortic valve peak velocity is 4.2 m/s. The aortic valve mean gradient is 41 mmHg. The dimensionless velocity index is 0.24. The aortic valve area is 1.00 cm2. AS now appears severe compared to previous study from 1/2023     Findings    Left Ventricle Left ventricular cavity size is normal. Wall thickness is mildly increased. The left ventricular ejection fraction is 60% by visual estimation. . Systolic function is normal.  Wall motion is normal. Diastolic function is mildly abnormal, consistent with grade I (abnormal) relaxation. Right Ventricle Right ventricular cavity size is normal. Systolic function is normal.   Left Atrium The atrium is dilated.    Aortic Valve The leaflets are moderately calcified. There is trace regurgitation. There is severe stenosis. The aortic valve peak velocity is 4.2 m/s. The aortic valve mean gradient is 41 mmHg. The dimensionless velocity index is 0.24. The aortic valve area is 1.00 cm2. Mitral Valve There is mild thickening. There is trace regurgitation. Tricuspid Valve The leaflets are not thickened. There is trace regurgitation. Ascending Aorta The aortic root is normal in size. IVC/SVC The inferior vena cava is not well visualized.      Left Ventricle Measurements    Function/Volumes   A4C EF 54 %         LVOT stroke volume 108.7 cm3         LVOT stroke volume index 50.9 ml/m2         LV Diastolic Volume (BP) 497 mL         LV Systolic Volume (BP) 56 mL         EF 56 %         LVOT Cardiac Output 7.18 l/min         LVOT Cardiac Index 3.29 l/min/m2         Dimensions   LVIDd 4.9 cm         LVIDS 3.4 cm         IVSd 1.4 cm         LVPWd 1.3 cm         LVOT area 3.8 cm2         FS 31 %         Diastolic Filling   MV E' Tissue Velocity Septal 5 cm/s         MV E' Tissue Velocity Lateral 5 cm/s         E wave deceleration time 243 ms         MV Peak E Roosevelt 78 cm/s         MV Peak A Roosevelt 0.89 m/s          Report Measurements   AV LVOT peak gradient 4 mmHg              Interventricular Septum Measurements    Shunt Ratio   LVOT peak VTI 28.61 cm         LVOT peak roosevelt 1.02 m/s              Left Atrium Measurements    Dimensions   LA size 5 cm         LA length (A2C) 6.1 cm         Volumes   LA volume (BP) 104 mL               Atrial Septum Measurements    Shunt Ratio   LVOT peak VTI 28.61 cm         LVOT peak roosevelt 1.02 m/s               Aortic Valve Measurements    Stenosis   Aortic valve peak velocity 4.2 m/s         LVOT peak roosevelt 1.02 m/s         Ao .36 cm         LVOT peak VTI 28.61 cm         AV mean gradient 41 mmHg         LVOT mn grad 2 mmHg         AV peak gradient 71 mmHg         AV LVOT peak gradient 4 mmHg         Area/Dimensions   DVI 0.24         AV valve area 1 cm2         AV area by cont VTI 1 cm2         AV area peak abel 0.9 cm2         LVOT diameter 2.2 cm         LVOT area 3.8 cm2               Mitral Valve Measurements    Stenosis   MV stenosis pressure 1/2 time 70 ms         MV valve area p 1/2 method 3.14 cm2               Aorta Measurements      Aortic Dimensions   Ao root 3.4 cm                Gated CTA of the chest/abdomen/pelvis:  10/6/23    VASCULAR STRUCTURES:  Annulus: diameter 29.8 x 23.9 mm  area: 597.2 sq mm  Annulus to LCA: 17.5 mm  Annulus to RCA: 21.3 mm  Minimal diameter right iliofemoral segment: 7.5 mm  Minimal diameter left iliofemoral segment: 6.3 mm     The ascending aorta is nonaneurysmal measuring 36 mm with mild atherosclerosis. There is a type II aortic arch with classic branching anatomy and no stenosis in the visualized great vessels. The aortic arch is nonaneurysmal with mild atherosclerosis. The   descending thoracic aorta is nonaneurysmal with mild atherosclerosis. The abdominal aorta is nonaneurysmal with mild atherosclerosis. Bilateral iliofemoral arteries are nonaneurysmal with mild atherosclerosis. Celiac, superior mesenteric, inferior mesenteric, and bilateral renal arteries are patent. Cardiac findings: There is moderate calcification of the aortic valve. The left ventricle is normal. The ventricular septum is normal. No prior valvular surgery. No prior bypass surgery. No pericardial effusion. No cardiac mass or thrombus. Coronary artery calcifications   present. Cardiac catheterization:  10/17/23      Mid RCA lesion is 40% stenosed. Prox LAD to Mid LAD lesion is 80% stenosed. The LAD lesion was interrogated by iFR and was borderline, varying from 0.88 to 0.91. IVUS was also performed, and there was a marked reduction in cross sectional area. PCI was performed, with placement of a 4.0x26mm DEON. There was no residual stenosis. There was full stent expansion and apposition, with 0% residual stenosis. Carotid artery ultrasound:  10/6/23      RIGHT:  There is <50% stenosis noted in the tortuous internal carotid artery. Plaque is  heterogenous and irregular. Vertebral artery flow is antegrade. There is no significant subclavian artery  disease. LEFT:  There is <50% stenosis noted in the tortuous internal carotid artery. Plaque is  heterogenous and smooth. Vertebral artery flow is antegrade. There is no significant subclavian artery  disease. I have personally reviewed pertinent films in PACS    TAVR evaluation Assessment:     5 Meter Walk Test:      Attempt 1: 5 sec   Attempt 2: 6 sec   Attempt 3: 6 sec    STS Risk Score: 1.8%    Aortic Stenosis Stage: D3    [facility]: III    [id number] completed        Impression/Plan:    Braulio Deras has symptomatic severe aortic stenosis. They have undergone testing for transcatheter aortic valve replacement. The results of these studies have been interpreted by the multidisciplinary TAVR team who have determined the patient to be Intermediate risk for open aortic valve replacement based on other pre-existing comobidities not reflected in the STS risk score. The risks, benefits, and alternatives to TAVR were discussed in detail with the patient today. They understand and wish to proceed with transfemoral transcatheter aortic valve replacement. Informed consent was obtained and a date for the intervention has been set. Pre-op instructions reviewed with patient and they were instructed to stop Omega 3 now but continue PLAVIX for recent PCI/DEON. Braulio Deras was comfortable with our recommendations, and their questions were answered to their satisfaction.        SIGNATURE: JESUS Sheppard  DATE: November 30, 2023  TIME: 12:02 PM

## 2023-11-30 NOTE — H&P
Pre-Op History & Physical - Cardiothoracic Surgery   Srinivasa London 77 y.o. male MRN: [id number]    Physician Requesting Consult: Estephania Byers, [facility]    Reason for Consult / Principal Problem: Aortic stenosis, Non-Rheumatic    History of Present Illness: Srinivasa London is a 77y.o. year old male who was previously evaluated in our office by Elkin Olsen M.D. for transcatheter aortic valve replacement. During this initial consultation, arrangements were made for the following studies to be completed: Gated CTA of the chest/abdomen/pelvis,  cardiac catheterization, dental clearance and carotid artery ultrasound. Srinivasa London now presents to review the results of these tests and obtain a second surgeon to confirm the suitability of proceeding with transcatheter aortic valve replacment. In review, Patient's PMHx is notable for AS, CAD s/p PCI/DEON LAD (10/17/23), VT s/p ICD (11/2/23), HTN, HLD, CKD3, pre-DM (A1c 5.9%), hypothyroidism, diastolic CHF, PVC's, tobacco abuse, emphysema, h/o chronic L great toe wound w/ osteo s/p amputation (6/2023), peripheral neuropathy, colon polyp and polio in childhood (L leg atrophy). Patient gives a h/o lifelong heart murmur since childhood. He denies h/o rheumatic fever. He reports likely polio in childhood without official diagnosis but he has lifelong left leg atrophy. Echo in 2021 at [facility]  Demonstrated moderate AS. Patient was admitted to [address] [place] [address] in Jan 2023 with generalized weakness, SOB, cough and fever. He was found to have staph aureus bacteremia/sepsis, likely source from a chronic left toe ulcer. He was also noted to be in CHF. TTE was performed to rule out endocarditis demonstrated moderate AS. Patient was treated with diuresis, antibiotics and seen podiatry to address toe ulcer. He subsequently underwent treatment and eventual L great toe amputation in June 2023 due to osteomyelitis.  He was seen for Cardiology follow-up and repeat Echo in early September demonstrates progression to severe AS. At his initial visit in our office in September,  the patient reported symptoms of  progressive symptoms of fatigue, decrease in activity tolerance, MONTANEZ, "skipped" heart beats and fluid retention in the form of bloating. He has bilateral lower extremity neuropathy, mostly numbness. He denied chest pain, lightheadedness, peripheral edema, PND or orthopnea. Since his initial visit in our office in September, her underwent cardiac cath in October that demonstrated 80% stenosis of prox-mid LAD, treated with PCI/DEON. On 10/30/23 while riding a stationary bike at cardiac rehab, patient became dizzy and was noted to be in VT. He was transferred to the local ER [facility] and was subsequently transferred to [address] [place] [address] and underwent ICD implantation on 11/2/23. Patient is accompanied by his wife today. He reports same symptoms or MONTANEZ, fatigue and poor activity tolerance. He denies new symptoms. He reports his left toe amputation site is completely healed. Patient reports he has quit smoking completley since 11/2/23. He has on average 2 alcoholic drinks per week. He denies drug use or vaping. Patient is a retired steel work, previously employed at [employer] in [place], [place]. Patient up to date with dental care. Past Medical History:  Past Medical History:   Diagnosis Date    Anxiety     Aortic stenosis     Borderline high cholesterol     CHF (congestive heart failure) (HCC)     Asaf Jackson-cardio-    Colon polyp     Depression     Heart murmur     Hypertension     Hypothyroidism     Prediabetes     PVCs (premature ventricular contractions)     "skip"    s/p Medtronic dual chamber ICD 11/2/2023 11/04/2023    Wears glasses     readers         Past Surgical History:   Past Surgical History:   Procedure Laterality Date    APPENDECTOMY      BACK SURGERY      CARDIAC CATHETERIZATION N/A 10/17/2023    Procedure: Cardiac RHC/LHC;   Surgeon: Rhonda Hernández Rosario Martinez MD;  Location: [facility]; Service: Cardiology    CARDIAC ELECTROPHYSIOLOGY PROCEDURE N/A 2023    Procedure: Cardiac icd implant;  Surgeon: Susana Lowry MD;  Location: [facility]; Service: Cardiology    CATARACT EXTRACTION Bilateral     CLOSURE DELAYED PRIMARY Left 2023    Procedure: CLOSURE DELAYED PRIMARY;  Surgeon: Coral Nolan DPM;  Location: [facility] MAIN [facility];  Service: Podiatry    COLONOSCOPY      NATHALIE Guerra CORRJ HALLUX VALGUS W/SESMDC W/RESCJ PROX PHAL Left 2023    Procedure: Pealr Mech and wound debridement;  Surgeon: Chris Burton DPM;  Location: [facility];  Service: Podiatry    TOE AMPUTATION Left 2023    Procedure: PARTIAL 1ST RAY AMPUTATION;  Surgeon: Coral Nolan DPM;  Location: [facility];  Service: Podiatry    TONSILLECTOMY      TOTAL SHOULDER REPLACEMENT Left     WISDOM TOOTH EXTRACTION           Family History:  Family History   Problem Relation Age of Onset    Dementia Mother     Blindness Mother     Cancer Mother     Dementia Father          Social History:    Social History     Substance and Sexual Activity   Alcohol Use Yes    Comment: socially-2 drinks per week     Social History     Substance and Sexual Activity   Drug Use Not Currently    Comment: sporadic in young adulthood     Social History     Tobacco Use   Smoking Status Former    Packs/day: 0.50    Years: 50.00    Total pack years: 25.00    Types: Cigarettes    Quit date: 11/3/2023    Years since quittin.0   Smokeless Tobacco Never       Home Medications:   Prior to Admission medications    Medication Sig Start Date End Date Taking?  Authorizing Provider   Alirocumab 150 MG/ML SOAJ Inject 150 mg under the skin every 14 (fourteen) days 10/17/23  Yes JESUS Garza   amiodarone 200 mg tablet Take 1 tablet (200 mg total) by mouth daily 11/3/23 2/1/24 Yes Dg Roper PA-C   aspirin 81 mg chewable tablet Chew 1 tablet (81 mg total) daily 10/17/23  Yes Carmelita Ye,    buPROPion (WELLBUTRIN XL) 300 mg 24 hr tablet  5/25/19  Yes Historical Provider, MD   clopidogrel (PLAVIX) 75 mg tablet TAKE ONE TABLET BY MOUTH EVERY DAY 11/12/23  Yes Neyda Ramirez MD   escitalopram (LEXAPRO) 20 mg tablet Take 20 mg by mouth daily 4/5/19  Yes Historical Provider, MD   ezetimibe (ZETIA) 10 mg tablet Take 1 tablet (10 mg total) by mouth daily 10/2/23  Yes Neyda Ramirez MD   furosemide (LASIX) 40 mg tablet Take 1 tablet (40 mg total) by mouth daily 11/4/23  Yes Hamida Steven,    levothyroxine 75 mcg tablet  5/17/19  Yes Historical Provider, MD   Magnesium 400 MG CAPS Take 1 capsule (400 mg total) by mouth in the morning 5/5/23  Yes JESUS Garza   metoprolol succinate (TOPROL-XL) 25 mg 24 hr tablet Take 0.5 tablets (12.5 mg total) by mouth daily 6/13/23  Yes JESUS Garza   mupirocin (BACTROBAN) 2 % ointment Apply topically 2 (two) times a day for 5 days Apply to each nostril twice daily for five days before your operation. 11/30/23 12/5/23 Yes JESUS Davies   Omega-3 Fatty Acids (Fish Oil) 1200 MG CAPS Take by mouth   Yes Historical Provider, MD   pantoprazole (PROTONIX) 40 mg tablet Take 1 tablet (40 mg total) by mouth 2 (two) times a day before meals 11/9/23  Yes Braden Baldwin, DO   potassium chloride (MICRO-K) 10 MEQ CR capsule Take 1 capsule (10 mEq total) by mouth daily 3/8/23  Yes JESUS Garza   pravastatin (PRAVACHOL) 20 mg tablet Take 1 tablet (20 mg total) by mouth every other day  Patient not taking: Reported on 11/6/2023 10/2/23   Neyda Ramirez MD   sucralfate (CARAFATE) 1 g tablet Take 1 tablet (1 g total) by mouth 4 (four) times a day (before meals and at bedtime) for 14 days  Patient not taking: Reported on 11/30/2023 11/9/23 11/30/23  Braden Alexis Counts, DO       Allergies:   Allergies   Allergen Reactions    Statins Myalgia     Other reaction(s): muscle aches and joint aches         Review of Systems:  Review of Systems - History obtained from chart review and the patient  General ROS: positive for  - fatigue and change in activity tolerance  negative for - chills, fever, sleep disturbance, or weight gain  Psychological ROS: negative  Ophthalmic ROS: negative  ENT ROS: negative  Allergy and Immunology ROS: negative  Hematological and Lymphatic ROS: negative  Endocrine ROS: negative  Breast ROS: negative  Respiratory ROS: positive for - shortness of breath  negative for - cough, hemoptysis, orthopnea, or pleuritic pain  Cardiovascular ROS: positive for - dyspnea on exertion, irregular heartbeat, murmur, and shortness of breath  negative for - chest pain, edema, loss of consciousness, orthopnea, palpitations, paroxysmal nocturnal dyspnea, or rapid heart rate  Gastrointestinal ROS: no abdominal pain, change in bowel habits, or black or bloody stools  Genito-Urinary ROS: no dysuria, trouble voiding, or hematuria  Musculoskeletal ROS: negative  Neurological ROS: positive for - numbness/tingling- feet  Dermatological ROS: negative    Vital Signs:     Vitals:    11/30/23 1126 11/30/23 1128   BP: 133/61 157/73   BP Location: Left arm Right arm   Patient Position: Sitting Sitting   Cuff Size: Standard Standard   Pulse: 67    SpO2: 98%    Weight: 101 kg (223 lb)    Height: 6' (1.829 m)        Physical Exam:    General: Alert, oriented, well developed, no acute distress  HEENT/NECK:  PERRLA. No jugular venous distention. Cardiac:Regular rate and rhythm, III/VI harsh systolic murmur RUSB. Carotids: 1+ pulses, no bruits   Pulmonary:  Breath sounds clear bilaterally. Abdomen:  Non-tender, Non-distended. Positive bowel sounds. Upper extremities: 2+ radial pulses; brisk capillary refill  Lower extremities: Extremities warm/dry. PT/DP pulses 1+ bilaterally. No edema B/L  Neuro: Alert and oriented X 3. Sensation is grossly intact. No focal deficits.   Musculoskeletal: MAEE, stable gait  Skin: Warm/Dry, without rashes or lesions. Lab Results:   Lab Results   Component Value Date    WBC 12.38 (H) 11/13/2023    HGB 9.4 (L) 11/13/2023    HCT 30.2 (L) 11/13/2023    MCV 98 11/13/2023     (H) 11/13/2023     Lab Results   Component Value Date    SODIUM 140 11/21/2023    K 4.2 11/21/2023     11/21/2023    CO2 29 11/21/2023    AGAP 7 11/21/2023    BUN 20 11/21/2023    CREATININE 1.28 11/21/2023    GLUC 114 11/13/2023    GLUF 108 (H) 11/21/2023    CALCIUM 9.2 11/21/2023    AST 14 11/21/2023    ALT 11 11/21/2023    ALKPHOS 81 11/21/2023    TP 7.0 11/21/2023    TBILI 0.36 11/21/2023    EGFR 56 11/21/2023         Lab Results   Component Value Date    HGBA1C 5.9 (H) 01/03/2023     Lab Results   Component Value Date    CKTOTAL 36 (L) 11/13/2023       Imaging Studies:     Echocardiogram:  9/8/23       Limited study for AS    Left Ventricle: Left ventricular cavity size is normal. Wall thickness is mildly increased. The left ventricular ejection fraction is 60% by visual estimation. . Systolic function is normal. Wall motion is normal. Diastolic function is mildly abnormal, consistent with grade I (abnormal) relaxation. Left Atrium: The atrium is dilated. Aortic Valve: The leaflets are moderately calcified. There is severe stenosis. The aortic valve peak velocity is 4.2 m/s. The aortic valve mean gradient is 41 mmHg. The dimensionless velocity index is 0.24. The aortic valve area is 1.00 cm2. AS now appears severe compared to previous study from 1/2023     Findings    Left Ventricle Left ventricular cavity size is normal. Wall thickness is mildly increased. The left ventricular ejection fraction is 60% by visual estimation. . Systolic function is normal.  Wall motion is normal. Diastolic function is mildly abnormal, consistent with grade I (abnormal) relaxation. Right Ventricle Right ventricular cavity size is normal. Systolic function is normal.   Left Atrium The atrium is dilated.    Aortic Valve The leaflets are moderately calcified. There is trace regurgitation. There is severe stenosis. The aortic valve peak velocity is 4.2 m/s. The aortic valve mean gradient is 41 mmHg. The dimensionless velocity index is 0.24. The aortic valve area is 1.00 cm2. Mitral Valve There is mild thickening. There is trace regurgitation. Tricuspid Valve The leaflets are not thickened. There is trace regurgitation. Ascending Aorta The aortic root is normal in size. IVC/SVC The inferior vena cava is not well visualized.      Left Ventricle Measurements    Function/Volumes   A4C EF 54 %         LVOT stroke volume 108.7 cm3         LVOT stroke volume index 50.9 ml/m2         LV Diastolic Volume (BP) 971 mL         LV Systolic Volume (BP) 56 mL         EF 56 %         LVOT Cardiac Output 7.18 l/min         LVOT Cardiac Index 3.29 l/min/m2         Dimensions   LVIDd 4.9 cm         LVIDS 3.4 cm         IVSd 1.4 cm         LVPWd 1.3 cm         LVOT area 3.8 cm2         FS 31 %         Diastolic Filling   MV E' Tissue Velocity Septal 5 cm/s         MV E' Tissue Velocity Lateral 5 cm/s         E wave deceleration time 243 ms         MV Peak E Roosevelt 78 cm/s         MV Peak A Roosevelt 0.89 m/s          Report Measurements   AV LVOT peak gradient 4 mmHg              Interventricular Septum Measurements    Shunt Ratio   LVOT peak VTI 28.61 cm         LVOT peak roosevelt 1.02 m/s              Left Atrium Measurements    Dimensions   LA size 5 cm         LA length (A2C) 6.1 cm         Volumes   LA volume (BP) 104 mL               Atrial Septum Measurements    Shunt Ratio   LVOT peak VTI 28.61 cm         LVOT peak roosevelt 1.02 m/s               Aortic Valve Measurements    Stenosis   Aortic valve peak velocity 4.2 m/s         LVOT peak roosevelt 1.02 m/s         Ao .36 cm         LVOT peak VTI 28.61 cm         AV mean gradient 41 mmHg         LVOT mn grad 2 mmHg         AV peak gradient 71 mmHg         AV LVOT peak gradient 4 mmHg         Area/Dimensions   DVI 0.24         AV valve area 1 cm2         AV area by cont VTI 1 cm2         AV area peak abel 0.9 cm2         LVOT diameter 2.2 cm         LVOT area 3.8 cm2               Mitral Valve Measurements    Stenosis   MV stenosis pressure 1/2 time 70 ms         MV valve area p 1/2 method 3.14 cm2               Aorta Measurements      Aortic Dimensions   Ao root 3.4 cm                Gated CTA of the chest/abdomen/pelvis:  10/6/23    VASCULAR STRUCTURES:  Annulus: diameter 29.8 x 23.9 mm  area: 597.2 sq mm  Annulus to LCA: 17.5 mm  Annulus to RCA: 21.3 mm  Minimal diameter right iliofemoral segment: 7.5 mm  Minimal diameter left iliofemoral segment: 6.3 mm     The ascending aorta is nonaneurysmal measuring 36 mm with mild atherosclerosis. There is a type II aortic arch with classic branching anatomy and no stenosis in the visualized great vessels. The aortic arch is nonaneurysmal with mild atherosclerosis. The   descending thoracic aorta is nonaneurysmal with mild atherosclerosis. The abdominal aorta is nonaneurysmal with mild atherosclerosis. Bilateral iliofemoral arteries are nonaneurysmal with mild atherosclerosis. Celiac, superior mesenteric, inferior mesenteric, and bilateral renal arteries are patent. Cardiac findings: There is moderate calcification of the aortic valve. The left ventricle is normal. The ventricular septum is normal. No prior valvular surgery. No prior bypass surgery. No pericardial effusion. No cardiac mass or thrombus. Coronary artery calcifications   present. Cardiac catheterization:  10/17/23      Mid RCA lesion is 40% stenosed. Prox LAD to Mid LAD lesion is 80% stenosed. The LAD lesion was interrogated by iFR and was borderline, varying from 0.88 to 0.91. IVUS was also performed, and there was a marked reduction in cross sectional area. PCI was performed, with placement of a 4.0x26mm DEON. There was no residual stenosis. There was full stent expansion and apposition, with 0% residual stenosis. Carotid artery ultrasound:  10/6/23      RIGHT:  There is <50% stenosis noted in the tortuous internal carotid artery. Plaque is  heterogenous and irregular. Vertebral artery flow is antegrade. There is no significant subclavian artery  disease. LEFT:  There is <50% stenosis noted in the tortuous internal carotid artery. Plaque is  heterogenous and smooth. Vertebral artery flow is antegrade. There is no significant subclavian artery  disease. I have personally reviewed pertinent films in PACS    TAVR evaluation Assessment:     5 Meter Walk Test:      Attempt 1: 5 sec   Attempt 2: 6 sec   Attempt 3: 6 sec    STS Risk Score: 1.8%    Aortic Stenosis Stage: D3    [facility]: III    [id number] completed        Impression/Plan:    Emerita Dodson has symptomatic severe aortic stenosis. They have undergone testing for transcatheter aortic valve replacement. The results of these studies have been interpreted by the multidisciplinary TAVR team who have determined the patient to be Intermediate risk for open aortic valve replacement based on other pre-existing comobidities not reflected in the STS risk score. The risks, benefits, and alternatives to TAVR were discussed in detail with the patient today. They understand and wish to proceed with transfemoral transcatheter aortic valve replacement. Informed consent was obtained and a date for the intervention has been set. Pre-op instructions reviewed with patient and they were instructed to stop Omega 3 now but continue PLAVIX for recent PCI/DEON. Emerita Dodson was comfortable with our recommendations, and their questions were answered to their satisfaction.        SIGNATURE: JESUS Leon  DATE: November 30, 2023  TIME: 12:02 PM

## 2023-12-01 ENCOUNTER — LAB REQUISITION (OUTPATIENT)
Dept: LAB | Facility: HOSPITAL | Age: 68
End: 2023-12-01
Payer: MEDICARE

## 2023-12-01 ENCOUNTER — APPOINTMENT (OUTPATIENT)
Dept: LAB | Facility: HOSPITAL | Age: 68
End: 2023-12-01
Payer: MEDICARE

## 2023-12-01 DIAGNOSIS — I35.0 SEVERE AORTIC STENOSIS: ICD-10-CM

## 2023-12-01 DIAGNOSIS — L97.521 ULCER OF LEFT FOOT, LIMITED TO BREAKDOWN OF SKIN (HCC): ICD-10-CM

## 2023-12-01 DIAGNOSIS — I47.20 VENTRICULAR TACHYCARDIA (HCC): ICD-10-CM

## 2023-12-01 DIAGNOSIS — I35.0 NONRHEUMATIC AORTIC (VALVE) STENOSIS: ICD-10-CM

## 2023-12-01 DIAGNOSIS — Z95.810 ICD (IMPLANTABLE CARDIOVERTER-DEFIBRILLATOR) IN PLACE: ICD-10-CM

## 2023-12-01 DIAGNOSIS — A41.9 SEPSIS, DUE TO UNSPECIFIED ORGANISM, UNSPECIFIED WHETHER ACUTE ORGAN DYSFUNCTION PRESENT (HCC): ICD-10-CM

## 2023-12-01 LAB
ABO GROUP BLD: NORMAL
ALBUMIN SERPL BCP-MCNC: 4.4 G/DL (ref 3.5–5)
ALP SERPL-CCNC: 93 U/L (ref 34–104)
ALT SERPL W P-5'-P-CCNC: 11 U/L (ref 7–52)
ANION GAP SERPL CALCULATED.3IONS-SCNC: 5 MMOL/L
AST SERPL W P-5'-P-CCNC: 17 U/L (ref 13–39)
BILIRUB SERPL-MCNC: 0.45 MG/DL (ref 0.2–1)
BLD GP AB SCN SERPL QL: NEGATIVE
BUN SERPL-MCNC: 25 MG/DL (ref 5–25)
CALCIUM SERPL-MCNC: 9 MG/DL (ref 8.4–10.2)
CHLORIDE SERPL-SCNC: 102 MMOL/L (ref 96–108)
CO2 SERPL-SCNC: 30 MMOL/L (ref 21–32)
CREAT SERPL-MCNC: 1.2 MG/DL (ref 0.6–1.3)
GFR SERPL CREATININE-BSD FRML MDRD: 60 ML/MIN/1.73SQ M
GLUCOSE P FAST SERPL-MCNC: 90 MG/DL (ref 65–99)
INR PPP: 0.91 (ref 0.84–1.19)
POTASSIUM SERPL-SCNC: 4.2 MMOL/L (ref 3.5–5.3)
PROT SERPL-MCNC: 7.7 G/DL (ref 6.4–8.4)
PROTHROMBIN TIME: 12.6 SECONDS (ref 11.6–14.5)
RH BLD: POSITIVE
SODIUM SERPL-SCNC: 137 MMOL/L (ref 135–147)
SPECIMEN EXPIRATION DATE: NORMAL

## 2023-12-01 PROCEDURE — 85610 PROTHROMBIN TIME: CPT

## 2023-12-01 PROCEDURE — 86901 BLOOD TYPING SEROLOGIC RH(D): CPT | Performed by: NURSE PRACTITIONER

## 2023-12-01 PROCEDURE — 87081 CULTURE SCREEN ONLY: CPT

## 2023-12-01 PROCEDURE — 36415 COLL VENOUS BLD VENIPUNCTURE: CPT

## 2023-12-01 PROCEDURE — 86850 RBC ANTIBODY SCREEN: CPT | Performed by: NURSE PRACTITIONER

## 2023-12-01 PROCEDURE — 80053 COMPREHEN METABOLIC PANEL: CPT

## 2023-12-01 PROCEDURE — 86900 BLOOD TYPING SEROLOGIC ABO: CPT | Performed by: NURSE PRACTITIONER

## 2023-12-02 LAB — MRSA NOSE QL CULT: NORMAL

## 2023-12-04 ENCOUNTER — ANESTHESIA EVENT (OUTPATIENT)
Dept: PERIOP | Facility: HOSPITAL | Age: 68
DRG: 267 | End: 2023-12-04
Payer: MEDICARE

## 2023-12-04 RX ORDER — HEPARIN SODIUM 1000 [USP'U]/ML
400 INJECTION, SOLUTION INTRAVENOUS; SUBCUTANEOUS ONCE
OUTPATIENT
Start: 2023-12-05

## 2023-12-04 RX ORDER — PHENYLEPHRINE HCL IN 0.9% NACL 1 MG/10 ML
200-2000 SYRINGE (ML) INTRAVENOUS ONCE
OUTPATIENT
Start: 2023-12-05

## 2023-12-04 NOTE — ANESTHESIA PREPROCEDURE EVALUATION
Procedure:  REPLACEMENT AORTIC VALVE TRANSCATHETER (TAVR) TRANSFEMORAL W/ 29MM SHERMAN ADRIANNA S3 UR VALVE(ACCESS ON LEFT) MAKEDA (Chest)  Cardiac tavr (Chest)    Relevant Problems   ANESTHESIA (within normal limits)      CARDIO   (+) Benign essential HTN   (+) Dyspnea on exertion   (+) Mixed hyperlipidemia   (+) PVCs (premature ventricular contractions)   (+) Severe aortic stenosis   (+) Ventricular tachycardia (HCC)      NEURO/PSYCH   (+) MDD (major depressive disorder)      PULMONARY   (+) Dyspnea on exertion   (+) Heart failure with preserved ejection fraction   (+) Other emphysema (HCC)      Nervous and Auditory   (+) Peripheral neuropathy      Other   (+) Status post amputation of great toe, left (HCC)   (+) Status post insertion of drug-eluting stent into left anterior descending (LAD) artery   (+) s/p Medtronic dual chamber ICD 11/2/2023   Findings    Left Ventricle Left ventricular cavity size is normal. Wall thickness is mildly increased. The left ventricular ejection fraction is 60% by visual estimation. . Systolic function is normal.  Wall motion is normal. Diastolic function is mildly abnormal, consistent with grade I (abnormal) relaxation. Right Ventricle Right ventricular cavity size is normal. Systolic function is normal.   Left Atrium The atrium is dilated. Aortic Valve The leaflets are moderately calcified. There is trace regurgitation. There is severe stenosis. The aortic valve peak velocity is 4.2 m/s. The aortic valve mean gradient is 41 mmHg. The dimensionless velocity index is 0.24. The aortic valve area is 1.00 cm2. Mitral Valve There is mild thickening. There is trace regurgitation. Tricuspid Valve The leaflets are not thickened. There is trace regurgitation. Ascending Aorta The aortic root is normal in size. IVC/SVC The inferior vena cava is not well visualized. Impression         Mid RCA lesion is 40% stenosed. Prox LAD to Mid LAD lesion is 80% stenosed.      Significant CAD, with a long 80% stenosis of the proximal/mid LAD. There was also non-obstructive plaque of the mid RCA. The LAD lesion was interrogated by iFR and was borderline, varying from 0.88 to 0.91. IVUS was also performed, and there was a marked reduction in cross sectional area. PCI was performed, with placement of a 4.0x26mm DEON. There was no residual stenosis. There was full stent expansion and apposition, with 0% residual stenosis. Plan: DAPT. The patient has been intolerant of statins and is on a low potency statin and ezetimibe. With an LDL above 140 mg/dL and pre-diabetes, and established CAD, he is an appropriate candidate for PCSK9 inhibition. He will undergo TAVR in the near future. Normal sinus rhythm  Nonspecific ST and T wave abnormality  Abnormal ECG  When compared with ECG of 02-NOV-2023 15:18,  Sinus rhythm has replaced Electronic atrial pacemaker     Physical Exam    Airway    Mallampati score: III  TM Distance: >3 FB  Neck ROM: full     Dental       Cardiovascular  Rhythm: regular, Rate: normal, Murmur    Pulmonary   Breath sounds clear to auscultation    Other Findings        Anesthesia Plan  ASA Score- 4     Anesthesia Type- general with ASA Monitors. Additional Monitors: arterial line and central venous line. Airway Plan: ETT. Comment: MAKEDA. Plan Factors-Exercise tolerance (METS): <4 METS. Chart reviewed. EKG reviewed. Existing labs reviewed. Patient summary reviewed. Patient is not a current smoker. Induction- intravenous. Postoperative Plan- Plan for postoperative opioid use. Planned trial extubation    Informed Consent- Anesthetic plan and risks discussed with patient. I personally reviewed this patient with the CRNA. Discussed and agreed on the Anesthesia Plan with the CRNA. Aman Lomeli

## 2023-12-05 ENCOUNTER — HOSPITAL ENCOUNTER (INPATIENT)
Facility: HOSPITAL | Age: 68
LOS: 2 days | Discharge: HOME | DRG: 267 | End: 2023-12-07
Attending: THORACIC SURGERY (CARDIOTHORACIC VASCULAR SURGERY) | Admitting: THORACIC SURGERY (CARDIOTHORACIC VASCULAR SURGERY)
Payer: MEDICARE

## 2023-12-05 ENCOUNTER — APPOINTMENT (OUTPATIENT)
Dept: NON INVASIVE DIAGNOSTICS | Facility: HOSPITAL | Age: 68
DRG: 267 | End: 2023-12-05
Payer: MEDICARE

## 2023-12-05 ENCOUNTER — ANESTHESIA (OUTPATIENT)
Dept: PERIOP | Facility: HOSPITAL | Age: 68
DRG: 267 | End: 2023-12-05
Payer: MEDICARE

## 2023-12-05 ENCOUNTER — APPOINTMENT (INPATIENT)
Dept: RADIOLOGY | Facility: HOSPITAL | Age: 68
DRG: 267 | End: 2023-12-05
Payer: MEDICARE

## 2023-12-05 DIAGNOSIS — I10 BENIGN ESSENTIAL HTN: Primary | ICD-10-CM

## 2023-12-05 DIAGNOSIS — I35.0 SEVERE AORTIC STENOSIS: ICD-10-CM

## 2023-12-05 DIAGNOSIS — Z95.2 S/P TAVR (TRANSCATHETER AORTIC VALVE REPLACEMENT): ICD-10-CM

## 2023-12-05 DIAGNOSIS — I35.0 SEVERE AORTIC STENOSIS: Primary | ICD-10-CM

## 2023-12-05 DIAGNOSIS — I35.9 NONRHEUMATIC AORTIC VALVE DISORDER: ICD-10-CM

## 2023-12-05 PROBLEM — E03.9 HYPOTHYROID: Status: ACTIVE | Noted: 2023-12-05

## 2023-12-05 PROBLEM — N18.31 STAGE 3A CHRONIC KIDNEY DISEASE (HCC): Status: ACTIVE | Noted: 2023-12-05

## 2023-12-05 PROBLEM — R73.03 PREDIABETES: Status: ACTIVE | Noted: 2023-12-05

## 2023-12-05 LAB
ABO GROUP BLD: NORMAL
ANION GAP SERPL CALCULATED.3IONS-SCNC: 6 MMOL/L
AORTIC ROOT: 2.6 CM
AORTIC VALVE MEAN VELOCITY: 14.7 M/S
APICAL FOUR CHAMBER EJECTION FRACTION: 54 %
ATRIAL RATE: 69 BPM
AV AREA BY CONTINUOUS VTI: 2 CM2
AV AREA PEAK VELOCITY: 1.9 CM2
AV LVOT MEAN GRADIENT: 3 MMHG
AV LVOT PEAK GRADIENT: 7 MMHG
AV MEAN GRADIENT: 10 MMHG
AV PEAK GRADIENT: 18 MMHG
AV VALVE AREA: 2 CM2
AV VELOCITY RATIO: 0.6
BASE EXCESS BLDA CALC-SCNC: -1 MMOL/L (ref -2–3)
BASE EXCESS BLDA CALC-SCNC: 1 MMOL/L (ref -2–3)
BLD GP AB SCN SERPL QL: NEGATIVE
BUN SERPL-MCNC: 21 MG/DL (ref 5–25)
CA-I BLD-SCNC: 1.16 MMOL/L (ref 1.12–1.32)
CA-I BLD-SCNC: 1.2 MMOL/L (ref 1.12–1.32)
CALCIUM SERPL-MCNC: 8.4 MG/DL (ref 8.4–10.2)
CHLORIDE SERPL-SCNC: 107 MMOL/L (ref 96–108)
CO2 SERPL-SCNC: 28 MMOL/L (ref 21–32)
CREAT SERPL-MCNC: 1.19 MG/DL (ref 0.6–1.3)
DOP CALC AO PEAK VEL: 2.14 M/S
DOP CALC AO VTI: 44.93 CM
DOP CALC LVOT AREA: 3.14 CM2
DOP CALC LVOT CARDIAC INDEX: 2.26 L/MIN/M2
DOP CALC LVOT CARDIAC OUTPUT: 4.93 L/MIN
DOP CALC LVOT DIAMETER: 2 CM
DOP CALC LVOT PEAK VEL VTI: 28.64 CM
DOP CALC LVOT PEAK VEL: 1.28 M/S
DOP CALC LVOT STROKE INDEX: 39.9 ML/M2
DOP CALC LVOT STROKE VOLUME: 89.93
E WAVE DECELERATION TIME: 319 MS
E/A RATIO: 0.86
FRACTIONAL SHORTENING: 32 (ref 28–44)
GFR SERPL CREATININE-BSD FRML MDRD: 61 ML/MIN/1.73SQ M
GLUCOSE SERPL-MCNC: 101 MG/DL (ref 65–140)
GLUCOSE SERPL-MCNC: 101 MG/DL (ref 65–140)
GLUCOSE SERPL-MCNC: 104 MG/DL (ref 65–140)
GLUCOSE SERPL-MCNC: 108 MG/DL (ref 65–140)
GLUCOSE SERPL-MCNC: 139 MG/DL (ref 65–140)
GLUCOSE SERPL-MCNC: 155 MG/DL (ref 65–140)
GLUCOSE SERPL-MCNC: 165 MG/DL (ref 65–140)
HCO3 BLDA-SCNC: 25.1 MMOL/L (ref 22–28)
HCO3 BLDA-SCNC: 27.4 MMOL/L (ref 24–30)
HCT VFR BLD AUTO: 33.9 % (ref 36.5–49.3)
HCT VFR BLD CALC: 31 % (ref 36.5–49.3)
HCT VFR BLD CALC: 34 % (ref 36.5–49.3)
HGB BLD-MCNC: 10.5 G/DL (ref 12–17)
HGB BLDA-MCNC: 10.5 G/DL (ref 12–17)
HGB BLDA-MCNC: 11.6 G/DL (ref 12–17)
INTERVENTRICULAR SEPTUM IN DIASTOLE (PARASTERNAL SHORT AXIS VIEW): 1.1 CM
INTERVENTRICULAR SEPTUM: 1.1 CM (ref 0.6–1.1)
IVC: 2.1 MM
KCT BLD-ACNC: 137 SEC (ref 89–137)
KCT BLD-ACNC: 154 SEC (ref 89–137)
KCT BLD-ACNC: 283 SEC (ref 89–137)
LAAS-AP2: 18.9 CM2
LAAS-AP4: 19.7 CM2
LEFT ATRIUM SIZE: 4 CM
LEFT ATRIUM VOLUME (MOD BIPLANE): 53 ML
LEFT ATRIUM VOLUME INDEX (MOD BIPLANE): 24.3 ML/M2
LEFT INTERNAL DIMENSION IN SYSTOLE: 3.6 CM (ref 2.1–4)
LEFT VENTRICULAR INTERNAL DIMENSION IN DIASTOLE: 5.3 CM (ref 3.5–6)
LEFT VENTRICULAR POSTERIOR WALL IN END DIASTOLE: 1.1 CM
LEFT VENTRICULAR STROKE VOLUME: 84 ML
LVSV (TEICH): 84 ML
MV E'TISSUE VEL-LAT: 8 CM/S
MV E'TISSUE VEL-SEP: 5 CM/S
MV PEAK A VEL: 1.03 M/S
MV PEAK E VEL: 89 CM/S
MV STENOSIS PRESSURE HALF TIME: 93 MS
MV VALVE AREA P 1/2 METHOD: 2.37
P AXIS: 60 DEGREES
PCO2 BLD: 26 MMOL/L (ref 21–32)
PCO2 BLD: 29 MMOL/L (ref 21–32)
PCO2 BLD: 47.1 MM HG (ref 36–44)
PCO2 BLD: 51.3 MM HG (ref 42–50)
PH BLD: 7.33 [PH] (ref 7.35–7.45)
PH BLD: 7.34 [PH] (ref 7.3–7.4)
PLATELET # BLD AUTO: 190 THOUSANDS/UL (ref 149–390)
PMV BLD AUTO: 8.9 FL (ref 8.9–12.7)
PO2 BLD: 109 MM HG (ref 75–129)
PO2 BLD: 68 MM HG (ref 35–45)
POTASSIUM BLD-SCNC: 3.8 MMOL/L (ref 3.5–5.3)
POTASSIUM BLD-SCNC: 3.9 MMOL/L (ref 3.5–5.3)
POTASSIUM SERPL-SCNC: 3.9 MMOL/L (ref 3.5–5.3)
PR INTERVAL: 171 MS
QRS AXIS: 67 DEGREES
QRSD INTERVAL: 104 MS
QT INTERVAL: 442 MS
QTC INTERVAL: 474 MS
RA PRESSURE ESTIMATED: 8 MMHG
RH BLD: POSITIVE
RIGHT ATRIUM AREA SYSTOLE A4C: 14.6 CM2
RIGHT VENTRICLE ID DIMENSION: 4.8 CM
RV PSP: 27 MMHG
SAO2 % BLD FROM PO2: 92 % (ref 60–85)
SAO2 % BLD FROM PO2: 98 % (ref 60–85)
SL CV LEFT ATRIUM LENGTH A2C: 6.1 CM
SL CV LV EF: 65
SL CV PED ECHO LEFT VENTRICLE DIASTOLIC VOLUME (MOD BIPLANE) 2D: 137 ML
SL CV PED ECHO LEFT VENTRICLE SYSTOLIC VOLUME (MOD BIPLANE) 2D: 53 ML
SODIUM BLD-SCNC: 141 MMOL/L (ref 136–145)
SODIUM BLD-SCNC: 142 MMOL/L (ref 136–145)
SODIUM SERPL-SCNC: 141 MMOL/L (ref 135–147)
SPECIMEN EXPIRATION DATE: NORMAL
SPECIMEN SOURCE: ABNORMAL
SPECIMEN SOURCE: NORMAL
T WAVE AXIS: 43 DEGREES
TR MAX PG: 19 MMHG
TR PEAK VELOCITY: 2.2 M/S
TRICUSPID ANNULAR PLANE SYSTOLIC EXCURSION: 2.8 CM
TRICUSPID VALVE PEAK REGURGITATION VELOCITY: 2.17 M/S
VENTRICULAR RATE: 69 BPM

## 2023-12-05 PROCEDURE — 86850 RBC ANTIBODY SCREEN: CPT | Performed by: THORACIC SURGERY (CARDIOTHORACIC VASCULAR SURGERY)

## 2023-12-05 PROCEDURE — 85014 HEMATOCRIT: CPT

## 2023-12-05 PROCEDURE — 93005 ELECTROCARDIOGRAM TRACING: CPT

## 2023-12-05 PROCEDURE — 82947 ASSAY GLUCOSE BLOOD QUANT: CPT

## 2023-12-05 PROCEDURE — 93306 TTE W/DOPPLER COMPLETE: CPT | Performed by: INTERNAL MEDICINE

## 2023-12-05 PROCEDURE — 93355 ECHO TRANSESOPHAGEAL (TEE): CPT

## 2023-12-05 PROCEDURE — 86923 COMPATIBILITY TEST ELECTRIC: CPT

## 2023-12-05 PROCEDURE — 85014 HEMATOCRIT: CPT | Performed by: PHYSICIAN ASSISTANT

## 2023-12-05 PROCEDURE — C1760 CLOSURE DEV, VASC: HCPCS | Performed by: THORACIC SURGERY (CARDIOTHORACIC VASCULAR SURGERY)

## 2023-12-05 PROCEDURE — 33361 REPLACE AORTIC VALVE PERQ: CPT | Performed by: THORACIC SURGERY (CARDIOTHORACIC VASCULAR SURGERY)

## 2023-12-05 PROCEDURE — 85049 AUTOMATED PLATELET COUNT: CPT | Performed by: PHYSICIAN ASSISTANT

## 2023-12-05 PROCEDURE — 99222 1ST HOSP IP/OBS MODERATE 55: CPT | Performed by: INTERNAL MEDICINE

## 2023-12-05 PROCEDURE — C1769 GUIDE WIRE: HCPCS | Performed by: THORACIC SURGERY (CARDIOTHORACIC VASCULAR SURGERY)

## 2023-12-05 PROCEDURE — 80048 BASIC METABOLIC PNL TOTAL CA: CPT | Performed by: PHYSICIAN ASSISTANT

## 2023-12-05 PROCEDURE — 86900 BLOOD TYPING SEROLOGIC ABO: CPT | Performed by: THORACIC SURGERY (CARDIOTHORACIC VASCULAR SURGERY)

## 2023-12-05 PROCEDURE — 86901 BLOOD TYPING SEROLOGIC RH(D): CPT | Performed by: THORACIC SURGERY (CARDIOTHORACIC VASCULAR SURGERY)

## 2023-12-05 PROCEDURE — NC001 PR NO CHARGE: Performed by: PHYSICIAN ASSISTANT

## 2023-12-05 PROCEDURE — 02HV33Z INSERTION OF INFUSION DEVICE INTO SUPERIOR VENA CAVA, PERCUTANEOUS APPROACH: ICD-10-PCS | Performed by: ANESTHESIOLOGY

## 2023-12-05 PROCEDURE — 02RF38Z REPLACEMENT OF AORTIC VALVE WITH ZOOPLASTIC TISSUE, PERCUTANEOUS APPROACH: ICD-10-PCS | Performed by: THORACIC SURGERY (CARDIOTHORACIC VASCULAR SURGERY)

## 2023-12-05 PROCEDURE — 84295 ASSAY OF SERUM SODIUM: CPT

## 2023-12-05 PROCEDURE — 82948 REAGENT STRIP/BLOOD GLUCOSE: CPT

## 2023-12-05 PROCEDURE — 82803 BLOOD GASES ANY COMBINATION: CPT

## 2023-12-05 PROCEDURE — 33361 REPLACE AORTIC VALVE PERQ: CPT | Performed by: INTERNAL MEDICINE

## 2023-12-05 PROCEDURE — C1894 INTRO/SHEATH, NON-LASER: HCPCS | Performed by: THORACIC SURGERY (CARDIOTHORACIC VASCULAR SURGERY)

## 2023-12-05 PROCEDURE — 76377 3D RENDER W/INTRP POSTPROCES: CPT

## 2023-12-05 PROCEDURE — 93306 TTE W/DOPPLER COMPLETE: CPT

## 2023-12-05 PROCEDURE — 71045 X-RAY EXAM CHEST 1 VIEW: CPT

## 2023-12-05 PROCEDURE — 85347 COAGULATION TIME ACTIVATED: CPT

## 2023-12-05 PROCEDURE — 82330 ASSAY OF CALCIUM: CPT

## 2023-12-05 PROCEDURE — 94760 N-INVAS EAR/PLS OXIMETRY 1: CPT

## 2023-12-05 PROCEDURE — 94664 DEMO&/EVAL PT USE INHALER: CPT

## 2023-12-05 PROCEDURE — 85018 HEMOGLOBIN: CPT | Performed by: PHYSICIAN ASSISTANT

## 2023-12-05 PROCEDURE — C1751 CATH, INF, PER/CENT/MIDLINE: HCPCS | Performed by: THORACIC SURGERY (CARDIOTHORACIC VASCULAR SURGERY)

## 2023-12-05 PROCEDURE — 84132 ASSAY OF SERUM POTASSIUM: CPT

## 2023-12-05 DEVICE — PERCLOSE™ PROSTYLE™ SUTURE-MEDIATED CLOSURE AND REPAIR SYSTEM
Type: IMPLANTABLE DEVICE | Site: GROIN | Status: FUNCTIONAL
Brand: PERCLOSE™ PROSTYLE™

## 2023-12-05 DEVICE — SAPIEN 3 ULTRA RESILIA TRANSCATHETER HEART VALVE, 26MM
Type: IMPLANTABLE DEVICE | Site: HEART | Status: FUNCTIONAL
Brand: SAPIEN 3 ULTRA RESILIA

## 2023-12-05 RX ORDER — AMIODARONE HYDROCHLORIDE 200 MG/1
200 TABLET ORAL DAILY
Status: DISCONTINUED | OUTPATIENT
Start: 2023-12-05 | End: 2023-12-07 | Stop reason: HOSPADM

## 2023-12-05 RX ORDER — INSULIN LISPRO 100 [IU]/ML
1-6 INJECTION, SOLUTION INTRAVENOUS; SUBCUTANEOUS
Status: DISCONTINUED | OUTPATIENT
Start: 2023-12-05 | End: 2023-12-07 | Stop reason: HOSPADM

## 2023-12-05 RX ORDER — POTASSIUM CHLORIDE 20 MEQ/1
20 TABLET, EXTENDED RELEASE ORAL ONCE
Status: COMPLETED | OUTPATIENT
Start: 2023-12-05 | End: 2023-12-05

## 2023-12-05 RX ORDER — POTASSIUM CHLORIDE 750 MG/1
10 TABLET, EXTENDED RELEASE ORAL DAILY
Status: DISCONTINUED | OUTPATIENT
Start: 2023-12-05 | End: 2023-12-07 | Stop reason: HOSPADM

## 2023-12-05 RX ORDER — FUROSEMIDE 40 MG/1
40 TABLET ORAL DAILY
Status: DISCONTINUED | OUTPATIENT
Start: 2023-12-05 | End: 2023-12-07 | Stop reason: HOSPADM

## 2023-12-05 RX ORDER — ESCITALOPRAM OXALATE 20 MG/1
20 TABLET ORAL DAILY
Status: DISCONTINUED | OUTPATIENT
Start: 2023-12-05 | End: 2023-12-07 | Stop reason: HOSPADM

## 2023-12-05 RX ORDER — LEVOTHYROXINE SODIUM 0.07 MG/1
75 TABLET ORAL
Status: DISCONTINUED | OUTPATIENT
Start: 2023-12-05 | End: 2023-12-07 | Stop reason: HOSPADM

## 2023-12-05 RX ORDER — ONDANSETRON 2 MG/ML
4 INJECTION INTRAMUSCULAR; INTRAVENOUS ONCE AS NEEDED
Status: DISCONTINUED | OUTPATIENT
Start: 2023-12-05 | End: 2023-12-05 | Stop reason: HOSPADM

## 2023-12-05 RX ORDER — ONDANSETRON 2 MG/ML
INJECTION INTRAMUSCULAR; INTRAVENOUS AS NEEDED
Status: DISCONTINUED | OUTPATIENT
Start: 2023-12-05 | End: 2023-12-05

## 2023-12-05 RX ORDER — ASPIRIN 81 MG/1
81 TABLET, CHEWABLE ORAL DAILY
Status: DISCONTINUED | OUTPATIENT
Start: 2023-12-05 | End: 2023-12-07 | Stop reason: HOSPADM

## 2023-12-05 RX ORDER — FENTANYL CITRATE/PF 50 MCG/ML
25 SYRINGE (ML) INJECTION
Status: DISCONTINUED | OUTPATIENT
Start: 2023-12-05 | End: 2023-12-05 | Stop reason: HOSPADM

## 2023-12-05 RX ORDER — ATORVASTATIN CALCIUM 20 MG/1
20 TABLET, FILM COATED ORAL
Status: DISCONTINUED | OUTPATIENT
Start: 2023-12-05 | End: 2023-12-07 | Stop reason: HOSPADM

## 2023-12-05 RX ORDER — POLYETHYLENE GLYCOL 3350 17 G/17G
17 POWDER, FOR SOLUTION ORAL DAILY
Status: DISCONTINUED | OUTPATIENT
Start: 2023-12-05 | End: 2023-12-07 | Stop reason: HOSPADM

## 2023-12-05 RX ORDER — CLOPIDOGREL BISULFATE 75 MG/1
75 TABLET ORAL DAILY
Status: DISCONTINUED | OUTPATIENT
Start: 2023-12-05 | End: 2023-12-07 | Stop reason: HOSPADM

## 2023-12-05 RX ORDER — PANTOPRAZOLE SODIUM 40 MG/1
40 TABLET, DELAYED RELEASE ORAL
Status: DISCONTINUED | OUTPATIENT
Start: 2023-12-05 | End: 2023-12-05

## 2023-12-05 RX ORDER — HYDROMORPHONE HCL IN WATER/PF 6 MG/30 ML
0.2 PATIENT CONTROLLED ANALGESIA SYRINGE INTRAVENOUS
Status: DISCONTINUED | OUTPATIENT
Start: 2023-12-05 | End: 2023-12-05 | Stop reason: HOSPADM

## 2023-12-05 RX ORDER — CHLORHEXIDINE GLUCONATE ORAL RINSE 1.2 MG/ML
15 SOLUTION DENTAL ONCE
Status: COMPLETED | OUTPATIENT
Start: 2023-12-05 | End: 2023-12-05

## 2023-12-05 RX ORDER — MAGNESIUM HYDROXIDE 1200 MG/15ML
LIQUID ORAL AS NEEDED
Status: DISCONTINUED | OUTPATIENT
Start: 2023-12-05 | End: 2023-12-05 | Stop reason: HOSPADM

## 2023-12-05 RX ORDER — PROTAMINE SULFATE 10 MG/ML
INJECTION, SOLUTION INTRAVENOUS AS NEEDED
Status: DISCONTINUED | OUTPATIENT
Start: 2023-12-05 | End: 2023-12-05

## 2023-12-05 RX ORDER — HYDRALAZINE HYDROCHLORIDE 20 MG/ML
10 INJECTION INTRAMUSCULAR; INTRAVENOUS EVERY 6 HOURS PRN
Status: DISCONTINUED | OUTPATIENT
Start: 2023-12-05 | End: 2023-12-07 | Stop reason: HOSPADM

## 2023-12-05 RX ORDER — LIDOCAINE HYDROCHLORIDE 10 MG/ML
INJECTION, SOLUTION INFILTRATION; PERINEURAL AS NEEDED
Status: DISCONTINUED | OUTPATIENT
Start: 2023-12-05 | End: 2023-12-05

## 2023-12-05 RX ORDER — DIPHENHYDRAMINE HYDROCHLORIDE 50 MG/ML
12.5 INJECTION INTRAMUSCULAR; INTRAVENOUS ONCE AS NEEDED
Status: DISCONTINUED | OUTPATIENT
Start: 2023-12-05 | End: 2023-12-05 | Stop reason: HOSPADM

## 2023-12-05 RX ORDER — BISACODYL 10 MG
10 SUPPOSITORY, RECTAL RECTAL DAILY PRN
Status: DISCONTINUED | OUTPATIENT
Start: 2023-12-05 | End: 2023-12-07 | Stop reason: HOSPADM

## 2023-12-05 RX ORDER — FENTANYL CITRATE 50 UG/ML
INJECTION, SOLUTION INTRAMUSCULAR; INTRAVENOUS AS NEEDED
Status: DISCONTINUED | OUTPATIENT
Start: 2023-12-05 | End: 2023-12-05

## 2023-12-05 RX ORDER — METOPROLOL SUCCINATE 25 MG/1
12.5 TABLET, EXTENDED RELEASE ORAL DAILY
Status: DISCONTINUED | OUTPATIENT
Start: 2023-12-05 | End: 2023-12-07 | Stop reason: HOSPADM

## 2023-12-05 RX ORDER — LABETALOL HYDROCHLORIDE 5 MG/ML
10 INJECTION, SOLUTION INTRAVENOUS
Status: DISCONTINUED | OUTPATIENT
Start: 2023-12-05 | End: 2023-12-05 | Stop reason: HOSPADM

## 2023-12-05 RX ORDER — ONDANSETRON 2 MG/ML
4 INJECTION INTRAMUSCULAR; INTRAVENOUS EVERY 6 HOURS PRN
Status: DISCONTINUED | OUTPATIENT
Start: 2023-12-05 | End: 2023-12-07 | Stop reason: HOSPADM

## 2023-12-05 RX ORDER — LIDOCAINE HYDROCHLORIDE 10 MG/ML
INJECTION, SOLUTION EPIDURAL; INFILTRATION; INTRACAUDAL; PERINEURAL
Status: COMPLETED
Start: 2023-12-05 | End: 2023-12-05

## 2023-12-05 RX ORDER — EZETIMIBE 10 MG/1
10 TABLET ORAL DAILY
Status: DISCONTINUED | OUTPATIENT
Start: 2023-12-05 | End: 2023-12-07 | Stop reason: HOSPADM

## 2023-12-05 RX ORDER — HEPARIN SODIUM 5000 [USP'U]/ML
5000 INJECTION, SOLUTION INTRAVENOUS; SUBCUTANEOUS EVERY 8 HOURS SCHEDULED
Status: DISCONTINUED | OUTPATIENT
Start: 2023-12-06 | End: 2023-12-07 | Stop reason: HOSPADM

## 2023-12-05 RX ORDER — PROMETHAZINE HYDROCHLORIDE 25 MG/ML
12.5 INJECTION, SOLUTION INTRAMUSCULAR; INTRAVENOUS ONCE AS NEEDED
Status: DISCONTINUED | OUTPATIENT
Start: 2023-12-05 | End: 2023-12-05 | Stop reason: HOSPADM

## 2023-12-05 RX ORDER — METOCLOPRAMIDE HYDROCHLORIDE 5 MG/ML
10 INJECTION INTRAMUSCULAR; INTRAVENOUS ONCE AS NEEDED
Status: DISCONTINUED | OUTPATIENT
Start: 2023-12-05 | End: 2023-12-05 | Stop reason: HOSPADM

## 2023-12-05 RX ORDER — PROPOFOL 10 MG/ML
INJECTION, EMULSION INTRAVENOUS AS NEEDED
Status: DISCONTINUED | OUTPATIENT
Start: 2023-12-05 | End: 2023-12-05

## 2023-12-05 RX ORDER — PANTOPRAZOLE SODIUM 40 MG/1
40 TABLET, DELAYED RELEASE ORAL
Status: DISCONTINUED | OUTPATIENT
Start: 2023-12-05 | End: 2023-12-07 | Stop reason: HOSPADM

## 2023-12-05 RX ORDER — ACETAMINOPHEN 325 MG/1
650 TABLET ORAL EVERY 4 HOURS PRN
Status: DISCONTINUED | OUTPATIENT
Start: 2023-12-05 | End: 2023-12-07 | Stop reason: HOSPADM

## 2023-12-05 RX ORDER — LIDOCAINE HYDROCHLORIDE 10 MG/ML
INJECTION, SOLUTION EPIDURAL; INFILTRATION; INTRACAUDAL; PERINEURAL
Status: COMPLETED | OUTPATIENT
Start: 2023-12-05 | End: 2023-12-05

## 2023-12-05 RX ORDER — ALBUTEROL SULFATE 2.5 MG/3ML
2.5 SOLUTION RESPIRATORY (INHALATION) ONCE AS NEEDED
Status: DISCONTINUED | OUTPATIENT
Start: 2023-12-05 | End: 2023-12-05 | Stop reason: HOSPADM

## 2023-12-05 RX ORDER — HEPARIN SODIUM 1000 [USP'U]/ML
INJECTION, SOLUTION INTRAVENOUS; SUBCUTANEOUS AS NEEDED
Status: DISCONTINUED | OUTPATIENT
Start: 2023-12-05 | End: 2023-12-05

## 2023-12-05 RX ORDER — SODIUM CHLORIDE 9 MG/ML
INJECTION, SOLUTION INTRAVENOUS CONTINUOUS PRN
Status: DISCONTINUED | OUTPATIENT
Start: 2023-12-05 | End: 2023-12-05

## 2023-12-05 RX ORDER — HYDROMORPHONE HCL/PF 1 MG/ML
0.5 SYRINGE (ML) INJECTION
Status: DISCONTINUED | OUTPATIENT
Start: 2023-12-05 | End: 2023-12-05 | Stop reason: HOSPADM

## 2023-12-05 RX ORDER — HYDRALAZINE HYDROCHLORIDE 20 MG/ML
5 INJECTION INTRAMUSCULAR; INTRAVENOUS
Status: DISCONTINUED | OUTPATIENT
Start: 2023-12-05 | End: 2023-12-05 | Stop reason: HOSPADM

## 2023-12-05 RX ORDER — SODIUM CHLORIDE, SODIUM LACTATE, POTASSIUM CHLORIDE, CALCIUM CHLORIDE 600; 310; 30; 20 MG/100ML; MG/100ML; MG/100ML; MG/100ML
125 INJECTION, SOLUTION INTRAVENOUS CONTINUOUS
Status: DISCONTINUED | OUTPATIENT
Start: 2023-12-05 | End: 2023-12-05

## 2023-12-05 RX ORDER — CHLORHEXIDINE GLUCONATE ORAL RINSE 1.2 MG/ML
15 SOLUTION DENTAL 2 TIMES DAILY
Status: DISCONTINUED | OUTPATIENT
Start: 2023-12-05 | End: 2023-12-07 | Stop reason: HOSPADM

## 2023-12-05 RX ORDER — BUPROPION HYDROCHLORIDE 150 MG/1
300 TABLET ORAL DAILY
Status: DISCONTINUED | OUTPATIENT
Start: 2023-12-05 | End: 2023-12-07 | Stop reason: HOSPADM

## 2023-12-05 RX ORDER — CEFAZOLIN SODIUM 2 G/50ML
2000 SOLUTION INTRAVENOUS EVERY 8 HOURS
Status: DISCONTINUED | OUTPATIENT
Start: 2023-12-05 | End: 2023-12-06

## 2023-12-05 RX ORDER — DEXAMETHASONE SODIUM PHOSPHATE 10 MG/ML
INJECTION, SOLUTION INTRAMUSCULAR; INTRAVENOUS AS NEEDED
Status: DISCONTINUED | OUTPATIENT
Start: 2023-12-05 | End: 2023-12-05

## 2023-12-05 RX ORDER — CEFAZOLIN SODIUM 2 G/50ML
2000 SOLUTION INTRAVENOUS ONCE
Status: COMPLETED | OUTPATIENT
Start: 2023-12-05 | End: 2023-12-05

## 2023-12-05 RX ORDER — SODIUM CHLORIDE, SODIUM LACTATE, POTASSIUM CHLORIDE, CALCIUM CHLORIDE 600; 310; 30; 20 MG/100ML; MG/100ML; MG/100ML; MG/100ML
INJECTION, SOLUTION INTRAVENOUS CONTINUOUS PRN
Status: DISCONTINUED | OUTPATIENT
Start: 2023-12-05 | End: 2023-12-05

## 2023-12-05 RX ORDER — ROCURONIUM BROMIDE 10 MG/ML
INJECTION, SOLUTION INTRAVENOUS AS NEEDED
Status: DISCONTINUED | OUTPATIENT
Start: 2023-12-05 | End: 2023-12-05

## 2023-12-05 RX ADMIN — CEFAZOLIN SODIUM 2000 MG: 2 SOLUTION INTRAVENOUS at 07:50

## 2023-12-05 RX ADMIN — NICARDIPINE HYDROCHLORIDE 5 MG/HR: 2.5 INJECTION, SOLUTION INTRAVENOUS at 17:33

## 2023-12-05 RX ADMIN — PANTOPRAZOLE SODIUM 40 MG: 40 TABLET, DELAYED RELEASE ORAL at 10:45

## 2023-12-05 RX ADMIN — FENTANYL CITRATE 100 MCG: 50 INJECTION, SOLUTION INTRAMUSCULAR; INTRAVENOUS at 07:35

## 2023-12-05 RX ADMIN — MUPIROCIN 1 APPLICATION: 20 OINTMENT TOPICAL at 05:57

## 2023-12-05 RX ADMIN — SUGAMMADEX 200 MG: 100 INJECTION, SOLUTION INTRAVENOUS at 08:26

## 2023-12-05 RX ADMIN — LIDOCAINE HYDROCHLORIDE 50 ML: 10 INJECTION, SOLUTION INFILTRATION; PERINEURAL at 07:35

## 2023-12-05 RX ADMIN — HEPARIN SODIUM 15000 UNITS: 1000 INJECTION INTRAVENOUS; SUBCUTANEOUS at 08:16

## 2023-12-05 RX ADMIN — NICARDIPINE HYDROCHLORIDE 7.5 MG/HR: 2.5 INJECTION, SOLUTION INTRAVENOUS at 11:09

## 2023-12-05 RX ADMIN — FUROSEMIDE 40 MG: 40 TABLET ORAL at 10:37

## 2023-12-05 RX ADMIN — MUPIROCIN 1 APPLICATION: 20 OINTMENT TOPICAL at 20:30

## 2023-12-05 RX ADMIN — ATORVASTATIN CALCIUM 20 MG: 20 TABLET, FILM COATED ORAL at 16:13

## 2023-12-05 RX ADMIN — SODIUM CHLORIDE: 0.9 INJECTION, SOLUTION INTRAVENOUS at 07:27

## 2023-12-05 RX ADMIN — CHLORHEXIDINE GLUCONATE 15 ML: 1.2 SOLUTION ORAL at 05:57

## 2023-12-05 RX ADMIN — NICARDIPINE HYDROCHLORIDE 5 MG/HR: 2.5 INJECTION, SOLUTION INTRAVENOUS at 08:25

## 2023-12-05 RX ADMIN — ACETAMINOPHEN 650 MG: 325 TABLET, FILM COATED ORAL at 11:11

## 2023-12-05 RX ADMIN — PROTAMINE SULFATE 100 MG: 10 INJECTION, SOLUTION INTRAVENOUS at 08:26

## 2023-12-05 RX ADMIN — PROPOFOL 100 MG: 10 INJECTION, EMULSION INTRAVENOUS at 07:35

## 2023-12-05 RX ADMIN — BUPROPION HYDROCHLORIDE 300 MG: 150 TABLET, FILM COATED, EXTENDED RELEASE ORAL at 10:38

## 2023-12-05 RX ADMIN — ASPIRIN 81 MG CHEWABLE TABLET 81 MG: 81 TABLET CHEWABLE at 10:38

## 2023-12-05 RX ADMIN — INSULIN LISPRO 1 UNITS: 100 INJECTION, SOLUTION INTRAVENOUS; SUBCUTANEOUS at 22:23

## 2023-12-05 RX ADMIN — METOPROLOL SUCCINATE 12.5 MG: 25 TABLET, FILM COATED, EXTENDED RELEASE ORAL at 10:38

## 2023-12-05 RX ADMIN — NICARDIPINE HYDROCHLORIDE 0.1 MG: 2.5 INJECTION, SOLUTION INTRAVENOUS at 08:23

## 2023-12-05 RX ADMIN — ROCURONIUM BROMIDE 50 MG: 10 INJECTION, SOLUTION INTRAVENOUS at 07:35

## 2023-12-05 RX ADMIN — AMIODARONE HYDROCHLORIDE 200 MG: 200 TABLET ORAL at 10:38

## 2023-12-05 RX ADMIN — POTASSIUM CHLORIDE 20 MEQ: 1500 TABLET, EXTENDED RELEASE ORAL at 11:36

## 2023-12-05 RX ADMIN — ESCITALOPRAM OXALATE 20 MG: 20 TABLET ORAL at 10:45

## 2023-12-05 RX ADMIN — CEFAZOLIN SODIUM 2000 MG: 2 SOLUTION INTRAVENOUS at 16:13

## 2023-12-05 RX ADMIN — ONDANSETRON 4 MG: 2 INJECTION INTRAMUSCULAR; INTRAVENOUS at 07:38

## 2023-12-05 RX ADMIN — LEVOTHYROXINE SODIUM 75 MCG: 75 TABLET ORAL at 10:45

## 2023-12-05 RX ADMIN — EZETIMIBE 10 MG: 10 TABLET ORAL at 10:38

## 2023-12-05 RX ADMIN — POTASSIUM CHLORIDE 10 MEQ: 750 TABLET, EXTENDED RELEASE ORAL at 10:45

## 2023-12-05 RX ADMIN — CLOPIDOGREL BISULFATE 75 MG: 75 TABLET ORAL at 10:45

## 2023-12-05 RX ADMIN — SODIUM CHLORIDE, SODIUM LACTATE, POTASSIUM CHLORIDE, AND CALCIUM CHLORIDE: .6; .31; .03; .02 INJECTION, SOLUTION INTRAVENOUS at 07:27

## 2023-12-05 RX ADMIN — PANTOPRAZOLE SODIUM 40 MG: 40 TABLET, DELAYED RELEASE ORAL at 16:13

## 2023-12-05 RX ADMIN — CHLORHEXIDINE GLUCONATE 15 ML: 1.2 SOLUTION ORAL at 17:33

## 2023-12-05 RX ADMIN — LIDOCAINE HYDROCHLORIDE 5 ML: 10 INJECTION, SOLUTION EPIDURAL; INFILTRATION; INTRACAUDAL; PERINEURAL at 07:32

## 2023-12-05 RX ADMIN — NICARDIPINE HYDROCHLORIDE 0.1 MG: 2.5 INJECTION, SOLUTION INTRAVENOUS at 08:25

## 2023-12-05 RX ADMIN — DEXAMETHASONE SODIUM PHOSPHATE 10 MG: 10 INJECTION, SOLUTION INTRAMUSCULAR; INTRAVENOUS at 07:38

## 2023-12-05 NOTE — ANESTHESIA PROCEDURE NOTES
Procedure Performed: MAKEDA Anesthesia  Start Time:  12/5/2023 7:48 AM        Preanesthesia Checklist    Patient identified, IV assessed, risks and benefits discussed, monitors and equipment assessed, procedure being performed at surgeon's request and anesthesia consent obtained. Procedure     Type of MAKEDA: interventional MAKEDA with real time guidance of intracardiac procedure. Images Saved: ultrasound permanent image saved. Physician Requesting Echo: Stevie Marcelino MD.  Location performed: OR. Intubated. Heart visualized. Insertion of MAKEDA Probe:  Easy. Probe Type:  Epiaortic and multiplane. Modalities:  Color flow mapping, 3D, continuous wave Doppler and pulse wave Doppler. Echocardiographic and Doppler Measurements    PREPROCEDURE    LEFT VENTRICLE:  Systolic Function: normal. Ejection Fraction: 50-55%. Cavity size: normal.   Regional Wall Motion Abnormalities: none. RIGHT VENTRICLE:  Systolic Function: normal.  Cavity size normal. No hypertrophy              AORTIC VALVE:  Leaflets: trileaflet. Leaflet motions restricted. Stenosis: severe. Mean Gradient: 46 mmHg. Peak Gradient: 87 mmHg. Regurgitation: trace. MITRAL VALVE:  Leaflets: normal. Leaflet Motions: normal. Regurgitation: mild. Stenosis: none. TRICUSPID VALVE:  Leaflets: normal. Leaflet Motions: normal. Stenosis: none. Regurgitation: mild. PULMONIC VALVE:  Leaflets: normal. Regurgitation: none. Stenosis: none. ASCENDING AORTA:  Size:  normal.  Dissection not present. AORTIC ARCH:  Size:  normal.  dissection not present. Grade 3: atheroma protruding < 0.5 cm into lumen. DESCENDING AORTA:  Size: normal.  Dissection not present. Grade 3: atheroma protruding < 0.5 cm into lumen.         RIGHT ATRIUM:  Size:  normal. No spontaneous echo contrast.    LEFT ATRIUM:  Size: normal. No spontaneous echo contrast.    LEFT ATRIAL APPENDAGE:  Size: normal. No spontaneous echo contrast         ATRIAL SEPTUM:  Intra-atrial septal morphology: normal.          VENTRICULAR SEPTUM:  Intra-ventricular septum morphology: normal.             OTHER FINDINGS:  Pericardium:  normal. Pleural Effusion:  none. POSTPROCEDURE    LEFT VENTRICLE: Unchanged . RIGHT VENTRICLE: Unchanged . AORTIC VALVE:   Leaflets: bioprosthetic. Stenosis: none. Mean Gradient: 8 mmHg. Regurgitation: 2 trace PVLs seen and trace. Valve Size: 26 mm. TRICUSPID VALVE: Unchanged . PULMONIC VALVE: Unchanged             ATRIA: Unchanged . AORTA: Unchanged . REMOVAL:  Probe Removal: atraumatic.

## 2023-12-05 NOTE — OP NOTE
OPERATIVE REPORT  PATIENT NAME: Thoams Srinivasan    :  1955  MRN: [id number]  Pt Location: [facility] OR [facility]OM 02    SURGERY DATE: 2023    SURGEON: Dedra Gooden MD    CO-SURGEON: Harrison Rodriguez DO    ASSISTANT: Sid Cagle PA-C    ADDITIONAL ASSISTANT: N/A    PREOPERATIVE DIAGNOSIS: Symptomatic severe aortic stenosis. POSTOPERATIVE DIAGNOSIS: Symptomatic severe aortic stenosis. NYHA Class: 2  CCS Class: 1    PROCEDURE:   Transcatheter aortic valve replacement with a 26 mm Chen ADRIANNA Resilia bioprosthetic valve via left transfemoral approach. CARDIOPULMONARY BYPASS TIME: 0 minutes. ANESTHESIA Dr. Martín Aiken, general endotracheal anesthesia with transesophageal echocardiogram guidance. INDICATIONS:  The patient is a 77y.o. year-old male with clinical and echocardiographic findings consistent with severe aortic stenosis. The patient was evaluated in our heart valve center, we recommended the procedure described previously. FINDINGS:  1. Intraoperative transesophageal echocardiogram revealed severe aortic stenosis. 2. Final transesophageal echocardiogram demonstrated prosthetic valve with normal function trace perivalvular leak. OPERATIVE TECHNIQUE:    The patient was taken to the operating room and placed supine on the operating table. Following the satisfactory induction of general anesthesia and placement of monitoring lines, the patient was prepped and draped in the usual sterile fashion. A time-out procedure was performed. The left common femoral vein was accessed percutaneously using Seldinger technique and fluoroscopy, a balloon-tip temporary pacing catheter was inserted and advanced into the right ventricle and its capture was confirmed. The left common femoral artery was accessed percutaneously using Seldinger technique and fluoroscopy. Two (2) Perclose sutures were deployed in the standard fashion. The patient was systemically heparinized.  A pigtail catheter was advanced to the right coronary cusp, an aortogram was performed to determine proper angle and orientation for valve deployment. A Lunderquist extra-stiff wire was positioned in the ascending aorta over an exchange catheter. The sheath for the delivery system was inserted. The stiff wire was removed over a catheter, the aortic valve was crossed with a 0.035 straight-tip wire, this was then exchanged over a catheter for a curved tip extra stiff wire. A 26 mm ADRIANNA Resilia valve delivery system was advanced through the delivery sheath into the aorta, the delivery system was configured for deployment. The valve on the delivery system was then advanced and the aortic valve was crossed. At this point, the catheter was desheathed in the standard fashion. The valve was positioned appropriately using a combination of fluoroscopy and transesophageal echocardiogram guidance. During an episode of rapid pacing, balloon deployment of the valve was performed. Following deployment, the position of the valve was confirmed by fluoroscopy and echocardiography and its position appeared appropriate with trace perivalvular leak. The valve delivery system was subsequently removed followed by removal of the sheath while the Perclose sutures were secured and direct pressure was held. Protamine was administered with normalization of the ACT. Pressure was released, without evidence of active bleeding. The left common femoral vein sheath was removed and pressure was held. COMPLICATIONS: None    PACKS/TUBES/DRAINS: None. EBL: 30 cc. TRANSFUSION: None. SPECIMENS: None      As the attending surgeon, I was present and scrubbed for all critical portions of this procedure. There was no qualified surgical resident available. Sponge, needle and instrument counts were reported as correct by the nursing staff.  A cardiology co-surgeon was required as is a CMS requirement      SIGNATURE: Paula Baez MD  DATE: December 5, 2023  TIME: 9:01 AM

## 2023-12-05 NOTE — RESPIRATORY THERAPY NOTE
RT Protocol Note  Melinda Floor 77 y.o. male MRN: [id number]  Unit/Bed#: [facility] 422-01 Encounter: [id number]    Assessment    Principal Problem:    Severe aortic stenosis  Active Problems:    Benign essential HTN    MDD (major depressive disorder)    Other emphysema (HCC)    Borderline high cholesterol    Dyspnea on exertion    Mixed hyperlipidemia    Status post insertion of drug-eluting stent into left anterior descending (LAD) artery    s/p Medtronic dual chamber ICD 2023    S/P TAVR (transcatheter aortic valve replacement)    Prediabetes    Hypothyroid    Stage 3a chronic kidney disease (HCC)      Home Pulmonary Medications:  none       Past Medical History:   Diagnosis Date    Anxiety     Aortic stenosis     Borderline high cholesterol     CHF (congestive heart failure) ([address])     Asaf Castellanos Gibble-cardio-    Colon polyp     Depression     Heart murmur     Hypertension     Hypothyroidism     Prediabetes     PVCs (premature ventricular contractions)     "skip"    s/p Medtronic dual chamber ICD 2023    Wears glasses     readers     Social History     Socioeconomic History    Marital status: /Civil Union     Spouse name: None    Number of children: None    Years of education: None    Highest education level: None   Occupational History    None   Tobacco Use    Smoking status: Former     Packs/day: 0.50     Years: 50.00     Total pack years: 25.00     Types: Cigarettes     Quit date: 11/3/2023     Years since quittin.0    Smokeless tobacco: Never   Vaping Use    Vaping Use: Never used   Substance and Sexual Activity    Alcohol use: Yes     Comment: socially-2 drinks per week    Drug use: Not Currently     Comment: sporadic in young adulthood    Sexual activity: None     Comment: defer   Other Topics Concern    None   Social History Narrative    None     Social Determinants of Health     Financial Resource Strain: Not on file   Food Insecurity: No Food Insecurity (2023) Hunger Vital Sign     Worried About Running Out of Food in the Last Year: Never true     Ran Out of Food in the Last Year: Never true   Transportation Needs: No Transportation Needs (11/7/2023)    PRAPARE - Transportation     Lack of Transportation (Medical): No     Lack of Transportation (Non-Medical): No   Physical Activity: Not on file   Stress: Not on file   Social Connections: Not on file   Intimate Partner Violence: Not on file   Housing Stability: Low Risk  (11/7/2023)    Housing Stability Vital Sign     Unable to Pay for Housing in the Last Year: No     Number of Places Lived in the Last Year: 1     Unstable Housing in the Last Year: No       Subjective         Objective    Physical Exam:   Assessment Type: Assess only  General Appearance: Awake, Alert  Respiratory Pattern: Normal  Chest Assessment: Chest expansion symmetrical  Bilateral Breath Sounds: Clear    Vitals:  Blood pressure 114/58, pulse 64, temperature 98 °F (36.7 °C), temperature source Oral, resp. rate 14, height 5' 10.75" (1.797 m), weight 99.1 kg (218 lb 8 oz), SpO2 94 %. Imaging and other studies: I have personally reviewed pertinent reports. Plan    Respiratory Plan: Discontinue Protocol, No distress/Pulmonary history  Airway Clearance Plan: Incentive Spirometer     Resp Comments: Pt ordered on respiratory and airway clearance protocol post TAVR procedure. Pt does not have a pulmonary hx and does not take pulmonary meds at home. Will keep airway clearance open for IS and d/c respiratory protocol at this time.

## 2023-12-05 NOTE — ANESTHESIA PROCEDURE NOTES
Arterial Line Insertion    Performed by: Karen Fernández MD  Authorized by: Karen Fernández MD  Consent: Verbal consent obtained. Written consent obtained. Risks and benefits: risks, benefits and alternatives were discussed  Consent given by: patient  Patient understanding: patient states understanding of the procedure being performed  Patient consent: the patient's understanding of the procedure matches consent given  Procedure consent: procedure consent matches procedure scheduled  Relevant documents: relevant documents present and verified  Test results: test results available and properly labeled  Site marked: the operative site was marked  Radiology Images: Radiology Images displayed and confirmed. If images not available, report reviewed  Required items: required blood products, implants, devices, and special equipment available  Patient identity confirmed: verbally with patient, arm band, provided demographic data and hospital-assigned identification number  Time out: Immediately prior to procedure a "time out" was called to verify the correct patient, procedure, equipment, support staff and site/side marked as required. Preparation: Patient was prepped and draped in the usual sterile fashion.   Indications: multiple ABGs and hemodynamic monitoring  Orientation:  Left  Location: radial arterylidocaine (PF) (XYLOCAINE-MPF) 1 % - Infiltration   5 mL - 12/5/2023 7:32:00 AM  Sedation:  Patient sedated: no    Procedure Details:  Dipak's test normal: yes  Needle gauge: 20  Seldinger technique: Seldinger technique used  Number of attempts: 1    Post-procedure:  Post-procedure: chlorhexidine patch applied and dressing applied  Waveform: good waveform and waveform confirmed  Post-procedure CNS: normal

## 2023-12-05 NOTE — CONSULTS
Consultation - Cardiology Team One  Braulio Deras 77 y.o. male MRN: [id number]  Unit/Bed#: [address]34 Encounter: [id number]    Inpatient consult to Cardiology  Consult performed by: Yassine Gross PA-C  Consult ordered by: Roxane Olmos PA-C        Physician Requesting Consult: Jeanine Hernández MD  Reason for Consult / Principal Problem: Severe AS s/p TAVR    Assessment:    1. Severe AS: s/p left TF TAVR. POD #0  Final intraoperative MAKEDA: Well-seated normally functioning valve with trace PVL  Aspirin, Plavix, beta-blocker and statin    2. Chronic HFpEF: Final intraoperative MAKEDA EF 55%. Volume status appears stable on Lasix 40 mg daily. 3.  CAD: s/p DEON to LAD on 10/17/2023. Continued on aspirin, Plavix, beta-blocker and statin. 4.  VT: s/p MDT DC ICD placement 11/2/2023. Maintained on amiodarone 200 mg daily and Toprol-XL 12.5 mg daily. Normal device function on interrogation 11/16/2023    5. Essential hypertension: Average /60 on Toprol-XL 12.5 mg daily and nicardipine infusion. 6.  Hyperlipidemia: Lipid panel 11/2023 , , HDL 43, LDL 56 on Zetia 10 mg daily and pravastatin 20 mg daily that has been switched to atorvastatin this admission due to non-formulary. 7.  Prediabetes: Hemoglobin A1c 5.9 in 1/2023. Management per primary team.    8.  CKD stage III: Baseline creatinine 1.0-1.2. Creatinine currently at baseline. Plan/Recommendations:   Follow-up on echocardiogram  Encourage incentive spirometry and ambulation when able  Continue current medication regimen  Monitor on telemetry  Follow-up with Ramon Porter PA-C on 12/29/2023  _____________________________________________________________________    CC: No chief complaint on file      History of Present Illness   HPI: Braulio Deras is a 77y.o. year old male who has CAD s/p DEON to LAD on 10/17/2023, VT s/p MDT DC ICD placement 11/2/2023, chronic HFpEF, severe AS, essential hypertension, hyperlipidemia, CKD stage III, prediabetes, hypothyroidism who follows with cardiologist Dr. Whitney Avalos. He was referred to the valve clinic for progressive severe AS. He underwent cardiac catheterization on 10/17 that revealed proximal-mid LAD lesion that was successfully treated with DEON. On 10/30 he was riding a stationary bike at cardiac rehab and became dizzy and was noted to be in VT and underwent placement of an ICD on 11/2. After routine preoperative testing patient presented 12/5/2023 and underwent successful left TF TAVR with a 26 mm Chen ADRIANNA Resilia bioprosthetic valve. Final intraoperative MAKEDA revealed EF of 55% with well-seated bioprosthetic TAVR with trace PVL. Cardiology has been consulted for postoperative co-management. Home medication regimen includes amiodarone 200 mg daily, aspirin 81 mg daily, Plavix 75 mg daily, Zetia 10 mg daily, Lasix 40 mg daily, Toprol-XL 12.5 mg daily, pravastatin 20 mg daily. Patient resting in bed during consultation denies any chest pain, shortness of breath or palpitations. Denies any significant groin pain. Device interrogation 11/16/2023: MDT DC ICD (MRI conditional). AP 13%,  0.1%. No significant high rate episodes. Programming changes were made to the device parameters. Normal device function. Cardiac catheterization 10/17/2023:    Mid RCA lesion is 40% stenosed. Prox LAD to Mid LAD lesion is 80% stenosed. Significant CAD, with a long 80% stenosis of the proximal/mid LAD. There was also non-obstructive plaque of the mid RCA. The LAD lesion was interrogated by iFR and was borderline, varying from 0.88 to 0.91. IVUS was also performed, and there was a marked reduction in cross sectional area. PCI was performed, with placement of a 4.0x26mm DEON. There was no residual stenosis. There was full stent expansion and apposition, with 0% residual stenosis. Plan: DAPT. The patient has been intolerant of statins and is on a low potency statin and ezetimibe.  With an LDL above 140 mg/dL and pre-diabetes, and established CAD, he is an appropriate candidate for PCSK9 inhibition. He will undergo TAVR in the near future. Echocardiogram 9/8/2023: EF 60% with no WMA, G1 DD, normal RV function, severe AS with MG 41 mmHg and EMILI 1.0 cm2. EKG reviewed personally: 12/5/23-this rhythm at a rate of 69 bpm with nonspecific ST-T wave abnormality. No significant change when compared to the EKG from 11/6/2023. Telemetry reviewed personally: Sinus rhythm with heart rates in the 60-70s      Review of Systems   Constitutional: Negative. Negative for chills. Cardiovascular:  Negative for chest pain, dyspnea on exertion, leg swelling, near-syncope, orthopnea, palpitations, paroxysmal nocturnal dyspnea and syncope. Respiratory: Negative. Negative for cough, shortness of breath and wheezing. Endocrine: Negative. Hematologic/Lymphatic: Negative. Skin: Negative. Musculoskeletal: Negative. Gastrointestinal: Negative. Negative for diarrhea, nausea and vomiting. Neurological:  Negative for dizziness, light-headedness and weakness. Psychiatric/Behavioral: Negative. Negative for altered mental status. All other systems reviewed and are negative. Historical Information   Past Medical History:   Diagnosis Date    Anxiety     Aortic stenosis     Borderline high cholesterol     CHF (congestive heart failure) ([address])     [facility] [place]-cardio-    Colon polyp     Depression     Heart murmur     Hypertension     Hypothyroidism     Prediabetes     PVCs (premature ventricular contractions)     "skip"    s/p Medtronic dual chamber ICD 11/2/2023 11/04/2023    Wears glasses     readers     Past Surgical History:   Procedure Laterality Date    APPENDECTOMY      BACK SURGERY      CARDIAC CATHETERIZATION N/A 10/17/2023    Procedure: Cardiac RHC/LHC; Surgeon: Flakita Parks MD;  Location: [facility];   Service: Cardiology    CARDIAC ELECTROPHYSIOLOGY PROCEDURE N/A 2023    Procedure: Cardiac icd implant;  Surgeon: Karin Narayan MD;  Location: [facility];   Service: Cardiology    CATARACT EXTRACTION Bilateral     CLOSURE DELAYED PRIMARY Left 2023    Procedure: CLOSURE DELAYED PRIMARY;  Surgeon: Simone Jerez DPM;  Location: [facility];  Service: Podiatry    COLONOSCOPY      NATHALIE Guerra    CT CORRJ HALLUX VALGUS W/SESMDC W/RESCJ PROX PHAL Left 2023    Procedure: Sherra Isaura and wound debridement;  Surgeon: Lester Hamilton DPM;  Location: [facility];  Service: Podiatry    TOE AMPUTATION Left 2023    Procedure: PARTIAL 1ST RAY AMPUTATION;  Surgeon: Simone Jerez DPM;  Location: [facility];  Service: Podiatry    TONSILLECTOMY      TOTAL SHOULDER REPLACEMENT Left     WISDOM TOOTH EXTRACTION       Social History     Substance and Sexual Activity   Alcohol Use Yes    Comment: socially-2 drinks per week     Social History     Substance and Sexual Activity   Drug Use Not Currently    Comment: sporadic in young adulthood     Social History     Tobacco Use   Smoking Status Former    Packs/day: 0.50    Years: 50.00    Total pack years: 25.00    Types: Cigarettes    Quit date: 11/3/2023    Years since quittin.0   Smokeless Tobacco Never     Family History:   Family History   Problem Relation Age of Onset    Dementia Mother     Blindness Mother     Cancer Mother     Dementia Father        Meds/Allergies   all current active meds have been reviewed, current meds:   Current Facility-Administered Medications   Medication Dose Route Frequency    acetaminophen (TYLENOL) rectal suppository 650 mg  650 mg Rectal Q4H PRN    acetaminophen (TYLENOL) tablet 650 mg  650 mg Oral Q4H PRN    amiodarone tablet 200 mg  200 mg Oral Daily    aspirin chewable tablet 81 mg  81 mg Oral Daily    atorvastatin (LIPITOR) tablet 20 mg  20 mg Oral Daily With Dinner    bisacodyl (DULCOLAX) rectal suppository 10 mg  10 mg Rectal Daily PRN buPROPion (WELLBUTRIN XL) 24 hr tablet 300 mg  300 mg Oral Daily    ceFAZolin (ANCEF) IVPB (premix in dextrose) 2,000 mg 50 mL  2,000 mg Intravenous Q8H    chlorhexidine (PERIDEX) 0.12 % oral rinse 15 mL  15 mL Mouth/Throat BID    clopidogrel (PLAVIX) tablet 75 mg  75 mg Oral Daily    escitalopram (LEXAPRO) tablet 20 mg  20 mg Oral Daily    ezetimibe (ZETIA) tablet 10 mg  10 mg Oral Daily    furosemide (LASIX) tablet 40 mg  40 mg Oral Daily    [START ON 12/6/2023] heparin (porcine) subcutaneous injection 5,000 Units  5,000 Units Subcutaneous Q8H [address]    hydrALAZINE (APRESOLINE) injection 10 mg  10 mg Intravenous Q6H PRN    insulin lispro (HumaLOG) 100 units/mL subcutaneous injection 1-6 Units  1-6 Units Subcutaneous TID AC    insulin lispro (HumaLOG) 100 units/mL subcutaneous injection 1-6 Units  1-6 Units Subcutaneous HS    levothyroxine tablet 75 mcg  75 mcg Oral Early Morning    metoprolol succinate (TOPROL-XL) 24 hr tablet 12.5 mg  12.5 mg Oral Daily    mupirocin (BACTROBAN) 2 % nasal ointment 1 Application  1 Application Nasal G77I ASHLEY    niCARdipine (CARDENE) 25 mg (STANDARD CONCENTRATION) in sodium chloride 0.9% 250 mL  1-15 mg/hr Intravenous Titrated    ondansetron (ZOFRAN) injection 4 mg  4 mg Intravenous Q6H PRN    pantoprazole (PROTONIX) EC tablet 40 mg  40 mg Oral BID AC    polyethylene glycol (MIRALAX) packet 17 g  17 g Oral Daily    potassium chloride (K-DUR,KLOR-CON) CR tablet 10 mEq  10 mEq Oral Daily   , and PTA meds:   Prior to Admission Medications   Prescriptions Last Dose Informant Patient Reported? Taking?    Alirocumab 150 MG/ML SOAJ 12/3/2023 Self No No   Sig: Inject 150 mg under the skin every 14 (fourteen) days   Magnesium 400 MG CAPS 12/4/2023 Self No Yes   Sig: Take 1 capsule (400 mg total) by mouth in the morning   Omega-3 Fatty Acids (Fish Oil) 1200 MG CAPS 12/3/2023 Self Yes No   Sig: Take by mouth   amiodarone 200 mg tablet 12/3/2023 Self No No   Sig: Take 1 tablet (200 mg total) by mouth daily   aspirin 81 mg chewable tablet 12/4/2023 at 800 Self No Yes   Sig: Chew 1 tablet (81 mg total) daily   buPROPion (WELLBUTRIN XL) 300 mg 24 hr tablet 12/4/2023 Self Yes Yes   clopidogrel (PLAVIX) 75 mg tablet 12/4/2023 at 800 Self No Yes   Sig: TAKE ONE TABLET BY MOUTH EVERY DAY   escitalopram (LEXAPRO) 20 mg tablet 12/4/2023 Self Yes Yes   Sig: Take 20 mg by mouth daily   ezetimibe (ZETIA) 10 mg tablet 12/4/2023 Self No Yes   Sig: Take 1 tablet (10 mg total) by mouth daily   furosemide (LASIX) 40 mg tablet 12/4/2023 at 800 Self No Yes   Sig: Take 1 tablet (40 mg total) by mouth daily   levothyroxine 75 mcg tablet 12/4/2023 Self Yes Yes   metoprolol succinate (TOPROL-XL) 25 mg 24 hr tablet 12/4/2023 at 800 Self No Yes   Sig: Take 0.5 tablets (12.5 mg total) by mouth daily   mupirocin (BACTROBAN) 2 % ointment 12/4/2023  No Yes   Sig: Apply topically 2 (two) times a day for 5 days Apply to each nostril twice daily for five days before your operation. pantoprazole (PROTONIX) 40 mg tablet 12/4/2023 at 800 Self No Yes   Sig: Take 1 tablet (40 mg total) by mouth 2 (two) times a day before meals   potassium chloride (MICRO-K) 10 MEQ CR capsule 12/4/2023 at 800 Self No Yes   Sig: Take 1 capsule (10 mEq total) by mouth daily   pravastatin (PRAVACHOL) 20 mg tablet  Self No No   Sig: Take 1 tablet (20 mg total) by mouth every other day   Patient not taking: Reported on 11/6/2023      Facility-Administered Medications: None     niCARdipine, 1-15 mg/hr, Last Rate: 7.5 mg/hr (12/05/23 1109)        Allergies   Allergen Reactions    Statins Myalgia     Other reaction(s): muscle aches and joint aches         Objective   Vitals: Blood pressure 114/59, pulse 68, temperature 97.6 °F (36.4 °C), resp. rate 18, height 5' 10.75" (1.797 m), weight 99.1 kg (218 lb 8 oz), SpO2 96 %. ,     Body mass index is 30.69 kg/m². ,     Systolic (89LET), RGZ:012 , Min:104 , VUR:821     Diastolic (76EWQ), SDX:03, Min:53, Max:81    Wt Readings from Last 3 Encounters:   12/05/23 99.1 kg (218 lb 8 oz)   11/30/23 101 kg (223 lb)   11/09/23 99.2 kg (218 lb 11.1 oz)      Lab Results   Component Value Date    CREATININE 1.19 12/05/2023    CREATININE 1.20 12/01/2023    CREATININE 1.28 11/21/2023             Intake/Output Summary (Last 24 hours) at 12/5/2023 1124  Last data filed at 12/5/2023 1773  Gross per 24 hour   Intake 1000 ml   Output 550 ml   Net 450 ml     Weight (last 2 days)       Date/Time Weight    12/05/23 0546 99.1 (218.5)          Invasive Devices       Central Venous Catheter Line  Duration             CVC Central Lines 12/05/23 Triple <1 day              Peripheral Intravenous Line  Duration             Peripheral IV 12/05/23 Left Antecubital <1 day    Peripheral IV 12/05/23 Right Wrist <1 day              Arterial Line  Duration             Arterial Line 12/05/23 Left Radial <1 day              Drain  Duration             Urethral Catheter Non-latex;Straight-tip; Temperature probe 16 Fr. <1 day              Airway  Duration             ETT  Cuffed 8 mm <1 day                      Physical Exam  Vitals and nursing note reviewed. Constitutional:       General: He is not in acute distress. Appearance: He is well-developed. Comments: On 2 L NC in NAD   HENT:      Head: Normocephalic and atraumatic. Neck:      Vascular: No JVD. Cardiovascular:      Rate and Rhythm: Normal rate and regular rhythm. Heart sounds: Normal heart sounds. No murmur heard. No friction rub. No gallop. Pulmonary:      Effort: Pulmonary effort is normal. No respiratory distress. Breath sounds: Normal breath sounds. No wheezing or rales. Chest:      Chest wall: No tenderness. Abdominal:      General: Bowel sounds are normal. There is no distension. Palpations: Abdomen is soft. Tenderness: There is no abdominal tenderness. Musculoskeletal:         General: No tenderness. Normal range of motion.       Cervical back: Normal range of motion and neck supple. Right lower leg: No edema. Left lower leg: No edema. Skin:     General: Skin is warm and dry. Coloration: Skin is not pale. Findings: No erythema. Neurological:      Mental Status: He is alert and oriented to person, place, and time. Psychiatric:         Mood and Affect: Mood normal.         Behavior: Behavior normal.         Thought Content: Thought content normal.         Judgment: Judgment normal.           LABORATORY RESULTS:      CBC with diff:   Results from last 7 days   Lab Units 12/05/23  0856   HEMOGLOBIN g/dL 10.5*   HEMATOCRIT % 33.9*   PLATELETS Thousands/uL 190   MPV fL 8.9       CMP:  Results from last 7 days   Lab Units 12/05/23  0856 12/01/23  0956   POTASSIUM mmol/L 3.9 4.2   CHLORIDE mmol/L 107 102   CO2 mmol/L 28 30   BUN mg/dL 21 25   CREATININE mg/dL 1.19 1.20   CALCIUM mg/dL 8.4 9.0   AST U/L  --  17   ALT U/L  --  11   ALK PHOS U/L  --  93   EGFR ml/min/1.73sq m 61 60       BMP:  Results from last 7 days   Lab Units 12/05/23  0856 12/01/23  0956   POTASSIUM mmol/L 3.9 4.2   CHLORIDE mmol/L 107 102   CO2 mmol/L 28 30   BUN mg/dL 21 25   CREATININE mg/dL 1.19 1.20   CALCIUM mg/dL 8.4 9.0          Lab Results   Component Value Date    NTBNP 324 (H) 03/29/2023         Results from last 7 days   Lab Units 12/01/23  0956   INR  0.91     Lipid Profile:   No results found for: "CHOL"  Lab Results   Component Value Date    HDL 43 11/13/2023     Lab Results   Component Value Date    LDLCALC 56 11/13/2023     Lab Results   Component Value Date    TRIG 165 (H) 11/13/2023         Cardiac testing:   No results found for this or any previous visit. No results found for this or any previous visit. No valid procedures specified. No results found for this or any previous visit. Imaging: I have personally reviewed pertinent reports.     MAKEDA Anesthesia    Result Date: 12/5/2023  Narrative: Pallavi Torres, TALI     12/5/2023  8:28 AM Procedure Performed: MAKEDA Anesthesia Start Time:  12/5/2023 7:48 AM Preanesthesia Checklist Patient identified, IV assessed, risks and benefits discussed, monitors and equipment assessed, procedure being performed at surgeon's request and anesthesia consent obtained. Procedure  Type of MAKEDA: interventional MAKEDA with real time guidance of intracardiac procedure. Images Saved: ultrasound permanent image saved. Physician Requesting Echo: Kerrie Rogers MD.  Location performed: OR. Intubated. Heart visualized. Insertion of MAKEDA Probe:  Easy. Probe Type:  Epiaortic and multiplane. Modalities:  Color flow mapping, 3D, continuous wave Doppler and pulse wave Doppler. Echocardiographic and Doppler Measurements PREPROCEDURE LEFT VENTRICLE: Systolic Function: normal. Ejection Fraction: 50-55%. Cavity size: normal.   Regional Wall Motion Abnormalities: none. RIGHT VENTRICLE: Systolic Function: normal.  Cavity size normal. No hypertrophy  AORTIC VALVE: Leaflets: trileaflet. Leaflet motions restricted. Stenosis: severe. Mean Gradient: 46 mmHg. Peak Gradient: 87 mmHg. Regurgitation: trace. MITRAL VALVE: Leaflets: normal. Leaflet Motions: normal. Regurgitation: mild. Stenosis: none. TRICUSPID VALVE: Leaflets: normal. Leaflet Motions: normal. Stenosis: none. Regurgitation: mild. PULMONIC VALVE: Leaflets: normal. Regurgitation: none. Stenosis: none. ASCENDING AORTA: Size:  normal.  Dissection not present. AORTIC ARCH: Size:  normal.  dissection not present. Grade 3: atheroma protruding < 0.5 cm into lumen. DESCENDING AORTA: Size: normal.  Dissection not present. Grade 3: atheroma protruding < 0.5 cm into lumen.  RIGHT ATRIUM: Size:  normal. No spontaneous echo contrast. LEFT ATRIUM: Size: normal. No spontaneous echo contrast. LEFT ATRIAL APPENDAGE: Size: normal. No spontaneous echo contrast ATRIAL SEPTUM: Intra-atrial septal morphology: normal.  VENTRICULAR SEPTUM: Intra-ventricular septum morphology: normal. OTHER FINDINGS: Pericardium: normal. Pleural Effusion:  none. POSTPROCEDURE LEFT VENTRICLE: Unchanged . RIGHT VENTRICLE: Unchanged . AORTIC VALVE: Leaflets: bioprosthetic. Stenosis: none. Mean Gradient: 8 mmHg. Regurgitation: 2 trace PVLs seen and trace. Valve Size: 26 mm. TRICUSPID VALVE: Unchanged . PULMONIC VALVE: Unchanged   ATRIA: Unchanged . AORTA: Unchanged . REMOVAL: Probe Removal: atraumatic. Cardiac EP device report    Result Date: 11/16/2023  Narrative: MDT DC ICD (MVP ON) - ACTIVE SYSTEM IS MRI CONDITIONAL DEVICE INTERROGATED IN THE [employer] OFFICE: WOUND CHECK: INCISION CLEAN AND DRY WITH EDGES APPROXIMATED;  WOUND CARE AND RESTRICTIONS REVIEWED WITH PATIENT (PDF # 2 ATTACHED) BRUISING ON CHEST WALL, PT TAKES PLAVIX, ASA 81MG, AMIODARONE, METOPROLOL SUCC. EF: 60% (ECHO 9/8/23). BATTERY VOLTAGE ADEQUATE (12.2 YRS). AP: 13%. : 0.1% (MVP-ON). ALL  LEAD PARAMETERS WITHIN NORMAL LIMITS (RA [email or web address]). NO SIGNIFICANT HIGH RATE EPISODES. NO PROGRAMMING CHANGES MADE TO DEVICE PARAMETERS. NORMAL DEVICE FUNCTION.      EGD    Addendum Date: 11/7/2023 Addendum:   [facility] [address] [place] [address] [phone] DATE OF SERVICE: 11/07/23 PHYSICIAN(S): Attending: Cecelia Mendez MD Fellow: No Staff Documented INDICATION: GI bleed, ABLA (acute blood loss anemia) POST-OP DIAGNOSIS: See the impression below. PREPROCEDURE: Informed consent was obtained for the procedure, including sedation. Risks of perforation, hemorrhage, adverse drug reaction and aspiration were discussed. The patient was placed in the left lateral decubitus position. Patient was explained about the risks and benefits of the procedure. Risks including but not limited to bleeding, infection, and perforation were explained in detail. Also explained about less than 100% sensitivity with the exam and other alternatives.  PROCEDURE: EGD DETAILS OF PROCEDURE: Patient was taken to the procedure room where a time out was performed to confirm correct patient and correct procedure. The patient underwent monitored anesthesia care, which was administered by an anesthesia professional. The patient's blood pressure, heart rate, level of consciousness, respirations and oxygen were monitored throughout the procedure. The scope was advanced to the second part of the duodenum. Retroflexion was performed in the fundus. The patient experienced no blood loss. The procedure was not difficult. The patient tolerated the procedure well. There were no apparent adverse events. ANESTHESIA INFORMATION: ASA: ASA status not filed in the log. Anesthesia Type: Anesthesia type not filed in the log. MEDICATIONS: No administrations occurring from 1317 to 1347 on 11/07/23 FINDINGS: Irregular Z-line 43 cm from the incisors Medium hiatal hernia Significant amount of fresh blood and blood clotting in the body of the stomach Single large Dieulafoy's lesion in the body of the stomach; there was oozing blood and a blood clot; placed 1 clip successfully and 1 clip unsuccessfully (clips are MRI conditional); hemostasis achieved; injected 1.5 mL of epinephrine to address bleeding; hemostasis achieved Abnormal mucosa in the body of the stomach and antrum, consistent with gastritis Moderate, generalized abnormal mucosa in the stomach, consistent with gastritis The duodenal bulb, 1st part of the duodenum and 2nd part of the duodenum appeared normal. There was eveidence of small blood clot and fresh blood in the duodenal bulb.  SPECIMENS: * No specimens in log * IMPRESSION: Irregular Z-line Medium hiatal hernia Significant amount of fresh blood and blood clotting in the body of the stomach Single large Dieulafoy's lesion in the body of the stomach; there was oozing blood and a blood clot; placed 1 clip successfully and 1 clip unsuccessfully; hemostasis achieved; injected epinephrine to address bleeding; hemostasis achieved Abnormal mucosa, consistent with gastritis in the body of the stomach and antrum Moderate abnormal mucosa in the stomach, consistent with gastritis The duodenal bulb, 1st part of the duodenum and 2nd part of the duodenum appeared normal. RECOMMENDATION:  Resume Plavix today Continue ASA Switch PPI to PO BID Start Carafate 1gm po TID Avoid ASA Advance diet Close H/H monitoring for recurrent Bleeding. Wade Olmos MD     Result Date: 11/7/2023  Narrative: Table formatting from the original result was not included. [facility] Operating Room [address] [address] [phone] DATE OF SERVICE: 11/07/23 PHYSICIAN(S): Attending: Wade Olmos MD Fellow: No Staff Documented INDICATION: GI bleed, ABLA (acute blood loss anemia) POST-OP DIAGNOSIS: See the impression below. PREPROCEDURE: Informed consent was obtained for the procedure, including sedation. Risks of perforation, hemorrhage, adverse drug reaction and aspiration were discussed. The patient was placed in the left lateral decubitus position. Patient was explained about the risks and benefits of the procedure. Risks including but not limited to bleeding, infection, and perforation were explained in detail. Also explained about less than 100% sensitivity with the exam and other alternatives. PROCEDURE: EGD DETAILS OF PROCEDURE: Patient was taken to the procedure room where a time out was performed to confirm correct patient and correct procedure. The patient underwent monitored anesthesia care, which was administered by an anesthesia professional. The patient's blood pressure, heart rate, level of consciousness, respirations and oxygen were monitored throughout the procedure. The scope was advanced to the second part of the duodenum. Retroflexion was performed in the fundus. The patient experienced no blood loss. The procedure was not difficult. The patient tolerated the procedure well. There were no apparent adverse events.  ANESTHESIA INFORMATION: ASA: ASA status not filed in the log. Anesthesia Type: Anesthesia type not filed in the log. MEDICATIONS: No administrations occurring from 1317 to 1347 on 11/07/23 FINDINGS: Irregular Z-line 43 cm from the incisors Significant amount of fresh blood and blood clotting in the body of the stomach Single large Dieulafoy's lesion in the body of the stomach; there was oozing blood and a blood clot; placed 1 clip successfully and 1 clip unsuccessfully (clips are MRI conditional); hemostasis achieved; injected 1.5 mL of epinephrine to address bleeding; hemostasis achieved Abnormal mucosa in the body of the stomach and antrum, consistent with gastritis Moderate, generalized abnormal mucosa in the stomach, consistent with gastritis The duodenal bulb, 1st part of the duodenum and 2nd part of the duodenum appeared normal. There was eveidence of small blood clot and fresh blood in the duodenal bulb. SPECIMENS: * No specimens in log *     Impression: Irregular Z-line Significant amount of fresh blood and blood clotting in the body of the stomach Single large Dieulafoy's lesion in the body of the stomach; there was oozing blood and a blood clot; placed 1 clip successfully and 1 clip unsuccessfully; hemostasis achieved; injected epinephrine to address bleeding; hemostasis achieved Abnormal mucosa, consistent with gastritis in the body of the stomach and antrum Moderate abnormal mucosa in the stomach, consistent with gastritis The duodenal bulb, 1st part of the duodenum and 2nd part of the duodenum appeared normal. RECOMMENDATION:  Resume Plavix today Continue ASA Switch PPI to PO BID Start Carafate 1gm po TID Avoid ASA Advance diet Close H/H monitoring for recurrent Bleeding. Cecelia Mendez MD     X-ray chest 1 view portable    Result Date: 11/7/2023  Narrative: CHEST INDICATION:   chest pain. COMPARISON: 11/3/2023 EXAM PERFORMED/VIEWS:  XR CHEST PORTABLE FINDINGS: Cardiomediastinal silhouette appears unremarkable.  Redemonstrated left-sided pacemaker/AICD with stable position of electrodes. The lungs are clear. No pneumothorax or pleural effusion. Osseous structures appear within normal limits for patient age. Impression: No acute cardiopulmonary disease. Workstation performed: [id number]     CT head without contrast    Result Date: 11/6/2023  Narrative: CT BRAIN - WITHOUT CONTRAST INDICATION:   syncope. COMPARISON: CT head 2/1/2023 TECHNIQUE:  CT examination of the brain was performed. Multiplanar 2D reformatted images were created from the source data. Radiation dose length product (DLP) for this visit:  812.61 mGy-cm . This examination, like all CT scans performed in the [facility], was performed utilizing techniques to minimize radiation dose exposure, including the use of iterative  reconstruction and automated exposure control. IMAGE QUALITY:  Diagnostic. FINDINGS: PARENCHYMA:  No intracranial mass, mass effect or midline shift. No CT signs of acute infarction. No acute parenchymal hemorrhage. Periventricular and centrum semiovale white matter hypodensity is a nonspecific finding likely representing chronic microangiopathy. Calcification bilateral cavernous internal carotid arteries. VENTRICLES AND EXTRA-AXIAL SPACES: No acute hydrocephalus. Mild prominence of CSF spaces diffusely attributed to generalized volume loss. VISUALIZED ORBITS: Changes of bilateral lens replacements noted. PARANASAL SINUSES: Trace mucosal thickening scattered in the paranasal sinuses. CALVARIUM AND EXTRACRANIAL SOFT TISSUES:  Normal.     Impression: No evidence of acute intracranial process. Chronic microangiopathy. Workstation performed: [id number]       Counseling / Coordination of Care  Total floor / unit time spent today 45 minutes. Greater than 50% of total time was spent with the patient and / or family counseling and / or coordination of care.   A description of the counseling / coordination of care: Review of history, current assessment, development of a plan. Code Status: Level 1 - Full Code    ** Please Note: Dragon 360 Dictation voice to text software may have been used in the creation of this document.  **

## 2023-12-05 NOTE — INTERVAL H&P NOTE
H&P reviewed. After examining the patient I find no changes in the patients condition since the H&P had been written. Vitals:    12/05/23 0546   BP: 135/81   Pulse: 58   Resp: 18   Temp: 97.8 °F (36.6 °C)   SpO2: 95%     Anticipated Length of Stay:  Patient will be admitted on an Inpatient basis with an anticipated length of stay of  greater than 2 midnights. Justification for Hospital Stay:  Post surgical recovery following open heart surgery.

## 2023-12-05 NOTE — CONSULTS
Consultation - Geriatrics   Henrry Phelps 77 y.o. male MRN: [id number]  Unit/Bed#: [facility] 422-01 Encounter: [id number]      Assessment/Plan  Aortic stenosis  S/p TAVR  CT surgery managing    Frailty   Clinical Frail Scale: 4- Vulnerable  Lives with wife at home  Not dependent for daily help, symptoms limit activity  PT/OT  Albumin 4.4, maintain protein in diet  Keep physically, mentally and socially active    Depression/anxiety  Currently taking bupropion  mg and escitalopram 20 mg  Patient reports taking these meds chronically  Per patient, he is in a better emotional state while retired compared to when he first started this regimen while working  Recommend an OP taper from this regimen with PCP, informed patient the risk for withdrawal while tapering off this regimen and to follow up with PCP    Cognitive screening  Forgetfulness, short term memory  Vitamin B12: 309 (11/6/23)  TSH: 3.154 (6/6/23)  CT Head: Chronic microangiopathy (11/6/23)  maintain neuro protective lifestyle :   Keep physically, mentally and socially active   Lower BMI   Increase physical exercise   Recommend Mediterranean/MIND diet   Monitor good control of blood pressure, blood sugar and cholestrerol    Fall prevention  At risk for falls secondary to age, medications, vision impairment  Fall precautions  Keep physically active  Recommend fall prevention/ balance training such as Matter of Balance or [place] or yoga  Fall prevention handout given from cdc.gov/piero    Insomnia  Patient reports a chronic difficultly to sleep at home  Likely secondary to chronic use of bupropion  Related to hospitalization  Maintain circadian rhythm     Delirium precautions  Alert and oriented x 3  Avoid deliriogenic medications such as tramadol, benzodiazepines, anticholinergics,  Benadryl  Treat pain, See geriatric pain protocol  Monitor for constipation and urinary retention  Encourage early and frequent moblization, OOB  Encourage Hydration/ Nutrition  Implement sleep hygiene, limit night time interuptions, group activities    Advanced care planning  Full code                  History of Present Illness   Physician Requesting Consult: Fanta Bella MD  Reason for Consult / Principal Problem: aortic stenosis  Hx and PE limited by: NA  HPI: Manuelito Viramontes is a 77y.o. year old male who presents with aortic stenosis. He is admitted for transcatheter aortic stenosis. He has anxiety, HF, depression, HTN, hypothyroidism. Hx of encephalopathy. Prior to arrival he lives at home with his wife. He needs assistance with IADLs. His wife manages medications. He is independent with ADLs. He denies prior falls. Patient states he is active at home, cares for the acres or land he owns, and cares for his mother in law who lives nearby. He still drives and plows the driveway with a tractor when it snows. He reports a chronic insomnia, waking up multiple times at night. He says this is a normal occurrence and he is used to it at this point. Patient reports he's been on buproprion and escitalopram for an extended period of time. He does not remember when he was started on either med, but will discuss taping down these meds with the guidance of his primary care provider when he is fully recovered from his aortic valve replacement. He says he's been experiencing difficulty walking up stairs and was easily fatigued before he had his aortic valve replaced. He reports he walked today with physical therapy and has yet to be fatigued. He reports he will see how this new valve performs when his activity restrictions are lifted. Inpatient consult to Gerontology  Consult performed by: JESUS Velazquez  Consult ordered by: Cristiano Lawson PA-C          Review of Systems   Constitutional:  Negative for activity change, appetite change and fatigue. HENT:  Negative for hearing loss, sinus pressure, sinus pain and sneezing. Eyes:  Negative for pain and redness. Respiratory:  Negative for cough, chest tightness and shortness of breath. Cardiovascular:  Negative for chest pain and palpitations. Gastrointestinal:  Negative for abdominal distention, abdominal pain, constipation and diarrhea. Endocrine: Negative for cold intolerance and heat intolerance. Genitourinary:  Negative for difficulty urinating, dysuria and frequency. Musculoskeletal:  Negative for back pain and gait problem. Skin:  Negative for color change and pallor. Neurological:  Negative for dizziness, light-headedness, numbness and headaches. Psychiatric/Behavioral:  Negative for behavioral problems and confusion. Insomnia       Historical Information   Past Medical History:   Diagnosis Date    Anxiety     Aortic stenosis     Borderline high cholesterol     CHF (congestive heart failure) ([address])     Asaf Evans Gibrafael-cardio-    Colon polyp     Depression     Heart murmur     Hypertension     Hypothyroidism     Prediabetes     PVCs (premature ventricular contractions)     "skip"    s/p [employer]onic dual chamber ICD 11/2/2023 11/04/2023    Wears glasses     readers     Past Surgical History:   Procedure Laterality Date    APPENDECTOMY      BACK SURGERY      CARDIAC CATHETERIZATION N/A 10/17/2023    Procedure: Cardiac RHC/LHC; Surgeon: Woody Perez MD;  [facility]: [facility]; Service: Cardiology    CARDIAC ELECTROPHYSIOLOGY PROCEDURE N/A 11/2/2023    Procedure: Cardiac icd implant;  Surgeon: Costa Gooden MD;  [facility];   Service: Cardiology    CATARACT EXTRACTION Bilateral     CLOSURE DELAYED PRIMARY Left 06/09/2023    Procedure: CLOSURE DELAYED PRIMARY;  Surgeon: Blake Bishop DPM;  Location: [facility];  Service: Podiatry    COLONOSCOPY  2023    [place]    NE CORRJ HALLUX VALGUS W/SESMDC W/RESCJ PROX PHAL Left 04/19/2023    Procedure: Smita Bracket and wound debridement;  Surgeon: Jennifer Sol DPM;  Location: [facility];  Service: Podiatry    TOE AMPUTATION Left 2023    Procedure: PARTIAL 1ST RAY AMPUTATION;  Surgeon: Serg Lao DPM;  Location: [facility];  Service: Podiatry    TONSILLECTOMY      TOTAL SHOULDER REPLACEMENT Left     WISDOM TOOTH EXTRACTION       Social History   Social History     Substance and Sexual Activity   Alcohol Use Yes    Comment: socially-2 drinks per week     Social History     Substance and Sexual Activity   Drug Use Not Currently    Comment: sporadic in young adulthood     Social History     Tobacco Use   Smoking Status Former    Packs/day: 0.50    Years: 50.00    Total pack years: 25.00    Types: Cigarettes    Quit date: 11/3/2023    Years since quittin.0   Smokeless Tobacco Never         Family History:   Family History   Problem Relation Age of Onset    Dementia Mother     Blindness Mother     Cancer Mother     Dementia Father        Meds/Allergies   Current meds:   Current Facility-Administered Medications   Medication Dose Route Frequency    acetaminophen (TYLENOL) rectal suppository 650 mg  650 mg Rectal Q4H PRN    acetaminophen (TYLENOL) tablet 650 mg  650 mg Oral Q4H PRN    amiodarone tablet 200 mg  200 mg Oral Daily    aspirin chewable tablet 81 mg  81 mg Oral Daily    atorvastatin (LIPITOR) tablet 20 mg  20 mg Oral Daily With Dinner    bisacodyl (DULCOLAX) rectal suppository 10 mg  10 mg Rectal Daily PRN    buPROPion (WELLBUTRIN XL) 24 hr tablet 300 mg  300 mg Oral Daily    ceFAZolin (ANCEF) IVPB (premix in dextrose) 2,000 mg 50 mL  2,000 mg Intravenous Q8H    chlorhexidine (PERIDEX) 0.12 % oral rinse 15 mL  15 mL Mouth/Throat BID    clopidogrel (PLAVIX) tablet 75 mg  75 mg Oral Daily    escitalopram (LEXAPRO) tablet 20 mg  20 mg Oral Daily    ezetimibe (ZETIA) tablet 10 mg  10 mg Oral Daily    furosemide (LASIX) tablet 40 mg  40 mg Oral Daily    [START ON 2023] heparin (porcine) subcutaneous injection 5,000 Units  5,000 Units Subcutaneous Q8H [address]    hydrALAZINE (APRESOLINE) injection 10 mg  10 mg Intravenous Q6H PRN    insulin lispro (HumaLOG) 100 units/mL subcutaneous injection 1-6 Units  1-6 Units Subcutaneous TID AC    insulin lispro (HumaLOG) 100 units/mL subcutaneous injection 1-6 Units  1-6 Units Subcutaneous HS    levothyroxine tablet 75 mcg  75 mcg Oral Early Morning    metoprolol succinate (TOPROL-XL) 24 hr tablet 12.5 mg  12.5 mg Oral Daily    mupirocin (BACTROBAN) 2 % nasal ointment 1 Application  1 Application Nasal B64A ASHLEY    niCARdipine (CARDENE) 25 mg (STANDARD CONCENTRATION) in sodium chloride 0.9% 250 mL  1-15 mg/hr Intravenous Titrated    ondansetron (ZOFRAN) injection 4 mg  4 mg Intravenous Q6H PRN    pantoprazole (PROTONIX) EC tablet 40 mg  40 mg Oral BID AC    polyethylene glycol (MIRALAX) packet 17 g  17 g Oral Daily    potassium chloride (K-DUR,KLOR-CON) CR tablet 10 mEq  10 mEq Oral Daily      Current PTA meds:  Medications Prior to Admission   Medication    aspirin 81 mg chewable tablet    buPROPion (WELLBUTRIN XL) 300 mg 24 hr tablet    clopidogrel (PLAVIX) 75 mg tablet    escitalopram (LEXAPRO) 20 mg tablet    ezetimibe (ZETIA) 10 mg tablet    furosemide (LASIX) 40 mg tablet    levothyroxine 75 mcg tablet    Magnesium 400 MG CAPS    metoprolol succinate (TOPROL-XL) 25 mg 24 hr tablet    mupirocin (BACTROBAN) 2 % ointment    pantoprazole (PROTONIX) 40 mg tablet    potassium chloride (MICRO-K) 10 MEQ CR capsule    Alirocumab 150 MG/ML SOAJ    amiodarone 200 mg tablet    Omega-3 Fatty Acids (Fish Oil) 1200 MG CAPS    pravastatin (PRAVACHOL) 20 mg tablet        Allergies   Allergen Reactions    Statins Myalgia     Other reaction(s): muscle aches and joint aches         Objective   Vitals: Blood pressure (!) 102/48, pulse 59, temperature 97.6 °F (36.4 °C), resp. rate 16, height 5' 10.75" (1.797 m), weight 99.1 kg (218 lb 8 oz), SpO2 96 %. ,Body mass index is 30.69 kg/m². Physical Exam  Vitals and nursing note reviewed.    Constitutional: General: He is not in acute distress. Appearance: Normal appearance. HENT:      Head: Normocephalic and atraumatic. Right Ear: External ear normal.      Left Ear: External ear normal.      Nose: Nose normal. No congestion or rhinorrhea. Mouth/Throat:      Mouth: Mucous membranes are moist.      Pharynx: Oropharynx is clear. Eyes:      General:         Right eye: No discharge. Left eye: No discharge. Extraocular Movements: Extraocular movements intact. Conjunctiva/sclera: Conjunctivae normal.      Pupils: Pupils are equal, round, and reactive to light. Cardiovascular:      Rate and Rhythm: Normal rate and regular rhythm. Pulses: Normal pulses. Heart sounds: Normal heart sounds. No murmur heard. No friction rub. No gallop. Pulmonary:      Effort: Pulmonary effort is normal. No respiratory distress. Breath sounds: Normal breath sounds. No stridor. No wheezing, rhonchi or rales. Abdominal:      General: Abdomen is flat. Bowel sounds are normal. There is no distension. Palpations: Abdomen is soft. Tenderness: There is no abdominal tenderness. Musculoskeletal:         General: No swelling or tenderness. Normal range of motion. Cervical back: Normal range of motion. Skin:     General: Skin is warm and dry. Capillary Refill: Capillary refill takes less than 2 seconds. Coloration: Skin is not pale. Findings: No bruising. Neurological:      General: No focal deficit present. Mental Status: He is alert and oriented to person, place, and time. Sensory: No sensory deficit. Motor: No weakness. Gait: Gait normal.   Psychiatric:         Mood and Affect: Mood normal.         Behavior: Behavior normal.         Thought Content:  Thought content normal.         Judgment: Judgment normal.         Lab Results:   Results from last 7 days   Lab Units 12/05/23  0856   HEMOGLOBIN g/dL 10.5*   HEMATOCRIT % 33.9*   PLATELETS Thousands/uL 190        Results from last 7 days   Lab Units 12/05/23  0856 12/01/23  0956   POTASSIUM mmol/L 3.9 4.2   CHLORIDE mmol/L 107 102   CO2 mmol/L 28 30   BUN mg/dL 21 25   CREATININE mg/dL 1.19 1.20   CALCIUM mg/dL 8.4 9.0   ALK PHOS U/L  --  93   ALT U/L  --  11   AST U/L  --  17       Imaging Studies: I have personally reviewed pertinent films in PACS  EKG, Pathology, and Other Studies: I have personally reviewed pertinent reports.     VTE Prophylaxis: Sequential compression device (Venodyne)     Code Status: Level 1 - Full Code

## 2023-12-05 NOTE — ANESTHESIA POSTPROCEDURE EVALUATION
Post-Op Assessment Note    CV Status:  Stable  Pain Score: 0    Pain management: adequate    Multimodal analgesia used between 6 hours prior to anesthesia start to PACU discharge    Mental Status:  Alert and awake   Hydration Status:  Euvolemic and stable   PONV Controlled:  Controlled   Airway Patency:  Patent  Two or more mitigation strategies used for obstructive sleep apnea   Post Op Vitals Reviewed: Yes    No anethesia notable event occurred.     Staff: CRNA               BP   115/55   Temp   97.6   Pulse  56   Resp   12   SpO2   98

## 2023-12-06 ENCOUNTER — APPOINTMENT (INPATIENT)
Dept: RADIOLOGY | Facility: HOSPITAL | Age: 68
DRG: 267 | End: 2023-12-06
Payer: MEDICARE

## 2023-12-06 LAB
ABO GROUP BLD BPU: NORMAL
ANION GAP SERPL CALCULATED.3IONS-SCNC: 7 MMOL/L
BPU ID: NORMAL
BUN SERPL-MCNC: 21 MG/DL (ref 5–25)
CALCIUM SERPL-MCNC: 8.5 MG/DL (ref 8.4–10.2)
CHLORIDE SERPL-SCNC: 106 MMOL/L (ref 96–108)
CO2 SERPL-SCNC: 27 MMOL/L (ref 21–32)
CREAT SERPL-MCNC: 1.05 MG/DL (ref 0.6–1.3)
CROSSMATCH: NORMAL
ERYTHROCYTE [DISTWIDTH] IN BLOOD BY AUTOMATED COUNT: 14.7 % (ref 11.6–15.1)
GFR SERPL CREATININE-BSD FRML MDRD: 71 ML/MIN/1.73SQ M
GLUCOSE SERPL-MCNC: 113 MG/DL (ref 65–140)
GLUCOSE SERPL-MCNC: 128 MG/DL (ref 65–140)
GLUCOSE SERPL-MCNC: 131 MG/DL (ref 65–140)
GLUCOSE SERPL-MCNC: 205 MG/DL (ref 65–140)
GLUCOSE SERPL-MCNC: 87 MG/DL (ref 65–140)
HCT VFR BLD AUTO: 32.8 % (ref 36.5–49.3)
HGB BLD-MCNC: 10.3 G/DL (ref 12–17)
MAGNESIUM SERPL-MCNC: 2.1 MG/DL (ref 1.9–2.7)
MCH RBC QN AUTO: 30.2 PG (ref 26.8–34.3)
MCHC RBC AUTO-ENTMCNC: 31.4 G/DL (ref 31.4–37.4)
MCV RBC AUTO: 96 FL (ref 82–98)
PLATELET # BLD AUTO: 204 THOUSANDS/UL (ref 149–390)
PMV BLD AUTO: 9.2 FL (ref 8.9–12.7)
POTASSIUM SERPL-SCNC: 4.1 MMOL/L (ref 3.5–5.3)
RBC # BLD AUTO: 3.41 MILLION/UL (ref 3.88–5.62)
SODIUM SERPL-SCNC: 140 MMOL/L (ref 135–147)
UNIT DISPENSE STATUS: NORMAL
UNIT PRODUCT CODE: NORMAL
UNIT PRODUCT VOLUME: 350 ML
UNIT RH: NORMAL
WBC # BLD AUTO: 12.36 THOUSAND/UL (ref 4.31–10.16)

## 2023-12-06 PROCEDURE — 71045 X-RAY EXAM CHEST 1 VIEW: CPT

## 2023-12-06 PROCEDURE — 85027 COMPLETE CBC AUTOMATED: CPT | Performed by: PHYSICIAN ASSISTANT

## 2023-12-06 PROCEDURE — 97166 OT EVAL MOD COMPLEX 45 MIN: CPT

## 2023-12-06 PROCEDURE — 97163 PT EVAL HIGH COMPLEX 45 MIN: CPT

## 2023-12-06 PROCEDURE — 82948 REAGENT STRIP/BLOOD GLUCOSE: CPT

## 2023-12-06 PROCEDURE — 80048 BASIC METABOLIC PNL TOTAL CA: CPT | Performed by: PHYSICIAN ASSISTANT

## 2023-12-06 PROCEDURE — 99233 SBSQ HOSP IP/OBS HIGH 50: CPT | Performed by: THORACIC SURGERY (CARDIOTHORACIC VASCULAR SURGERY)

## 2023-12-06 PROCEDURE — 99232 SBSQ HOSP IP/OBS MODERATE 35: CPT | Performed by: INTERNAL MEDICINE

## 2023-12-06 PROCEDURE — 99222 1ST HOSP IP/OBS MODERATE 55: CPT | Performed by: INTERNAL MEDICINE

## 2023-12-06 PROCEDURE — 83735 ASSAY OF MAGNESIUM: CPT | Performed by: PHYSICIAN ASSISTANT

## 2023-12-06 RX ORDER — LISINOPRIL 5 MG/1
5 TABLET ORAL DAILY
Status: DISCONTINUED | OUTPATIENT
Start: 2023-12-06 | End: 2023-12-07 | Stop reason: HOSPADM

## 2023-12-06 RX ADMIN — FUROSEMIDE 40 MG: 40 TABLET ORAL at 08:06

## 2023-12-06 RX ADMIN — CHLORHEXIDINE GLUCONATE 15 ML: 1.2 SOLUTION ORAL at 08:04

## 2023-12-06 RX ADMIN — CHLORHEXIDINE GLUCONATE 15 ML: 1.2 SOLUTION ORAL at 17:21

## 2023-12-06 RX ADMIN — ATORVASTATIN CALCIUM 20 MG: 20 TABLET, FILM COATED ORAL at 17:21

## 2023-12-06 RX ADMIN — LEVOTHYROXINE SODIUM 75 MCG: 75 TABLET ORAL at 05:00

## 2023-12-06 RX ADMIN — HEPARIN SODIUM 5000 UNITS: 5000 INJECTION INTRAVENOUS; SUBCUTANEOUS at 05:00

## 2023-12-06 RX ADMIN — PANTOPRAZOLE SODIUM 40 MG: 40 TABLET, DELAYED RELEASE ORAL at 05:00

## 2023-12-06 RX ADMIN — METOPROLOL SUCCINATE 12.5 MG: 25 TABLET, FILM COATED, EXTENDED RELEASE ORAL at 08:05

## 2023-12-06 RX ADMIN — NICARDIPINE HYDROCHLORIDE 5 MG/HR: 2.5 INJECTION, SOLUTION INTRAVENOUS at 00:10

## 2023-12-06 RX ADMIN — LISINOPRIL 5 MG: 5 TABLET ORAL at 09:33

## 2023-12-06 RX ADMIN — CLOPIDOGREL BISULFATE 75 MG: 75 TABLET ORAL at 08:06

## 2023-12-06 RX ADMIN — ASPIRIN 81 MG CHEWABLE TABLET 81 MG: 81 TABLET CHEWABLE at 08:04

## 2023-12-06 RX ADMIN — EZETIMIBE 10 MG: 10 TABLET ORAL at 08:04

## 2023-12-06 RX ADMIN — ESCITALOPRAM OXALATE 20 MG: 20 TABLET ORAL at 08:06

## 2023-12-06 RX ADMIN — HEPARIN SODIUM 5000 UNITS: 5000 INJECTION INTRAVENOUS; SUBCUTANEOUS at 21:02

## 2023-12-06 RX ADMIN — CEFAZOLIN SODIUM 2000 MG: 2 SOLUTION INTRAVENOUS at 08:04

## 2023-12-06 RX ADMIN — NICARDIPINE HYDROCHLORIDE 5 MG/HR: 2.5 INJECTION, SOLUTION INTRAVENOUS at 04:36

## 2023-12-06 RX ADMIN — INSULIN LISPRO 2 UNITS: 100 INJECTION, SOLUTION INTRAVENOUS; SUBCUTANEOUS at 08:08

## 2023-12-06 RX ADMIN — MUPIROCIN 1 APPLICATION: 20 OINTMENT TOPICAL at 08:04

## 2023-12-06 RX ADMIN — PANTOPRAZOLE SODIUM 40 MG: 40 TABLET, DELAYED RELEASE ORAL at 17:20

## 2023-12-06 RX ADMIN — MUPIROCIN 1 APPLICATION: 20 OINTMENT TOPICAL at 21:02

## 2023-12-06 RX ADMIN — POTASSIUM CHLORIDE 10 MEQ: 750 TABLET, EXTENDED RELEASE ORAL at 08:06

## 2023-12-06 NOTE — OCCUPATIONAL THERAPY NOTE
Occupational Therapy Evaluation     Patient Name: Manuelito Viramontes  JMGYZ'O Date: 12/6/2023  Problem List  Principal Problem:    Severe aortic stenosis  Active Problems:    Leukocytosis    Benign essential HTN    Other emphysema (HCC)    Borderline high cholesterol    Dyspnea on exertion    Mixed hyperlipidemia    Status post insertion of drug-eluting stent into left anterior descending (LAD) artery    s/p Medtronic dual chamber ICD 11/2/2023    S/P TAVR (transcatheter aortic valve replacement)    Prediabetes    Hypothyroid    Stage 3a chronic kidney disease ([address])    Past Medical History  Past Medical History:   Diagnosis Date    Anxiety     Aortic stenosis     Borderline high cholesterol     CHF (congestive heart failure) ([address])     As[facility]-cardio-    Colon polyp     Depression     Heart murmur     Hypertension     Hypothyroidism     Prediabetes     PVCs (premature ventricular contractions)     "skip"    s/p Medtronic dual chamber ICD 11/2/2023 11/04/2023    Wears glasses     readers     Past Surgical History  Past Surgical History:   Procedure Laterality Date    APPENDECTOMY      BACK SURGERY      CARDIAC CATHETERIZATION N/A 10/17/2023    Procedure: Cardiac RHC/LHC; Surgeon: Aisha Hodgson MD;  [facility]; Service: Cardiology    CARDIAC ELECTROPHYSIOLOGY PROCEDURE N/A 11/2/2023    Procedure: Cardiac icd implant;  Surgeon: Michelle Donahue MD;  Location: [facility];   Service: Cardiology    CATARACT EXTRACTION Bilateral     CLOSURE DELAYED PRIMARY Left 06/09/2023    Procedure: CLOSURE DELAYED PRIMARY;  Surgeon: Maria Luisa Rutledge DPM;  Location: [facility];  Service: Podiatry    COLONOSCOPY  2023    [place]    FL CORRJ HALLUX VALGUS W/SESMDC W/RESCJ PROX PHAL Left 04/19/2023    Procedure: Ashley Acron and wound debridement;  Surgeon: Dar Davenport DPM;  Location: [facility];  Service: Podiatry    TOE AMPUTATION Left 06/07/2023    Procedure: PARTIAL 1ST RAY AMPUTATION;  Surgeon: Dillon Sweeney DPM;  Location:  MAIN OR;  Service: Podiatry    TONSILLECTOMY      TOTAL SHOULDER REPLACEMENT Left     WISDOM TOOTH EXTRACTION               12/06/23 0906   OT Last Visit   OT Visit Date 12/06/23   Note Type   Note type Evaluation   Pain Assessment   Pain Assessment Tool 0-10   Pain Score No Pain   Restrictions/Precautions   Weight Bearing Precautions Per Order No   Other Precautions Cardiac/sternal;Telemetry;Multiple lines  (+A-line)   Home Living   Type of Home House   Home Layout Two level;Stairs to enter with rails  (2 KULWINDER, 1st fl setup)   Bathroom Shower/Tub Tub/shower unit  (tub shower 1st fl, walk-in shower 2nd fl)   Bathroom Toilet Standard   Bathroom Accessibility Accessible   Home Equipment Walker;Cane  (does not use pta)   Additional Comments Pt lives in a 2 SH with 2 KULWINDER, has a 1st fl setup with tub shower and standard toilet, has a walk-in shower on the 2nd fl   Prior Function   Level of [place] Independent with ADLs; Independent with functional mobility; Independent with IADLS   Lives With Spouse   Receives Help From Family   IADLs Independent with driving; Independent with meal prep; Independent with medication management   Falls in the last 6 months 1 to 4  (reports 1 fall)   Vocational Retired   Lifestyle   Autonomy Pta pt I with ADL, IADL and fxnal mobility with no AD although has a RW and cane, (+)    Reciprocal Relationships supportive wife   Service to Others retired    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, caring for the chickens   Subjective   Subjective "The only discomfort I have right now is these lines coming out of my neck."   ADL   Where Assessed Chair   Eating Assistance 6  Modified independent   Grooming Assistance 6  Modified Independent   UB Bathing Assistance 6  Modified Independent   LB Bathing Assistance 6  [facility] 6  Modified independent   LB Dressing Assistance 6  Modified independent   Toileting Assistance  6  Modified independent   Functional Assistance 6  Modified independent   Bed Mobility   Additional Comments Pt OOB in chair throughout session, left in chair with all needs within reach   Transfers   Sit to Stand 6  Modified independent   Stand to Sit 6  Modified independent   Additional Comments no AD   Functional Mobility   Functional Mobility 5  Supervision   Additional Comments Pt performs short household distance mobility with S for safety due to lines, no AD   Additional items   (no AD)   Balance   Static Sitting Good   Dynamic Sitting Fair +   Static Standing Fair   Dynamic Standing Fair   Ambulatory Fair   Activity Tolerance   Activity Tolerance Patient tolerated treatment well   Medical Staff Made Aware Co-eval with DPT due to medical complexity   Nurse Made Aware RN cleared for therapy   RUE Assessment   RUE Assessment WFL   LUE Assessment   LUE Assessment WFL   Hand Function   Gross Motor Coordination Functional   Fine Motor Coordination Functional   Sensation   Light Touch No apparent deficits   Psychosocial   Psychosocial (WDL) WDL   Cognition   Overall Cognitive Status WFL   Arousal/Participation Alert; Cooperative   Attention Within functional limits   Orientation Level Oriented X4   Memory Within functional limits   Following Commands Follows all commands and directions without difficulty   Comments Pt cooperative to therapy, verbalizes understanding to all provided education   Assessment   Prognosis Good   Assessment Pt is a 77 y.o. male who was admitted to [address] [place] [address] on 12/5/2023 with Severe aortic stenosis, s/p TAVR. Pt seen for an OT evaluation per active OT orders.   Pt  has a past medical history of Anxiety, Aortic stenosis, Borderline high cholesterol, CHF (congestive heart failure) ([address]), Colon polyp, Depression, Heart murmur, Hypertension, Hypothyroidism, Prediabetes, PVCs (premature ventricular contractions), s/p Medtronic dual chamber ICD 11/2/2023, and Wears glasses. Pt lives with wife in a 2 SH with 2 KULWINDER, uses a 1st fl setup with tub shower and standard toilet, uses walk-in shower on 2nd fl at times. Pta, pt was independent w/ ADL/IADL and functional mobility, was (+) driving and was not using any DME at baseline although has a RW. Currently, pt is Mod I for UB ADL, Mod I for LB ADL, and completed transfers/FM w Mod I-S with no AD. Pt received education packet, Recovering from Minimally Invasive Heart Surgery, verbalizes understanding. The patient's raw score on the AM-PAC Daily Activity Inpatient Short Form is 24. A raw score of greater than or equal to 19 suggests the patient may benefit from discharge to home. Please refer to the recommendation of the Occupational Therapist for safe discharge planning. Pt is likely close to fxnal baseline and has adequate assist at home, indicating no further acute OT needs at this time, D/C acute OT. From OT standpoint, recommend home with social support, no further OT needs upon D/C. Pt was left seated in bedside chair with all needs within reach.    Goals   Patient Goals to go home   Plan   OT Frequency Eval only   Discharge Recommendation   Rehab Resource Intensity Level, OT No post-acute rehabilitation needs   AM-PAC Daily Activity Inpatient   Lower Body Dressing 4   Bathing 4   Toileting 4   Upper Body Dressing 4   Grooming 4   Eating 4   Daily Activity Raw Score 24   Daily Activity Standardized Score (Calc for Raw Score >=11) 57.54   AM-PAC Applied Cognition Inpatient   Following a Speech/Presentation 4   Understanding Ordinary Conversation 4   Taking Medications 4   Remembering Where Things Are Placed or Put Away 4   Remembering List of 4-5 Errands 4   Taking Care of Complicated Tasks 4   Applied Cognition Raw Score 24   Applied Cognition Standardized Score 62.21   End of Consult   Education Provided Yes  (Pt received education packet, Recovering from Minimally Invasive Heart Surgery, verbalizes understanding.)   Patient Position at End of Consult Bedside chair; All needs within reach   Nurse Communication Nurse aware of consult     AMINTA Carl, OTR/L

## 2023-12-06 NOTE — RESPIRATORY THERAPY NOTE
Respiratory care    12/05/23 2030   Respiratory Protocol   Protocol Selection Airway Clearance   Airway Clearance Plan Discontinue Protocol   Respiratory Assessment   Assessment Type Assess only   General Appearance Alert; Awake   Respiratory Pattern Normal   Chest Assessment Chest expansion symmetrical   Bilateral Breath Sounds Clear   Cough None   Resp Comments Pt instructed IS for ACP, performs well independently and exceeds goal of 15ml/kg. D/c ACP at this time, pt to cont is independently.    O2 Device ra   Additional Assessments   SpO2 94 %

## 2023-12-06 NOTE — PROGRESS NOTES
General Cardiology   Progress Note -  [facility] One   Lianet Gupta 77 y.o. male MRN: [id number]    Unit/Bed#: [facility] 422-01 Encounter: [id number]    Assessment:    1. Severe AS: s/p left TF TAVR. POD #1  Final intraoperative MAKEDA: Well-seated normally functioning valve with trace PVL  Echocardiogram yesterday: Well-seated normally functioning TAVR with no PVL with peak gradient 18 mmHg and EMILI 2.0 cm2. Aspirin, Plavix, beta-blocker and statin     2. Chronic HFpEF: Final intraoperative MAKEDA EF 55%. Volume status appears stable on Lasix 40 mg daily. Echocardiogram yesterday: EF 65% with no diagnostic WMA, normal RV function, normally functioning TAVR. 3.  CAD: s/p DEON to LAD on 10/17/2023. Continued on aspirin, Plavix, beta-blocker and statin. 4.  VT: s/p MDT DC ICD placement 11/2/2023. Maintained on amiodarone 200 mg daily and Toprol-XL 12.5 mg daily. Normal device function on interrogation 11/16/2023     5. Essential hypertension: Average /59 on Toprol-XL 12.5 mg daily. Nicardipine infusion has been discontinued     6. Hyperlipidemia: Lipid panel 11/2023 , , HDL 43, LDL 56 on Zetia 10 mg daily and pravastatin 20 mg daily that has been switched to atorvastatin this admission due to non-formulary. 7.  Prediabetes: Hemoglobin A1c 5.9 in 1/2023. Management per primary team.     8.  CKD stage III: Baseline creatinine 1.0-1.2. Creatinine currently at baseline. Plan/Recommendations:  No further recommendations from cardiac standpoint  Continue current medication regimen  Follow-up with Wade Farris PA-C on 12/29/2023    _____________________________________________________________________    Subjective    Patient seen and examined. No acute events overnight. Review of Systems   Constitutional: Negative. Negative for chills.    Cardiovascular:  Negative for chest pain, dyspnea on exertion, leg swelling, near-syncope, orthopnea, palpitations, paroxysmal nocturnal dyspnea and syncope. Respiratory: Negative. Negative for cough, shortness of breath and wheezing. Endocrine: Negative. Hematologic/Lymphatic: Negative. Skin: Negative. Musculoskeletal: Negative. Gastrointestinal: Negative. Negative for diarrhea, nausea and vomiting. Neurological:  Negative for dizziness, light-headedness and weakness. Psychiatric/Behavioral: Negative. Negative for altered mental status. All other systems reviewed and are negative. Objective:   Vitals: Blood pressure 122/61, pulse 59, temperature 97.9 °F (36.6 °C), temperature source Oral, resp. rate 20, height 5' 10" (1.778 m), weight 103 kg (226 lb 9.6 oz), SpO2 100 %. ,     Wt Readings from Last 3 Encounters:   12/06/23 103 kg (226 lb 9.6 oz)   11/30/23 101 kg (223 lb)   11/09/23 99.2 kg (218 lb 11.1 oz)        Lab Results   Component Value Date    CREATININE 1.05 12/06/2023    CREATININE 1.19 12/05/2023    CREATININE 1.20 12/01/2023         Body mass index is 32.51 kg/m². ,     Systolic (01EIN), YOF:099 , Min:100 , BCR:279     Diastolic (36VYR), WWI:32, Min:42, Max:75          Intake/Output Summary (Last 24 hours) at 12/6/2023 0907  Last data filed at 12/6/2023 0800  Gross per 24 hour   Intake 1893.92 ml   Output 2275 ml   Net -381.08 ml     Weight (last 2 days)       Date/Time Weight    12/06/23 0600 103 (226.6)    12/05/23 1501 98.9 (218)    12/05/23 0546 99.1 (218.5)          Telemetry Review: No significant arrhythmias seen on telemetry review. Physical Exam  Vitals and nursing note reviewed. Constitutional:       General: He is not in acute distress. Appearance: He is well-developed. Comments: On RA in NAD   HENT:      Head: Normocephalic and atraumatic. Neck:      Vascular: No JVD. Cardiovascular:      Rate and Rhythm: Normal rate and regular rhythm. Heart sounds: Normal heart sounds. No murmur heard. No friction rub. No gallop.    Pulmonary:      Effort: Pulmonary effort is normal. No respiratory distress. Breath sounds: Normal breath sounds. No wheezing or rales. Chest:      Chest wall: No tenderness. Abdominal:      General: Bowel sounds are normal. There is no distension. Palpations: Abdomen is soft. Tenderness: There is no abdominal tenderness. Musculoskeletal:         General: No tenderness. Normal range of motion. Cervical back: Normal range of motion and neck supple. Right lower leg: No edema. Left lower leg: No edema. Skin:     General: Skin is warm and dry. Coloration: Skin is not pale. Findings: No erythema. Neurological:      Mental Status: He is alert and oriented to person, place, and time. Psychiatric:         Mood and Affect: Mood normal.         Behavior: Behavior normal.         Thought Content:  Thought content normal.         Judgment: Judgment normal.         LABORATORY RESULTS      CBC with diff:   Results from last 7 days   Lab Units 12/06/23 0439 12/05/23  0856 12/05/23  0835 12/05/23  0747   WBC Thousand/uL 12.36*  --   --   --    HEMOGLOBIN g/dL 10.3* 10.5*  --   --    I STAT HEMOGLOBIN g/dl  --   --  10.5* 11.6*   HEMATOCRIT % 32.8* 33.9*  --   --    HEMATOCRIT, ISTAT %  --   --  31* 34*   MCV fL 96  --   --   --    PLATELETS Thousands/uL 204 190  --   --    RBC Million/uL 3.41*  --   --   --    MCH pg 30.2  --   --   --    MCHC g/dL 31.4  --   --   --    RDW % 14.7  --   --   --    MPV fL 9.2 8.9  --   --        CMP:  Results from last 7 days   Lab Units 12/06/23  0439 12/05/23  0856 12/05/23  0835 12/05/23  0747 12/01/23  0956   POTASSIUM mmol/L 4.1 3.9  --   --  4.2   CHLORIDE mmol/L 106 107  --   --  102   CO2 mmol/L 27 28  --   --  30   CO2, I-STAT mmol/L  --   --  26 29  --    BUN mg/dL 21 21  --   --  25   CREATININE mg/dL 1.05 1.19  --   --  1.20   GLUCOSE, ISTAT mg/dl  --   --  104 101  --    CALCIUM mg/dL 8.5 8.4  --   --  9.0   AST U/L  --   --   --   --  17   ALT U/L  --   --   --   --  11   ALK PHOS U/L  -- --   --   --  93   EGFR ml/min/1.73sq m 71 61  --   --  60       BMP:  Results from last 7 days   Lab Units 12/06/23  0439 12/05/23  0856 12/05/23  0835 12/05/23  0747 12/05/23  0747 12/01/23  0956   POTASSIUM mmol/L 4.1 3.9  --   --   --  4.2   CHLORIDE mmol/L 106 107  --   --   --  102   CO2 mmol/L 27 28  --   --   --  30   CO2, I-STAT mmol/L  --   --  26  --  29  --    BUN mg/dL 21 21  --   --   --  25   CREATININE mg/dL 1.05 1.19  --   --   --  1.20   GLUCOSE, ISTAT mg/dl  --   --  104   < > 101  --    CALCIUM mg/dL 8.5 8.4  --   --   --  9.0    < > = values in this interval not displayed. Lab Results   Component Value Date    NTBNP 324 (H) 03/29/2023             Results from last 7 days   Lab Units 12/06/23  0439   MAGNESIUM mg/dL 2.1                     Results from last 7 days   Lab Units 12/01/23  0956   INR  0.91       Lipid Profile:   No results found for: "CHOL"  Lab Results   Component Value Date    HDL 43 11/13/2023     Lab Results   Component Value Date    LDLCALC 56 11/13/2023     Lab Results   Component Value Date    TRIG 165 (H) 11/13/2023       Cardiac testing:   No results found for this or any previous visit. No results found for this or any previous visit. No results found for this or any previous visit. No valid procedures specified. No results found for this or any previous visit.         Meds/Allergies   all current active meds have been reviewed  Medications Prior to Admission   Medication    aspirin 81 mg chewable tablet    buPROPion (WELLBUTRIN XL) 300 mg 24 hr tablet    clopidogrel (PLAVIX) 75 mg tablet    escitalopram (LEXAPRO) 20 mg tablet    ezetimibe (ZETIA) 10 mg tablet    furosemide (LASIX) 40 mg tablet    levothyroxine 75 mcg tablet    Magnesium 400 MG CAPS    metoprolol succinate (TOPROL-XL) 25 mg 24 hr tablet    mupirocin (BACTROBAN) 2 % ointment    pantoprazole (PROTONIX) 40 mg tablet    potassium chloride (MICRO-K) 10 MEQ CR capsule    Alirocumab 150 MG/ML SOAJ amiodarone 200 mg tablet    Omega-3 Fatty Acids (Fish Oil) 1200 MG CAPS    pravastatin (PRAVACHOL) 20 mg tablet       niCARdipine, 1-15 mg/hr, Last Rate: Stopped (12/06/23 0902)        Counseling / Coordination of Care  Total floor / unit time spent today 20 minutes. Greater than 50% of total time was spent with the patient and / or family counseling and / or coordination of care. ** Please Note: Dragon 360 Dictation voice to text software may have been used in the creation of this document.  **

## 2023-12-06 NOTE — PROGRESS NOTES
Progress Note - Cardiothoracic Surgery   Cally Fry 77 y.o. male MRN: [id number]  Unit/Bed#: [facility] 422-01 Encounter: [id number]    Aortic stenosis, Non-Rheumatic. S/P transfemoral transcatheter aortic valve replacement; POD # 1    24 Hour Events: Cardene was off this morning, but placed back on at 2.5mg due to 's. Feels well. Denies CP or SOB.      Medications:   Scheduled Meds:  Current Facility-Administered Medications   Medication Dose Route Frequency Provider Last Rate    acetaminophen  650 mg Rectal Q4H PRN Rise Danger, PA-C      acetaminophen  650 mg Oral Q4H PRN Rise Danger, PA-C      amiodarone  200 mg Oral Daily Rise Danger, PA-C      aspirin  81 mg Oral Daily Rise Danger, PA-C      atorvastatin  20 mg Oral Daily With [employer], PA-C      bisacodyl  10 mg Rectal Daily PRN Rise Danger, PA-C      buPROPion  300 mg Oral Daily Rise Danger, PA-C      chlorhexidine  15 mL Mouth/Throat BID Rise Danger, PA-C      clopidogrel  75 mg Oral Daily Ann Noreen, PA-C      escitalopram  20 mg Oral Daily Rise Danger, PA-C      ezetimibe  10 mg Oral Daily Rise Danger, PA-C      furosemide  40 mg Oral Daily Rise Danger, PA-C      heparin (porcine)  5,000 Units Subcutaneous [facility] [address] Rise Danger, PA-C      hydrALAZINE  10 mg Intravenous Q6H PRN Rise Danger, PA-C      insulin lispro  1-6 Units Subcutaneous TID AC Rise Danger, PA-C      insulin lispro  1-6 Units Subcutaneous HS Rise Danger, PA-C      levothyroxine  75 mcg Oral Early Morning Rise Danger, PA-C      lisinopril  5 mg Oral Daily Nazarje, PA-C      metoprolol succinate  12.5 mg Oral Daily Rise Danger, PA-C      mupirocin  1 Application Nasal J97V [address] Rise Danger, PA-C      niCARdipine  1-15 mg/hr Intravenous Titrated Rise Danger, PA-C Stopped (12/06/23 0902)    ondansetron  4 mg Intravenous Q6H PRN Rise Danger, PA-C      pantoprazole  40 mg Oral BID AC Rise Danger, PA-C      polyethylene glycol  17 g Oral Daily Óscar AMANDA Sorto      potassium chloride  10 mEq Oral Daily Roxane Olmos PA-C       Continuous Infusions:niCARdipine, 1-15 mg/hr, Last Rate: Stopped (12/06/23 0902)      PRN Meds:.  acetaminophen    acetaminophen    bisacodyl    hydrALAZINE    ondansetron    Vitals:   Vitals:    12/06/23 0734 12/06/23 0736 12/06/23 0800 12/06/23 0805   BP: 147/66 127/75  122/61   BP Location:  Right arm     Pulse:  55  59   Resp:  20     Temp:  97.9 °F (36.6 °C)     TempSrc:  Oral     SpO2:  97% 100%    Weight:       Height:           Telemetry: Sinus Bradycardia; Heart Rate: 59    Respiratory:   SpO2: SpO2: 100 %; Room Air    Intake/Output: +547 ml/24 hrs    Intake/Output Summary (Last 24 hours) at 12/6/2023 0919  Last data filed at 12/6/2023 0800  Gross per 24 hour   Intake 1893.92 ml   Output 2275 ml   Net -381.08 ml        Weights:   Weight (last 2 days)       Date/Time Weight    12/06/23 0600 103 (226.6)    12/05/23 1501 98.9 (218)    12/05/23 0546 99.1 (218.5)          Admit weight: 99.1 kg    Results:   Results from last 7 days   Lab Units 12/06/23  0439 12/05/23  0856 12/05/23  0835   WBC Thousand/uL 12.36*  --   --    HEMOGLOBIN g/dL 10.3* 10.5*  --    I STAT HEMOGLOBIN g/dl  --   --  10.5*   HEMATOCRIT % 32.8* 33.9*  --    HEMATOCRIT, ISTAT %  --   --  31*   PLATELETS Thousands/uL 204 190  --      Results from last 7 days   Lab Units 12/06/23  0439 12/05/23  0856 12/05/23  0835 12/05/23  0747 12/01/23  0956   POTASSIUM mmol/L 4.1 3.9  --   --  4.2   CHLORIDE mmol/L 106 107  --   --  102   CO2 mmol/L 27 28  --   --  30   CO2, I-STAT mmol/L  --   --  26   < >  --    BUN mg/dL 21 21  --   --  25   CREATININE mg/dL 1.05 1.19  --   --  1.20   GLUCOSE, ISTAT mg/dl  --   --  104   < >  --    CALCIUM mg/dL 8.5 8.4  --   --  9.0    < > = values in this interval not displayed.      Results from last 7 days   Lab Units 12/01/23  0956   INR  0.91     Point of care glucose:  - 205  3 units SSIC/24 hrs    Studies:    ECG: Sinus laoy, rate 54. CXR 12/6: final report pending        Echocardiogram: Left Ventricle: Left ventricular cavity size is normal. Wall thickness is mildly increased. There is mild concentric hypertrophy. The left ventricular ejection fraction is 65% by visual estimation. Systolic function is vigorous. Although no diagnostic regional wall motion abnormality was identified, this possibility cannot be completely excluded on the basis of this study. Diastolic function is mildly abnormal, consistent with grade I (abnormal) relaxation. Right Ventricle: Right ventricular cavity size is mildly dilated. Systolic function is normal.    Aortic Valve: There is an Chen ADRIANNA 3 Ultra 26 mm TAVR bioprosthetic valve. The prosthetic valve appears well-seated and appears to be functioning normally. Prosthetic valve leaflet motion is normal. There is no evidence of paravalvular regurgitation. There is no evidence of transvalvular regurgitation. The gradient recorded across the prosthetic aortic valve is within the expected range. The aortic valve peak velocity is 2.14 m/s. The aortic valve peak gradient is 18 mmHg. The dimensionless velocity index is 0.60. The aortic valve area is 2.00 cm2. I have personally reviewed pertinent reports. and I have personally reviewed pertinent films in PACS    Invasive Lines/Tubes:  Invasive Devices       Central Venous Catheter Line  Duration             CVC Central Lines 12/05/23 Triple 1 day              Peripheral Intravenous Line  Duration             Peripheral IV 12/05/23 Left Antecubital 1 day    Peripheral IV 12/05/23 Right Wrist 1 day              Arterial Line  Duration             Arterial Line 12/05/23 Left Radial 1 day                    Physical Exam:    General: No acute distress, Alert, and Normal appearance  HEENT/NECK:  Normocephalic. Atraumatic. No jugular venous distention.     Cardiac: Regular rate and rhythm and No murmurs/rubs/gallops  Pulmonary:  Breath sounds clear bilaterally and No rales/rhonchi/wheezes  Abdomen:  Non-tender, Non-distended, and Normal bowel sounds  Incisions: Groin puncture sites clean, dry, and intact without hematoma  Extremities: Extremities warm/dry and No edema B/L  Neuro: Alert and oriented X 3, Sensation is grossly intact, and No focal deficits  Skin: Warm/Dry, without rashes or lesions. Assessment:  Principal Problem:    Severe aortic stenosis  Active Problems:    Benign essential HTN    MDD (major depressive disorder)    Other emphysema (HCC)    Borderline high cholesterol    Dyspnea on exertion    Mixed hyperlipidemia    Status post insertion of drug-eluting stent into left anterior descending (LAD) artery    s/p Medtronic dual chamber ICD 11/2/2023    S/P TAVR (transcatheter aortic valve replacement)    Prediabetes    Hypothyroid    Stage 3a chronic kidney disease (HCC)       Aortic stenosis, Non-Rheumatic. S/P transfemoral transcatheter aortic valve replacement; POD # 1    Plan:    Cardiac:     Normal ventricular systolic function, EF 24%    Sinus Bradycardia; HR well-controlled  Hypertensive - on Cardene 2.5mg. Wean as able    Continue Toprol XL 12.5mg PO Daily  Start Lisinopril 5mg daily    ASA/Plavix  Postoperative transthoracic echocardiogram:  Echo completed; Well seated AV, without PVL    Continue Statin therapy    Central IV access no longer required; Remove central venous catheter today    Continue Subcutaneous Heparin for DVT prophylaxis    Pulmonary:     Good Room air oxygen saturation; Continue incentive spirometry/Coughing/Deep breathing exercises    Renal:     Post op Creatinine stable; Follow up labs prn   CKD 3 - baseline Cr 1.0-1.2  Intake/Output net: +547 mL/24 hours    Diuretic Regimen:  Continue PO Lasix, 40 mg QD  Continue Potassium Chloride 10 mEq PO QD    Neuro:    Neurologically intact;  No active issues     Incisional pain well-controlled; Continue prn Tylenol    GI:    Cardiac diet, with 1800 mL fluid restriction    Tolerating diet without complaint    Continue stool softeners and prn suppository    Continue GI prophylaxis    Endo:     Glucose well-controlled with insulin sliding scale coverage    7. Hematology:     Post-operative blood count acceptable; Trend prn  Leukocytosis; Afebrile with no signs of infection; Trend CBC prn    8. Disposition:    Gerontology consultation ordered for routine assessment of TAVR patient over 72years old    Following daily PT/OT recommendations regarding home vs. rehab when medically cleared for discharge  Routine postoperative recovery to this point;  Anticipated discharge date: today        VTE Pharmacologic Prophylaxis: Heparin  VTE Mechanical Prophylaxis: sequential compression device    Collaborative rounds completed with supervising physician  Plan of care discussed with bedside nurse    SIGNATURE: Amanda Archer PA-C  DATE: December 6, 2023  TIME: 9:19 AM

## 2023-12-06 NOTE — RESTORATIVE TECHNICIAN NOTE
Restorative Technician Note      Patient Name: Manuelito Viramontes     Restorative Tech Visit Date: 12/06/23  Note Type: Mobility  Patient Position Upon Consult: Bedside chair  Activity Performed: Ambulated  Assistive Device: Other (Comment) (none)  Patient Position at End of Consult: All needs within reach;  Bedside chair

## 2023-12-06 NOTE — PHYSICAL THERAPY NOTE
Physical Therapy Evaluation    Patient's Name: German Albrecht    Admitting Diagnosis  Severe aortic stenosis [I35.0]    Problem List  Patient Active Problem List   Diagnosis    Leukocytosis    Benign essential HTN    Vasovagal syncope    MDD (major depressive disorder)    Peripheral neuropathy    Other emphysema (HCC)    Ulcer of left foot (HCC)    ANNALEE (acute kidney injury) ([address])    Hyperkalemia    MSSA bacteremia    Heart failure with preserved ejection fraction    PVCs (premature ventricular contractions)    Status post foot surgery    Acute hematogenous osteomyelitis of left foot (HCC)    Borderline high cholesterol    Dyspnea on exertion    Episodic lightheadedness    Status post amputation of great toe, left (HCC)    Severe aortic stenosis    Mixed hyperlipidemia    Statin intolerance    Status post insertion of drug-eluting stent into left anterior descending (LAD) artery    Ventricular tachycardia (HCC)    s/p Medtronic dual chamber ICD 11/2/2023    Acute GI bleeding    ABLA (acute blood loss anemia)    S/P TAVR (transcatheter aortic valve replacement)    Prediabetes    Hypothyroid    Stage 3a chronic kidney disease ([address])       Past Medical History  Past Medical History:   Diagnosis Date    Anxiety     Aortic stenosis     Borderline high cholesterol     CHF (congestive heart failure) ([address])     Asaf Jackson-cardio-    Colon polyp     Depression     Heart murmur     Hypertension     Hypothyroidism     Prediabetes     PVCs (premature ventricular contractions)     "skip"    s/p Medtronic dual chamber ICD 11/2/2023 11/04/2023    Wears glasses     readers       Past Surgical History  Past Surgical History:   Procedure Laterality Date    APPENDECTOMY      BACK SURGERY      CARDIAC CATHETERIZATION N/A 10/17/2023    Procedure: Cardiac RHC/LHC; Surgeon: Ellie Rust MD;  [facility];   Service: Cardiology    CARDIAC ELECTROPHYSIOLOGY PROCEDURE N/A 11/2/2023    Procedure: Cardiac icd implant;  Surgeon: Narciso Hutson MD;  [facility]: [facility]; Service: Cardiology    CATARACT EXTRACTION Bilateral     CLOSURE DELAYED PRIMARY Left 06/09/2023    Procedure: CLOSURE DELAYED PRIMARY;  Surgeon: Bonnie Monaco DPM;  Location: [facility];  Service: Podiatry    COLONOSCOPY  2023    Mari, PA    NC CORRJ HALLUX VALGUS W/SESMDC W/RESCJ PROX PHAL Left 04/19/2023    Procedure: Mort Oswaldo and wound debridement;  Surgeon: Anna Helton DPM;  Location: [facility];  Service: Podiatry    TOE AMPUTATION Left 06/07/2023    Procedure: PARTIAL 1ST RAY AMPUTATION;  Surgeon: Bonnie Monaco DPM;  Location: [facility];  Service: Podiatry    TONSILLECTOMY      TOTAL SHOULDER REPLACEMENT Left     WISDOM TOOTH EXTRACTION          12/06/23 0920   PT Last Visit   PT Visit Date 12/06/23   Note Type   Note type Evaluation   Pain Assessment   Pain Assessment Tool 0-10   Pain Score 2   Pain Location/Orientation Location: Neck; Location: Generalized   Patient's Stated Pain Goal No pain   Hospital Pain Intervention(s) Ambulation/increased activity   Restrictions/Precautions   Weight Bearing Precautions Per Order No   Other Precautions Cardiac/sternal;Multiple lines;Telemetry;Pain   Home Living   Type of Home House   Additional Comments Resides w/ wife in 2 story home w/ first floor setup, 1-2 KULWINDER. Indep, ambulates w/ out device. Prior Function   Falls in the last 6 months 1 to 4   General   Family/Caregiver Present No   Cognition   Overall Cognitive Status WFL   Arousal/Participation Responsive   Attention Within functional limits   Orientation Level Oriented X4   Memory Unable to assess   Following Commands Follows all commands and directions without difficulty   Subjective   Subjective states he feels well, some pain.   less SOB   RLE Assessment   RLE Assessment   (strength grossly 4/5)   LLE Assessment   LLE Assessment   (strength grossly 4/5)   Coordination   Movements are Fluid and Coordinated 0   Coordination and Movement Description mild ataxia   Transfers   Sit to Stand 7  Independent   Stand to Sit 7  Independent   Ambulation/Elevation   Gait pattern   (slow, mild ataxia, lateral sway)   Gait Assistance 5  Supervision   Assistive Device None   Distance 250'   Balance   Static Sitting Normal   Dynamic Sitting Good   Static Standing Good   Dynamic Standing Fair +   Ambulatory Fair +   Endurance Deficit   Endurance Deficit Yes   Endurance Deficit Description mild fatigue, pain   Activity Tolerance   Activity Tolerance Patient tolerated treatment well;Patient limited by fatigue;Patient limited by pain   Nurse Made Aware yes   Assessment   Prognosis Good   Assessment Pt seen for physical therapy evaluation, portion of which was performed as a co-evaluation w/ OT due to concerns re: medical stability. PT portion of evaluation focused on mobility assessment where OT portion focused on ADLs, self care. Pt is a 76 y/o male w/ history/ comorbidities of AS, CAD, prior PCI, vtach, ICD, HTN, HLD, CKD, CHF, emphysema, polio, peripheral neuropathy who is now admitted w/ known AS for elective TAVR  performed 12/5/23. Due to acute medical issues, acute surgery, pain, fall risk, note unstable clinical picture. PT consulted to assess post op mobility, d/c needs. At present time, despite acute medical issues, note pt to be functioning close to or at mobility baseline. No continued skilled acute care PT needs identified. will sign off. Pt may mobilize ad val vs w/ P4 nursing or restorative while here, and may d/c home when stable.    Goals   Patient Goals to go home   PT Treatment Day 0   Plan   PT Frequency   (d/c PT)   Discharge Recommendation   Rehab Resource Intensity Level, PT No post-acute rehabilitation needs   AM-PAC Basic Mobility Inpatient   Turning in Flat Bed Without Bedrails 4   Lying on Back to Sitting on Edge of Flat Bed Without Bedrails 4   Moving Bed to Chair 4   Standing Up From Chair Using Arms 4   Walk in Room 4   Climb 3-5 Stairs With Railing 4   Basic Mobility Inpatient Raw Score 24   Basic Mobility Standardized Score 57.68   Highest Level Of Mobility   JH-HLM Goal 8: Walk 250 feet or more   JH-HLM Achieved 8: Walk 250 feet ot more             Zara Alliance party PT, DPT, CSRS

## 2023-12-07 VITALS
HEIGHT: 70 IN | SYSTOLIC BLOOD PRESSURE: 144 MMHG | BODY MASS INDEX: 32.04 KG/M2 | DIASTOLIC BLOOD PRESSURE: 70 MMHG | HEART RATE: 51 BPM | OXYGEN SATURATION: 95 % | TEMPERATURE: 99.3 F | RESPIRATION RATE: 18 BRPM | WEIGHT: 223.77 LBS

## 2023-12-07 LAB
GLUCOSE SERPL-MCNC: 111 MG/DL (ref 65–140)
GLUCOSE SERPL-MCNC: 93 MG/DL (ref 65–140)

## 2023-12-07 PROCEDURE — 82948 REAGENT STRIP/BLOOD GLUCOSE: CPT

## 2023-12-07 PROCEDURE — NC001 PR NO CHARGE: Performed by: PHYSICIAN ASSISTANT

## 2023-12-07 PROCEDURE — 99238 HOSP IP/OBS DSCHRG MGMT 30/<: CPT | Performed by: PHYSICIAN ASSISTANT

## 2023-12-07 RX ORDER — LISINOPRIL 5 MG/1
5 TABLET ORAL DAILY
Qty: 30 TABLET | Refills: 3 | Status: SHIPPED | OUTPATIENT
Start: 2023-12-07

## 2023-12-07 RX ORDER — POLYETHYLENE GLYCOL 3350 17 G/17G
17 POWDER, FOR SOLUTION ORAL DAILY PRN
Refills: 0
Start: 2023-12-07

## 2023-12-07 RX ADMIN — CLOPIDOGREL BISULFATE 75 MG: 75 TABLET ORAL at 08:52

## 2023-12-07 RX ADMIN — FUROSEMIDE 40 MG: 40 TABLET ORAL at 08:54

## 2023-12-07 RX ADMIN — EZETIMIBE 10 MG: 10 TABLET ORAL at 08:52

## 2023-12-07 RX ADMIN — LEVOTHYROXINE SODIUM 75 MCG: 75 TABLET ORAL at 06:09

## 2023-12-07 RX ADMIN — ESCITALOPRAM OXALATE 20 MG: 20 TABLET ORAL at 08:52

## 2023-12-07 RX ADMIN — PANTOPRAZOLE SODIUM 40 MG: 40 TABLET, DELAYED RELEASE ORAL at 06:09

## 2023-12-07 RX ADMIN — METOPROLOL SUCCINATE 12.5 MG: 25 TABLET, FILM COATED, EXTENDED RELEASE ORAL at 08:53

## 2023-12-07 RX ADMIN — ASPIRIN 81 MG CHEWABLE TABLET 81 MG: 81 TABLET CHEWABLE at 08:52

## 2023-12-07 RX ADMIN — MUPIROCIN 1 APPLICATION: 20 OINTMENT TOPICAL at 08:52

## 2023-12-07 RX ADMIN — AMIODARONE HYDROCHLORIDE 200 MG: 200 TABLET ORAL at 08:52

## 2023-12-07 RX ADMIN — BUPROPION HYDROCHLORIDE 300 MG: 150 TABLET, FILM COATED, EXTENDED RELEASE ORAL at 08:52

## 2023-12-07 RX ADMIN — CHLORHEXIDINE GLUCONATE 15 ML: 1.2 SOLUTION ORAL at 08:52

## 2023-12-07 RX ADMIN — POTASSIUM CHLORIDE 10 MEQ: 750 TABLET, EXTENDED RELEASE ORAL at 08:54

## 2023-12-07 RX ADMIN — LISINOPRIL 5 MG: 5 TABLET ORAL at 08:52

## 2023-12-07 RX ADMIN — HEPARIN SODIUM 5000 UNITS: 5000 INJECTION INTRAVENOUS; SUBCUTANEOUS at 06:09

## 2023-12-07 NOTE — DISCHARGE INSTRUCTIONS
Transcatheter Aortic Valve Replacement (TAVR)    What you need to know about a TAVR:     A TAVR is a procedure to replace your aortic valve. It is done without removing your old valve. The aortic valve opens and closes to let blood flow from your heart to the rest of your body. Your valve has been replaced with a tissue valve. The tissue has been made from the lining that surrounds the heart of a cow. Recovering from surgery: There may be bruising or pain in your groin, where the valve was inserted. You may take over the counter acetaminophen (Tylenol) as needed for discomfort. Your incision is sealed with surgical glue. This will fall off after a few weeks. Do not pick this off. Do not drive for two weeks  Do not lift over 25 pounds for two weeks. Shower every day. Keep your incision dry. Do not apply lotions, powders, or ointments to your incision. Blood thinners after surgery: You have been prescribed blood thinners to help prevent blood clots. Blood clots can cause strokes, heart attacks, and death. While taking any blood thinner, watch for bleeding and bruising. Watch for blood in your urine and bowel movements. Use a soft washcloth on your skin, and a soft toothbrush to brush your teeth. If you shave, use an electric shaver. Do not play contact sports. Do not start or stop any other medicines unless your healthcare provider tells you to do so. (Many medicines cannot be used with blood thinners)    Take your blood thinner exactly as prescribed by your healthcare provider. Do not skip a dose or take less than prescribed. Tell your provider right away if you forget to take your blood thinner, or if you took too much. Call your doctor if:     You develop any bleeding from the incisions in your groin. Your leg feels numb, cool, or looks pale. You feel dizzy or lightheaded  Your groin puncture is red, swollen, or draining pus.   Your groin puncture looks more bruised/swollen or you have new bruising on the side of your leg. You have nausea or are vomiting. Antibiotic Prophylaxis:     After TAVR, you are at an increased risk for developing a valve infection with many common dental procedures. Bacteria that normally lives in your mouth can cross into your bloodstream with any dental work (even cleanings). The immune system normally kills these bacteria, but antibiotic prophylaxis provides extra protection. Inform your dentist that you have had a TAVR  Do not schedule routine dental cleaning for six months following surgery. Antibiotics will be prescribed by your dentist, prior to your procedure              Transcatheter Aortic Valve Replacement (TAVR)    What you need to know about a TAVR:     A TAVR is a procedure to replace your aortic valve. It is done without removing your old valve. The aortic valve opens and closes to let blood flow from your heart to the rest of your body. Your valve has been replaced with a tissue valve. The tissue has been made from the lining that surrounds the heart of a cow. Recovering from surgery: There may be bruising or pain in your groin, where the valve was inserted. You may take over the counter acetaminophen (Tylenol) as needed for discomfort. Your incision is sealed with surgical glue. This will fall off after a few weeks. Do not pick this off. Do not drive for two weeks  Do not lift over 25 pounds for two weeks. Shower every day. Keep your incision dry. Do not apply lotions, powders, or ointments to your incision. Blood thinners after surgery: You have been prescribed blood thinners to help prevent blood clots. Blood clots can cause strokes, heart attacks, and death. While taking any blood thinner, watch for bleeding and bruising. Watch for blood in your urine and bowel movements. Use a soft washcloth on your skin, and a soft toothbrush to brush your teeth. If you shave, use an electric shaver.  Do not play contact sports. Do not start or stop any other medicines unless your healthcare provider tells you to do so. (Many medicines cannot be used with blood thinners)    Take your blood thinner exactly as prescribed by your healthcare provider. Do not skip a dose or take less than prescribed. Tell your provider right away if you forget to take your blood thinner, or if you took too much. Call your doctor if:     You develop any bleeding from the incisions in your groin. Your leg feels numb, cool, or looks pale. You feel dizzy or lightheaded  Your groin puncture is red, swollen, or draining pus. Your groin puncture looks more bruised/swollen or you have new bruising on the side of your leg. You have nausea or are vomiting. Antibiotic Prophylaxis:     After TAVR, you are at an increased risk for developing a valve infection with many common dental procedures. Bacteria that normally lives in your mouth can cross into your bloodstream with any dental work (even cleanings). The immune system normally kills these bacteria, but antibiotic prophylaxis provides extra protection. Inform your dentist that you have had a TAVR  Do not schedule routine dental cleaning for six months following surgery.    Antibiotics will be prescribed by your dentist, prior to your procedure

## 2023-12-07 NOTE — PLAN OF CARE
Problem: Prexisting or High Potential for Compromised Skin Integrity  Goal: Skin integrity is maintained or improved  Description: INTERVENTIONS:  - Identify patients at risk for skin breakdown  - Assess and monitor skin integrity  - Assess and monitor nutrition and hydration status  - Monitor labs   - Assess for incontinence   - Turn and reposition patient  - Assist with mobility/ambulation  - Relieve pressure over bony prominences  - Avoid friction and shearing  - Provide appropriate hygiene as needed including keeping skin clean and dry  - Evaluate need for skin moisturizer/barrier cream  - Collaborate with interdisciplinary team   - Patient/family teaching  - Consider wound care consult   Outcome: Progressing     Problem: PAIN - ADULT  Goal: Verbalizes/displays adequate comfort level or baseline comfort level  Description: Interventions:  - Encourage patient to monitor pain and request assistance  - Assess pain using appropriate pain scale  - Administer analgesics based on type and severity of pain and evaluate response  - Implement non-pharmacological measures as appropriate and evaluate response  - Consider cultural and social influences on pain and pain management  - Notify physician/advanced practitioner if interventions unsuccessful or patient reports new pain  Outcome: Progressing     Problem: INFECTION - ADULT  Goal: Absence or prevention of progression during hospitalization  Description: INTERVENTIONS:  - Assess and monitor for signs and symptoms of infection  - Monitor lab/diagnostic results  - Monitor all insertion sites, i.e. indwelling lines, tubes, and drains  - Monitor endotracheal if appropriate and nasal secretions for changes in amount and color  - [place] appropriate cooling/warming therapies per order  - Administer medications as ordered  - Instruct and encourage patient and family to use good hand hygiene technique  - Identify and instruct in appropriate isolation precautions for identified infection/condition  Outcome: Progressing  Goal: Absence of fever/infection during neutropenic period  Description: INTERVENTIONS:  - Monitor WBC    Outcome: Progressing     Problem: SAFETY ADULT  Goal: Patient will remain free of falls  Description: INTERVENTIONS:  - Educate patient/family on patient safety including physical limitations  - Instruct patient to call for assistance with activity   - Consult OT/PT to assist with strengthening/mobility   - Keep Call bell within reach  - Keep bed low and locked with side rails adjusted as appropriate  - Keep care items and personal belongings within reach  - Initiate and maintain comfort rounds  - Make Fall Risk Sign visible to staff  - Offer Toileting every 2 Hours, in advance of need  - Initiate/Maintain alarm  - Obtain necessary fall risk management equipment:   - Apply yellow socks and bracelet for high fall risk patients  - Consider moving patient to room near nurses station  Outcome: Progressing  Goal: Maintain or return to baseline ADL function  Description: INTERVENTIONS:  -  Assess patient's ability to carry out ADLs; assess patient's baseline for ADL function and identify physical deficits which impact ability to perform ADLs (bathing, care of mouth/teeth, toileting, grooming, dressing, etc.)  - Assess/evaluate cause of self-care deficits   - Assess range of motion  - Assess patient's mobility; develop plan if impaired  - Assess patient's need for assistive devices and provide as appropriate  - Encourage maximum independence but intervene and supervise when necessary  - Involve family in performance of ADLs  - Assess for home care needs following discharge   - Consider OT consult to assist with ADL evaluation and planning for discharge  - Provide patient education as appropriate  Outcome: Progressing  Goal: Maintains/Returns to pre admission functional level  Description: INTERVENTIONS:  - Perform AM-PAC 6 Click Basic Mobility/ Daily Activity assessment daily.  - Set and communicate daily mobility goal to care team and patient/family/caregiver. - Collaborate with rehabilitation services on mobility goals if consulted  - Perform Range of Motion 4 times a day. - Reposition patient every 2 hours. - Dangle patient 4 times a day  - Stand patient 4 times a day  - Ambulate patient 4 times a day  - Out of bed to chair 3 times a day   - Out of bed for meals 3 times a day  - Out of bed for toileting  - Record patient progress and toleration of activity level   Outcome: Progressing     Problem: DISCHARGE PLANNING  Goal: Discharge to home or other facility with appropriate resources  Description: INTERVENTIONS:  - Identify barriers to discharge w/patient and caregiver  - Arrange for needed discharge resources and transportation as appropriate  - Identify discharge learning needs (meds, wound care, etc.)  - Arrange for interpretive services to assist at discharge as needed  - Refer to Case Management Department for coordinating discharge planning if the patient needs post-hospital services based on physician/advanced practitioner order or complex needs related to functional status, cognitive ability, or social support system  Outcome: Progressing     Problem: Knowledge Deficit  Goal: Patient/family/caregiver demonstrates understanding of disease process, treatment plan, medications, and discharge instructions  Description: Complete learning assessment and assess knowledge base.   Interventions:  - Provide teaching at level of understanding  - Provide teaching via preferred learning methods  Outcome: Progressing

## 2023-12-07 NOTE — DISCHARGE SUMMARY
Discharge Summary - Cardiothoracic Surgery   Lester Jenkins 77 y.o. 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 Attending: Veto Lou M.D. Consulting Physician(s):   Cardiology  Gerontology    Procedures Performed:   Procedure(s):  REPLACEMENT AORTIC VALVE TRANSCATHETER (TAVR) TRANSFEMORAL W/ 26MM SHERMAN ADRIANNA S3 UR VALVE(ACCESS ON LEFT) MAKEDA  Cardiac tavr     Hospital Course: The patient was seen in consultation prior to this admission for evaluation of severe symptomatic aortic stenosis, Non-Rheumatic. Risks and benefits of transfemoral transcatheter aortic valve replacement were discussed in detail, and patient was agreeable. Routine preoperative evaluation was completed and informed consent was obtained prior to admission. Preop workup and imagine revealed he would be a suitable transfemoral approach. 12/5: Elective admission for TF TAVR # 26mm via L approach. Extubated and transferred to PACU supported with cardene 15. DP pulses 2+ b/l. Restarted on home ASA, Plavix, amiodarone 200 mg QD, Toprol 12.5 mg QD, Zetia, Lexapro, Wellbutrin, levothyroxine. ECG shows NSR. Gerontology and cardiology consulted. 12/6: Remains on Cardene 2.5mg. Start Lisinopril 5mg daily, wean Cardene off. Postop echo satisfactory. Labs stable. NSR, RA. Weaned off cardene in afternoon, d/c arterial line in evening. Transfer to tele.  Patient and wife uncomfortable with discharge. Plan for discharge in AM.      12/7: SB/SR, RA, no events, BP improved on lisinopril. Discharge home today     Condition at Discharge:   good     Discharge Physical Exam:    Please see the documented physical exam from this morning's progress note for details. Discharge Data:  Results from last 7 days   Lab Units 12/06/23 0439 12/05/23 0856 12/05/23  0835   WBC Thousand/uL 12.36*  --   --    HEMOGLOBIN g/dL 10.3* 10.5*  --    I STAT HEMOGLOBIN g/dl  --   --  10.5*   HEMATOCRIT % 32.8* 33.9*  --    HEMATOCRIT, ISTAT %  --   --  31*   PLATELETS Thousands/uL 204 190  --      Results from last 7 days   Lab Units 12/06/23 0439 12/05/23 0856 12/05/23  0835 12/05/23  0747 12/01/23  0956   POTASSIUM mmol/L 4.1 3.9  --   --  4.2   CHLORIDE mmol/L 106 107  --   --  102   CO2 mmol/L 27 28  --   --  30   CO2, I-STAT mmol/L  --   --  26   < >  --    BUN mg/dL 21 21  --   --  25   CREATININE mg/dL 1.05 1.19  --   --  1.20   GLUCOSE, ISTAT mg/dl  --   --  104   < >  --    CALCIUM mg/dL 8.5 8.4  --   --  9.0    < > = values in this interval not displayed. Results from last 7 days   Lab Units 12/01/23  0956   INR  0.91       Discharge instructions/Information to patient and family:   See after visit summary for information provided to patient and family. Pal Stone was educated on restrictions regarding driving and lifting, and techniques of proper incisional care. They were specifically counselled on signs and symptoms of an incisional infection, and advised to contact our service immediately should they develop fevers, sweats, chill, redness or drainage at the site of any incisions. Provisions for Follow-Up Care:  See after visit summary for information related to follow-up care and any pertinent home health orders. Disposition:  See After Visit Summary for discharge disposition information.     Planned Readmission:   No    Discharge Medications:  See after visit summary for reconciled discharge medications provided to patient and family. Rhett See was provided contact information and scheduled a follow up appointment with Jenn Ren M.D. Additionally, follow up appointments have been scheduled for their primary care physician and primary cardiologist.  Contact information was provided. Rhett See was counseled on the importance of avoiding tobacco products. As with all patients whom have undergone open heart surgery, tobacco cessation medication was contraindicated at the time of discharge. ACE/ARB was Prescribed at discharge    Beta Blocker was Prescribed at discharge    Aspirin was Prescribed at discharge    Statin was Prescribed at discharge      The patient was discharged on ongoing diuretic therapy with Furosemide, 40 mg, PO QD and Potassium Chloride 10 mEq, PO QD. These were preop medications and were continued. These should be discussed with his cardiologist at next appointment. The patient was informed that following their postoperative surgical evaluation, they will be referred to outpatient cardiac rehabilitation. They were counseled that this program is run by specialists who will help them safely strengthen their heart and prevent more heart disease. Cardiac rehabilitation will include exercise, relaxation, stress management, and heart-healthy nutrition. Caregivers will also check to make sure their medication regimen is working. During this admission, the patient was questioned on their use of tobacco, alcohol, and illicit/non-prescription drug use in the  previous 24 months. Rhett See states that they have not used any of these substances in this time frame. I spent 30 minutes discharging the patient. This time was spent on the day of discharge. I had direct contact with the patient on the day of discharge. Additional documentation is required if more than 30 minutes were spent on discharge.      SIGNATURE: Jewel [employer] AMANDA Acosta  DATE: December 7, 2023  TIME: 6:57 AM

## 2023-12-10 DIAGNOSIS — I25.10 CAD (CORONARY ARTERY DISEASE): ICD-10-CM

## 2023-12-14 ENCOUNTER — TELEPHONE (OUTPATIENT)
Dept: CARDIAC SURGERY | Facility: CLINIC | Age: 68
End: 2023-12-14

## 2023-12-14 NOTE — TELEPHONE ENCOUNTER
Post-op call. S/p TAVR on 12/5/23 with Dr. Oneida Jimenez with patient's wife - patient is fatigued, but feeling better and staying active. -Showering and cleaning incision daily otherwise.   -Ambulating well, at least 4 times/day at home  -Abiding by dietary/fluid restrictions.  -No pain or sleeping concerns. -Appts, medications & basic post-op instructions reviewed.

## 2023-12-15 RX ORDER — CLOPIDOGREL BISULFATE 75 MG/1
75 TABLET ORAL DAILY
Qty: 30 TABLET | Refills: 3 | Status: SHIPPED | OUTPATIENT
Start: 2023-12-15

## 2023-12-26 ENCOUNTER — TELEPHONE (OUTPATIENT)
Dept: CARDIOLOGY CLINIC | Facility: CLINIC | Age: 68
End: 2023-12-26

## 2023-12-26 ENCOUNTER — APPOINTMENT (OUTPATIENT)
Dept: LAB | Facility: HOSPITAL | Age: 68
End: 2023-12-26
Payer: MEDICARE

## 2023-12-26 DIAGNOSIS — I10 BENIGN ESSENTIAL HTN: Primary | ICD-10-CM

## 2023-12-26 DIAGNOSIS — I35.0 SEVERE AORTIC STENOSIS: ICD-10-CM

## 2023-12-26 DIAGNOSIS — L97.521 ULCER OF LEFT FOOT, LIMITED TO BREAKDOWN OF SKIN (HCC): ICD-10-CM

## 2023-12-26 DIAGNOSIS — A41.9 SEPSIS, DUE TO UNSPECIFIED ORGANISM, UNSPECIFIED WHETHER ACUTE ORGAN DYSFUNCTION PRESENT (HCC): ICD-10-CM

## 2023-12-26 DIAGNOSIS — Z95.2 S/P TAVR (TRANSCATHETER AORTIC VALVE REPLACEMENT): ICD-10-CM

## 2023-12-26 LAB
ALBUMIN SERPL BCP-MCNC: 4.3 G/DL (ref 3.5–5)
ALP SERPL-CCNC: 93 U/L (ref 34–104)
ALT SERPL W P-5'-P-CCNC: 11 U/L (ref 7–52)
ANION GAP SERPL CALCULATED.3IONS-SCNC: 10 MMOL/L
AST SERPL W P-5'-P-CCNC: 17 U/L (ref 13–39)
BASOPHILS # BLD AUTO: 0.06 THOUSANDS/ÂΜL (ref 0–0.1)
BASOPHILS NFR BLD AUTO: 1 % (ref 0–1)
BILIRUB SERPL-MCNC: 0.29 MG/DL (ref 0.2–1)
BUN SERPL-MCNC: 24 MG/DL (ref 5–25)
CALCIUM SERPL-MCNC: 9.6 MG/DL (ref 8.4–10.2)
CHLORIDE SERPL-SCNC: 102 MMOL/L (ref 96–108)
CO2 SERPL-SCNC: 25 MMOL/L (ref 21–32)
CREAT SERPL-MCNC: 1.29 MG/DL (ref 0.6–1.3)
EOSINOPHIL # BLD AUTO: 0.35 THOUSAND/ÂΜL (ref 0–0.61)
EOSINOPHIL NFR BLD AUTO: 5 % (ref 0–6)
ERYTHROCYTE [DISTWIDTH] IN BLOOD BY AUTOMATED COUNT: 15.9 % (ref 11.6–15.1)
GFR SERPL CREATININE-BSD FRML MDRD: 55 ML/MIN/1.73SQ M
GLUCOSE P FAST SERPL-MCNC: 92 MG/DL (ref 65–99)
HCT VFR BLD AUTO: 44.4 % (ref 36.5–49.3)
HGB BLD-MCNC: 13.8 G/DL (ref 12–17)
IMM GRANULOCYTES # BLD AUTO: 0.04 THOUSAND/UL (ref 0–0.2)
IMM GRANULOCYTES NFR BLD AUTO: 1 % (ref 0–2)
LYMPHOCYTES # BLD AUTO: 1.96 THOUSANDS/ÂΜL (ref 0.6–4.47)
LYMPHOCYTES NFR BLD AUTO: 26 % (ref 14–44)
MCH RBC QN AUTO: 29.2 PG (ref 26.8–34.3)
MCHC RBC AUTO-ENTMCNC: 31.1 G/DL (ref 31.4–37.4)
MCV RBC AUTO: 94 FL (ref 82–98)
MONOCYTES # BLD AUTO: 1.09 THOUSAND/ÂΜL (ref 0.17–1.22)
MONOCYTES NFR BLD AUTO: 14 % (ref 4–12)
NEUTROPHILS # BLD AUTO: 4.17 THOUSANDS/ÂΜL (ref 1.85–7.62)
NEUTS SEG NFR BLD AUTO: 53 % (ref 43–75)
NRBC BLD AUTO-RTO: 0 /100 WBCS
PLATELET # BLD AUTO: 282 THOUSANDS/UL (ref 149–390)
PMV BLD AUTO: 9.3 FL (ref 8.9–12.7)
POTASSIUM SERPL-SCNC: 4.3 MMOL/L (ref 3.5–5.3)
PROT SERPL-MCNC: 7.4 G/DL (ref 6.4–8.4)
RBC # BLD AUTO: 4.73 MILLION/UL (ref 3.88–5.62)
SODIUM SERPL-SCNC: 137 MMOL/L (ref 135–147)
WBC # BLD AUTO: 7.67 THOUSAND/UL (ref 4.31–10.16)

## 2023-12-26 PROCEDURE — 36415 COLL VENOUS BLD VENIPUNCTURE: CPT

## 2023-12-26 PROCEDURE — 85025 COMPLETE CBC W/AUTO DIFF WBC: CPT

## 2023-12-26 PROCEDURE — 80053 COMPREHEN METABOLIC PANEL: CPT

## 2023-12-26 RX ORDER — LOSARTAN POTASSIUM 25 MG/1
25 TABLET ORAL DAILY
Qty: 30 TABLET | Refills: 11 | Status: SHIPPED | OUTPATIENT
Start: 2023-12-26

## 2023-12-26 NOTE — TELEPHONE ENCOUNTER
Patient had to cancel his appt with me in December. Can you please schedule in January for a follow up with me? Ok for 4 pm slot. Thank you

## 2023-12-26 NOTE — TELEPHONE ENCOUNTER
I spoke with Mikael. He developed cough with lisinopril in the past. I asked him to discontinue and I started losartan 25 mg daily instead.  Increase furosemide to 80 mg daily for 2 days. He will monitor BP and weights at home. He has an EP appt Friday. I will reach out to them so they can monitor at his visit.

## 2023-12-26 NOTE — TELEPHONE ENCOUNTER
Pt states he is coughing a lot since he started lisinopril 1 week ago. He is also retaining fluid. # weeks ago when he came home from hospital he was 210. He is now 221. He is asking if he can take lasix 60 mg.

## 2023-12-26 NOTE — TELEPHONE ENCOUNTER
Patient left message on office voicemail stating he has questions about the lisinopril that NATHALIE Acosta prescribed him on 12/7/23    Patient can be reached at

## 2023-12-28 NOTE — PROGRESS NOTES
Electrophysiology Office Note    Mikael Saavedra  1955  [id number]  HEART & VASCULAR [facility]  [address]  [place] [address]    Assessment/Plan     Primary diagnosis:   Ventricular tachycardia   Recent DEON stent LAD 80% block   NSVT symptomatic at cardiac rehab   Started on amiodarone  On metoprolol succinate 12.5 mg daily    S/p secondary prevention Medtronic dual chamber ICD by Dr. Talbot 11/2/23   Recent echo 12/5/23 with LVEF 65%   EKG here sinus bradycardia with a rate of 55.  Atrial pacing noted.  No significant ectopy  Device check 24% AP <0.1%  no high rate episodes   Plan:   Continue amiodarone for now we will consider cutting Amio down to 100 mg given stability.  Unable to uptitrate AV rayna's for now given BP  Follow-up in 3 months to see if amiodarone can be discontinued  Severe aortic stenosis   Noted on echo 9/8 with severe aortic stenosis   Planning for valve replacement with CTS later this month   CAD s/p DEON to Mid LAD 10/17/23  S/p DEON for 80% lesion   Currently on DAPT with ASA and plavix   LVEF 60% on recent echo in 9/8/23  S/p TAVR for severe AS 12/5/23  AGIB   Admitted early November with GI bleed   S/p EGD Dieulafoy's lesion in stomach s/p clipping   Restarted on plavix and ASA given resent stent   HTN   Started on lisinopril but developed cough - changed to losartan 25 mg   On lasix 40 mg after valve replacement - recently increased due to volume overload   Current /72  Volume overload  Last echo 65% LVEF with grade 1 diastolic dysfunction.   Was treated for volume overload with symptoms of abdominal bloating  Was taking Lasix 80 mg which is double his 40 mg home dose  Complaining of worsening abdominal distention  Continue Lasix 80 mg x 2 days for now and then we will increase standing dose of Lasix to 60 mg from 40  He will call me in 2 to 3 days to report symptoms.      Rhythm History:   Atrial fibrillation:     Atrial flutter:      SVT:     VT/VF/PVC:     Device history:   Pacemaker:    Defibrillator:    BIV PPM:    BIV ICD:    ILR:      Cardiac Testing:     ECHO: No results found for this or any previous visit.        History of Present Illness     HPI/INTERVAL HISTORY:  Wiley is a 68-year-old male with past medical history as mentioned above.    Briefly, patient had history of drug-eluting stent placed for mid LAD lesion of 80% stenosis noted on cardiac catheterization and was found to have stent near aortic stenosis at that time.  Patient underwent cardiac rehab and was noted to have period of NSVT lasting 30 seconds which broke spontaneously.  He was transferred to the emergency department had another 22nd run of NSVT prompting rapid response and ER visit.  In the ER patient had 2 further self terminating episodes of NSVT and was started on IV amiodarone was eventually changed to p.o. amiodarone after having developed bradycardia on IV amiodarone.  Patient was seen by electrophysiology at [facility] and secondary prevention ICD was placed by Dr. Talbot with dual-chamber Medtronic ICD.  Patient's postop course was complicated by crepitus in the left neck area which was evaluated and found to have no significant pneumothorax.  Patient was discharged after an read presented to hospital shortly after with acute GI bleed at which time his Plavix, and aspirin were held.  He was found to have Dula Jaleel's lesion of stomach which underwent clipping and was restarted on aspirin and Plavix after discharge.  Patient underwent elective aortic valve replacement via TAVR for his severe aortic stenosis.  He was started on lisinopril for blood pressure, and coronary artery disease at that time however subsequently developed cough and was changed to losartan 25 mg.  He did have some volume overload in the post TAVR setting and his Lasix was increased from 40 mg to 80 mg.  Recently, patient has been feeling overall well.  He has been  reporting some mild shortness of breath especially with exertion as well as abdominal distention.  He was being followed by his outpatient cardiologist who increased his Lasix from 40 mg daily to 80 mg for a couple of days to see if he could lose some extra volume.  He still complaining of some abdominal fullness and I have instructed him to keep Lasix dose at 80 for now x 2 to 3 days and will reassess.  In terms of rhythm, he has not felt any palpitations, chest pain, skipped beats, near-syncope, or any symptoms of VT.  Device check in office today did not show any high rate episodes.  We spent a significant amount of time going through his multiple diagnoses and how they are all interrelated.  He was very appreciative of time spent with him to go over his complex medical history.      Review of Systems   Constitutional:  Negative for fatigue and fever.   Respiratory:  Negative for chest tightness and shortness of breath.    Cardiovascular:  Negative for chest pain and palpitations.   Gastrointestinal:  Positive for abdominal distention.   Neurological:  Negative for dizziness and light-headedness.   All other systems reviewed and are negative.    ROS as noted above, otherwise 12 point review of systems was performed and is negative.       Historical Information   Social History     Socioeconomic History    Marital status: /Civil Union     Spouse name: Not on file    Number of children: Not on file    Years of education: Not on file    Highest education level: Not on file   Occupational History    Not on file   Tobacco Use    Smoking status: Former     Current packs/day: 0.00     Average packs/day: 0.5 packs/day for 50.0 years (25.0 ttl pk-yrs)     Types: Cigarettes     Start date: 11/3/1973     Quit date: 11/3/2023     Years since quittin.1    Smokeless tobacco: Never   Vaping Use    Vaping status: Never Used   Substance and Sexual Activity    Alcohol use: Yes     Comment: socially-2 drinks per week     Drug use: Not Currently     Comment: sporadic in young adulthood    Sexual activity: Not on file     Comment: defer   Other Topics Concern    Not on file   Social History Narrative    Not on file     Social Determinants of Health     Financial Resource Strain: Medium Risk (12/13/2023)    Received from [facility]    Overall Financial Resource Strain (CARDIA)     Difficulty of Paying Living Expenses: Somewhat hard   Food Insecurity: No Food Insecurity (12/13/2023)    Received from [facility]    Hunger Vital Sign     Worried About Running Out of Food in the Last Year: Never true     Ran Out of Food in the Last Year: Never true   Transportation Needs: No Transportation Needs (12/13/2023)    Received from [facility]    PRAPARE - Transportation     Lack of Transportation (Medical): No     Lack of Transportation (Non-Medical): No   Physical Activity: Inactive (12/13/2023)    Received from [facility]    Exercise Vital Sign     Days of Exercise per Week: 0 days     Minutes of Exercise per Session: 0 min   Stress: Stress Concern Present (12/13/2023)    Received from [facility]    [employer] - Occupational Stress Questionnaire     Feeling of Stress : Rather much   Social Connections: Socially Integrated (12/13/2023)    Received from [facility]    Social Connection and Isolation Panel [NHANES]     Frequency of Communication with Friends and Family: Twice a week     Frequency of Social Gatherings with Friends and Family: Three times a week     Attends Adventism Services: More than 4 times per year     Active Member of Clubs or Organizations: Yes     Attends Club or Organization Meetings: More than 4 times per year     Marital Status:    Intimate Partner Violence: Not At Risk (12/13/2023)    Received from [facility]    Humiliation, Afraid, Rape, and Kick questionnaire  "    Fear of Current or Ex-Partner: No     Emotionally Abused: No     Physically Abused: No     Sexually Abused: No   Housing Stability: Low Risk  (12/13/2023)    Received from [facility]    Housing Stability Vital Sign     Unable to Pay for Housing in the Last Year: No     Number of Places Lived in the Last Year: 1     Unstable Housing in the Last Year: No     Past Medical History:   Diagnosis Date    Anxiety     Aortic stenosis     Borderline high cholesterol     CHF (congestive heart failure) ([facility])     Asaf Jackson-cardio-    Colon polyp     Depression     Heart murmur     Hypertension     Hypothyroidism     Prediabetes     PVCs (premature ventricular contractions)     \"skip\"    s/p Medtronic dual chamber ICD 11/2/2023 11/04/2023    Wears glasses     readers     Past Surgical History:   Procedure Laterality Date    APPENDECTOMY      BACK SURGERY      CARDIAC CATHETERIZATION N/A 10/17/2023    Procedure: Cardiac RHC/LHC;  Surgeon: Phillip Pfeiffer MD;  Location: [facility];  Service: Cardiology    CARDIAC CATHETERIZATION N/A 12/5/2023    Procedure: Cardiac tavr;  Surgeon: Malcolm Humphrey DO;  Location: [facility] MA[facility];  Service: Cardiology    CARDIAC ELECTROPHYSIOLOGY PROCEDURE N/A 11/2/2023    Procedure: Cardiac icd implant;  Surgeon: Jered Talbot MD;  Location: [facility];  Service: Cardiology    CATARACT EXTRACTION Bilateral     CLOSURE DELAYED PRIMARY Left 06/09/2023    Procedure: CLOSURE DELAYED PRIMARY;  Surgeon: Tuan Simms DPM;  Location: [facility];  Service: Podiatry    COLONOSCOPY  2023    NATHALIE Guerra    MO CORRJ HALLUX VALGUS W/SESMDC W/RESCJ PROX PHAL Left 04/19/2023    Procedure: BUNIONECTOMY PERDOMO and wound debridement;  Surgeon: Clover Mott DPM;  Location: [facility];  Service: Podiatry    MO REPLACE AORTIC VALVE OPENFEMORAL ARTERY APPROACH N/A 12/5/2023    Procedure: REPLACEMENT AORTIC VALVE TRANSCATHETER (TAVR) TRANSFEMORAL W/ 26MM " SHERMAN ADRIANNA S3 UR VALVE(ACCESS ON LEFT) MAKEDA;  Surgeon: Gerald Gooden MD;  Location: [facility];  Service: Cardiac Surgery    TOE AMPUTATION Left 2023    Procedure: PARTIAL 1ST RAY AMPUTATION;  Surgeon: Tuan Simms DPM;  Location:  MAIN OR;  Service: Podiatry    TONSILLECTOMY      TOTAL SHOULDER REPLACEMENT Left     WISDOM TOOTH EXTRACTION       Social History     Substance and Sexual Activity   Alcohol Use Yes    Comment: socially-2 drinks per week     Social History     Substance and Sexual Activity   Drug Use Not Currently    Comment: sporadic in young adulthood     Social History     Tobacco Use   Smoking Status Former    Current packs/day: 0.00    Average packs/day: 0.5 packs/day for 50.0 years (25.0 ttl pk-yrs)    Types: Cigarettes    Start date: 11/3/1973    Quit date: 11/3/2023    Years since quittin.1   Smokeless Tobacco Never     Family History   Problem Relation Age of Onset    Dementia Mother     Blindness Mother     Cancer Mother     Dementia Father        Meds/Allergies       Current Outpatient Medications:     Alirocumab 150 MG/ML SOAJ, Inject 150 mg under the skin every 14 (fourteen) days, Disp: 2 mL, Rfl: 11    amiodarone 200 mg tablet, Take 1 tablet (200 mg total) by mouth daily, Disp: 90 tablet, Rfl: 0    aspirin 81 mg chewable tablet, Chew 1 tablet (81 mg total) daily, Disp: 30 tablet, Rfl: 0    buPROPion (WELLBUTRIN XL) 300 mg 24 hr tablet, , Disp: , Rfl:     clopidogrel (PLAVIX) 75 mg tablet, TAKE ONE TABLET BY MOUTH EVERY DAY, Disp: 30 tablet, Rfl: 3    escitalopram (LEXAPRO) 20 mg tablet, Take 20 mg by mouth daily, Disp: , Rfl: 1    ezetimibe (ZETIA) 10 mg tablet, Take 1 tablet (10 mg total) by mouth daily, Disp: 90 tablet, Rfl: 3    furosemide (LASIX) 40 mg tablet, Take 1 tablet (40 mg total) by mouth daily, Disp: , Rfl:     levothyroxine 75 mcg tablet, , Disp: , Rfl:     losartan (COZAAR) 25 mg tablet, Take 1 tablet (25 mg total) by mouth daily, Disp: 30  tablet, Rfl: 11    Magnesium 400 MG CAPS, Take 1 capsule (400 mg total) by mouth in the morning, Disp: 90 capsule, Rfl: 3    metoprolol succinate (TOPROL-XL) 25 mg 24 hr tablet, Take 0.5 tablets (12.5 mg total) by mouth daily, Disp: 15 tablet, Rfl: 11    Omega-3 Fatty Acids (Fish Oil) 1200 MG CAPS, Take by mouth, Disp: , Rfl:     pantoprazole (PROTONIX) 40 mg tablet, Take 1 tablet (40 mg total) by mouth 2 (two) times a day before meals, Disp: 60 tablet, Rfl: 0    polyethylene glycol (MIRALAX) 17 g packet, Take 17 g by mouth daily as needed (constipation), Disp: , Rfl: 0    potassium chloride (MICRO-K) 10 MEQ CR capsule, Take 1 capsule (10 mEq total) by mouth daily, Disp: 90 capsule, Rfl: 3    pravastatin (PRAVACHOL) 20 mg tablet, Take 1 tablet (20 mg total) by mouth every other day, Disp: 90 tablet, Rfl: 6    Allergies   Allergen Reactions    Statins Myalgia     Other reaction(s): muscle aches and joint aches         Objective   Vitals: There were no vitals taken for this visit.        Physical Exam  Vitals and nursing note reviewed.   Constitutional:       General: He is not in acute distress.  Cardiovascular:      Rate and Rhythm: Normal rate and regular rhythm.   Pulmonary:      Effort: Pulmonary effort is normal.      Breath sounds: Normal breath sounds.   Abdominal:      General: Abdomen is flat.      Palpations: Abdomen is soft.   Musculoskeletal:      Right lower leg: No edema.      Left lower leg: No edema.   Skin:     General: Skin is warm and dry.   Neurological:      General: No focal deficit present.      Mental Status: He is alert and oriented to person, place, and time.   Psychiatric:         Mood and Affect: Mood normal.           Labs:  Appointment on 12/26/2023   Component Date Value    Sodium 12/26/2023 137     Potassium 12/26/2023 4.3     Chloride 12/26/2023 102     CO2 12/26/2023 25     ANION GAP 12/26/2023 10     BUN 12/26/2023 24     Creatinine 12/26/2023 1.29     Glucose, Fasting 12/26/2023 92      Calcium 12/26/2023 9.6     AST 12/26/2023 17     ALT 12/26/2023 11     Alkaline Phosphatase 12/26/2023 93     Total Protein 12/26/2023 7.4     Albumin 12/26/2023 4.3     Total Bilirubin 12/26/2023 0.29     eGFR 12/26/2023 55     WBC 12/26/2023 7.67     RBC 12/26/2023 4.73     Hemoglobin 12/26/2023 13.8     Hematocrit 12/26/2023 44.4     MCV 12/26/2023 94     MCH 12/26/2023 29.2     MCHC 12/26/2023 31.1 (L)     RDW 12/26/2023 15.9 (H)     MPV 12/26/2023 9.3     Platelets 12/26/2023 282     nRBC 12/26/2023 0     Neutrophils Relative 12/26/2023 53     Immat GRANS % 12/26/2023 1     Lymphocytes Relative 12/26/2023 26     Monocytes Relative 12/26/2023 14 (H)     Eosinophils Relative 12/26/2023 5     Basophils Relative 12/26/2023 1     Neutrophils Absolute 12/26/2023 4.17     Immature Grans Absolute 12/26/2023 0.04     Lymphocytes Absolute 12/26/2023 1.96     Monocytes Absolute 12/26/2023 1.09     Eosinophils Absolute 12/26/2023 0.35     Basophils Absolute 12/26/2023 0.06    Admission on 12/05/2023, Discharged on 12/07/2023   Component Date Value    Unit Product Code 12/06/2023 Y9502G39     Unit Number 12/06/2023 [id number]     Unit ABO 12/06/2023 O     Unit RH 12/06/2023 POS     Crossmatch 12/06/2023 Compatible     Unit Dispense Status 12/06/2023 Return to Inv     Unit Product Volume 12/06/2023 350     Unit Product Code 12/06/2023 V6728Y95     Unit Number 12/06/2023 [id number]     Unit ABO 12/06/2023 O     Unit RH 12/06/2023 POS     Crossmatch 12/06/2023 Compatible     Unit Dispense Status 12/06/2023 Return to Inv     Unit Product Volume 12/06/2023 350     Unit Product Code 12/06/2023 R5685X26     Unit Number 12/06/2023 [id number]     Unit ABO 12/06/2023 O     Unit RH 12/06/2023 POS     Crossmatch 12/06/2023 Compatible     Unit Dispense Status 12/06/2023 Return to Inv     Unit Product Volume 12/06/2023 350     Unit Product Code 12/06/2023 [id number]     Unit Number 12/06/2023 [id number]     Unit ABO  12/06/2023 O     Unit RH 12/06/2023 POS     Crossmatch 12/06/2023 Compatible     Unit Dispense Status 12/06/2023 Return to Inv     Unit Product Volume 12/06/2023 350     ABO Grouping 12/05/2023 O     Rh Factor 12/05/2023 Positive     Antibody Screen 12/05/2023 Negative     Specimen Expiration Date 12/05/2023 20231208     POC Glucose 12/05/2023 101     Sodium 12/05/2023 141     Potassium 12/05/2023 3.9     Chloride 12/05/2023 107     CO2 12/05/2023 28     ANION GAP 12/05/2023 6     BUN 12/05/2023 21     Creatinine 12/05/2023 1.19     Glucose 12/05/2023 108     Calcium 12/05/2023 8.4     eGFR 12/05/2023 61     Hemoglobin 12/05/2023 10.5 (L)     Hematocrit 12/05/2023 33.9 (L)     Platelets 12/05/2023 190     MPV 12/05/2023 8.9     A4C EF 12/05/2023 54     LVOT stroke volume 12/05/2023 89.93     LVOT stroke volume index 12/05/2023 39.90     LVOT Cardiac Index 12/05/2023 2.26     LVOT Cardiac Output 12/05/2023 4.93     LVIDd 12/05/2023 5.30     LVIDS 12/05/2023 3.60     IVSd 12/05/2023 1.10     LVPWd 12/05/2023 1.10     FS 12/05/2023 32     MV E' Tissue Velocity Se* 12/05/2023 5     MV E' Tissue Velocity La* 12/05/2023 8     LA Volume Index (BP) 12/05/2023 24.3     E/A ratio 12/05/2023 0.86     E wave deceleration time 12/05/2023 319     MV Peak E Roosevelt 12/05/2023 89     MV Peak A Roosevelt 12/05/2023 1.03     AV LVOT peak gradient 12/05/2023 7     LVOT peak VTI 12/05/2023 28.64     LVOT peak roosevelt 12/05/2023 1.28     RVID d 12/05/2023 4.8     Tricuspid annular plane * 12/05/2023 2.80     LA size 12/05/2023 4     LA length (A2C) 12/05/2023 6.10     LA volume (BP) 12/05/2023 53     RAA A4C 12/05/2023 14.6     LVOT diameter 12/05/2023 2.0     Aortic valve peak veloci* 12/05/2023 2.14     Ao VTI 12/05/2023 44.93     AV mean gradient 12/05/2023 10     LVOT mn grad 12/05/2023 3.0     AV peak gradient 12/05/2023 18     AV area by cont VTI 12/05/2023 2.0     AV area peak roosevelt 12/05/2023 1.9     MV stenosis pressure 1/2* 12/05/2023 93      MV valve area p 1/2 meth* 12/05/2023 2.37     TR Peak Roosevelt 12/05/2023 2.2     Triscuspid Valve Regurgi* 12/05/2023 19.0     Ao root 12/05/2023 2.60     Aortic valve mean veloci* 12/05/2023 14.70     Tricuspid valve peak reg* 12/05/2023 2.17     Left ventricular stroke * 12/05/2023 84.00     IVS 12/05/2023 1.1     LEFT VENTRICLE SYSTOLIC * 12/05/2023 53     LV DIASTOLIC VOLUME (MOD* 12/05/2023 137     Left Atrium Area-systoli* 12/05/2023 19.7     Left Atrium Area-systoli* 12/05/2023 18.9     LVSV, 2D 12/05/2023 84     LVOT area 12/05/2023 3.14     DVI 12/05/2023 0.60     AV valve area 12/05/2023 2.00     LV EF 12/05/2023 65     Est. RA pres 12/05/2023 8.0     Right Ventricular Peak S* 12/05/2023 27.00     IVC 12/05/2023 2.1     POC Glucose 12/05/2023 155 (H)     ph, Eric ISTAT 12/05/2023 7.336     pCO2, Eric i-STAT 12/05/2023 51.3 (H)     pO2, Eric i-STAT 12/05/2023 68.0 (H)     BE, i-STAT 12/05/2023 1     HCO3, Eric i-STAT 12/05/2023 27.4     CO2, i-STAT 12/05/2023 29     O2 Sat, i-STAT 12/05/2023 92 (H)     SODIUM, I-STAT 12/05/2023 142     Potassium, i-STAT 12/05/2023 3.8     Calcium, Ionized i-STAT 12/05/2023 1.20     Hct, i-STAT 12/05/2023 34 (L)     Hgb, i-STAT 12/05/2023 11.6 (L)     Glucose, i-STAT 12/05/2023 101     Specimen Type 12/05/2023 VENOUS     pH, Art i-STAT 12/05/2023 7.335 (L)     pCO2, Art i-STAT 12/05/2023 47.1 (H)     pO2, ART i-STAT 12/05/2023 109.0     BE, i-STAT 12/05/2023 -1     HCO3, Art i-STAT 12/05/2023 25.1     CO2, i-STAT 12/05/2023 26     O2 Sat, i-STAT 12/05/2023 98 (H)     SODIUM, I-STAT 12/05/2023 141     Potassium, i-STAT 12/05/2023 3.9     Calcium, Ionized i-STAT 12/05/2023 1.16     Hct, i-STAT 12/05/2023 31 (L)     Hgb, i-STAT 12/05/2023 10.5 (L)     Glucose, i-STAT 12/05/2023 104     Specimen Type 12/05/2023 ARTERIAL     Activated Clotting Time,* 12/05/2023 154 (H)     Specimen Type 12/05/2023 VENOUS     Activated Clotting Time,* 12/05/2023 283 (H)     Specimen Type 12/05/2023  ARTERIAL     Activated Clotting Time,* 12/05/2023 137     Specimen Type 12/05/2023 ARTERIAL     POC Glucose 12/05/2023 139     POC Glucose 12/05/2023 165 (H)     Ventricular Rate 12/05/2023 69     Atrial Rate 12/05/2023 69     NV Interval 12/05/2023 171     QRSD Interval 12/05/2023 104     QT Interval 12/05/2023 442     QTC Interval 12/05/2023 474     P Axis 12/05/2023 60     QRS Easley 12/05/2023 67     T Wave Easley 12/05/2023 43     Sodium 12/06/2023 140     Potassium 12/06/2023 4.1     Chloride 12/06/2023 106     CO2 12/06/2023 27     ANION GAP 12/06/2023 7     BUN 12/06/2023 21     Creatinine 12/06/2023 1.05     Glucose 12/06/2023 131     Calcium 12/06/2023 8.5     eGFR 12/06/2023 71     Magnesium 12/06/2023 2.1     WBC 12/06/2023 12.36 (H)     RBC 12/06/2023 3.41 (L)     Hemoglobin 12/06/2023 10.3 (L)     Hematocrit 12/06/2023 32.8 (L)     MCV 12/06/2023 96     MCH 12/06/2023 30.2     MCHC 12/06/2023 31.4     RDW 12/06/2023 14.7     Platelets 12/06/2023 204     MPV 12/06/2023 9.2     POC Glucose 12/06/2023 205 (H)     POC Glucose 12/06/2023 87     POC Glucose 12/06/2023 113     POC Glucose 12/06/2023 128     POC Glucose 12/07/2023 93     POC Glucose 12/07/2023 111    Lab Requisition on 12/01/2023   Component Date Value    ABO Grouping 12/01/2023 O     Rh Factor 12/01/2023 Positive     Antibody Screen 12/01/2023 Negative     Specimen Expiration Date 12/01/2023 20231204    Appointment on 12/01/2023   Component Date Value    Sodium 12/01/2023 137     Potassium 12/01/2023 4.2     Chloride 12/01/2023 102     CO2 12/01/2023 30     ANION GAP 12/01/2023 5     BUN 12/01/2023 25     Creatinine 12/01/2023 1.20     Glucose, Fasting 12/01/2023 90     Calcium 12/01/2023 9.0     AST 12/01/2023 17     ALT 12/01/2023 11     Alkaline Phosphatase 12/01/2023 93     Total Protein 12/01/2023 7.7     Albumin 12/01/2023 4.4     Total Bilirubin 12/01/2023 0.45     eGFR 12/01/2023 60     Protime 12/01/2023 12.6     INR 12/01/2023 0.91      MRSA Culture Only 12/01/2023 No Methicillin Resistant Staphlyococcus aureus (MRSA) isolated    Appointment on 11/21/2023   Component Date Value    Sodium 11/21/2023 140     Potassium 11/21/2023 4.2     Chloride 11/21/2023 104     CO2 11/21/2023 29     ANION GAP 11/21/2023 7     BUN 11/21/2023 20     Creatinine 11/21/2023 1.28     Glucose, Fasting 11/21/2023 108 (H)     Calcium 11/21/2023 9.2     AST 11/21/2023 14     ALT 11/21/2023 11     Alkaline Phosphatase 11/21/2023 81     Total Protein 11/21/2023 7.0     Albumin 11/21/2023 4.0     Total Bilirubin 11/21/2023 0.36     eGFR 11/21/2023 56    Appointment on 11/13/2023   Component Date Value    Total CK 11/13/2023 36 (L)     Sodium 11/13/2023 140     Potassium 11/13/2023 4.2     Chloride 11/13/2023 104     CO2 11/13/2023 30     ANION GAP 11/13/2023 6     BUN 11/13/2023 17     Creatinine 11/13/2023 1.18     Glucose 11/13/2023 114     Calcium 11/13/2023 9.3     AST 11/13/2023 12 (L)     ALT 11/13/2023 12     Alkaline Phosphatase 11/13/2023 73     Total Protein 11/13/2023 6.4     Albumin 11/13/2023 3.8     Total Bilirubin 11/13/2023 0.43     eGFR 11/13/2023 62     Cholesterol 11/13/2023 132     Triglycerides 11/13/2023 165 (H)     HDL, Direct 11/13/2023 43     LDL Calculated 11/13/2023 56     WBC 11/13/2023 12.38 (H)     RBC 11/13/2023 3.07 (L)     Hemoglobin 11/13/2023 9.4 (L)     Hematocrit 11/13/2023 30.2 (L)     MCV 11/13/2023 98     MCH 11/13/2023 30.6     MCHC 11/13/2023 31.1 (L)     RDW 11/13/2023 14.4     Platelets 11/13/2023 433 (H)     MPV 11/13/2023 8.9    Admission on 11/06/2023, Discharged on 11/09/2023   Component Date Value    WBC 11/06/2023 10.94 (H)     RBC 11/06/2023 3.14 (L)     Hemoglobin 11/06/2023 9.5 (L)     Hematocrit 11/06/2023 29.6 (L)     MCV 11/06/2023 94     MCH 11/06/2023 30.3     MCHC 11/06/2023 32.1     RDW 11/06/2023 13.5     MPV 11/06/2023 9.4     Platelets 11/06/2023 276     hs TnI 0hr 11/06/2023 22     Sodium 11/06/2023 139      Potassium 11/06/2023 3.5     Chloride 11/06/2023 104     CO2 11/06/2023 24     ANION GAP 11/06/2023 11     BUN 11/06/2023 42 (H)     Creatinine 11/06/2023 1.22     Glucose 11/06/2023 140     Calcium 11/06/2023 8.9     AST 11/06/2023 14     ALT 11/06/2023 16     Alkaline Phosphatase 11/06/2023 56     Total Protein 11/06/2023 6.4     Albumin 11/06/2023 3.7     Total Bilirubin 11/06/2023 0.34     eGFR 11/06/2023 59     ABO Grouping 11/06/2023 O     Rh Factor 11/06/2023 Positive     Antibody Screen 11/06/2023 Negative     Specimen Expiration Date 11/06/2023 [id number]     Protime 11/06/2023 13.0     INR 11/06/2023 0.95     PTT 11/06/2023 25     Segmented % 11/06/2023 56     Lymphocytes % 11/06/2023 25     Monocytes % 11/06/2023 12     Eosinophils, % 11/06/2023 4     Basophils % 11/06/2023 3 (H)     Absolute Neutrophils 11/06/2023 6.13     Lymphocytes Absolute 11/06/2023 2.74     Monocytes Absolute 11/06/2023 1.31 (H)     Eosinophils Absolute 11/06/2023 0.44 (H)     Basophils Absolute 11/06/2023 0.33 (H)     RBC Morphology 11/06/2023 Present     Platelet Estimate 11/06/2023 Adequate     Polychromasia 11/06/2023 Present     ABO Grouping 11/06/2023 O     Rh Factor 11/06/2023 Positive     hs TnI 2hr 11/06/2023 19     Delta 2hr hsTnI 11/06/2023 -3     hs TnI 4hr 11/06/2023 20     Delta 4hr hsTnI 11/06/2023 -2     Vitamin B-12 11/06/2023 309     Iron Saturation 11/06/2023 28     TIBC 11/06/2023 323     Iron 11/06/2023 91     UIBC 11/06/2023 232     Ferritin 11/06/2023 74     Hemoglobin 11/06/2023 8.0 (L)     Hematocrit 11/06/2023 24.4 (L)     Sodium 11/07/2023 141     Potassium 11/07/2023 3.9     Chloride 11/07/2023 110 (H)     CO2 11/07/2023 27     ANION GAP 11/07/2023 4     BUN 11/07/2023 42 (H)     Creatinine 11/07/2023 1.03     Glucose 11/07/2023 117     Calcium 11/07/2023 8.6     eGFR 11/07/2023 73     WBC 11/07/2023 10.58 (H)     RBC 11/07/2023 2.40 (L)     Hemoglobin 11/07/2023 7.4 (L)     Hematocrit 11/07/2023 23.4 (L)      MCV 11/07/2023 98     MCH 11/07/2023 30.8     MCHC 11/07/2023 31.6     RDW 11/07/2023 13.6     Platelets 11/07/2023 263     MPV 11/07/2023 9.2     Unit Product Code 11/08/2023 [id number]     Unit Number 11/08/2023 [id number]     Unit ABO 11/08/2023 O     Unit RH 11/08/2023 POS     Crossmatch 11/08/2023 Compatible     Unit Dispense Status 11/08/2023 Presumed Trans     Unit Product Volume 11/08/2023 350     Hemoglobin 11/07/2023 8.4 (L)     Hematocrit 11/07/2023 25.2 (L)     Unit Product Code 11/08/2023 R7299C51     Unit Number 11/08/2023 [id number]     Unit ABO 11/08/2023 O     Unit RH 11/08/2023 POS     Crossmatch 11/08/2023 Compatible     Unit Dispense Status 11/08/2023 Presumed Trans     Unit Product Volume 11/08/2023 350     Hemoglobin 11/07/2023 8.5 (L)     Hematocrit 11/07/2023 26.1 (L)     POC Glucose 11/07/2023 111     Ventricular Rate 11/06/2023 61     Atrial Rate 11/06/2023 61     MN Interval 11/06/2023 158     QRSD Interval 11/06/2023 100     QT Interval 11/06/2023 434     QTC Interval 11/06/2023 436     P Axis 11/06/2023 52     QRS [place] 11/06/2023 48     T Wave [place] 11/06/2023 74     WBC 11/08/2023 10.23 (H)     RBC 11/08/2023 2.71 (L)     Hemoglobin 11/08/2023 8.6 (L)     Hematocrit 11/08/2023 26.2 (L)     MCV 11/08/2023 97     MCH 11/08/2023 31.7     MCHC 11/08/2023 32.8     RDW 11/08/2023 14.3     Platelets 11/08/2023 242     MPV 11/08/2023 9.0     Sodium 11/08/2023 141     Potassium 11/08/2023 4.1     Chloride 11/08/2023 110 (H)     CO2 11/08/2023 28     ANION GAP 11/08/2023 3     BUN 11/08/2023 26 (H)     Creatinine 11/08/2023 1.01     Glucose 11/08/2023 108     Calcium 11/08/2023 8.6     eGFR 11/08/2023 75     WBC 11/09/2023 10.15     RBC 11/09/2023 2.66 (L)     Hemoglobin 11/09/2023 8.3 (L)     Hematocrit 11/09/2023 25.4 (L)     MCV 11/09/2023 96     MCH 11/09/2023 31.2     MCHC 11/09/2023 32.7     RDW 11/09/2023 14.3     Platelets 11/09/2023 284     MPV 11/09/2023 9.5    Admission on  11/01/2023, Discharged on 11/04/2023   Component Date Value    PTT 11/01/2023 28     WBC 11/01/2023 9.21     RBC 11/01/2023 4.66     Hemoglobin 11/01/2023 14.5     Hematocrit 11/01/2023 42.9     MCV 11/01/2023 92     MCH 11/01/2023 31.1     MCHC 11/01/2023 33.8     RDW 11/01/2023 13.4     MPV 11/01/2023 9.3     Platelets 11/01/2023 202     nRBC 11/01/2023 0     Neutrophils Relative 11/01/2023 64     Immat GRANS % 11/01/2023 0     Lymphocytes Relative 11/01/2023 21     Monocytes Relative 11/01/2023 10     Eosinophils Relative 11/01/2023 5     Basophils Relative 11/01/2023 0     Neutrophils Absolute 11/01/2023 5.77     Immature Grans Absolute 11/01/2023 0.03     Lymphocytes Absolute 11/01/2023 1.94     Monocytes Absolute 11/01/2023 0.94     Eosinophils Absolute 11/01/2023 0.49     Basophils Absolute 11/01/2023 0.04     Sodium 11/01/2023 138     Potassium 11/01/2023 3.7     Chloride 11/01/2023 103     CO2 11/01/2023 29     ANION GAP 11/01/2023 6     BUN 11/01/2023 21     Creatinine 11/01/2023 1.04     Glucose 11/01/2023 108     Calcium 11/01/2023 8.9     AST 11/01/2023 14     ALT 11/01/2023 11     Alkaline Phosphatase 11/01/2023 80     Total Protein 11/01/2023 6.8     Albumin 11/01/2023 3.8     Total Bilirubin 11/01/2023 0.53     eGFR 11/01/2023 72     Magnesium 11/01/2023 2.1     Protime 11/01/2023 13.3     INR 11/01/2023 1.02     Sodium 11/02/2023 139     Potassium 11/02/2023 3.8     Chloride 11/02/2023 103     CO2 11/02/2023 26     ANION GAP 11/02/2023 10     BUN 11/02/2023 21     Creatinine 11/02/2023 0.98     Glucose 11/02/2023 96     Calcium 11/02/2023 9.5     eGFR 11/02/2023 77     Magnesium 11/02/2023 2.2     WBC 11/02/2023 9.23     RBC 11/02/2023 4.79     Hemoglobin 11/02/2023 14.9     Hematocrit 11/02/2023 45.3     MCV 11/02/2023 95     MCH 11/02/2023 31.1     MCHC 11/02/2023 32.9     RDW 11/02/2023 13.2     Platelets 11/02/2023 199     MPV 11/02/2023 9.5     Ventricular Rate 11/02/2023 60     Atrial Rate  11/02/2023 60     [place] Interval 11/02/2023 176     QRSD Interval 11/02/2023 98     QT Interval 11/02/2023 458     QTC Interval 11/02/2023 458     P Baker 11/02/2023 10     QRS Baker 11/02/2023 42     T Wave [place] 11/02/2023 59     WBC 11/03/2023 12.46 (H)     RBC 11/03/2023 4.36     Hemoglobin 11/03/2023 13.4     Hematocrit 11/03/2023 40.9     MCV 11/03/2023 94     MCH 11/03/2023 30.7     MCHC 11/03/2023 32.8     RDW 11/03/2023 13.2     Platelets 11/03/2023 203     MPV 11/03/2023 9.4     Sodium 11/03/2023 138     Potassium 11/03/2023 4.3     Chloride 11/03/2023 105     CO2 11/03/2023 24     ANION GAP 11/03/2023 9     BUN 11/03/2023 29 (H)     Creatinine 11/03/2023 1.04     Glucose 11/03/2023 124     Calcium 11/03/2023 9.4     eGFR 11/03/2023 72        Imaging: I have personally reviewed pertinent reports.

## 2023-12-29 ENCOUNTER — OFFICE VISIT (OUTPATIENT)
Dept: CARDIOLOGY CLINIC | Facility: CLINIC | Age: 68
End: 2023-12-29
Payer: MEDICARE

## 2023-12-29 VITALS
BODY MASS INDEX: 32.07 KG/M2 | WEIGHT: 224 LBS | HEART RATE: 55 BPM | HEIGHT: 70 IN | SYSTOLIC BLOOD PRESSURE: 118 MMHG | DIASTOLIC BLOOD PRESSURE: 72 MMHG

## 2023-12-29 DIAGNOSIS — I47.20 VENTRICULAR TACHYCARDIA (HCC): Primary | ICD-10-CM

## 2023-12-29 DIAGNOSIS — Z95.810 PRESENCE OF AUTOMATIC CARDIOVERTER/DEFIBRILLATOR (AICD): ICD-10-CM

## 2023-12-29 DIAGNOSIS — I49.3 PVCS (PREMATURE VENTRICULAR CONTRACTIONS): ICD-10-CM

## 2023-12-29 PROCEDURE — 99213 OFFICE O/P EST LOW 20 MIN: CPT | Performed by: PHYSICIAN ASSISTANT

## 2023-12-29 PROCEDURE — 93000 ELECTROCARDIOGRAM COMPLETE: CPT | Performed by: PHYSICIAN ASSISTANT

## 2024-01-02 ENCOUNTER — APPOINTMENT (OUTPATIENT)
Dept: LAB | Facility: HOSPITAL | Age: 69
End: 2024-01-02
Payer: MEDICARE

## 2024-01-02 DIAGNOSIS — L97.521 ULCER OF LEFT FOOT, LIMITED TO BREAKDOWN OF SKIN (HCC): ICD-10-CM

## 2024-01-02 DIAGNOSIS — I47.20 VENTRICULAR TACHYCARDIA (HCC): ICD-10-CM

## 2024-01-02 DIAGNOSIS — A41.9 SEPSIS, DUE TO UNSPECIFIED ORGANISM, UNSPECIFIED WHETHER ACUTE ORGAN DYSFUNCTION PRESENT (HCC): ICD-10-CM

## 2024-01-02 LAB
ALBUMIN SERPL BCP-MCNC: 4.5 G/DL (ref 3.5–5)
ALP SERPL-CCNC: 87 U/L (ref 34–104)
ALT SERPL W P-5'-P-CCNC: 15 U/L (ref 7–52)
ANION GAP SERPL CALCULATED.3IONS-SCNC: 7 MMOL/L
AST SERPL W P-5'-P-CCNC: 21 U/L (ref 13–39)
BILIRUB SERPL-MCNC: 0.45 MG/DL (ref 0.2–1)
BUN SERPL-MCNC: 25 MG/DL (ref 5–25)
CALCIUM SERPL-MCNC: 9.4 MG/DL (ref 8.4–10.2)
CHLORIDE SERPL-SCNC: 102 MMOL/L (ref 96–108)
CO2 SERPL-SCNC: 28 MMOL/L (ref 21–32)
CREAT SERPL-MCNC: 1.37 MG/DL (ref 0.6–1.3)
GFR SERPL CREATININE-BSD FRML MDRD: 51 ML/MIN/1.73SQ M
GLUCOSE P FAST SERPL-MCNC: 112 MG/DL (ref 65–99)
POTASSIUM SERPL-SCNC: 4.5 MMOL/L (ref 3.5–5.3)
PROT SERPL-MCNC: 7.7 G/DL (ref 6.4–8.4)
SODIUM SERPL-SCNC: 137 MMOL/L (ref 135–147)

## 2024-01-02 PROCEDURE — 80053 COMPREHEN METABOLIC PANEL: CPT

## 2024-01-02 PROCEDURE — 36415 COLL VENOUS BLD VENIPUNCTURE: CPT

## 2024-01-02 RX ORDER — AMIODARONE HYDROCHLORIDE 200 MG/1
100 TABLET ORAL DAILY
Start: 2024-01-02 | End: 2024-06-30

## 2024-01-02 NOTE — PROGRESS NOTES
Patients amiodarone dropped to 100 mg daily per my discussion with Dr. Talbot. Bloating not completely gone however improving. Planning to drop lasix to 60 mg daily tomorrow. He will call me and generally cardiologist Friday with volume status update.

## 2024-01-04 ENCOUNTER — HOSPITAL ENCOUNTER (OUTPATIENT)
Dept: RADIOLOGY | Facility: HOSPITAL | Age: 69
End: 2024-01-04
Payer: MEDICARE

## 2024-01-04 ENCOUNTER — HOSPITAL ENCOUNTER (OUTPATIENT)
Dept: NON INVASIVE DIAGNOSTICS | Facility: HOSPITAL | Age: 69
Discharge: HOME | End: 2024-01-04
Payer: MEDICARE

## 2024-01-04 ENCOUNTER — TELEPHONE (OUTPATIENT)
Dept: CARDIAC SURGERY | Facility: CLINIC | Age: 69
End: 2024-01-04

## 2024-01-04 ENCOUNTER — OFFICE VISIT (OUTPATIENT)
Dept: CARDIAC SURGERY | Facility: CLINIC | Age: 69
End: 2024-01-04
Payer: MEDICARE

## 2024-01-04 VITALS
DIASTOLIC BLOOD PRESSURE: 72 MMHG | WEIGHT: 224.87 LBS | SYSTOLIC BLOOD PRESSURE: 118 MMHG | HEIGHT: 70 IN | HEART RATE: 79 BPM | BODY MASS INDEX: 32.19 KG/M2

## 2024-01-04 VITALS
TEMPERATURE: 97.7 F | WEIGHT: 227 LBS | OXYGEN SATURATION: 97 % | BODY MASS INDEX: 32.5 KG/M2 | DIASTOLIC BLOOD PRESSURE: 83 MMHG | HEART RATE: 68 BPM | SYSTOLIC BLOOD PRESSURE: 140 MMHG | HEIGHT: 70 IN

## 2024-01-04 DIAGNOSIS — I47.20 VENTRICULAR TACHYCARDIA (HCC): ICD-10-CM

## 2024-01-04 DIAGNOSIS — Z95.2 S/P TAVR (TRANSCATHETER AORTIC VALVE REPLACEMENT): ICD-10-CM

## 2024-01-04 DIAGNOSIS — I35.0 SEVERE AORTIC STENOSIS: ICD-10-CM

## 2024-01-04 DIAGNOSIS — N18.31 CHRONIC KIDNEY DISEASE, STAGE 3A (HCC): ICD-10-CM

## 2024-01-04 DIAGNOSIS — I50.43 ACUTE ON CHRONIC COMBINED SYSTOLIC AND DIASTOLIC CONGESTIVE HEART FAILURE (HCC): ICD-10-CM

## 2024-01-04 DIAGNOSIS — Z48.89 ENCOUNTER FOR POSTOPERATIVE CARE: ICD-10-CM

## 2024-01-04 DIAGNOSIS — Z95.2 S/P TAVR (TRANSCATHETER AORTIC VALVE REPLACEMENT): Primary | ICD-10-CM

## 2024-01-04 PROBLEM — I50.9 CHF (CONGESTIVE HEART FAILURE) (HCC): Status: ACTIVE | Noted: 2024-01-04

## 2024-01-04 LAB
ATRIAL RATE: 56 BPM
P AXIS: 65 DEGREES
PR INTERVAL: 176 MS
QRS AXIS: 62 DEGREES
QRSD INTERVAL: 96 MS
QT INTERVAL: 446 MS
QTC INTERVAL: 430 MS
T WAVE AXIS: 65 DEGREES
VENTRICULAR RATE: 56 BPM

## 2024-01-04 PROCEDURE — 93005 ELECTROCARDIOGRAM TRACING: CPT | Performed by: THORACIC SURGERY (CARDIOTHORACIC VASCULAR SURGERY)

## 2024-01-04 PROCEDURE — 99214 OFFICE O/P EST MOD 30 MIN: CPT | Performed by: THORACIC SURGERY (CARDIOTHORACIC VASCULAR SURGERY)

## 2024-01-04 PROCEDURE — 71046 X-RAY EXAM CHEST 2 VIEWS: CPT

## 2024-01-04 PROCEDURE — 93306 TTE W/DOPPLER COMPLETE: CPT | Performed by: INTERNAL MEDICINE

## 2024-01-04 PROCEDURE — 93306 TTE W/DOPPLER COMPLETE: CPT

## 2024-01-04 PROCEDURE — 93010 ELECTROCARDIOGRAM REPORT: CPT | Performed by: INTERNAL MEDICINE

## 2024-01-04 RX ORDER — METOLAZONE 5 MG/1
5 TABLET ORAL DAILY
Qty: 3 TABLET | Refills: 0 | Status: SHIPPED | OUTPATIENT
Start: 2024-01-04

## 2024-01-04 RX ORDER — GABAPENTIN 100 MG/1
100 CAPSULE ORAL DAILY
COMMUNITY
Start: 2023-12-19

## 2024-01-04 RX ORDER — LEVOTHYROXINE SODIUM 0.1 MG/1
100 TABLET ORAL DAILY
COMMUNITY
Start: 2023-12-13

## 2024-01-04 RX ORDER — AMLODIPINE BESYLATE 10 MG/1
10 TABLET ORAL DAILY
COMMUNITY
Start: 2023-12-19

## 2024-01-04 NOTE — PROGRESS NOTES
POST OP FOLLOW UP VISIT S/P TAVR    Procedure:   12/5/23    Transcatheter aortic valve replacement with a 26 mm Chen ADRIANNA Resilia bioprosthetic valve via left transfemoral approach.     History: Mikael Saavedra is a 68 y.o. year old male who presents to our office today for routine follow up care from transfemoral transcatheter aortic valve replacement.  Patient tolerated procedure well. His post op stay was 2 overnights due to hypertension and medication adjustments. Post op echo stable and ECG with NSR, no IVCD.   Patient discharged to home on POD #2.  Patient reports today he was doing well, feeling good for approximately 2 weeks after his procedure but developed a persistent, dry cough, weight gain, abdominal distention and SOB. Patient contacted Cardiology on 12/26/23 at which time patient instructed to stop Lisinopril, start Losartan and increase Furosemide to 80 mg (from 40 mg ) x 2 days. Patient was then seen in Cardiology EP office on 12/29/23. At that visit patient still symptomatic therefore he was instructed to continue Furosemide 80 mg x 2 days then 60 mg daily thereafter.  Today patient reports modest improvement in his SOB and abdominal bloating. He never develops peripheral edema. His cough is slightly improved but still persistent and dry. His SOB and cough is not any worse when he is supine and he denies orthopnea or PND. He denies fever, chills or cold/flu symptoms. He denies chest pian, lightheadedness, palpitations or ICD shocks.    Review of System:     History obtained from chart review and the patient  General ROS: negative  Psychological ROS: negative  Ophthalmic ROS: negative  ENT ROS: negative  Allergy and Immunology ROS: negative  Hematological and Lymphatic ROS: negative  Endocrine ROS: negative  Breast ROS: negative  Respiratory ROS: positive for - cough and shortness of breath  negative for - hemoptysis, orthopnea, pleuritic pain, sputum changes, stridor, tachypnea, or  "wheezing  Cardiovascular ROS: positive for - dyspnea on exertion  negative for - chest pain, edema, irregular heartbeat, loss of consciousness, murmur, orthopnea, palpitations, paroxysmal nocturnal dyspnea, or rapid heart rate  Gastrointestinal ROS: positive for bloating; no abdominal pain, change in bowel habits, or black or bloody stools  Genito-Urinary ROS: no dysuria, trouble voiding, or hematuria  Musculoskeletal ROS: negative  Neurological ROS: no TIA or stroke symptoms  Dermatological ROS: negative    Vital Signs:     Vitals:    01/04/24 0948 01/04/24 0958   BP: 133/82 140/83   BP Location: Left arm Left arm   Patient Position: Sitting Sitting   Cuff Size: Standard Standard   Pulse: 68    Temp: 97.7 °F (36.5 °C)    TempSrc: Tympanic    SpO2: 97%    Weight: 103 kg (227 lb)    Height: 5' 10\" (1.778 m)        Allergies:    Allergies   Allergen Reactions    Statins Myalgia     Other reaction(s): muscle aches and joint aches         Home Medications:     Prior to Admission medications    Medication Sig Start Date End Date Taking? Authorizing Provider   Alirocumab 150 MG/ML SOAJ Inject 150 mg under the skin every 14 (fourteen) days 10/17/23   JESUS Lozano   amiodarone 200 mg tablet Take 0.5 tablets (100 mg total) by mouth daily 1/2/24 6/30/24  Dg Roper PA-C   aspirin 81 mg chewable tablet Chew 1 tablet (81 mg total) daily 10/17/23   Carmelita Ye DO   buPROPion (WELLBUTRIN XL) 300 mg 24 hr tablet  5/25/19   Historical Provider, MD   clopidogrel (PLAVIX) 75 mg tablet TAKE ONE TABLET BY MOUTH EVERY DAY 12/15/23   Neyda Ramirez MD   escitalopram (LEXAPRO) 20 mg tablet Take 20 mg by mouth daily  Patient not taking: Reported on 12/29/2023 4/5/19   Historical Provider, MD   ezetimibe (ZETIA) 10 mg tablet Take 1 tablet (10 mg total) by mouth daily  Patient not taking: Reported on 12/29/2023 10/2/23   Neyda Ramirez MD   furosemide (LASIX) 40 mg tablet Take 1 tablet (40 mg total) by mouth daily 11/4/23   " Hamida Steven DO   levothyroxine 75 mcg tablet  5/17/19   Historical Provider, MD   losartan (COZAAR) 25 mg tablet Take 1 tablet (25 mg total) by mouth daily 12/26/23   JESUS Lozano   Magnesium 400 MG CAPS Take 1 capsule (400 mg total) by mouth in the morning 5/5/23   JESUS Lozano   metoprolol succinate (TOPROL-XL) 25 mg 24 hr tablet Take 0.5 tablets (12.5 mg total) by mouth daily 6/13/23   JESUS Lozano   Omega-3 Fatty Acids (Fish Oil) 1200 MG CAPS Take by mouth    Historical Provider, MD   pantoprazole (PROTONIX) 40 mg tablet Take 1 tablet (40 mg total) by mouth 2 (two) times a day before meals  Patient not taking: Reported on 12/29/2023 11/9/23   Braden Baldwin DO   polyethylene glycol (MIRALAX) 17 g packet Take 17 g by mouth daily as needed (constipation)  Patient not taking: Reported on 12/29/2023 12/7/23   Javier Acosta PA-C   potassium chloride (MICRO-K) 10 MEQ CR capsule Take 1 capsule (10 mEq total) by mouth daily 3/8/23   JESUS Lozano   pravastatin (PRAVACHOL) 20 mg tablet Take 1 tablet (20 mg total) by mouth every other day  Patient not taking: Reported on 12/29/2023 10/2/23   Neyda Ramirez MD       Physical Exam:    General: Alert, oriented, well developed, no acute distress  HEENT/NECK:  PERRLA.  No jugular venous distention.    Cardiac:Regular rate and rhythm, no murmurs rubs or gallops.  Pulmonary:Breath sounds clear bilaterally  Abdomen:  Non-tender, Non-distended.  Positive bowel sounds.  Upper extremities: 2+ radial pulses; brisk capillary refill  Lower extremities: Extremities warm/dry.  Left femoral pulse 2+, no hematoma. PT/DP pulses 2+ bilaterally.  No edema B/L  Incisions: Inguinal puncture site is clean, dry, and intact.  Musculoskeletal: MAEE, stable gait  Neuro: Alert and oriented X 3.  Sensation is grossly intact.  No focal deficits  Skin: Warm/Dry, without rashes or lesions.    Lab Results:     Lab Results   Component Value Date    WBC 7.67 12/26/2023     HGB 13.8 12/26/2023    HCT 44.4 12/26/2023    MCV 94 12/26/2023     12/26/2023     Lab Results   Component Value Date    SODIUM 137 01/02/2024    K 4.5 01/02/2024     01/02/2024    CO2 28 01/02/2024    AGAP 7 01/02/2024    BUN 25 01/02/2024    CREATININE 1.37 (H) 01/02/2024    GLUC 131 12/06/2023    GLUF 112 (H) 01/02/2024    CALCIUM 9.4 01/02/2024    AST 21 01/02/2024    ALT 15 01/02/2024    ALKPHOS 87 01/02/2024    TP 7.7 01/02/2024    TBILI 0.45 01/02/2024    EGFR 51 01/02/2024       Imaging Studies:     Transthoracic Echocardiogram: TODAY    Preliminary data:      Left Ventricle Measurements    Function/Volumes   A4C EF 51 %         LVOT stroke volume 96.39         LVOT stroke volume index 44.3 ml/m2         LVOT Cardiac Output 5.13 l/min         LVOT Cardiac Index 2.34 l/min/m2         Dimensions   LVIDd 4.7 cm         LVIDS 3.3 cm         IVSd 1.2 cm         LVPWd 1.2 cm         LVOT area 3.8 cm2         FS 30         Diastolic Filling   MV E' Tissue Velocity Septal 8 cm/s         LA Volume Index (BP) 34.7 mL/m2         E/A ratio 0.89         E wave deceleration time 214 ms         MV Peak E Roosevelt 90 cm/s         MV Peak A Roosevelt 1.01 m/s          Report Measurements   AV LVOT peak gradient 4 mmHg              Interventricular Septum Measurements    Shunt Ratio   LVOT peak VTI 25.37 cm         LVOT peak roosevelt 1.03 m/s              Right Ventricle Measurements    Dimensions   RVID d 4.6 cm         Tricuspid annular plane systolic excursion 2.6 cm               Left Atrium Measurements    Dimensions   LA size 4.6 cm         LA length (A2C) 6 cm         Volumes   LA volume (BP) 76 mL         LA Volume Index (BP) 34.7 mL/m2               Right Atrium Measurements    Dimensions   RA 2D Volume 63 mL         RAA A4C 20.1 cm2               Atrial Septum Measurements    Shunt Ratio   LVOT peak VTI 25.37 cm         LVOT peak roosevelt 1.03 m/s               Aortic Valve Measurements    Stenosis   Aortic valve peak  velocity 1.69 m/s         LVOT peak roosevelt 1.03 m/s         Ao VTI 43.92 cm         LVOT peak VTI 25.37 cm         AV mean gradient 7 mmHg         LVOT mn grad 2 mmHg         AV peak gradient 11 mmHg         AV LVOT peak gradient 4 mmHg         Area/Dimensions   DVI 0.61         AV valve area 2.19 cm2         AV area by cont VTI 2.2 cm2         AV area peak roosevelt 2.3 cm2         LVOT diameter 2.2 cm         LVOT area 3.8 cm2               Mitral Valve Measurements    Stenosis   MV stenosis pressure 1/2 time 62 ms         MV valve area p 1/2 method 3.55               Tricuspid Valve Measurements    RVSP Parameters   TR Peak Roosevelt 2.8 m/s         Triscuspid Valve Regurgitation Peak Gradient 32 mmHg               Aorta Measurements    Aortic Dimensions   Ao root 3.5 cm               EKG:     pending    I have personally reviewed pertinent films in PACS    Cardiology notes reviewed    TAVR evaluation Assessment:     [facility]: II    Assessment:   Aortic stenosis, Non-Rheumatic.   S/P transfemoral transcatheter aortic valve replacement    Mikael Saavedra is making good progress in his post-op recovery., but still with volume overload and SOB  NYHA functional class II.   Left groin incision is well healed.   Weight is up 9 lb from pre-op weight; VS are stable.   Recent echocardiogram demonstrates normally functioning TAVR bioprosthesis . ECG pending; BMP & CBC stable .       Plan:   ~Test results reviewed with patient.    ~Medications reviewed with patient.    1) Metolazone 5 mg daily x 3 days ( 30 min before Furosemide)- e-script sent to patient's pharmacy.   2) Plavix for DEON to LAD (10/17/23) per Cardiology  3) Aspirin 81 mg daily is lifelong for antiplatelet therapy.     ~PA/LAT CXR today- will call patient with results.    ~Will reach out to Cardiology to follow up fluid status and t/c switch from Furosemide to Torsemide; next scheduled Cardiology appt 1/23/24    ~Benefits of participating in cardiac rehabilitation discussed with  patient and they are cleared to proceed with the program.    ~May resume driving and all normal activities.    Mikael Saavedra will return for one year follow-up in our office with repeat echocardiogram, ECG, CBC & BMP.  Our office will contact patient to schedule appointment.  He has been advised to maintain routine follow up with their cardiologist and PCP for ongoing medical care.     Antibiotic prophylaxis for dental procedures, as per guidelines set by [employer], reviewed with patient.     Patient was comfortable with our recommendations and his questions were answered to his satisfaction.    The patient recently had a screening colonoscopy in 2023.  Therefore GI referral is not indicated at this time.     JESUS Evans  1/4/24  10:00 AM

## 2024-01-04 NOTE — TELEPHONE ENCOUNTER
Called patient to report CXR results. Patient napping, spoke ti wife- CXR WNL- no PTX, CHF or pleural effusions.

## 2024-01-04 NOTE — LETTER
January 4, 2024     Raghav Mckeon MD  [address] [place] [id number]    Patient: Mikael Saavedra   YOB: 1955   Date of Visit: 1/4/2024       Dear Dr. Mckeon:    Thank you for referring Mikael Saavedra to me for evaluation. Below are my notes for this consultation.    If you have questions, please do not hesitate to call me. I look forward to following your patient along with you.         Sincerely,        Gerald Gooden MD        CC: JESUS Lozano CRNP  1/4/2024 11:49 AM  Attested   POST OP FOLLOW UP VISIT S/P TAVR    Procedure:   12/5/23    Transcatheter aortic valve replacement with a 26 mm Chen ADRIANNA Resilia bioprosthetic valve via left transfemoral approach.     History: Mikael Saavedra is a 68 y.o. year old male who presents to our office today for routine follow up care from transfemoral transcatheter aortic valve replacement.  Patient tolerated procedure well. His post op stay was 2 overnights due to hypertension and medication adjustments. Post op echo stable and ECG with NSR, no IVCD.   Patient discharged to home on POD #2.  Patient reports today he was doing well, feeling good for approximately 2 weeks after his procedure but developed a persistent, dry cough, weight gain, abdominal distention and SOB. Patient contacted Cardiology on 12/26/23 at which time patient instructed to stop Lisinopril, start Losartan and increase Furosemide to 80 mg (from 40 mg ) x 2 days. Patient was then seen in Cardiology EP office on 12/29/23. At that visit patient still symptomatic therefore he was instructed to continue Furosemide 80 mg x 2 days then 60 mg daily thereafter.  Today patient reports modest improvement in his SOB and abdominal bloating. He never develops peripheral edema. His cough is slightly improved but still persistent and dry. His SOB and cough is not any worse when he is supine and he denies orthopnea or PND. He denies fever, chills or cold/flu  "symptoms. He denies chest pian, lightheadedness, palpitations or ICD shocks.    Review of System:     History obtained from chart review and the patient  General ROS: negative  Psychological ROS: negative  Ophthalmic ROS: negative  ENT ROS: negative  Allergy and Immunology ROS: negative  Hematological and Lymphatic ROS: negative  Endocrine ROS: negative  Breast ROS: negative  Respiratory ROS: positive for - cough and shortness of breath  negative for - hemoptysis, orthopnea, pleuritic pain, sputum changes, stridor, tachypnea, or wheezing  Cardiovascular ROS: positive for - dyspnea on exertion  negative for - chest pain, edema, irregular heartbeat, loss of consciousness, murmur, orthopnea, palpitations, paroxysmal nocturnal dyspnea, or rapid heart rate  Gastrointestinal ROS: positive for bloating; no abdominal pain, change in bowel habits, or black or bloody stools  Genito-Urinary ROS: no dysuria, trouble voiding, or hematuria  Musculoskeletal ROS: negative  Neurological ROS: no TIA or stroke symptoms  Dermatological ROS: negative    Vital Signs:     Vitals:    01/04/24 0948 01/04/24 0958   BP: 133/82 140/83   BP Location: Left arm Left arm   Patient Position: Sitting Sitting   Cuff Size: Standard Standard   Pulse: 68    Temp: 97.7 °F (36.5 °C)    TempSrc: Tympanic    SpO2: 97%    Weight: 103 kg (227 lb)    Height: 5' 10\" (1.778 m)        Allergies:    Allergies   Allergen Reactions   • Statins Myalgia     Other reaction(s): muscle aches and joint aches         Home Medications:     Prior to Admission medications    Medication Sig Start Date End Date Taking? Authorizing Provider   Alirocumab 150 MG/ML SOAJ Inject 150 mg under the skin every 14 (fourteen) days 10/17/23   JESUS Lozano   amiodarone 200 mg tablet Take 0.5 tablets (100 mg total) by mouth daily 1/2/24 6/30/24  Dg Roper PA-C   aspirin 81 mg chewable tablet Chew 1 tablet (81 mg total) daily 10/17/23   Carmelita Ye,    buPROPion (WELLBUTRIN " XL) 300 mg 24 hr tablet  5/25/19   Historical Provider, MD   clopidogrel (PLAVIX) 75 mg tablet TAKE ONE TABLET BY MOUTH EVERY DAY 12/15/23   Neyda Ramirez MD   escitalopram (LEXAPRO) 20 mg tablet Take 20 mg by mouth daily  Patient not taking: Reported on 12/29/2023 4/5/19   Historical Provider, MD   ezetimibe (ZETIA) 10 mg tablet Take 1 tablet (10 mg total) by mouth daily  Patient not taking: Reported on 12/29/2023 10/2/23   Neyda Ramirez MD   furosemide (LASIX) 40 mg tablet Take 1 tablet (40 mg total) by mouth daily 11/4/23   Hamida Steven DO   levothyroxine 75 mcg tablet  5/17/19   Historical Provider, MD   losartan (COZAAR) 25 mg tablet Take 1 tablet (25 mg total) by mouth daily 12/26/23   JESUS Lozano   Magnesium 400 MG CAPS Take 1 capsule (400 mg total) by mouth in the morning 5/5/23   JESUS Lozano   metoprolol succinate (TOPROL-XL) 25 mg 24 hr tablet Take 0.5 tablets (12.5 mg total) by mouth daily 6/13/23   JESUS Lozano   Omega-3 Fatty Acids (Fish Oil) 1200 MG CAPS Take by mouth    Historical Provider, MD   pantoprazole (PROTONIX) 40 mg tablet Take 1 tablet (40 mg total) by mouth 2 (two) times a day before meals  Patient not taking: Reported on 12/29/2023 11/9/23   Braden Baldwin, DO   polyethylene glycol (MIRALAX) 17 g packet Take 17 g by mouth daily as needed (constipation)  Patient not taking: Reported on 12/29/2023 12/7/23   Javier Acosta PA-C   potassium chloride (MICRO-K) 10 MEQ CR capsule Take 1 capsule (10 mEq total) by mouth daily 3/8/23   JESUS Lozano   pravastatin (PRAVACHOL) 20 mg tablet Take 1 tablet (20 mg total) by mouth every other day  Patient not taking: Reported on 12/29/2023 10/2/23   Neyda Ramirez MD       Physical Exam:    General: Alert, oriented, well developed, no acute distress  HEENT/NECK:  PERRLA.  No jugular venous distention.    Cardiac:Regular rate and rhythm, no murmurs rubs or gallops.  Pulmonary:Breath sounds clear bilaterally  Abdomen:   Non-tender, Non-distended.  Positive bowel sounds.  Upper extremities: 2+ radial pulses; brisk capillary refill  Lower extremities: Extremities warm/dry.  Left femoral pulse 2+, no hematoma. PT/DP pulses 2+ bilaterally.  No edema B/L  Incisions: Inguinal puncture site is clean, dry, and intact.  Musculoskeletal: MAEE, stable gait  Neuro: Alert and oriented X 3.  Sensation is grossly intact.  No focal deficits  Skin: Warm/Dry, without rashes or lesions.    Lab Results:     Lab Results   Component Value Date    WBC 7.67 12/26/2023    HGB 13.8 12/26/2023    HCT 44.4 12/26/2023    MCV 94 12/26/2023     12/26/2023     Lab Results   Component Value Date    SODIUM 137 01/02/2024    K 4.5 01/02/2024     01/02/2024    CO2 28 01/02/2024    AGAP 7 01/02/2024    BUN 25 01/02/2024    CREATININE 1.37 (H) 01/02/2024    GLUC 131 12/06/2023    GLUF 112 (H) 01/02/2024    CALCIUM 9.4 01/02/2024    AST 21 01/02/2024    ALT 15 01/02/2024    ALKPHOS 87 01/02/2024    TP 7.7 01/02/2024    TBILI 0.45 01/02/2024    EGFR 51 01/02/2024       Imaging Studies:     Transthoracic Echocardiogram: TODAY    Preliminary data:      Left Ventricle Measurements    Function/Volumes   A4C EF 51 %         LVOT stroke volume 96.39         LVOT stroke volume index 44.3 ml/m2         LVOT Cardiac Output 5.13 l/min         LVOT Cardiac Index 2.34 l/min/m2         Dimensions   LVIDd 4.7 cm         LVIDS 3.3 cm         IVSd 1.2 cm         LVPWd 1.2 cm         LVOT area 3.8 cm2         FS 30         Diastolic Filling   MV E' Tissue Velocity Septal 8 cm/s         LA Volume Index (BP) 34.7 mL/m2         E/A ratio 0.89         E wave deceleration time 214 ms         MV Peak E Roosevelt 90 cm/s         MV Peak A Roosevelt 1.01 m/s          Report Measurements   AV LVOT peak gradient 4 mmHg              Interventricular Septum Measurements    Shunt Ratio   LVOT peak VTI 25.37 cm         LVOT peak roosevelt 1.03 m/s              Right Ventricle Measurements    Dimensions    RVID d 4.6 cm         Tricuspid annular plane systolic excursion 2.6 cm               Left Atrium Measurements    Dimensions   LA size 4.6 cm         LA length (A2C) 6 cm         Volumes   LA volume (BP) 76 mL         LA Volume Index (BP) 34.7 mL/m2               Right Atrium Measurements    Dimensions   RA 2D Volume 63 mL         RAA A4C 20.1 cm2               Atrial Septum Measurements    Shunt Ratio   LVOT peak VTI 25.37 cm         LVOT peak roosevelt 1.03 m/s               Aortic Valve Measurements    Stenosis   Aortic valve peak velocity 1.69 m/s         LVOT peak roosevelt 1.03 m/s         Ao VTI 43.92 cm         LVOT peak VTI 25.37 cm         AV mean gradient 7 mmHg         LVOT mn grad 2 mmHg         AV peak gradient 11 mmHg         AV LVOT peak gradient 4 mmHg         Area/Dimensions   DVI 0.61         AV valve area 2.19 cm2         AV area by cont VTI 2.2 cm2         AV area peak roosevelt 2.3 cm2         LVOT diameter 2.2 cm         LVOT area 3.8 cm2               Mitral Valve Measurements    Stenosis   MV stenosis pressure 1/2 time 62 ms         MV valve area p 1/2 method 3.55               Tricuspid Valve Measurements    RVSP Parameters   TR Peak Roosevelt 2.8 m/s         Triscuspid Valve Regurgitation Peak Gradient 32 mmHg               Aorta Measurements    Aortic Dimensions   Ao root 3.5 cm               EKG:     pending    I have personally reviewed pertinent films in PACS    Cardiology notes reviewed    TAVR evaluation Assessment:     [facility]: II    Assessment:   Aortic stenosis, Non-Rheumatic.   S/P transfemoral transcatheter aortic valve replacement    Mikael Saavedra is making good progress in his post-op recovery., but still with volume overload and SOB  NYHA functional class II.   Left groin incision is well healed.   Weight is up 9 lb from pre-op weight; VS are stable.   Recent echocardiogram demonstrates normally functioning TAVR bioprosthesis . ECG pending; BMP & CBC stable .       Plan:   ~Test results reviewed  with patient.    ~Medications reviewed with patient.    1) Metolazone 5 mg daily x 3 days ( 30 min before Furosemide)- e-script sent to patient's pharmacy.   2) Plavix for DEON to LAD (10/17/23) per Cardiology  3) Aspirin 81 mg daily is lifelong for antiplatelet therapy.     ~PA/LAT CXR today- will call patient with results.    ~Will reach out to Cardiology to follow up fluid status and t/c switch from Furosemide to Torsemide; next scheduled Cardiology appt 1/23/24    ~Benefits of participating in cardiac rehabilitation discussed with patient and they are cleared to proceed with the program.    ~May resume driving and all normal activities.    Mikael Saavedra will return for one year follow-up in our office with repeat echocardiogram, ECG, CBC & BMP.  Our office will contact patient to schedule appointment.  He has been advised to maintain routine follow up with their cardiologist and PCP for ongoing medical care.     Antibiotic prophylaxis for dental procedures, as per guidelines set by [employer], reviewed with patient.     Patient was comfortable with our recommendations and his questions were answered to his satisfaction.    The patient recently had a screening colonoscopy in 2023.  Therefore GI referral is not indicated at this time.     JESUS Evans  1/4/24  10:00 AM  Attestation signed by Gerald Gooden MD at 1/4/2024 12:39 PM:  Patient seen and evaluated with JESUS / AMANDA.  I agree with the above assessment and plan with the following additions.    The patient is a 68-year-old man returns today for open 1 month postop visit after TF TAVR.  He complains of dry cough without fevers, he was recently started on an ACE inhibitor.  He has also had some weight gain.   TTE performed today shows prosthetic valve with normal function without significant PVL.  EF is 50%.  Plan:  We will add metolazone for 3 days and have him follow-up with cardiology next week.  Follow-up with PCP  Cardiac  rehab  Follow-up in the valve center in 1 year  Chest x-ray    This note was completed in part utilizing [employer] direct voice recognition software.   Grammatical errors, random word insertion, spelling mistakes, and incomplete sentences may be an occasional consequence of the system secondary to software limitations, ambient noise and hardware issues. At the time of dictation, efforts were made to edit, clarify and /or correct errors.  Please read the chart carefully and recognize, using context, where substitutions have occurred.  If you have any questions or concerns about the context, text or information contained within the body of this dictation, please contact myself, the provider, for further clarification.

## 2024-01-05 LAB
AORTIC ROOT: 3.5 CM
AORTIC VALVE MEAN VELOCITY: 12.5 M/S
APICAL FOUR CHAMBER EJECTION FRACTION: 51 %
AV AREA BY CONTINUOUS VTI: 2.2 CM2
AV AREA PEAK VELOCITY: 2.3 CM2
AV LVOT MEAN GRADIENT: 2 MMHG
AV LVOT PEAK GRADIENT: 4 MMHG
AV MEAN GRADIENT: 7 MMHG
AV PEAK GRADIENT: 11 MMHG
AV VALVE AREA: 2.19 CM2
AV VELOCITY RATIO: 0.61
DOP CALC AO PEAK VEL: 1.69 M/S
DOP CALC AO VTI: 43.92 CM
DOP CALC LVOT AREA: 3.8 CM2
DOP CALC LVOT CARDIAC INDEX: 2.34 L/MIN/M2
DOP CALC LVOT CARDIAC OUTPUT: 5.13 L/MIN
DOP CALC LVOT DIAMETER: 2.2 CM
DOP CALC LVOT PEAK VEL VTI: 25.37 CM
DOP CALC LVOT PEAK VEL: 1.03 M/S
DOP CALC LVOT STROKE INDEX: 44.3 ML/M2
DOP CALC LVOT STROKE VOLUME: 96.39
E WAVE DECELERATION TIME: 214 MS
E/A RATIO: 0.89
FRACTIONAL SHORTENING: 30 (ref 28–44)
INTERVENTRICULAR SEPTUM IN DIASTOLE (PARASTERNAL SHORT AXIS VIEW): 1.2 CM
INTERVENTRICULAR SEPTUM: 1.2 CM (ref 0.6–1.1)
LAAS-AP2: 23.1 CM2
LAAS-AP4: 23.3 CM2
LEFT ATRIUM SIZE: 4.6 CM
LEFT ATRIUM VOLUME (MOD BIPLANE): 76 ML
LEFT ATRIUM VOLUME INDEX (MOD BIPLANE): 34.7 ML/M2
LEFT INTERNAL DIMENSION IN SYSTOLE: 3.3 CM (ref 2.1–4)
LEFT VENTRICULAR INTERNAL DIMENSION IN DIASTOLE: 4.7 CM (ref 3.5–6)
LEFT VENTRICULAR POSTERIOR WALL IN END DIASTOLE: 1.2 CM
LEFT VENTRICULAR STROKE VOLUME: 57 ML
LVSV (TEICH): 57 ML
MV E'TISSUE VEL-SEP: 8 CM/S
MV PEAK A VEL: 1.01 M/S
MV PEAK E VEL: 90 CM/S
MV STENOSIS PRESSURE HALF TIME: 62 MS
MV VALVE AREA P 1/2 METHOD: 3.55
RA PRESSURE ESTIMATED: 5 MMHG
RIGHT ATRIAL 2D VOLUME: 63 ML
RIGHT ATRIUM AREA SYSTOLE A4C: 20.1 CM2
RIGHT VENTRICLE ID DIMENSION: 4.6 CM
RV PSP: 37 MMHG
SL CV LEFT ATRIUM LENGTH A2C: 6 CM
SL CV LV EF: 60
SL CV PED ECHO LEFT VENTRICLE DIASTOLIC VOLUME (MOD BIPLANE) 2D: 100 ML
SL CV PED ECHO LEFT VENTRICLE SYSTOLIC VOLUME (MOD BIPLANE) 2D: 43 ML
TR MAX PG: 32 MMHG
TR PEAK VELOCITY: 2.8 M/S
TRICUSPID ANNULAR PLANE SYSTOLIC EXCURSION: 2.6 CM
TRICUSPID VALVE PEAK REGURGITATION VELOCITY: 2.81 M/S

## 2024-01-08 ENCOUNTER — TELEPHONE (OUTPATIENT)
Dept: CARDIOLOGY CLINIC | Facility: CLINIC | Age: 69
End: 2024-01-08

## 2024-01-08 DIAGNOSIS — R06.02 SHORTNESS OF BREATH: Primary | ICD-10-CM

## 2024-01-08 NOTE — TELEPHONE ENCOUNTER
Patients spouse called and stated that patient is regressing in his swelling and SOB.    Patient is swelling in his belly and face again and getting SOB more often.    He is having to take a nap after walking the dog and doing snow removal.    Spouse stated patient was taking a medication for 3 days that was helping but doesn't remember the name of the medication she threw away the bottle.    Spouse can be reached at

## 2024-01-08 NOTE — TELEPHONE ENCOUNTER
Spoke with Pt the name of the medication is metolazone (ZAROXOLYN) 5 mg tablet. Pt is not urinating as much. Today he only went 2 times so far. having increased SOB increased swelling. They do not know his weight due to Pt refuses to weigh himself.

## 2024-01-09 ENCOUNTER — APPOINTMENT (OUTPATIENT)
Dept: LAB | Facility: HOSPITAL | Age: 69
End: 2024-01-09
Payer: MEDICARE

## 2024-01-09 DIAGNOSIS — R06.02 SHORTNESS OF BREATH: ICD-10-CM

## 2024-01-09 LAB
ANION GAP SERPL CALCULATED.3IONS-SCNC: 9 MMOL/L
BNP SERPL-MCNC: 13 PG/ML (ref 0–100)
BUN SERPL-MCNC: 51 MG/DL (ref 5–25)
CALCIUM SERPL-MCNC: 10.1 MG/DL (ref 8.4–10.2)
CHLORIDE SERPL-SCNC: 94 MMOL/L (ref 96–108)
CO2 SERPL-SCNC: 32 MMOL/L (ref 21–32)
CREAT SERPL-MCNC: 2.07 MG/DL (ref 0.6–1.3)
GFR SERPL CREATININE-BSD FRML MDRD: 31 ML/MIN/1.73SQ M
GLUCOSE P FAST SERPL-MCNC: 112 MG/DL (ref 65–99)
POTASSIUM SERPL-SCNC: 3.4 MMOL/L (ref 3.5–5.3)
SODIUM SERPL-SCNC: 135 MMOL/L (ref 135–147)

## 2024-01-09 PROCEDURE — 80048 BASIC METABOLIC PNL TOTAL CA: CPT

## 2024-01-09 PROCEDURE — 83880 ASSAY OF NATRIURETIC PEPTIDE: CPT

## 2024-01-09 PROCEDURE — 36415 COLL VENOUS BLD VENIPUNCTURE: CPT

## 2024-01-09 RX ORDER — TORSEMIDE 20 MG/1
40 TABLET ORAL DAILY
Qty: 60 TABLET | Refills: 11 | Status: SHIPPED | OUTPATIENT
Start: 2024-01-09

## 2024-01-09 NOTE — TELEPHONE ENCOUNTER
I spoke with Eva this morning. Mikael apparently never started taking 80 mg of furosemide, he was only taking 60 mg of furosemide.  We will switch from furosemide to torsemide 40 mg daily.  I advised that he MUST weigh himself daily so we can get an objective measurement of his fluid loss. I ordered a BMP and BNP for him to complete any day this week. Based on results I will adjust his medication further.

## 2024-01-10 ENCOUNTER — TELEPHONE (OUTPATIENT)
Dept: CARDIOLOGY CLINIC | Facility: CLINIC | Age: 69
End: 2024-01-10

## 2024-01-10 DIAGNOSIS — R06.02 SHORTNESS OF BREATH: Primary | ICD-10-CM

## 2024-01-10 DIAGNOSIS — I47.20 VENTRICULAR TACHYCARDIA (HCC): ICD-10-CM

## 2024-01-10 NOTE — TELEPHONE ENCOUNTER
I attempted to reach Margarito and left a voicemail to call back. His blood work today shows acute injury to his kidneys. His Bnp (test for fluid stretch in the heart) is totally normal. He needs to stop the torsemide completely for the next 2 days then cut down to 1/2 of a 20 mg pill which would be 10 mg of torsemide daily. I will want to repeat blood work within 1 week to monitor his kidney function closely.  I suspect something else is making him short of breath, which we can discuss at his visit.  I asked them to call back when they get this message. Please try to reach them again tomorrow.

## 2024-01-11 RX ORDER — AMIODARONE HYDROCHLORIDE 200 MG/1
100 TABLET ORAL DAILY
Qty: 45 TABLET | Refills: 0 | Status: SHIPPED | OUTPATIENT
Start: 2024-01-11 | End: 2024-04-10

## 2024-01-16 ENCOUNTER — TELEPHONE (OUTPATIENT)
Dept: CARDIOLOGY CLINIC | Facility: CLINIC | Age: 69
End: 2024-01-16

## 2024-01-16 ENCOUNTER — APPOINTMENT (OUTPATIENT)
Dept: LAB | Facility: HOSPITAL | Age: 69
End: 2024-01-16
Payer: MEDICARE

## 2024-01-16 DIAGNOSIS — R06.02 SHORTNESS OF BREATH: ICD-10-CM

## 2024-01-16 LAB
ANION GAP SERPL CALCULATED.3IONS-SCNC: 8 MMOL/L
BUN SERPL-MCNC: 24 MG/DL (ref 5–25)
CALCIUM SERPL-MCNC: 9.2 MG/DL (ref 8.4–10.2)
CHLORIDE SERPL-SCNC: 105 MMOL/L (ref 96–108)
CO2 SERPL-SCNC: 26 MMOL/L (ref 21–32)
CREAT SERPL-MCNC: 1.44 MG/DL (ref 0.6–1.3)
GFR SERPL CREATININE-BSD FRML MDRD: 48 ML/MIN/1.73SQ M
GLUCOSE P FAST SERPL-MCNC: 112 MG/DL (ref 65–99)
POTASSIUM SERPL-SCNC: 4 MMOL/L (ref 3.5–5.3)
SODIUM SERPL-SCNC: 139 MMOL/L (ref 135–147)

## 2024-01-16 PROCEDURE — 80048 BASIC METABOLIC PNL TOTAL CA: CPT

## 2024-01-16 PROCEDURE — 36415 COLL VENOUS BLD VENIPUNCTURE: CPT

## 2024-01-16 NOTE — TELEPHONE ENCOUNTER
----- Message from JESUS Lozano sent at 1/16/2024  3:49 PM EST -----  Please let Mikael and his wife know that his kidney function is better on this repeat blood work, not back to normal yet, but better. OK to stay on the torsemide 10 mg until I see him unless his weight increases by 5 pounds. If weight gain of 3 pounds in 1 day or 5 pounds in 1 week call our office.

## 2024-01-16 NOTE — TELEPHONE ENCOUNTER
[facility] LM stating they need a refill for this pt to have Rehab,  Please fax script to [phone]   [phone]

## 2024-01-23 ENCOUNTER — APPOINTMENT (OUTPATIENT)
Dept: LAB | Facility: HOSPITAL | Age: 69
End: 2024-01-23
Payer: MEDICARE

## 2024-01-23 ENCOUNTER — OFFICE VISIT (OUTPATIENT)
Dept: CARDIOLOGY CLINIC | Facility: CLINIC | Age: 69
End: 2024-01-23
Payer: MEDICARE

## 2024-01-23 VITALS
SYSTOLIC BLOOD PRESSURE: 140 MMHG | HEIGHT: 70 IN | DIASTOLIC BLOOD PRESSURE: 88 MMHG | WEIGHT: 230.2 LBS | HEART RATE: 66 BPM | BODY MASS INDEX: 32.96 KG/M2 | TEMPERATURE: 97.3 F | OXYGEN SATURATION: 97 %

## 2024-01-23 DIAGNOSIS — I35.0 SEVERE AORTIC STENOSIS: Primary | ICD-10-CM

## 2024-01-23 DIAGNOSIS — E03.9 HYPOTHYROIDISM, UNSPECIFIED TYPE: ICD-10-CM

## 2024-01-23 DIAGNOSIS — N17.9 AKI (ACUTE KIDNEY INJURY) (HCC): ICD-10-CM

## 2024-01-23 DIAGNOSIS — R06.02 SHORTNESS OF BREATH: ICD-10-CM

## 2024-01-23 DIAGNOSIS — I49.3 PVCS (PREMATURE VENTRICULAR CONTRACTIONS): ICD-10-CM

## 2024-01-23 DIAGNOSIS — N17.9 AKI (ACUTE KIDNEY INJURY) (HCC): Primary | ICD-10-CM

## 2024-01-23 DIAGNOSIS — R06.09 DYSPNEA ON EXERTION: ICD-10-CM

## 2024-01-23 DIAGNOSIS — Z95.2 S/P TAVR (TRANSCATHETER AORTIC VALVE REPLACEMENT): ICD-10-CM

## 2024-01-23 DIAGNOSIS — I10 BENIGN ESSENTIAL HTN: ICD-10-CM

## 2024-01-23 DIAGNOSIS — Z95.810 ICD (IMPLANTABLE CARDIOVERTER-DEFIBRILLATOR) IN PLACE: ICD-10-CM

## 2024-01-23 DIAGNOSIS — E78.2 MIXED HYPERLIPIDEMIA: ICD-10-CM

## 2024-01-23 DIAGNOSIS — I25.10 CORONARY ARTERY DISEASE INVOLVING NATIVE CORONARY ARTERY OF NATIVE HEART WITHOUT ANGINA PECTORIS: ICD-10-CM

## 2024-01-23 DIAGNOSIS — D62 ABLA (ACUTE BLOOD LOSS ANEMIA): ICD-10-CM

## 2024-01-23 DIAGNOSIS — Z78.9 STATIN INTOLERANCE: ICD-10-CM

## 2024-01-23 DIAGNOSIS — J43.8 OTHER EMPHYSEMA (HCC): ICD-10-CM

## 2024-01-23 DIAGNOSIS — I47.20 VENTRICULAR TACHYCARDIA (HCC): ICD-10-CM

## 2024-01-23 DIAGNOSIS — N18.31 STAGE 3A CHRONIC KIDNEY DISEASE (HCC): ICD-10-CM

## 2024-01-23 DIAGNOSIS — Z95.5 STATUS POST INSERTION OF DRUG-ELUTING STENT INTO LEFT ANTERIOR DESCENDING (LAD) ARTERY: ICD-10-CM

## 2024-01-23 LAB
ANION GAP SERPL CALCULATED.3IONS-SCNC: 6 MMOL/L
BUN SERPL-MCNC: 24 MG/DL (ref 5–25)
CALCIUM SERPL-MCNC: 9.4 MG/DL (ref 8.4–10.2)
CHLORIDE SERPL-SCNC: 106 MMOL/L (ref 96–108)
CO2 SERPL-SCNC: 25 MMOL/L (ref 21–32)
CREAT SERPL-MCNC: 1.39 MG/DL (ref 0.6–1.3)
GFR SERPL CREATININE-BSD FRML MDRD: 50 ML/MIN/1.73SQ M
GLUCOSE P FAST SERPL-MCNC: 110 MG/DL (ref 65–99)
POTASSIUM SERPL-SCNC: 4.7 MMOL/L (ref 3.5–5.3)
SODIUM SERPL-SCNC: 137 MMOL/L (ref 135–147)

## 2024-01-23 PROCEDURE — 36415 COLL VENOUS BLD VENIPUNCTURE: CPT

## 2024-01-23 PROCEDURE — 80048 BASIC METABOLIC PNL TOTAL CA: CPT

## 2024-01-23 PROCEDURE — 99214 OFFICE O/P EST MOD 30 MIN: CPT | Performed by: NURSE PRACTITIONER

## 2024-01-23 RX ORDER — FUROSEMIDE 40 MG/1
40 TABLET ORAL 2 TIMES DAILY
COMMUNITY
End: 2024-01-23 | Stop reason: ALTCHOICE

## 2024-01-23 RX ORDER — PANTOPRAZOLE SODIUM 40 MG/1
40 TABLET, DELAYED RELEASE ORAL DAILY
COMMUNITY
End: 2024-01-25 | Stop reason: ALTCHOICE

## 2024-01-23 RX ORDER — SUCRALFATE 1 G/1
1 TABLET ORAL 4 TIMES DAILY
COMMUNITY
End: 2024-01-25 | Stop reason: ALTCHOICE

## 2024-01-23 NOTE — PROGRESS NOTES
Cardiology Follow Up    Mikael Saavedra  1955  [id number]  [facility] [place]  [address]  [address]  [place] [address] [phone]    Mikael presents for follow up of AS s/p TAVR, CAD s/p stent to LAD, HFpEF, VT on amio s/p ICD, HTN.    1. Severe aortic stenosis  Assessment & Plan:  History of aortic stenosis which was reported as moderate on echo 11/2021 at [facility].  Echocardiogram 1/30/2023 continues to show moderate aortic stenosis with PV 3.52, mean gradient 30 mmHg, DI 0.30.  Repeat echo 9/2023 showed progression to severe AS with PV 4.2 m/s, MG 41mmHg, DVI 0.24.  Now s/p TAVR by Dr. Gooden at [facility] 12/5/2023.  Echo 1/4/2024 shows normally functioning AVR.  Given script to resume cardiac rehab.  He does report significant improvement in activity tolerance since valve replacement.      2. S/P TAVR (transcatheter aortic valve replacement)  Assessment & Plan:  S/p 26 mm Chen Benedicto S3 UR valve by Dr. Gooden at [facility] 12/5/23  See discussion under aortic stenosis      3. Coronary artery disease involving native coronary artery of native heart without angina pectoris  Assessment & Plan:  Pre-TAVR cardiac cath 10/17/2023 revealed prox-mid 80% LAD lesion s/p PCI/DEON x 1. Residual 40% mid RCA disease.  - - - - - -  Maintained on aspirin and Plavix.  No anginal complaints.  Continue Praluent 150 mg every 14 days for goal LDL < 70. He has been unable to tolerate statins.      4. Status post insertion of drug-eluting stent into left anterior descending (LAD) artery    5. Ventricular tachycardia (HCC)  Assessment & Plan:  VT discovered while at cardiac rehab.  He has been evaluated by [facility] EP and underwent AICD insertion 11/2/2023 and started on amiodarone.  Preserved LVEF on serial echocardiograms.  Most recently evaluated by EP 12/29/2023 at which time amiodarone was reduced to 100 mg daily.  He will have close follow up within 3 months with EP  to determine plan for amiodarone.        6. PVCs (premature ventricular contractions)  Assessment & Plan:  ECG and telemetry monitoring during hospitalization revealed PVC's in the pattern of bigeminy.  History of PVC's per patient report.  Echocardiogram 1/30/2023 with EF 60%.  Magnesium supplementation added with improvement in PVC's.  Holter monitor 3/15/2023 showed 3.7% PVC burden with avg HR 90 bpm.  Will start metoprolol succinate 12.5 mg daily.  Continue magnesium.  Now s/p EP eval for VT s/p ICD insertion, on amiodarone.  See discussion under VT.        7. s/p Medtronic dual chamber ICD 11/2/2023    8. Heart failure with preserved ejection fraction  Assessment & Plan:  Echocardiogram 1/4/2024 with EF 60%, mild LVH, normal diastolic function. Normal functioning AVR, mild TR with PASP 37mmHg.  BNP 36 on recent labs.  Worsened renal function with up-titration of diuretics.  Currently on torsemide 10 mg daily.  Will continue with 2 g sodium diet, daily weights.  Reviewed s/s to report.  Will monitor closely.      9. Other emphysema (HCC)  Assessment & Plan:  Strongly urged complete smoking cessation.  Will obtain updated PFT's given ongoing SOB and use of amiodarone.    Orders:  -     Complete PFT with post bronchodilator; Future    10. Dyspnea on exertion  Assessment & Plan:  Reports dyspnea, which now is worse with bending over.  He feels belly bloating.  No edema on exam. BNP normal. Chest xray clear. Worsened renal function with up-titration of diuretics.  TSH hypoactive with recent levothyroxine adjustment.  Will obtain PFT's given emphysema and amio use.      11. ANNALEE (acute kidney injury) (HCC)  Assessment & Plan:  Creatinine increase to 2 with up-titration of diuretics.  Now 1.39.  Monitoring closely.  Avoid nephrotoxic agents.  Repeat within 1 month.    Orders:  -     Basic metabolic panel; Future; Expected date: 02/23/2024    12. Hypothyroidism, unspecified type  Assessment & Plan:  TSH 9 on recent  labs.  Levothyroxine increased to 100 mcg daily this week by PCP.  I am concerned that amiodarone may be affecting his thyroid. Recommend close monitoring.    Orders:  -     TSH, 3rd generation with Free T4 reflex; Future; Expected date: 02/23/2024    13. Benign essential HTN  Assessment & Plan:  Blood pressure is borderline elevated today.  Currently on amlodipine 10 mg daily, metoprolol succinate 12.5 mg daily. Losartan 25 mg daily recently added but he is not taking. He is not sure what he is taking as his wife manages his scripts. She will call back to confirm.  Developed dry cough with lisinopril which resolved with discontinuation.  Will monitor closely.  Recent lab work reviewed.  Will up-titrate regimen as needed.      14. Stage 3a chronic kidney disease (HCC)  Assessment & Plan:  Lab Results   Component Value Date    EGFR 50 01/23/2024    EGFR 48 01/16/2024    EGFR 31 01/09/2024    CREATININE 1.39 (H) 01/23/2024    CREATININE 1.44 (H) 01/16/2024    CREATININE 2.07 (H) 01/09/2024     Recent ANNALEE with up-titration of diuretics.  Will continue to monitor closely.      15. Mixed hyperlipidemia  Assessment & Plan:  Goal LDL < 70  Intolerant of statins.  Now on Praluent 150 mg every 14 days.  Lipid panel 12/11/2023: C 106. T 363. H 49. L not calculated  Plan repeat lipid panel at follow up. Now on omega 3 supplement.      16. Statin intolerance  Assessment & Plan:  Now on Praluent 150 mg every 14 days.      17. ABLA (acute blood loss anemia)  Assessment & Plan:  Secondary to GI bleed.  Blood work 1/12/2024 shows improvement of H/H back to normal with normal iron/ferritin levels.           LEXI Youssef has a past medical history of aortic stenosis s/p TAVR, CAD s/p DEON to LAD, HFpEF, VT and PVC's s/p AICD on amio, GI bleed, HTN, hypothyroidism. Ongoing tobacco use.     He was admitted to [facility] 1/30-2/6/2023 for sepsis. Cardiology was consulted for evaluation of CHF an MAKEDA for work up of bacteremia. HCTZ was  discontinued due to hyponatremia. Furosemide was held due to dehydration. Echo 1/30/2023 showed EF 60% with moderate AS. MAKEDA showed no source of bacteremia. Ultimately source of bacteremia was from a toe wound which ultimately underwent amputation. He had a complicated course with hyperkalemia and ANNALEE. Lisinopril was stopped (also noted to cause dry cough). He was noted to have frequent PVC's on telemetry. A Holter monitor 3/15/2023 showed 3.7% PVC burden without NSVT and he was started on metoprolol succinate.      Echocardiogram 9/2023 revealed severe AS and he was referred to CT surgery for TAVR. Cardiac cath 10/17/2023 as part of pre-TAVR work up resulted in stent placement to an 80% mid LAD lesion with residual non-obstructive disease noted in the mid RCA. He was noted to have NSVT while at cardiac rehab and was sent to [facility] ER 10/30/23. Echocardiogram showed preserved EF. He was ultimately transferred to [facility]. He was evaluated by EP and underwent AICD insertion for primary prevention on 11/2/2023 as well as started on amiodarone loading.     He was admitted to [facility] 11/6-11/9/23 with syncope and found to have acute GI bleed requiring 2 units PRBC. ASA and Plavix were temporarily held but resumed following EGD with cautery and clip to a bleeding vessel.     He was admitted to [facility] 12/5-12/7/2023 where Dr. Gooden placed a 26 mm Chen Benedicto S3 UR valve on 12/5/2023. He was discharged home on post op day 2. Lisinopril was restarted post op for BP management. He reached out to my office on 12/26/23 to report a dry cough and progressive 10 pound weight gain. Lisinopril was discontinued and replaced with losartan 25 mg daily. Furosemide was temporarily increased to 80 mg daily from 40 mg daily. He followed up with EP on 12/29/2023 at which time his amiodarone was reduced from 200 mg daily to 100 mg daily. He was instructed to continue 80 mg of furosemide for 2 days then go to 60 mg  daily.    Echocardiogram 1/4/2024 showed LVEF 60% with normal functioning AVR and mild TR with PASP 37mmHg. He met with Dr. Gooden that same day and was released to start cardiac rehab. He continued to c/o shortness of breath and bloating. Metolazone 5 mg daily for 3 days was added. Chest xray showed no acute findings.     Due to persistent complaints he was transitioned from furosemide to torsemide 40 mg daily, however, repeat blood work showed ANNALEE with creatinine of 2. Torsemide was reduced to 10 mg daily.    1/23/2024: Mikael presents today for close follow up. His weight remains unchanged despite the increase in diuretics. He has no visible edema. Lungs are clear. BNP was normal at 38 on recent blood work. Creatinine has improved slightly to 1.4 on BMP this morning. His TSH is 9. Iron and ferritin WNL. He states he feels bloated in his upper abdomen and only feels short of breath when bending over. He notes a significant improvement in his breathing since valve replacement. He notes improved activity tolerance. He continues to be fatigued. He is frustrated with the weight. We did discuss today the side effects of amiodarone and result it is having on his thyroid. His PCP just increased his levothyroxine to 100 mcg this week. He denies chest discomfort, lightheadedness or dizziness. No palpitations. Next ICD interrogation is 2/19/24. He is to follow up with EP to discuss coming off amiodarone about that time. He continues to smoke occasionally - states a pack lasts 1 week. He is scheduled to start cardiac rehab tomorrow and needs a new order.    Medical Problems       Problem List       Leukocytosis    MDD (major depressive disorder)    Peripheral neuropathy    Other emphysema (HCC)    Ulcer of left foot (HCC)    ANNALEE (acute kidney injury) (HCC)    Hyperkalemia    MSSA bacteremia    Status post foot surgery    Acute hematogenous osteomyelitis of left foot (HCC)    Borderline high cholesterol    Status post  "amputation of great toe, left ([facility])    Severe aortic stenosis    Mixed hyperlipidemia    Statin intolerance    Status post insertion of drug-eluting stent into left anterior descending (LAD) artery    Ventricular tachycardia ([facility])    s/p Medtronic dual chamber ICD 2023    Acute GI bleeding    ABLA (acute blood loss anemia)    Vasovagal syncope    Heart failure with preserved ejection fraction    Benign essential HTN    PVCs (premature ventricular contractions)    Dyspnea on exertion    Episodic lightheadedness    S/P TAVR (transcatheter aortic valve replacement)    Prediabetes    Hypothyroid    Stage 3a chronic kidney disease ([facility])    CHF (congestive heart failure) ([facility])        Past Medical History:   Diagnosis Date    Anxiety     Aortic stenosis     Borderline high cholesterol     CHF (congestive heart failure) ([facility]) 2024    Asaf Jackson-cardio-    Colon polyp     Depression     Heart murmur     Hypertension     Hypothyroidism     Prediabetes     PVCs (premature ventricular contractions)     \"skip\"    s/p Medtronic dual chamber ICD 2023    Wears glasses     readers     Social History     Socioeconomic History    Marital status: /Civil Union     Spouse name: Not on file    Number of children: Not on file    Years of education: Not on file    Highest education level: Not on file   Occupational History    Not on file   Tobacco Use    Smoking status: Former     Current packs/day: 0.00     Average packs/day: 0.5 packs/day for 50.0 years (25.0 ttl pk-yrs)     Types: Cigarettes     Start date: 11/3/1973     Quit date: 11/3/2023     Years since quittin.2    Smokeless tobacco: Never   Vaping Use    Vaping status: Never Used   Substance and Sexual Activity    Alcohol use: Not Currently     Comment: socially-2 drinks per week    Drug use: Not Currently     Comment: sporadic in young adulthood    Sexual activity: Not on file     Comment: defer   Other Topics Concern    Not on file "   Social History Narrative    Not on file     Social Determinants of Health     Financial Resource Strain: Medium Risk (12/13/2023)    Received from [facility]    Overall Financial Resource Strain (CARDIA)     Difficulty of Paying Living Expenses: Somewhat hard   Food Insecurity: No Food Insecurity (12/13/2023)    Received from [facility]    Hunger Vital Sign     Worried About Running Out of Food in the Last Year: Never true     Ran Out of Food in the Last Year: Never true   Transportation Needs: No Transportation Needs (12/13/2023)    Received from [facility]    PRAPARE - Transportation     Lack of Transportation (Medical): No     Lack of Transportation (Non-Medical): No   Physical Activity: Inactive (12/13/2023)    Received from [facility]    Exercise Vital Sign     Days of Exercise per Week: 0 days     Minutes of Exercise per Session: 0 min   Stress: Stress Concern Present (12/13/2023)    Received from [facility]    [place] [employer] - Occupational Stress Questionnaire     Feeling of Stress : Rather much   Social Connections: Socially Integrated (12/13/2023)    Received from [facility]    Social Connection and Isolation Panel [NHANES]     Frequency of Communication with Friends and Family: Twice a week     Frequency of Social Gatherings with Friends and Family: Three times a week     Attends Protestant Services: More than 4 times per year     Active Member of Clubs or Organizations: Yes     Attends Club or Organization Meetings: More than 4 times per year     Marital Status:    Intimate Partner Violence: Not At Risk (12/13/2023)    Received from [facility]    Humiliation, Afraid, Rape, and Kick questionnaire     Fear of Current or Ex-Partner: No     Emotionally Abused: No     Physically Abused: No     Sexually Abused: No   Housing Stability: Low Risk   (12/13/2023)    Received from [facility]    Housing Stability Vital Sign     Unable to Pay for Housing in the Last Year: No     Number of Places Lived in the Last Year: 1     Unstable Housing in the Last Year: No      Family History   Problem Relation Age of Onset    Dementia Mother     Blindness Mother     Cancer Mother     Dementia Father      Past Surgical History:   Procedure Laterality Date    APPENDECTOMY      BACK SURGERY      CARDIAC CATHETERIZATION N/A 10/17/2023    Procedure: Cardiac RHC/LHC;  Surgeon: Phillip Pfeiffer MD;  Location: [facility];  Service: Cardiology    CARDIAC CATHETERIZATION N/A 12/5/2023    Procedure: Cardiac tavr;  Surgeon: Malcolm Humphrey DO;  Location: [facility];  Service: Cardiology    CARDIAC ELECTROPHYSIOLOGY PROCEDURE N/A 11/2/2023    Procedure: Cardiac icd implant;  Surgeon: Jered Talbot MD;  Location: [facility];  Service: Cardiology    CATARACT EXTRACTION Bilateral     CLOSURE DELAYED PRIMARY Left 06/09/2023    Procedure: CLOSURE DELAYED PRIMARY;  Surgeon: Tuan Simms DPM;  Location: [facility];  Service: Podiatry    COL[place]OS[place]  2023    [place]    SC CORRJ HLX VLGS BNCTY SESMDC RESCJ PROX PHLX BASE Left 04/19/2023    Procedure: BUNIONECTOMY PERDOMO and wound debridement;  Surgeon: Clover Mott DPM;  Location: [facility];  Service: Podiatry    SC REPLACE AORTIC VALVE OPENFEMORAL ARTERY APPROACH N/A 12/5/2023    Procedure: REPLACEMENT AORTIC VALVE TRANSCATHETER (TAVR) TRANSFEMORAL W/ 26MM SHERMAN ADRIANNA S3 UR VALVE(ACCESS ON LEFT) MAKEDA;  Surgeon: Gerald Gooden MD;  Location: [facility];  Service: Cardiac Surgery    TOE AMPUTATION Left 06/07/2023    Procedure: PARTIAL 1ST RAY AMPUTATION;  Surgeon: Tuan Simms DPM;  Location: [facility];  Service: Podiatry    TONSILLECTOMY      TOTAL SHOULDER REPLACEMENT Left     WISDOM TOOTH EXTRACTION         Current Outpatient Medications:     Alirocumab 150 MG/ML  SOAJ, Inject 150 mg under the skin every 14 (fourteen) days, Disp: 2 mL, Rfl: 11    amiodarone 200 mg tablet, Take 0.5 tablets (100 mg total) by mouth daily Takes 100 mg once daily changed 1/2/23., Disp: 45 tablet, Rfl: 0    amLODIPine (NORVASC) 10 mg tablet, Take 10 mg by mouth daily, Disp: , Rfl:     aspirin 81 mg chewable tablet, Chew 1 tablet (81 mg total) daily, Disp: 30 tablet, Rfl: 0    buPROPion (WELLBUTRIN XL) 300 mg 24 hr tablet, , Disp: , Rfl:     clopidogrel (PLAVIX) 75 mg tablet, TAKE ONE TABLET BY MOUTH EVERY DAY, Disp: 30 tablet, Rfl: 3    escitalopram (LEXAPRO) 20 mg tablet, Take 20 mg by mouth daily, Disp: , Rfl: 1    levothyroxine 100 mcg tablet, Take 100 mcg by mouth daily, Disp: , Rfl:     Magnesium 400 MG CAPS, Take 1 capsule (400 mg total) by mouth in the morning, Disp: 90 capsule, Rfl: 3    metoprolol succinate (TOPROL-XL) 25 mg 24 hr tablet, Take 0.5 tablets (12.5 mg total) by mouth daily, Disp: 15 tablet, Rfl: 11    Omega-3 Fatty Acids (Fish Oil) 1200 MG CAPS, Take by mouth, Disp: , Rfl:     pantoprazole (PROTONIX) 40 mg tablet, Take 40 mg by mouth daily, Disp: , Rfl:     sucralfate (CARAFATE) 1 g tablet, Take 1 g by mouth 4 (four) times a day, Disp: , Rfl:     torsemide (DEMADEX) 20 mg tablet, Take 2 tablets (40 mg total) by mouth daily (Patient taking differently: Take 10 mg by mouth daily), Disp: 60 tablet, Rfl: 11    gabapentin (NEURONTIN) 100 mg capsule, Take 100 mg by mouth daily (Patient not taking: Reported on 1/23/2024), Disp: , Rfl:   Allergies   Allergen Reactions    Statins Myalgia     Other reaction(s): muscle aches and joint aches         Labs:     Chemistry        Component Value Date/Time    K 4.7 01/23/2024 0931     01/23/2024 0931    CO2 25 01/23/2024 0931    CO2 26 12/05/2023 0835    BUN 24 01/23/2024 0931    CREATININE 1.39 (H) 01/23/2024 0931        Component Value Date/Time    CALCIUM 9.4 01/23/2024 0931    ALKPHOS 87 01/02/2024 1000    AST 21 01/02/2024 1000     ALT 15 01/02/2024 1000        Lipid panel 12/11/2023: C 106. T 363. H 49. L not calculated    Cardiac testing:  Echo complete 1/5/2024    Left Ventricle: Left ventricular cavity size is normal. Wall thickness is mildly increased. There is mild concentric hypertrophy. The left ventricular ejection fraction is 60%. Systolic function is normal. Wall motion is normal. Diastolic function is normal.   Aortic Valve: There is an Chen ADRIANNA 3 Ultra 26 mm TAVR bioprosthetic valve. The prosthetic valve appears well-seated and appears to be functioning normally. There is no evidence of paravalvular regurgitation. There is no evidence of transvalvular regurgitation. The gradient recorded across the prosthetic aortic valve is within the expected range. The aortic valve peak velocity is 1.69 m/s. The aortic valve area is 2.19 cm2.   Tricuspid Valve: There is mild regurgitation. The right ventricular systolic pressure is mildly elevated. The estimated right ventricular systolic pressure is 37.00 mmHg.     Cardiac cath 10/17/2023:    Mid RCA lesion is 40% stenosed.    Prox LAD to Mid LAD lesion is 80% stenosed.    Echo 9/8/2023:    Left Ventricle: Left ventricular cavity size is normal. Wall thickness is mildly increased. The left ventricular ejection fraction is 60% by visual estimation.. Systolic function is normal. Wall motion is normal. Diastolic function is mildly abnormal, consistent with grade I (abnormal) relaxation.    Left Atrium: The atrium is dilated.    Aortic Valve: The leaflets are moderately calcified. There is severe stenosis. The aortic valve peak velocity is 4.2 m/s. The aortic valve mean gradient is 41 mmHg. The dimensionless velocity index is 0.24. The aortic valve area is 1.00 cm2.    AS now appears severe compared to previous study from 1/2023    48 hour Holter monitor 7/18/2023:  The patient had predominantly sinus rhythm.   Heart rate varied from 49 bpm to 112 bpm.  The patient’s average heart rate was  75 bpm.   The patient had a holter monitor tracing done for 47 hours and 59 minutes.  The patient had 40735 ventricular ectopic beats accounting for 5.5% of total beats.   The patient had 35 supraventricular ectopic beats.  The longest R/R interval was 1.5 seconds.  Ventricular trigeminy and bigeminy was noted  PVCs in the pattern of triplets and couplets noted.   Patient noted feeling tired associated with NSR.     Lexiscan nuclear stress test 7/18/2023:    Stress Function: Left ventricular function post-stress is normal. Stress ejection fraction is 59 %.  Normal regional wall motion noted.    Perfusion: There are no perfusion defects.    Stress Combined Conclusion: The ECG and SPECT imaging portions of the stress study are concordant with no evidence of stress induced myocardial ischemia. Left ventricular perfusion is normal.  Post-rest ejection fraction is determined as 59%.    Stress ECG: No ST deviation is noted. There were no arrhythmias during recovery. . The ECG was negative for ischemia.    No anginal-like symptoms were reported during the stress test.    Normal resting blood pressure.  Appropriate blood pressure response was noted during the stress test.     24 hour Holter monitor 3/15/2023:  Predominantly sinus rhythm, HR , avg 90 bpm.  3.7% PVC burden. Rare PAC's. One asymptomatic 4 beat atrial run.      ECG 2/10/2023: Normal sinus rhythm.     MAKEDA 2/3/2023:  EF 60%. LA mildly dilated.   Mod AS. Trace MR. Trace TR.     Echocardiogram 1/30/2023:  EF 60%, mild LVH. Grade 1 DD.  Mod AS, PV 3.52 m/s, MG 30 mmHg, DI 0.30. Mild AI.  MAC with trace MR. Trace TR. PASP 41 mmHg.  Aortic root 3.8 cm. Ascending aorta 3.3. cm.     ECG 1/30/2023: Sinus rhythm with frequent PVC's. Nonspecific ST abnormality.    Review of Systems   Constitutional: Positive for malaise/fatigue.   HENT: Negative.     Cardiovascular:  Positive for dyspnea on exertion. Negative for chest pain, irregular heartbeat, leg swelling,  "near-syncope, orthopnea and palpitations.   Respiratory:  Negative for cough and snoring.    Endocrine: Negative.    Skin: Negative.    Musculoskeletal: Negative.    Gastrointestinal:  Positive for bloating.   Genitourinary: Negative.    Neurological: Negative.    Psychiatric/Behavioral:  Positive for memory loss.        Vitals:    01/23/24 1602   BP: 140/88   Pulse: 66   Temp: (!) 97.3 °F (36.3 °C)   SpO2: 97%     Vitals:    01/23/24 1602   Weight: 104 kg (230 lb 3.2 oz)     Height: 5' 10\" (177.8 cm)   Body mass index is 33.03 kg/m².    Physical Exam  Vitals and nursing note reviewed.   Constitutional:       General: He is not in acute distress.     Appearance: He is well-developed. He is not diaphoretic.   HENT:      Head: Normocephalic and atraumatic.   Neck:      Vascular: No carotid bruit or JVD.   Cardiovascular:      Rate and Rhythm: Normal rate and regular rhythm.      Pulses: Intact distal pulses.      Heart sounds: Normal heart sounds, S1 normal and S2 normal. No murmur heard.     No friction rub. No gallop.      Comments: No edema  Pulmonary:      Effort: Pulmonary effort is normal. No respiratory distress.      Breath sounds: Normal breath sounds.   Abdominal:      General: There is no distension.      Palpations: Abdomen is soft.      Tenderness: There is no abdominal tenderness.   Skin:     General: Skin is warm and dry.      Findings: No rash.   Neurological:      Mental Status: He is alert and oriented to person, place, and time.   Psychiatric:         Behavior: Behavior normal.                "

## 2024-01-23 NOTE — PATIENT INSTRUCTIONS
BP is borderline elevated today - please monitor. I will watch records from cardiac rehab and we will consider resuming losartan.  Your thyroid is hypoactive, which is likely causing weight gain and shortness of breath.  I will review your echocardiogram with my cardiologist to let you know if any additional suggestions are available.  PFT's to assess lung function.  Please have your wife call with your medication list.  Make sure you have a follow up with electrophysiologist.   Your kidney function improved somewhat but not back to normal. Recheck in 1 month.

## 2024-01-24 ENCOUNTER — TELEPHONE (OUTPATIENT)
Dept: CARDIOLOGY CLINIC | Facility: CLINIC | Age: 69
End: 2024-01-24

## 2024-01-24 DIAGNOSIS — I10 BENIGN ESSENTIAL HTN: Primary | ICD-10-CM

## 2024-01-24 PROBLEM — I50.9 CHF (CONGESTIVE HEART FAILURE) (HCC): Status: RESOLVED | Noted: 2024-01-04 | Resolved: 2024-01-24

## 2024-01-24 PROBLEM — I25.10 CORONARY ARTERY DISEASE INVOLVING NATIVE CORONARY ARTERY OF NATIVE HEART WITHOUT ANGINA PECTORIS: Status: ACTIVE | Noted: 2024-01-24

## 2024-01-24 NOTE — TELEPHONE ENCOUNTER
Pt was told to call with an updated med list.    Lexapro needs to be deleted  Protonix and carafate        The ones he takes are  Amiodarone pt takes 100mg  Levothyroxine 100 mg everyday on Mon and Thur he takes 2  Cozar 25mg daily  Potassium 10mg daily  Iron over the counter    Everything else on the meds list he takes.    [phone]

## 2024-01-25 RX ORDER — LOSARTAN POTASSIUM 25 MG/1
25 TABLET ORAL DAILY
Qty: 30 TABLET | Refills: 11 | Status: SHIPPED | OUTPATIENT
Start: 2024-01-25

## 2024-01-25 NOTE — TELEPHONE ENCOUNTER
Spoke with Pt spouse, she is asking if Pt is suppose to stop taking the Cozar 25mg daily and Potassium 10mg daily

## 2024-01-25 NOTE — ASSESSMENT & PLAN NOTE
Blood pressure is borderline elevated today.  Currently on amlodipine 10 mg daily, metoprolol succinate 12.5 mg daily. Losartan 25 mg daily recently added but he is not taking. He is not sure what he is taking as his wife manages his scripts. She will call back to confirm.  Developed dry cough with lisinopril which resolved with discontinuation.  Will monitor closely.  Recent lab work reviewed.  Will up-titrate regimen as needed.

## 2024-01-25 NOTE — ASSESSMENT & PLAN NOTE
admitted to room 220. [place] bands checked with mother and match.  brought to nursery for admission assessment and vitals. [place] in stable condition. Goal LDL < 70  Intolerant of statins.  Now on Praluent 150 mg every 14 days.  Lipid panel 12/11/2023: C 106. T 363. H 49. L not calculated  Plan repeat lipid panel at follow up. Now on omega 3 supplement.

## 2024-01-25 NOTE — ASSESSMENT & PLAN NOTE
ECG and telemetry monitoring during hospitalization revealed PVC's in the pattern of bigeminy.  History of PVC's per patient report.  Echocardiogram 1/30/2023 with EF 60%.  Magnesium supplementation added with improvement in PVC's.  Holter monitor 3/15/2023 showed 3.7% PVC burden with avg HR 90 bpm.  Will start metoprolol succinate 12.5 mg daily.  Continue magnesium.  Now s/p EP eval for VT s/p ICD insertion, on amiodarone.  See discussion under VT.

## 2024-01-25 NOTE — ASSESSMENT & PLAN NOTE
VT discovered while at cardiac rehab.  He has been evaluated by [facility] EP and underwent AICD insertion 11/2/2023 and started on amiodarone.  Preserved LVEF on serial echocardiograms.  Most recently evaluated by EP 12/29/2023 at which time amiodarone was reduced to 100 mg daily.  He will have close follow up within 3 months with EP to determine plan for amiodarone.

## 2024-01-25 NOTE — TELEPHONE ENCOUNTER
Spouse called and stated that as of this week patient was suppose to stop taking    Cozar 25mg daily  Potassium 10mg daily    Spouse would like clarification on patients medication list

## 2024-01-25 NOTE — ASSESSMENT & PLAN NOTE
Reports dyspnea, which now is worse with bending over.  He feels belly bloating.  No edema on exam. BNP normal. Chest xray clear. Worsened renal function with up-titration of diuretics.  TSH hypoactive with recent levothyroxine adjustment.  Will obtain PFT's given emphysema and amio use.

## 2024-01-25 NOTE — ASSESSMENT & PLAN NOTE
History of aortic stenosis which was reported as moderate on echo 11/2021 at [facility].  Echocardiogram 1/30/2023 continues to show moderate aortic stenosis with PV 3.52, mean gradient 30 mmHg, DI 0.30.  Repeat echo 9/2023 showed progression to severe AS with PV 4.2 m/s, MG 41mmHg, DVI 0.24.  Now s/p TAVR by Dr. Gooden at [facility] 12/5/2023.  Echo 1/4/2024 shows normally functioning AVR.  Given script to resume cardiac rehab.  He does report significant improvement in activity tolerance since valve replacement.

## 2024-01-25 NOTE — ASSESSMENT & PLAN NOTE
Pre-TAVR cardiac cath 10/17/2023 revealed prox-mid 80% LAD lesion s/p PCI/DEON x 1. Residual 40% mid RCA disease.  - - - - - -  Maintained on aspirin and Plavix.  No anginal complaints.  Continue Praluent 150 mg every 14 days for goal LDL < 70. He has been unable to tolerate statins.

## 2024-01-25 NOTE — ASSESSMENT & PLAN NOTE
TSH 9 on recent labs.  Levothyroxine increased to 100 mcg daily this week by PCP.  I am concerned that amiodarone may be affecting his thyroid. Recommend close monitoring.

## 2024-01-25 NOTE — ASSESSMENT & PLAN NOTE
Creatinine increase to 2 with up-titration of diuretics.  Now 1.39.  Monitoring closely.  Avoid nephrotoxic agents.  Repeat within 1 month.

## 2024-01-25 NOTE — ASSESSMENT & PLAN NOTE
Secondary to GI bleed.  Blood work 1/12/2024 shows improvement of H/H back to normal with normal iron/ferritin levels.

## 2024-01-25 NOTE — ASSESSMENT & PLAN NOTE
Lab Results   Component Value Date    EGFR 50 01/23/2024    EGFR 48 01/16/2024    EGFR 31 01/09/2024    CREATININE 1.39 (H) 01/23/2024    CREATININE 1.44 (H) 01/16/2024    CREATININE 2.07 (H) 01/09/2024     Recent ANNALEE with up-titration of diuretics.  Will continue to monitor closely.

## 2024-01-25 NOTE — TELEPHONE ENCOUNTER
I updated his med list to reflect these corrections.  I called Samanta and left a message for her to call back.  He needs to continue the losartan 25 mg daily.  Stop the potassium supplement.  Please monitor BP and notify us if staying > 140/80.

## 2024-01-25 NOTE — ASSESSMENT & PLAN NOTE
Strongly urged complete smoking cessation.  Will obtain updated PFT's given ongoing SOB and use of amiodarone.

## 2024-01-25 NOTE — ASSESSMENT & PLAN NOTE
Echocardiogram 1/4/2024 with EF 60%, mild LVH, normal diastolic function. Normal functioning AVR, mild TR with PASP 37mmHg.  BNP 36 on recent labs.  Worsened renal function with up-titration of diuretics.  Currently on torsemide 10 mg daily.  Will continue with 2 g sodium diet, daily weights.  Reviewed s/s to report.  Will monitor closely.

## 2024-01-25 NOTE — ASSESSMENT & PLAN NOTE
S/p 26 mm Chen Benedicto S3 UR valve by Dr. Gooden at [facility] 12/5/23  See discussion under aortic stenosis

## 2024-02-05 ENCOUNTER — HOSPITAL ENCOUNTER (OUTPATIENT)
Dept: PULMONOLOGY | Facility: HOSPITAL | Age: 69
Discharge: HOME | End: 2024-02-05
Payer: MEDICARE

## 2024-02-05 DIAGNOSIS — J43.8 OTHER EMPHYSEMA (HCC): ICD-10-CM

## 2024-02-05 PROCEDURE — 94726 PLETHYSMOGRAPHY LUNG VOLUMES: CPT

## 2024-02-05 PROCEDURE — 94729 DIFFUSING CAPACITY: CPT | Performed by: INTERNAL MEDICINE

## 2024-02-05 PROCEDURE — 94726 PLETHYSMOGRAPHY LUNG VOLUMES: CPT | Performed by: INTERNAL MEDICINE

## 2024-02-05 PROCEDURE — 94729 DIFFUSING CAPACITY: CPT

## 2024-02-05 PROCEDURE — 94760 N-INVAS EAR/PLS OXIMETRY 1: CPT

## 2024-02-05 PROCEDURE — 94060 EVALUATION OF WHEEZING: CPT | Performed by: INTERNAL MEDICINE

## 2024-02-05 PROCEDURE — 94060 EVALUATION OF WHEEZING: CPT

## 2024-02-05 RX ORDER — ALBUTEROL SULFATE 2.5 MG/3ML
2.5 SOLUTION RESPIRATORY (INHALATION) ONCE AS NEEDED
Status: COMPLETED | OUTPATIENT
Start: 2024-02-05 | End: 2024-02-05

## 2024-02-05 RX ADMIN — ALBUTEROL SULFATE 2.5 MG: 2.5 SOLUTION RESPIRATORY (INHALATION) at 10:46

## 2024-02-19 ENCOUNTER — IN-CLINIC DEVICE VISIT (OUTPATIENT)
Dept: CARDIOLOGY CLINIC | Facility: CLINIC | Age: 69
End: 2024-02-19
Payer: MEDICARE

## 2024-02-19 DIAGNOSIS — Z95.810 PRESENCE OF AUTOMATIC CARDIOVERTER/DEFIBRILLATOR (AICD): Primary | ICD-10-CM

## 2024-02-19 PROCEDURE — 93283 PRGRMG EVAL IMPLANTABLE DFB: CPT | Performed by: INTERNAL MEDICINE

## 2024-02-19 NOTE — PROGRESS NOTES
Results for orders placed or performed in visit on 02/19/24   Cardiac EP device report    Narrative    MDT DC ICD (MVP ON) - ACTIVE SYSTEM IS MRI CONDITIONAL  DEVICE INTERROGATED IN THE [facility] OFFICE: BATTERY VOLTAGE ADEQUATE (12 YRS). AP: 21.6%. : <0.1% (MVP-ON). ALL  LEAD PARAMETERS WITHIN NORMAL LIMITS.  NO SIGNIFICANT HIGH RATE EPISODES. INCREASE MADE TO RA PULSE WIDTH, WHILE DECREASE MADE TO RA AMPLITUDE (THRESHOLD 1.5V@ 0.4ms VS 1V @ 0.8ms). NORMAL DEVICE FUNCTION. CH

## 2024-02-23 ENCOUNTER — APPOINTMENT (OUTPATIENT)
Dept: LAB | Facility: HOSPITAL | Age: 69
End: 2024-02-23
Payer: MEDICARE

## 2024-02-23 ENCOUNTER — TELEPHONE (OUTPATIENT)
Dept: CARDIOLOGY CLINIC | Facility: CLINIC | Age: 69
End: 2024-02-23

## 2024-02-23 DIAGNOSIS — N17.9 AKI (ACUTE KIDNEY INJURY) (HCC): ICD-10-CM

## 2024-02-23 DIAGNOSIS — E03.9 HYPOTHYROIDISM, UNSPECIFIED TYPE: ICD-10-CM

## 2024-02-23 LAB
ANION GAP SERPL CALCULATED.3IONS-SCNC: 8 MMOL/L
BUN SERPL-MCNC: 31 MG/DL (ref 5–25)
CALCIUM SERPL-MCNC: 9.8 MG/DL (ref 8.4–10.2)
CHLORIDE SERPL-SCNC: 104 MMOL/L (ref 96–108)
CO2 SERPL-SCNC: 25 MMOL/L (ref 21–32)
CREAT SERPL-MCNC: 1.47 MG/DL (ref 0.6–1.3)
GFR SERPL CREATININE-BSD FRML MDRD: 47 ML/MIN/1.73SQ M
GLUCOSE SERPL-MCNC: 89 MG/DL (ref 65–140)
POTASSIUM SERPL-SCNC: 4.1 MMOL/L (ref 3.5–5.3)
SODIUM SERPL-SCNC: 137 MMOL/L (ref 135–147)
T4 FREE SERPL-MCNC: 1.18 NG/DL (ref 0.61–1.12)
TSH SERPL DL<=0.05 MIU/L-ACNC: 5.71 UIU/ML (ref 0.45–4.5)

## 2024-02-23 PROCEDURE — 84443 ASSAY THYROID STIM HORMONE: CPT

## 2024-02-23 PROCEDURE — 80048 BASIC METABOLIC PNL TOTAL CA: CPT

## 2024-02-23 PROCEDURE — 36415 COLL VENOUS BLD VENIPUNCTURE: CPT

## 2024-02-23 PROCEDURE — 84439 ASSAY OF FREE THYROXINE: CPT

## 2024-02-23 NOTE — TELEPHONE ENCOUNTER
----- Message from JESUS Lozano sent at 2/23/2024 12:42 PM EST -----  Please let Mikael know his TSH improved from 9 to 5.7. Still slightly in the hypoactive range. Please have him follow up with PCP regarding when to have this checked again.  Kidney function is stable but still mildly reduced. If no recent change in his weight I suggest we try a further dose reduction of the torsemide to 5 mg

## 2024-03-22 ENCOUNTER — TELEPHONE (OUTPATIENT)
Dept: CARDIOLOGY CLINIC | Facility: CLINIC | Age: 69
End: 2024-03-22

## 2024-03-22 DIAGNOSIS — N17.9 AKI (ACUTE KIDNEY INJURY) (HCC): Primary | ICD-10-CM

## 2024-03-22 NOTE — TELEPHONE ENCOUNTER
Talked with Eva. Mikael is due for a 3 month follow up with EP to discuss amio. I sent a message to EP to discuss.  He has a TSH ordered by his PCP for next week. I ordered a BMP to complete with it. Will watch for results.

## 2024-03-22 NOTE — TELEPHONE ENCOUNTER
Pt spouse called, She stated Pt was at Cardiac rehab today and they said Pt gained 6lb in a month. They also questioned why Pt was still taking Amiodaron, Pt spouse stated the office that prescribed that, he no longer see's. Pt spouse is also concerned because Pt does not sleep at night and wants to sleep all day.

## 2024-03-31 ENCOUNTER — NURSE TRIAGE (OUTPATIENT)
Dept: OTHER | Facility: OTHER | Age: 69
End: 2024-03-31

## 2024-03-31 NOTE — TELEPHONE ENCOUNTER
"Reason for Disposition   [place] or felt device alarm (e.g. beeping or buzzing)    Answer Assessment - Initial Assessment Questions  1. DESCRIPTION: \"Please describe the concern you have with your pacemaker or defibrillator\" (e.g.,  delivered a shock, palpitations, beeping heard)      Beeping heard    2. ONSET: \"When did this occur?\" (e.g., minutes, hours or days)      Noticed this afternoon, around 12:30pm    3. DURATION: \"How long did it last\" (e.g., seconds, minutes, hours)       Still beeping intermittently    4..RECURRENT SYMPTOM: \"Have you ever had this before?\" If YES,  \"When was the last time?\" and \"What happened that time?\"       No    5. CARDIAC HISTORY: \"What other heart problems do you have?\" (e.g., heart attack, angina, bypass surgery, stents, heart failure, rhythm problem)       See chart    6. OTHER SYMPTOMS: \"Do you have any other symptoms?\" (e.g., dizziness, chest pain, fever, sweating, difficulty breathing)      No other symptoms    Protocols used: ICD and Pacemaker Symptoms and Questions-ADULT-      Advised patient's wife to call office tomorrow to have device interrogated as long as patient remains with no symptoms, as recommended by the on call provider. Wife verbalized understanding.     Please call patient's wife tomorrow morning.  "

## 2024-03-31 NOTE — TELEPHONE ENCOUNTER
Regarding:  pacemaker keeps going off  ----- Message from Alireza Almaraz sent at 3/31/2024  4:42 PM EDT -----  '' My  pacemaker keeps going off. He stated that he is feeling fine.''

## 2024-04-01 ENCOUNTER — APPOINTMENT (OUTPATIENT)
Dept: LAB | Facility: HOSPITAL | Age: 69
End: 2024-04-01
Payer: MEDICARE

## 2024-04-01 ENCOUNTER — TELEPHONE (OUTPATIENT)
Dept: CARDIOLOGY CLINIC | Facility: CLINIC | Age: 69
End: 2024-04-01

## 2024-04-01 DIAGNOSIS — E03.9 ACQUIRED HYPOTHYROIDISM: ICD-10-CM

## 2024-04-01 DIAGNOSIS — N17.9 AKI (ACUTE KIDNEY INJURY) (HCC): ICD-10-CM

## 2024-04-01 LAB
ANION GAP SERPL CALCULATED.3IONS-SCNC: 11 MMOL/L (ref 4–13)
BUN SERPL-MCNC: 24 MG/DL (ref 5–25)
CALCIUM SERPL-MCNC: 9.7 MG/DL (ref 8.4–10.2)
CHLORIDE SERPL-SCNC: 104 MMOL/L (ref 96–108)
CO2 SERPL-SCNC: 25 MMOL/L (ref 21–32)
CREAT SERPL-MCNC: 1.24 MG/DL (ref 0.6–1.3)
GFR SERPL CREATININE-BSD FRML MDRD: 58 ML/MIN/1.73SQ M
GLUCOSE P FAST SERPL-MCNC: 107 MG/DL (ref 65–99)
POTASSIUM SERPL-SCNC: 4.3 MMOL/L (ref 3.5–5.3)
SODIUM SERPL-SCNC: 140 MMOL/L (ref 135–147)
TSH SERPL DL<=0.05 MIU/L-ACNC: 5.12 UIU/ML (ref 0.45–4.5)

## 2024-04-01 PROCEDURE — 36415 COLL VENOUS BLD VENIPUNCTURE: CPT

## 2024-04-01 PROCEDURE — 84443 ASSAY THYROID STIM HORMONE: CPT

## 2024-04-01 PROCEDURE — 80048 BASIC METABOLIC PNL TOTAL CA: CPT

## 2024-04-01 NOTE — TELEPHONE ENCOUNTER
----- Message from Odell Milian sent at 4/1/2024  8:23 AM EDT -----  Regarding: ICD patient crossed optivol  For your review.  Thank you, Odell JESSICA DC ICD (MVP ON) - ACTIVE SYSTEM IS MRI CONDITIONAL   NB CARELINK TRANSMISSION:  BATTERY VOLTAGE ADEQUATE (12.0 YR.).  AP 27.7%  <0.1%.  ALL LEAD PARAMETERS WITHIN NORMAL LIMITS.  NO SIGNIFICANT HIGH RATE EPISODES.  OPTI-VOL FLUID THRESHOLD CROSSED.  TASK TO HF RN.  NORMAL DEVICE FUNCTION.  RG

## 2024-04-01 NOTE — TELEPHONE ENCOUNTER
Spoke with pt this morning & assisted him to send remote transmission. No auditory alert notification was noted. Stable device function. Report in chart. Advised pt to call if he hears the alarm again. Pt verbalized understanding.

## 2024-04-08 DIAGNOSIS — I25.10 CAD (CORONARY ARTERY DISEASE): ICD-10-CM

## 2024-04-08 RX ORDER — CLOPIDOGREL BISULFATE 75 MG/1
75 TABLET ORAL DAILY
Qty: 30 TABLET | Refills: 0 | Status: SHIPPED | OUTPATIENT
Start: 2024-04-08

## 2024-04-23 ENCOUNTER — NURSE TRIAGE (OUTPATIENT)
Age: 69
End: 2024-04-23

## 2024-04-23 DIAGNOSIS — R06.02 SHORTNESS OF BREATH: Primary | ICD-10-CM

## 2024-04-23 NOTE — TELEPHONE ENCOUNTER
"Pt's wife Eva called in to state that patient informed her today that his weight is up 5pounds in the past 4 days. Recent wt was 222lbs but todays wt 227lbs.  She sates that his left leg is swollen to about the calf area and slightly swollen in the right. She states that typically the pt does not get leg swelling but he will have facial and abdominal swelling which he currently has. Sosa states that pt was reduced on his torsemide to 5mg d/t kidney function.     Please call wife's cell phone back with recommendations #  [phone]        Please advise     Answer Assessment - Initial Assessment Questions  1. ONSET: \"When did the swelling start?\" (e.g., minutes, hours, days)      Today, left leg swollen, slightly in the right, belly and face swelling as weel   2. LOCATION: \"What part of the leg is swollen?\"  \"Are both legs swollen or just one leg?\"      Up to calf in the left leg,   3. SEVERITY: \"How bad is the swelling?\" (e.g., localized; mild, moderate, severe)   - Localized - small area of swelling localized to one leg   - MILD pedal edema - swelling limited to foot and ankle, pitting edema < 1/4 inch (6 mm) deep, rest and elevation eliminate most or all swelling   - MODERATE edema - swelling of lower leg to knee, pitting edema > 1/4 inch (6 mm) deep, rest and elevation only partially reduce swelling   - SEVERE edema - swelling extends above knee, facial or hand swelling present       mild  4. REDNESS: \"Does the swelling look red or infected?\"      No   5. PAIN: \"Is the swelling painful to touch?\" If Yes, ask: \"How painful is it?\"   (Scale 1-10; mild, moderate or severe)      no  6. FEVER: \"Do you have a fever?\" If Yes, ask: \"What is it, how was it measured, and when did it start?\"       no  7. CAUSE: \"What do you think is causing the leg swelling?\"      fluid  8. MEDICAL HISTORY: \"Do you have a history of heart failure, kidney disease, liver failure, or cancer?\"      CHF  9. RECURRENT SYMPTOM: \"Have you " "had leg swelling before?\" If Yes, ask: \"When was the last time?\" \"What happened that time?\"      Yes but usually only in the face and belly   10. OTHER SYMPTOMS: \"Do you have any other symptoms?\" (e.g., chest pain, difficulty breathing)        SOB with walking    Protocols used: Leg Swelling and Edema-ADULT-[place]    "

## 2024-04-23 NOTE — TELEPHONE ENCOUNTER
Please have him take 20mg of torsemide today then tomorrow go to 10 mg daily.  Call by Thursday with an update on his weight.

## 2024-04-25 NOTE — TELEPHONE ENCOUNTER
Ok to stay on 20 mg dose of torsemide, but please get BMP lab draw by Monday of next week. I placed a order in his chart. No need to fast.

## 2024-04-25 NOTE — TELEPHONE ENCOUNTER
Spouse called back stating pt took 20 mg Tuesday, 10 mg yesterday, and today.  He definitely had a positive return on the 20 mg dose...more than the 10 mg.    Wt  Tues 227.8 lbs   Wed 226.5 lbs  Today- 225.8 lbs    LE edema is better, but abdominal bloating is still bothersome

## 2024-04-29 ENCOUNTER — APPOINTMENT (OUTPATIENT)
Dept: LAB | Facility: HOSPITAL | Age: 69
End: 2024-04-29
Payer: MEDICARE

## 2024-04-29 DIAGNOSIS — R06.02 SHORTNESS OF BREATH: ICD-10-CM

## 2024-04-29 DIAGNOSIS — E03.9 ACQUIRED HYPOTHYROIDISM: ICD-10-CM

## 2024-04-29 LAB
ANION GAP SERPL CALCULATED.3IONS-SCNC: 9 MMOL/L (ref 4–13)
BUN SERPL-MCNC: 29 MG/DL (ref 5–25)
CALCIUM SERPL-MCNC: 9.6 MG/DL (ref 8.4–10.2)
CHLORIDE SERPL-SCNC: 101 MMOL/L (ref 96–108)
CO2 SERPL-SCNC: 31 MMOL/L (ref 21–32)
CREAT SERPL-MCNC: 1.49 MG/DL (ref 0.6–1.3)
GFR SERPL CREATININE-BSD FRML MDRD: 46 ML/MIN/1.73SQ M
GLUCOSE P FAST SERPL-MCNC: 101 MG/DL (ref 65–99)
POTASSIUM SERPL-SCNC: 4.2 MMOL/L (ref 3.5–5.3)
SODIUM SERPL-SCNC: 141 MMOL/L (ref 135–147)
TSH SERPL DL<=0.05 MIU/L-ACNC: 4.88 UIU/ML (ref 0.45–4.5)

## 2024-04-29 PROCEDURE — 84443 ASSAY THYROID STIM HORMONE: CPT

## 2024-04-29 PROCEDURE — 80048 BASIC METABOLIC PNL TOTAL CA: CPT

## 2024-04-29 PROCEDURE — 36415 COLL VENOUS BLD VENIPUNCTURE: CPT

## 2024-04-30 ENCOUNTER — TELEPHONE (OUTPATIENT)
Dept: CARDIOLOGY CLINIC | Facility: CLINIC | Age: 69
End: 2024-04-30

## 2024-04-30 DIAGNOSIS — R06.02 SHORTNESS OF BREATH: Primary | ICD-10-CM

## 2024-04-30 NOTE — TELEPHONE ENCOUNTER
Spoke with wife. She states that pt states that he is not having swelling. States that he did go up three lbs since yesterday, but it was hot yesterday and says he was drinking a lot of water. States taht he was going down a lb a day before that.

## 2024-04-30 NOTE — TELEPHONE ENCOUNTER
----- Message from JESUS Lozano sent at 4/29/2024  3:45 PM EDT -----  Please let Mikael and his wife know his kidney function just slightly worsened. How is swelling with the higher dose of torsemide?

## 2024-04-30 NOTE — TELEPHONE ENCOUNTER
Please stay on same dose of torsemide. Continue to monitor swelling/weights and I placed an order in his chart to repeat blood test in 2 weeks.

## 2024-05-06 DIAGNOSIS — I25.10 CAD (CORONARY ARTERY DISEASE): ICD-10-CM

## 2024-05-06 RX ORDER — CLOPIDOGREL BISULFATE 75 MG/1
75 TABLET ORAL DAILY
Qty: 30 TABLET | Refills: 5 | Status: SHIPPED | OUTPATIENT
Start: 2024-05-06

## 2024-05-07 ENCOUNTER — OFFICE VISIT (OUTPATIENT)
Dept: CARDIOLOGY CLINIC | Facility: CLINIC | Age: 69
End: 2024-05-07
Payer: MEDICARE

## 2024-05-07 VITALS
HEIGHT: 70 IN | DIASTOLIC BLOOD PRESSURE: 72 MMHG | SYSTOLIC BLOOD PRESSURE: 130 MMHG | BODY MASS INDEX: 33.14 KG/M2 | HEART RATE: 56 BPM | WEIGHT: 231.5 LBS

## 2024-05-07 DIAGNOSIS — I47.20 VENTRICULAR TACHYCARDIA (HCC): Primary | ICD-10-CM

## 2024-05-07 PROCEDURE — 93000 ELECTROCARDIOGRAM COMPLETE: CPT | Performed by: PHYSICIAN ASSISTANT

## 2024-05-07 PROCEDURE — 99213 OFFICE O/P EST LOW 20 MIN: CPT | Performed by: PHYSICIAN ASSISTANT

## 2024-05-07 RX ORDER — UREA 10 %
325 LOTION (ML) TOPICAL DAILY
COMMUNITY

## 2024-05-07 RX ORDER — ESCITALOPRAM OXALATE 20 MG/1
20 TABLET ORAL DAILY
COMMUNITY

## 2024-05-07 NOTE — PROGRESS NOTES
Electrophysiology Office Note    Mikael Saavedra  1955  [id number]  HEART & VASCULAR [facility] [place]  [address]  [facility] [address]        Assessment/Plan     Primary diagnosis:   Ventricular tachycardia   Recent DEON stent LAD 80% block   NSVT symptomatic at cardiac rehab   Started on amiodarone  On metoprolol succinate 12.5 mg daily    S/p secondary prevention Medtronic dual chamber ICD by Dr. Talbot 11/2/23   Recent echo 12/5/23 with LVEF 65%   EKG here sinus bradycardia with a rate of 55.  Atrial pacing noted.  No significant ectopy  Device check no high rate episodes   Plan:   Currently off amiodarone and maintaining good rhythm without ventricular arrhythmia   Monitor device checks   Continue metoprolol   Severe aortic stenosis   S/p TAVR by Dr. Gooden 12/5/23  CAD s/p DEON to Mid LAD 10/17/23  S/p DEON for 80% lesion   Currently on DAPT with ASA and plavix   LVEF 60% on recent echo in 9/8/23  CKD 3B   Cr 1.4   Being monitored by cardiology with increasing need for diuretics   AGIB   Admitted early November with GI bleed   S/p EGD Dieulafoy's lesion in stomach s/p clipping   Restarted on plavix and ASA given resent stent   HTN   Bp today 130/82   Volume overload  Has been having problems with volume currently on torsemide 20 mg BID by outpatient cardiologist Lola Jackson   Discussed diet today and he does consume a lot of salt   Asked him to watch salt for 1 week and see if this helps with volume     Rhythm History:   Atrial fibrillation:     Atrial flutter:     SVT:     VT/VF/PVC:     Device history:   Pacemaker:    Defibrillator:    BIV PPM:    BIV ICD:    ILR:      Cardiac Testing:     ECHO: No results found for this or any previous visit.        History of Present Illness     HPI/INTERVAL HISTORY:  Wiley is a 68-year-old male with past medical history as mentioned above.     Briefly, patient had history of drug-eluting stent placed for mid LAD lesion of 80%  stenosis noted on cardiac catheterization and was found to have stent near aortic stenosis at that time.  Patient underwent cardiac rehab and was noted to have period of NSVT lasting 30 seconds which broke spontaneously.  He was transferred to the emergency department had another 22nd run of NSVT prompting rapid response and ER visit.  In the ER patient had 2 further self terminating episodes of NSVT and was started on IV amiodarone was eventually changed to p.o. amiodarone after having developed bradycardia on IV amiodarone.  Patient was seen by electrophysiology at [facility] and secondary prevention ICD was placed by Dr. Talbot with dual-chamber Medtronic ICD.  Patient's postop course was complicated by crepitus in the left neck area which was evaluated and found to have no significant pneumothorax.  Patient was discharged after an read presented to hospital shortly after with acute GI bleed at which time his Plavix, and aspirin were held.  He was found to have Dula Jaleel's lesion of stomach which underwent clipping and was restarted on aspirin and Plavix after discharge.  Patient underwent elective aortic valve replacement via TAVR for his severe aortic stenosis.  He was started on lisinopril for blood pressure, and coronary artery disease at that time however subsequently developed cough and was changed to losartan 25 mg.  He did have some volume overload in the post TAVR setting and his Lasix was increased from 40 mg to 80 mg.  Recently, patient has been feeling overall well.  He has been reporting some mild shortness of breath especially with exertion as well as abdominal distention.  He was being followed by his outpatient cardiologist who increased his Lasix from 40 mg daily to 80 mg for a couple of days to see if he could lose some extra volume.  He still complaining of some abdominal fullness and I have instructed him to keep Lasix dose at 80 for now x 2 to 3 days and will reassess.  In  terms of rhythm, he has not felt any palpitations, chest pain, skipped beats, near-syncope, or any symptoms of VT.  Device check in office today did not show any high rate episodes.  We spent a significant amount of time going through his multiple diagnoses and how they are all interrelated.  He was very appreciative of time spent with him to go over his complex medical history.    24: patient is doing well today. He did not have any ventricular arrhythmias on his device check and has not had any significant symptoms. He is still having problems with volume retention and recently had his torsemide increased by outpatient cardiology. Has been eating a lot of salt recently as he does eat out frequently.       Review of Systems   Constitutional:  Negative for diaphoresis and fatigue.   Respiratory:  Negative for chest tightness and shortness of breath.    Cardiovascular:  Negative for chest pain and leg swelling.   Gastrointestinal:  Positive for abdominal distention. Negative for constipation.   Neurological:  Negative for dizziness and light-headedness.   All other systems reviewed and are negative.    ROS as noted above, otherwise 12 point review of systems was performed and is negative.       Historical Information   Social History     Socioeconomic History    Marital status: /Civil Union     Spouse name: Not on file    Number of children: Not on file    Years of education: Not on file    Highest education level: Not on file   Occupational History    Not on file   Tobacco Use    Smoking status: Former     Current packs/day: 0.00     Average packs/day: 0.5 packs/day for 50.0 years (25.0 ttl pk-yrs)     Types: Cigarettes     Start date: 11/3/1973     Quit date: 11/3/2023     Years since quittin.5    Smokeless tobacco: Never   Vaping Use    Vaping status: Never Used   Substance and Sexual Activity    Alcohol use: Not Currently     Comment: socially-2 drinks per week    Drug use: Not Currently      Comment: sporadic in young adulthood    Sexual activity: Not on file     Comment: defer   Other Topics Concern    Not on file   Social History Narrative    Not on file     Social Determinants of Health     Financial Resource Strain: Medium Risk (12/13/2023)    Received from [facility]    Overall Financial Resource Strain (CARDIA)     Difficulty of Paying Living Expenses: Somewhat hard   Food Insecurity: No Food Insecurity (12/13/2023)    Received from [facility]    Hunger Vital Sign     Worried About Running Out of Food in the Last Year: Never true     Ran Out of Food in the Last Year: Never true   Transportation Needs: No Transportation Needs (12/13/2023)    Received from [facility]    PRAPARE - Transportation     Lack of Transportation (Medical): No     Lack of Transportation (Non-Medical): No   Physical Activity: Inactive (12/13/2023)    Received from [facility]    Exercise Vital Sign     Days of Exercise per Week: 0 days     Minutes of Exercise per Session: 0 min   Stress: Stress Concern Present (12/13/2023)    Received from [facility]    [place] [employer] - Occupational Stress Questionnaire     Feeling of Stress : Rather much   Social Connections: Socially Integrated (12/13/2023)    Received from [facility]    Social Connection and Isolation Panel [NHANES]     Frequency of Communication with Friends and Family: Twice a week     Frequency of Social Gatherings with Friends and Family: Three times a week     Attends Confucianism Services: More than 4 times per year     Active Member of Clubs or Organizations: Yes     Attends Club or Organization Meetings: More than 4 times per year     Marital Status:    Intimate Partner Violence: Not At Risk (12/13/2023)    Received from [facility]    Humiliation, Afraid, Rape, and Kick questionnaire     Fear of Current or  "Ex-Partner: No     Emotionally Abused: No     Physically Abused: No     Sexually Abused: No   Housing Stability: Low Risk  (12/13/2023)    Received from [facility]    Housing Stability Vital Sign     Unable to Pay for Housing in the Last Year: No     Number of Places Lived in the Last Year: 1     Unstable Housing in the Last Year: No     Past Medical History:   Diagnosis Date    Anxiety     Aortic stenosis     Borderline high cholesterol     CHF (congestive heart failure) (HCC) 1/4/2024    Asaf Jackson-cardio-    Colon polyp     Depression     Heart murmur     Hypertension     Hypothyroidism     Prediabetes     PVCs (premature ventricular contractions)     \"skip\"    s/p Medtronic dual chamber ICD 11/2/2023 11/04/2023    Wears glasses     readers     Past Surgical History:   Procedure Laterality Date    APPENDECTOMY      BACK SURGERY      CARDIAC CATHETERIZATION N/A 10/17/2023    Procedure: Cardiac RHC/LHC;  Surgeon: Phillip Pfeiffer MD;  Location: [facility];  Service: Cardiology    CARDIAC CATHETERIZATION N/A 12/5/2023    Procedure: Cardiac tavr;  Surgeon: Malcolm Humphrey DO;  Location: [facility];  Service: Cardiology    CARDIAC ELECTROPHYSIOLOGY PROCEDURE N/A 11/2/2023    Procedure: Cardiac icd implant;  Surgeon: Jered Talbot MD;  Location: [facility];  Service: Cardiology    CATARACT EXTRACTION Bilateral     CLOSURE DELAYED PRIMARY Left 06/09/2023    Procedure: CLOSURE DELAYED PRIMARY;  Surgeon: Tuan Simms DPM;  Location: [facility];  Service: Podiatry    COLONOSCOPY  2023    [place]    IA CORRJ HLX VLGS BNCTY SESMDC RESCJ PROX PHLX BASE Left 04/19/2023    Procedure: BUNIONECTOMY PERDOMO and wound debridement;  Surgeon: Clover Mott DPM;  Location: [facility];  Service: Podiatry    IA REPLACE AORTIC VALVE OPENFEMORAL ARTERY APPROACH N/A 12/5/2023    Procedure: REPLACEMENT AORTIC VALVE TRANSCATHETER (TAVR) TRANSFEMORAL W/ 26MM SHERMAN ADRIANNA " S3 UR VALVE(ACCESS ON LEFT) MAKEDA;  Surgeon: Gerald Gooden MD;  Location: [facility];  Service: Cardiac Surgery    TOE AMPUTATION Left 2023    Procedure: PARTIAL 1ST RAY AMPUTATION;  Surgeon: Tuan Simms DPM;  Location:  MAIN OR;  Service: Podiatry    TONSILLECTOMY      TOTAL SHOULDER REPLACEMENT Left     WISDOM TOOTH EXTRACTION       Social History     Substance and Sexual Activity   Alcohol Use Not Currently    Comment: socially-2 drinks per week     Social History     Substance and Sexual Activity   Drug Use Not Currently    Comment: sporadic in young adulthood     Social History     Tobacco Use   Smoking Status Former    Current packs/day: 0.00    Average packs/day: 0.5 packs/day for 50.0 years (25.0 ttl pk-yrs)    Types: Cigarettes    Start date: 11/3/1973    Quit date: 11/3/2023    Years since quittin.5   Smokeless Tobacco Never     Family History   Problem Relation Age of Onset    Dementia Mother     Blindness Mother     Cancer Mother     Dementia Father        Meds/Allergies       Current Outpatient Medications:     Alirocumab 150 MG/ML SOAJ, Inject 150 mg under the skin every 14 (fourteen) days, Disp: 2 mL, Rfl: 11    amiodarone 200 mg tablet, Take 0.5 tablets (100 mg total) by mouth daily Takes 100 mg once daily changed 23., Disp: 45 tablet, Rfl: 0    amLODIPine (NORVASC) 10 mg tablet, Take 10 mg by mouth daily, Disp: , Rfl:     aspirin 81 mg chewable tablet, Chew 1 tablet (81 mg total) daily, Disp: 30 tablet, Rfl: 0    buPROPion (WELLBUTRIN XL) 300 mg 24 hr tablet, , Disp: , Rfl:     clopidogrel (PLAVIX) 75 mg tablet, TAKE ONE TABLET BY MOUTH EVERY DAY, Disp: 30 tablet, Rfl: 5    gabapentin (NEURONTIN) 100 mg capsule, Take 100 mg by mouth daily (Patient not taking: Reported on 2024), Disp: , Rfl:     levothyroxine 100 mcg tablet, Take 100 mcg by mouth daily Monday, Thursday 200 mcg, all other days 100 mcg, Disp: , Rfl:     losartan (COZAAR) 25 mg tablet, Take 1 tablet  (25 mg total) by mouth daily, Disp: 30 tablet, Rfl: 11    Magnesium 400 MG CAPS, Take 1 capsule (400 mg total) by mouth in the morning, Disp: 90 capsule, Rfl: 3    metoprolol succinate (TOPROL-XL) 25 mg 24 hr tablet, Take 0.5 tablets (12.5 mg total) by mouth daily, Disp: 15 tablet, Rfl: 11    Omega-3 Fatty Acids (Fish Oil) 1200 MG CAPS, Take by mouth, Disp: , Rfl:     torsemide (DEMADEX) 20 mg tablet, Take 2 tablets (40 mg total) by mouth daily (Patient taking differently: Take 10 mg by mouth daily), Disp: 60 tablet, Rfl: 11    Allergies   Allergen Reactions    Statins Myalgia     Other reaction(s): muscle aches and joint aches         Objective   Vitals: There were no vitals taken for this visit.        Physical Exam  Vitals and nursing note reviewed.   Constitutional:       General: He is not in acute distress.  HENT:      Head: Normocephalic and atraumatic.   Cardiovascular:      Rate and Rhythm: Normal rate and regular rhythm.   Pulmonary:      Effort: Pulmonary effort is normal.      Breath sounds: Normal breath sounds.   Skin:     General: Skin is warm and dry.   Neurological:      Mental Status: He is alert.   Psychiatric:         Mood and Affect: Mood normal.           Labs:  Appointment on 04/29/2024   Component Date Value    Sodium 04/29/2024 141     Potassium 04/29/2024 4.2     Chloride 04/29/2024 101     CO2 04/29/2024 31     ANION GAP 04/29/2024 9     BUN 04/29/2024 29 (H)     Creatinine 04/29/2024 1.49 (H)     Glucose, Fasting 04/29/2024 101 (H)     Calcium 04/29/2024 9.6     eGFR 04/29/2024 46     TSH 3RD GENERATON 04/29/2024 4.876 (H)    Appointment on 04/01/2024   Component Date Value    Sodium 04/01/2024 140     Potassium 04/01/2024 4.3     Chloride 04/01/2024 104     CO2 04/01/2024 25     ANION GAP 04/01/2024 11     BUN 04/01/2024 24     Creatinine 04/01/2024 1.24     Glucose, Fasting 04/01/2024 107 (H)     Calcium 04/01/2024 9.7     eGFR 04/01/2024 58     TSH 3RD GENERATON 04/01/2024 5.125 (H)         Imaging: I have personally reviewed pertinent reports.

## 2024-05-14 ENCOUNTER — APPOINTMENT (OUTPATIENT)
Dept: LAB | Facility: HOSPITAL | Age: 69
End: 2024-05-14
Payer: MEDICARE

## 2024-05-14 DIAGNOSIS — R06.02 SHORTNESS OF BREATH: ICD-10-CM

## 2024-05-14 LAB
ANION GAP SERPL CALCULATED.3IONS-SCNC: 9 MMOL/L (ref 4–13)
BNP SERPL-MCNC: 32 PG/ML (ref 0–100)
BUN SERPL-MCNC: 28 MG/DL (ref 5–25)
CALCIUM SERPL-MCNC: 9.8 MG/DL (ref 8.4–10.2)
CHLORIDE SERPL-SCNC: 100 MMOL/L (ref 96–108)
CO2 SERPL-SCNC: 29 MMOL/L (ref 21–32)
CREAT SERPL-MCNC: 1.43 MG/DL (ref 0.6–1.3)
GFR SERPL CREATININE-BSD FRML MDRD: 49 ML/MIN/1.73SQ M
GLUCOSE P FAST SERPL-MCNC: 104 MG/DL (ref 65–99)
POTASSIUM SERPL-SCNC: 4.6 MMOL/L (ref 3.5–5.3)
SODIUM SERPL-SCNC: 138 MMOL/L (ref 135–147)

## 2024-05-14 PROCEDURE — 83880 ASSAY OF NATRIURETIC PEPTIDE: CPT

## 2024-05-14 PROCEDURE — 80048 BASIC METABOLIC PNL TOTAL CA: CPT

## 2024-05-14 PROCEDURE — 36415 COLL VENOUS BLD VENIPUNCTURE: CPT

## 2024-05-16 ENCOUNTER — TELEPHONE (OUTPATIENT)
Age: 69
End: 2024-05-16

## 2024-05-16 DIAGNOSIS — I49.3 PVCS (PREMATURE VENTRICULAR CONTRACTIONS): ICD-10-CM

## 2024-05-16 NOTE — TELEPHONE ENCOUNTER
Pt's wife Eva called wanting to know lab results.     Gave provider's message:   Hi Mikael!  Your blood work shows normal test for fluid stretch in the heart. Kidney function is stable from 2 weeks ago. We can continue your current medication for now. Thank you!   Written by JESUS Lozano on 5/14/2024  2:58 PM EDT       Eva understood and she would like to know when pt needs to have blood work again. She states that he is going away until about 5/25.    Please advise.

## 2024-05-22 RX ORDER — MAGNESIUM OXIDE 400 MG
1 CAPSULE ORAL EVERY MORNING
Qty: 90 CAPSULE | Refills: 3 | Status: SHIPPED | OUTPATIENT
Start: 2024-05-22

## 2024-05-26 DIAGNOSIS — I49.3 PVCS (PREMATURE VENTRICULAR CONTRACTIONS): ICD-10-CM

## 2024-05-27 RX ORDER — METOPROLOL SUCCINATE 25 MG/1
12.5 TABLET, EXTENDED RELEASE ORAL DAILY
Qty: 15 TABLET | Refills: 5 | Status: SHIPPED | OUTPATIENT
Start: 2024-05-27

## 2024-05-29 ENCOUNTER — REMOTE DEVICE CLINIC VISIT (OUTPATIENT)
Dept: CARDIOLOGY CLINIC | Facility: CLINIC | Age: 69
End: 2024-05-29
Payer: MEDICARE

## 2024-05-29 DIAGNOSIS — Z95.810 PRESENCE OF AUTOMATIC CARDIOVERTER/DEFIBRILLATOR (AICD): Primary | ICD-10-CM

## 2024-05-29 PROCEDURE — 93295 DEV INTERROG REMOTE 1/2/MLT: CPT | Performed by: INTERNAL MEDICINE

## 2024-05-29 PROCEDURE — 93296 REM INTERROG EVL PM/IDS: CPT | Performed by: INTERNAL MEDICINE

## 2024-05-29 NOTE — PROGRESS NOTES
Results for orders placed or performed in visit on 05/29/24   Cardiac EP device report    Narrative    MDT DC ICD (MVP ON) - ACTIVE SYSTEM IS MRI CONDITIONAL  CARELINK TRANSMISSION: BATTERY VOLTAGE ADEQUATE. (12 YRS) AP 25%  1%. ALL AVAILABLE LEAD PARAMETERS WITHIN NORMAL LIMITS. NO SIGNIFICANT HIGH RATE EPISODES. OPTI-VOL WITHIN NORMAL LIMITS. NORMAL DEVICE FUNCTION.---KNIGHT

## 2024-05-30 ENCOUNTER — APPOINTMENT (OUTPATIENT)
Dept: LAB | Facility: HOSPITAL | Age: 69
End: 2024-05-30
Payer: MEDICARE

## 2024-05-30 DIAGNOSIS — E03.9 ACQUIRED HYPOTHYROIDISM: ICD-10-CM

## 2024-05-30 LAB — TSH SERPL DL<=0.05 MIU/L-ACNC: 2.51 UIU/ML (ref 0.45–4.5)

## 2024-05-30 PROCEDURE — 84443 ASSAY THYROID STIM HORMONE: CPT

## 2024-05-30 PROCEDURE — 36415 COLL VENOUS BLD VENIPUNCTURE: CPT

## 2024-06-04 NOTE — PROGRESS NOTES
Cardiology Follow Up    Mikael Saavedra  1955  [id number]  [facility] [place]  [address]  [address]  [place] [address] [phone]    Mikael presents for follow up of AS s/p TAVR, CAD s/p stent to LAD, HFpEF, VT on amio s/p ICD, HTN.     1. Severe aortic stenosis  Assessment & Plan:  History of aortic stenosis which was reported as moderate on echo 11/2021 at [facility].  Echocardiogram 1/30/2023 continues to show moderate aortic stenosis with PV 3.52, mean gradient 30 mmHg, DI 0.30.  Repeat echo 9/2023 showed progression to severe AS with PV 4.2 m/s, MG 41mmHg, DVI 0.24.  Now s/p TAVR by Dr. Gooden at [facility] 12/5/2023.  Echo 1/4/2024 shows normally functioning AVR.  Given script to resume cardiac rehab.  He does report significant improvement in activity tolerance since valve replacement.  2. S/P TAVR (transcatheter aortic valve replacement)  Assessment & Plan:  S/p 26 mm Chen Benedicto S3 UR valve by Dr. Gooden at [facility] 12/5/23  See discussion under aortic stenosis  3. Coronary artery disease involving native coronary artery of native heart without angina pectoris  Assessment & Plan:  Pre-TAVR cardiac cath 10/17/2023 revealed prox-mid 80% LAD lesion s/p PCI/DEON x 1. Residual 40% mid RCA disease.  - - - - - -  Maintained on aspirin and Plavix.  No anginal complaints.  Continue Praluent 150 mg every 14 days for goal LDL < 70. He has been unable to tolerate statins.  Ok to discontinue Plavix as of 10/17/2024. Continue aspirin lifelong.  4. PVCs (premature ventricular contractions)  Assessment & Plan:  ECG and telemetry monitoring during hospitalization revealed PVC's in the pattern of bigeminy.  History of PVC's per patient report.  Echocardiogram 1/30/2023 with EF 60%.  Magnesium supplementation added with improvement in PVC's.  Holter monitor 3/15/2023 showed 3.7% PVC burden with avg HR 90 bpm.  on metoprolol succinate 12.5 mg daily.  Continue  magnesium.  Now s/p EP eval for VT s/p ICD insertion.  See discussion under VT.    5. Ventricular tachycardia (HCC)  Assessment & Plan:  VT discovered while at cardiac rehab.  He has been evaluated by [facility] EP and underwent AICD insertion 11/2/2023 and started on amiodarone.  Preserved LVEF on serial echocardiograms.  Amio weaned off as of May 2024.  Will monitor device interrogations.     6. s/p Medtronic dual chamber ICD 11/2/2023  7. Heart failure with preserved ejection fraction  Assessment & Plan:  Echocardiogram 1/4/2024 with EF 60%, mild LVH, normal diastolic function. Normal functioning AVR, mild TR with PASP 37mmHg.  BNP 36 on recent labs.  Worsened renal function with up-titration of diuretics.  Currently on torsemide 20 mg daily.  Will continue with 2 g sodium diet, daily weights.  Reviewed s/s to report.  Will monitor closely.  Orders:  -     Basic metabolic panel; Future  8. Benign essential HTN  Assessment & Plan:  Blood pressure is acceptable today.  Currently on amlodipine 10 mg daily, losartan 25 mg daily, metoprolol succinate 12.5 mg daily.   Developed dry cough with lisinopril which resolved with discontinuation.  Recent lab work reviewed.  9. Mixed hyperlipidemia  Assessment & Plan:  Goal LDL < 70  Intolerant of statins.  Now on Praluent 150 mg every 14 days.  Lipid panel 12/11/2023: C 106. T 363. H 49. L not calculated  Ordered repeat lipid panel with next set of labs.  Orders:  -     Lipid Panel with Direct LDL reflex; Future  10. Statin intolerance  Assessment & Plan:  Now on Praluent 150 mg every 14 days.  11. Stage 3a chronic kidney disease (HCC)  Assessment & Plan:  Lab Results   Component Value Date    EGFR 49 05/14/2024    EGFR 46 04/29/2024    EGFR 58 04/01/2024    CREATININE 1.43 (H) 05/14/2024    CREATININE 1.49 (H) 04/29/2024    CREATININE 1.24 04/01/2024     Recent ANNALEE with up-titration of diuretics.  Will continue to monitor closely.  Orders:  -     Basic metabolic panel;  Future  12. JUSTO (obstructive sleep apnea)  -     Ambulatory Referral to Sleep Medicine; Future     HPI  Mikael has a past medical history of aortic stenosis s/p TAVR, CAD s/p DEON to LAD, HFpEF, VT and PVC's s/p AICD on amio, GI bleed, HTN, hypothyroidism. Ongoing tobacco use.     He was admitted to [facility] 1/30-2/6/2023 for sepsis. Cardiology was consulted for evaluation of CHF an MAKEDA for work up of bacteremia. HCTZ was discontinued due to hyponatremia. Furosemide was held due to dehydration. Echo 1/30/2023 showed EF 60% with moderate AS. MAKEDA showed no source of bacteremia. Ultimately source of bacteremia was from a toe wound which ultimately underwent amputation. He had a complicated course with hyperkalemia and ANNALEE. Lisinopril was stopped (also noted to cause dry cough). He was noted to have frequent PVC's on telemetry. A Holter monitor 3/15/2023 showed 3.7% PVC burden without NSVT and he was started on metoprolol succinate.      Echocardiogram 9/2023 revealed severe AS and he was referred to CT surgery for TAVR. Cardiac cath 10/17/2023 as part of pre-TAVR work up resulted in stent placement to an 80% mid LAD lesion with residual non-obstructive disease noted in the mid RCA. He was noted to have NSVT while at cardiac rehab and was sent to [facility] ER 10/30/23. Echocardiogram showed preserved EF. He was ultimately transferred to [facility]. He was evaluated by EP and underwent AICD insertion for primary prevention on 11/2/2023 as well as started on amiodarone loading.      He was admitted to [facility] 11/6-11/9/23 with syncope and found to have acute GI bleed requiring 2 units PRBC. ASA and Plavix were temporarily held but resumed following EGD with cautery and clip to a bleeding vessel.      He was admitted to [facility] 12/5-12/7/2023 where Dr. Gooden placed a 26 mm Chen Benedicto S3 UR valve on 12/5/2023. He was discharged home on post op day 2. Lisinopril was restarted post op for BP management. He reached out to my office on  12/26/23 to report a dry cough and progressive 10 pound weight gain. Lisinopril was discontinued and replaced with losartan 25 mg daily. Furosemide was temporarily increased to 80 mg daily from 40 mg daily. He followed up with EP on 12/29/2023 at which time his amiodarone was reduced from 200 mg daily to 100 mg daily. He was instructed to continue 80 mg of furosemide for 2 days then go to 60 mg daily.     Echocardiogram 1/4/2024 showed LVEF 60% with normal functioning AVR and mild TR with PASP 37mmHg. He met with Dr. Gooden that same day and was released to start cardiac rehab. He continued to c/o shortness of breath and bloating. Metolazone 5 mg daily for 3 days was added. Chest xray showed no acute findings.      Due to persistent complaints he was transitioned from furosemide to torsemide 40 mg daily, however, repeat blood work showed ANNALEE with creatinine of 2. Torsemide was reduced to 10 mg daily.     6/5/2024: Mikael presents today accompanied by his wife for routine follow up. He met with EP on 5/7/2024. He was weaned off amiodarone. Device interrogations show no high rate episodes. He will follow up in 6 months. He continues to c/o fatigue, frequent nighttime awakening and day time naps, bloating, poor recall. He does have documented JUSTO and hypoxia during sleep however has been adamant that he will not tolerate a CPAP. He denies chest pain. He reports some dyspnea on exertion. Some abdominal distention. No lower leg edema or orthopnea. His PCP increased his levothyroxine to 200 mcg daily and TSH on 5/30 was 2. He just returned from a fishing trip to [place]. His wife reached out to report increased swelling and weight gain. His torsemide was increased back to 20 mg daily. BMP shows stable renal function. Next ICD check is 8/20/24.    Medical Problems       Problem List       Leukocytosis    MDD (major depressive disorder)    Peripheral neuropathy    Other emphysema (HCC)    Ulcer of left foot (HCC)    ANNALEE  "(acute kidney injury) ([facility])    Hyperkalemia    MSSA bacteremia    Status post foot surgery    Acute hematogenous osteomyelitis of left foot ([facility])    Borderline high cholesterol    Status post amputation of great toe, left ([facility])    Severe aortic stenosis    Mixed hyperlipidemia    Statin intolerance    Status post insertion of drug-eluting stent into left anterior descending (LAD) artery    Ventricular tachycardia ([facility])    s/p Medtronic dual chamber ICD 2023    Acute GI bleeding    ABLA (acute blood loss anemia)    S/P TAVR (transcatheter aortic valve replacement)    Prediabetes    Hypothyroid    Stage 3a chronic kidney disease ([facility])    Vasovagal syncope    Heart failure with preserved ejection fraction    Benign essential HTN    PVCs (premature ventricular contractions)    Dyspnea on exertion    Episodic lightheadedness    Coronary artery disease involving native coronary artery of native heart without angina pectoris        Past Medical History:   Diagnosis Date    Anxiety     Aortic stenosis     Borderline high cholesterol     CHF (congestive heart failure) ([facility]) 2024    Asaf Jackson-cardio-    Colon polyp     Depression     Heart murmur     Hypertension     Hypothyroidism     Prediabetes     PVCs (premature ventricular contractions)     \"skip\"    s/p Medtronic dual chamber ICD 2023    Wears glasses     readers     Social History     Socioeconomic History    Marital status: /Civil Union     Spouse name: Not on file    Number of children: Not on file    Years of education: Not on file    Highest education level: Not on file   Occupational History    Not on file   Tobacco Use    Smoking status: Former     Current packs/day: 0.00     Average packs/day: 0.5 packs/day for 50.0 years (25.0 ttl pk-yrs)     Types: Cigarettes     Start date: 11/3/1973     Quit date: 11/3/2023     Years since quittin.5    Smokeless tobacco: Never   Vaping Use    Vaping status: Never Used "   Substance and Sexual Activity    Alcohol use: Yes     Alcohol/week: 2.0 standard drinks of alcohol     Types: 2 Cans of beer per week     Comment: once a week    Drug use: Not Currently     Comment: sporadic in young adulthood    Sexual activity: Not on file     Comment: defer   Other Topics Concern    Not on file   Social History Narrative    Not on file     Social Determinants of Health     Financial Resource Strain: Medium Risk (12/13/2023)    [facility]    Overall Financial Resource Strain (CARDIA)     Difficulty of Paying Living Expenses: Somewhat hard   Food Insecurity: No Food Insecurity (12/13/2023)    [facility]    Hunger Vital Sign     Worried About Running Out of Food in the Last Year: Never true     Ran Out of Food in the Last Year: Never true   Transportation Needs: No Transportation Needs (12/13/2023)    [facility]    PRAPARE - Transportation     Lack of Transportation (Medical): No     Lack of Transportation (Non-Medical): No   Physical Activity: Inactive (12/13/2023)    Received from [facility]    Exercise Vital Sign     Days of Exercise per Week: 0 days     Minutes of Exercise per Session: 0 min   Stress: Stress Concern Present (12/13/2023)    [facility]    [employer] - Occupational Stress Questionnaire     Feeling of Stress : Rather much   Social Connections: Socially Integrated (12/13/2023)    [facility]    Social Connection and Isolation Panel [NHANES]     Frequency of Communication with Friends and Family: Twice a week     Frequency of Social Gatherings with Friends and Family: Three times a week     Attends Christianity Services: More than 4  times per year     Active Member of Clubs or Organizations: Yes     Attends Club or Organization Meetings: More than 4 times per year     Marital Status:    Intimate Partner Violence: Not At Risk (12/13/2023)    [facility]    Humiliation, Afraid, Rape, and Kick questionnaire     Fear of Current or Ex-Partner: No     Emotionally Abused: No     Physically Abused: No     Sexually Abused: No   Housing Stability: Low Risk  (12/13/2023)    [facility]    Housing Stability Vital Sign     Unable to Pay for Housing in the Last Year: No     Number of Places Lived in the Last Year: 1     Unstable Housing in the Last Year: No      Family History   Problem Relation Age of Onset    Dementia Mother     Blindness Mother     Cancer Mother     Dementia Father      Past Surgical History:   Procedure Laterality Date    APPENDECTOMY      BACK SURGERY      CARDIAC CATHETERIZATION N/A 10/17/2023    Procedure: Cardiac RHC/LHC;  Surgeon: Phillip Pfeiffer MD;  Location: [facility];  Service: Cardiology    CARDIAC CATHETERIZATION N/A 12/5/2023    Procedure: Cardiac tavr;  Surgeon: Malcolm Humphrey DO;  Location: [facility];  Service: Cardiology    CARDIAC ELECTROPHYSIOLOGY PROCEDURE N/A 11/2/2023    Procedure: Cardiac icd implant;  Surgeon: Jered Talbot MD;  Location: [facility];  Service: Cardiology    CATARACT EXTRACTION Bilateral     CLOSURE DELAYED PRIMARY Left 06/09/2023    Procedure: CLOSURE DELAYED PRIMARY;  Surgeon: Tuan Simms DPM;  Location: [facility];  Service: Podiatry    COL[place]OS[place]  2023    [place]    FL CORRJ HLX VLGS BNCTY SESMDC RESCJ PROX PHLX BASE Left 04/19/2023    Procedure: BUNIONECTOMY PERDOMO and wound debridement;  Surgeon: Clover Mott DPM;  Location: [facility];  Service: Podiatry    FL REPLACE AORTIC VALVE OPENFEMORAL ARTERY APPROACH N/A  12/5/2023    Procedure: REPLACEMENT AORTIC VALVE TRANSCATHETER (TAVR) TRANSFEMORAL W/ 26MM SHERMAN ADRIANNA S3 UR VALVE(ACCESS ON LEFT) MAKEDA;  Surgeon: Gerald Gooden MD;  Location: [facility];  Service: Cardiac Surgery    TOE AMPUTATION Left 06/07/2023    Procedure: PARTIAL 1ST RAY AMPUTATION;  Surgeon: Tuan Simms DPM;  Location: [facility];  Service: Podiatry    TONSILLECTOMY      TOTAL SHOULDER REPLACEMENT Left     WISDOM TOOTH EXTRACTION         Current Outpatient Medications:     Alirocumab 150 MG/ML SOAJ, Inject 150 mg under the skin every 14 (fourteen) days, Disp: 2 mL, Rfl: 11    amLODIPine (NORVASC) 10 mg tablet, Take 10 mg by mouth daily, Disp: , Rfl:     aspirin 81 mg chewable tablet, Chew 1 tablet (81 mg total) daily, Disp: 30 tablet, Rfl: 0    buPROPion (WELLBUTRIN XL) 300 mg 24 hr tablet, , Disp: , Rfl:     clopidogrel (PLAVIX) 75 mg tablet, TAKE ONE TABLET BY MOUTH EVERY DAY, Disp: 30 tablet, Rfl: 5    escitalopram (LEXAPRO) 20 mg tablet, Take 20 mg by mouth daily, Disp: , Rfl:     ferrous sulfate 325 (65 FE) MG EC tablet, Take 325 mg by mouth daily, Disp: , Rfl:     gabapentin (NEURONTIN) 100 mg capsule, Take 200 mg by mouth daily at bedtime, Disp: , Rfl:     Levothyroxine Sodium 200 MCG CAPS, Take 200 mcg by mouth daily, Disp: , Rfl:     losartan (COZAAR) 25 mg tablet, Take 1 tablet (25 mg total) by mouth daily, Disp: 30 tablet, Rfl: 11    Magnesium Oxide -Mg Supplement (Magnesium Extra Strength) 400 MG CAPS, TAKE ONE CAPSULE BY MOUTH IN THE MORNING, Disp: 90 capsule, Rfl: 3    metoprolol succinate (TOPROL-XL) 25 mg 24 hr tablet, TAKE HALF TABLET BY MOUTH DAILY, Disp: 15 tablet, Rfl: 5    Multiple Vitamins-Minerals (ICAPS AREDS 2 PO), Take by mouth, Disp: , Rfl:     Omega-3 Fatty Acids (Fish Oil) 1200 MG CAPS, Take by mouth, Disp: , Rfl:     torsemide (DEMADEX) 20 mg tablet, Take 2 tablets (40 mg total) by mouth daily (Patient taking differently: Take 20 mg by mouth daily), Disp: 60  tablet, Rfl: 11  Allergies   Allergen Reactions    Lisinopril Cough    Statins Myalgia     Other reaction(s): muscle aches and joint aches         Labs:     Chemistry        Component Value Date/Time    K 4.6 05/14/2024 1000    K 3.6 10/30/2023 0913     05/14/2024 1000     10/30/2023 0913    CO2 29 05/14/2024 1000    CO2 26 12/05/2023 0835    CO2 25 10/30/2023 0913    BUN 28 (H) 05/14/2024 1000    BUN 21 10/30/2023 0913    CREATININE 1.43 (H) 05/14/2024 1000    CREATININE 0.95 10/30/2023 0913        Component Value Date/Time    CALCIUM 9.8 05/14/2024 1000    CALCIUM 9.1 10/30/2023 0913    ALKPHOS 87 01/02/2024 1000    ALKPHOS 91 10/30/2023 0913    AST 21 01/02/2024 1000    AST 18 10/30/2023 0913    AST 19 05/28/2019 1720    ALT 15 01/02/2024 1000    ALT 15 10/30/2023 0913    ALT 15 05/28/2019 1720        Lipid panel 12/11/2023: C 106. T 363. H 49. L not calculated    Imaging:   Cardiac EP device report 5/29/2024  MDT DC ICD (MVP ON) - ACTIVE SYSTEM IS MRI CONDITIONAL CARELINK TRANSMISSION: BATTERY VOLTAGE ADEQUATE. (12 YRS) AP 25%  1%. ALL AVAILABLE LEAD PARAMETERS WITHIN NORMAL LIMITS. NO SIGNIFICANT HIGH RATE EPISODES. OPTI-VOL WITHIN NORMAL LIMITS. NORMAL DEVICE FUNCTION.    Echo complete 1/5/2024    Left Ventricle: Left ventricular cavity size is normal. Wall thickness is mildly increased. There is mild concentric hypertrophy. The left ventricular ejection fraction is 60%. Systolic function is normal. Wall motion is normal. Diastolic function is normal.   Aortic Valve: There is an Chen ADRIANNA 3 Ultra 26 mm TAVR bioprosthetic valve. The prosthetic valve appears well-seated and appears to be functioning normally. There is no evidence of paravalvular regurgitation. There is no evidence of transvalvular regurgitation. The gradient recorded across the prosthetic aortic valve is within the expected range. The aortic valve peak velocity is 1.69 m/s. The aortic valve area is 2.19 cm2.   Tricuspid Valve:  There is mild regurgitation. The right ventricular systolic pressure is mildly elevated. The estimated right ventricular systolic pressure is 37.00 mmHg.      Cardiac cath 10/17/2023:    Mid RCA lesion is 40% stenosed.    Prox LAD to Mid LAD lesion is 80% stenosed.     Echo 9/8/2023:    Left Ventricle: Left ventricular cavity size is normal. Wall thickness is mildly increased. The left ventricular ejection fraction is 60% by visual estimation.. Systolic function is normal. Wall motion is normal. Diastolic function is mildly abnormal, consistent with grade I (abnormal) relaxation.    Left Atrium: The atrium is dilated.    Aortic Valve: The leaflets are moderately calcified. There is severe stenosis. The aortic valve peak velocity is 4.2 m/s. The aortic valve mean gradient is 41 mmHg. The dimensionless velocity index is 0.24. The aortic valve area is 1.00 cm2.    AS now appears severe compared to previous study from 1/2023     48 hour Holter monitor 7/18/2023:  The patient had predominantly sinus rhythm.   Heart rate varied from 49 bpm to 112 bpm.  The patient’s average heart rate was 75 bpm.   The patient had a holter monitor tracing done for 47 hours and 59 minutes.  The patient had 25383 ventricular ectopic beats accounting for 5.5% of total beats.   The patient had 35 supraventricular ectopic beats.  The longest R/R interval was 1.5 seconds.  Ventricular trigeminy and bigeminy was noted  PVCs in the pattern of triplets and couplets noted.   Patient noted feeling tired associated with NSR.      Lexiscan nuclear stress test 7/18/2023:    Stress Function: Left ventricular function post-stress is normal. Stress ejection fraction is 59 %.  Normal regional wall motion noted.    Perfusion: There are no perfusion defects.    Stress Combined Conclusion: The ECG and SPECT imaging portions of the stress study are concordant with no evidence of stress induced myocardial ischemia. Left ventricular perfusion is normal.   "Post-rest ejection fraction is determined as 59%.    Stress ECG: No ST deviation is noted. There were no arrhythmias during recovery. . The ECG was negative for ischemia.    No anginal-like symptoms were reported during the stress test.    Normal resting blood pressure.  Appropriate blood pressure response was noted during the stress test.     24 hour Holter monitor 3/15/2023:  Predominantly sinus rhythm, HR , avg 90 bpm.  3.7% PVC burden. Rare PAC's. One asymptomatic 4 beat atrial run.      ECG 2/10/2023: Normal sinus rhythm.     MAKEDA 2/3/2023:  EF 60%. LA mildly dilated.   Mod AS. Trace MR. Trace TR.     Echocardiogram 1/30/2023:  EF 60%, mild LVH. Grade 1 DD.  Mod AS, PV 3.52 m/s, MG 30 mmHg, DI 0.30. Mild AI.  MAC with trace MR. Trace TR. PASP 41 mmHg.  Aortic root 3.8 cm. Ascending aorta 3.3. cm.     ECG 1/30/2023: Sinus rhythm with frequent PVC's. Nonspecific ST abnormality.    Review of Systems   Constitutional: Positive for malaise/fatigue.   HENT: Negative.     Cardiovascular:  Positive for dyspnea on exertion. Negative for chest pain, irregular heartbeat, leg swelling, near-syncope, orthopnea and palpitations.   Respiratory:  Positive for sleep disturbances due to breathing and snoring. Negative for cough.    Endocrine: Negative.    Skin: Negative.    Musculoskeletal: Negative.    Gastrointestinal:  Positive for bloating.   Genitourinary: Negative.    Neurological: Negative.    Psychiatric/Behavioral: Negative.         Vitals:    06/05/24 1453   BP: 128/80   Pulse: 80   SpO2: 97%     Vitals:    06/05/24 1453   Weight: 105 kg (232 lb)     Height: 5' 10\" (177.8 cm)   Body mass index is 33.29 kg/m².    Physical Exam  Vitals and nursing note reviewed.   Constitutional:       General: He is not in acute distress.     Appearance: He is well-developed. He is obese. He is not diaphoretic.   HENT:      Head: Normocephalic and atraumatic.   Neck:      Vascular: No carotid bruit or JVD.   Cardiovascular:      " Rate and Rhythm: Normal rate and regular rhythm. No extrasystoles are present.     Pulses: Intact distal pulses.      Heart sounds: S1 normal and S2 normal. Murmur heard.      Systolic murmur is present.      No friction rub. No gallop.      Comments: No lower leg edema  Pulmonary:      Effort: Pulmonary effort is normal. No respiratory distress.      Breath sounds: Normal breath sounds.   Abdominal:      General: There is no distension.      Palpations: Abdomen is soft.      Tenderness: There is no abdominal tenderness.   Skin:     General: Skin is warm and dry.      Findings: No rash.   Neurological:      Mental Status: He is alert and oriented to person, place, and time.   Psychiatric:         Mood and Affect: Mood and affect normal.         Behavior: Behavior normal.

## 2024-06-05 ENCOUNTER — OFFICE VISIT (OUTPATIENT)
Dept: CARDIOLOGY CLINIC | Facility: CLINIC | Age: 69
End: 2024-06-05
Payer: MEDICARE

## 2024-06-05 VITALS
HEIGHT: 70 IN | WEIGHT: 232 LBS | OXYGEN SATURATION: 97 % | HEART RATE: 80 BPM | DIASTOLIC BLOOD PRESSURE: 80 MMHG | BODY MASS INDEX: 33.21 KG/M2 | SYSTOLIC BLOOD PRESSURE: 128 MMHG

## 2024-06-05 DIAGNOSIS — I25.10 CORONARY ARTERY DISEASE INVOLVING NATIVE CORONARY ARTERY OF NATIVE HEART WITHOUT ANGINA PECTORIS: ICD-10-CM

## 2024-06-05 DIAGNOSIS — I35.0 SEVERE AORTIC STENOSIS: Primary | ICD-10-CM

## 2024-06-05 DIAGNOSIS — I10 BENIGN ESSENTIAL HTN: ICD-10-CM

## 2024-06-05 DIAGNOSIS — I49.3 PVCS (PREMATURE VENTRICULAR CONTRACTIONS): ICD-10-CM

## 2024-06-05 DIAGNOSIS — Z78.9 STATIN INTOLERANCE: ICD-10-CM

## 2024-06-05 DIAGNOSIS — I47.20 VENTRICULAR TACHYCARDIA (HCC): ICD-10-CM

## 2024-06-05 DIAGNOSIS — R06.02 SHORTNESS OF BREATH: ICD-10-CM

## 2024-06-05 DIAGNOSIS — N18.31 STAGE 3A CHRONIC KIDNEY DISEASE (HCC): ICD-10-CM

## 2024-06-05 DIAGNOSIS — Z95.810 ICD (IMPLANTABLE CARDIOVERTER-DEFIBRILLATOR) IN PLACE: ICD-10-CM

## 2024-06-05 DIAGNOSIS — E78.2 MIXED HYPERLIPIDEMIA: ICD-10-CM

## 2024-06-05 DIAGNOSIS — Z95.2 S/P TAVR (TRANSCATHETER AORTIC VALVE REPLACEMENT): ICD-10-CM

## 2024-06-05 DIAGNOSIS — G47.33 OSA (OBSTRUCTIVE SLEEP APNEA): ICD-10-CM

## 2024-06-05 PROCEDURE — 99214 OFFICE O/P EST MOD 30 MIN: CPT | Performed by: NURSE PRACTITIONER

## 2024-06-05 NOTE — ASSESSMENT & PLAN NOTE
Pre-TAVR cardiac cath 10/17/2023 revealed prox-mid 80% LAD lesion s/p PCI/DEON x 1. Residual 40% mid RCA disease.  - - - - - -  Maintained on aspirin and Plavix.  No anginal complaints.  Continue Praluent 150 mg every 14 days for goal LDL < 70. He has been unable to tolerate statins.  Ok to discontinue Plavix as of 10/17/2024. Continue aspirin lifelong.

## 2024-06-05 NOTE — ASSESSMENT & PLAN NOTE
Blood pressure is acceptable today.  Currently on amlodipine 10 mg daily, losartan 25 mg daily, metoprolol succinate 12.5 mg daily.   Developed dry cough with lisinopril which resolved with discontinuation.  Recent lab work reviewed.

## 2024-06-05 NOTE — PATIENT INSTRUCTIONS
Blood work within 1 month to  monitor kidney function.  I recommend repeat thyroid testing in 8 weeks since you are now off amio   Thyroid will continue to correct being off amio and your weight should come down. Please watch sodium on food labels and limit total to 2000 mg daily.  Limit carbs. Increase exercise.  You should consider meeting with the sleep doctor to talk about treatment for sleep apnea because I think this is causing your symptoms.   Call with any concerns.

## 2024-06-05 NOTE — ASSESSMENT & PLAN NOTE
VT discovered while at cardiac rehab.  He has been evaluated by [facility] EP and underwent AICD insertion 11/2/2023 and started on amiodarone.  Preserved LVEF on serial echocardiograms.  Amio weaned off as of May 2024.  Will monitor device interrogations.

## 2024-06-05 NOTE — ASSESSMENT & PLAN NOTE
Lab Results   Component Value Date    EGFR 49 05/14/2024    EGFR 46 04/29/2024    EGFR 58 04/01/2024    CREATININE 1.43 (H) 05/14/2024    CREATININE 1.49 (H) 04/29/2024    CREATININE 1.24 04/01/2024     Recent ANNALEE with up-titration of diuretics.  Will continue to monitor closely.

## 2024-06-05 NOTE — ASSESSMENT & PLAN NOTE
S/p 26 mm Chen Benedicto S3 UR valve by Dr. Gooden at [facility] 12/5/23  See discussion under aortic stenosis

## 2024-06-05 NOTE — ASSESSMENT & PLAN NOTE
ECG and telemetry monitoring during hospitalization revealed PVC's in the pattern of bigeminy.  History of PVC's per patient report.  Echocardiogram 1/30/2023 with EF 60%.  Magnesium supplementation added with improvement in PVC's.  Holter monitor 3/15/2023 showed 3.7% PVC burden with avg HR 90 bpm.  on metoprolol succinate 12.5 mg daily.  Continue magnesium.  Now s/p EP eval for VT s/p ICD insertion.  See discussion under VT.

## 2024-06-05 NOTE — ASSESSMENT & PLAN NOTE
Echocardiogram 1/4/2024 with EF 60%, mild LVH, normal diastolic function. Normal functioning AVR, mild TR with PASP 37mmHg.  BNP 36 on recent labs.  Worsened renal function with up-titration of diuretics.  Currently on torsemide 20 mg daily.  Will continue with 2 g sodium diet, daily weights.  Reviewed s/s to report.  Will monitor closely.

## 2024-06-05 NOTE — ASSESSMENT & PLAN NOTE
Goal LDL < 70  Intolerant of statins.  Now on Praluent 150 mg every 14 days.  Lipid panel 12/11/2023: C 106. T 363. H 49. L not calculated  Ordered repeat lipid panel with next set of labs.

## 2024-06-05 NOTE — ASSESSMENT & PLAN NOTE
History of aortic stenosis which was reported as moderate on echo 11/2021 at [facility].  Echocardiogram 1/30/2023 continues to show moderate aortic stenosis with PV 3.52, mean gradient 30 mmHg, DI 0.30.  Repeat echo 9/2023 showed progression to severe AS with PV 4.2 m/s, MG 41mmHg, DVI 0.24.  Now s/p TAVR by Dr. Gooden at [facility] 12/5/2023.  Echo 1/4/2024 shows normally functioning AVR.  Given script to resume cardiac rehab.  He does report significant improvement in activity tolerance since valve replacement.

## 2024-08-27 ENCOUNTER — HOSPITAL ENCOUNTER (EMERGENCY)
Facility: HOSPITAL | Age: 69
Discharge: HOME | End: 2024-08-27
Attending: EMERGENCY MEDICINE
Payer: MEDICARE

## 2024-08-27 ENCOUNTER — APPOINTMENT (EMERGENCY)
Dept: RADIOLOGY | Facility: HOSPITAL | Age: 69
End: 2024-08-27
Payer: MEDICARE

## 2024-08-27 VITALS
TEMPERATURE: 97.9 F | DIASTOLIC BLOOD PRESSURE: 66 MMHG | WEIGHT: 228.62 LBS | BODY MASS INDEX: 32.73 KG/M2 | HEIGHT: 70 IN | OXYGEN SATURATION: 94 % | SYSTOLIC BLOOD PRESSURE: 124 MMHG | RESPIRATION RATE: 18 BRPM | HEART RATE: 69 BPM

## 2024-08-27 DIAGNOSIS — S29.019A STRAIN OF THORACIC REGION, INITIAL ENCOUNTER: Primary | ICD-10-CM

## 2024-08-27 LAB
ALBUMIN SERPL BCG-MCNC: 4.2 G/DL (ref 3.5–5)
ALP SERPL-CCNC: 87 U/L (ref 34–104)
ALT SERPL W P-5'-P-CCNC: 17 U/L (ref 7–52)
ANION GAP SERPL CALCULATED.3IONS-SCNC: 9 MMOL/L (ref 4–13)
AST SERPL W P-5'-P-CCNC: 17 U/L (ref 13–39)
ATRIAL RATE: 69 BPM
BASOPHILS # BLD AUTO: 0.05 THOUSANDS/ÂΜL (ref 0–0.1)
BASOPHILS NFR BLD AUTO: 0 % (ref 0–1)
BILIRUB SERPL-MCNC: 0.52 MG/DL (ref 0.2–1)
BUN SERPL-MCNC: 18 MG/DL (ref 5–25)
CALCIUM SERPL-MCNC: 9.6 MG/DL (ref 8.4–10.2)
CARDIAC TROPONIN I PNL SERPL HS: 5 NG/L
CHLORIDE SERPL-SCNC: 102 MMOL/L (ref 96–108)
CO2 SERPL-SCNC: 26 MMOL/L (ref 21–32)
CREAT SERPL-MCNC: 1.13 MG/DL (ref 0.6–1.3)
D DIMER PPP FEU-MCNC: 0.61 UG/ML FEU
EOSINOPHIL # BLD AUTO: 0.4 THOUSAND/ÂΜL (ref 0–0.61)
EOSINOPHIL NFR BLD AUTO: 3 % (ref 0–6)
ERYTHROCYTE [DISTWIDTH] IN BLOOD BY AUTOMATED COUNT: 12.3 % (ref 11.6–15.1)
GFR SERPL CREATININE-BSD FRML MDRD: 65 ML/MIN/1.73SQ M
GLUCOSE SERPL-MCNC: 114 MG/DL (ref 65–140)
HCT VFR BLD AUTO: 47.1 % (ref 36.5–49.3)
HGB BLD-MCNC: 16.2 G/DL (ref 12–17)
IMM GRANULOCYTES # BLD AUTO: 0.03 THOUSAND/UL (ref 0–0.2)
IMM GRANULOCYTES NFR BLD AUTO: 0 % (ref 0–2)
LYMPHOCYTES # BLD AUTO: 1.41 THOUSANDS/ÂΜL (ref 0.6–4.47)
LYMPHOCYTES NFR BLD AUTO: 11 % (ref 14–44)
MCH RBC QN AUTO: 32 PG (ref 26.8–34.3)
MCHC RBC AUTO-ENTMCNC: 34.4 G/DL (ref 31.4–37.4)
MCV RBC AUTO: 93 FL (ref 82–98)
MONOCYTES # BLD AUTO: 1.07 THOUSAND/ÂΜL (ref 0.17–1.22)
MONOCYTES NFR BLD AUTO: 9 % (ref 4–12)
NEUTROPHILS # BLD AUTO: 9.59 THOUSANDS/ÂΜL (ref 1.85–7.62)
NEUTS SEG NFR BLD AUTO: 77 % (ref 43–75)
NRBC BLD AUTO-RTO: 0 /100 WBCS
P AXIS: 63 DEGREES
PLATELET # BLD AUTO: 286 THOUSANDS/UL (ref 149–390)
PMV BLD AUTO: 9.3 FL (ref 8.9–12.7)
POTASSIUM SERPL-SCNC: 4.2 MMOL/L (ref 3.5–5.3)
PR INTERVAL: 162 MS
PROT SERPL-MCNC: 7.4 G/DL (ref 6.4–8.4)
QRS AXIS: 64 DEGREES
QRSD INTERVAL: 84 MS
QT INTERVAL: 404 MS
QTC INTERVAL: 432 MS
RBC # BLD AUTO: 5.06 MILLION/UL (ref 3.88–5.62)
SODIUM SERPL-SCNC: 137 MMOL/L (ref 135–147)
T WAVE AXIS: 66 DEGREES
VENTRICULAR RATE: 69 BPM
WBC # BLD AUTO: 12.55 THOUSAND/UL (ref 4.31–10.16)

## 2024-08-27 PROCEDURE — 99285 EMERGENCY DEPT VISIT HI MDM: CPT | Performed by: EMERGENCY MEDICINE

## 2024-08-27 PROCEDURE — 93010 ELECTROCARDIOGRAM REPORT: CPT | Performed by: INTERNAL MEDICINE

## 2024-08-27 PROCEDURE — 85379 FIBRIN DEGRADATION QUANT: CPT | Performed by: EMERGENCY MEDICINE

## 2024-08-27 PROCEDURE — 84484 ASSAY OF TROPONIN QUANT: CPT | Performed by: EMERGENCY MEDICINE

## 2024-08-27 PROCEDURE — 71045 X-RAY EXAM CHEST 1 VIEW: CPT

## 2024-08-27 PROCEDURE — 85025 COMPLETE CBC W/AUTO DIFF WBC: CPT | Performed by: EMERGENCY MEDICINE

## 2024-08-27 PROCEDURE — 93005 ELECTROCARDIOGRAM TRACING: CPT

## 2024-08-27 PROCEDURE — 99283 EMERGENCY DEPT VISIT LOW MDM: CPT

## 2024-08-27 PROCEDURE — 36415 COLL VENOUS BLD VENIPUNCTURE: CPT | Performed by: EMERGENCY MEDICINE

## 2024-08-27 PROCEDURE — 80053 COMPREHEN METABOLIC PANEL: CPT | Performed by: EMERGENCY MEDICINE

## 2024-08-27 PROCEDURE — 96374 THER/PROPH/DIAG INJ IV PUSH: CPT

## 2024-08-27 RX ORDER — METHOCARBAMOL 500 MG/1
500 TABLET, FILM COATED ORAL ONCE
Status: COMPLETED | OUTPATIENT
Start: 2024-08-27 | End: 2024-08-27

## 2024-08-27 RX ORDER — METHYLPREDNISOLONE 4 MG
TABLET, DOSE PACK ORAL
Qty: 21 TABLET | Refills: 0 | Status: SHIPPED | OUTPATIENT
Start: 2024-08-27

## 2024-08-27 RX ORDER — FENTANYL CITRATE 50 UG/ML
50 INJECTION, SOLUTION INTRAMUSCULAR; INTRAVENOUS ONCE
Status: COMPLETED | OUTPATIENT
Start: 2024-08-27 | End: 2024-08-27

## 2024-08-27 RX ORDER — METHOCARBAMOL 500 MG/1
500 TABLET, FILM COATED ORAL 2 TIMES DAILY PRN
Qty: 20 TABLET | Refills: 0 | Status: SHIPPED | OUTPATIENT
Start: 2024-08-27

## 2024-08-27 RX ADMIN — METHOCARBAMOL TABLETS 500 MG: 500 TABLET, COATED ORAL at 08:50

## 2024-08-27 RX ADMIN — FENTANYL CITRATE 50 MCG: 50 INJECTION INTRAMUSCULAR; INTRAVENOUS at 08:50

## 2024-08-27 NOTE — ED PROVIDER NOTES
History  Chief Complaint   Patient presents with    Back Pain     Patient reports upper back pain starting Saturday evening that radiates towards his neck and down his L arm. Was lifting ramps earlier Saturday morning. Has pacer/defib       History provided by:  Medical records and patient  Back Pain  Location:  Thoracic spine  Quality:  Aching and shooting  Radiates to:  L shoulder  Pain severity:  Moderate  Pain is:  Same all the time  Onset quality:  Gradual  Duration:  3 days  Timing:  Intermittent  Progression:  Waxing and waning  Chronicity:  New  Context comment:  Patient states he rented a mower, was loading up on a trailer most of the day Saturday, 3 days prior to arrival, mowing grass in [facility], later that evening developed left upper back pain radiating to the left neck, left shoulder  Relieved by:  Nothing  Worsened by:  Bending, twisting and movement  Ineffective treatments:  None tried  Associated symptoms: no abdominal pain, no abdominal swelling, no bladder incontinence, no bowel incontinence, no chest pain, no dysuria, no fever, no headaches, no leg pain, no numbness, no paresthesias, no pelvic pain, no perianal numbness, no tingling, no weakness and no weight loss        Prior to Admission Medications   Prescriptions Last Dose Informant Patient Reported? Taking?   Alirocumab 150 MG/ML SOAJ Past Month Self, Spouse/Significant Other No Yes   Sig: Inject 150 mg under the skin every 14 (fourteen) days   Levothyroxine Sodium 200 MCG CAPS 8/27/2024 Spouse/Significant Other, Self Yes Yes   Sig: Take 200 mcg by mouth daily   Magnesium Oxide -Mg Supplement (Magnesium Extra Strength) 400 MG CAPS 8/26/2024  No Yes   Sig: TAKE ONE CAPSULE BY MOUTH IN THE MORNING   Multiple Vitamins-Minerals (ICAPS AREDS 2 PO) 8/27/2024  Yes Yes   Sig: Take by mouth   Omega-3 Fatty Acids (Fish Oil) 1200 MG CAPS 8/26/2024 Self, Spouse/Significant Other Yes Yes   Sig: Take by mouth   amLODIPine (NORVASC) 10 mg tablet  "8/27/2024 Spouse/Significant Other, Self Yes Yes   Sig: Take 10 mg by mouth daily   aspirin 81 mg chewable tablet 8/27/2024 Self, Spouse/Significant Other No Yes   Sig: Chew 1 tablet (81 mg total) daily   buPROPion (WELLBUTRIN XL) 300 mg 24 hr tablet 8/27/2024 Self, Spouse/Significant Other Yes Yes   clopidogrel (PLAVIX) 75 mg tablet 8/27/2024  No Yes   Sig: TAKE ONE TABLET BY MOUTH EVERY DAY   escitalopram (LEXAPRO) 20 mg tablet 8/26/2024  Yes Yes   Sig: Take 20 mg by mouth daily   ferrous sulfate 325 (65 FE) MG EC tablet 8/27/2024  Yes Yes   Sig: Take 325 mg by mouth daily   gabapentin (NEURONTIN) 100 mg capsule 8/26/2024 Spouse/Significant Other, Self Yes Yes   Sig: Take 200 mg by mouth daily at bedtime   losartan (COZAAR) 25 mg tablet 8/27/2024  No Yes   Sig: Take 1 tablet (25 mg total) by mouth daily   metoprolol succinate (TOPROL-XL) 25 mg 24 hr tablet 8/27/2024  No Yes   Sig: TAKE HALF TABLET BY MOUTH DAILY   torsemide (DEMADEX) 20 mg tablet 8/27/2024  No Yes   Sig: Take 2 tablets (40 mg total) by mouth daily   Patient taking differently: Take 20 mg by mouth daily      Facility-Administered Medications: None       Past Medical History:   Diagnosis Date    Anxiety     Aortic stenosis     Borderline high cholesterol     CHF (congestive heart failure) ([facility]) 1/4/2024    Asaf Jackson-cardio-    Colon polyp     Depression     Heart murmur     Hypertension     Hypothyroidism     Prediabetes     PVCs (premature ventricular contractions)     \"skip\"    s/p Medtronic dual chamber ICD 11/2/2023 11/04/2023    Wears glasses     readers       Past Surgical History:   Procedure Laterality Date    APPENDECTOMY      BACK SURGERY      CARDIAC CATHETERIZATION N/A 10/17/2023    Procedure: Cardiac RHC/LHC;  Surgeon: Phillip Pfeiffer MD;  [facility]: [facility];  Service: Cardiology    CARDIAC CATHETERIZATION N/A 12/5/2023    Procedure: Cardiac tavr;  Surgeon: Malcolm Humphrey DO;  Location: [facility];  Service: " Cardiology    CARDIAC ELECTROPHYSIOLOGY PROCEDURE N/A 2023    Procedure: Cardiac icd implant;  Surgeon: Jered Talbot MD;  [facility]: [facility];  Service: Cardiology    CATARACT EXTRACTION Bilateral     CLOSURE DELAYED PRIMARY Left 2023    Procedure: CLOSURE DELAYED PRIMARY;  Surgeon: Tuan Simms DPM;  Location: [facility];  Service: Podiatry    COLONOSCOPY      [place], [place]    DE CORRJ HLX VLGS BNCTY SESMDC RESCJ PROX PHLX BASE Left 2023    Procedure: BUNIONECTOMY PERDOMO and wound debridement;  Surgeon: Clover Mott DPM;  Location: [facility];  Service: Podiatry    DE REPLACE AORTIC VALVE OPENFEMORAL ARTERY APPROACH N/A 2023    Procedure: REPLACEMENT AORTIC VALVE TRANSCATHETER (TAVR) TRANSFEMORAL W/ 26MM SHERMAN ADRIANNA S3 UR VALVE(ACCESS ON LEFT) MAKEDA;  Surgeon: Gerald Gooden MD;  Location: [facility];  Service: Cardiac Surgery    TOE AMPUTATION Left 2023    Procedure: PARTIAL 1ST RAY AMPUTATION;  Surgeon: Tuan Simms DPM;  Location: [facility];  Service: Podiatry    TONSILLECTOMY      TOTAL SHOULDER REPLACEMENT Left     WISDOM TOOTH EXTRACTION         Family History   Problem Relation Age of Onset    Dementia Mother     Blindness Mother     Cancer Mother     Dementia Father      I have reviewed and agree with the history as documented.    E-Cigarette/Vaping    E-Cigarette Use Never User      E-Cigarette/Vaping Substances    Nicotine No     THC No     CBD No     Flavoring No      Social History     Tobacco Use    Smoking status: Some Days     Current packs/day: 0.00     Average packs/day: 0.5 packs/day for 50.0 years (25.0 ttl pk-yrs)     Types: Cigarettes     Start date: 11/3/1973     Last attempt to quit: 11/3/2023     Years since quittin.8    Smokeless tobacco: Never   Vaping Use    Vaping status: Never Used   Substance Use Topics    Alcohol use: Not Currently     Alcohol/week: 2.0 standard drinks of alcohol     Types: 2 Cans of beer  per week     Comment: once a week    Drug use: Not Currently     Comment: sporadic in young adulthood       Review of Systems   Constitutional:  Negative for appetite change, chills, fatigue, fever and weight loss.   HENT:  Negative for ear pain, rhinorrhea, sore throat and trouble swallowing.    Eyes:  Negative for pain, discharge and visual disturbance.   Respiratory:  Negative for cough, chest tightness and shortness of breath.    Cardiovascular:  Negative for chest pain and palpitations.   Gastrointestinal:  Negative for abdominal pain, bowel incontinence, nausea and vomiting.   Endocrine: Negative for polydipsia, polyphagia and polyuria.   Genitourinary:  Negative for bladder incontinence, difficulty urinating, dysuria, hematuria, pelvic pain and testicular pain.   Musculoskeletal:  Positive for back pain and neck pain. Negative for arthralgias.   Skin:  Negative for color change and rash.   Allergic/Immunologic: Negative for immunocompromised state.   Neurological:  Negative for dizziness, tingling, seizures, syncope, weakness, numbness, headaches and paresthesias.   Hematological:  Negative for adenopathy.   Psychiatric/Behavioral:  Negative for confusion and dysphoric mood.    All other systems reviewed and are negative.      Physical Exam  Physical Exam  Vitals and nursing note reviewed.   Constitutional:       General: He is not in acute distress.     Appearance: Normal appearance. He is not ill-appearing, toxic-appearing or diaphoretic.   HENT:      Head: Normocephalic and atraumatic.      Right Ear: External ear normal.      Left Ear: External ear normal.      Nose: Nose normal. No congestion or rhinorrhea.      Mouth/Throat:      Mouth: Mucous membranes are moist.      Pharynx: Oropharynx is clear. No oropharyngeal exudate or posterior oropharyngeal erythema.   Eyes:      General:         Right eye: No discharge.         Left eye: No discharge.   Cardiovascular:      Rate and Rhythm: Normal rate and  regular rhythm.      Pulses: Normal pulses.      Heart sounds: Normal heart sounds. No murmur heard.     No gallop.      Comments: AICD left chest wall, no overlying erythema or tenderness  Pulmonary:      Effort: Pulmonary effort is normal. No respiratory distress.      Breath sounds: Normal breath sounds. No stridor. No wheezing, rhonchi or rales.   Chest:      Chest wall: No tenderness.   Abdominal:      General: Bowel sounds are normal. There is no distension.      Palpations: Abdomen is soft. There is no mass.      Tenderness: There is no abdominal tenderness. There is no right CVA tenderness, left CVA tenderness, guarding or rebound.      Hernia: No hernia is present.   Musculoskeletal:         General: Tenderness present. Normal range of motion.      Cervical back: Normal range of motion and neck supple.      Comments: Tenderness to palpation of the left scapular region, reproduction of pain with certain movements of the left arm   Skin:     General: Skin is warm and dry.      Capillary Refill: Capillary refill takes less than 2 seconds.   Neurological:      General: No focal deficit present.      Mental Status: He is alert and oriented to person, place, and time.      Cranial Nerves: No cranial nerve deficit.      Sensory: No sensory deficit.      Motor: No weakness.      Coordination: Coordination normal.      Gait: Gait normal.      Deep Tendon Reflexes: Reflexes normal.   Psychiatric:         Mood and Affect: Mood normal.         Behavior: Behavior normal.         Thought Content: Thought content normal.         Judgment: Judgment normal.         Vital Signs  ED Triage Vitals   Temperature Pulse Respirations Blood Pressure SpO2   08/27/24 0836 08/27/24 0836 08/27/24 0836 08/27/24 0836 08/27/24 0836   97.9 °F (36.6 °C) 78 20 134/75 97 %      Temp Source Heart Rate Source Patient Position - Orthostatic VS BP Location FiO2 (%)   08/27/24 0836 08/27/24 0836 08/27/24 0836 08/27/24 0836 --   Temporal Monitor  Lying Left arm       Pain Score       08/27/24 0850       9           Vitals:    08/27/24 0836 08/27/24 0900 08/27/24 0915   BP: 134/75 114/62 124/66   Pulse: 78 74 69   Patient Position - Orthostatic VS: Lying Sitting Sitting         Visual Acuity      ED Medications  Medications   fentaNYL injection 50 mcg (50 mcg Intravenous Given 8/27/24 0850)   methocarbamol (ROBAXIN) tablet 500 mg (500 mg Oral Given 8/27/24 0850)       Diagnostic Studies  Results Reviewed       Procedure Component Value Units Date/Time    D-Dimer [[id number]]  (Abnormal) Collected: 08/27/24 0854    Lab Status: Final result Specimen: Blood from Arm, Right Updated: 08/27/24 0930     D-Dimer, Quant 0.61 ug/ml FEU     Narrative:      In the evaluation for possible pulmonary embolism, in the appropriate (Well's Score of 4 or less) patient, the age adjusted d-dimer cutoff for this patient can be calculated as:    Age x 0.01 (in ug/mL) for Age-adjusted D-dimer exclusion threshold for a patient over 50 years.    HS Troponin 0hr (reflex protocol) [[id number]]  (Normal) Collected: 08/27/24 0854    Lab Status: Final result Specimen: Blood from Arm, Right Updated: 08/27/24 0926     hs TnI 0hr 5 ng/L     Comprehensive metabolic panel [[id number]] Collected: 08/27/24 0854    Lab Status: Final result Specimen: Blood from Arm, Right Updated: 08/27/24 0919     Sodium 137 mmol/L      Potassium 4.2 mmol/L      Chloride 102 mmol/L      CO2 26 mmol/L      ANION GAP 9 mmol/L      BUN 18 mg/dL      Creatinine 1.13 mg/dL      Glucose 114 mg/dL      Calcium 9.6 mg/dL      AST 17 U/L      ALT 17 U/L      Alkaline Phosphatase 87 U/L      Total Protein 7.4 g/dL      Albumin 4.2 g/dL      Total Bilirubin 0.52 mg/dL      eGFR 65 ml/min/1.73sq m     Narrative:      National Kidney Disease Foundation guidelines for Chronic Kidney Disease (CKD):     Stage 1 with normal or high GFR (GFR > 90 mL/min/1.73 square meters)    Stage 2 Mild CKD (GFR = 60-89 mL/min/1.73 square meters)     Stage 3A Moderate CKD (GFR = 45-59 mL/min/1.73 square meters)    Stage 3B Moderate CKD (GFR = 30-44 mL/min/1.73 square meters)    Stage 4 Severe CKD (GFR = 15-29 mL/min/1.73 square meters)    Stage 5 End Stage CKD (GFR <15 mL/min/1.73 square meters)  Note: GFR calculation is accurate only with a steady state creatinine    CBC and differential [[id number]]  (Abnormal) Collected: 08/27/24 0854    Lab Status: Final result Specimen: Blood from Arm, Right Updated: 08/27/24 0901     WBC 12.55 Thousand/uL      RBC 5.06 Million/uL      Hemoglobin 16.2 g/dL      Hematocrit 47.1 %      MCV 93 fL      MCH 32.0 pg      MCHC 34.4 g/dL      RDW 12.3 %      MPV 9.3 fL      Platelets 286 Thousands/uL      nRBC 0 /100 WBCs      Segmented % 77 %      Immature Grans % 0 %      Lymphocytes % 11 %      Monocytes % 9 %      Eosinophils Relative 3 %      Basophils Relative 0 %      Absolute Neutrophils 9.59 Thousands/µL      Absolute Immature Grans 0.03 Thousand/uL      Absolute Lymphocytes 1.41 Thousands/µL      Absolute Monocytes 1.07 Thousand/µL      Eosinophils Absolute 0.40 Thousand/µL      Basophils Absolute 0.05 Thousands/µL                    XR chest 1 view portable    (Results Pending)              Procedures  Procedures         ED Course                                 SBIRT 22yo+      Flowsheet Row Most Recent Value   Initial Alcohol Screen: US AUDIT-C     1. How often do you have a drink containing alcohol? 0 Filed at: 08/27/2024 0838   2. How many drinks containing alcohol do you have on a typical day you are drinking?  0 Filed at: 08/27/2024 0838   3b. FEMALE Any Age, or MALE 65+: How often do you have 4 or more drinks on one occassion? 0 Filed at: 08/27/2024 0838   Audit-C Score 0 Filed at: 08/27/2024 0838   NIC: How many times in the past year have you...    Used an illegal drug or used a prescription medication for non-medical reasons? Never Filed at: 08/27/2024 0838                      Medical Decision Making  0833:  Patient appears well, vital signs reviewed.  Concerns for muscle skeletal strain of the left upper back.  Plan to complete basic labs including cardiac enzymes, D-dimer, EKG and chest x-ray to screen for other etiologies.  I will give analgesics for his discomfort and reevaluate.  Hemodynamically stable.    1000: Chest x-ray and labs reviewed.  Pain well-controlled.  Stable for discharge.    Amount and/or Complexity of Data Reviewed  Labs: ordered.  Radiology: ordered and independent interpretation performed.     Details: Chest x-ray--no acute pathology  ECG/medicine tests: ordered and independent interpretation performed.     Details: Normal sinus rhythm 69 bpm, no acute ischemia.    Risk  Prescription drug management.                 Disposition  Final diagnoses:   Strain of thoracic region, initial encounter     Time reflects when diagnosis was documented in both MDM as applicable and the Disposition within this note       Time User Action Codes Description Comment    8/27/2024  9:55 AM Jose Ramon Dennis Add [S29.019A] Strain of thoracic region, initial encounter           ED Disposition       ED Disposition   Discharge    Condition   Stable    Date/Time   Tue Aug 27, 2024 0916    Comment   Mikael Saavedra discharge to home/self care.                   Follow-up Information       Follow up With Specialties Details Why Contact Info Additional Information    Raghav Mckeon MD Family Medicine Schedule an appointment as soon as possible for a visit   [address] [facility] [address] [phone]       [facility] Emergency Medicine  If symptoms worsen [address] [facility] [id number] [phone] [facility], [address], [place], [address]            Discharge Medication List as of 8/27/2024  9:56 AM        START taking these medications    Details   methocarbamol (ROBAXIN) 500 mg tablet Take 1 tablet (500  mg total) by mouth 2 (two) times a day as needed for muscle spasms, Starting Tue 8/27/2024, Normal      methylPREDNISolone 4 MG tablet therapy pack Use as directed on package, Normal           CONTINUE these medications which have NOT CHANGED    Details   Alirocumab 150 MG/ML SOAJ Inject 150 mg under the skin every 14 (fourteen) days, Starting Tue 10/17/2023, Normal      amLODIPine (NORVASC) 10 mg tablet Take 10 mg by mouth daily, Starting Tue 12/19/2023, Historical Med      aspirin 81 mg chewable tablet Chew 1 tablet (81 mg total) daily, Starting Tue 10/17/2023, Normal      buPROPion (WELLBUTRIN XL) 300 mg 24 hr tablet Starting Sat 5/25/2019, Historical Med      clopidogrel (PLAVIX) 75 mg tablet TAKE ONE TABLET BY MOUTH EVERY DAY, Starting Mon 5/6/2024, Normal      escitalopram (LEXAPRO) 20 mg tablet Take 20 mg by mouth daily, Historical Med      ferrous sulfate 325 (65 FE) MG EC tablet Take 325 mg by mouth daily, Historical Med      gabapentin (NEURONTIN) 100 mg capsule Take 200 mg by mouth daily at bedtime, Starting Tue 12/19/2023, Historical Med      Levothyroxine Sodium 200 MCG CAPS Take 200 mcg by mouth daily, Starting Wed 12/13/2023, Historical Med      losartan (COZAAR) 25 mg tablet Take 1 tablet (25 mg total) by mouth daily, Starting Thu 1/25/2024, Normal      Magnesium Oxide -Mg Supplement (Magnesium Extra Strength) 400 MG CAPS TAKE ONE CAPSULE BY MOUTH IN THE MORNING, Starting Wed 5/22/2024, Normal      metoprolol succinate (TOPROL-XL) 25 mg 24 hr tablet TAKE HALF TABLET BY MOUTH DAILY, Starting Mon 5/27/2024, Normal      Multiple Vitamins-Minerals (ICAPS AREDS 2 PO) Take by mouth, Historical Med      Omega-3 Fatty Acids (Fish Oil) 1200 MG CAPS Take by mouth, Historical Med      torsemide (DEMADEX) 20 mg tablet Take 2 tablets (40 mg total) by mouth daily, Starting Tue 1/9/2024, Normal             No discharge procedures on file.    PDMP Review         Value Time User    PDMP Reviewed  Yes 12/7/2023  6:49  AM Javier Acosta PA-C            ED Provider  Electronically Signed by             Jose Ramon Dennis MD  08/27/24 8896

## 2024-08-30 ENCOUNTER — TELEPHONE (OUTPATIENT)
Dept: NON INVASIVE DIAGNOSTICS | Facility: HOSPITAL | Age: 69
End: 2024-08-30

## 2024-08-30 ENCOUNTER — REMOTE DEVICE CLINIC VISIT (OUTPATIENT)
Dept: CARDIOLOGY CLINIC | Facility: CLINIC | Age: 69
End: 2024-08-30
Payer: MEDICARE

## 2024-08-30 DIAGNOSIS — I47.20 VENTRICULAR TACHYCARDIA (HCC): Primary | ICD-10-CM

## 2024-08-30 PROCEDURE — 93295 DEV INTERROG REMOTE 1/2/MLT: CPT | Performed by: INTERNAL MEDICINE

## 2024-08-30 PROCEDURE — 93296 REM INTERROG EVL PM/IDS: CPT | Performed by: INTERNAL MEDICINE

## 2024-08-30 NOTE — PROGRESS NOTES
Results for orders placed or performed in visit on 08/30/24   Cardiac EP device report    Narrative    MDT DC ICD (MVP ON) - ACTIVE SYSTEM IS MRI CONDITIONAL  CARELINK TRANSMISSION: BATTERY VOLTAGE ADEQUATE (11.7 YRS). AP: 17.9%. : <0.1% (MVP-ON). ALL AVAILABLE LEAD PARAMETERS WITHIN NORMAL LIMITS. 4 VT-NS EPISODES W/ EGMS SHOWING NSVT 6 BEATS  @200 BPM, 5 BEATS @ 185 BPM FOLLOWED BY 7 BEATS @ 185 BPM, 9 BEATS @ 200 BPM, 8 BEATS @ 200 BPM. PT TAKES METOPROLOL SUCC, PLAVIX, ASA 81MG. EF: 65% (ECHO 12/5/23). TASK AG, CRNP. OPTI-VOL WITHIN NORMAL LIMITS. NORMAL DEVICE FUNCTION. CH

## 2024-08-30 NOTE — TELEPHONE ENCOUNTER
Left voicemail for Mikael and Eva. Reviewed device transmission showing a few episodes of short NSVT. I asked him to get a magnesium level drawn. Potassium was good at 4.2 on 8/27 labs.  If he is having any chest pain or palpitations should let us know.

## 2024-08-30 NOTE — TELEPHONE ENCOUNTER
----- Message from [employer] sent at 8/30/2024  8:55 AM EDT -----  Regarding: nsvt @200 bpm  Hi Lola,  Happy Friday!  Pt had 4 vt-ns episodes w/ egms showing nsvt 6 beats @ 200 bpm, 5 beats @ 185 bpm followed by 7 beats @ 185 bpm, 9 beats @ 200 bp0m 8 beats @ 200 bpm. Pt takes metoprolol succ, plavix, asa 81mg. Ef: 65% (echo 12/5/23). All episodes were 8/24 & 8/25.  Have a great Labor day weekend!!  Thanks,  ~Tanya   CARELINK TRANSMISSION: BATTERY VOLTAGE ADEQUATE (11.7 YRS). AP: 17.9%. : <0.1% (MVP-ON). ALL AVAILABLE LEAD PARAMETERS WITHIN NORMAL LIMITS. 4 VT-NS EPISODES W/ EGMS SHOWING NSVT 6 BEATS  @200 BPM, 5 BEATS @ 185 BPM FOLLOWED BY 7 BEATS @ 185 BPM, 9 BEATS @ 200 BPM, 8 BEATS @ 200 BPM. PT TAKES METOPROLOL SUCC, PLAVIX, ASA 81MG. EF: 65% (ECHO 12/5/23). TASK AG, CRNP. OPTI-VOL WITHIN NORMAL LIMITS. NORMAL DEVICE FUNCTION. CH

## 2024-09-04 ENCOUNTER — APPOINTMENT (OUTPATIENT)
Dept: LAB | Facility: HOSPITAL | Age: 69
End: 2024-09-04
Payer: MEDICARE

## 2024-09-04 DIAGNOSIS — R06.02 SHORTNESS OF BREATH: ICD-10-CM

## 2024-09-04 DIAGNOSIS — I47.20 VENTRICULAR TACHYCARDIA (HCC): ICD-10-CM

## 2024-09-04 DIAGNOSIS — E78.2 MIXED HYPERLIPIDEMIA: ICD-10-CM

## 2024-09-04 DIAGNOSIS — N18.31 STAGE 3A CHRONIC KIDNEY DISEASE (HCC): ICD-10-CM

## 2024-09-04 LAB
ANION GAP SERPL CALCULATED.3IONS-SCNC: 7 MMOL/L (ref 4–13)
BUN SERPL-MCNC: 26 MG/DL (ref 5–25)
CALCIUM SERPL-MCNC: 9.1 MG/DL (ref 8.4–10.2)
CHLORIDE SERPL-SCNC: 100 MMOL/L (ref 96–108)
CO2 SERPL-SCNC: 30 MMOL/L (ref 21–32)
CREAT SERPL-MCNC: 1.13 MG/DL (ref 0.6–1.3)
GFR SERPL CREATININE-BSD FRML MDRD: 65 ML/MIN/1.73SQ M
GLUCOSE SERPL-MCNC: 105 MG/DL (ref 65–140)
MAGNESIUM SERPL-MCNC: 2.3 MG/DL (ref 1.9–2.7)
POTASSIUM SERPL-SCNC: 4 MMOL/L (ref 3.5–5.3)
SODIUM SERPL-SCNC: 137 MMOL/L (ref 135–147)

## 2024-09-04 PROCEDURE — 36415 COLL VENOUS BLD VENIPUNCTURE: CPT

## 2024-09-04 PROCEDURE — 80048 BASIC METABOLIC PNL TOTAL CA: CPT

## 2024-09-04 PROCEDURE — 83735 ASSAY OF MAGNESIUM: CPT

## 2024-09-05 ENCOUNTER — TELEPHONE (OUTPATIENT)
Dept: CARDIOLOGY CLINIC | Facility: CLINIC | Age: 69
End: 2024-09-05

## 2024-09-05 NOTE — TELEPHONE ENCOUNTER
----- Message from JESUS Treviño sent at 9/4/2024  4:57 PM EDT -----  Please let Mikael know his potassium and magnesium are at goal!

## 2024-09-16 ENCOUNTER — HOSPITAL ENCOUNTER (OUTPATIENT)
Dept: RADIOLOGY | Facility: CLINIC | Age: 69
Discharge: HOME | End: 2024-09-16
Payer: MEDICARE

## 2024-09-16 ENCOUNTER — OFFICE VISIT (OUTPATIENT)
Dept: PODIATRY | Age: 69
End: 2024-09-16
Payer: MEDICARE

## 2024-09-16 VITALS
OXYGEN SATURATION: 99 % | TEMPERATURE: 97.6 F | BODY MASS INDEX: 30.77 KG/M2 | HEART RATE: 73 BPM | HEIGHT: 72 IN | WEIGHT: 227.2 LBS | SYSTOLIC BLOOD PRESSURE: 140 MMHG | DIASTOLIC BLOOD PRESSURE: 88 MMHG

## 2024-09-16 DIAGNOSIS — L97.522 ULCER OF LEFT FOOT WITH FAT LAYER EXPOSED (HCC): ICD-10-CM

## 2024-09-16 DIAGNOSIS — G62.9 PERIPHERAL POLYNEUROPATHY: Primary | ICD-10-CM

## 2024-09-16 DIAGNOSIS — G62.9 PERIPHERAL POLYNEUROPATHY: ICD-10-CM

## 2024-09-16 PROCEDURE — 73630 X-RAY EXAM OF FOOT: CPT

## 2024-09-16 PROCEDURE — 99213 OFFICE O/P EST LOW 20 MIN: CPT | Performed by: STUDENT IN AN ORGANIZED HEALTH CARE EDUCATION/TRAINING PROGRAM

## 2024-09-16 PROCEDURE — 11042 DBRDMT SUBQ TIS 1ST 20SQCM/<: CPT | Performed by: STUDENT IN AN ORGANIZED HEALTH CARE EDUCATION/TRAINING PROGRAM

## 2024-09-16 NOTE — PROGRESS NOTES
"Ambulatory Visit  Name: Mikael Saavedra      : 1955      MRN: [id number]  Encounter Provider: Clover Mott DPM  Encounter Date: 2024   Encounter department: [facility] [place]    Assessment & Plan  Peripheral polyneuropathy    Orders:    Ambulatory Referral to Wound Care; Future    X-ray foot left 3+ views; Future    Ulcer of left foot with fat layer exposed (HCC)    Orders:    Ambulatory Referral to Wound Care; Future    X-ray foot left 3+ views; Future      Imaging Reviewed at this visit (I personally reviewed/independently interpreted images and reports in PACS)  XR left foot WB 3v 24: No acute osseous abnormalities noted. Bone remodeling and plantar-medial prominence of 1st metatarsal distal shaft at site of prior bone cut.    JACK 23: RLE 1.23/162/117. LLE 1.22/156/129.     IMPRESSION:  Left submet 1 neuropathic ulceration (s/p left partial 1st ray amputation w/ DPC (DOS: 23 & 23)      PLAN:  I reviewed clinical exam and radiographic imaging (XR) with patient in detail today. I have discussed with the patient the pathophysiology of this diagnosis and reviewed how the examination correlates with this diagnosis.  Patient has new Left 1st submet 1 neuropathic ulceration due to walking in normal shoes without custom insert. XR as above without osseous erosion nor KULWINDER however bone cut of 1st metatarsal has remodeled with prominence  I debrided this as below  Dermagran dsd. Q2-3d  Toe unloading shoe (pt has at home), NWB best  Wound care referral placed, f/u 1wk. Report to ED if SOI arise    Debridement   Wound 23 Foot Left    Universal Protocol:  procedure performed by consultantConsent: Verbal consent obtained.  Risks and benefits: risks, benefits and alternatives were discussed  Consent given by: patient  Time out: Immediately prior to procedure a \"time out\" was called to verify the correct patient, procedure, equipment, support staff and site/side " marked as required.  Patient understanding: patient states understanding of the procedure being performed  Patient consent: the patient's understanding of the procedure matches consent given  Patient identity confirmed: verbally with patient    Debridement Details  Performed by: physician  Debridement type: surgical  Level of debridement: subcutaneous tissue      Post-debridement measurements  Length (cm): 1  Width (cm): 1  Depth (cm): 0.2  Percent debrided: 100%  Surface Area (cm^2): 1  Area Debrided (cm^2): 1  Volume (cm^3): 0.2    Tissue and other material debrided: subcutaneous tissue  Devitalized tissue debrided: biofilm, callus and slough  Instrument(s) utilized: blade  Technique utilized: excisionalBleeding: small  Hemostasis obtained with: pressure  Procedural pain (0-10): insensate  Post-procedural pain: insensate   Response to treatment: procedure was tolerated well  Debridement Comments: Pre dbt measurement 0.5x0.5x0.1cm       History of Present Illness     Mikael Saavedra is a 68 y.o. male who presents to clinic for new left foot neuropathic ulcer present for about 1-2 wks. Pt was last seen 8/2/23 and was supposed to come to clinic every 10wks for RFC however did not. He did not get the custom inserts either. He is not diabetic.       Review of Systems   Constitutional:  Negative for activity change, chills and fever.   HENT: Negative.     Respiratory:  Negative for cough, chest tightness and shortness of breath.    Cardiovascular:  Negative for chest pain and leg swelling.   Endocrine: Negative.    Genitourinary: Negative.    Skin:  Positive for wound.   Neurological:  Positive for numbness.   Psychiatric/Behavioral: Negative.  Negative for agitation and behavioral problems.            Objective     /88 (BP Location: Left arm, Patient Position: Sitting, Cuff Size: Standard)   Pulse 73   Temp 97.6 °F (36.4 °C)   Ht 6' (1.829 m)   Wt 103 kg (227 lb 3.2 oz)   SpO2 99%   BMI 30.81 kg/m²      Physical Exam  Constitutional:       General: He is not in acute distress.     Appearance: Normal appearance. He is obese. He is not ill-appearing.   Cardiovascular:      Comments: B/L DP and PT pulses 1/4. Legs to toes warm to cool. Diminished pedal hair. B/L LE mild edema with varicosities.   Pulmonary:      Effort: Pulmonary effort is normal.   Musculoskeletal:         General: Deformity (s/p partial left 1st ray amputation. HTs 2-5.) present. No tenderness. Normal range of motion.   Skin:     General: Skin is warm.      Capillary Refill: Capillary refill takes less than 2 seconds.      Findings: Lesion (Left submet 1 ulceration with significant callus periwound. no deep probe to bone. no purulence or SOI) present. No erythema.      Comments: Atrophic skin to LE - thin, dry and shiny.      Neurological:      General: No focal deficit present.      Mental Status: He is alert and oriented to person, place, and time.      Comments: + PN, + numbness to B/L feet and absent protective sensation   Psychiatric:         Mood and Affect: Mood normal.

## 2024-09-18 ENCOUNTER — OFFICE VISIT (OUTPATIENT)
Facility: CLINIC | Age: 69
End: 2024-09-18
Payer: MEDICARE

## 2024-09-18 VITALS
HEIGHT: 72 IN | DIASTOLIC BLOOD PRESSURE: 74 MMHG | BODY MASS INDEX: 29.66 KG/M2 | TEMPERATURE: 97.9 F | RESPIRATION RATE: 18 BRPM | WEIGHT: 219 LBS | HEART RATE: 58 BPM | SYSTOLIC BLOOD PRESSURE: 151 MMHG

## 2024-09-18 DIAGNOSIS — G62.9 PERIPHERAL POLYNEUROPATHY: ICD-10-CM

## 2024-09-18 DIAGNOSIS — Z89.412 STATUS POST AMPUTATION OF GREAT TOE, LEFT (HCC): ICD-10-CM

## 2024-09-18 DIAGNOSIS — L97.522 ULCER OF LEFT FOOT WITH FAT LAYER EXPOSED (HCC): Primary | ICD-10-CM

## 2024-09-18 PROBLEM — S91.302A OPEN WOUND OF LEFT FOOT, INITIAL ENCOUNTER: Status: ACTIVE | Noted: 2024-09-18

## 2024-09-18 PROCEDURE — 97597 DBRDMT OPN WND 1ST 20 CM/<: CPT | Performed by: FAMILY MEDICINE

## 2024-09-18 PROCEDURE — 99204 OFFICE O/P NEW MOD 45 MIN: CPT | Performed by: FAMILY MEDICINE

## 2024-09-18 PROCEDURE — 99213 OFFICE O/P EST LOW 20 MIN: CPT | Performed by: FAMILY MEDICINE

## 2024-09-18 NOTE — PATIENT INSTRUCTIONS
Orders Placed This Encounter   Procedures    Wound cleansing and dressings Left;Plantar Foot     Wash your hands with soap and water.  Remove old dressing, discard into plastic bag and place in trash.  Cleanse the wound with prophase prior to applying a clean dressing. Do not use tissue or cotton balls. Do not scrub the wound. Pat dry using gauze.    Shower no   Apply dermagran to the left plantar foot wound.  Cover with gauze.  Secure with roll gauze  Change dressing daily    Elastic Tubular Stocking Spandagrip E  Tubular elastic bandage: Apply from base of toes to behind the knee. Apply in AM, may remove for sleep.  Avoid prolonged standing in one place.  Elevate leg(s) above the level of the heart when sitting or as much as possible.       Off-loading Instructions:  Keep weight and pressure off wound at all times.   Wear off-loading device as directed by your physician. Put on immediately when rising in the morning and remove when going to bed.  Obtain front offloading shoe and wear AT ALL TIMES  Use knee rover as able to ensure no pressure to wound     Return to wound center in 1 week with Dr Mott     Standing Status:   Future     Standing Expiration Date:   9/25/2024    Debridement     This order was created via procedure documentation    Offloading Wedge Shoe     Patient wears a mens size 12 please size shoe accordingly

## 2024-09-18 NOTE — PROGRESS NOTES
"Patient ID: Mikael Saavedra is a 68 y.o. male Date of Birth 1955       Chief Complaint   Patient presents with    New Patient Visit     Patient presents for evalution of wound on left plantar foot that he states he has has on and off since he has his great to amputated last year. He has been most recently following in Dr. Mott's podiatry office where she recommended that he be seen in wound care.       Allergies:  Lisinopril and Statins    Diagnosis:      Diagnosis ICD-10-CM Associated Orders   1. Ulcer of left foot with fat layer exposed ([facility])  L97.522 Wound cleansing and dressings Left;Plantar Foot     Debridement     Offloading Wedge Shoe     Wound Procedure Treatment Left;Plantar Foot      2. Status post amputation of great toe, left ([facility])  Z89.412 Wound cleansing and dressings Left;Plantar Foot     Offloading Wedge Shoe     Wound Procedure Treatment Left;Plantar Foot      3. Peripheral polyneuropathy  G62.9               Assessment & Plan:  Neuropathic ulceration left submet 1 s/p left partial first ray amp: Despite surgical debridement 2 days ago at his podiatry appointment, patient again with significant periwound callusing and slight undermining.  Slough present within wound bed.  Selective debridement completed today.  No deep probe or probe to bone.  No purulence or malodor.  No erythema, increased warmth, edema, fluctuance, crepitus.  Selective debridement  Cleanse with prophase, continue Dermagran considering significant callus and hyperkeratotic buildup (patient is not offloading, see below)  Bedside ABIs completed at wound center today R 1.0 / L 1.13  Recent podiatry visit and imaging reviewed in detail.  X-ray 9/16/2024 \"no acute osseous abnormalities noted.  Bone remodeling and plantar medial prominence of first metatarsal distal shaft at site of prior bone cut\"  Patient is not offloading.  He arrived to wound center appointment today in normal sneakers without offloading shoe recommended by " "podiatry.  He is also not using his knee scooter.  He plans to go on a fishing trip later this week but states he mostly sits.  I strongly advised against this and emphasized importance of compliance with offloading and risks should he fail to do so. He was unable to locate offloading shoe that he had previously, new order placed today.  He should be using a knee scooter as well for additional offloading.  He expresses understanding and states that he will try to stay off of it but does plan to attend his fishing trip.   I again reviewed risks of failing to properly offload and possible complications including but not limited to wound deterioration, increased risk of infection, further amputation, life-threatening infections, amongst others.  He expresses understanding.   Reviewed extreme importance of very close monitoring of wound.  Should patient experience any acute change or any of the following symptoms including but not limited to fever, chills, increased pain at the wound, swelling, purulent drainage, foul odor, etc... to immediately proceed to emergency department. Pt expresses understanding.   F/u in 1 week or sooner if needed     Portions of the record may have been created with voice recognition software. Occasional wrong word or \"sound alike\" substitutions may have occurred due to the inherent limitations of voice recognition software. Read the chart carefully and recognize, using context, where substitutions have occurred.    Subjective:   Mr. Saavedra is a 68-year-old male with a past medical history including but not limited to neuropathy, hypertension, heart failure, s/p TAVR on AC, presents to wound center today along with his wife for evaluation of neuropathic ulceration left foot at site of previous great toe amp.  He is not diabetic but does report significant neuropathy which patient and wife believe is secondary to polio when he was a child.  He reports he was just seen by Dr. Mott/Podiatry " on 9/16 for this ulceration.  He is s/p left partial first ray amp June 2023.  Wound was debrided and he was referred to the wound center. He arrived to wound center today with regular sneakers and did not have offloading shoe.  He also did not get custom inserts as ordered in the past by podiatry in 2023.  His wife reports since he was at podiatry office earlier in the week, he has not been offloading as he has been walking around getting things ready for a fishing trip.       The following portions of the patient's history were reviewed and updated as appropriate:   Patient Active Problem List   Diagnosis    Leukocytosis    Benign essential HTN    Vasovagal syncope    MDD (major depressive disorder)    Peripheral neuropathy    Other emphysema ([facility])    Ulcer of left foot ([facility])    ANNALEE (acute kidney injury) ([facility])    Hyperkalemia    MSSA bacteremia    Heart failure with preserved ejection fraction    PVCs (premature ventricular contractions)    Status post foot surgery    Acute hematogenous osteomyelitis of left foot ([facility])    Borderline high cholesterol    Dyspnea on exertion    Episodic lightheadedness    Status post amputation of great toe, left ([facility])    Severe aortic stenosis    Mixed hyperlipidemia    Statin intolerance    Status post insertion of drug-eluting stent into left anterior descending (LAD) artery    Ventricular tachycardia ([facility])    s/p Medtronic dual chamber ICD 11/2/2023    Acute GI bleeding    ABLA (acute blood loss anemia)    S/P TAVR (transcatheter aortic valve replacement)    Prediabetes    Hypothyroid    Stage 3a chronic kidney disease ([facility])    Coronary artery disease involving native coronary artery of native heart without angina pectoris    Open wound of left foot, initial encounter     Past Medical History:   Diagnosis Date    Anxiety     Aortic stenosis     Borderline high cholesterol     CHF (congestive heart failure) ([facility]) 1/4/2024    Asaf Jackson-cardio-    Colon polyp      "Depression     Heart murmur     Hypertension     Hypothyroidism     Prediabetes     PVCs (premature ventricular contractions)     \"skip\"    s/p Medtronic dual chamber ICD 11/2/2023 11/04/2023    Wears glasses     readers     Past Surgical History:   Procedure Laterality Date    APPENDECTOMY      BACK SURGERY      CARDIAC CATHETERIZATION N/A 10/17/2023    Procedure: Cardiac RHC/LHC;  Surgeon: Phillip Pfeiffer MD;  [facility];  Service: Cardiology    CARDIAC CATHETERIZATION N/A 12/5/2023    Procedure: Cardiac tavr;  Surgeon: Malcolm Humphrey DO;  Location: [facility];  Service: Cardiology    CARDIAC ELECTROPHYSIOLOGY PROCEDURE N/A 11/2/2023    Procedure: Cardiac icd implant;  Surgeon: Jered Talbot MD;  [facility];  Service: Cardiology    CATARACT EXTRACTION Bilateral     CLOSURE DELAYED PRIMARY Left 06/09/2023    Procedure: CLOSURE DELAYED PRIMARY;  Surgeon: Tuan Simms DPM;  Location: [facility];  Service: Podiatry    COL[place]OSCOPY  2023    [place]    AR CORRJ HLX VLGS BNCTY SESMDC RESCJ PROX PHLX BASE Left 04/19/2023    Procedure: BUNIONECTOMY PERDOMO and wound debridement;  Surgeon: Clover Mott DPM;  Location: [facility];  Service: Podiatry    AR REPLACE AORTIC VALVE OPENFEMORAL ARTERY APPROACH N/A 12/5/2023    Procedure: REPLACEMENT AORTIC VALVE TRANSCATHETER (TAVR) TRANSFEMORAL W/ 26MM SHERMAN ADRIANNA S3 UR VALVE(ACCESS ON LEFT) MAKEDA;  Surgeon: Gerald Gooden MD;  Location[facility];  Service: Cardiac Surgery    TOE AMPUTATION Left 06/07/2023    Procedure: PARTIAL 1ST RAY AMPUTATION;  Surgeon: Tuan Simms DPM;  Location: [facility];  Service: Podiatry    TONSILLECTOMY      TOTAL SHOULDER REPLACEMENT Left     WISDOM TOOTH EXTRACTION       Family History   Problem Relation Age of Onset    Dementia Mother     Blindness Mother     Cancer Mother     Dementia Father       Social History     Socioeconomic History    Marital status: " /Civil Union     Spouse name: None    Number of children: None    Years of education: None    Highest education level: None   Occupational History    None   Tobacco Use    Smoking status: Some Days     Current packs/day: 0.00     Average packs/day: 0.5 packs/day for 50.0 years (25.0 ttl pk-yrs)     Types: Cigarettes     Start date: 11/3/1973     Last attempt to quit: 11/3/2023     Years since quittin.8    Smokeless tobacco: Never   Vaping Use    Vaping status: Never Used   Substance and Sexual Activity    Alcohol use: Not Currently     Alcohol/week: 2.0 standard drinks of alcohol     Types: 2 Cans of beer per week     Comment: once a week    Drug use: Not Currently     Comment: sporadic in young adulthood    Sexual activity: None     Comment: defer   Other Topics Concern    None   Social History Narrative    None     Social Determinants of Health     Financial Resource Strain: Medium Risk (2023)    [facility]    Overall Financial Resource Strain (CARDIA)     Difficulty of Paying Living Expenses: Somewhat hard   Food Insecurity: No Food Insecurity (2023)    [facility]    Hunger Vital Sign     Worried About Running Out of Food in the Last Year: Never true     Ran Out of Food in the Last Year: Never true   Transportation Needs: No Transportation Needs (2023)    [facility]    PRAPARE - Transportation     Lack of Transportation (Medical): No     Lack of Transportation (Non-Medical): No   Physical Activity: Inactive (2023)    Received from [facility]    Exercise Vital Sign     Days of Exercise per Week: 0 days     Minutes of Exercise per Session: 0 min   Stress: Stress Concern Present (2023)    Received from [facility], [facility]    [place]  [place] [employer] - Occupational Stress Questionnaire     Feeling of Stress : Rather much   Social Connections: Unknown (6/18/2024)    Received from [facility]     How often do you feel lonely or isolated from those around you? (Adult - for ages 18 years and over): Not on file   Intimate Partner Violence: Not At Risk (12/13/2023)    [facility]    Humiliation, Afraid, Rape, and Kick questionnaire     Fear of Current or Ex-Partner: No     Emotionally Abused: No     Physically Abused: No     Sexually Abused: No   Housing Stability: Low Risk  (11/7/2023)    Housing Stability Vital Sign     Unable to Pay for Housing in the Last Year: No     Number of Times Moved in the Last Year: 1     Homeless in the Last Year: No        Current Outpatient Medications:     Alirocumab 150 MG/ML SOAJ, Inject 150 mg under the skin every 14 (fourteen) days, Disp: 2 mL, Rfl: 11    amLODIPine (NORVASC) 10 mg tablet, Take 10 mg by mouth daily, Disp: , Rfl:     aspirin 81 mg chewable tablet, Chew 1 tablet (81 mg total) daily, Disp: 30 tablet, Rfl: 0    buPROPion (WELLBUTRIN XL) 300 mg 24 hr tablet, , Disp: , Rfl:     clopidogrel (PLAVIX) 75 mg tablet, TAKE ONE TABLET BY MOUTH EVERY DAY, Disp: 30 tablet, Rfl: 5    escitalopram (LEXAPRO) 20 mg tablet, Take 20 mg by mouth daily, Disp: , Rfl:     ferrous sulfate 325 (65 FE) MG EC tablet, Take 325 mg by mouth daily, Disp: , Rfl:     gabapentin (NEURONTIN) 100 mg capsule, Take 200 mg by mouth daily at bedtime, Disp: , Rfl:     Levothyroxine Sodium 200 MCG CAPS, Take 200 mcg by mouth daily, Disp: , Rfl:     losartan (COZAAR) 25 mg tablet, Take 1 tablet (25 mg total) by mouth daily, Disp: 30 tablet, Rfl: 11    Magnesium Oxide -Mg Supplement (Magnesium Extra Strength) 400 MG CAPS, TAKE ONE CAPSULE BY MOUTH IN THE MORNING, Disp: 90 capsule, Rfl: 3    methocarbamol (ROBAXIN) 500 mg tablet, Take 1 tablet (500  mg total) by mouth 2 (two) times a day as needed for muscle spasms (Patient not taking: Reported on 9/16/2024), Disp: 20 tablet, Rfl: 0    methylPREDNISolone 4 MG tablet therapy pack, Use as directed on package (Patient not taking: Reported on 9/16/2024), Disp: 21 tablet, Rfl: 0    metoprolol succinate (TOPROL-XL) 25 mg 24 hr tablet, TAKE HALF TABLET BY MOUTH DAILY, Disp: 15 tablet, Rfl: 5    Multiple Vitamins-Minerals (ICAPS AREDS 2 PO), Take by mouth (Patient not taking: Reported on 9/16/2024), Disp: , Rfl:     Omega-3 Fatty Acids (Fish Oil) 1200 MG CAPS, Take by mouth, Disp: , Rfl:     torsemide (DEMADEX) 20 mg tablet, Take 2 tablets (40 mg total) by mouth daily (Patient taking differently: Take 20 mg by mouth daily), Disp: 60 tablet, Rfl: 11    Review of Systems   Constitutional:  Negative for chills and fever.   Respiratory:  Negative for shortness of breath.    Cardiovascular:  Negative for chest pain, palpitations and leg swelling.   Gastrointestinal:  Negative for vomiting.   Skin:  Positive for wound (L foot).   Neurological:  Positive for numbness.        Neuropathy   Psychiatric/Behavioral:  The patient is not nervous/anxious.      Objective:  /74   Pulse 58   Temp 97.9 °F (36.6 °C)   Resp 18   Ht 6' (1.829 m)   Wt 99.3 kg (219 lb)   BMI 29.70 kg/m²   Pain Score: 0-No pain     Physical Exam  Vitals reviewed.   Constitutional:       General: He is not in acute distress.     Appearance: He is not ill-appearing.      Comments: NAD. Wife at bedside.    HENT:      Head: Normocephalic and atraumatic.   Eyes:      General: No scleral icterus.  Cardiovascular:      Comments: Diminished but palpable DP/PT bilaterally.  Absent pedal hair.  Skin of bilateral lower extremities is atrophic thin, dry and shiny in appearance.  No lower extremity edema noted  Pulmonary:      Effort: Pulmonary effort is normal. No respiratory distress.   Musculoskeletal:      Right lower leg: No edema.      Left lower leg: No  "edema.        Feet:       Comments: S/p L partial 1st ray amputation   Feet:      Left foot:      Skin integrity: Callus present. No erythema.      Comments: 2- Left plantar submet 1 ulcer: Despite surgical debridement 2 days ago at his podiatry appointment, patient again with significant periwound callusing and slight undermining.  Slough present within wound bed.  Selective debridement completed today.  No deep probe or probe to bone.  No purulence or malodor.  No erythema, increased warmth, edema, fluctuance, crepitus.  Skin:     Findings: Wound (Ulcer) present.   Neurological:      Mental Status: He is alert and oriented to person, place, and time.   Psychiatric:         Mood and Affect: Mood normal.         Behavior: Behavior normal.           Wound 09/18/24 Foot Left;Plantar (Active)   Wound Image   09/18/24 1410   Wound Description Pink;Yellow;Brown 09/18/24 1411   Chantale-wound Assessment Callus;Dry 09/18/24 1411   Wound Length (cm) 0.6 cm 09/18/24 1411   Wound Width (cm) 0.4 cm 09/18/24 1411   Wound Depth (cm) 0.3 cm 09/18/24 1411   Wound Surface Area (cm^2) 0.24 cm^2 09/18/24 1411   Wound Volume (cm^3) 0.072 cm^3 09/18/24 1411   Calculated Wound Volume (cm^3) 0.07 cm^3 09/18/24 1411   Number of underminings 1 09/18/24 1411   Undermining 1 0.2 09/18/24 1411   Undermining 1 is depth extending from 7 o clock to 11 o clock deepest at 9 o clock 09/18/24 1411   Drainage Amount Small 09/18/24 1411   Drainage Description Serosanguineous 09/18/24 1411   Non-staged Wound Description Full thickness 09/18/24 1411       Debridement   Wound 09/18/24 Foot Left;Plantar    Universal Protocol:  procedure performed by consultantConsent: Verbal consent obtained. Written consent obtained.  Consent given by: patient  Time out: Immediately prior to procedure a \"time out\" was called to verify the correct patient, procedure, equipment, support staff and site/side marked as required.  Patient understanding: patient states " understanding of the procedure being performed  Patient identity confirmed: verbally with patient    Debridement Details  Performed by: physician  Debridement type: selective  Local anesthetic: declined by pt -neuropathic.      Post-debridement measurements  Length (cm): 0.6  Width (cm): 0.5  Depth (cm): 0.3  Percent debrided: 100%  Surface Area (cm^2): 0.3  Area Debrided (cm^2): 0.3  Volume (cm^3): 0.09    Tissue and other material debrided: dermis  Devitalized tissue debrided: callus and slough  Instrument(s) utilized: blade and forceps  Bleeding: small  Hemostasis obtained with: pressure  Procedural pain (0-10): insensate  Post-procedural pain: insensate   Response to treatment: procedure was tolerated well               Lab Results   Component Value Date    HGBA1C 5.8 (H) 07/23/2024       Wound Instructions:  Orders Placed This Encounter   Procedures    Wound cleansing and dressings Left;Plantar Foot     Wash your hands with soap and water.  Remove old dressing, discard into plastic bag and place in trash.  Cleanse the wound with prophase prior to applying a clean dressing. Do not use tissue or cotton balls. Do not scrub the wound. Pat dry using gauze.    Shower no   Apply dermagran to the left plantar foot wound.  Cover with gauze.  Secure with roll gauze  Change dressing daily    Elastic Tubular Stocking Spandagrip E  Tubular elastic bandage: Apply from base of toes to behind the knee. Apply in AM, may remove for sleep.  Avoid prolonged standing in one place.  Elevate leg(s) above the level of the heart when sitting or as much as possible.       Off-loading Instructions:  Keep weight and pressure off wound at all times.   Wear off-loading device as directed by your physician. Put on immediately when rising in the morning and remove when going to bed.  Obtain front offloading shoe and wear AT ALL TIMES  Use knee rover as able to ensure no pressure to wound     Return to wound center in 1 week with Dr Mott      Standing Status:   Future     Standing Expiration Date:   9/25/2024    Debridement     This order was created via procedure documentation    Wound Procedure Treatment Left;Plantar Foot     This order was created via procedure documentation    Offloading Wedge Shoe     Patient wears a mens size 12 please size shoe accordingly       Lacy Pickard MD

## 2024-09-18 NOTE — PROGRESS NOTES
Wound Procedure Treatment Left;Plantar Foot    Performed by: Jo Snow RN  Authorized by: Lacy Pickard MD    Associated wounds:   Wound 09/18/24 Foot Left;Plantar  Applied primary dressing:  Dermagran  Applied secondary dressing:  Gauze  Dressing secured with:  Zbigniew, Tape, Elastic tubular stocking and Size E

## 2024-09-25 ENCOUNTER — OFFICE VISIT (OUTPATIENT)
Facility: CLINIC | Age: 69
End: 2024-09-25
Payer: MEDICARE

## 2024-09-25 ENCOUNTER — OFFICE VISIT (OUTPATIENT)
Dept: CARDIOLOGY CLINIC | Facility: CLINIC | Age: 69
End: 2024-09-25
Payer: MEDICARE

## 2024-09-25 VITALS
TEMPERATURE: 97.7 F | HEART RATE: 61 BPM | SYSTOLIC BLOOD PRESSURE: 105 MMHG | RESPIRATION RATE: 18 BRPM | DIASTOLIC BLOOD PRESSURE: 71 MMHG

## 2024-09-25 VITALS
SYSTOLIC BLOOD PRESSURE: 112 MMHG | HEART RATE: 71 BPM | HEIGHT: 72 IN | OXYGEN SATURATION: 97 % | WEIGHT: 228 LBS | DIASTOLIC BLOOD PRESSURE: 72 MMHG | BODY MASS INDEX: 30.88 KG/M2

## 2024-09-25 DIAGNOSIS — N18.31 STAGE 3A CHRONIC KIDNEY DISEASE (HCC): ICD-10-CM

## 2024-09-25 DIAGNOSIS — R06.02 SHORTNESS OF BREATH: ICD-10-CM

## 2024-09-25 DIAGNOSIS — Z78.9 STATIN INTOLERANCE: ICD-10-CM

## 2024-09-25 DIAGNOSIS — Z89.412 STATUS POST AMPUTATION OF GREAT TOE, LEFT (HCC): ICD-10-CM

## 2024-09-25 DIAGNOSIS — L97.522 ULCER OF LEFT FOOT WITH FAT LAYER EXPOSED (HCC): Primary | ICD-10-CM

## 2024-09-25 DIAGNOSIS — I49.3 PVCS (PREMATURE VENTRICULAR CONTRACTIONS): ICD-10-CM

## 2024-09-25 DIAGNOSIS — E78.2 MIXED HYPERLIPIDEMIA: ICD-10-CM

## 2024-09-25 DIAGNOSIS — I47.20 VENTRICULAR TACHYCARDIA (HCC): ICD-10-CM

## 2024-09-25 DIAGNOSIS — I10 BENIGN ESSENTIAL HTN: ICD-10-CM

## 2024-09-25 DIAGNOSIS — Z95.810 ICD (IMPLANTABLE CARDIOVERTER-DEFIBRILLATOR) IN PLACE: ICD-10-CM

## 2024-09-25 DIAGNOSIS — G62.9 PERIPHERAL POLYNEUROPATHY: ICD-10-CM

## 2024-09-25 DIAGNOSIS — I35.0 SEVERE AORTIC STENOSIS: Primary | ICD-10-CM

## 2024-09-25 DIAGNOSIS — I25.10 CORONARY ARTERY DISEASE INVOLVING NATIVE CORONARY ARTERY OF NATIVE HEART WITHOUT ANGINA PECTORIS: ICD-10-CM

## 2024-09-25 DIAGNOSIS — Z95.2 S/P TAVR (TRANSCATHETER AORTIC VALVE REPLACEMENT): ICD-10-CM

## 2024-09-25 PROBLEM — R78.81 MSSA BACTEREMIA: Status: RESOLVED | Noted: 2023-02-10 | Resolved: 2024-09-25

## 2024-09-25 PROBLEM — D72.829 LEUKOCYTOSIS: Status: RESOLVED | Noted: 2023-01-30 | Resolved: 2024-09-25

## 2024-09-25 PROBLEM — R42 EPISODIC LIGHTHEADEDNESS: Status: RESOLVED | Noted: 2023-06-13 | Resolved: 2024-09-25

## 2024-09-25 PROBLEM — R55 VASOVAGAL SYNCOPE: Status: RESOLVED | Noted: 2023-01-30 | Resolved: 2024-09-25

## 2024-09-25 PROBLEM — B95.61 MSSA BACTEREMIA: Status: RESOLVED | Noted: 2023-02-10 | Resolved: 2024-09-25

## 2024-09-25 PROBLEM — D62 ABLA (ACUTE BLOOD LOSS ANEMIA): Status: RESOLVED | Noted: 2023-11-06 | Resolved: 2024-09-25

## 2024-09-25 PROBLEM — R06.09 DYSPNEA ON EXERTION: Status: RESOLVED | Noted: 2023-06-13 | Resolved: 2024-09-25

## 2024-09-25 PROBLEM — N17.9 AKI (ACUTE KIDNEY INJURY) (HCC): Status: RESOLVED | Noted: 2023-02-10 | Resolved: 2024-09-25

## 2024-09-25 PROBLEM — E87.5 HYPERKALEMIA: Status: RESOLVED | Noted: 2023-02-10 | Resolved: 2024-09-25

## 2024-09-25 PROCEDURE — 11042 DBRDMT SUBQ TIS 1ST 20SQCM/<: CPT | Performed by: FAMILY MEDICINE

## 2024-09-25 PROCEDURE — 99214 OFFICE O/P EST MOD 30 MIN: CPT | Performed by: NURSE PRACTITIONER

## 2024-09-25 PROCEDURE — 99213 OFFICE O/P EST LOW 20 MIN: CPT | Performed by: FAMILY MEDICINE

## 2024-09-25 RX ORDER — LIDOCAINE 40 MG/G
CREAM TOPICAL ONCE
Status: COMPLETED | OUTPATIENT
Start: 2024-09-25 | End: 2024-09-25

## 2024-09-25 RX ORDER — METOPROLOL SUCCINATE 25 MG/1
25 TABLET, EXTENDED RELEASE ORAL 2 TIMES DAILY
Qty: 180 TABLET | Refills: 3 | Status: SHIPPED | OUTPATIENT
Start: 2024-09-25

## 2024-09-25 RX ORDER — AMLODIPINE BESYLATE 5 MG/1
5 TABLET ORAL DAILY
Qty: 30 TABLET | Refills: 11 | Status: SHIPPED | OUTPATIENT
Start: 2024-09-25

## 2024-09-25 RX ADMIN — LIDOCAINE: 40 CREAM TOPICAL at 09:21

## 2024-09-25 NOTE — ASSESSMENT & PLAN NOTE
Echocardiogram 1/4/2024 with EF 60%, mild LVH, normal diastolic function. Normal functioning AVR, mild TR with PASP 37mmHg.  BNP 36 on recent labs.  Worsened renal function with up-titration of diuretics.  Currently on torsemide 20 mg daily. Appears euvolemic. Will reduce torsemide to 10 mg daily if BP remains < 110 systolic.  Will continue with 2 g sodium diet, daily weights.  Reviewed s/s to report.

## 2024-09-25 NOTE — ASSESSMENT & PLAN NOTE
Blood pressure is borderline hypotensive today.  Reduce amlodipine from 10 to 5 mg daily.  Increase metoprolol succinate to 25 mg BID (from 12.5 mg daily) due to NSVT  Continue losartan 25 mg daily.  If BP remains hypotensive will reduce torsemide to 10 mg   Developed dry cough with lisinopril which resolved with discontinuation.  Recent lab work reviewed.

## 2024-09-25 NOTE — ASSESSMENT & PLAN NOTE
S/p 26 mm Chen Benedicto S3 UR valve by Dr. Gooden at [facility] 12/5/23  See discussion under aortic stenosis

## 2024-09-25 NOTE — ASSESSMENT & PLAN NOTE
Lab Results   Component Value Date    EGFR 65 09/04/2024    EGFR 65 08/27/2024    EGFR 65 07/23/2024    CREATININE 1.13 09/04/2024    CREATININE 1.13 08/27/2024    CREATININE 1.20 07/23/2024     Improving.  Will monitor. Avoid hypotension

## 2024-09-25 NOTE — PROGRESS NOTES
"Patient ID: Mikael Saavedra is a 68 y.o. male Date of Birth 1955       Chief Complaint   Patient presents with    Follow Up Wound Care Visit       Allergies:  Lisinopril and Statins    Diagnosis:      Diagnosis ICD-10-CM Associated Orders   1. Ulcer of left foot with fat layer exposed ([facility])  L97.522 lidocaine (LMX) 4 % cream     Wound cleansing and dressings Left;Plantar Foot     Debridement Left;Plantar Foot      2. Status post amputation of great toe, left ([facility])  Z89.412 Wound cleansing and dressings Left;Plantar Foot      3. Peripheral polyneuropathy  G62.9 Wound cleansing and dressings Left;Plantar Foot              Assessment & Plan:  Neuropathic ulceration left submet 1 s/p left partial first ray amp: Patient with significant periwound callusing and circumferential undermining.  Wound bed with slough and fibrinous debris. Surgical debridement completed today.  No deep probe or probe to bone.  No acute erythema, limited streaking, purulence, malodor.  No induration, fluctuance, crepitus.  Surgical debridement  Continue same wound care -Dermagran considering significant callus and hyperkeratotic buildup  Though patient is doing better with offloading when compared to last visit, it is still not enough.  Recommend ambulation for ADLs only with knee scooter and Darco offloading shoe.  I again reviewed risks of failing to properly offload and possible complications including but not limited to wound deterioration, increased risk for infection, further amputation, life-threatening infections, amongst others.  He again expresses understanding.  Bedside ABIs completed at initial wound center visit again reviewed R 1.0 / L 1.13  Most recent x-ray 9/16/2024 \" no acute osseous abnormalities noted.  Bone remodeling and plantar medial prominence of first metatarsal distal shaft at site of prior bone cut\"  Reviewed extreme importance of very close monitoring of wound.  Should patient experience any acute change or any " "of the following symptoms including but not limited to fever, chills, increased pain at the wound, swelling, purulent drainage, foul odor, etc... to immediately proceed to emergency department. Pt expresses understanding.   F/u in 1 week or sooner if needed     Portions of the record may have been created with voice recognition software. Occasional wrong word or \"sound alike\" substitutions may have occurred due to the inherent limitations of voice recognition software. Read the chart carefully and recognize, using context, where substitutions have occurred.    Subjective:   9/18/2024: Mr. Saavedra is a 68-year-old male with a past medical history including but not limited to neuropathy, hypertension, heart failure, s/p TAVR on AC, presents to wound center today along with his wife for evaluation of neuropathic ulceration left foot at site of previous great toe amp.  He is not diabetic but does report significant neuropathy which patient and wife believe is secondary to polio when he was a child.  He reports he was just seen by Dr. Mott/Podiatry on 9/16 for this ulceration.  He is s/p left partial first ray amp June 2023.  Wound was debrided and he was referred to the wound center. He arrived to wound center today with regular sneakers and did not have offloading shoe.  He also did not get custom inserts as ordered in the past by podiatry in 2023.  His wife reports since he was at podiatry office earlier in the week, he has not been offloading as he has been walking around getting things ready for a fishing trip.     9/25/2024: Mikael presents today for follow-up.  His wife is again accompanied him to today's visit.  He reports that he tried to be good and though he did go on the fishing trip was not doing as much though does admit he was not offloading as much as he should have been.  He did wear the Darco offloading shoe and is now utilizing the knee scooter.  He denies new acute complaints.  Denies fever, " "chills        The following portions of the patient's history were reviewed and updated as appropriate:   Patient Active Problem List   Diagnosis    Leukocytosis    Benign essential HTN    Vasovagal syncope    MDD (major depressive disorder)    Peripheral neuropathy    Other emphysema ([facility])    Ulcer of left foot ([facility])    ANNALEE (acute kidney injury) ([facility])    Hyperkalemia    MSSA bacteremia    Heart failure with preserved ejection fraction    PVCs (premature ventricular contractions)    Status post foot surgery    Acute hematogenous osteomyelitis of left foot ([facility])    Borderline high cholesterol    Dyspnea on exertion    Episodic lightheadedness    Status post amputation of great toe, left ([facility])    Severe aortic stenosis    Mixed hyperlipidemia    Statin intolerance    Status post insertion of drug-eluting stent into left anterior descending (LAD) artery    Ventricular tachycardia ([facility])    s/p Medtronic dual chamber ICD 11/2/2023    Acute GI bleeding    ABLA (acute blood loss anemia)    S/P TAVR (transcatheter aortic valve replacement)    Prediabetes    Hypothyroid    Stage 3a chronic kidney disease ([facility])    Coronary artery disease involving native coronary artery of native heart without angina pectoris    Open wound of left foot, initial encounter     Past Medical History:   Diagnosis Date    Anxiety     Aortic stenosis     Borderline high cholesterol     CHF (congestive heart failure) ([facility]) 1/4/2024    Asaf Jackson-cardio-    Colon polyp     Depression     Heart murmur     Hypertension     Hypothyroidism     Prediabetes     PVCs (premature ventricular contractions)     \"skip\"    s/p Medtronic dual chamber ICD 11/2/2023 11/04/2023    Wears glasses     readers     Past Surgical History:   Procedure Laterality Date    APPENDECTOMY      BACK SURGERY      CARDIAC CATHETERIZATION N/A 10/17/2023    Procedure: Cardiac RHC/LHC;  Surgeon: Phillip Pfeiffer MD;  [facility];  Service: Cardiology    CARDIAC " CATHETERIZATION N/A 2023    Procedure: Cardiac tavr;  Surgeon: Malcolm Humphrey DO;  Location: [facility];  Service: Cardiology    CARDIAC ELECTROPHYSIOLOGY PROCEDURE N/A 2023    Procedure: Cardiac icd implant;  Surgeon: Jered Talbot MD;  Location: [facility];  Service: Cardiology    CATARACT EXTRACTION Bilateral     CLOSURE DELAYED PRIMARY Left 2023    Procedure: CLOSURE DELAYED PRIMARY;  Surgeon: Tuan Simms DPM;  Location: [facility];  Service: Podiatry    COLONOSCOPY      [place], [place]    AR CORRJ HLX VLGS BNCTY SESMDC RESCJ PROX PHLX BASE Left 2023    Procedure: BUNIONECTOMY PERDOMO and wound debridement;  Surgeon: Clover Mott DPM;  Location: [facility];  Service: Podiatry    AR REPLACE AORTIC VALVE OPENFEMORAL ARTERY APPROACH N/A 2023    Procedure: REPLACEMENT AORTIC VALVE TRANSCATHETER (TAVR) TRANSFEMORAL W/ 26MM SHERMAN ADRIANNA S3 UR VALVE(ACCESS ON LEFT) MAKEDA;  Surgeon: Gerald Gooden MD;  Location[facility];  Service: Cardiac Surgery    TOE AMPUTATION Left 2023    Procedure: PARTIAL 1ST RAY AMPUTATION;  Surgeon: Tuan Simms DPM;  Location[facility];  Service: Podiatry    TONSILLECTOMY      TOTAL SHOULDER REPLACEMENT Left     WISDOM TOOTH EXTRACTION       Family History   Problem Relation Age of Onset    Dementia Mother     Blindness Mother     Cancer Mother     Dementia Father       Social History     Socioeconomic History    Marital status: /Civil Union     Spouse name: None    Number of children: None    Years of education: None    Highest education level: None   Occupational History    None   Tobacco Use    Smoking status: Some Days     Current packs/day: 0.00     Average packs/day: 0.5 packs/day for 50.0 years (25.0 ttl pk-yrs)     Types: Cigarettes     Start date: 11/3/1973     Last attempt to quit: 11/3/2023     Years since quittin.8    Smokeless tobacco: Never   Vaping Use    Vaping status: Never Used    Substance and Sexual Activity    Alcohol use: Not Currently     Alcohol/week: 2.0 standard drinks of alcohol     Types: 2 Cans of beer per week     Comment: once a week    Drug use: Not Currently     Comment: sporadic in young adulthood    Sexual activity: None     Comment: defer   Other Topics Concern    None   Social History Narrative    None     Social Determinants of Health     Financial Resource Strain: Medium Risk (12/13/2023)    [facility]    Overall Financial Resource Strain (CARDIA)     Difficulty of Paying Living Expenses: Somewhat hard   Food Insecurity: No Food Insecurity (12/13/2023)    [facility]    Hunger Vital Sign     Worried About Running Out of Food in the Last Year: Never true     Ran Out of Food in the Last Year: Never true   Transportation Needs: No Transportation Needs (12/13/2023)    [facility]    PRAPARE - Transportation     Lack of Transportation (Medical): No     Lack of Transportation (Non-Medical): No   Physical Activity: Inactive (12/13/2023)    Received from [facility]    Exercise Vital Sign     Days of Exercise per Week: 0 days     Minutes of Exercise per Session: 0 min   Stress: Stress Concern Present (12/13/2023)    [facility]    [employer] - Occupational Stress Questionnaire     Feeling of Stress : Rather much   Social Connections: Unknown (6/18/2024)    Received from [facility]    Social [employer]     How often do you feel lonely or isolated from those around you? (Adult - for ages 18 years and over): Not on file   Intimate Partner Violence: Not At Risk (12/13/2023)    [facility]    Humiliation, Afraid, Rape, and Kick  questionnaire     Fear of Current or Ex-Partner: No     Emotionally Abused: No     Physically Abused: No     Sexually Abused: No   Housing Stability: Low Risk  (11/7/2023)    Housing Stability Vital Sign     Unable to Pay for Housing in the Last Year: No     Number of Times Moved in the Last Year: 1     Homeless in the Last Year: No        Current Outpatient Medications:     Alirocumab 150 MG/ML SOAJ, Inject 150 mg under the skin every 14 (fourteen) days, Disp: 2 mL, Rfl: 11    amLODIPine (NORVASC) 10 mg tablet, Take 10 mg by mouth daily, Disp: , Rfl:     aspirin 81 mg chewable tablet, Chew 1 tablet (81 mg total) daily, Disp: 30 tablet, Rfl: 0    buPROPion (WELLBUTRIN XL) 300 mg 24 hr tablet, , Disp: , Rfl:     clopidogrel (PLAVIX) 75 mg tablet, TAKE ONE TABLET BY MOUTH EVERY DAY, Disp: 30 tablet, Rfl: 5    escitalopram (LEXAPRO) 20 mg tablet, Take 20 mg by mouth daily, Disp: , Rfl:     ferrous sulfate 325 (65 FE) MG EC tablet, Take 325 mg by mouth daily, Disp: , Rfl:     gabapentin (NEURONTIN) 100 mg capsule, Take 200 mg by mouth daily at bedtime, Disp: , Rfl:     Levothyroxine Sodium 200 MCG CAPS, Take 200 mcg by mouth daily, Disp: , Rfl:     losartan (COZAAR) 25 mg tablet, Take 1 tablet (25 mg total) by mouth daily, Disp: 30 tablet, Rfl: 11    Magnesium Oxide -Mg Supplement (Magnesium Extra Strength) 400 MG CAPS, TAKE ONE CAPSULE BY MOUTH IN THE MORNING, Disp: 90 capsule, Rfl: 3    methocarbamol (ROBAXIN) 500 mg tablet, Take 1 tablet (500 mg total) by mouth 2 (two) times a day as needed for muscle spasms (Patient not taking: Reported on 9/16/2024), Disp: 20 tablet, Rfl: 0    methylPREDNISolone 4 MG tablet therapy pack, Use as directed on package (Patient not taking: Reported on 9/16/2024), Disp: 21 tablet, Rfl: 0    metoprolol succinate (TOPROL-XL) 25 mg 24 hr tablet, TAKE HALF TABLET BY MOUTH DAILY, Disp: 15 tablet, Rfl: 5    Multiple Vitamins-Minerals (ICAPS AREDS 2 PO), Take by mouth (Patient not taking:  Reported on 9/16/2024), Disp: , Rfl:     Omega-3 Fatty Acids (Fish Oil) 1200 MG CAPS, Take by mouth, Disp: , Rfl:     torsemide (DEMADEX) 20 mg tablet, Take 2 tablets (40 mg total) by mouth daily (Patient taking differently: Take 20 mg by mouth daily), Disp: 60 tablet, Rfl: 11  No current facility-administered medications for this visit.    Review of Systems   Constitutional:  Negative for chills and fever.   Respiratory:  Negative for shortness of breath.    Cardiovascular:  Negative for chest pain, palpitations and leg swelling.   Gastrointestinal:  Negative for vomiting.   Skin:  Positive for wound (L foot).   Neurological:  Positive for numbness.        Neuropathy   Psychiatric/Behavioral:  The patient is not nervous/anxious.        Objective:  /71   Pulse 61   Temp 97.7 °F (36.5 °C)   Resp 18         Physical Exam  Vitals reviewed.   Constitutional:       General: He is not in acute distress.     Appearance: He is not ill-appearing.      Comments: NAD. Wife at bedside.    HENT:      Head: Normocephalic and atraumatic.   Eyes:      General: No scleral icterus.  Cardiovascular:      Comments: Diminished but palpable DP/PT bilaterally.  Absent pedal hair.  Skin of bilateral lower extremities is atrophic thin, dry and shiny in appearance.  No lower extremity edema noted  Pulmonary:      Effort: Pulmonary effort is normal. No respiratory distress.   Musculoskeletal:      Right lower leg: No edema.      Left lower leg: No edema.        Feet:       Comments: S/p L partial 1st ray amputation   Feet:      Left foot:      Skin integrity: Callus present. No erythema.      Comments: 2- Left plantar submet 1 ulcer: Patient with significant periwound callusing and circumferential undermining.  Wound bed with slough and fibrinous debris. Surgical debridement completed today.  No deep probe or probe to bone.  No acute erythema, limited streaking, purulence, malodor.  No induration, fluctuance, crepitus.  Skin:      "Findings: Wound (Ulcer) present.   Neurological:      Mental Status: He is alert and oriented to person, place, and time.   Psychiatric:         Mood and Affect: Mood normal.         Behavior: Behavior normal.         Wound 09/18/24 Foot Left;Plantar (Active)   Wound Image   09/25/24 0917   Wound Description Pink;Yellow;Brown 09/25/24 0918   Chantale-wound Assessment Callus;Dry 09/25/24 0918   Wound Length (cm) 0.2 cm 09/25/24 0918   Wound Width (cm) 0.2 cm 09/25/24 0918   Wound Depth (cm) 0.2 cm 09/25/24 0918   Wound Surface Area (cm^2) 0.04 cm^2 09/25/24 0918   Wound Volume (cm^3) 0.008 cm^3 09/25/24 0918   Calculated Wound Volume (cm^3) 0.01 cm^3 09/25/24 0918   Change in Wound Size % 85.71 09/25/24 0918   Number of underminings 1 09/18/24 1411   Undermining 1 0.2 09/18/24 1411   Undermining 1 is depth extending from 7 o clock to 11 o clock deepest at 9 o clock 09/18/24 1411   Drainage Amount Scant 09/25/24 0918   Drainage Description Serosanguineous 09/25/24 0918   Non-staged Wound Description Full thickness 09/25/24 0918       Debridement   Wound 09/18/24 Foot Left;Plantar    Universal Protocol:  Consent: Verbal consent obtained. Written consent obtained.  Risks and benefits: risks, benefits and alternatives were discussed  Consent given by: patient  Time out: Immediately prior to procedure a \"time out\" was called to verify the correct patient, procedure, equipment, support staff and site/side marked as required.  Patient understanding: patient states understanding of the procedure being performed  Patient identity confirmed: verbally with patient    Debridement Details  Performed by: physician  Debridement type: surgical  Level of debridement: subcutaneous tissue  Pain control: lidocaine 4%      Post-debridement measurements  Length (cm): 0.4  Width (cm): 0.4  Depth (cm): 0.3  Percent debrided: 100%  Surface Area (cm^2): 0.16  Area Debrided (cm^2): 0.16  Volume (cm^3): 0.05    Tissue and other material debrided: " subcutaneous tissue  Devitalized tissue debrided: callus, fibrin and slough  Instrument(s) utilized: blade and forceps  Bleeding: medium  Hemostasis obtained with: pressure and silver nitrate  Procedural pain (0-10): 0  Post-procedural pain: 0   Response to treatment: procedure was tolerated well                   Lab Results   Component Value Date    HGBA1C 5.8 (H) 07/23/2024       Wound Instructions:  Orders Placed This Encounter   Procedures    Wound cleansing and dressings Left;Plantar Foot     Wash your hands with soap and water.  Remove old dressing, discard into plastic bag and place in trash.  Cleanse the wound with prophase prior to applying a clean dressing. Do not use tissue or cotton balls. Do not scrub the wound. Pat dry using gauze.     Shower no   Apply dermagran to the left plantar foot wound.  Cover with gauze.  Secure with roll gauze  Change dressing daily     Elastic Tubular Stocking Spandagrip E  Tubular elastic bandage: Apply from base of toes to behind the knee. Apply in AM, may remove for sleep.  Avoid prolonged standing in one place.  Elevate leg(s) above the level of the heart when sitting or as much as possible.         Off-loading Instructions:  Keep weight and pressure off wound at all times.   Wear off-loading device as directed by your physician. Put on immediately when rising in the morning and remove when going to bed.  Obtain front offloading shoe and wear AT ALL TIMES  Use knee rover as able to ensure no pressure to wound     Standing Status:   Future     Standing Expiration Date:   10/2/2024    Debridement Left;Plantar Foot     This order was created via procedure documentation       Lacy Pickard MD

## 2024-09-25 NOTE — PROGRESS NOTES
Cardiology Follow Up    Mikael Saavedra  1955  [id number]  [facility] [place]  [address]  [address]  [place] [address] [phone]    Mikael presents for follow up of AS s/p TAVR, CAD s/p stent to LAD, HFpEF, VT on amio s/p ICD, HTN.     1. Severe aortic stenosis  Assessment & Plan:  History of aortic stenosis which was reported as moderate on echo 11/2021 at [facility].  Echocardiogram 1/30/2023 continues to show moderate aortic stenosis with PV 3.52, mean gradient 30 mmHg, DI 0.30.  Repeat echo 9/2023 showed progression to severe AS with PV 4.2 m/s, MG 41mmHg, DVI 0.24.  Now s/p TAVR by Dr. Gooden at [facility] 12/5/2023.  Echo 1/4/2024 shows normally functioning AVR.  Recheck 1/2025  Completed cardiac rehab.  He does report significant improvement in activity tolerance since valve replacement.  Orders:  -     Echo complete w/ contrast if indicated; Future; Expected date: 12/25/2024  2. S/P TAVR (transcatheter aortic valve replacement)  Assessment & Plan:  S/p 26 mm Chen Benedicto S3 UR valve by Dr. Gooden at [facility] 12/5/23  See discussion under aortic stenosis  Orders:  -     Echo complete w/ contrast if indicated; Future; Expected date: 12/25/2024  3. Coronary artery disease involving native coronary artery of native heart without angina pectoris  Assessment & Plan:  Pre-TAVR cardiac cath 10/17/2023 revealed prox-mid 80% LAD lesion s/p PCI/DEON x 1. Residual 40% mid RCA disease.  - - - - - -  Maintained on aspirin and Plavix.  No anginal complaints.  Continue Praluent 150 mg every 14 days for goal LDL < 70. He has been unable to tolerate statins.  Ok to discontinue Plavix as of 10/17/2024. Continue aspirin lifelong.  4. Ventricular tachycardia (HCC)  Assessment & Plan:  VT discovered while at cardiac rehab.  He has been evaluated by [facility] EP and underwent AICD insertion 11/2/2023 and started on amiodarone.  Preserved LVEF on serial  echocardiograms.  Amio weaned off as of May 2024.  4 episodes of NSVT on 8/2024 interrogation  K 4 and Mag 2.3.  Increase metoprolol succinate from 12.5 mg daily to 25 mg BID and monitor.  EP follow up scheduled 11/2024    5. s/p Medtronic dual chamber ICD 11/2/2023  Assessment & Plan:  Managed by [facility].  See discussion under VT    6. Heart failure with preserved ejection fraction  Assessment & Plan:  Echocardiogram 1/4/2024 with EF 60%, mild LVH, normal diastolic function. Normal functioning AVR, mild TR with PASP 37mmHg.  BNP 36 on recent labs.  Worsened renal function with up-titration of diuretics.  Currently on torsemide 20 mg daily. Appears euvolemic. Will reduce torsemide to 10 mg daily if BP remains < 110 systolic.  Will continue with 2 g sodium diet, daily weights.  Reviewed s/s to report.  7. Benign essential HTN  Assessment & Plan:  Blood pressure is borderline hypotensive today.  Reduce amlodipine from 10 to 5 mg daily.  Increase metoprolol succinate to 25 mg BID (from 12.5 mg daily) due to NSVT  Continue losartan 25 mg daily.  If BP remains hypotensive will reduce torsemide to 10 mg   Developed dry cough with lisinopril which resolved with discontinuation.  Recent lab work reviewed.  Orders:  -     amLODIPine (NORVASC) 5 mg tablet; Take 1 tablet (5 mg total) by mouth daily  8. Mixed hyperlipidemia  Assessment & Plan:  Goal LDL < 70  Intolerant of statins.  Now on Praluent 150 mg every 14 days.  Lipid panel 7/2024 with LDL 28  9. PVCs (premature ventricular contractions)  Assessment & Plan:  ECG and telemetry monitoring during hospitalization revealed PVC's in the pattern of bigeminy.  History of PVC's per patient report.  Echocardiogram 1/30/2023 with EF 60%.  Magnesium supplementation added with improvement in PVC's.  Holter monitor 3/15/2023 showed 3.7% PVC burden with avg HR 90 bpm.  on metoprolol succinate 12.5 mg daily.  Continue magnesium.  Now s/p EP eval for VT s/p ICD  insertion.  See discussion under VT.  Orders:  -     metoprolol succinate (TOPROL-XL) 25 mg 24 hr tablet; Take 1 tablet (25 mg total) by mouth 2 (two) times a day  10. Stage 3a chronic kidney disease (HCC)  Assessment & Plan:  Lab Results   Component Value Date    EGFR 65 09/04/2024    EGFR 65 08/27/2024    EGFR 65 07/23/2024    CREATININE 1.13 09/04/2024    CREATININE 1.13 08/27/2024    CREATININE 1.20 07/23/2024     Improving.  Will monitor. Avoid hypotension  11. Statin intolerance  Assessment & Plan:  Now on Praluent 150 mg every 14 days.     LEXI Youssef has a past medical history of aortic stenosis s/p TAVR, CAD s/p DEON to LAD, HFpEF, VT and PVC's s/p AICD on amio, GI bleed, HTN, hypothyroidism. Ongoing tobacco use.     He was admitted to [facility] 1/30-2/6/2023 for sepsis. Cardiology was consulted for evaluation of CHF an MAKEDA for work up of bacteremia. HCTZ was discontinued due to hyponatremia. Furosemide was held due to dehydration. Echo 1/30/2023 showed EF 60% with moderate AS. MAKEDA showed no source of bacteremia. Ultimately source of bacteremia was from a toe wound which ultimately underwent amputation. He had a complicated course with hyperkalemia and ANNALEE. Lisinopril was stopped (also noted to cause dry cough). He was noted to have frequent PVC's on telemetry. A Holter monitor 3/15/2023 showed 3.7% PVC burden without NSVT and he was started on metoprolol succinate.      Echocardiogram 9/2023 revealed severe AS and he was referred to CT surgery for TAVR. Cardiac cath 10/17/2023 as part of pre-TAVR work up resulted in stent placement to an 80% mid LAD lesion with residual non-obstructive disease noted in the mid RCA. He was noted to have NSVT while at cardiac rehab and was sent to [facility] ER 10/30/23. Echocardiogram showed preserved EF. He was ultimately transferred to [facility]. He was evaluated by EP and underwent AICD insertion for primary prevention on 11/2/2023 as well as started on amiodarone loading.      He  was admitted to [facility] 11/6-11/9/23 with syncope and found to have acute GI bleed requiring 2 units PRBC. ASA and Plavix were temporarily held but resumed following EGD with cautery and clip to a bleeding vessel.      He was admitted to [facility] 12/5-12/7/2023 where Dr. Gooden placed a 26 mm Chen Benedicto S3 UR valve on 12/5/2023. He was discharged home on post op day 2. Lisinopril was restarted post op for BP management. He reached out to my office on 12/26/23 to report a dry cough and progressive 10 pound weight gain. Lisinopril was discontinued and replaced with losartan 25 mg daily. Furosemide was temporarily increased to 80 mg daily from 40 mg daily. He followed up with EP on 12/29/2023 at which time his amiodarone was reduced from 200 mg daily to 100 mg daily. He was instructed to continue 80 mg of furosemide for 2 days then go to 60 mg daily.     Echocardiogram 1/4/2024 showed LVEF 60% with normal functioning AVR and mild TR with PASP 37mmHg. He met with Dr. Gooden that same day and was released to start cardiac rehab. He continued to c/o shortness of breath and bloating. Metolazone 5 mg daily for 3 days was added. Chest xray showed no acute findings.      Due to persistent complaints he was transitioned from furosemide to torsemide 40 mg daily, however, repeat blood work showed ANNALEE with creatinine of 2. Torsemide was reduced to 10 mg daily.     He met with EP on 5/7/2024. He was weaned off amiodarone. He followed up with me on 6/5/2024 at which time his device interrogations showed no high rate episodes. He c/o fatigue, frequent nighttime awakening and day time naps, bloating, poor recall. He does have documented JUSTO and hypoxia during sleep however has been adamant that he will not tolerate a CPAP. His PCP increased his levothyroxine to 200 mcg daily and TSH on 5/30 was 2. He just returned from a fishing trip to [place]. His wife reached out to report increased swelling and weight gain. His torsemide was  increased back to 20 mg daily.    9/25/2024: Mikael presents for routine follow up. Device check on 8/20 showed 4 NSVT episodes, longest 9 beats. Lab work 9/4/2024 showed K 4 and Mag 2.3. BP today is borderline hypotensive. He has lost 4 pounds since his last OV. He has no edema on today's exam and reports resolution of his shortness of breath. He denies any chest pain, lightheadedness, palpitations. He is taking his medications faithfully. Not checking BP at home. He has an order for a brain MRI due to his cognitive concerns with poor recall.    Medical Problems       Problem List       Leukocytosis    MDD (major depressive disorder)    Peripheral neuropathy    Other emphysema ([facility])    Ulcer of left foot ([facility])    ANNALEE (acute kidney injury) ([facility])    Hyperkalemia    MSSA bacteremia    Status post foot surgery    Acute hematogenous osteomyelitis of left foot ([facility])    Borderline high cholesterol    Status post amputation of great toe, left ([facility])    Severe aortic stenosis    Mixed hyperlipidemia    Statin intolerance    Status post insertion of drug-eluting stent into left anterior descending (LAD) artery    Ventricular tachycardia ([facility])    s/p Medtronic dual chamber ICD 11/2/2023    Acute GI bleeding    ABLA (acute blood loss anemia)    S/P TAVR (transcatheter aortic valve replacement)    Prediabetes    Hypothyroid    Stage 3a chronic kidney disease ([facility])    Vasovagal syncope    Heart failure with preserved ejection fraction    Benign essential HTN    PVCs (premature ventricular contractions)    Dyspnea on exertion    Episodic lightheadedness    Coronary artery disease involving native coronary artery of native heart without angina pectoris    Open wound of left foot, initial encounter        Past Medical History:   Diagnosis Date    Anxiety     Aortic stenosis     Borderline high cholesterol     CHF (congestive heart failure) ([facility]) 1/4/2024    Asaf Jackson-cardio-    Colon polyp     Depression     Heart murmur   "   Hypertension     Hypothyroidism     Prediabetes     PVCs (premature ventricular contractions)     \"skip\"    s/p Medtronic dual chamber ICD 2023    Wears glasses     readers     Social History     Socioeconomic History    Marital status: /Civil Union     Spouse name: Not on file    Number of children: Not on file    Years of education: Not on file    Highest education level: Not on file   Occupational History    Not on file   Tobacco Use    Smoking status: Some Days     Current packs/day: 0.00     Average packs/day: 0.5 packs/day for 50.0 years (25.0 ttl pk-yrs)     Types: Cigarettes     Start date: 11/3/1973     Last attempt to quit: 11/3/2023     Years since quittin.8    Smokeless tobacco: Never   Vaping Use    Vaping status: Never Used   Substance and Sexual Activity    Alcohol use: Not Currently     Alcohol/week: 2.0 standard drinks of alcohol     Types: 2 Cans of beer per week     Comment: once a week    Drug use: Not Currently     Comment: sporadic in young adulthood    Sexual activity: Not on file     Comment: defer   Other Topics Concern    Not on file   Social History Narrative    Not on file     Social Determinants of Health     Financial Resource Strain: Medium Risk (2023)    Received from [facility], [facility]    Overall Financial Resource Strain (CARDIA)     Difficulty of Paying Living Expenses: Somewhat hard   Food Insecurity: No Food Insecurity (2023)    Received from [facility], [facility]    Hunger Vital Sign     Worried About Running Out of Food in the Last Year: Never true     Ran Out of Food in the Last Year: Never true   Transportation Needs: No Transportation Needs (2023)    Received from [facility], [facility]    PRAPARE - Transportation     Lack of Transportation (Medical): No     Lack of Transportation (Non-Medical): No   Physical " Activity: Inactive (12/13/2023)    Received from [facility]    Exercise Vital Sign     Days of Exercise per Week: 0 days     Minutes of Exercise per Session: 0 min   Stress: Stress Concern Present (12/13/2023)    Received from [facility], [facility]    [place] [employer] [place] [employer] - Occupational Stress Questionnaire     Feeling of Stress : Rather much   Social Connections: Unknown (6/18/2024)    Received from [facility]     How often do you feel lonely or isolated from those around you? (Adult - for ages 18 years and over): Not on file   Intimate Partner Violence: Not At Risk (12/13/2023)    Received from [facility], [facility]    Humiliation, Afraid, Rape, and Kick questionnaire     Fear of Current or Ex-Partner: No     Emotionally Abused: No     Physically Abused: No     Sexually Abused: No   Housing Stability: Low Risk  (11/7/2023)    Housing Stability Vital Sign     Unable to Pay for Housing in the Last Year: No     Number of Times Moved in the Last Year: 1     Homeless in the Last Year: No      Family History   Problem Relation Age of Onset    Dementia Mother     Blindness Mother     Cancer Mother     Dementia Father      Past Surgical History:   Procedure Laterality Date    APPENDECTOMY      BACK SURGERY      CARDIAC CATHETERIZATION N/A 10/17/2023    Procedure: Cardiac RHC/LHC;  Surgeon: Phillip Pfeiffer MD;  Location: [facility];  Service: Cardiology    CARDIAC CATHETERIZATION N/A 12/5/2023    Procedure: Cardiac tavr;  Surgeon: Malcolm Humphrey DO;  Location: [facility];  Service: Cardiology    CARDIAC ELECTROPHYSIOLOGY PROCEDURE N/A 11/2/2023    Procedure: Cardiac icd implant;  Surgeon: Jered Talbot MD;  Location: [facility];  Service: Cardiology    CATARACT EXTRACTION Bilateral     CLOSURE DELAYED PRIMARY Left 06/09/2023    Procedure: CLOSURE DELAYED PRIMARY;   Surgeon: Tuan Simms DPM;  Location: [facility];  Service: Podiatry    COLONOSCOPY  2023    [place]    VT CORRJ HLX VLGS BNCTY SESMDC RESCJ PROX PHLX BASE Left 04/19/2023    Procedure: BUNIONECTOMY PERDOMO and wound debridement;  Surgeon: Clover Mott DPM;  Location: [facility];  Service: Podiatry    VT REPLACE AORTIC VALVE OPENFEMORAL ARTERY APPROACH N/A 12/5/2023    Procedure: REPLACEMENT AORTIC VALVE TRANSCATHETER (TAVR) TRANSFEMORAL W/ 26MM SHERMAN ADRIANNA S3 UR VALVE(ACCESS ON LEFT) MAKEDA;  Surgeon: Gerald Gooden MD;  Location: [facility];  Service: Cardiac Surgery    TOE AMPUTATION Left 06/07/2023    Procedure: PARTIAL 1ST RAY AMPUTATION;  Surgeon: Tuan Simms DPM;  Location: [facility];  Service: Podiatry    TONSILLECTOMY      TOTAL SHOULDER REPLACEMENT Left     WISDOM TOOTH EXTRACTION         Current Outpatient Medications:     Alirocumab 150 MG/ML SOAJ, Inject 150 mg under the skin every 14 (fourteen) days, Disp: 2 mL, Rfl: 11    amLODIPine (NORVASC) 5 mg tablet, Take 1 tablet (5 mg total) by mouth daily, Disp: 30 tablet, Rfl: 11    aspirin 81 mg chewable tablet, Chew 1 tablet (81 mg total) daily, Disp: 30 tablet, Rfl: 0    buPROPion (WELLBUTRIN XL) 300 mg 24 hr tablet, , Disp: , Rfl:     clopidogrel (PLAVIX) 75 mg tablet, TAKE ONE TABLET BY MOUTH EVERY DAY, Disp: 30 tablet, Rfl: 5    escitalopram (LEXAPRO) 20 mg tablet, Take 20 mg by mouth daily, Disp: , Rfl:     ferrous sulfate 325 (65 FE) MG EC tablet, Take 325 mg by mouth daily, Disp: , Rfl:     gabapentin (NEURONTIN) 100 mg capsule, Take 200 mg by mouth daily at bedtime, Disp: , Rfl:     Levothyroxine Sodium 200 MCG CAPS, Take 200 mcg by mouth daily, Disp: , Rfl:     losartan (COZAAR) 25 mg tablet, Take 1 tablet (25 mg total) by mouth daily, Disp: 30 tablet, Rfl: 11    Magnesium Oxide -Mg Supplement (Magnesium Extra Strength) 400 MG CAPS, TAKE ONE CAPSULE BY MOUTH IN THE MORNING, Disp: 90 capsule, Rfl: 3    metoprolol  succinate (TOPROL-XL) 25 mg 24 hr tablet, Take 1 tablet (25 mg total) by mouth 2 (two) times a day, Disp: 180 tablet, Rfl: 3    Omega-3 Fatty Acids (Fish Oil) 1200 MG CAPS, Take by mouth, Disp: , Rfl:     torsemide (DEMADEX) 20 mg tablet, Take 2 tablets (40 mg total) by mouth daily (Patient taking differently: Take 20 mg by mouth daily), Disp: 60 tablet, Rfl: 11    Multiple Vitamins-Minerals (ICAPS AREDS 2 PO), Take by mouth (Patient not taking: Reported on 9/16/2024), Disp: , Rfl:   No current facility-administered medications for this visit.  Allergies   Allergen Reactions    Lisinopril Cough    Statins Myalgia     Other reaction(s): muscle aches and joint aches         Labs:     Chemistry        Component Value Date/Time    K 4.0 09/04/2024 1201    K 4.2 07/23/2024 1005     09/04/2024 1201     07/23/2024 1005    CO2 30 09/04/2024 1201    CO2 26 07/23/2024 1005    BUN 26 (H) 09/04/2024 1201    BUN 21 07/23/2024 1005    CREATININE 1.13 09/04/2024 1201    CREATININE 1.20 07/23/2024 1005        Component Value Date/Time    CALCIUM 9.1 09/04/2024 1201    CALCIUM 9.1 07/23/2024 1005    ALKPHOS 87 08/27/2024 0854    ALKPHOS 83 07/23/2024 1005    AST 17 08/27/2024 0854    AST 23 07/23/2024 1005    AST 19 05/28/2019 1720    ALT 17 08/27/2024 0854    ALT 20 07/23/2024 1005    ALT 15 05/28/2019 1720        Cardiac testing:  ICD interrogation 8/30/2024  MDT DC ICD (MVP ON) - ACTIVE SYSTEM IS MRI CONDITIONAL CARELINK TRANSMISSION: BATTERY VOLTAGE ADEQUATE (11.7 YRS). AP: 17.9%. : <0.1% (MVP-ON). ALL AVAILABLE LEAD PARAMETERS WITHIN NORMAL LIMITS. 4 VT-NS EPISODES W/ EGMS SHOWING NSVT 6 BEATS  @200 BPM, 5 BEATS @ 185 BPM FOLLOWED BY 7 BEATS @ 185 BPM, 9 BEATS @ 200 BPM, 8 BEATS @ 200 BPM. PT TAKES METOPROLOL SUCC, PLAVIX, ASA 81MG. EF: 65% (ECHO 12/5/23). TASK AG, CRNP. OPTI-VOL WITHIN NORMAL LIMITS. NORMAL DEVICE FUNCTION.      Lipid panel 7/23/2024: C 140. T 314. H 49. L 28.      Echo complete 1/5/2024    Left  Ventricle: Left ventricular cavity size is normal. Wall thickness is mildly increased. There is mild concentric hypertrophy. The left ventricular ejection fraction is 60%. Systolic function is normal. Wall motion is normal. Diastolic function is normal.   Aortic Valve: There is an Chen ADRIANNA 3 Ultra 26 mm TAVR bioprosthetic valve. The prosthetic valve appears well-seated and appears to be functioning normally. There is no evidence of paravalvular regurgitation. There is no evidence of transvalvular regurgitation. The gradient recorded across the prosthetic aortic valve is within the expected range. The aortic valve peak velocity is 1.69 m/s. The aortic valve area is 2.19 cm2.   Tricuspid Valve: There is mild regurgitation. The right ventricular systolic pressure is mildly elevated. The estimated right ventricular systolic pressure is 37.00 mmHg.      Cardiac cath 10/17/2023:    Mid RCA lesion is 40% stenosed.    Prox LAD to Mid LAD lesion is 80% stenosed.     Echo 9/8/2023:    Left Ventricle: Left ventricular cavity size is normal. Wall thickness is mildly increased. The left ventricular ejection fraction is 60% by visual estimation.. Systolic function is normal. Wall motion is normal. Diastolic function is mildly abnormal, consistent with grade I (abnormal) relaxation.    Left Atrium: The atrium is dilated.    Aortic Valve: The leaflets are moderately calcified. There is severe stenosis. The aortic valve peak velocity is 4.2 m/s. The aortic valve mean gradient is 41 mmHg. The dimensionless velocity index is 0.24. The aortic valve area is 1.00 cm2.    AS now appears severe compared to previous study from 1/2023     48 hour Holter monitor 7/18/2023:  The patient had predominantly sinus rhythm.   Heart rate varied from 49 bpm to 112 bpm.  The patient’s average heart rate was 75 bpm.   The patient had a holter monitor tracing done for 47 hours and 59 minutes.  The patient had [id number] ventricular ectopic beats  accounting for 5.5% of total beats.   The patient had 35 supraventricular ectopic beats.  The longest R/R interval was 1.5 seconds.  Ventricular trigeminy and bigeminy was noted  PVCs in the pattern of triplets and couplets noted.   Patient noted feeling tired associated with NSR.      Lexiscan nuclear stress test 7/18/2023:    Stress Function: Left ventricular function post-stress is normal. Stress ejection fraction is 59 %.  Normal regional wall motion noted.    Perfusion: There are no perfusion defects.    Stress Combined Conclusion: The ECG and SPECT imaging portions of the stress study are concordant with no evidence of stress induced myocardial ischemia. Left ventricular perfusion is normal.  Post-rest ejection fraction is determined as 59%.    Stress ECG: No ST deviation is noted. There were no arrhythmias during recovery. . The ECG was negative for ischemia.    No anginal-like symptoms were reported during the stress test.    Normal resting blood pressure.  Appropriate blood pressure response was noted during the stress test.     24 hour Holter monitor 3/15/2023:  Predominantly sinus rhythm, HR , avg 90 bpm.  3.7% PVC burden. Rare PAC's. One asymptomatic 4 beat atrial run.      ECG 2/10/2023: Normal sinus rhythm.     MAKEDA 2/3/2023:  EF 60%. LA mildly dilated.   Mod AS. Trace MR. Trace TR.     Echocardiogram 1/30/2023:  EF 60%, mild LVH. Grade 1 DD.  Mod AS, PV 3.52 m/s, MG 30 mmHg, DI 0.30. Mild AI.  MAC with trace MR. Trace TR. PASP 41 mmHg.  Aortic root 3.8 cm. Ascending aorta 3.3. cm.     ECG 1/30/2023: Sinus rhythm with frequent PVC's. Nonspecific ST abnormality.    Review of Systems   Constitutional: Negative.   HENT: Negative.     Cardiovascular:  Negative for chest pain, dyspnea on exertion, irregular heartbeat, leg swelling, near-syncope, orthopnea and palpitations.   Respiratory:  Negative for cough and snoring.    Endocrine: Negative.    Skin:         Wound left foot - getting wound care,  wearing boot   Musculoskeletal:  Positive for back pain.   Gastrointestinal: Negative.    Genitourinary: Negative.    Neurological: Negative.    Psychiatric/Behavioral:  Positive for memory loss.        Vitals:    09/25/24 1431   BP: 112/72   Pulse: 71   SpO2: 97%     Vitals:    09/25/24 1431   Weight: 103 kg (228 lb)     Height: 6' (182.9 cm)   Body mass index is 30.92 kg/m².    Physical Exam  Vitals and nursing note reviewed.   Constitutional:       General: He is not in acute distress.     Appearance: He is well-developed. He is not diaphoretic.   HENT:      Head: Normocephalic and atraumatic.   Neck:      Vascular: No carotid bruit or JVD.   Cardiovascular:      Rate and Rhythm: Normal rate and regular rhythm.      Pulses: Intact distal pulses.      Heart sounds: S1 normal and S2 normal. Murmur heard.      Systolic murmur is present.      No friction rub. No gallop.      Comments: No edema  Pulmonary:      Effort: Pulmonary effort is normal. No respiratory distress.      Breath sounds: Normal breath sounds.   Abdominal:      General: There is no distension.      Palpations: Abdomen is soft.      Tenderness: There is no abdominal tenderness.   Skin:     General: Skin is warm and dry.      Findings: No rash.   Neurological:      Mental Status: He is alert and oriented to person, place, and time.   Psychiatric:         Mood and Affect: Mood and affect normal.         Behavior: Behavior normal.

## 2024-09-25 NOTE — PATIENT INSTRUCTIONS
Orders Placed This Encounter   Procedures    Wound cleansing and dressings Left;Plantar Foot     Wash your hands with soap and water.  Remove old dressing, discard into plastic bag and place in trash.  Cleanse the wound with prophase prior to applying a clean dressing. Do not use tissue or cotton balls. Do not scrub the wound. Pat dry using gauze.     Shower no   Apply dermagran to the left plantar foot wound.  Cover with gauze.  Secure with roll gauze  Change dressing daily     Elastic Tubular Stocking Spandagrip E  Tubular elastic bandage: Apply from base of toes to behind the knee. Apply in AM, may remove for sleep.  Avoid prolonged standing in one place.  Elevate leg(s) above the level of the heart when sitting or as much as possible.         Off-loading Instructions:  Keep weight and pressure off wound at all times.   Wear off-loading device as directed by your physician. Put on immediately when rising in the morning and remove when going to bed.  Obtain front offloading shoe and wear AT ALL TIMES  Use knee rover as able to ensure no pressure to wound     Standing Status:   Future     Standing Expiration Date:   10/2/2024

## 2024-09-25 NOTE — PATIENT INSTRUCTIONS
Blood pressure is on the low side today. Reduce amlodipine from 10 mg to 5 mg daily.   For non-sustained VT increase metoprolol to 25 mg twice daily.  Stop Plavix as of 10/17/2024. Continue aspirin faithfully.  Monitor blood pressure a few times this week and update the office by next week on readings. If your BP remains low I will back off on the torsemide as well.  Try using a cinnamon stick instead of a cigarette.

## 2024-09-25 NOTE — ASSESSMENT & PLAN NOTE
VT discovered while at cardiac rehab.  He has been evaluated by [facility] EP and underwent AICD insertion 11/2/2023 and started on amiodarone.  Preserved LVEF on serial echocardiograms.  Amio weaned off as of May 2024.  4 episodes of NSVT on 8/2024 interrogation  K 4 and Mag 2.3.  Increase metoprolol succinate from 12.5 mg daily to 25 mg BID and monitor.  EP follow up scheduled 11/2024

## 2024-09-25 NOTE — ASSESSMENT & PLAN NOTE
Goal LDL < 70  Intolerant of statins.  Now on Praluent 150 mg every 14 days.  Lipid panel 7/2024 with LDL 28

## 2024-09-25 NOTE — ASSESSMENT & PLAN NOTE
History of aortic stenosis which was reported as moderate on echo 11/2021 at [facility].  Echocardiogram 1/30/2023 continues to show moderate aortic stenosis with PV 3.52, mean gradient 30 mmHg, DI 0.30.  Repeat echo 9/2023 showed progression to severe AS with PV 4.2 m/s, MG 41mmHg, DVI 0.24.  Now s/p TAVR by Dr. Gooden at [facility] 12/5/2023.  Echo 1/4/2024 shows normally functioning AVR.  Recheck 1/2025  Completed cardiac rehab.  He does report significant improvement in activity tolerance since valve replacement.

## 2024-09-26 ENCOUNTER — TELEPHONE (OUTPATIENT)
Age: 69
End: 2024-09-26

## 2024-10-01 ENCOUNTER — OFFICE VISIT (OUTPATIENT)
Facility: CLINIC | Age: 69
End: 2024-10-01
Payer: MEDICARE

## 2024-10-01 VITALS
DIASTOLIC BLOOD PRESSURE: 73 MMHG | HEART RATE: 60 BPM | RESPIRATION RATE: 18 BRPM | TEMPERATURE: 97.7 F | SYSTOLIC BLOOD PRESSURE: 156 MMHG

## 2024-10-01 DIAGNOSIS — L97.522 NEUROPATHIC ULCER OF FOOT WITH FAT LAYER EXPOSED, LEFT (HCC): ICD-10-CM

## 2024-10-01 DIAGNOSIS — L97.522 ULCER OF LEFT FOOT WITH FAT LAYER EXPOSED (HCC): Primary | ICD-10-CM

## 2024-10-01 PROCEDURE — 11042 DBRDMT SUBQ TIS 1ST 20SQCM/<: CPT | Performed by: FAMILY MEDICINE

## 2024-10-01 NOTE — PROGRESS NOTES
Wound Procedure Treatment Left;Plantar Foot    Performed by: Jo Snow RN  Authorized by: Lewis Melendez MD    Associated wounds:   Wound 09/18/24 Foot Left;Plantar  Wound cleansed with:  NSS  Applied primary dressing:  Silver, Calcium alginate and Foam  Applied secondary dressing:  Gauze  Dressing secured with:  Zbigniew and Tape  Offloading device appllied:  Forefoot relief shoe

## 2024-10-01 NOTE — PROGRESS NOTES
Patient ID: Mikael Saavedra is a 68 y.o. male Date of Birth 1955       Chief Complaint   Patient presents with    Follow Up Wound Care Visit     L foot wound       Allergies:  Lisinopril and Statins    Diagnosis:      Diagnosis ICD-10-CM Associated Orders   1. Ulcer of left foot with fat layer exposed ([facility])  L97.522 Wound cleansing and dressings Left;Plantar Foot     Wound Procedure Treatment Left;Plantar Foot      2. 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  Status post hallux amputation.  Surgical debridement.  Endoform and nonbordered foam.  Change 3 times a week.  Offload is much as possible with Darco shoe and knee scooter.  Follow-up in 1 week and eventual follow-up with podiatry.  May need TCC.  Currently has offloading shoe.         Subjective:   9/18/2024: Mr. Saavedra is a 68-year-old male with a past medical history including but not limited to neuropathy, hypertension, heart failure, s/p TAVR on AC, presents to wound center today along with his wife for evaluation of neuropathic ulceration left foot at site of previous great toe amp.  He is not diabetic but does report significant neuropathy which patient and wife believe is secondary to polio when he was a child.  He reports he was just seen by Dr. Mott/Podiatry on 9/16 for this ulceration.  He is s/p left partial first ray amp June 2023.  Wound was debrided and he was referred to the wound center. He arrived to wound center today with regular sneakers and did not have offloading shoe.  He also did not get custom inserts as ordered in the past by podiatry in 2023.  His wife reports since he was at podiatry office earlier in the week, he has not been offloading as he has been walking around getting things ready for a fishing trip.     9/25/2024: Mikael presents today for follow-up.  His wife is again accompanied him to today's visit.  He reports that  "he tried to be good and though he did go on the fishing trip was not doing as much though does admit he was not offloading as much as he should have been.  He did wear the Darco offloading shoe and is now utilizing the knee scooter.  He denies new acute complaints.  Denies fever, chills    10/1/2024: Follow-up neuropathic ulcer of the first MTP.  Has a Darco offloading shoe.  He is also using a knee scooter.  No new complaints.        The following portions of the patient's history were reviewed and updated as appropriate:   Patient Active Problem List   Diagnosis    Benign essential HTN    MDD (major depressive disorder)    Peripheral neuropathy    Other emphysema ([facility])    Ulcer of left foot ([facility])    Heart failure with preserved ejection fraction    PVCs (premature ventricular contractions)    Status post foot surgery    Acute hematogenous osteomyelitis of left foot ([facility])    Status post amputation of great toe, left ([facility])    Severe aortic stenosis    Mixed hyperlipidemia    Statin intolerance    Status post insertion of drug-eluting stent into left anterior descending (LAD) artery    Ventricular tachycardia ([facility])    s/p Medtronic dual chamber ICD 11/2/2023    Acute GI bleeding    S/P TAVR (transcatheter aortic valve replacement)    Prediabetes    Hypothyroid    Stage 3a chronic kidney disease ([facility])    Coronary artery disease involving native coronary artery of native heart without angina pectoris    Open wound of left foot, initial encounter     Past Medical History:   Diagnosis Date    Anxiety     Aortic stenosis     Borderline high cholesterol     CHF (congestive heart failure) ([facility]) 1/4/2024    Asaf Jackson-cardio-    Colon polyp     Depression     Heart murmur     Hypertension     Hypothyroidism     Prediabetes     PVCs (premature ventricular contractions)     \"skip\"    s/p Medtronic dual chamber ICD 11/2/2023 11/04/2023    Wears glasses     readers     Past Surgical History:   Procedure Laterality " Date    APPENDECTOMY      BACK SURGERY      CARDIAC CATHETERIZATION N/A 10/17/2023    Procedure: Cardiac RHC/LHC;  Surgeon: Phillip Pfeiffer MD;  Location: [facility];  Service: Cardiology    CARDIAC CATHETERIZATION N/A 12/5/2023    Procedure: Cardiac tavr;  Surgeon: Malcolm Humphrey DO;  Location: [facility];  Service: Cardiology    CARDIAC ELECTROPHYSIOLOGY PROCEDURE N/A 11/2/2023    Procedure: Cardiac icd implant;  Surgeon: Jered Talbot MD;  Location: [facility];  Service: Cardiology    CATARACT EXTRACTION Bilateral     CLOSURE DELAYED PRIMARY Left 06/09/2023    Procedure: CLOSURE DELAYED PRIMARY;  Surgeon: Tuan Simms DPM;  Location: [facility];  Service: Podiatry    [place]OSCOPY  2023    [place]    MD CORRJ HLX VLGS BNCTY SESMDC RESCJ PROX PHLX BASE Left 04/19/2023    Procedure: BUNIONECTOMY PERDOMO and wound debridement;  Surgeon: Clover Mott DPM;  Location: [facility];  Service: Podiatry    MD REPLACE AORTIC VALVE OPENFEMORAL ARTERY APPROACH N/A 12/5/2023    Procedure: REPLACEMENT AORTIC VALVE TRANSCATHETER (TAVR) TRANSFEMORAL W/ 26MM SHERMAN ADRIANNA S3 UR VALVE(ACCESS ON LEFT) MAKEDA;  Surgeon: Gerald Gooden MD;  Location[facility];  Service: Cardiac Surgery    TOE AMPUTATION Left 06/07/2023    Procedure: PARTIAL 1ST RAY AMPUTATION;  Surgeon: Tuan Simms DPM;  Location: [facility];  Service: Podiatry    TONSILLECTOMY      TOTAL SHOULDER REPLACEMENT Left     WISDOM TOOTH EXTRACTION       Family History   Problem Relation Age of Onset    Dementia Mother     Blindness Mother     Cancer Mother     Dementia Father       Social History     Socioeconomic History    Marital status: /Civil Union     Spouse name: Not on file    Number of children: Not on file    Years of education: Not on file    Highest education level: Not on file   Occupational History    Not on file   Tobacco Use    Smoking status: Some Days     Current packs/day: 0.00     Average  packs/day: 0.5 packs/day for 50.0 years (25.0 ttl pk-yrs)     Types: Cigarettes     Start date: 11/3/1973     Last attempt to quit: 11/3/2023     Years since quittin.9    Smokeless tobacco: Never   Vaping Use    Vaping status: Never Used   Substance and Sexual Activity    Alcohol use: Not Currently     Alcohol/week: 2.0 standard drinks of alcohol     Types: 2 Cans of beer per week     Comment: once a week    Drug use: Not Currently     Comment: sporadic in young adulthood    Sexual activity: Not on file     Comment: defer   Other Topics Concern    Not on file   Social History Narrative    Not on file     Social Determinants of Health     Financial Resource Strain: Medium Risk (2023)    [facility]    Overall Financial Resource Strain (CARDIA)     Difficulty of Paying Living Expenses: Somewhat hard   Food Insecurity: No Food Insecurity (2023)    [facility]    Hunger Vital Sign     Worried About Running Out of Food in the Last Year: Never true     Ran Out of Food in the Last Year: Never true   Transportation Needs: No Transportation Needs (2023)    [facility]    PRAPARE - Transportation     Lack of Transportation (Medical): No     Lack of Transportation (Non-Medical): No   Physical Activity: Inactive (2023)    Received from [facility]    Exercise Vital Sign     Days of Exercise per Week: 0 days     Minutes of Exercise per Session: 0 min   Stress: Stress Concern Present (2023)    [facility]    [employer] [place] [employer] - Occupational Stress Questionnaire     Feeling of Stress : Rather much   Social Connections: Unknown (2024)    Received from [facility]     How often do you feel lonely  or isolated from those around you? (Adult - for ages 18 years and over): Not on file   Intimate Partner Violence: Not At Risk (12/13/2023)    [facility]    Humiliation, Afraid, Rape, and Kick questionnaire     Fear of Current or Ex-Partner: No     Emotionally Abused: No     Physically Abused: No     Sexually Abused: No   Housing Stability: Low Risk  (11/7/2023)    Housing Stability Vital Sign     Unable to Pay for Housing in the Last Year: No     Number of Times Moved in the Last Year: 1     Homeless in the Last Year: No        Current Outpatient Medications:     Alirocumab 150 MG/ML SOAJ, Inject 150 mg under the skin every 14 (fourteen) days, Disp: 2 mL, Rfl: 11    amLODIPine (NORVASC) 5 mg tablet, Take 1 tablet (5 mg total) by mouth daily, Disp: 30 tablet, Rfl: 11    aspirin 81 mg chewable tablet, Chew 1 tablet (81 mg total) daily, Disp: 30 tablet, Rfl: 0    buPROPion (WELLBUTRIN XL) 300 mg 24 hr tablet, , Disp: , Rfl:     clopidogrel (PLAVIX) 75 mg tablet, TAKE ONE TABLET BY MOUTH EVERY DAY, Disp: 30 tablet, Rfl: 5    escitalopram (LEXAPRO) 20 mg tablet, Take 20 mg by mouth daily, Disp: , Rfl:     ferrous sulfate 325 (65 FE) MG EC tablet, Take 325 mg by mouth daily, Disp: , Rfl:     gabapentin (NEURONTIN) 100 mg capsule, Take 200 mg by mouth daily at bedtime, Disp: , Rfl:     Levothyroxine Sodium 200 MCG CAPS, Take 200 mcg by mouth daily, Disp: , Rfl:     losartan (COZAAR) 25 mg tablet, Take 1 tablet (25 mg total) by mouth daily, Disp: 30 tablet, Rfl: 11    Magnesium Oxide -Mg Supplement (Magnesium Extra Strength) 400 MG CAPS, TAKE ONE CAPSULE BY MOUTH IN THE MORNING, Disp: 90 capsule, Rfl: 3    metoprolol succinate (TOPROL-XL) 25 mg 24 hr tablet, Take 1 tablet (25 mg total) by mouth 2 (two) times a day, Disp: 180 tablet, Rfl: 3    Multiple Vitamins-Minerals (ICAPS AREDS 2 PO), Take by mouth (Patient not taking: Reported on 9/16/2024), Disp: , Rfl:      Omega-3 Fatty Acids (Fish Oil) 1200 MG CAPS, Take by mouth, Disp: , Rfl:     torsemide (DEMADEX) 20 mg tablet, Take 2 tablets (40 mg total) by mouth daily (Patient taking differently: Take 20 mg by mouth daily), Disp: 60 tablet, Rfl: 11    Review of Systems   Constitutional:  Negative for chills and fever.   Respiratory:  Negative for shortness of breath.    Cardiovascular:  Negative for chest pain, palpitations and leg swelling.   Gastrointestinal:  Negative for vomiting.   Skin:  Positive for wound (L foot).   Neurological:  Positive for numbness.        Neuropathy   Psychiatric/Behavioral:  The patient is not nervous/anxious.        Objective:  /73   Pulse 60   Temp 97.7 °F (36.5 °C)   Resp 18         Physical Exam  Vitals reviewed.   Constitutional:       General: He is not in acute distress.     Comments: NAD. Wife at bedside.    HENT:      Head: Normocephalic and atraumatic.   Cardiovascular:      Rate and Rhythm: Normal rate.      Comments: Diminished but palpable DP/PT bilaterally.  Absent pedal hair.  Skin of bilateral lower extremities is atrophic thin, dry and shiny in appearance.  No lower extremity edema noted  Pulmonary:      Effort: Pulmonary effort is normal. No respiratory distress.   Musculoskeletal:      Right lower leg: No edema.      Left lower leg: No edema.        Feet:       Comments: S/p L partial 1st ray amputation   Feet:      Left foot:      Skin integrity: Ulcer and callus present. No erythema.      Comments: 2- Left plantar submet 1 ulcer: Patient with significant periwound callusing and circumferential undermining.  Wound bed with slough and fibrinous debris. Surgical debridement completed today with saucerization.  No deep probe or probe to bone.  No signs of infection.  Skin:     Findings: Wound (Ulcer) present.   Neurological:      Mental Status: He is alert and oriented to person, place, and time.   Psychiatric:         Mood and Affect: Mood normal.         Behavior:  "Behavior normal.         Wound 09/18/24 Foot Left;Plantar (Active)   Wound Image Images linked 10/01/24 1134   Wound Description Pink;Brown;Eschar 10/01/24 1113   Chantale-wound Assessment Callus;Dry;Brown 10/01/24 1113   Wound Length (cm) 0.2 cm 10/01/24 1113   Wound Width (cm) 0.2 cm 10/01/24 1113   Wound Depth (cm) 0.3 cm 10/01/24 1113   Wound Surface Area (cm^2) 0.04 cm^2 10/01/24 1113   Wound Volume (cm^3) 0.012 cm^3 10/01/24 1113   Calculated Wound Volume (cm^3) 0.01 cm^3 10/01/24 1113   Change in Wound Size % 85.71 10/01/24 1113   Undermining 1 0.3 10/01/24 1113   Undermining 1 is depth extending from 12 o clock to 12 o clock deepest at 2 10/01/24 1113   Drainage Amount Scant 10/01/24 1113   Drainage Description Serosanguineous 10/01/24 1113   Non-staged Wound Description Full thickness 10/01/24 1113        Debridement   Wound 09/18/24 Foot Left;Plantar    Universal Protocol:  procedure performed by consultantConsent: Verbal consent obtained. Written consent obtained.  Consent given by: patient  Time out: Immediately prior to procedure a \"time out\" was called to verify the correct patient, procedure, equipment, support staff and site/side marked as required.  Patient understanding: patient states understanding of the procedure being performed  Patient identity confirmed: verbally with patient    Debridement Details  Performed by: physician  Debridement type: surgical  Level of debridement: subcutaneous tissue  Pain control: lidocaine 4%      Post-debridement measurements  Length (cm): 0.9  Width (cm): 0.8  Depth (cm): 0.5  Percent debrided: 100%  Surface Area (cm^2): 0.72  Area Debrided (cm^2): 0.72  Volume (cm^3): 0.36    Tissue and other material debrided: dermis, epidermis and subcutaneous tissue  Devitalized tissue debrided: callus, fibrin, necrotic debris and slough  Instrument(s) utilized: curette, nippers, blade and forceps  Bleeding: large  Hemostasis obtained with: pressure and silver nitrate  Procedural " "pain (0-10): 0  Post-procedural pain: 0   Response to treatment: procedure was tolerated well  Debridement Comments: All the undermined area was saucerized/excised.  Tissue nipper was used as well as blade and curette.  Bleeding was controlled eventually with silver nitrate cauterization.             Lab Results   Component Value Date    HGBA1C 5.8 (H) 07/23/2024       Wound Instructions:  Orders Placed This Encounter   Procedures    Wound cleansing and dressings Left;Plantar Foot     Wound cleansing and dressings Left;Plantar Foot   Wash your hands with soap and water.  Remove old dressing, discard into plastic bag and place in trash.  Cleanse the wound with prophase prior to applying a clean dressing. Do not use tissue or cotton balls. Do not scrub the wound. Pat dry using gauze.     Shower no   Apply endoform to the left plantar foot wound.  Cover with non bordered foam (mepilex up) then gauze.  Secure with roll gauze  Change dressing 3x week      Off-loading Instructions:  Keep weight and pressure off wound at all times.   Wear off-loading device as directed by your physician. Put on immediately when rising in the morning and remove when going to bed.  Wear front offloading shoe AT ALL TIMES  Use knee rover as able to ensure no pressure to wound    Return to wound center in 1 week for Dr. Pickard , 2 weeks with Dr. Mott     Standing Status:   Future     Standing Expiration Date:   10/8/2024    Wound Procedure Treatment Left;Plantar Foot     This order was created via procedure documentation             Lewis Melendez MD, CHT, CWS    Portions of the record may have been created with voice recognition software. Occasional wrong word or \"sound alike\" substitutions may have occurred due to the inherent limitations of voice recognition software. Read the chart carefully and recognize, using context, where substitutions have occurred.      "

## 2024-10-01 NOTE — PATIENT INSTRUCTIONS
Orders Placed This Encounter   Procedures    Wound cleansing and dressings Left;Plantar Foot     Wound cleansing and dressings Left;Plantar Foot   Wash your hands with soap and water.  Remove old dressing, discard into plastic bag and place in trash.  Cleanse the wound with prophase prior to applying a clean dressing. Do not use tissue or cotton balls. Do not scrub the wound. Pat dry using gauze.     Shower no   Apply endoform to the left plantar foot wound.  Cover with non bordered foam (mepilex up) then gauze.  Secure with roll gauze  Change dressing 3x week      Off-loading Instructions:  Keep weight and pressure off wound at all times.   Wear off-loading device as directed by your physician. Put on immediately when rising in the morning and remove when going to bed.  Wear front offloading shoe AT ALL TIMES  Use knee rover as able to ensure no pressure to wound    Return to wound center in 1 week for Dr. Pickard , 2 weeks with Dr. Mott     Standing Status:   Future     Standing Expiration Date:   10/8/2024    Wound Procedure Treatment Left;Plantar Foot     This order was created via procedure documentation

## 2024-10-11 ENCOUNTER — OFFICE VISIT (OUTPATIENT)
Facility: CLINIC | Age: 69
End: 2024-10-11
Payer: MEDICARE

## 2024-10-11 VITALS
RESPIRATION RATE: 20 BRPM | TEMPERATURE: 98.1 F | DIASTOLIC BLOOD PRESSURE: 68 MMHG | HEART RATE: 62 BPM | SYSTOLIC BLOOD PRESSURE: 124 MMHG

## 2024-10-11 DIAGNOSIS — Z89.412 STATUS POST AMPUTATION OF GREAT TOE, LEFT (HCC): ICD-10-CM

## 2024-10-11 DIAGNOSIS — L97.522 ULCER OF LEFT FOOT WITH FAT LAYER EXPOSED (HCC): Primary | ICD-10-CM

## 2024-10-11 DIAGNOSIS — L97.522 NEUROPATHIC ULCER OF FOOT WITH FAT LAYER EXPOSED, LEFT (HCC): ICD-10-CM

## 2024-10-11 PROCEDURE — 11042 DBRDMT SUBQ TIS 1ST 20SQCM/<: CPT | Performed by: FAMILY MEDICINE

## 2024-10-11 RX ORDER — LIDOCAINE 40 MG/G
CREAM TOPICAL ONCE
Status: COMPLETED | OUTPATIENT
Start: 2024-10-11 | End: 2024-10-11

## 2024-10-11 RX ADMIN — LIDOCAINE: 40 CREAM TOPICAL at 10:43

## 2024-10-11 NOTE — PROGRESS NOTES
Patient ID: Mikael Saavedra is a 68 y.o. male Date of Birth 1955       Chief Complaint   Patient presents with    Follow Up Wound Care Visit     L toe wound       Allergies:  Lisinopril and Statins    Diagnosis:      Diagnosis ICD-10-CM Associated Orders   1. Ulcer of left foot with fat layer exposed ([facility])  L97.522 Wound cleansing and dressings Left;Plantar Foot     lidocaine (LMX) 4 % cream     Cam Boot     Wound Procedure Treatment Left;Plantar Foot      2. 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 Status post amputation of great toe, left ([facility])  Z89.412 Cam Boot     Wound Procedure Treatment Left;Plantar Foot              Assessment & Plan:  Neuropathic ulceration L submet 1 in pt w/ hx of L partial first ray amp: Measurements are increased.  Patient again with significant periwound callusing.  Wound with increased depth of 0.6 cm, circumferential undermining of 0.2 cm present.  Does not probe to bone.  No acute erythema, lymphangitic streaking, purulence, malodor.  No surrounding induration, fluctuance, crepitus, edema of foot.  Per both patient report and exam findings, offloading is not adequate. Mikael continues to ambulate without appropriate use of offloading devices.  Surgical debridement  Continue cleansing with prophase followed by endoform and Mepilex up  Current offloading with Darco shoe however patient does not appear to be using this correctly per wife report.  I reviewed gold standard for offloading is a total contact cast (TCC) however patient hesitant.  Discussed that I am open to short trial of low tide Cam boot for now but should patient continue without appropriate offloading, TCC necessary.   I again reviewed risks of failing to properly offload and possible complications including but not limited to wound deterioration, increased risk for infection, further amputation, life-threatening  "infections, amongst others.  He again expresses understanding.  Bedside ABIs completed at initial wound center visit again reviewed R 1.0 / L 1.13  Most recent x-ray 9/16/2024 \" no acute osseous abnormalities noted.  Bone remodeling and plantar medial prominence of first metatarsal distal shaft at site of prior bone cut\"  Reviewed extreme importance of very close monitoring of wound.  Should patient experience any acute change or any of the following symptoms including but not limited to fever, chills, increased pain at the wound, swelling, purulent drainage, foul odor, etc... to immediately proceed to emergency department. Pt expresses understanding.   F/u w/ Dr. Mott in 1 week or sooner if needed     Portions of the record may have been created with voice recognition software. Occasional wrong word or \"sound alike\" substitutions may have occurred due to the inherent limitations of voice recognition software. Read the chart carefully and recognize, using context, where substitutions have occurred.    Subjective:   9/18/2024: Mr. Saavedra is a 68-year-old male with a past medical history including but not limited to neuropathy, hypertension, heart failure, s/p TAVR on AC, presents to wound center today along with his wife for evaluation of neuropathic ulceration left foot at site of previous great toe amp.  He is not diabetic but does report significant neuropathy which patient and wife believe is secondary to polio when he was a child.  He reports he was just seen by Dr. Mott/Podiatry on 9/16 for this ulceration.  He is s/p left partial first ray amp June 2023.  Wound was debrided and he was referred to the wound center. He arrived to wound center today with regular sneakers and did not have offloading shoe.  He also did not get custom inserts as ordered in the past by podiatry in 2023.  His wife reports since he was at podiatry office earlier in the week, he has not been offloading as he has been walking " "around getting things ready for a fishing trip.     9/25/2024: Mikael presents today for follow-up.  His wife is again accompanied him to today's visit.  He reports that he tried to be good and though he did go on the fishing trip was not doing as much though does admit he was not offloading as much as he should have been.  He did wear the Darco offloading shoe and is now utilizing the knee scooter.  He denies new acute complaints.  Denies fever, chills      Follow-up visit with Dr. Melendez   \"10/1/2024: Follow-up neuropathic ulcer of the first MTP.  Has a Darco offloading shoe.  He is also using a knee scooter.  No new complaints.\"    10/11/2024: Mikael presents today for follow-up.  His wife has accompanied him to today's visit.  Patient's wife reports he is not using his knee scooter and when he walks with the offloading shoe he \"sort of rolls it forward\" putting pressure on front of his foot.  Mikale reports he tries to stay off it but his activity really has not been limited much.  Continues to ambulate regularly.  He reports he has not had fever or chills.  No other new acute complaints today.        The following portions of the patient's history were reviewed and updated as appropriate:   Patient Active Problem List   Diagnosis    Benign essential HTN    MDD (major depressive disorder)    Peripheral neuropathy    Other emphysema (HCC)    Ulcer of left foot (HCC)    Heart failure with preserved ejection fraction    PVCs (premature ventricular contractions)    Status post foot surgery    Acute hematogenous osteomyelitis of left foot (HCC)    Status post amputation of great toe, left (HCC)    Severe aortic stenosis    Mixed hyperlipidemia    Statin intolerance    Status post insertion of drug-eluting stent into left anterior descending (LAD) artery    Ventricular tachycardia (HCC)    s/p Medtronic dual chamber ICD 11/2/2023    Acute GI bleeding    S/P TAVR (transcatheter aortic valve replacement)    Prediabetes    " "Hypothyroid    Stage 3a chronic kidney disease (HCC)    Coronary artery disease involving native coronary artery of native heart without angina pectoris    Open wound of left foot, initial encounter    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, left (HCC)    Ulcer of left foot with fat layer exposed (HCC)     Past Medical History:   Diagnosis Date    Anxiety     Aortic stenosis     Borderline high cholesterol     CHF (congestive heart failure) (HCC) 1/4/2024    Asaf Jackson-cardio-    Colon polyp     Depression     Heart murmur     Hypertension     Hypothyroidism     Prediabetes     PVCs (premature ventricular contractions)     \"skip\"    s/p Medtronic dual chamber ICD 11/2/2023 11/04/2023    Wears glasses     readers     Past Surgical History:   Procedure Laterality Date    APPENDECTOMY      BACK SURGERY      CARDIAC CATHETERIZATION N/A 10/17/2023    Procedure: Cardiac RHC/LHC;  Surgeon: Phillip Pfeiffer MD;  [facility]: [facility];  Service: Cardiology    CARDIAC CATHETERIZATION N/A 12/5/2023    Procedure: Cardiac tavr;  Surgeon: Malcolm Humphrey DO;  Location: [facility];  Service: Cardiology    CARDIAC ELECTROPHYSIOLOGY PROCEDURE N/A 11/2/2023    Procedure: Cardiac icd implant;  Surgeon: Jered Talbot MD;  Location: [facility];  Service: Cardiology    CATARACT EXTRACTION Bilateral     CLOSURE DELAYED PRIMARY Left 06/09/2023    Procedure: CLOSURE DELAYED PRIMARY;  Surgeon: Tuan Simms DPM;  Location: [facility];  Service: Podiatry    COLONOSCOPY  2023    NATHALIE Guerra    NJ CORRJ HLX VLGS BNCTY SESMDC RESCJ PROX PHLX BASE Left 04/19/2023    Procedure: BUNIONECTOMY PERDOMO and wound debridement;  Surgeon: Clover Mott DPM;  Location: [facility];  Service: Podiatry    NJ REPLACE AORTIC VALVE OPENFEMORAL ARTERY APPROACH N/A 12/5/2023    Procedure: REPLACEMENT AORTIC VALVE TRANSCATHETER (TAVR) TRANSFEMORAL W/ 26MM SHERMAN ADRIANNA S3 UR VALVE(ACCESS ON LEFT) MAKEDA;  " Surgeon: Gerald Gooden MD;  Location:  MAIN OR;  Service: Cardiac Surgery    TOE AMPUTATION Left 2023    Procedure: PARTIAL 1ST RAY AMPUTATION;  Surgeon: Tuan Simms DPM;  Location:  MAIN OR;  Service: Podiatry    TONSILLECTOMY      TOTAL SHOULDER REPLACEMENT Left     WISDOM TOOTH EXTRACTION       Family History   Problem Relation Age of Onset    Dementia Mother     Blindness Mother     Cancer Mother     Dementia Father       Social History     Socioeconomic History    Marital status: /Civil Union     Spouse name: None    Number of children: None    Years of education: None    Highest education level: None   Occupational History    None   Tobacco Use    Smoking status: Some Days     Current packs/day: 0.00     Average packs/day: 0.5 packs/day for 50.0 years (25.0 ttl pk-yrs)     Types: Cigarettes     Start date: 11/3/1973     Last attempt to quit: 11/3/2023     Years since quittin.9    Smokeless tobacco: Never   Vaping Use    Vaping status: Never Used   Substance and Sexual Activity    Alcohol use: Not Currently     Alcohol/week: 2.0 standard drinks of alcohol     Types: 2 Cans of beer per week     Comment: once a week    Drug use: Not Currently     Comment: sporadic in young adulthood    Sexual activity: None     Comment: defer   Other Topics Concern    None   Social History Narrative    None     Social Determinants of Health     Financial Resource Strain: Medium Risk (2023)    [facility]    Overall Financial Resource Strain (CARDIA)     Difficulty of Paying Living Expenses: Somewhat hard   Food Insecurity: No Food Insecurity (2023)    Received from [facility], [facility]    Hunger Vital Sign     Worried About Running Out of Food in the Last Year: Never true     Ran Out of Food in the Last Year: Never true   Transportation Needs: No Transportation Needs (2023)     Received from [facility], [facility]    PRAPARE - Transportation     Lack of Transportation (Medical): No     Lack of Transportation (Non-Medical): No   Physical Activity: Inactive (12/13/2023)    Received from [facility]    Exercise Vital Sign     Days of Exercise per Week: 0 days     Minutes of Exercise per Session: 0 min   Stress: Stress Concern Present (12/13/2023)    Received from [facility], [facility]    [employer] - Occupational Stress Questionnaire     Feeling of Stress : Rather much   Social Connections: Unknown (6/18/2024)    Received from [facility]     How often do you feel lonely or isolated from those around you? (Adult - for ages 18 years and over): Not on file   Intimate Partner Violence: Not At Risk (12/13/2023)    Received from [facility], [facility]    Humiliation, Afraid, Rape, and Kick questionnaire     Fear of Current or Ex-Partner: No     Emotionally Abused: No     Physically Abused: No     Sexually Abused: No   Housing Stability: Low Risk  (11/7/2023)    Housing Stability Vital Sign     Unable to Pay for Housing in the Last Year: No     Number of Times Moved in the Last Year: 1     Homeless in the Last Year: No        Current Outpatient Medications:     Alirocumab 150 MG/ML SOAJ, Inject 150 mg under the skin every 14 (fourteen) days, Disp: 2 mL, Rfl: 11    amLODIPine (NORVASC) 5 mg tablet, Take 1 tablet (5 mg total) by mouth daily, Disp: 30 tablet, Rfl: 11    aspirin 81 mg chewable tablet, Chew 1 tablet (81 mg total) daily, Disp: 30 tablet, Rfl: 0    buPROPion (WELLBUTRIN XL) 300 mg 24 hr tablet, , Disp: , Rfl:     clopidogrel (PLAVIX) 75 mg tablet, TAKE ONE TABLET BY MOUTH EVERY DAY, Disp: 30 tablet, Rfl: 5    escitalopram (LEXAPRO) 20 mg tablet, Take 20 mg by mouth daily, Disp: , Rfl:     ferrous sulfate 325 (65 FE)  MG EC tablet, Take 325 mg by mouth daily, Disp: , Rfl:     gabapentin (NEURONTIN) 100 mg capsule, Take 200 mg by mouth daily at bedtime, Disp: , Rfl:     Levothyroxine Sodium 200 MCG CAPS, Take 200 mcg by mouth daily, Disp: , Rfl:     losartan (COZAAR) 25 mg tablet, Take 1 tablet (25 mg total) by mouth daily, Disp: 30 tablet, Rfl: 11    Magnesium Oxide -Mg Supplement (Magnesium Extra Strength) 400 MG CAPS, TAKE ONE CAPSULE BY MOUTH IN THE MORNING, Disp: 90 capsule, Rfl: 3    metoprolol succinate (TOPROL-XL) 25 mg 24 hr tablet, Take 1 tablet (25 mg total) by mouth 2 (two) times a day, Disp: 180 tablet, Rfl: 3    Multiple Vitamins-Minerals (ICAPS AREDS 2 PO), Take by mouth (Patient not taking: Reported on 9/16/2024), Disp: , Rfl:     Omega-3 Fatty Acids (Fish Oil) 1200 MG CAPS, Take by mouth, Disp: , Rfl:     torsemide (DEMADEX) 20 mg tablet, Take 2 tablets (40 mg total) by mouth daily (Patient taking differently: Take 20 mg by mouth daily), Disp: 60 tablet, Rfl: 11  No current facility-administered medications for this visit.    Review of Systems   Constitutional:  Negative for chills and fever.   Respiratory:  Negative for shortness of breath.    Cardiovascular:  Negative for chest pain, palpitations and leg swelling.   Gastrointestinal:  Negative for vomiting.   Skin:  Positive for wound (L foot).   Neurological:  Positive for numbness (Neuropathy).   Psychiatric/Behavioral:  The patient is not nervous/anxious.        Objective:  /68   Pulse 62   Temp 98.1 °F (36.7 °C)   Resp 20         Physical Exam  Vitals reviewed.   Constitutional:       General: He is not in acute distress.     Appearance: He is not ill-appearing, toxic-appearing or diaphoretic.      Comments: NAD. Wife at bedside.    HENT:      Head: Normocephalic and atraumatic.   Eyes:      General: No scleral icterus.  Cardiovascular:      Rate and Rhythm: Normal rate.      Comments: Diminished but palpable DP/PT bilaterally. Absent pedal hair.   Skin of bilateral lower extremities is atrophic thin, dry and shiny in appearance.  Pulmonary:      Effort: Pulmonary effort is normal. No respiratory distress.   Musculoskeletal:      Right lower leg: No edema.      Left lower leg: No edema.        Feet:       Comments: S/p L partial 1st ray amputation   Feet:      Left foot:      Skin integrity: Callus present. No erythema.      Comments: 2- Left plantar submet 1 ulcer: Measurements are increased.  Patient again with significant periwound callusing.  Wound with increased depth of 0.6 cm, circumferential undermining of 0.2 cm present.  Does not probe to bone.  No acute erythema, lymphangitic streaking, purulence, malodor.  No surrounding induration, fluctuance, crepitus, edema of foot.    Skin:     Findings: Wound (Ulcer) present.   Neurological:      Mental Status: He is alert and oriented to person, place, and time.   Psychiatric:         Mood and Affect: Mood normal.         Behavior: Behavior normal.       Wound 09/18/24 Foot Left;Plantar (Active)   Wound Image   10/11/24 1106   Wound Description Pink;Brown;Eschar 10/11/24 1003   Chantale-wound Assessment Callus;Dry;Brown 10/11/24 1003   Wound Length (cm) 1.2 cm 10/11/24 1003   Wound Width (cm) 0.8 cm 10/11/24 1003   Wound Depth (cm) 0.6 cm 10/11/24 1003   Wound Surface Area (cm^2) 0.96 cm^2 10/11/24 1003   Wound Volume (cm^3) 0.576 cm^3 10/11/24 1003   Calculated Wound Volume (cm^3) 0.58 cm^3 10/11/24 1003   Change in Wound Size % -728.57 10/11/24 1003   Number of underminings 1 10/11/24 1003   Undermining 1 0.2 10/11/24 1003   Undermining 1 is depth extending from 12oclock to 12 oclock 10/11/24 1003   Drainage Amount Moderate 10/11/24 1003   Drainage Description Serosanguineous 10/11/24 1003   Non-staged Wound Description Full thickness 10/11/24 1003     Debridement   Wound 09/18/24 Foot Left;Plantar    Universal Protocol:  Consent: Verbal consent obtained. Written consent obtained.  Risks and benefits: risks,  "benefits and alternatives were discussed  Consent given by: patient  Time out: Immediately prior to procedure a \"time out\" was called to verify the correct patient, procedure, equipment, support staff and site/side marked as required.  Patient understanding: patient states understanding of the procedure being performed  Patient identity confirmed: verbally with patient    Debridement Details  Performed by: physician  Debridement type: surgical  Level of debridement: subcutaneous tissue  Pain control: lidocaine 4%      Post-debridement measurements  Length (cm): 1.2  Width (cm): 1  Depth (cm): 0.7  Percent debrided: 100%  Surface Area (cm^2): 1.2  Area Debrided (cm^2): 1.2  Volume (cm^3): 0.84    Tissue and other material debrided: subcutaneous tissue  Devitalized tissue debrided: callus, fibrin and slough  Instrument(s) utilized: blade  Bleeding: medium  Hemostasis obtained with: pressure  Procedural pain (0-10): 0  Post-procedural pain: 0   Response to treatment: procedure was tolerated well                   Lab Results   Component Value Date    HGBA1C 5.8 (H) 07/23/2024       Wound Instructions:  Orders Placed This Encounter   Procedures    Wound cleansing and dressings Left;Plantar Foot     Wound cleansing and dressings Left;Plantar Foot      Wash your hands with soap and water.  Remove old dressing, discard into plastic bag and place in trash.  Cleanse the wound with prophase prior to applying a clean dressing. Do not use tissue or cotton balls. Do not scrub the wound. Pat dry using gauze.     Shower no   Apply endoform to the left plantar foot wound. May need to moisten endoform.  Cover with non bordered foam (mepilex up) then gauze.  Secure with roll gauze  Change dressing 3x week      Off-loading Instructions:  Keep weight and pressure off wound at all times.   Wear off-loading device as directed by your physician. Put on immediately when rising in the morning and remove when going to bed.  Wear front " offloading shoe AT ALL TIMES  Use knee rover as able to ensure NO PRESSURE to the wound    Obtain low CAM boot as ordered.     Standing Status:   Future     Standing Expiration Date:   10/18/2024    Wound Procedure Treatment Left;Plantar Foot     This order was created via procedure documentation    Debridement     This order was created via procedure documentation    Cam Boot     Order Specific Question:   Size     Answer:   X-Large     Comments:   Patient wears size 12 shoe, may adjust size of cam boot to accommodate patient's foot size     Order Specific Question:   Type     Answer:   Endy Pickard MD

## 2024-10-11 NOTE — PROGRESS NOTES
Wound Procedure Treatment Left;Plantar Foot    Performed by: Abhishek Silva RN  Authorized by: Lacy Pickard MD    Associated wounds:   Wound 09/18/24 Foot Left;Plantar  Wound cleansed with:  NSS  Applied primary dressing:  Collagen dressing  Applied secondary dressing:  Foam  Dressing secured with:  Zbigniew and Tape

## 2024-10-11 NOTE — PATIENT INSTRUCTIONS
Orders Placed This Encounter   Procedures    Wound cleansing and dressings Left;Plantar Foot     Wound cleansing and dressings Left;Plantar Foot      Wash your hands with soap and water.  Remove old dressing, discard into plastic bag and place in trash.  Cleanse the wound with prophase prior to applying a clean dressing. Do not use tissue or cotton balls. Do not scrub the wound. Pat dry using gauze.     Shower no   Apply endoform to the left plantar foot wound. May need to moisten endoform.  Cover with non bordered foam (mepilex up) then gauze.  Secure with roll gauze  Change dressing 3x week      Off-loading Instructions:  Keep weight and pressure off wound at all times.   Wear off-loading device as directed by your physician. Put on immediately when rising in the morning and remove when going to bed.  Wear front offloading shoe AT ALL TIMES  Use knee rover as able to ensure NO PRESSURE to the wound    Obtain low CAM boot as ordered.     Standing Status:   Future     Standing Expiration Date:   10/18/2024

## 2024-10-18 ENCOUNTER — OFFICE VISIT (OUTPATIENT)
Facility: CLINIC | Age: 69
End: 2024-10-18
Payer: MEDICARE

## 2024-10-18 VITALS
RESPIRATION RATE: 18 BRPM | SYSTOLIC BLOOD PRESSURE: 123 MMHG | HEART RATE: 59 BPM | DIASTOLIC BLOOD PRESSURE: 62 MMHG | TEMPERATURE: 97.8 F

## 2024-10-18 DIAGNOSIS — G62.9 PERIPHERAL POLYNEUROPATHY: ICD-10-CM

## 2024-10-18 DIAGNOSIS — Z89.412 STATUS POST AMPUTATION OF GREAT TOE, LEFT (HCC): ICD-10-CM

## 2024-10-18 DIAGNOSIS — L97.522 ULCER OF LEFT FOOT WITH FAT LAYER EXPOSED (HCC): ICD-10-CM

## 2024-10-18 DIAGNOSIS — L97.522 NEUROPATHIC ULCER OF FOOT WITH FAT LAYER EXPOSED, LEFT (HCC): Primary | ICD-10-CM

## 2024-10-18 PROCEDURE — 11042 DBRDMT SUBQ TIS 1ST 20SQCM/<: CPT | Performed by: STUDENT IN AN ORGANIZED HEALTH CARE EDUCATION/TRAINING PROGRAM

## 2024-10-18 RX ORDER — LIDOCAINE 40 MG/G
CREAM TOPICAL ONCE
Status: COMPLETED | OUTPATIENT
Start: 2024-10-18 | End: 2024-10-18

## 2024-10-18 RX ADMIN — LIDOCAINE: 40 CREAM TOPICAL at 13:08

## 2024-10-18 NOTE — PROGRESS NOTES
Patient ID: Mikael Saavedra is a 69 y.o. male Date of Birth 1955       Chief Complaint   Patient presents with    Follow Up Wound Care Visit       Allergies:  Lisinopril and Statins    Diagnosis:  1. 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)  2. Ulcer of left foot with fat layer exposed (HCC)  -     lidocaine (LMX) 4 % cream  -     Wound cleansing and dressings Left;Plantar Foot; Future; Expected date: 10/18/2024  -     Wound Procedure Treatment Left;Plantar Foot  3. Peripheral polyneuropathy  -     Diabetic Shoe  -     Diabetic Shoe Inserts  4. Status post amputation of great toe, left (HCC)  -     Diabetic Shoe  -     Diabetic Shoe Inserts     Diagnosis ICD-10-CM Associated Orders   1. 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)  L97.522       2. Ulcer of left foot with fat layer exposed ([facility])  L97.522 lidocaine (LMX) 4 % cream     Wound cleansing and dressings Left;Plantar Foot     Wound Procedure Treatment Left;Plantar Foot      3. Peripheral polyneuropathy  G62.9 Diabetic Shoe     Diabetic Shoe Inserts      4. Status post amputation of great toe, left ([facility])  Z89.412 Diabetic Shoe     Diabetic Shoe Inserts           Assessment & Plan:  See wound orders. (Endoform, mepilex)  - Left submet 1 with neuropathic ulceration which appears with significant improvement in size today. No SOI. Surgical dbt as below.   - WB ADLS only in tall CAM boot  - monitor for SOI  - I ordered custom shoes/inserts with mod today (call [facility] for appt). He was not interested in these after the amputation which likely lead to ulceration however he is now. Ulcer needs to be healed prior to getting these casted/fit.   - f/u 2wks for recheck    Prior Imaging:  XR left foot WB 3v 9/16/24: No acute osseous abnormalities noted. Bone remodeling and plantar-medial prominence of 1st metatarsal distal shaft at site of prior bone cut.    JACK 6/7/23: RLE 1.23/162/117. LLE 1.22/156/129.    Subjective:  "Mikael presents to clinic for f/u left foot ulcer. Saw Dr. Pickard and Nora in the past. Doing well with CAM boot. No pain due to baseline PN. Here with wife. Denies N/V/C/F/SOB        The following portions of the patient's history were reviewed and updated as appropriate:   Patient Active Problem List   Diagnosis    Benign essential HTN    MDD (major depressive disorder)    Peripheral neuropathy    Other emphysema (HCC)    Ulcer of left foot (HCC)    Heart failure with preserved ejection fraction    PVCs (premature ventricular contractions)    Status post foot surgery    Acute hematogenous osteomyelitis of left foot (HCC)    Status post amputation of great toe, left (HCC)    Severe aortic stenosis    Mixed hyperlipidemia    Statin intolerance    Status post insertion of drug-eluting stent into left anterior descending (LAD) artery    Ventricular tachycardia (HCC)    s/p Medtronic dual chamber ICD 11/2/2023    Acute GI bleeding    S/P TAVR (transcatheter aortic valve replacement)    Prediabetes    Hypothyroid    Stage 3a chronic kidney disease (HCC)    Coronary artery disease involving native coronary artery of native heart without angina pectoris    Open wound of left foot, initial encounter    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, left (HCC)    Ulcer of left foot with fat layer exposed (HCC)    Peripheral polyneuropathy     Past Medical History:   Diagnosis Date    Anxiety     Aortic stenosis     Borderline high cholesterol     CHF (congestive heart failure) ([facility]) 1/4/2024    Asaf Jackson-cardio-    Colon polyp     Depression     Heart murmur     Hypertension     Hypothyroidism     Prediabetes     PVCs (premature ventricular contractions)     \"skip\"    s/p Medtronic dual chamber ICD 11/2/2023 11/04/2023    Wears glasses     readers     Past Surgical History:   Procedure Laterality Date    APPENDECTOMY      BACK SURGERY      CARDIAC CATHETERIZATION N/A 10/17/2023    Procedure: Cardiac " [facility];  Surgeon: Phillip Pfeiffer MD;  Location: [facility] CARDIAC CATH LAB;  Service: Cardiology    CARDIAC CATHETERIZATION N/A 2023    Procedure: Cardiac tavr;  Surgeon: Malcolm Humphrey DO;  Location: [facility] MAIN OR;  Service: Cardiology    CARDIAC ELECTROPHYSIOLOGY PROCEDURE N/A 2023    Procedure: Cardiac icd implant;  Surgeon: Jered Talbot MD;  Location: [facility] CARDIAC C[facility];  Service: Cardiology    CATARACT EXTRACTION Bilateral     CLOSURE DELAYED PRIMARY Left 2023    Procedure: CLOSURE DELAYED PRIMARY;  Surgeon: Tuan Simms DPM;  Location: [facility] MAIN OR;  Service: Podiatry    COLONOSCOPY      [place], [place]    VT CORRJ HLX VLGS BNCTY SESMDC RESCJ PROX PHLX BASE Left 2023    Procedure: BUNIONECTOMY PERDOMO and wound debridement;  Surgeon: Clover Mott DPM;  Location: [facility];  Service: Podiatry    VT REPLACE AORTIC VALVE OPENFEMORAL ARTERY APPROACH N/A 2023    Procedure: REPLACEMENT AORTIC VALVE TRANSCATHETER (TAVR) TRANSFEMORAL W/ 26MM SHERMAN ADRIANNA S3 UR VALVE(ACCESS ON LEFT) MAKEDA;  Surgeon: Gerald Gooden MD;  Location: [facility];  Service: Cardiac Surgery    TOE AMPUTATION Left 2023    Procedure: PARTIAL 1ST RAY AMPUTATION;  Surgeon: Tuan Simms DPM;  Location: [facility];  Service: Podiatry    TONSILLECTOMY      TOTAL SHOULDER REPLACEMENT Left     WISDOM TOOTH EXTRACTION       Social History     Socioeconomic History    Marital status: /Civil Union     Spouse name: None    Number of children: None    Years of education: None    Highest education level: None   Occupational History    None   Tobacco Use    Smoking status: Some Days     Current packs/day: 0.00     Average packs/day: 0.5 packs/day for 50.0 years (25.0 ttl pk-yrs)     Types: Cigarettes     Start date: 11/3/1973     Last attempt to quit: 11/3/2023     Years since quittin.9    Smokeless tobacco: Never   Vaping Use    Vaping status: Never Used   Substance and Sexual Activity     Alcohol use: Not Currently     Alcohol/week: 2.0 standard drinks of alcohol     Types: 2 Cans of beer per week     Comment: once a week    Drug use: Not Currently     Comment: sporadic in young adulthood    Sexual activity: None     Comment: defer   Other Topics Concern    None   Social History Narrative    None     Social Determinants of Health     Financial Resource Strain: Medium Risk (12/13/2023)    [facility]    Overall Financial Resource Strain (CARDIA)     Difficulty of Paying Living Expenses: Somewhat hard   Food Insecurity: No Food Insecurity (12/13/2023)    [facility]    Hunger Vital Sign     Worried About Running Out of Food in the Last Year: Never true     Ran Out of Food in the Last Year: Never true   Transportation Needs: No Transportation Needs (12/13/2023)    [facility]    PRAPARE - Transportation     Lack of Transportation (Medical): No     Lack of Transportation (Non-Medical): No   Physical Activity: Inactive (12/13/2023)    Received from [facility]    Exercise Vital Sign     Days of Exercise per Week: 0 days     Minutes of Exercise per Session: 0 min   Stress: Stress Concern Present (12/13/2023)    [facility]    [employer] - Occupational Stress Questionnaire     Feeling of Stress : Rather much   Social Connections: Unknown (6/18/2024)    Received from [facility]    Social [employer]     How often do you feel lonely or isolated from those around you? (Adult - for ages 18 years and over): Not on file   Intimate Partner Violence: Not At Risk (12/13/2023)    [facility]    Humiliation, Afraid, Rape, and Kick questionnaire     Fear of Current or  Ex-Partner: No     Emotionally Abused: No     Physically Abused: No     Sexually Abused: No   Housing Stability: Low Risk  (11/7/2023)    Housing Stability Vital Sign     Unable to Pay for Housing in the Last Year: No     Number of Times Moved in the Last Year: 1     Homeless in the Last Year: No        Current Outpatient Medications:     Alirocumab 150 MG/ML SOAJ, Inject 150 mg under the skin every 14 (fourteen) days, Disp: 2 mL, Rfl: 11    amLODIPine (NORVASC) 5 mg tablet, Take 1 tablet (5 mg total) by mouth daily, Disp: 30 tablet, Rfl: 11    aspirin 81 mg chewable tablet, Chew 1 tablet (81 mg total) daily, Disp: 30 tablet, Rfl: 0    buPROPion (WELLBUTRIN XL) 300 mg 24 hr tablet, , Disp: , Rfl:     clopidogrel (PLAVIX) 75 mg tablet, TAKE ONE TABLET BY MOUTH EVERY DAY (Patient not taking: Reported on 10/18/2024), Disp: 30 tablet, Rfl: 5    escitalopram (LEXAPRO) 20 mg tablet, Take 20 mg by mouth daily, Disp: , Rfl:     ferrous sulfate 325 (65 FE) MG EC tablet, Take 325 mg by mouth daily, Disp: , Rfl:     gabapentin (NEURONTIN) 100 mg capsule, Take 200 mg by mouth daily at bedtime, Disp: , Rfl:     Levothyroxine Sodium 200 MCG CAPS, Take 200 mcg by mouth daily, Disp: , Rfl:     losartan (COZAAR) 25 mg tablet, Take 1 tablet (25 mg total) by mouth daily, Disp: 30 tablet, Rfl: 11    Magnesium Oxide -Mg Supplement (Magnesium Extra Strength) 400 MG CAPS, TAKE ONE CAPSULE BY MOUTH IN THE MORNING, Disp: 90 capsule, Rfl: 3    metoprolol succinate (TOPROL-XL) 25 mg 24 hr tablet, Take 1 tablet (25 mg total) by mouth 2 (two) times a day, Disp: 180 tablet, Rfl: 3    Multiple Vitamins-Minerals (ICAPS AREDS 2 PO), Take by mouth (Patient not taking: Reported on 9/16/2024), Disp: , Rfl:     Omega-3 Fatty Acids (Fish Oil) 1200 MG CAPS, Take by mouth, Disp: , Rfl:     torsemide (DEMADEX) 20 mg tablet, Take 2 tablets (40 mg total) by mouth daily (Patient taking differently: Take 20 mg by mouth daily), Disp: 60 tablet, Rfl: 11  No  current facility-administered medications for this visit.  Family History   Problem Relation Age of Onset    Dementia Mother     Blindness Mother     Cancer Mother     Dementia Father       Review of Systems   Constitutional:  Negative for activity change, chills and fever.   HENT: Negative.     Respiratory:  Negative for cough, chest tightness and shortness of breath.    Cardiovascular:  Negative for chest pain and leg swelling.   Endocrine: Negative.    Genitourinary: Negative.    Skin:  Positive for wound.   Neurological:  Positive for numbness.   Psychiatric/Behavioral: Negative.  Negative for agitation and behavioral problems.          Objective:  /62   Pulse 59   Temp 97.8 °F (36.6 °C)   Resp 18     Physical Exam  Vitals reviewed.   Constitutional:       General: He is not in acute distress.     Appearance: He is not ill-appearing, toxic-appearing or diaphoretic.      Comments: NAD. Wife at bedside.    HENT:      Head: Normocephalic and atraumatic.   Eyes:      General: No scleral icterus.  Cardiovascular:      Rate and Rhythm: Normal rate.      Comments: Diminished but palpable DP/PT bilaterally. Absent pedal hair.  Skin of bilateral lower extremities is atrophic thin, dry and shiny in appearance.  Pulmonary:      Effort: Pulmonary effort is normal. No respiratory distress.   Musculoskeletal:      Right lower leg: No edema.      Left lower leg: No edema.        Feet:       Comments: S/p L partial 1st ray amputation   Feet:      Left foot:      Skin integrity: Callus present. No erythema.      Comments: 2- Left plantar submet 1 ulcer: improved periwound callusing.  No undermining today. Granular base. Does not probe to bone.  No acute erythema, lymphangitic streaking, purulence, malodor.  No surrounding induration, fluctuance, crepitus, edema of foot.    Skin:     Findings: Wound (Ulcer) present.   Neurological:      Mental Status: He is alert and oriented to person, place, and time.   Psychiatric:     "     Mood and Affect: Mood normal.         Behavior: Behavior normal.             Wound 09/18/24 Foot Left;Plantar (Active)   Wound Image   10/18/24 1300   Wound Description Pink;Brown;White 10/18/24 1303   Chantale-wound Assessment Callus;Dry;Brown 10/18/24 1303   Wound Length (cm) 0.1 cm 10/18/24 1303   Wound Width (cm) 0.1 cm 10/18/24 1303   Wound Depth (cm) 0.1 cm 10/18/24 1303   Wound Surface Area (cm^2) 0.01 cm^2 10/18/24 1303   Wound Volume (cm^3) 0.001 cm^3 10/18/24 1303   Calculated Wound Volume (cm^3) 0 cm^3 10/18/24 1303   Change in Wound Size % 100 10/18/24 1303   Number of underminings 1 10/11/24 1003   Undermining 1 0.2 10/11/24 1003   Undermining 1 is depth extending from 12oclock to 12 oclock 10/11/24 1003   Drainage Amount Scant 10/18/24 1303   Drainage Description Serosanguineous 10/18/24 1303   Non-staged Wound Description Full thickness 10/18/24 1303             Debridement   Wound 09/18/24 Foot Left;Plantar    Universal Protocol:  procedure performed by consultantConsent: Verbal consent obtained.  Risks and benefits: risks, benefits and alternatives were discussed  Consent given by: patient  Time out: Immediately prior to procedure a \"time out\" was called to verify the correct patient, procedure, equipment, support staff and site/side marked as required.  Patient understanding: patient states understanding of the procedure being performed  Patient consent: the patient's understanding of the procedure matches consent given  Patient identity confirmed: verbally with patient    Debridement Details  Performed by: physician  Debridement type: surgical  Level of debridement: subcutaneous tissue      Post-debridement measurements  Length (cm): 0.2  Width (cm): 0.2  Depth (cm): 0.1  Percent debrided: 100%  Surface Area (cm^2): 0.04  Area Debrided (cm^2): 0.04  Volume (cm^3): 0    Tissue and other material debrided: subcutaneous tissue  Devitalized tissue debrided: biofilm, callus and exudate  Instrument(s) " utilized: blade  Technique utilized: excisionalBleeding: small  Hemostasis obtained with: pressure  Procedural pain (0-10): insensate  Post-procedural pain: insensate   Response to treatment: procedure was tolerated well                 Wound Instructions:  Orders Placed This Encounter   Procedures    Wound cleansing and dressings Left;Plantar Foot     Wound cleansing and dressings Left;Plantar Foot      Wash your hands with soap and water.  Remove old dressing, discard into plastic bag and place in trash.  Cleanse the wound with prophase prior to applying a clean dressing. Do not use tissue or cotton balls. Do not scrub the wound. Pat dry using gauze.     Shower no   Apply endoform to the left plantar foot wound. May need to moisten endoform.  Cover with non bordered foam (mepilex up) then gauze.  Secure with roll gauze  Change dressing 3x week      Off-loading Instructions:  Keep weight and pressure off wound at all times.   Wear off-loading device (CAM boot)  as directed by your physician. Put on immediately when rising in the morning and remove when going to bed.  Wear Cam boot AT ALL TIMES  Use knee rover as able to ensure NO PRESSURE to the wound     Call [facility] or [place] for custom shoes  Or [employer] shoes in [place]     Standing Status:   Future     Standing Expiration Date:   11/1/2024    Debridement     This order was created via procedure documentation    Wound Procedure Treatment Left;Plantar Foot     This order was created via procedure documentation    Diabetic Shoe     Order Specific Question:   Type     Answer:   Custom Molded    Diabetic Shoe Inserts     With left 1st toe filler and offloading submet 1 head to reduce ulcer risk. Patient is neuropathic but not diabetic.     Order Specific Question:   Type     Answer:   Custom Fabricated         Clover Mott DPM      Portions of the record may have been created with voice recognition software. Occasional wrong word or  "\"sound a like\" substitutions may have occurred due to the inherent limitations of voice recognition software. Read the chart carefully and recognize, using context, where substitutions have occurred.    "

## 2024-10-18 NOTE — PATIENT INSTRUCTIONS
Orders Placed This Encounter   Procedures    Wound cleansing and dressings Left;Plantar Foot     Wound cleansing and dressings Left;Plantar Foot      Wash your hands with soap and water.  Remove old dressing, discard into plastic bag and place in trash.  Cleanse the wound with prophase prior to applying a clean dressing. Do not use tissue or cotton balls. Do not scrub the wound. Pat dry using gauze.     Shower no   Apply endoform to the left plantar foot wound. May need to moisten endoform.  Cover with non bordered foam (mepilex up) then gauze.  Secure with roll gauze  Change dressing 3x week      Off-loading Instructions:  Keep weight and pressure off wound at all times.   Wear off-loading device (CAM boot)  as directed by your physician. Put on immediately when rising in the morning and remove when going to bed.  Wear Cam boot AT ALL TIMES  Use knee rover as able to ensure NO PRESSURE to the wound     Call [facility] [place] [facility], or [place] for custom shoes  Or [employer] shoes in [facility] Status:   Future     Standing Expiration Date:   11/1/2024    Debridement     This order was created via procedure documentation    Wound Procedure Treatment Left;Plantar Foot     This order was created via procedure documentation    Diabetic Shoe     Order Specific Question:   Type     Answer:   Custom Molded    Diabetic Shoe Inserts     With left 1st toe filler and offloading submet 1 head to reduce ulcer risk. Patient is neuropathic but not diabetic.     Order Specific Question:   Type     Answer:   Custom Fabricated

## 2024-10-19 DIAGNOSIS — Z78.9 STATIN INTOLERANCE: ICD-10-CM

## 2024-10-19 DIAGNOSIS — E78.2 MIXED HYPERLIPIDEMIA: ICD-10-CM

## 2024-10-21 RX ORDER — ALIROCUMAB 150 MG/ML
INJECTION, SOLUTION SUBCUTANEOUS
Qty: 2 ML | Refills: 2 | Status: SHIPPED | OUTPATIENT
Start: 2024-10-21

## 2024-10-27 DIAGNOSIS — I25.10 CAD (CORONARY ARTERY DISEASE): ICD-10-CM

## 2024-10-28 RX ORDER — CLOPIDOGREL BISULFATE 75 MG/1
75 TABLET ORAL DAILY
Qty: 30 TABLET | Refills: 5 | Status: SHIPPED | OUTPATIENT
Start: 2024-10-28

## 2024-10-31 ENCOUNTER — OFFICE VISIT (OUTPATIENT)
Facility: CLINIC | Age: 69
End: 2024-10-31
Payer: MEDICARE

## 2024-10-31 VITALS
HEART RATE: 72 BPM | DIASTOLIC BLOOD PRESSURE: 75 MMHG | RESPIRATION RATE: 18 BRPM | SYSTOLIC BLOOD PRESSURE: 140 MMHG | TEMPERATURE: 96 F

## 2024-10-31 DIAGNOSIS — L97.522 ULCER OF LEFT FOOT WITH FAT LAYER EXPOSED (HCC): ICD-10-CM

## 2024-10-31 DIAGNOSIS — L97.522 NEUROPATHIC ULCER OF FOOT WITH FAT LAYER EXPOSED, LEFT (HCC): Primary | ICD-10-CM

## 2024-10-31 DIAGNOSIS — G62.9 PERIPHERAL POLYNEUROPATHY: ICD-10-CM

## 2024-10-31 PROCEDURE — 99212 OFFICE O/P EST SF 10 MIN: CPT | Performed by: STUDENT IN AN ORGANIZED HEALTH CARE EDUCATION/TRAINING PROGRAM

## 2024-10-31 PROCEDURE — 99213 OFFICE O/P EST LOW 20 MIN: CPT | Performed by: STUDENT IN AN ORGANIZED HEALTH CARE EDUCATION/TRAINING PROGRAM

## 2024-10-31 RX ORDER — LIDOCAINE 40 MG/G
CREAM TOPICAL ONCE
Status: COMPLETED | OUTPATIENT
Start: 2024-10-31 | End: 2024-10-31

## 2024-10-31 RX ADMIN — LIDOCAINE 1 APPLICATION: 40 CREAM TOPICAL at 13:25

## 2024-10-31 NOTE — PROGRESS NOTES
Patient ID: Mikael Saavedra is a 69 y.o. male Date of Birth 1955       Chief Complaint   Patient presents with    Follow Up Wound Care Visit     L foot wound       Allergies:  Lisinopril and Statins    Diagnosis:  1. 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 Ulcer of left foot with fat layer exposed (HCC)  -     Wound cleansing and dressings Left;Plantar Foot; Future  -     lidocaine (LMX) 4 % cream  -     Wound Procedure Treatment  3. Peripheral polyneuropathy     Diagnosis ICD-10-CM Associated Orders   1. 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 Ulcer of left foot with fat layer exposed (HCC)  L97.522 Wound cleansing and dressings Left;Plantar Foot     lidocaine (LMX) 4 % cream     Wound Procedure Treatment      3. Peripheral polyneuropathy  G62.9            Assessment & Plan:  See wound orders.   - Left submet 1 with neuropathic ulceration has healed. Monitor daily for recurrence   - WB ADLS only in tall CAM boot until he gets his custom shoe/insert  - monitor for SOI  - F/u  clinic for custom shoes/inserts with mod (L submet 2 offloading). Appt in 2wks for casting  - do not walk barefoot  - consider surgical exostectomy if ulcer recurs.   - f/u in clinic Q9-10wks for recheck and nail care (Q7)     Prior Imaging:  XR left foot WB 3v 9/16/24: No acute osseous abnormalities noted. Bone remodeling and plantar-medial prominence of 1st metatarsal distal shaft at site of prior bone cut.    JACK 6/7/23: RLE 1.23/162/117. LLE 1.22/156/129.    Subjective:   Mikael presents to clinic for f/u left foot ulcer. Doing well with CAM boot. No pain due to baseline PN. Here with wife. Denies N/V/C/F/SOB        The following portions of the patient's history were reviewed and updated as  "appropriate:   Patient Active Problem List   Diagnosis    Benign essential HTN    MDD (major depressive disorder)    Peripheral neuropathy    Other emphysema (HCC)    Ulcer of left foot (HCC)    Heart failure with preserved ejection fraction    PVCs (premature ventricular contractions)    Status post foot surgery    Acute hematogenous osteomyelitis of left foot (HCC)    Status post amputation of great toe, left (HCC)    Severe aortic stenosis    Mixed hyperlipidemia    Statin intolerance    Status post insertion of drug-eluting stent into left anterior descending (LAD) artery    Ventricular tachycardia ([facility])    s/p Medtronic dual chamber ICD 11/2/2023    Acute GI bleeding    S/P TAVR (transcatheter aortic valve replacement)    Prediabetes    Hypothyroid    Stage 3a chronic kidney disease (HCC)    Coronary artery disease involving native coronary artery of native heart without angina pectoris    Open wound of left foot, initial encounter    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, left (HCC)    Ulcer of left foot with fat layer exposed (HCC)    Peripheral polyneuropathy     Past Medical History:   Diagnosis Date    Anxiety     Aortic stenosis     Borderline high cholesterol     CHF (congestive heart failure) ([facility]) 1/4/2024    Asaf Jackson-cardio-    Colon polyp     Depression     Heart murmur     Hypertension     Hypothyroidism     Prediabetes     PVCs (premature ventricular contractions)     \"skip\"    s/p Medtronic dual chamber ICD 11/2/2023 11/04/2023    Wears glasses     readers     Past Surgical History:   Procedure Laterality Date    APPENDECTOMY      BACK SURGERY      CARDIAC CATHETERIZATION N/A 10/17/2023    Procedure: Cardiac RHC/LHC;  Surgeon: Phillip Pfeiffer MD;  [facility];  Service: Cardiology    CARDIAC CATHETERIZATION N/A 12/5/2023    Procedure: Cardiac tavr;  Surgeon: Malcolm Humphrey DO;  Location: [facility];  Service: Cardiology    CARDIAC ELECTROPHYSIOLOGY " PROCEDURE N/A 2023    Procedure: Cardiac icd implant;  Surgeon: Jered Talbot MD;  [facility];  Service: Cardiology    CATARACT EXTRACTION Bilateral     CLOSURE DELAYED PRIMARY Left 2023    Procedure: CLOSURE DELAYED PRIMARY;  Surgeon: Tuan Simms DPM;  [facility];  Service: Podiatry    [place]ONOSCOPY      [place]    CT CORRJ HLX VLGS BNCTY SESMDC RESCJ PROX PHLX BASE Left 2023    Procedure: BUNIONECTOMY PERDOMO and wound debridement;  Surgeon: Clover Mott DPM;  [facility];  Service: Podiatry    CT REPLACE AORTIC VALVE OPENFEMORAL ARTERY APPROACH N/A 2023    Procedure: REPLACEMENT AORTIC VALVE TRANSCATHETER (TAVR) TRANSFEMORAL W/ 26MM SHERMAN ADRIANNA S3 UR VALVE(ACCESS ON LEFT) MAKEDA;  Surgeon: Gerald Gooden MD;  Location[facility];  Service: Cardiac Surgery    TOE AMPUTATION Left 2023    Procedure: PARTIAL 1ST RAY AMPUTATION;  Surgeon: Tuan Simms DPM;  [facility];  Service: Podiatry    TONSILLECTOMY      TOTAL SHOULDER REPLACEMENT Left     WISDOM TOOTH EXTRACTION       Social History     Socioeconomic History    Marital status: /Civil Union     Spouse name: None    Number of children: None    Years of education: None    Highest education level: None   Occupational History    None   Tobacco Use    Smoking status: Some Days     Current packs/day: 0.00     Average packs/day: 0.5 packs/day for 50.0 years (25.0 ttl pk-yrs)     Types: Cigarettes     Start date: 11/3/1973     Last attempt to quit: 11/3/2023     Years since quittin.9    Smokeless tobacco: Never   Vaping Use    Vaping status: Never Used   Substance and Sexual Activity    Alcohol use: Not Currently     Alcohol/week: 2.0 standard drinks of alcohol     Types: 2 Cans of beer per week     Comment: once a week    Drug use: Not Currently     Comment: sporadic in young adulthood    Sexual activity: None     Comment: defer   Other Topics  Concern    None   Social History Narrative    None     Social Determinants of Health     Financial Resource Strain: Medium Risk (12/13/2023)    [facility]    Overall Financial Resource Strain (CARDIA)     Difficulty of Paying Living Expenses: Somewhat hard   Food Insecurity: No Food Insecurity (12/13/2023)    [facility]    Hunger Vital Sign     Worried About Running Out of Food in the Last Year: Never true     Ran Out of Food in the Last Year: Never true   Transportation Needs: No Transportation Needs (12/13/2023)    Received from [facility], [facility]    PRAPARE - Transportation     Lack of Transportation (Medical): No     Lack of Transportation (Non-Medical): No   Physical Activity: Inactive (12/13/2023)    Received from [facility]    Exercise Vital Sign     Days of Exercise per Week: 0 days     Minutes of Exercise per Session: 0 min   Stress: Stress Concern Present (12/13/2023)    Received from [facility], [facility]    [employer] - Occupational Stress Questionnaire     Feeling of Stress : Rather much   Social Connections: Unknown (6/18/2024)    Received from [place]     How often do you feel lonely or isolated from those around you? (Adult - for ages 18 years and over): Not on file   Intimate Partner Violence: Not At Risk (12/13/2023)    [facility] from [facility], [facility]    Humiliation, Afraid, Rape, and Kick questionnaire     Fear of Current or Ex-Partner: No     Emotionally Abused: No     Physically Abused: No     Sexually Abused: No   Housing Stability: Low Risk  (11/7/2023)    Housing Stability Vital Sign     Unable to Pay for Housing in the Last Year: No     Number of Times Moved in the Last Year: 1      Homeless in the Last Year: No        Current Outpatient Medications:     Alirocumab (Praluent) 150 MG/ML SOAJ, Inject 150mg under the skin once every 14 days, Disp: 2 mL, Rfl: 2    amLODIPine (NORVASC) 5 mg tablet, Take 1 tablet (5 mg total) by mouth daily, Disp: 30 tablet, Rfl: 11    aspirin 81 mg chewable tablet, Chew 1 tablet (81 mg total) daily, Disp: 30 tablet, Rfl: 0    buPROPion (WELLBUTRIN XL) 300 mg 24 hr tablet, , Disp: , Rfl:     clopidogrel (PLAVIX) 75 mg tablet, TAKE ONE TABLET BY MOUTH EVERY DAY, Disp: 30 tablet, Rfl: 5    escitalopram (LEXAPRO) 20 mg tablet, Take 20 mg by mouth daily, Disp: , Rfl:     ferrous sulfate 325 (65 FE) MG EC tablet, Take 325 mg by mouth daily, Disp: , Rfl:     gabapentin (NEURONTIN) 100 mg capsule, Take 200 mg by mouth daily at bedtime, Disp: , Rfl:     Levothyroxine Sodium 200 MCG CAPS, Take 200 mcg by mouth daily, Disp: , Rfl:     losartan (COZAAR) 25 mg tablet, Take 1 tablet (25 mg total) by mouth daily, Disp: 30 tablet, Rfl: 11    Magnesium Oxide -Mg Supplement (Magnesium Extra Strength) 400 MG CAPS, TAKE ONE CAPSULE BY MOUTH IN THE MORNING, Disp: 90 capsule, Rfl: 3    metoprolol succinate (TOPROL-XL) 25 mg 24 hr tablet, Take 1 tablet (25 mg total) by mouth 2 (two) times a day, Disp: 180 tablet, Rfl: 3    Multiple Vitamins-Minerals (ICAPS AREDS 2 PO), Take by mouth (Patient not taking: Reported on 9/16/2024), Disp: , Rfl:     Omega-3 Fatty Acids (Fish Oil) 1200 MG CAPS, Take by mouth, Disp: , Rfl:     torsemide (DEMADEX) 20 mg tablet, Take 2 tablets (40 mg total) by mouth daily (Patient taking differently: Take 20 mg by mouth daily), Disp: 60 tablet, Rfl: 11  No current facility-administered medications for this visit.  Family History   Problem Relation Age of Onset    Dementia Mother     Blindness Mother     Cancer Mother     Dementia Father       Review of Systems   Constitutional:  Negative for activity change, chills and fever.   HENT: Negative.     Respiratory:   Negative for cough, chest tightness and shortness of breath.    Cardiovascular:  Negative for chest pain and leg swelling.   Endocrine: Negative.    Genitourinary: Negative.    Skin:  Negative for wound.   Neurological:  Positive for numbness.   Psychiatric/Behavioral: Negative.  Negative for agitation and behavioral problems.          Objective:  /75   Pulse 72   Temp (!) 96 °F (35.6 °C)   Resp 18     Physical Exam  Vitals reviewed.   Constitutional:       General: He is not in acute distress.     Appearance: He is not ill-appearing, toxic-appearing or diaphoretic.      Comments: NAD. Wife at bedside.    HENT:      Head: Normocephalic and atraumatic.   Eyes:      General: No scleral icterus.  Cardiovascular:      Rate and Rhythm: Normal rate.      Comments: Diminished but palpable DP/PT bilaterally. Absent pedal hair.  Skin of bilateral lower extremities is atrophic thin, dry and shiny in appearance.  Pulmonary:      Effort: Pulmonary effort is normal. No respiratory distress.   Musculoskeletal:      Right lower leg: No edema.      Left lower leg: No edema.        Feet:       Comments: S/p L partial 1st ray amputation   Feet:      Left foot:      Skin integrity: Callus present. No erythema.      Comments: 2- Left plantar submet 1 ulcer has healed  Skin:     Findings: No wound (Ulcer).   Neurological:      Mental Status: He is alert and oriented to person, place, and time.   Psychiatric:         Mood and Affect: Mood normal.         Behavior: Behavior normal.             Wound 09/18/24 Foot Left;Plantar (Active)   Wound Image   10/31/24 1322   Wound Description Pink;Brown;White 10/31/24 1323   Chantale-wound Assessment Callus;Dry;Brown 10/31/24 1323   Wound Length (cm) 0.1 cm 10/31/24 1323   Wound Width (cm) 0.1 cm 10/31/24 1323   Wound Depth (cm) 0.1 cm 10/31/24 1323   Wound Surface Area (cm^2) 0.01 cm^2 10/31/24 1323   Wound Volume (cm^3) 0.001 cm^3 10/31/24 1323   Calculated Wound Volume (cm^3) 0 cm^3  "10/31/24 1323   Change in Wound Size % 100 10/31/24 1323   Number of underminings 1 10/11/24 1003   Undermining 1 0.2 10/11/24 1003   Undermining 1 is depth extending from 12oclock to 12 oclock 10/11/24 1003   Drainage Amount None 10/31/24 1323   Drainage Description Serosanguineous 10/18/24 1303   Non-staged Wound Description Full thickness 10/18/24 1303                 Wound Instructions:  Orders Placed This Encounter   Procedures    Wound cleansing and dressings Left;Plantar Foot     Left foot wound healed today!    Apply bordered foam or gauze to area to provide protection and easier to assess for reulceration or drainage      Continue to wear Cam boot AT ALL TIMES until custom inserts and shoes are ready  [facility] appointment in 2 weeks    Follow up with wound center as needed     Standing Status:   Future     Standing Expiration Date:   11/7/2024    Wound Procedure Treatment     This order was created via procedure documentation         Clover Mott DPM      Portions of the record may have been created with voice recognition software. Occasional wrong word or \"sound a like\" substitutions may have occurred due to the inherent limitations of voice recognition software. Read the chart carefully and recognize, using context, where substitutions have occurred.    "

## 2024-10-31 NOTE — PROGRESS NOTES
Wound Procedure Treatment    Performed by: oJ Snow RN  Authorized by: Clover Mott DPM    Wound cleansed with:  NSS  Applied primary dressing:  Silicone bordered foam

## 2024-10-31 NOTE — PATIENT INSTRUCTIONS
Orders Placed This Encounter   Procedures    Wound cleansing and dressings Left;Plantar Foot     Left foot wound healed today!    Apply bordered foam or gauze to area to provide protection and easier to assess for reulceration or drainage      Continue to wear Cam boot AT ALL TIMES until custom inserts and shoes are ready  [facility] appointment in 2 weeks    Follow up with wound center as needed     Standing Status:   Future     Standing Expiration Date:   11/7/2024

## 2024-11-04 PROBLEM — Z87.19 HISTORY OF GI BLEED: Status: ACTIVE | Noted: 2024-11-04

## 2024-11-04 PROBLEM — K92.2 ACUTE GI BLEEDING: Status: RESOLVED | Noted: 2023-11-06 | Resolved: 2024-11-04

## 2024-11-04 NOTE — ASSESSMENT & PLAN NOTE
Known CAD w/ 80% mid LAD lesion s/p DEON  Experienced symptomatic NSVT at cardiac rehab and was started on amio temporarily  Secondary prevention ICD placed 11/2/23  Amiodarone later stopped   Currently maintained on metoprolol succinate 25mg BID   ECHO (01/2024) - EF 60%

## 2024-11-04 NOTE — PROGRESS NOTES
Electrophysiology Follow Up  [facility]  [facility] [address], [place] [address]    Name: Mikael Saavedra  : 1955  MRN: [id number]    Assessment & Plan  Ventricular tachycardia (HCC)  Known CAD w/ 80% mid LAD lesion s/p DEON  Experienced symptomatic NSVT at cardiac rehab and was started on amio temporarily  Secondary prevention ICD placed 23  Amiodarone later stopped   Currently maintained on metoprolol succinate 25mg BID   ECHO (2024) - EF 60%  Coronary artery disease involving native coronary artery of native heart without angina pectoris  10/17/23 s/p DEON to mid LAD for 80% lesion  On DAPT + statin + BB  Severe aortic stenosis  S/; TAVR 23  Benign essential HTN  BP in office today 100/64   Stage 3a chronic kidney disease (HCC)  Lab Results   Component Value Date    EGFR 65 2024    EGFR 65 2024    EGFR 65 2024    CREATININE 1.13 2024    CREATININE 1.13 2024    CREATININE 1.20 2024   Renal function appears stable per latest lab evaluation   s/p Medtronic dual chamber ICD 2023  Secondary prevention device placed 23 given VT hx as above   History of GI bleed  Prior hx noted  S/p EGD w/ dieulafoy's lesion in the stomach s/p clipping  Maintained on DAPT as above        Discussion/Plan:    Patient has a prior history of symptomatic NSVT s/p ICD for secondary prevention    In agust he had some NSVT seen on device and his metoprolol dose was increased    He is currently maintained on metoprolol succinate 25mg BID    EKG in office today showing Sinus bradycardia w/ ventricular rate 53bpm    His device was interrogated by myself in the office today showing no repeat NSVT episodes since August. It was orignialy programmed MVP-R 50bpm and was 22% AP w/ <0.1% .    He notes feeling somewhat fatigued since his dose increase and notes his HR was slower than usual. Additionally his BP was somewhat soft  today at 100/64.    He denies currently dizzy/lightheadedness, syncope/presyncope, or other acute cardiac complaint at this time    I increased his lower rate to 60bpm in the office today to hopefully help support his BP and improve his fatigue symptoms.    No acute medication changes made today. To continue w/ scheduled device interrogations and to continue f/u w/ his general cardiology team    Patient has been instructed to follow up in our EP office in 3 months or as needed. He will call our office with any questions or concerns in the meantime.    Rhythm History:   Atrial fibrillation:      Atrial flutter:      SVT:      VT/VF/PVC:     Device history:   Pacemaker:     Defibrillator:     BIV PPM:     BIV ICD:     ILR:    Interim History/HPI:   Interim history: Mikael Saavedra is a 69 y.o. male with a PMH of VT, severe AS s/p TAVR, CAD s/p DEON, CKD, HTN, and hx of GI bleed.    He presents today for routine outpatient follow up given his hx of NSVT s/p ICD. He reports that since his last visit he has been feeling well overall, however since his metoprolol was increased in August he has been feeling more fatigued and feels as though his heart rate is slower.  Otherwise he denies acute cardiac complaint at this time    EKG: Sinus bradycardia with ventricular rate 53 bpm    Review of Systems   Constitutional:  Negative for activity change, appetite change, chills, fatigue and fever.   HENT:  Negative for nosebleeds.    Respiratory:  Negative for chest tightness and shortness of breath.    Cardiovascular:  Negative for chest pain, palpitations and leg swelling.   Neurological:  Negative for dizziness, syncope, weakness and light-headedness.         OBJECTIVE:   Vitals:   There were no vitals taken for this visit.  There is no height or weight on file to calculate BMI.        Physical Exam:   Physical Exam  Constitutional:       General: He is not in acute distress.     Appearance: Normal appearance. He is not  toxic-appearing.   HENT:      Head: Normocephalic and atraumatic.   Eyes:      General:         Right eye: No discharge.         Left eye: No discharge.   Cardiovascular:      Rate and Rhythm: Normal rate and regular rhythm.      Pulses: Normal pulses.   Pulmonary:      Effort: Pulmonary effort is normal.      Breath sounds: Normal breath sounds.   Musculoskeletal:      Right lower leg: No edema.      Left lower leg: No edema.   Skin:     General: Skin is warm and dry.      Capillary Refill: Capillary refill takes less than 2 seconds.   Neurological:      Mental Status: He is alert.            Medications:      Current Outpatient Medications:     Alirocumab (Praluent) 150 MG/ML SOAJ, Inject 150mg under the skin once every 14 days, Disp: 2 mL, Rfl: 2    amLODIPine (NORVASC) 5 mg tablet, Take 1 tablet (5 mg total) by mouth daily, Disp: 30 tablet, Rfl: 11    aspirin 81 mg chewable tablet, Chew 1 tablet (81 mg total) daily, Disp: 30 tablet, Rfl: 0    buPROPion (WELLBUTRIN XL) 300 mg 24 hr tablet, , Disp: , Rfl:     clopidogrel (PLAVIX) 75 mg tablet, TAKE ONE TABLET BY MOUTH EVERY DAY, Disp: 30 tablet, Rfl: 5    escitalopram (LEXAPRO) 20 mg tablet, Take 20 mg by mouth daily, Disp: , Rfl:     ferrous sulfate 325 (65 FE) MG EC tablet, Take 325 mg by mouth daily, Disp: , Rfl:     gabapentin (NEURONTIN) 100 mg capsule, Take 200 mg by mouth daily at bedtime, Disp: , Rfl:     Levothyroxine Sodium 200 MCG CAPS, Take 200 mcg by mouth daily, Disp: , Rfl:     losartan (COZAAR) 25 mg tablet, Take 1 tablet (25 mg total) by mouth daily, Disp: 30 tablet, Rfl: 11    Magnesium Oxide -Mg Supplement (Magnesium Extra Strength) 400 MG CAPS, TAKE ONE CAPSULE BY MOUTH IN THE MORNING, Disp: 90 capsule, Rfl: 3    metoprolol succinate (TOPROL-XL) 25 mg 24 hr tablet, Take 1 tablet (25 mg total) by mouth 2 (two) times a day, Disp: 180 tablet, Rfl: 3    Multiple Vitamins-Minerals (ICAPS AREDS 2 PO), Take by mouth (Patient not taking: Reported on  "9/16/2024), Disp: , Rfl:     Omega-3 Fatty Acids (Fish Oil) 1200 MG CAPS, Take by mouth, Disp: , Rfl:     torsemide (DEMADEX) 20 mg tablet, Take 2 tablets (40 mg total) by mouth daily (Patient taking differently: Take 20 mg by mouth daily), Disp: 60 tablet, Rfl: 11       Historical Information   Past Medical History:   Diagnosis Date    Anxiety     Aortic stenosis     Borderline high cholesterol     CHF (congestive heart failure) ([facility]) 1/4/2024    Asaf Jackson-cardio-    Colon polyp     Depression     Heart murmur     Hypertension     Hypothyroidism     Prediabetes     PVCs (premature ventricular contractions)     \"skip\"    s/p Medtronic dual chamber ICD 11/2/2023 11/04/2023    Wears glasses     readers       Past Surgical History:   Procedure Laterality Date    APPENDECTOMY      BACK SURGERY      CARDIAC CATHETERIZATION N/A 10/17/2023    Procedure: Cardiac RHC/LHC;  Surgeon: Phillip Pfeiffer MD;  [facility];  Service: Cardiology    CARDIAC CATHETERIZATION N/A 12/5/2023    Procedure: Cardiac tavr;  Surgeon: Malcolm Humphrey DO;  Location: [facility];  Service: Cardiology    CARDIAC ELECTROPHYSIOLOGY PROCEDURE N/A 11/2/2023    Procedure: Cardiac icd implant;  Surgeon: Jered Talbot MD;  [facility];  Service: Cardiology    CATARACT EXTRACTION Bilateral     CLOSURE DELAYED PRIMARY Left 06/09/2023    Procedure: CLOSURE DELAYED PRIMARY;  Surgeon: Tuan Simms DPM;  [facility];  Service: Podiatry    COLONOSCOPY  2023    [place]    DC CORRJ HLX VLGS BNCTY SESMDC RESCJ PROX PHLX BASE Left 04/19/2023    Procedure: BUNIONECTOMY PERDOMO and wound debridement;  Surgeon: Clover Mott DPM;  [facility];  Service: Podiatry    DC REPLACE AORTIC VALVE OPENFEMORAL ARTERY APPROACH N/A 12/5/2023    Procedure: REPLACEMENT AORTIC VALVE TRANSCATHETER (TAVR) TRANSFEMORAL W/ 26MM SHERMAN ADRIANNA S3 UR VALVE(ACCESS ON LEFT) MAKEDA;  Surgeon: Gerald Gooden, " MD;  Location: [facility];  Service: Cardiac Surgery    TOE AMPUTATION Left 2023    Procedure: PARTIAL 1ST RAY AMPUTATION;  Surgeon: Tuan Simms DPM;  Location:  MAIN OR;  Service: Podiatry    TONSILLECTOMY      TOTAL SHOULDER REPLACEMENT Left     WISDOM TOOTH EXTRACTION         Social History     Substance and Sexual Activity   Alcohol Use Not Currently    Alcohol/week: 2.0 standard drinks of alcohol    Types: 2 Cans of beer per week    Comment: once a week     Social History     Substance and Sexual Activity   Drug Use Not Currently    Comment: sporadic in young adulthood     Social History     Tobacco Use   Smoking Status Some Days    Current packs/day: 0.00    Average packs/day: 0.5 packs/day for 50.0 years (25.0 ttl pk-yrs)    Types: Cigarettes    Start date: 11/3/1973    Last attempt to quit: 11/3/2023    Years since quittin.0   Smokeless Tobacco Never       Family History   Problem Relation Age of Onset    Dementia Mother     Blindness Mother     Cancer Mother     Dementia Father          Labs & Results:  Below is the patient's most recent value for Albumin, ALT, AST, BUN, Calcium, Chloride, Cholesterol, CO2, Creatinine, GFR, Glucose, HDL, Hematocrit, Hemoglobin, Hemoglobin A1C, LDL, Magnesium, Phosphorus, Platelets, Potassium, PSA, Sodium, Triglycerides, and WBC.   Lab Results   Component Value Date    ALT 17 2024    AST 17 2024    BUN 26 (H) 2024    CALCIUM 9.1 2024     2024    CO2 30 2024    CREATININE 1.13 2024    HDL 43 2023    HCT 47.1 2024    HGB 16.2 2024    HGBA1C 5.8 (H) 2024    MG 2.3 2024    PHOS 2.9 2018     2024    K 4.0 2024    TRIG 165 (H) 2023    WBC 12.55 (H) 2024     Note: for a comprehensive list of the patient's lab results, access the Results Review activity.

## 2024-11-04 NOTE — ASSESSMENT & PLAN NOTE
Prior hx noted  S/p EGD w/ dieulafoy's lesion in the stomach s/p clipping  Maintained on DAPT as above

## 2024-11-04 NOTE — ASSESSMENT & PLAN NOTE
Lab Results   Component Value Date    EGFR 65 09/04/2024    EGFR 65 08/27/2024    EGFR 65 07/23/2024    CREATININE 1.13 09/04/2024    CREATININE 1.13 08/27/2024    CREATININE 1.20 07/23/2024   Renal function appears stable per latest lab evaluation

## 2024-11-06 ENCOUNTER — OFFICE VISIT (OUTPATIENT)
Dept: CARDIOLOGY CLINIC | Facility: CLINIC | Age: 69
End: 2024-11-06
Payer: MEDICARE

## 2024-11-06 VITALS
DIASTOLIC BLOOD PRESSURE: 64 MMHG | HEART RATE: 53 BPM | HEIGHT: 72 IN | BODY MASS INDEX: 29.8 KG/M2 | SYSTOLIC BLOOD PRESSURE: 100 MMHG | WEIGHT: 220 LBS

## 2024-11-06 DIAGNOSIS — Z87.19 HISTORY OF GI BLEED: ICD-10-CM

## 2024-11-06 DIAGNOSIS — I47.20 VENTRICULAR TACHYCARDIA (HCC): Primary | ICD-10-CM

## 2024-11-06 DIAGNOSIS — I35.0 SEVERE AORTIC STENOSIS: ICD-10-CM

## 2024-11-06 DIAGNOSIS — I10 BENIGN ESSENTIAL HTN: ICD-10-CM

## 2024-11-06 DIAGNOSIS — N18.31 STAGE 3A CHRONIC KIDNEY DISEASE (HCC): ICD-10-CM

## 2024-11-06 DIAGNOSIS — Z95.810 ICD (IMPLANTABLE CARDIOVERTER-DEFIBRILLATOR) IN PLACE: ICD-10-CM

## 2024-11-06 DIAGNOSIS — I25.10 CORONARY ARTERY DISEASE INVOLVING NATIVE CORONARY ARTERY OF NATIVE HEART WITHOUT ANGINA PECTORIS: ICD-10-CM

## 2024-11-06 PROCEDURE — 99214 OFFICE O/P EST MOD 30 MIN: CPT

## 2024-11-06 PROCEDURE — 93000 ELECTROCARDIOGRAM COMPLETE: CPT

## 2024-11-06 NOTE — Clinical Note
History of symptomatic NSVT.  Noted with NSVT on 08/2024 device interrogation.  Metoprolol increased to 25 Mg twice daily at that time.  Since then has been reporting increased fatigue.  EKG showing sinus bradycardia in the 50s in office today with soft /64.  Otherwise denies syncope/presyncope, dizzy/lightheadedness, or other acute cardiac complaint at this time.    Device programmed MVP-R w/ lower rate 50bpm, only AP 22%,  <0.1%. No recorded NSVT episodes since August.   I increased his lower rate to 60 bpm to hopefully help support his BP and maybe improve his fatigue?   Bringing him back in three months for close monitoring. If he stays tired think we can safely go back down on the metoprolol?

## 2024-11-26 ENCOUNTER — TELEPHONE (OUTPATIENT)
Age: 69
End: 2024-11-26

## 2024-11-26 NOTE — TELEPHONE ENCOUNTER
Mikael's spouse Eva called states she received a letter to schedule testing for his 1 year Tavr follow up. Asked her which location he would like his Echo & EKG done and I will rely message to [place] and she will call her back.

## 2024-12-02 DIAGNOSIS — I35.0 SEVERE AORTIC STENOSIS: ICD-10-CM

## 2024-12-02 DIAGNOSIS — Z95.2 S/P TAVR (TRANSCATHETER AORTIC VALVE REPLACEMENT): Primary | ICD-10-CM

## 2024-12-05 ENCOUNTER — REMOTE DEVICE CLINIC VISIT (OUTPATIENT)
Dept: CARDIOLOGY CLINIC | Facility: CLINIC | Age: 69
End: 2024-12-05
Payer: MEDICARE

## 2024-12-05 ENCOUNTER — RESULTS FOLLOW-UP (OUTPATIENT)
Dept: CARDIOLOGY CLINIC | Facility: CLINIC | Age: 69
End: 2024-12-05

## 2024-12-05 DIAGNOSIS — Z95.810 AICD (AUTOMATIC CARDIOVERTER/DEFIBRILLATOR) PRESENT: Primary | ICD-10-CM

## 2024-12-05 PROCEDURE — 93296 REM INTERROG EVL PM/IDS: CPT | Performed by: INTERNAL MEDICINE

## 2024-12-05 PROCEDURE — 93295 DEV INTERROG REMOTE 1/2/MLT: CPT | Performed by: INTERNAL MEDICINE

## 2024-12-05 NOTE — PROGRESS NOTES
Results for orders placed or performed in visit on 12/05/24   Cardiac EP device report    Narrative    MDT DC ICD (MVP ON) - ACTIVE SYSTEM IS MRI CONDITIONAL  CARELINK TRANSMISSION: BATTERY VOLTAGE ADEQUATE (11.4 YRS). AP-76%, <0.1%. ALL AVAILABLE LEAD PARAMETERS WITHIN NORMAL LIMITS. NO SIGNIFICANT HIGH RATE EPISODES. OPTI-VOL WITHIN NORMAL LIMITS. NORMAL DEVICE FUNCTION. GV

## 2025-01-03 ENCOUNTER — HOSPITAL ENCOUNTER (OUTPATIENT)
Dept: NON INVASIVE DIAGNOSTICS | Facility: HOSPITAL | Age: 70
Discharge: HOME | End: 2025-01-03
Payer: MEDICARE

## 2025-01-03 ENCOUNTER — APPOINTMENT (OUTPATIENT)
Dept: LAB | Facility: HOSPITAL | Age: 70
End: 2025-01-03
Payer: MEDICARE

## 2025-01-03 VITALS
HEART RATE: 78 BPM | DIASTOLIC BLOOD PRESSURE: 60 MMHG | WEIGHT: 220 LBS | HEIGHT: 72 IN | BODY MASS INDEX: 29.8 KG/M2 | SYSTOLIC BLOOD PRESSURE: 110 MMHG

## 2025-01-03 DIAGNOSIS — Z95.2 S/P TAVR (TRANSCATHETER AORTIC VALVE REPLACEMENT): ICD-10-CM

## 2025-01-03 DIAGNOSIS — I35.0 SEVERE AORTIC STENOSIS: ICD-10-CM

## 2025-01-03 LAB
AORTIC ROOT: 3.1 CM
AORTIC VALVE MEAN VELOCITY: 10 M/S
APICAL FOUR CHAMBER EJECTION FRACTION: 67 %
AV AREA BY CONTINUOUS VTI: 3.6 CM2
AV AREA PEAK VELOCITY: 3.2 CM2
AV LVOT MEAN GRADIENT: 4 MMHG
AV LVOT PEAK GRADIENT: 7 MMHG
AV MEAN GRADIENT: 5 MMHG
AV PEAK GRADIENT: 11 MMHG
AV VALVE AREA: 3.55 CM2
AV VELOCITY RATIO: 0.83
BSA FOR ECHO PROCEDURE: 2.22 M2
DOP CALC AO PEAK VEL: 1.65 M/S
DOP CALC AO VTI: 32.42 CM
DOP CALC LVOT AREA: 3.8 CM2
DOP CALC LVOT CARDIAC INDEX: 2.89 L/MIN/M2
DOP CALC LVOT CARDIAC OUTPUT: 6.41 L/MIN
DOP CALC LVOT DIAMETER: 2.2 CM
DOP CALC LVOT PEAK VEL VTI: 30.33 CM
DOP CALC LVOT PEAK VEL: 1.37 M/S
DOP CALC LVOT STROKE INDEX: 50.5 ML/M2
DOP CALC LVOT STROKE VOLUME: 115.24
E WAVE DECELERATION TIME: 250 MS
E/A RATIO: 0.9
FRACTIONAL SHORTENING: 36 (ref 28–44)
INTERVENTRICULAR SEPTUM IN DIASTOLE (PARASTERNAL SHORT AXIS VIEW): 1.2 CM
INTERVENTRICULAR SEPTUM: 1.2 CM (ref 0.6–1.1)
LAAS-AP2: 15.4 CM2
LAAS-AP4: 18.3 CM2
LEFT ATRIUM SIZE: 4.6 CM
LEFT ATRIUM VOLUME (MOD BIPLANE): 44 ML
LEFT ATRIUM VOLUME INDEX (MOD BIPLANE): 19.8 ML/M2
LEFT INTERNAL DIMENSION IN SYSTOLE: 2.7 CM (ref 2.1–4)
LEFT VENTRICLE DIASTOLIC VOLUME (MOD BIPLANE): 77 ML
LEFT VENTRICLE DIASTOLIC VOLUME INDEX (MOD BIPLANE): 34.7 ML/M2
LEFT VENTRICLE SYSTOLIC VOLUME (MOD BIPLANE): 27 ML
LEFT VENTRICLE SYSTOLIC VOLUME INDEX (MOD BIPLANE): 12.2 ML/M2
LEFT VENTRICULAR INTERNAL DIMENSION IN DIASTOLE: 4.2 CM (ref 3.5–6)
LEFT VENTRICULAR POSTERIOR WALL IN END DIASTOLE: 1 CM
LEFT VENTRICULAR STROKE VOLUME: 50 ML
LV EF: 66 %
LVSV (TEICH): 50 ML
MV E'TISSUE VEL-LAT: 6 CM/S
MV E'TISSUE VEL-SEP: 5 CM/S
MV PEAK A VEL: 0.86 M/S
MV PEAK E VEL: 77 CM/S
MV STENOSIS PRESSURE HALF TIME: 73 MS
MV VALVE AREA P 1/2 METHOD: 3.01
RIGHT ATRIUM AREA SYSTOLE A4C: 17.4 CM2
RIGHT VENTRICLE ID DIMENSION: 4.2 CM
SL CV LEFT ATRIUM LENGTH A2C: 5.2 CM
SL CV LV EF: 70
SL CV PED ECHO LEFT VENTRICLE DIASTOLIC VOLUME (MOD BIPLANE) 2D: 78 ML
SL CV PED ECHO LEFT VENTRICLE SYSTOLIC VOLUME (MOD BIPLANE) 2D: 28 ML
TR MAX PG: 30 MMHG
TR PEAK VELOCITY: 2.8 M/S
TRICUSPID ANNULAR PLANE SYSTOLIC EXCURSION: 1.8 CM
TRICUSPID VALVE PEAK REGURGITATION VELOCITY: 2.76 M/S

## 2025-01-03 PROCEDURE — 93306 TTE W/DOPPLER COMPLETE: CPT | Performed by: INTERNAL MEDICINE

## 2025-01-03 PROCEDURE — 80048 BASIC METABOLIC PNL TOTAL CA: CPT

## 2025-01-03 PROCEDURE — 93306 TTE W/DOPPLER COMPLETE: CPT

## 2025-01-03 PROCEDURE — 85025 COMPLETE CBC W/AUTO DIFF WBC: CPT

## 2025-01-04 ENCOUNTER — APPOINTMENT (OUTPATIENT)
Dept: LAB | Facility: HOSPITAL | Age: 70
End: 2025-01-04
Payer: MEDICARE

## 2025-01-04 LAB
ANION GAP SERPL CALCULATED.3IONS-SCNC: 8 MMOL/L (ref 4–13)
BASOPHILS # BLD AUTO: 0.04 THOUSANDS/ΜL (ref 0–0.1)
BASOPHILS NFR BLD AUTO: 1 % (ref 0–1)
BUN SERPL-MCNC: 18 MG/DL (ref 5–25)
CALCIUM SERPL-MCNC: 9.8 MG/DL (ref 8.4–10.2)
CHLORIDE SERPL-SCNC: 100 MMOL/L (ref 96–108)
CHOLEST SERPL-MCNC: 207 MG/DL (ref ?–200)
CO2 SERPL-SCNC: 30 MMOL/L (ref 21–32)
CREAT SERPL-MCNC: 1.27 MG/DL (ref 0.6–1.3)
EOSINOPHIL # BLD AUTO: 0.54 THOUSAND/ΜL (ref 0–0.61)
EOSINOPHIL NFR BLD AUTO: 6 % (ref 0–6)
ERYTHROCYTE [DISTWIDTH] IN BLOOD BY AUTOMATED COUNT: 13.2 % (ref 11.6–15.1)
GFR SERPL CREATININE-BSD FRML MDRD: 56 ML/MIN/1.73SQ M
GLUCOSE P FAST SERPL-MCNC: 97 MG/DL (ref 65–99)
HCT VFR BLD AUTO: 51.5 % (ref 36.5–49.3)
HDLC SERPL-MCNC: 41 MG/DL
HGB BLD-MCNC: 17 G/DL (ref 12–17)
IMM GRANULOCYTES # BLD AUTO: 0.04 THOUSAND/UL (ref 0–0.2)
IMM GRANULOCYTES NFR BLD AUTO: 1 % (ref 0–2)
LDLC SERPL DIRECT ASSAY-MCNC: 115 MG/DL (ref 0–100)
LYMPHOCYTES # BLD AUTO: 2.29 THOUSANDS/ΜL (ref 0.6–4.47)
LYMPHOCYTES NFR BLD AUTO: 27 % (ref 14–44)
MCH RBC QN AUTO: 31.7 PG (ref 26.8–34.3)
MCHC RBC AUTO-ENTMCNC: 33 G/DL (ref 31.4–37.4)
MCV RBC AUTO: 96 FL (ref 82–98)
MONOCYTES # BLD AUTO: 1.09 THOUSAND/ΜL (ref 0.17–1.22)
MONOCYTES NFR BLD AUTO: 13 % (ref 4–12)
NEUTROPHILS # BLD AUTO: 4.62 THOUSANDS/ΜL (ref 1.85–7.62)
NEUTS SEG NFR BLD AUTO: 52 % (ref 43–75)
NRBC BLD AUTO-RTO: 0 /100 WBCS
PLATELET # BLD AUTO: 244 THOUSANDS/UL (ref 149–390)
PMV BLD AUTO: 9.3 FL (ref 8.9–12.7)
POTASSIUM SERPL-SCNC: 4.4 MMOL/L (ref 3.5–5.3)
RBC # BLD AUTO: 5.37 MILLION/UL (ref 3.88–5.62)
SODIUM SERPL-SCNC: 138 MMOL/L (ref 135–147)
TRIGL SERPL-MCNC: 433 MG/DL (ref ?–150)
WBC # BLD AUTO: 8.62 THOUSAND/UL (ref 4.31–10.16)

## 2025-01-06 ENCOUNTER — RESULTS FOLLOW-UP (OUTPATIENT)
Dept: CARDIOLOGY CLINIC | Facility: CLINIC | Age: 70
End: 2025-01-06

## 2025-01-13 ENCOUNTER — TELEMEDICINE (OUTPATIENT)
Dept: CARDIAC SURGERY | Facility: CLINIC | Age: 70
End: 2025-01-13
Payer: MEDICARE

## 2025-01-13 DIAGNOSIS — Z95.2 S/P TAVR (TRANSCATHETER AORTIC VALVE REPLACEMENT): Primary | ICD-10-CM

## 2025-01-13 PROCEDURE — 99212 OFFICE O/P EST SF 10 MIN: CPT | Performed by: NURSE PRACTITIONER

## 2025-01-13 NOTE — PROGRESS NOTES
Virtual Brief Visit  Name: Mikael Saavedra      : 1955      MRN: [id number]  Encounter Provider: JESUS Evans  Encounter Date: 2025   Encounter department: [facility] [place]    This Visit is being completed by telephone. The Patient is located at Home and in the following state in which I hold an active license [place]    The patient was identified by name and date of birth. Mikael Saavedra was informed that this is a telemedicine visit and that the visit is being conducted through Telephone.  My office door was closed. No one else was in the room.  He acknowledged consent and understanding of privacy and security of the video platform. The patient has agreed to participate and understands they can discontinue the visit at any time.    Patient is aware this is a billable service.     :  Assessment & Plan  S/P TAVR (transcatheter aortic valve replacement) 26 mm Chen ADRIANNA Resilia bioprosthesis    Patient is NYHA class I  His echo demonstrates normal TAVR function; ECG and labs are stable- all results reviewed with patient.  Continue daily Aspirin 81 mg for antiplatelet therapy for bioprosthetic TAVR (and CAD s/p PCI/DEON)  Maintain routine follow-up with Cardiology- yearly echo to keep TAVR under surveillance- to be ordered by Cardiology.  No future f/u in CTS needed.    ECHO: 1/3/25      Left Ventricle: Left ventricular cavity size is normal. Wall thickness is mildly increased. The left ventricular ejection fraction is 70%. Systolic function is normal. Wall motion is normal. Diastolic function is mildly abnormal, consistent with grade I (abnormal) relaxation.    Left Atrium: The atrium is mildly dilated.    Aortic Valve: There is an Chen ADRIANNA 3 Ultra 26 mm TAVR bioprosthetic valve. The prosthetic valve appears to be functioning normally.  The prosthetic valve gradients are within normal range.  No perivalvular regurgitation noted.    Tricuspid Valve:  There is mild regurgitation.  RVSP is 35 mmHg.    No significant change compared to 2024 study report.    EC24; SB  Lab Results   Component Value Date    WBC 8.62 2025    HGB 17.0 2025    HCT 51.5 (H) 2025    MCV 96 2025     2025     Lab Results   Component Value Date    SODIUM 138 2025    K 4.4 2025     2025    CO2 30 2025    BUN 18 2025    CREATININE 1.27 2025    GLUC 105 2024    CALCIUM 9.8 2025     History of Present Illness   HPI  Patient's PMHx is notable for AS s/p TAVR (23), CAD s/p PCI/DEON LAD (10/17/23), VT s/p ICD (23), HTN, HLD, CKD3, pre-DM (A1c 5.9%), hypothyroidism, diastolic CHF, PVC's, tobacco abuse, emphysema, h/o chronic L great toe wound w/ osteo s/p amputation (2023), peripheral neuropathy, colon polyp and polio in childhood (L leg atrophy).     This is a routine one year f/u s/p TAVR for severe AS    Patient reports he is doing well. He is able to perform his activities without SOB, CP, lightheadedness or palpitations. He is on daily diuretic for edema. Fluid retention, managed by Cardiology. He is just returning to ambulation s/p great toe amputation.    Review of Systems - History obtained from chart review and the patient  General ROS: negative  Psychological ROS: negative  Ophthalmic ROS: negative  ENT ROS: negative  Allergy and Immunology ROS: negative  Hematological and Lymphatic ROS: negative  Endocrine ROS: negative  Breast ROS: negative  Respiratory ROS: no cough, shortness of breath, or wheezing  Cardiovascular ROS: positive for - edema  negative for - chest pain, dyspnea on exertion, irregular heartbeat, loss of consciousness, murmur, orthopnea, palpitations, paroxysmal nocturnal dyspnea, or rapid heart rate  Gastrointestinal ROS: no abdominal pain, change in bowel habits, or black or bloody stools  Genito-Urinary ROS: no dysuria, trouble voiding, or  hematuria  Musculoskeletal ROS: negative  Neurological ROS: no TIA or stroke symptoms  Dermatological ROS: negative    Visit Time  Total Visit Duration: 15 min

## 2025-02-03 NOTE — PROGRESS NOTES
Electrophysiology Follow Up  [facility]  [facility] [address], [place] [address]    Name: Mikael Saavedra  : 1955  MRN: [id number]    Assessment & Plan  Ventricular tachycardia (HCC)  Known CAD w/ 80% mid LAD lesion s/p DEON  Experienced symptomatic NSVT at cardiac rehab and was started on amio temporarily  Secondary prevention ICD placed 23  Amiodarone later stopped   Currently maintained on metoprolol succinate 25mg BID   ECHO (2024) - EF 60%  Coronary artery disease involving native coronary artery of native heart without angina pectoris  10/17/23 s/p DEON to mid LAD for 80% lesion  On DAPT + statin + BB  Benign essential HTN  BP in office today 102/62  Stage 3a chronic kidney disease (HCC)  Lab Results   Component Value Date    EGFR 56 2025    EGFR 65 2024    EGFR 65 2024    CREATININE 1.27 2025    CREATININE 1.13 2024    CREATININE 1.13 2024   Renal function around baseline per latest evaluation  Continue PCP follow-up  S/P TAVR (transcatheter aortic valve replacement)  S/p TAVR 2023  s/p Medtronic dual chamber ICD 2023  Secondary prevention device placed 23 given VT hx as above   History of GI bleed  Prior hx noted  S/p EGD w/ dieulafoy's lesion in the stomach s/p clipping  Maintained on DAPT as above  PVCs (premature ventricular contractions)    Acute on chronic combined systolic and diastolic congestive heart failure (HCC)  Wt Readings from Last 3 Encounters:   25 104 kg (228 lb 12.8 oz)   25 99.8 kg (220 lb)   24 99.8 kg (220 lb)   Appears euvolemic in office today      Diabetes due to underlying condition w oth circulatory comp (HCC)    Lab Results   Component Value Date    HGBA1C 6 (H) 2025   Continue PCP f/u       Discussion/Plan:    Patient has a prior history of symptomatic NSVT s/p ICD for secondary prevention     In august he had some NSVT seen on device  and his metoprolol dose was increased     He is currently maintained on metoprolol succinate 25mg BID    During 11/6/2024 outpatient EP follow-up he did report some ongoing fatigue with a soft BP around 100/64, given this the lower rate of his device was increased to 60 bpm to hopefully help support his BP and improve any symptoms of fatigue    Since then he reports he has been feeling well, but does still note some chronic slight fatigue. Reports he is unsure if it is due to his age or other factors.    His device was interrogated in office today showing no significant high-rate episodes, his rate response settings were further adjusted today to hopefully improve his energy.    Otherwise no acute medication changes were made today    Modifiable risk factors were discussed today including weight loss, avoiding alcohol/tobacco products, and treatment of JUSTO/HTN/DM    To continue with scheduled device interrogations moving forward    Patient has been instructed to follow up in our EP office in 1 year or as needed. He will call our office with any questions or concerns in the meantime.    Rhythm History:   Atrial fibrillation:      Atrial flutter:      SVT:      VT/VF/PVC:     Device history:   Pacemaker:     Defibrillator:     BIV PPM:     BIV ICD:     ILR:    Interim History/HPI:   Interim history: Mikael Saavedra is a 69 y.o. male with a PMH of VT s/p ICD, s/p TAVR, CAD, HTN, CKD, history of GI bleed.    He presents today for routine outpatient follow-up given his history of VT with Medtronic dual-chamber ICD in situ.  Since his last outpatient EP appointment he reports he has been feeling well, but does still note some chronic slight fatigue. Reports he is unsure if it is due to his age or other factors.    EKG: NSR w/ ventricular rate 61bpm    Review of Systems   Constitutional:  Negative for activity change, appetite change, chills, fatigue and fever.   HENT:  Negative for nosebleeds.    Respiratory:  Negative for  chest tightness and shortness of breath.    Cardiovascular:  Negative for chest pain, palpitations and leg swelling.   Neurological:  Negative for dizziness, syncope, weakness and light-headedness.         OBJECTIVE:   Vitals:   There were no vitals taken for this visit.  There is no height or weight on file to calculate BMI.        Physical Exam:   Physical Exam  Constitutional:       General: He is not in acute distress.     Appearance: Normal appearance. He is not toxic-appearing.   HENT:      Head: Normocephalic and atraumatic.   Eyes:      General:         Right eye: No discharge.         Left eye: No discharge.   Cardiovascular:      Rate and Rhythm: Normal rate and regular rhythm.      Pulses: Normal pulses.   Pulmonary:      Effort: Pulmonary effort is normal.      Breath sounds: Normal breath sounds.   Musculoskeletal:      Right lower leg: No edema.      Left lower leg: No edema.   Skin:     General: Skin is warm and dry.      Capillary Refill: Capillary refill takes less than 2 seconds.   Neurological:      Mental Status: He is alert.          Medications:      Current Outpatient Medications:     Alirocumab (Praluent) 150 MG/ML SOAJ, Inject 150mg under the skin once every 14 days, Disp: 2 mL, Rfl: 2    amLODIPine (NORVASC) 5 mg tablet, Take 1 tablet (5 mg total) by mouth daily, Disp: 30 tablet, Rfl: 11    aspirin 81 mg chewable tablet, Chew 1 tablet (81 mg total) daily, Disp: 30 tablet, Rfl: 0    buPROPion (WELLBUTRIN XL) 300 mg 24 hr tablet, , Disp: , Rfl:     escitalopram (LEXAPRO) 20 mg tablet, Take 20 mg by mouth daily, Disp: , Rfl:     ferrous sulfate 325 (65 FE) MG EC tablet, Take 325 mg by mouth daily, Disp: , Rfl:     gabapentin (NEURONTIN) 100 mg capsule, Take 200 mg by mouth daily at bedtime, Disp: , Rfl:     Levothyroxine Sodium 200 MCG CAPS, Take 200 mcg by mouth daily, Disp: , Rfl:     losartan (COZAAR) 25 mg tablet, Take 1 tablet (25 mg total) by mouth daily, Disp: 30 tablet, Rfl: 11     "Magnesium Oxide -Mg Supplement (Magnesium Extra Strength) 400 MG CAPS, TAKE ONE CAPSULE BY MOUTH IN THE MORNING, Disp: 90 capsule, Rfl: 3    metoprolol succinate (TOPROL-XL) 25 mg 24 hr tablet, Take 1 tablet (25 mg total) by mouth 2 (two) times a day, Disp: 180 tablet, Rfl: 3    Multiple Vitamins-Minerals (ICAPS AREDS 2 PO), Take by mouth, Disp: , Rfl:     Omega-3 Fatty Acids (Fish Oil) 1200 MG CAPS, Take by mouth, Disp: , Rfl:     torsemide (DEMADEX) 20 mg tablet, Take 2 tablets (40 mg total) by mouth daily (Patient taking differently: Take 20 mg by mouth daily), Disp: 60 tablet, Rfl: 11       Historical Information   Past Medical History:   Diagnosis Date    Anxiety     Aortic stenosis     Borderline high cholesterol     CHF (congestive heart failure) ([facility]) 1/4/2024    Asaf Jackson-cardio-    Colon polyp     Depression     Heart murmur     Hypertension     Hypothyroidism     Prediabetes     PVCs (premature ventricular contractions)     \"skip\"    s/p Medtronic dual chamber ICD 11/2/2023 11/04/2023    Wears glasses     readers       Past Surgical History:   Procedure Laterality Date    APPENDECTOMY      BACK SURGERY      CARDIAC CATHETERIZATION N/A 10/17/2023    Procedure: Cardiac RHC/LHC;  Surgeon: Phillip Pfeiffer MD;  Location: [facility];  Service: Cardiology    CARDIAC CATHETERIZATION N/A 12/5/2023    Procedure: Cardiac tavr;  Surgeon: Malcolm Humphrey DO;  Location: [facility];  Service: Cardiology    CARDIAC ELECTROPHYSIOLOGY PROCEDURE N/A 11/2/2023    Procedure: Cardiac icd implant;  Surgeon: Jered Talbot MD;  Location: [facility];  Service: Cardiology    CATARACT EXTRACTION Bilateral     CLOSURE DELAYED PRIMARY Left 06/09/2023    Procedure: CLOSURE DELAYED PRIMARY;  Surgeon: Tuan Simms DPM;  Location:  MAIN OR;  Service: Podiatry    COLONOSCOPY  2023    NATHALIE Guerra    WI CORRJ HLX VLGS BNCTY SESMDC RESCJ PROX PHLX BASE Left 04/19/2023    Procedure: " BUNIONECTOMY PERDOMO and wound debridement;  Surgeon: Clover Mott DPM;  Location: [facility];  Service: Podiatry    WI REPLACE AORTIC VALVE OPENFEMORAL ARTERY APPROACH N/A 2023    Procedure: REPLACEMENT AORTIC VALVE TRANSCATHETER (TAVR) TRANSFEMORAL W/ 26MM SHERMAN ADRIANNA S3 UR VALVE(ACCESS ON LEFT) MAKEDA;  Surgeon: Gerald Gooden MD;  Location: [facility];  Service: Cardiac Surgery    TOE AMPUTATION Left 2023    Procedure: PARTIAL 1ST RAY AMPUTATION;  Surgeon: Tuan Simms DPM;  Location: [facility];  Service: Podiatry    TONSILLECTOMY      TOTAL SHOULDER REPLACEMENT Left     WISDOM TOOTH EXTRACTION         Social History     Substance and Sexual Activity   Alcohol Use Not Currently    Alcohol/week: 5.0 standard drinks of alcohol    Types: 3 Glasses of wine, 2 Cans of beer per week     Social History     Substance and Sexual Activity   Drug Use Not Currently    Comment: sporadic in young adulthood     Social History     Tobacco Use   Smoking Status Some Days    Current packs/day: 0.00    Average packs/day: 0.5 packs/day for 50.0 years (25.0 ttl pk-yrs)    Types: Cigarettes    Start date: 11/3/1973    Last attempt to quit: 11/3/2023    Years since quittin.2   Smokeless Tobacco Never       Family History   Problem Relation Age of Onset    Dementia Mother     Blindness Mother     Cancer Mother     Dementia Father          Labs & Results:  Below is the patient's most recent value for Albumin, ALT, AST, BUN, Calcium, Chloride, Cholesterol, CO2, Creatinine, GFR, Glucose, HDL, Hematocrit, Hemoglobin, Hemoglobin A1C, LDL, Magnesium, Phosphorus, Platelets, Potassium, PSA, Sodium, Triglycerides, and WBC.   Lab Results   Component Value Date    ALT 2024    AST 17 2024    BUN 18 2025    CALCIUM 9.8 2025     2025    CO2 30 2025    CREATININE 1.27 2025    HDL 41 2025    HCT 51.5 (H) 2025    HGB 17.0 2025    HGBA1C 5.8 (H)  07/23/2024    MG 2.3 09/04/2024    PHOS 2.9 07/18/2018     01/04/2025    K 4.4 01/04/2025    TRIG 433 (H) 01/04/2025    WBC 8.62 01/04/2025     Note: for a comprehensive list of the patient's lab results, access the Results Review activity.

## 2025-02-03 NOTE — ASSESSMENT & PLAN NOTE
Lab Results   Component Value Date    EGFR 56 01/04/2025    EGFR 65 09/04/2024    EGFR 65 08/27/2024    CREATININE 1.27 01/04/2025    CREATININE 1.13 09/04/2024    CREATININE 1.13 08/27/2024   Renal function around baseline per latest evaluation  Continue PCP follow-up

## 2025-02-07 ENCOUNTER — IN-CLINIC DEVICE VISIT (OUTPATIENT)
Dept: CARDIOLOGY CLINIC | Facility: CLINIC | Age: 70
End: 2025-02-07
Payer: MEDICARE

## 2025-02-07 ENCOUNTER — OFFICE VISIT (OUTPATIENT)
Dept: CARDIOLOGY CLINIC | Facility: CLINIC | Age: 70
End: 2025-02-07
Payer: MEDICARE

## 2025-02-07 ENCOUNTER — RESULTS FOLLOW-UP (OUTPATIENT)
Dept: NON INVASIVE DIAGNOSTICS | Facility: HOSPITAL | Age: 70
End: 2025-02-07

## 2025-02-07 VITALS
HEART RATE: 61 BPM | WEIGHT: 228.8 LBS | DIASTOLIC BLOOD PRESSURE: 62 MMHG | BODY MASS INDEX: 30.99 KG/M2 | SYSTOLIC BLOOD PRESSURE: 102 MMHG | HEIGHT: 72 IN

## 2025-02-07 DIAGNOSIS — I49.3 PVCS (PREMATURE VENTRICULAR CONTRACTIONS): ICD-10-CM

## 2025-02-07 DIAGNOSIS — E08.59 DIABETES DUE TO UNDERLYING CONDITION W OTH CIRCULATORY COMP (HCC): ICD-10-CM

## 2025-02-07 DIAGNOSIS — Z95.810 PRESENCE OF IMPLANTABLE CARDIOVERTER-DEFIBRILLATOR (ICD): ICD-10-CM

## 2025-02-07 DIAGNOSIS — Z95.2 S/P TAVR (TRANSCATHETER AORTIC VALVE REPLACEMENT): ICD-10-CM

## 2025-02-07 DIAGNOSIS — Z87.19 HISTORY OF GI BLEED: ICD-10-CM

## 2025-02-07 DIAGNOSIS — Z95.810 ICD (IMPLANTABLE CARDIOVERTER-DEFIBRILLATOR) IN PLACE: ICD-10-CM

## 2025-02-07 DIAGNOSIS — I10 BENIGN ESSENTIAL HTN: ICD-10-CM

## 2025-02-07 DIAGNOSIS — I47.20 VENTRICULAR TACHYCARDIA (HCC): Primary | ICD-10-CM

## 2025-02-07 DIAGNOSIS — I25.10 CORONARY ARTERY DISEASE INVOLVING NATIVE CORONARY ARTERY OF NATIVE HEART WITHOUT ANGINA PECTORIS: ICD-10-CM

## 2025-02-07 DIAGNOSIS — R06.02 SHORTNESS OF BREATH: Primary | ICD-10-CM

## 2025-02-07 DIAGNOSIS — I50.43 ACUTE ON CHRONIC COMBINED SYSTOLIC AND DIASTOLIC CONGESTIVE HEART FAILURE (HCC): ICD-10-CM

## 2025-02-07 DIAGNOSIS — N18.31 STAGE 3A CHRONIC KIDNEY DISEASE (HCC): ICD-10-CM

## 2025-02-07 PROCEDURE — 93000 ELECTROCARDIOGRAM COMPLETE: CPT

## 2025-02-07 PROCEDURE — 99214 OFFICE O/P EST MOD 30 MIN: CPT

## 2025-02-07 PROCEDURE — 93283 PRGRMG EVAL IMPLANTABLE DFB: CPT | Performed by: STUDENT IN AN ORGANIZED HEALTH CARE EDUCATION/TRAINING PROGRAM

## 2025-02-07 RX ORDER — METOPROLOL SUCCINATE 25 MG/1
25 TABLET, EXTENDED RELEASE ORAL 2 TIMES DAILY
Qty: 180 TABLET | Refills: 3 | Status: SHIPPED | OUTPATIENT
Start: 2025-02-07

## 2025-02-07 NOTE — ASSESSMENT & PLAN NOTE
Wt Readings from Last 3 Encounters:   02/07/25 104 kg (228 lb 12.8 oz)   01/03/25 99.8 kg (220 lb)   11/06/24 99.8 kg (220 lb)   Appears euvolemic in office today

## 2025-02-07 NOTE — PROGRESS NOTES
Results for orders placed or performed in visit on 02/07/25   Cardiac EP device report    Narrative    MDT DC ICD (MVP ON) - ACTIVE SYSTEM IS MRI CONDITIONAL  DEVICE INTERROGATED IN THE [place] OFFICE.  BATTERY VOLTAGE ADEQUATE (11.2 YRS).   AP 70.5%    <0.1%.  ALL LEAD PARAMETERS WITHIN NORMAL LIMITS.  NO SIGNIFICANT HIGH RATE EPISODES.  PT TAKES ASA, METOPROLOL SUCC. OPTI-VOL WITHIN NORMAL LIMITS.  PT C/O SOME FATIGUE, RE-PROGRAMMED ACTIVITY ACCELERATION TO 15 SECS.  NORMAL DEVICE FUNCTION.  PAS

## 2025-02-10 ENCOUNTER — RESULTS FOLLOW-UP (OUTPATIENT)
Dept: CARDIOLOGY CLINIC | Facility: CLINIC | Age: 70
End: 2025-02-10

## 2025-02-13 RX ORDER — FENOFIBRATE 145 MG/1
145 TABLET, COATED ORAL DAILY
COMMUNITY
Start: 2025-01-29

## 2025-02-14 DIAGNOSIS — I10 BENIGN ESSENTIAL HTN: ICD-10-CM

## 2025-02-14 RX ORDER — LOSARTAN POTASSIUM 25 MG/1
25 TABLET ORAL DAILY
Qty: 30 TABLET | Refills: 5 | Status: SHIPPED | OUTPATIENT
Start: 2025-02-14

## 2025-02-21 DIAGNOSIS — R06.02 SHORTNESS OF BREATH: ICD-10-CM

## 2025-02-21 RX ORDER — TORSEMIDE 20 MG/1
40 TABLET ORAL DAILY
Qty: 60 TABLET | Refills: 5 | Status: SHIPPED | OUTPATIENT
Start: 2025-02-21

## 2025-03-07 DIAGNOSIS — E78.2 MIXED HYPERLIPIDEMIA: ICD-10-CM

## 2025-03-07 DIAGNOSIS — Z78.9 STATIN INTOLERANCE: ICD-10-CM

## 2025-03-07 RX ORDER — ALIROCUMAB 150 MG/ML
INJECTION, SOLUTION SUBCUTANEOUS
Qty: 2 ML | Refills: 2 | Status: SHIPPED | OUTPATIENT
Start: 2025-03-07

## 2025-03-10 RX ORDER — LEVOTHYROXINE SODIUM 200 UG/1
200 TABLET ORAL DAILY
COMMUNITY
Start: 2025-02-14

## 2025-03-19 ENCOUNTER — OFFICE VISIT (OUTPATIENT)
Dept: OBGYN CLINIC | Facility: CLINIC | Age: 70
End: 2025-03-19
Payer: MEDICARE

## 2025-03-19 ENCOUNTER — HOSPITAL ENCOUNTER (OUTPATIENT)
Dept: RADIOLOGY | Facility: CLINIC | Age: 70
Discharge: HOME | End: 2025-03-19
Payer: MEDICARE

## 2025-03-19 VITALS — BODY MASS INDEX: 30.64 KG/M2 | WEIGHT: 226.2 LBS | HEIGHT: 72 IN

## 2025-03-19 DIAGNOSIS — M25.511 CHRONIC RIGHT SHOULDER PAIN: Primary | ICD-10-CM

## 2025-03-19 DIAGNOSIS — G89.29 CHRONIC RIGHT SHOULDER PAIN: ICD-10-CM

## 2025-03-19 DIAGNOSIS — M25.511 CHRONIC RIGHT SHOULDER PAIN: ICD-10-CM

## 2025-03-19 DIAGNOSIS — G89.29 CHRONIC RIGHT SHOULDER PAIN: Primary | ICD-10-CM

## 2025-03-19 DIAGNOSIS — M19.011 PRIMARY OSTEOARTHRITIS OF RIGHT SHOULDER: ICD-10-CM

## 2025-03-19 PROCEDURE — 99204 OFFICE O/P NEW MOD 45 MIN: CPT | Performed by: ORTHOPAEDIC SURGERY

## 2025-03-19 PROCEDURE — 73030 X-RAY EXAM OF SHOULDER: CPT

## 2025-03-19 NOTE — PROGRESS NOTES
Assessment/Plan:  Assessment & Plan   Diagnoses and all orders for this visit:    Chronic right shoulder pain  -     XR shoulder 2+ vw right; Future    Primary osteoarthritis of right shoulder        Plan: Treatment was discussed.  Although he has had limited response to corticosteroid injection in the past, he does not want to pursue any surgery at this time and would not be able to pursue surgical intervention until about 9 months from now.  He has a fishing trip planned and would like to have an injection done shortly before the trip so as to help minimize pain during the fishing trip.  I will see him back during the first week of May and will proceed with intra-articular corticosteroid injection of his right shoulder, assuming he continues to have interest in doing so.  He was encouraged to contact the office in the interim if any questions or concerns arise.    Subjective:   Patient ID: Mikael Saavedra is a 69 y.o. male.    LEXI Youssef is a 69-year-old male with a history of right shoulder pain of approximately 2-year duration.  He has previously been treated elsewhere but the orthopedic surgeon has now retired and he did not want to return to the same practice.  He complains of consistent pain over the past 2 years.  Previous treatment consisted of injection with the most recent injection performed about 6 months ago providing no more than about 1 month of relief.  He believes the previous injection also provided no more than about 1 month of relief.  He denies pain at rest.  He does note pain with activity as well as a crunching and grinding sensation.  This is particularly worsened by lifting things.  He denies any weakness.  He underwent left shoulder replacement arthroplasty about 10 years ago and he believes the right shoulder feels like the left did prior to the surgical treatment.  He has had no formal physical therapy.  He denies upper extremity paresthesias.    The following portions of the patient's  history were reviewed and updated as appropriate: allergies, current medications, past family history, past medical history, past social history, past surgical history, and problem list.    Review of Systems: The patient's 10 organ system review is negative excluding the chief complaint    Objective:  Ortho Exam  The right shoulder exam demonstrates abduction of about 140 degrees with pain occurring at 90 degrees and increasing to the maximum of 140 degrees.  He has 140 degrees of forward flexion with a lesser degree of pain and pain occurring at about 120 degrees.  He has external rotation of 45 degrees with his elbow at his side.  He demonstrates good strength of resisted internal rotation, external rotation and abduction with some pain during resisted abduction.  During lifting of the stool for stepping up onto the exam table, there is crepitus noted about the shoulder.  During passive range of motion there was no crepitus.  He has anterior tenderness.  The AC joint and lateral shoulder are nontender.  There is some atrophy of the supraspinatus muscle belly.  He has no tenderness of the supraspinatus or infraspinatus muscle bellies.  The remainder the right upper extremity exam is benign.  Physical Exam    X-rays of the shoulder obtained today demonstrate advanced degenerative disease with essentially complete loss of the glenohumeral joint space.  There is slight posterior translation of the humeral head on the scapular Y view.  There is no significant proximal migration.

## 2025-03-20 ENCOUNTER — OFFICE VISIT (OUTPATIENT)
Dept: CARDIOLOGY CLINIC | Facility: CLINIC | Age: 70
End: 2025-03-20
Payer: MEDICARE

## 2025-03-20 VITALS
DIASTOLIC BLOOD PRESSURE: 76 MMHG | HEART RATE: 84 BPM | HEIGHT: 72 IN | TEMPERATURE: 97.2 F | BODY MASS INDEX: 30.75 KG/M2 | OXYGEN SATURATION: 96 % | WEIGHT: 227 LBS | SYSTOLIC BLOOD PRESSURE: 136 MMHG

## 2025-03-20 DIAGNOSIS — I35.0 SEVERE AORTIC STENOSIS: ICD-10-CM

## 2025-03-20 DIAGNOSIS — I10 BENIGN ESSENTIAL HTN: ICD-10-CM

## 2025-03-20 DIAGNOSIS — I47.20 VENTRICULAR TACHYCARDIA (HCC): ICD-10-CM

## 2025-03-20 DIAGNOSIS — I25.10 CORONARY ARTERY DISEASE INVOLVING NATIVE CORONARY ARTERY OF NATIVE HEART WITHOUT ANGINA PECTORIS: Primary | ICD-10-CM

## 2025-03-20 DIAGNOSIS — E78.2 MIXED HYPERLIPIDEMIA: ICD-10-CM

## 2025-03-20 DIAGNOSIS — R06.02 SHORTNESS OF BREATH: ICD-10-CM

## 2025-03-20 DIAGNOSIS — Z95.810 ICD (IMPLANTABLE CARDIOVERTER-DEFIBRILLATOR) IN PLACE: ICD-10-CM

## 2025-03-20 DIAGNOSIS — Z78.9 STATIN INTOLERANCE: ICD-10-CM

## 2025-03-20 DIAGNOSIS — N18.31 STAGE 3A CHRONIC KIDNEY DISEASE (HCC): ICD-10-CM

## 2025-03-20 DIAGNOSIS — Z95.2 S/P TAVR (TRANSCATHETER AORTIC VALVE REPLACEMENT): ICD-10-CM

## 2025-03-20 PROCEDURE — 99214 OFFICE O/P EST MOD 30 MIN: CPT | Performed by: NURSE PRACTITIONER

## 2025-03-20 NOTE — ASSESSMENT & PLAN NOTE
S/p 26 mm Chen Benedicto S3 UR valve by Dr. Gooden at [facility] 12/5/23  See discussion under aortic stenosis

## 2025-03-20 NOTE — ASSESSMENT & PLAN NOTE
Pre-TAVR cardiac cath 10/17/2023 revealed prox-mid 80% LAD lesion s/p PCI/DEON x 1. Residual 40% mid RCA disease.  - - - - - -  Completed 1 year of DAPT. Maintained on aspirin 81 mg daily.  No anginal complaints.  Continue Praluent 150 mg every 14 days for goal LDL < 55. He has been unable to tolerate statins.

## 2025-03-20 NOTE — PROGRESS NOTES
Cardiology Follow Up    Mikael Saavedra  1955  [id number]  [facility] [place]  [address]  [address]  [facility] [address] [phone]    Mikael presents for follow up of AS s/p TAVR, CAD s/p stent to LAD, HFpEF, VT s/p ICD, HTN.     1. Coronary artery disease involving native coronary artery of native heart without angina pectoris  Assessment & Plan:  Pre-TAVR cardiac cath 10/17/2023 revealed prox-mid 80% LAD lesion s/p PCI/DEON x 1. Residual 40% mid RCA disease.  - - - - - -  Completed 1 year of DAPT. Maintained on aspirin 81 mg daily.  No anginal complaints.  Continue Praluent 150 mg every 14 days for goal LDL < 55. He has been unable to tolerate statins.  Orders:  -     Lipid Panel with Direct LDL reflex; Future; Expected date: 06/20/2025  2. Heart failure with preserved ejection fraction  Assessment & Plan:  Echocardiogram 1/2025 with EF 70% with normal AVR function, mild TR with normal PASP  Euvolemic on torsemide 40 mg daily.  Will continue with 2 g sodium diet, daily weights.  Reviewed s/s to report.  3. Severe aortic stenosis  Assessment & Plan:  History of aortic stenosis which was reported as moderate on echo 11/2021 at [facility].  Echocardiogram 1/30/2023 continues to show moderate aortic stenosis with PV 3.52, mean gradient 30 mmHg, DI 0.30.  Repeat echo 9/2023 showed progression to severe AS with PV 4.2 m/s, MG 41mmHg, DVI 0.24.  Now s/p TAVR by Dr. Gooden at [facility] 12/5/2023.  1/2025 shows normal AVR function.  Completed cardiac rehab.  He does report significant improvement in activity tolerance since valve replacement.  4. S/P TAVR (transcatheter aortic valve replacement)  Assessment & Plan:  S/p 26 mm Chen Benedicto S3 UR valve by Dr. Gooden at [facility] 12/5/23  See discussion under aortic stenosis  5. Ventricular tachycardia (HCC)  Assessment & Plan:  VT discovered while at cardiac rehab.  He has been evaluated by [facility] EP and underwent  AICD insertion 11/2/2023 and started on amiodarone.  Preserved LVEF on serial echocardiograms.  Amio weaned off as of May 2024.  Now on metoprolol succinate 25 mg BID with no VT episodes on most recent device check.  6. s/p Medtronic dual chamber ICD 11/2/2023  Assessment & Plan:  Managed by [facility].  See discussion under VT    7. Benign essential HTN  Assessment & Plan:  Blood pressure is at goal.  Continue amlodipine 5 mg daily, losartan 25 mg daily, torsemide 40 mg daily, and metoprolol succinate 25 mg BID.  Recent lab work reviewed.  8. Mixed hyperlipidemia  Assessment & Plan:  Goal LDL < 55. Intolerant of statins.  Prescribed Praluent 150 mg every 14 days and Tricor 145 mg daily.  Lipid panel 1/2025 with TG in the 500's and . He was without medication temporarily.  Recheck lipid panel now and will further adjust regimen as needed.  Orders:  -     Lipid Panel with Direct LDL reflex; Future; Expected date: 06/20/2025  9. Statin intolerance  Assessment & Plan:  Now on Praluent 150 mg every 14 days.  Orders:  -     Lipid Panel with Direct LDL reflex; Future; Expected date: 06/20/2025  10. Stage 3a chronic kidney disease (HCC)  Assessment & Plan:  Lab Results   Component Value Date    EGFR 63 01/27/2025    EGFR 56 01/04/2025    EGFR 65 09/04/2024    CREATININE 1.23 01/27/2025    CREATININE 1.27 01/04/2025    CREATININE 1.13 09/04/2024     Improving.  Will monitor. Avoid hypotension     LEXI Youssef has a past medical history of aortic stenosis s/p TAVR, CAD s/p DEON to LAD, HFpEF, VT and PVC's s/p AICD previously on amio, GI bleed, HTN, hypothyroidism. Ongoing tobacco use.     He was admitted to [facility] 1/30-2/6/2023 for sepsis. Cardiology was consulted for evaluation of CHF an MAKEDA for work up of bacteremia. HCTZ was discontinued due to hyponatremia. Furosemide was held due to dehydration. Echo 1/30/2023 showed EF 60% with moderate AS. MAKEDA showed no source of bacteremia. Ultimately source of  bacteremia was from a toe wound which ultimately underwent amputation. He had a complicated course with hyperkalemia and ANNALEE. Lisinopril was stopped (also noted to cause dry cough). He was noted to have frequent PVC's on telemetry. A Holter monitor 3/15/2023 showed 3.7% PVC burden without NSVT and he was started on metoprolol succinate.      Echocardiogram 9/2023 revealed severe AS and he was referred to CT surgery for TAVR. Cardiac cath 10/17/2023 as part of pre-TAVR work up resulted in stent placement to an 80% mid LAD lesion with residual non-obstructive disease noted in the mid RCA. He was noted to have NSVT while at cardiac rehab and was sent to [facility] 10/30/23. Echocardiogram showed preserved EF. He was ultimately transferred to [facility]. He was evaluated by EP and underwent AICD insertion for primary prevention on 11/2/2023 as well as started on amiodarone loading.      He was admitted to [facility] 11/6-11/9/23 with syncope and found to have acute GI bleed requiring 2 units PRBC. ASA and Plavix were temporarily held but resumed following EGD with cautery and clip to a bleeding vessel.      He was admitted to [facility] 12/5-12/7/2023 where Dr. Gooden placed a 26 mm Chen Benedicto S3 UR valve on 12/5/2023. He was discharged home on post op day 2. Lisinopril was restarted post op for BP management. He reached out to my office on 12/26/23 to report a dry cough and progressive 10 pound weight gain. Lisinopril was discontinued and replaced with losartan 25 mg daily. Furosemide was temporarily increased to 80 mg daily from 40 mg daily. He followed up with EP on 12/29/2023 at which time his amiodarone was reduced from 200 mg daily to 100 mg daily. He was instructed to continue 80 mg of furosemide for 2 days then go to 60 mg daily.     Echocardiogram 1/4/2024 showed LVEF 60% with normal functioning AVR and mild TR with PASP 37mmHg. He met with Dr. Gooden that same day and was released to start cardiac rehab. He  continued to c/o shortness of breath and bloating. Metolazone 5 mg daily for 3 days was added. Chest xray showed no acute findings.      Due to persistent complaints he was transitioned from furosemide to torsemide 40 mg daily, however, repeat blood work showed ANNALEE with creatinine of 2. Torsemide was reduced to 10 mg daily.     He met with EP on 5/7/2024. He was weaned off amiodarone. He followed up with me on 6/5/2024 at which time his device interrogations showed no high rate episodes. He c/o fatigue, frequent nighttime awakening and day time naps, bloating, poor recall. He does have documented JUSTO and hypoxia during sleep however has been adamant that he will not tolerate a CPAP.     3/20/2025: Mikael presents for routine follow up. He completed an echo in January which showed LVEF 70% with normal AVR function. He met with EP in Feb at which time his ICD settings were adjusted for rate control to help with fatigue. No high rate episodes noted. He continues on metoprolol succinate 25 mg BID. He denies any cardiac complaints today. He is struggling with poor vision due to macular degeneration. He also has neuropathy. He has been following with podiatry for a non-healing wound requiring no weight bearing. He struggles with poor balance, short term memory loss. This all frustrates him greatly. Lipid panel 1/27/25 showed TG in the 500's and . He admits he was out of his Praluent but does have it now.    Medical Problems       Problem List       MDD (major depressive disorder)    Peripheral neuropathy    Other emphysema (HCC)    Ulcer of left foot (HCC)    Status post foot surgery    Acute hematogenous osteomyelitis of left foot (HCC)    Status post amputation of great toe, left (HCC)    Severe aortic stenosis    Mixed hyperlipidemia    Statin intolerance    Status post insertion of drug-eluting stent into left anterior descending (LAD) artery    Ventricular tachycardia (HCC)    s/p Medtronic dual chamber ICD  "2023    S/P TAVR (transcatheter aortic valve replacement)    Prediabetes    Hypothyroid    Stage 3a chronic kidney disease (HCC)    Heart failure with preserved ejection fraction    Benign essential HTN    PVCs (premature ventricular contractions)    Coronary artery disease involving native coronary artery of native heart without angina pectoris    Open wound of left foot, initial encounter    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, left (HCC)    Ulcer of left foot with fat layer exposed ([facility])    Peripheral polyneuropathy    History of GI bleed    Acute on chronic combined systolic and diastolic congestive heart failure ([facility])    Diabetes due to underlying condition w oth circulatory comp ([facility])    Primary osteoarthritis of right shoulder    Chronic right shoulder pain        Past Medical History:   Diagnosis Date    Anxiety     Aortic stenosis     Borderline high cholesterol     CHF (congestive heart failure) ([facility]) 2024    Asaf Jackson-cardio-    Colon polyp     Depression     Heart murmur     Hypertension     Hypothyroidism     Prediabetes     PVCs (premature ventricular contractions)     \"skip\"    s/p Medtronic dual chamber ICD 2023    Wears glasses     readers     Social History     Socioeconomic History    Marital status: /Civil Union     Spouse name: Not on file    Number of children: Not on file    Years of education: Not on file    Highest education level: Not on file   Occupational History    Not on file   Tobacco Use    Smoking status: Former     Current packs/day: 0.00     Average packs/day: 0.5 packs/day for 51.2 years (25.6 ttl pk-yrs)     Types: Cigarettes     Start date: 11/3/1973     Quit date: 2025     Years since quittin.2    Smokeless tobacco: Never   Vaping Use    Vaping status: Never Used   Substance and Sexual Activity    Alcohol use: Yes     Alcohol/week: 5.0 standard drinks of alcohol     Types: 3 Glasses of wine, 2 Cans of beer per " week     Comment: Occasionally    Drug use: Not Currently     Comment: sporadic in young adulthood    Sexual activity: Not on file     Comment: defer   Other Topics Concern    Not on file   Social History Narrative    Not on file     Social Drivers of Health     Financial Resource Strain: Medium Risk (12/13/2023)    Received from [facility], [facility]    Overall Financial Resource Strain (CARDIA)     Difficulty of Paying Living Expenses: Somewhat hard   Food Insecurity: No Food Insecurity (12/13/2023)    Received from [facility], [facility]    Hunger Vital Sign     Worried About Running Out of Food in the Last Year: Never true     Ran Out of Food in the Last Year: Never true   Transportation Needs: No Transportation Needs (12/13/2023)    Received from [facility], [facility]    PRAPARE - Transportation     Lack of Transportation (Medical): No     Lack of Transportation (Non-Medical): No   Physical Activity: Inactive (12/13/2023)    Received from [facility]    Exercise Vital Sign     Days of Exercise per Week: 0 days     Minutes of Exercise per Session: 0 min   Stress: Stress Concern Present (12/13/2023)    Received from [facility], [facility]    [employer] - Occupational Stress Questionnaire     Feeling of Stress : Rather much   Social Connections: Unknown (6/18/2024)    Received from [facility]    Social Training Intelligence     How often do you feel lonely or isolated from those around you? (Adult - for ages 18 years and over): Not on file   Intimate Partner Violence: Not At Risk (12/13/2023)    Received from [facility], [facility]    Humiliation, Afraid, Rape, and Kick questionnaire     Fear of Current or Ex-Partner: No     Emotionally Abused: No     Physically Abused: No     Sexually Abused: No    Housing Stability: Low Risk  (11/7/2023)    Housing Stability Vital Sign     Unable to Pay for Housing in the Last Year: No     Number of Times Moved in the Last Year: 1     Homeless in the Last Year: No      Family History   Problem Relation Age of Onset    Dementia Mother     Blindness Mother     Cancer Mother     Dementia Father      Past Surgical History:   Procedure Laterality Date    APPENDECTOMY      BACK SURGERY      CARDIAC CATHETERIZATION N/A 10/17/2023    Procedure: Cardiac RHC/LHC;  Surgeon: Phillip Pfeiffer MD;  [facility]: [facility];  Service: Cardiology    CARDIAC CATHETERIZATION N/A 12/5/2023    Procedure: Cardiac tavr;  Surgeon: Malcolm Humphrey DO;  Location: [facility];  Service: Cardiology    CARDIAC ELECTROPHYSIOLOGY PROCEDURE N/A 11/2/2023    Procedure: Cardiac icd implant;  Surgeon: Jered Talbot MD;  Location: [facility];  Service: Cardiology    CATARACT EXTRACTION Bilateral     CLOSURE DELAYED PRIMARY Left 06/09/2023    Procedure: CLOSURE DELAYED PRIMARY;  Surgeon: Tuan Simms DPM;  Location: [facility];  Service: Podiatry    COLONOSCOPY  2023    [place]    ME CORRJ HLX VLGS BNCTY SESMDC RESCJ PROX PHLX BASE Left 04/19/2023    Procedure: BUNIONECTOMY PERDOMO and wound debridement;  Surgeon: Clover Mott DPM;  Location: [facility];  Service: Podiatry    ME REPLACE AORTIC VALVE OPENFEMORAL ARTERY APPROACH N/A 12/5/2023    Procedure: REPLACEMENT AORTIC VALVE TRANSCATHETER (TAVR) TRANSFEMORAL W/ 26MM SHERMAN ADRIANNA S3 UR VALVE(ACCESS ON LEFT) MAKEDA;  Surgeon: Gerald Gooden MD;  Location: [facility];  Service: Cardiac Surgery    TOE AMPUTATION Left 06/07/2023    Procedure: PARTIAL 1ST RAY AMPUTATION;  Surgeon: Tuan Simms DPM;  Location: [facility];  Service: Podiatry    TONSILLECTOMY      TOTAL SHOULDER REPLACEMENT Left     WISDOM TOOTH EXTRACTION         Current Outpatient Medications:     Alirocumab (Praluent) 150 MG/ML SOAJ, Inject  150mg under the skin once every 14 days, Disp: 2 mL, Rfl: 2    amLODIPine (NORVASC) 5 mg tablet, Take 1 tablet (5 mg total) by mouth daily, Disp: 30 tablet, Rfl: 11    aspirin 81 mg chewable tablet, Chew 1 tablet (81 mg total) daily, Disp: 30 tablet, Rfl: 0    buPROPion (WELLBUTRIN XL) 300 mg 24 hr tablet, , Disp: , Rfl:     Cholecalciferol 50 MCG (2000 UT) CAPS, Take 1 capsule by mouth daily, Disp: , Rfl:     escitalopram (LEXAPRO) 20 mg tablet, Take 20 mg by mouth daily, Disp: , Rfl:     fenofibrate (TRICOR) 145 mg tablet, Take 145 mg by mouth daily, Disp: , Rfl:     ferrous sulfate 325 (65 FE) MG EC tablet, Take 325 mg by mouth daily, Disp: , Rfl:     gabapentin (NEURONTIN) 100 mg capsule, Take 200 mg by mouth daily at bedtime, Disp: , Rfl:     levothyroxine 200 mcg tablet, Take 200 mcg by mouth daily, Disp: , Rfl:     losartan (COZAAR) 25 mg tablet, TAKE ONE TABLET BY MOUTH EVERY DAY, Disp: 30 tablet, Rfl: 5    Magnesium Oxide -Mg Supplement (Magnesium Extra Strength) 400 MG CAPS, TAKE ONE CAPSULE BY MOUTH IN THE MORNING, Disp: 90 capsule, Rfl: 3    metoprolol succinate (TOPROL-XL) 25 mg 24 hr tablet, Take 1 tablet (25 mg total) by mouth 2 (two) times a day, Disp: 180 tablet, Rfl: 3    Omega-3 Fatty Acids (Fish Oil) 1200 MG CAPS, Take by mouth, Disp: , Rfl:     torsemide (DEMADEX) 20 mg tablet, TAKE TWO TABLETS BY MOUTH DAILY, Disp: 60 tablet, Rfl: 5  Allergies   Allergen Reactions    Lisinopril Cough    Statins Myalgia     Other reaction(s): muscle aches and joint aches         Labs:     Chemistry        Component Value Date/Time    K 4.2 01/27/2025 0948     01/27/2025 0948    CO2 29 01/27/2025 0948    BUN 23 01/27/2025 0948    CREATININE 1.23 01/27/2025 0948        Component Value Date/Time    CALCIUM 9.7 01/27/2025 0948    ALKPHOS 84 01/27/2025 0948    AST 21 01/27/2025 0948    AST 19 05/28/2019 1720    ALT 16 01/27/2025 0948    ALT 15 05/28/2019 1720        Lipid panel 7/23/2024: C 140. T 314. H 49. L  28.   Lipid panel 1/27/2025: C 153. T 565. H 48. L 115.     Cardiac testing:  ECG 2/7/2025: NSR with ventricular rate 61 bpm.     Echo complete 1/5/2024    Left Ventricle: Left ventricular cavity size is normal. Wall thickness is mildly increased. There is mild concentric hypertrophy. The left ventricular ejection fraction is 60%. Systolic function is normal. Wall motion is normal. Diastolic function is normal.   Aortic Valve: There is an Chen ADRIANNA 3 Ultra 26 mm TAVR bioprosthetic valve. The prosthetic valve appears well-seated and appears to be functioning normally. There is no evidence of paravalvular regurgitation. There is no evidence of transvalvular regurgitation. The gradient recorded across the prosthetic aortic valve is within the expected range. The aortic valve peak velocity is 1.69 m/s. The aortic valve area is 2.19 cm2.   Tricuspid Valve: There is mild regurgitation. The right ventricular systolic pressure is mildly elevated. The estimated right ventricular systolic pressure is 37.00 mmHg.      Cardiac cath 10/17/2023:    Mid RCA lesion is 40% stenosed.    Prox LAD to Mid LAD lesion is 80% stenosed.     Echo 9/8/2023:    Left Ventricle: Left ventricular cavity size is normal. Wall thickness is mildly increased. The left ventricular ejection fraction is 60% by visual estimation.. Systolic function is normal. Wall motion is normal. Diastolic function is mildly abnormal, consistent with grade I (abnormal) relaxation.    Left Atrium: The atrium is dilated.    Aortic Valve: The leaflets are moderately calcified. There is severe stenosis. The aortic valve peak velocity is 4.2 m/s. The aortic valve mean gradient is 41 mmHg. The dimensionless velocity index is 0.24. The aortic valve area is 1.00 cm2.    AS now appears severe compared to previous study from 1/2023     48 hour Holter monitor 7/18/2023:  The patient had predominantly sinus rhythm.   Heart rate varied from 49 bpm to 112 bpm.  The patient’s  average heart rate was 75 bpm.   The patient had a holter monitor tracing done for 47 hours and 59 minutes.  The patient had 81057 ventricular ectopic beats accounting for 5.5% of total beats.   The patient had 35 supraventricular ectopic beats.  The longest R/R interval was 1.5 seconds.  Ventricular trigeminy and bigeminy was noted  PVCs in the pattern of triplets and couplets noted.   Patient noted feeling tired associated with NSR.      Lexiscan nuclear stress test 7/18/2023:    Stress Function: Left ventricular function post-stress is normal. Stress ejection fraction is 59 %.  Normal regional wall motion noted.    Perfusion: There are no perfusion defects.    Stress Combined Conclusion: The ECG and SPECT imaging portions of the stress study are concordant with no evidence of stress induced myocardial ischemia. Left ventricular perfusion is normal.  Post-rest ejection fraction is determined as 59%.    Stress ECG: No ST deviation is noted. There were no arrhythmias during recovery. . The ECG was negative for ischemia.    No anginal-like symptoms were reported during the stress test.    Normal resting blood pressure.  Appropriate blood pressure response was noted during the stress test.     24 hour Holter monitor 3/15/2023:  Predominantly sinus rhythm, HR , avg 90 bpm.  3.7% PVC burden. Rare PAC's. One asymptomatic 4 beat atrial run.      ECG 2/10/2023: Normal sinus rhythm.     MAKEDA 2/3/2023:  EF 60%. LA mildly dilated.   Mod AS. Trace MR. Trace TR.     Echocardiogram 1/30/2023:  EF 60%, mild LVH. Grade 1 DD.  Mod AS, PV 3.52 m/s, MG 30 mmHg, DI 0.30. Mild AI.  MAC with trace MR. Trace TR. PASP 41 mmHg.  Aortic root 3.8 cm. Ascending aorta 3.3. cm.     ECG 1/30/2023: Sinus rhythm with frequent PVC's. Nonspecific ST abnormality.    Review of Systems   Constitutional: Positive for malaise/fatigue.   HENT: Negative.     Eyes:  Positive for visual disturbance.   Cardiovascular:  Negative for chest pain, dyspnea on  exertion, irregular heartbeat, leg swelling, near-syncope, orthopnea and palpitations.   Respiratory:  Negative for cough, shortness of breath and snoring.    Endocrine: Negative.    Skin: Negative.    Musculoskeletal: Negative.    Gastrointestinal: Negative.    Genitourinary: Negative.    Neurological:  Positive for disturbances in coordination, loss of balance and numbness. Negative for light-headedness.   Psychiatric/Behavioral: Negative.         Vitals:    03/20/25 0829   BP: 136/76   Pulse: 84   Temp: (!) 97.2 °F (36.2 °C)   SpO2: 96%     Vitals:    03/20/25 0829   Weight: 103 kg (227 lb)     Height: 6' (182.9 cm)   Body mass index is 30.79 kg/m².    Physical Exam  Vitals and nursing note reviewed.   Constitutional:       General: He is not in acute distress.     Appearance: He is well-developed. He is not diaphoretic.   HENT:      Head: Normocephalic and atraumatic.   Neck:      Vascular: No carotid bruit or JVD.   Cardiovascular:      Rate and Rhythm: Normal rate and regular rhythm.      Pulses: Intact distal pulses.      Heart sounds: S1 normal and S2 normal. Murmur heard.      No friction rub. No gallop.      Comments: No edema  Pulmonary:      Effort: Pulmonary effort is normal. No respiratory distress.      Breath sounds: Normal breath sounds.   Abdominal:      General: There is no distension.      Palpations: Abdomen is soft.      Tenderness: There is no abdominal tenderness.   Skin:     General: Skin is warm and dry.      Findings: No rash.   Neurological:      Mental Status: He is alert and oriented to person, place, and time.   Psychiatric:         Behavior: Behavior normal.

## 2025-03-20 NOTE — ASSESSMENT & PLAN NOTE
Goal LDL < 55. Intolerant of statins.  Prescribed Praluent 150 mg every 14 days and Tricor 145 mg daily.  Lipid panel 1/2025 with TG in the 500's and . He was without medication temporarily.  Recheck lipid panel now and will further adjust regimen as needed.

## 2025-03-20 NOTE — ASSESSMENT & PLAN NOTE
Lab Results   Component Value Date    EGFR 63 01/27/2025    EGFR 56 01/04/2025    EGFR 65 09/04/2024    CREATININE 1.23 01/27/2025    CREATININE 1.27 01/04/2025    CREATININE 1.13 09/04/2024     Improving.  Will monitor. Avoid hypotension

## 2025-03-20 NOTE — ASSESSMENT & PLAN NOTE
Blood pressure is at goal.  Continue amlodipine 5 mg daily, losartan 25 mg daily, torsemide 40 mg daily, and metoprolol succinate 25 mg BID.  Recent lab work reviewed.

## 2025-03-20 NOTE — ASSESSMENT & PLAN NOTE
History of aortic stenosis which was reported as moderate on echo 11/2021 at [facility].  Echocardiogram 1/30/2023 continues to show moderate aortic stenosis with PV 3.52, mean gradient 30 mmHg, DI 0.30.  Repeat echo 9/2023 showed progression to severe AS with PV 4.2 m/s, MG 41mmHg, DVI 0.24.  Now s/p TAVR by Dr. Gooden at [facility] 12/5/2023.  1/2025 shows normal AVR function.  Completed cardiac rehab.  He does report significant improvement in activity tolerance since valve replacement.

## 2025-03-20 NOTE — ASSESSMENT & PLAN NOTE
VT discovered while at cardiac rehab.  He has been evaluated by [facility] EP and underwent AICD insertion 11/2/2023 and started on amiodarone.  Preserved LVEF on serial echocardiograms.  Amio weaned off as of May 2024.  Now on metoprolol succinate 25 mg BID with no VT episodes on most recent device check.

## 2025-03-20 NOTE — ASSESSMENT & PLAN NOTE
Echocardiogram 1/2025 with EF 70% with normal AVR function, mild TR with normal PASP  Euvolemic on torsemide 40 mg daily.  Will continue with 2 g sodium diet, daily weights.  Reviewed s/s to report.

## 2025-03-28 ENCOUNTER — TELEPHONE (OUTPATIENT)
Age: 70
End: 2025-03-28

## 2025-03-28 DIAGNOSIS — R06.02 SHORTNESS OF BREATH: ICD-10-CM

## 2025-03-28 RX ORDER — TORSEMIDE 20 MG/1
20 TABLET ORAL DAILY
Start: 2025-03-28

## 2025-03-28 NOTE — TELEPHONE ENCOUNTER
Received call from pt's spouse regarding torsemide.  Pt has actually only been taking 20 mg.  She wants to confirm if he should continue with that, or if he should be taking 40 mg as noted in chart.  Please advise.

## 2025-03-28 NOTE — TELEPHONE ENCOUNTER
Please thank Samanta for spotting this. He was looking good at his recent visit so ok to stay on the 20 mg torsemide if that is what he has been taking. I will change it in his med list.  Thank you!!

## 2025-05-02 ENCOUNTER — OFFICE VISIT (OUTPATIENT)
Dept: PODIATRY | Age: 70
End: 2025-05-02
Payer: MEDICARE

## 2025-05-02 VITALS — HEIGHT: 72 IN | BODY MASS INDEX: 30.2 KG/M2 | WEIGHT: 223 LBS

## 2025-05-02 DIAGNOSIS — L84 CALLUS: ICD-10-CM

## 2025-05-02 DIAGNOSIS — G62.9 PERIPHERAL POLYNEUROPATHY: ICD-10-CM

## 2025-05-02 DIAGNOSIS — I49.3 PVCS (PREMATURE VENTRICULAR CONTRACTIONS): ICD-10-CM

## 2025-05-02 DIAGNOSIS — B35.1 ONYCHOMYCOSIS: Primary | ICD-10-CM

## 2025-05-02 DIAGNOSIS — Z89.412 STATUS POST AMPUTATION OF GREAT TOE, LEFT (HCC): ICD-10-CM

## 2025-05-02 PROCEDURE — 11056 PARNG/CUTG B9 HYPRKR LES 2-4: CPT | Performed by: STUDENT IN AN ORGANIZED HEALTH CARE EDUCATION/TRAINING PROGRAM

## 2025-05-02 PROCEDURE — 11721 DEBRIDE NAIL 6 OR MORE: CPT | Performed by: STUDENT IN AN ORGANIZED HEALTH CARE EDUCATION/TRAINING PROGRAM

## 2025-05-02 PROCEDURE — RECHECK: Performed by: STUDENT IN AN ORGANIZED HEALTH CARE EDUCATION/TRAINING PROGRAM

## 2025-05-02 NOTE — PATIENT INSTRUCTIONS
Always wear your custom inserts in every shoe at all times with weight bearing.    Apply amlactin or cream with urea to calluses daily

## 2025-05-02 NOTE — PROGRESS NOTES
Mikael Saavedra  1955  AT RISK FOOT CARE    1. Onychomycosis        2. Callus        3. Peripheral polyneuropathy        4. Status post amputation of great toe, left (HCC)            Patient presents for at-risk foot care.  Patient has no acute concerns today.  Patient has significant lower extremity risk due to previous amputation due to neuropathic ulceration and infection.    C/w custom inserts with mod (L1 filler, plantar offloading (submet 1 cutout and met pad)). Notes no pain when wearing these. Sometimes wears work boots without the inserts and has pain left submet 2.  JACK 6/7/23: RLE 1.23/162/117. LLE 1.22/156/129.     On exam patient has thickened, hypertrophic, discolored, brittle toenails with subungual debris and tenderness x9   Callus: 2 (L submet 1,2)  Amputation: L partial 1st ray    Today's treatment includes:  Debridement of toenails. Using nail nipper, nicolás, and curette, nails were sharply debrided, reduced in thickness and length. Devitalized nail tissue and fungal debris excised and removed. Patient tolerated well.    Calluses x2 pared in thickness to more normal epithelium w/ #15 blade. Amlactin or urea cream  daily.     Always wear custom inserts. This will prevent ulceration.     Discussed proper shoe gear, daily inspections of feet, and general foot health with patient. Patient has Q7 findings and is recommended for at risk foot care every 9-10 weeks.    Patients most recent complete clinical exam was performed: 5/2/25

## 2025-05-08 ENCOUNTER — OFFICE VISIT (OUTPATIENT)
Dept: OBGYN CLINIC | Facility: CLINIC | Age: 70
End: 2025-05-08
Payer: MEDICARE

## 2025-05-08 VITALS — WEIGHT: 223 LBS | BODY MASS INDEX: 30.2 KG/M2 | HEIGHT: 72 IN

## 2025-05-08 DIAGNOSIS — M19.011 PRIMARY OSTEOARTHRITIS OF RIGHT SHOULDER: Primary | ICD-10-CM

## 2025-05-08 PROCEDURE — 99213 OFFICE O/P EST LOW 20 MIN: CPT | Performed by: ORTHOPAEDIC SURGERY

## 2025-05-08 PROCEDURE — 20610 DRAIN/INJ JOINT/BURSA W/O US: CPT | Performed by: ORTHOPAEDIC SURGERY

## 2025-05-08 RX ORDER — BUPIVACAINE HYDROCHLORIDE 2.5 MG/ML
4 INJECTION, SOLUTION INFILTRATION; PERINEURAL
Status: COMPLETED | OUTPATIENT
Start: 2025-05-08 | End: 2025-05-08

## 2025-05-08 RX ORDER — TRIAMCINOLONE ACETONIDE 40 MG/ML
40 INJECTION, SUSPENSION INTRA-ARTICULAR; INTRAMUSCULAR
Status: COMPLETED | OUTPATIENT
Start: 2025-05-08 | End: 2025-05-08

## 2025-05-08 RX ADMIN — BUPIVACAINE HYDROCHLORIDE 4 ML: 2.5 INJECTION, SOLUTION INFILTRATION; PERINEURAL at 09:30

## 2025-05-08 RX ADMIN — TRIAMCINOLONE ACETONIDE 40 MG: 40 INJECTION, SUSPENSION INTRA-ARTICULAR; INTRAMUSCULAR at 09:30

## 2025-05-08 NOTE — ASSESSMENT & PLAN NOTE
Reviewed physical exam with the patient at the time of visit. He was offered and accepted an injection(s) of kenalog and marcaine to his Right shoulder(s) for symptomatic relief of pain and inflammation. Patient tolerated the treatment(s) well. Ice and post injection protocol advised. Weightbearing activities as tolerated. He will be seen for follow-up on an as needed for re-evaluation. Suggested that he see Dr. Huff if he is considering shoulder replacement surgery. Patient expresses understanding and is in agreement with this treatment plan. The patient was given the opportunity to ask questions or present concerns.      Orders:    Large joint arthrocentesis: R subacromial bursa

## 2025-05-08 NOTE — PROGRESS NOTES
Assessment & Plan  Primary osteoarthritis of right shoulder  Reviewed physical exam with the patient at the time of visit. He was offered and accepted an injection(s) of kenalog and marcaine to his Right shoulder(s) for symptomatic relief of pain and inflammation. Patient tolerated the treatment(s) well. Ice and post injection protocol advised. Weightbearing activities as tolerated. He will be seen for follow-up  on an as needed  for re-evaluation. Suggested that he see Dr. Huff if he is considering shoulder replacement surgery. Patient expresses understanding and is in agreement with this treatment plan. The patient was given the opportunity to ask questions or present concerns.      Orders:    Large joint arthrocentesis: R subacromial bursa      Return if symptoms worsen or fail to improve.      _____________________________________________________  CHIEF COMPLAINT:  Chief Complaint   Patient presents with    Follow-up         SUBJECTIVE:  Mikael Saavedra is a 69 y.o. year old male who presents for follow up of right shoulder pain. He was last seen on 3/19/2025 at which time he complained of consistent shoulder pain for the last 2 years. He had had injection in the past that only offered mild relief of symptoms. Today, he presented with no improvement in symptoms. He has a trip planned and would like a cortisone injection. He states he would not be ready to consider a shoulder replacement until the fall or winter.       PAST MEDICAL HISTORY:  Past Medical History:   Diagnosis Date    Anxiety     Aortic stenosis     Aortic valve disease     Arthritis     Borderline high cholesterol     Callus     CHF (congestive heart failure) (HCC) 01/04/2024    Asaf Jackson-cardio-    Colon polyp     Coronary artery disease     Depression     Disease of thyroid gland     Foot ulcer (HCC)     Head injury     Heart disease     Heart murmur     HL (hearing loss)     Hypertension     Hypothyroidism     Prediabetes     PVCs  "(premature ventricular contractions)     \"skip\"    s/p Medtronic dual chamber ICD 11/2/2023 11/04/2023    Sleep apnea     Visual impairment     Wears glasses     readers       PAST SURGICAL HISTORY:  Past Surgical History:   Procedure Laterality Date    AMPUTATION      AORTIC VALVE REPLACEMENT      APPENDECTOMY      BACK SURGERY      CARDIAC CATHETERIZATION N/A 10/17/2023    Procedure: Cardiac RHC/LHC;  Surgeon: Phillip Pfeiffer MD;  [facility];  Service: Cardiology    CARDIAC CATHETERIZATION N/A 12/05/2023    Procedure: Cardiac tavr;  Surgeon: Malcolm Humphrey DO;  Location: [facility];  Service: Cardiology    CARDIAC ELECTROPHYSIOLOGY PROCEDURE N/A 11/02/2023    Procedure: Cardiac icd implant;  Surgeon: Jered Talbot MD;  [facility]: [facility];  Service: Cardiology    CARDIAC VALVE REPLACEMENT      CATARACT EXTRACTION Bilateral     CLOSURE DELAYED PRIMARY Left 06/09/2023    Procedure: CLOSURE DELAYED PRIMARY;  Surgeon: Tuan Simms DPM;  Location: [facility];  Service: Podiatry    COLONOSCOPY  2023    NATHALIE Guerra    FOOT SURGERY      INSERT / REPLACE / REMOVE PACEMAKER      JOINT REPLACEMENT      SD CORRJ HLX VLGS BNCTY SESMDC RESCJ PROX PHLX BASE Left 04/19/2023    Procedure: BUNIONECTOMY PERDOMO and wound debridement;  Surgeon: Clover Mott DPM;  Location: [facility];  Service: Podiatry    SD REPLACE AORTIC VALVE OPENFEMORAL ARTERY APPROACH N/A 12/05/2023    Procedure: REPLACEMENT AORTIC VALVE TRANSCATHETER (TAVR) TRANSFEMORAL W/ 26MM SHERMAN ADRIANNA S3 UR VALVE(ACCESS ON LEFT) MAKEDA;  Surgeon: Gerald Gooden MD;  Location: [facility];  Service: Cardiac Surgery    SHOULDER SURGERY      SPINE SURGERY      TOE AMPUTATION Left 06/07/2023    Procedure: PARTIAL 1ST RAY AMPUTATION;  Surgeon: Tuan Simms DPM;  Location[facility];  Service: Podiatry    TONSILLECTOMY      TOTAL SHOULDER REPLACEMENT Left     WISDOM TOOTH EXTRACTION         FAMILY HISTORY:  Family " History   Problem Relation Age of Onset    Dementia Mother     Blindness Mother     Cancer Mother     Dementia Father     Cancer Father         Lung       SOCIAL HISTORY:  Social History     Tobacco Use    Smoking status: Former     Current packs/day: 0.00     Average packs/day: 0.5 packs/day for 51.2 years (25.6 ttl pk-yrs)     Types: Cigarettes     Start date: 11/3/1973     Quit date: 2025     Years since quittin.3    Smokeless tobacco: Never    Tobacco comments:     I quit and start, quit and start....   Vaping Use    Vaping status: Never Used   Substance Use Topics    Alcohol use: Yes     Alcohol/week: 5.0 standard drinks of alcohol     Types: 3 Glasses of wine, 2 Cans of beer per week     Comment: Occasionally    Drug use: Not Currently     Comment: sporadic in young adulthood       MEDICATIONS:    Current Outpatient Medications:     Alirocumab (Praluent) 150 MG/ML SOAJ, Inject 150mg under the skin once every 14 days, Disp: 2 mL, Rfl: 2    amLODIPine (NORVASC) 5 mg tablet, Take 1 tablet (5 mg total) by mouth daily, Disp: 30 tablet, Rfl: 11    aspirin 81 mg chewable tablet, Chew 1 tablet (81 mg total) daily, Disp: 30 tablet, Rfl: 0    buPROPion (WELLBUTRIN XL) 300 mg 24 hr tablet, , Disp: , Rfl:     Cholecalciferol 50 MCG (2000 UT) CAPS, Take 1 capsule by mouth daily, Disp: , Rfl:     escitalopram (LEXAPRO) 20 mg tablet, Take 20 mg by mouth daily, Disp: , Rfl:     fenofibrate (TRICOR) 145 mg tablet, Take 145 mg by mouth daily, Disp: , Rfl:     ferrous sulfate 325 (65 FE) MG EC tablet, Take 325 mg by mouth daily, Disp: , Rfl:     gabapentin (NEURONTIN) 100 mg capsule, Take 200 mg by mouth daily at bedtime, Disp: , Rfl:     levothyroxine 200 mcg tablet, Take 200 mcg by mouth daily, Disp: , Rfl:     losartan (COZAAR) 25 mg tablet, TAKE ONE TABLET BY MOUTH EVERY DAY, Disp: 30 tablet, Rfl: 5    Magnesium Oxide -Mg Supplement 400 MG CAPS, TAKE ONE CAPSULE BY MOUTH IN THE MORNING, Disp: 90 capsule, Rfl: 1     metoprolol succinate (TOPROL-XL) 25 mg 24 hr tablet, Take 1 tablet (25 mg total) by mouth 2 (two) times a day, Disp: 180 tablet, Rfl: 3    Omega-3 Fatty Acids (Fish Oil) 1200 MG CAPS, Take by mouth, Disp: , Rfl:     torsemide (DEMADEX) 20 mg tablet, Take 1 tablet (20 mg total) by mouth daily, Disp: , Rfl:     ALLERGIES:  Allergies   Allergen Reactions    Lisinopril Cough    Statins Myalgia     Other reaction(s): muscle aches and joint aches         REVIEW OF SYSTEMS:  Pertinent items are noted in HPI.  A comprehensive review of systems was negative.      _____________________________________________________  PHYSICAL EXAMINATION:  General: well developed and well nourished, alert, oriented times 3, and appears comfortable  Psychiatric: Normal  HEENT:  Normocephalic, atraumatic  Cardiovascular:  Regular  Pulmonary: No wheezing or stridor  Skin: No masses, erthema, lacerations, fluctation, ulcerations  Neurovascular: Sensory and motor grossly intact     MUSCULOSKELETAL EXAMINATION:    right Shoulder -   No anatomical deformity  Skin is warm and dry to touch with no signs of erythema, ecchymosis, or infection  No soft tissue swelling or effusion noted  Mild Palpable crepitus with passive motion  ROM FF 95°, °  Strength: FF 4+/5, ABD 4+/5, ER 4+/5  NO glenohumeral instability appreciated on exam  Demonstrates normal elbow, wrist, and finger motion  2+  distal radial pulse with brisk capillary refill to the fingers  Radial, median, ulnar and motor  and sensory distribution intact  Sensation to light touch intact distally      _____________________________________________________  STUDIES REVIEWED:  No Studies to review      PROCEDURES PERFORMED:  Large joint arthrocentesis: R subacromial bursa    Performed by: Ventura Pike DO  Authorized by: Ventura Pike DO    Universal Protocol:  procedure performed by consultantConsent: Verbal consent obtained.  Risks and benefits: risks, benefits and alternatives were  discussed  Consent given by: patient  Timeout called at: 5/8/2025 9:22 AM.  Patient understanding: patient states understanding of the procedure being performed  Patient consent: the patient's understanding of the procedure matches consent given  Site marked: the operative site was marked  Radiology Images displayed and confirmed. If images not available, report reviewed: imaging studies available  Patient identity confirmed: verbally with patient  Supporting Documentation  Indications: pain and joint swelling     Is this a Visco injection? NoProcedure Details  Location: shoulder - R subacromial bursa  Needle size: 22 G  Ultrasound guidance: no  Approach: posterolateral  Medications administered: 4 mL bupivacaine 0.25 %; 40 mg triamcinolone acetonide 40 mg/mL    Patient tolerance: patient tolerated the procedure well with no immediate complications  Dressing:  Sterile dressing applied        Scribe Attestation      I,:  Meagan Gonzalez am acting as a scribe while in the presence of the attending physician.:       I,:  Ventura Pike DO personally performed the services described in this documentation    as scribed in my presence.:

## 2025-05-09 ENCOUNTER — REMOTE DEVICE CLINIC VISIT (OUTPATIENT)
Dept: CARDIOLOGY CLINIC | Facility: CLINIC | Age: 70
End: 2025-05-09
Payer: MEDICARE

## 2025-05-09 DIAGNOSIS — Z95.810 AICD (AUTOMATIC CARDIOVERTER/DEFIBRILLATOR) PRESENT: Primary | ICD-10-CM

## 2025-05-09 PROCEDURE — 93296 REM INTERROG EVL PM/IDS: CPT | Performed by: INTERNAL MEDICINE

## 2025-05-09 PROCEDURE — 93295 DEV INTERROG REMOTE 1/2/MLT: CPT | Performed by: INTERNAL MEDICINE

## 2025-05-09 NOTE — PROGRESS NOTES
"Results for orders placed or performed in visit on 05/09/25   Cardiac EP device report    Narrative    MDT DC ICD (MVP ON) - ACTIVE SYSTEM IS MRI CONDITIONAL  CARELINK TRANSMISSION: PRESENTING EGRAM AP VS@ 60 BPM. BATTERY STATUS \"10 YRS.\" AP 77%  0%. ALL AVAILABLE LEAD PARAMETERS WITHIN NORMAL LIMITS. NO SIGNIFICANT HIGH RATE EPISODES. OPTI-VOL WITHIN NORMAL LIMITS. NORMAL DEVICE FUNCTION. NC         "

## 2025-05-10 ENCOUNTER — RESULTS FOLLOW-UP (OUTPATIENT)
Dept: CARDIOLOGY CLINIC | Facility: CLINIC | Age: 70
End: 2025-05-10

## 2025-06-05 NOTE — ASSESSMENT & PLAN NOTE
History of aortic stenosis. Being evaluated for TAVR however it was delayed due to recent admission for CAD s/p stent and followed by episode of ventricular tachycardia. Further follow-up outpatient with cardiology  Syncopal episode while at [facility] today. May be secondary to GI bleed.     Pacemaker interrogated in the ED  Hold on orthostatics given drop in Hgb New scripts sent to pharmacy.

## 2025-06-11 DIAGNOSIS — Z78.9 STATIN INTOLERANCE: ICD-10-CM

## 2025-06-11 DIAGNOSIS — E78.2 MIXED HYPERLIPIDEMIA: ICD-10-CM

## 2025-06-11 RX ORDER — ALIROCUMAB 150 MG/ML
INJECTION, SOLUTION SUBCUTANEOUS
Qty: 2 ML | Refills: 5 | Status: SHIPPED | OUTPATIENT
Start: 2025-06-11

## 2025-06-22 ENCOUNTER — NURSE TRIAGE (OUTPATIENT)
Dept: OTHER | Facility: OTHER | Age: 70
End: 2025-06-22

## 2025-06-22 NOTE — TELEPHONE ENCOUNTER
"REASON FOR CONVERSATION: Pacemaker Problem    SYMPTOMS: ICD making a beeping sound, no shock or symptoms     OTHER HEALTH INFORMATION: N/A    PROTOCOL DISPOSITION: Call PCP Now    CARE ADVICE PROVIDED: Per on call provider, patient to follow up with the office tomorrow for a device check, call back with shock or symptoms. Patient verbalized understanding.     PRACTICE FOLLOW-UP: Discussed with on call provider who advised to follow up in the office tomorrow for a device check       Reason for Disposition   [1] [place] or felt device ALARM (e.g., beeping or buzzing) more than once today  (Exception: Caused by a magnet being near the device) AND [2] feels unwell aftewards    Answer Assessment - Initial Assessment Questions  1. DESCRIPTION: \"Please describe the concern you have with your pacemaker or defibrillator\" (e.g.,  delivered a shock, palpitations, beeping heard)        Making a beeping sound all morning at random times     2. ONSET: \"When did this occur?\" (e.g., minutes, hours or days)        This morning     3. DURATION: \"How long did it last\" (e.g., seconds, minutes, hours)         Beeping for a few seconds     4. RECURRENT SYMPTOM: \"Have you ever had this before?\" If YES,  \"When was the last time?\" and \"What happened that time?\"         Did happen last fall but was a different tone, went to the office and did not show anything     5. CARDIAC HISTORY: \"What other heart problems do you have?\" (e.g., heart attack, angina, bypass surgery, stents, heart failure, rhythm problem)         TAVR   CAD h/o stents   HF   VTACH      6. OTHER SYMPTOMS: \"Do you have any other symptoms?\" (e.g., dizziness, chest pain, fever, sweating, difficulty breathing)        Denies LH/dizziness, feels tired, denies CP/SOB, denies palpitations, denies other symptoms    Protocols used: ICD and Pacemaker Symptoms and Questions-Adult-    "

## 2025-06-22 NOTE — TELEPHONE ENCOUNTER
"Regarding: pacemaker/defibrillator Tone  ----- Message from Kassandra WHITTINGTON sent at 6/22/2025  2:44 PM EDT -----  \"My pacemaker/defibrillator is giving off an odd tone--I've never heard before. It did a weird sound last fall but it sounded different from this and they couldn't find a reason. I otherwise feel ok, I don't have any symptoms\"    "

## 2025-06-23 ENCOUNTER — TELEPHONE (OUTPATIENT)
Dept: CARDIOLOGY CLINIC | Facility: CLINIC | Age: 70
End: 2025-06-23

## 2025-06-23 NOTE — TELEPHONE ENCOUNTER
Discussed with Tanya Davalos. She is contacting the patient. Will bring in for device check at [facility] today.

## 2025-06-23 NOTE — TELEPHONE ENCOUNTER
L/m for pt to see if he wants to come in @ 3pm to see why he's beeping. Spoke to pts wife as well and she will try to reach pt to come in~ch

## 2025-07-11 ENCOUNTER — PROCEDURE VISIT (OUTPATIENT)
Dept: PODIATRY | Age: 70
End: 2025-07-11
Payer: MEDICARE

## 2025-07-11 VITALS — HEIGHT: 72 IN | WEIGHT: 217 LBS | BODY MASS INDEX: 29.39 KG/M2

## 2025-07-11 DIAGNOSIS — B35.1 ONYCHOMYCOSIS: Primary | ICD-10-CM

## 2025-07-11 DIAGNOSIS — L84 CALLUS: ICD-10-CM

## 2025-07-11 DIAGNOSIS — G62.9 PERIPHERAL POLYNEUROPATHY: ICD-10-CM

## 2025-07-11 PROCEDURE — 11056 PARNG/CUTG B9 HYPRKR LES 2-4: CPT | Performed by: STUDENT IN AN ORGANIZED HEALTH CARE EDUCATION/TRAINING PROGRAM

## 2025-07-11 PROCEDURE — 11721 DEBRIDE NAIL 6 OR MORE: CPT | Performed by: STUDENT IN AN ORGANIZED HEALTH CARE EDUCATION/TRAINING PROGRAM

## 2025-07-11 RX ORDER — ANTIOX #8/OM3/DHA/EPA/LUT/ZEAX 250-2.5 MG
CAPSULE ORAL 2 TIMES DAILY
COMMUNITY

## 2025-07-11 NOTE — PROGRESS NOTES
Mikael Saavedra  1955  AT RISK FOOT CARE    1. Onychomycosis        2. Callus        3. Peripheral polyneuropathy              Patient presents for at-risk foot care.  Patient has no acute concerns today.  Patient has significant lower extremity risk due to previous amputation due to neuropathic ulceration and infection.    C/w custom inserts with mod (L1 filler, plantar offloading (submet 1 cutout and met pad)). Notes no pain when wearing these. Sometimes wears work boots without the inserts and has pain left submet 2.  JACK 6/7/23: RLE 1.23/162/117. LLE 1.22/156/129.     On exam patient has thickened, hypertrophic, discolored, brittle toenails with subungual debris and tenderness x9   Callus: 2 (L submet 1,2)  Amputation: L partial 1st ray    Today's treatment includes:  Debridement of toenails. Using nail nipper, nicolás, and curette, nails were sharply debrided, reduced in thickness and length. Devitalized nail tissue and fungal debris excised and removed. Patient tolerated well.    Calluses x2 pared in thickness to more normal epithelium w/ #15 blade. Amlactin or urea cream  daily.     Always wear custom inserts. This will prevent ulceration. Wear metatarsal pad if not wearing shoes w/ insert    Discussed proper shoe gear, daily inspections of feet, and general foot health with patient. Patient has Q7 findings and is recommended for at risk foot care every 9-10 weeks.    Patients most recent complete clinical exam was performed: 5/2/25

## 2025-08-11 ENCOUNTER — REMOTE DEVICE CLINIC VISIT (OUTPATIENT)
Dept: CARDIOLOGY CLINIC | Facility: CLINIC | Age: 70
End: 2025-08-11
Payer: MEDICARE

## (undated) DEVICE — STRETCH BANDAGE: Brand: CURITY

## (undated) DEVICE — GLOVE INDICATOR PI UNDERGLOVE SZ 7 BLUE

## (undated) DEVICE — PRESSURE GUIDEWIRE: Brand: COMET™ II

## (undated) DEVICE — CARDIO PERI-GROIN: Brand: CONVERTORS

## (undated) DEVICE — INTENDED FOR TISSUE SEPARATION, AND OTHER PROCEDURES THAT REQUIRE A SHARP SURGICAL BLADE TO PUNCTURE OR CUT.: Brand: BARD-PARKER ® SAFETYLOCK CARBON RIB-BACK BLADES

## (undated) DEVICE — BETHLEHEM UNIVERSAL  MIONR EXT: Brand: CARDINAL HEALTH

## (undated) DEVICE — GLOVE SRG BIOGEL ECLIPSE 7.5

## (undated) DEVICE — TUBING SUCTION 5MM X 12 FT

## (undated) DEVICE — CURITY NON-ADHERENT STRIPS: Brand: CURITY

## (undated) DEVICE — CURITY IDOFORM PACKING STRIP: Brand: CURITY

## (undated) DEVICE — GLOVE INDICATOR PI UNDERGLOVE SZ 8 BLUE

## (undated) DEVICE — RUNTHROUGH NS EXTRA FLOPPY PTCA GUIDEWIRE: Brand: RUNTHROUGH

## (undated) DEVICE — 3M™ COBAN™ NL STERILE NON-LATEX SELF-ADHERENT WRAP, 2084S, 4 IN X 5 YD (10 CM X 4,5 M), 18 ROLLS/CASE: Brand: 3M™ COBAN™

## (undated) DEVICE — STERILE POLYISOPRENE POWDER-FREE SURGICAL GLOVES WITH EMOLLIENT COATING: Brand: PROTEXIS

## (undated) DEVICE — PREMIUM DRY TRAY LF: Brand: MEDLINE INDUSTRIES, INC.

## (undated) DEVICE — Device

## (undated) DEVICE — CATH DIAG 5FR IMPULSE 100CM FR4

## (undated) DEVICE — SINGLE PORT MANIFOLD: Brand: NEPTUNE 2

## (undated) DEVICE — PENCIL ELECTROSURG E-Z CLEAN -0035H

## (undated) DEVICE — DGW .035 FC STR 260CM TEF: Brand: EMERALD

## (undated) DEVICE — NEEDLE BLUNT 18 G X 1 1/2IN

## (undated) DEVICE — STANDARD SURGICAL GOWN, L: Brand: CONVERTORS

## (undated) DEVICE — SYRINGE 10ML LL CONTROL TOP

## (undated) DEVICE — Device: Brand: ASAHI SILVERWAY

## (undated) DEVICE — CORONARY IMAGING CATHETER: Brand: OPTICROSS™ 6 HD

## (undated) DEVICE — ACE WRAP 4 IN STERILE

## (undated) DEVICE — INTRO SHEATH PEEL AWAY 9 FR

## (undated) DEVICE — PLUMEPEN PRO 10FT

## (undated) DEVICE — PAD GROUNDING ADULT

## (undated) DEVICE — THIN OFFSET (5.5 X 0.38 X 25.0MM)

## (undated) DEVICE — KERLIX BANDAGE ROLL: Brand: KERLIX

## (undated) DEVICE — DGW .035 FC J3MM 150CM TEF: Brand: EMERALD

## (undated) DEVICE — GAUZE SPONGES,USP TYPE VII GAUZE, 12 PLY: Brand: CURITY

## (undated) DEVICE — NEEDLE 25G X 1 1/2

## (undated) DEVICE — 4-PORT MANIFOLD: Brand: NEPTUNE 2

## (undated) DEVICE — GAUZE SPONGES,16 PLY: Brand: CURITY

## (undated) DEVICE — ADHESIVE SKIN HIGH VISCOSITY EXOFIN 1ML

## (undated) DEVICE — TRAY FOLEY 16FR SURESTEP TEMP SENS URIMETER STAT LOK

## (undated) DEVICE — CATH DIAG 5FR IMPULSE 110CM 6S PIG 145 ANG

## (undated) DEVICE — BETHLEHEM MAJOR GENERAL PACK: Brand: CARDINAL HEALTH

## (undated) DEVICE — POV-IOD SOLUTION 4OZ BT

## (undated) DEVICE — FRAZIER SUCTION INSTRUMENT 18 FR W/OBTURATOR, NO CONTROL VENT: Brand: FRAZIER

## (undated) DEVICE — GLOVE SRG LF STRL BGL SKNSNS 7 PF

## (undated) DEVICE — CARDIOVASCULAR SPLIT DRAPE: Brand: CONVERTORS

## (undated) DEVICE — BALLOON NC EUPHORA 3.5 X 20MM

## (undated) DEVICE — PINNACLE INTRODUCER SHEATH: Brand: PINNACLE

## (undated) DEVICE — RADIFOCUS OPTITORQUE ANGIOGRAPHIC CATHETER: Brand: OPTITORQUE

## (undated) DEVICE — INTENDED FOR TISSUE SEPARATION, AND OTHER PROCEDURES THAT REQUIRE A SHARP SURGICAL BLADE TO PUNCTURE OR CUT.: Brand: BARD-PARKER ® CARBON RIB-BACK BLADES

## (undated) DEVICE — SYRINGE 10ML LL

## (undated) DEVICE — CATH GUIDE LAUNCHER 6FR EBU 3.5

## (undated) DEVICE — DGW .035 FC J3MM 260CM TEF: Brand: EMERALD

## (undated) DEVICE — 3000CC GUARDIAN II: Brand: GUARDIAN

## (undated) DEVICE — BALLOON NC EUPHORA 4 X 20MM

## (undated) DEVICE — SUT SILK 0 CT-1 30 IN 424H

## (undated) DEVICE — INTENDED FOR TISSUE SEPARATION, AND OTHER PROCEDURES THAT REQUIRE A SHARP SURGICAL BLADE TO PUNCTURE OR CUT.: Brand: BARD-PARKER SAFETY BLADES SIZE 15, STERILE

## (undated) DEVICE — PADDING CAST 4 IN  COTTON STRL

## (undated) DEVICE — INTRO SHEATH PEEL AWAY 7FR

## (undated) DEVICE — THERMOFLECT BLANKET, L, 25EA                               TS THERMOFLECT BLANKET, 48" X 84", SILVER, 5/BG, 5 BG/CS NW: Brand: THERMOFLECT

## (undated) DEVICE — GLIDESHEATH BASIC HYDROPHILIC COATED INTRODUCER SHEATH: Brand: GLIDESHEATH

## (undated) DEVICE — HYDROPHILIC WOUND DRESSING WITH ZINC PLUS VITAMINS A AND B6.: Brand: DERMAGRAN®-B

## (undated) DEVICE — GUIDEWIRE VASC SAFARI2 0.035IN DIA XSMALL 275CML

## (undated) DEVICE — CURITY STRETCH BANDAGE: Brand: CURITY

## (undated) DEVICE — ABDOMINAL PAD: Brand: DERMACEA

## (undated) DEVICE — GUIDEWIRE LUNDERQUIST TSCMG-35-300-LESDC

## (undated) DEVICE — HEAVY DUTY TABLE COVER: Brand: CONVERTORS

## (undated) DEVICE — SUT VICRYL 3-0 SH 27 IN J416H

## (undated) DEVICE — SWAN-GANZ BIPOLAR PACING CATHETER: Brand: SWAN-GANZ

## (undated) DEVICE — SPONGE 4 X 4 XRAY 16 PLY STRL LF RFD

## (undated) DEVICE — SPONGE STICK WITH PVP-I: Brand: KENDALL

## (undated) DEVICE — 3M™ TEGADERM™ TRANSPARENT FILM DRESSING FRAME STYLE, 1626W, 4 IN X 4-3/4 IN (10 CM X 12 CM), 50/CT 4CT/CASE: Brand: 3M™ TEGADERM™

## (undated) DEVICE — GLOVE SRG BIOGEL 6.5